# Patient Record
Sex: FEMALE | Race: WHITE | NOT HISPANIC OR LATINO | Employment: OTHER | URBAN - METROPOLITAN AREA
[De-identification: names, ages, dates, MRNs, and addresses within clinical notes are randomized per-mention and may not be internally consistent; named-entity substitution may affect disease eponyms.]

---

## 2017-09-15 ENCOUNTER — HISTORICAL (OUTPATIENT)
Dept: ADMINISTRATIVE | Facility: HOSPITAL | Age: 52
End: 2017-09-15

## 2017-09-15 LAB
ALBUMIN SERPL-MCNC: 3.7 G/DL (ref 3.1–4.7)
ALP SERPL-CCNC: 83 IU/L (ref 40–104)
ALT (SGPT): 18 IU/L (ref 3–33)
AST SERPL-CCNC: 18 IU/L (ref 10–40)
BASOPHILS NFR BLD: 0 K/UL (ref 0–0.2)
BASOPHILS NFR BLD: 0.4 %
BILIRUB SERPL-MCNC: 0.6 MG/DL (ref 0.3–1)
BUN SERPL-MCNC: 14 MG/DL (ref 8–20)
CALCIUM SERPL-MCNC: 9.1 MG/DL (ref 7.7–10.4)
CHLORIDE: 101 MMOL/L (ref 98–110)
CO2 SERPL-SCNC: 24.4 MMOL/L (ref 22.8–31.6)
CREATININE: 0.66 MG/DL (ref 0.6–1.4)
EOSINOPHIL NFR BLD: 0.1 K/UL (ref 0–0.7)
EOSINOPHIL NFR BLD: 1.2 %
ERYTHROCYTE [DISTWIDTH] IN BLOOD BY AUTOMATED COUNT: 17.2 % (ref 11.7–14.9)
FERRITIN SERPL-MCNC: 67 NG/ML (ref 24–162)
GLUCOSE: 121 MG/DL (ref 70–99)
GRAN #: 8.2 K/UL (ref 1.4–6.5)
GRAN%: 73.1 %
HCT VFR BLD AUTO: 36.7 % (ref 36–48)
HGB BLD-MCNC: 11.2 G/DL (ref 12–15)
IMMATURE GRANS (ABS): 0.1 K/UL (ref 0–1)
IMMATURE GRANULOCYTES: 0.4 %
LYMPH #: 2.2 K/UL (ref 1.2–3.4)
LYMPH%: 19.4 %
MCH RBC QN AUTO: 23.9 PG (ref 25–35)
MCHC RBC AUTO-ENTMCNC: 30.5 G/DL (ref 31–36)
MCV RBC AUTO: 78.4 FL (ref 79–98)
MONO #: 0.6 K/UL (ref 0.1–0.6)
MONO%: 5.5 %
NUCLEATED RBCS: 0 %
PLATELET # BLD AUTO: 443 K/UL (ref 140–440)
PMV BLD AUTO: 9.4 FL (ref 8.8–12.7)
POTASSIUM SERPL-SCNC: 4.1 MMOL/L (ref 3.5–5)
PROT SERPL-MCNC: 7.9 G/DL (ref 6–8.2)
RBC # BLD AUTO: 4.68 M/UL (ref 3.5–5.5)
SODIUM: 136 MMOL/L (ref 134–144)
WBC # BLD AUTO: 11.3 K/UL (ref 5–10)

## 2018-01-31 ENCOUNTER — OFFICE VISIT (OUTPATIENT)
Dept: FAMILY MEDICINE | Facility: CLINIC | Age: 53
End: 2018-01-31
Payer: MEDICARE

## 2018-01-31 VITALS
TEMPERATURE: 98 F | OXYGEN SATURATION: 97 % | HEART RATE: 91 BPM | DIASTOLIC BLOOD PRESSURE: 80 MMHG | BODY MASS INDEX: 50.02 KG/M2 | HEIGHT: 64 IN | WEIGHT: 293 LBS | SYSTOLIC BLOOD PRESSURE: 114 MMHG | RESPIRATION RATE: 18 BRPM

## 2018-01-31 DIAGNOSIS — M54.42 CHRONIC BILATERAL LOW BACK PAIN WITH BILATERAL SCIATICA: ICD-10-CM

## 2018-01-31 DIAGNOSIS — I89.0 LYMPHEDEMA: ICD-10-CM

## 2018-01-31 DIAGNOSIS — F33.1 MODERATE EPISODE OF RECURRENT MAJOR DEPRESSIVE DISORDER: ICD-10-CM

## 2018-01-31 DIAGNOSIS — E11.9 TYPE 2 DIABETES MELLITUS WITHOUT COMPLICATION, WITHOUT LONG-TERM CURRENT USE OF INSULIN: ICD-10-CM

## 2018-01-31 DIAGNOSIS — R26.81 UNSTEADINESS ON FEET: ICD-10-CM

## 2018-01-31 DIAGNOSIS — G89.29 CHRONIC BILATERAL LOW BACK PAIN WITH BILATERAL SCIATICA: ICD-10-CM

## 2018-01-31 DIAGNOSIS — R26.9 GAIT DIFFICULTY: ICD-10-CM

## 2018-01-31 DIAGNOSIS — M54.41 CHRONIC BILATERAL LOW BACK PAIN WITH BILATERAL SCIATICA: ICD-10-CM

## 2018-01-31 DIAGNOSIS — E66.2 PICKWICKIAN SYNDROME: Primary | ICD-10-CM

## 2018-01-31 DIAGNOSIS — L73.2 HIDRADENITIS SUPPURATIVA: ICD-10-CM

## 2018-01-31 DIAGNOSIS — D50.0 IRON DEFICIENCY ANEMIA DUE TO CHRONIC BLOOD LOSS: ICD-10-CM

## 2018-01-31 DIAGNOSIS — E66.01 MORBID OBESITY: ICD-10-CM

## 2018-01-31 DIAGNOSIS — B37.2 CANDIDAL INTERTRIGO: ICD-10-CM

## 2018-01-31 DIAGNOSIS — R53.81 PHYSICAL DECONDITIONING: ICD-10-CM

## 2018-01-31 DIAGNOSIS — R53.83 OTHER FATIGUE: ICD-10-CM

## 2018-01-31 PROBLEM — D50.9 IRON DEFICIENCY ANEMIA: Status: ACTIVE | Noted: 2018-01-31

## 2018-01-31 PROCEDURE — 99204 OFFICE O/P NEW MOD 45 MIN: CPT | Mod: ,,, | Performed by: INTERNAL MEDICINE

## 2018-01-31 RX ORDER — CLINDAMYCIN PHOSPHATE 11.9 MG/ML
SOLUTION TOPICAL 2 TIMES DAILY
Qty: 60 ML | Refills: 3 | Status: SHIPPED | OUTPATIENT
Start: 2018-01-31 | End: 2018-04-17

## 2018-01-31 RX ORDER — CLINDAMYCIN HYDROCHLORIDE 300 MG/1
300 CAPSULE ORAL 3 TIMES DAILY
Qty: 30 CAPSULE | Refills: 0 | Status: CANCELLED | OUTPATIENT
Start: 2018-01-31

## 2018-01-31 RX ORDER — FLUCONAZOLE 100 MG/1
100 TABLET ORAL DAILY
Qty: 30 TABLET | Refills: 0 | Status: SHIPPED | OUTPATIENT
Start: 2018-01-31 | End: 2018-03-02

## 2018-01-31 RX ORDER — GABAPENTIN 100 MG/1
100 CAPSULE ORAL 2 TIMES DAILY
COMMUNITY
End: 2018-04-17

## 2018-01-31 RX ORDER — BUPROPION HYDROCHLORIDE 150 MG/1
150 TABLET ORAL DAILY
Qty: 30 TABLET | Refills: 5 | Status: SHIPPED | OUTPATIENT
Start: 2018-01-31 | End: 2019-03-20 | Stop reason: CLARIF

## 2018-01-31 RX ORDER — METFORMIN HYDROCHLORIDE 500 MG/1
500 TABLET ORAL 2 TIMES DAILY WITH MEALS
COMMUNITY
End: 2018-02-26 | Stop reason: SDUPTHER

## 2018-01-31 RX ORDER — FERROUS SULFATE 325(65) MG
325 TABLET ORAL
COMMUNITY
End: 2021-08-26

## 2018-01-31 NOTE — Clinical Note
Can you try to get me a number for [facility] lymphedema physical therapy?  Also, Ms. Wright said [facility] one of [facility] med[facility] for cost???

## 2018-01-31 NOTE — PROGRESS NOTES
"                                                NEW ADULT PATIENT NOTE    Subjective:     Chief Complaint:  Establish Care      History of Present Illness:   Adriana Zaragoza is a 52 y.o. female who presents to establish care in this clinic, though she is well known to me from my previous practice. She has a history of morbid obesity, type 2 diabetes, low back pain, Obesity hypoventilation syndrome and massive localized lymphedema of R thigh. She has a few complaints today.  Fatigue- She felt she was doing well in terms of getting around, losing weight, until a month or so ago when she started feeling very fatigued. She states she sleeps 8 hours per night (though typically sleeps from 3AM to 11AM) but does not wake refreshed. She denies SOB, Cp, diaphoresis.  She states that her BiPAP was "reposessed" a few months ago and admits that is when these symptoms started. She has a h/o iron def anemia 2/2 menorrhagia, but her vaginal bleeding has decreased significantly this year.  Rash- Pt states she has a recurrent rash under her breasts and under her pannus. Dr. Rey had previous treated this with a combination of fluconazole and clindamycin (both Po).  She denies any rash under her arms.        Past Medical History:   Diagnosis Date    Allergy     Anemia     Asthma     COPD (chronic obstructive pulmonary disease)     Deep vein thrombosis     Depression     Diabetes mellitus, type 2     Encounter for blood transfusion     Heart murmur     Neuromuscular disorder        Family History   Problem Relation Age of Onset    Heart disease Mother     Diabetes Mother     Arthritis Mother     Depression Mother     Cancer Father     COPD Father     Arthritis Maternal Grandmother     Cancer Maternal Grandmother     Cancer Maternal Grandfather     Cancer Paternal Grandmother     Kidney disease Paternal Grandmother     Diabetes Paternal Grandmother     Diabetes Paternal Grandfather     Hyperlipidemia " Paternal Grandfather        Social History     Social History    Marital status: Legally      Spouse name: N/A    Number of children: N/A    Years of education: N/A     Occupational History    Not on file.     Social History Main Topics    Smoking status: Former Smoker    Smokeless tobacco: Never Used    Alcohol use No    Drug use: No    Sexual activity: No     Other Topics Concern    Not on file     Social History Narrative    No narrative on file       ROS:  Review of Systems   Constitutional: Positive for fatigue. Negative for activity change, appetite change, diaphoresis and unexpected weight change.   HENT: Negative for congestion, ear pain, rhinorrhea, sinus pain, sinus pressure, sore throat and trouble swallowing.    Eyes: Negative for pain, redness and visual disturbance.   Respiratory: Negative for cough, chest tightness, shortness of breath and wheezing.    Cardiovascular: Negative for chest pain and palpitations.   Gastrointestinal: Negative for abdominal pain, constipation, diarrhea, nausea and vomiting.   Endocrine: Negative for cold intolerance, heat intolerance, polydipsia and polyuria.   Genitourinary: Positive for vaginal bleeding. Negative for difficulty urinating, dysuria, flank pain, frequency, pelvic pain and vaginal discharge.   Musculoskeletal: Positive for arthralgias and back pain.   Skin: Positive for rash.   Neurological: Negative for dizziness, seizures, syncope, weakness and headaches.   Hematological: Negative for adenopathy.   Psychiatric/Behavioral: Positive for sleep disturbance. Negative for confusion, dysphoric mood and suicidal ideas. The patient is not nervous/anxious.           Current Outpatient Prescriptions:     ferrous sulfate 325 mg (65 mg iron) Tab tablet, Take 325 mg by mouth daily with breakfast., Disp: , Rfl:     metFORMIN (GLUCOPHAGE) 500 MG tablet, Take 500 mg by mouth 2 (two) times daily with meals., Disp: , Rfl:     buPROPion (WELLBUTRIN XL)  "150 MG TB24 tablet, Take 1 tablet (150 mg total) by mouth once daily., Disp: 30 tablet, Rfl: 5    clindamycin (CLEOCIN T) 1 % external solution, Apply topically 2 (two) times daily., Disp: 60 mL, Rfl: 3    fluconazole (DIFLUCAN) 100 MG tablet, Take 1 tablet (100 mg total) by mouth once daily., Disp: 30 tablet, Rfl: 0    gabapentin (NEURONTIN) 100 MG capsule, Take 100 mg by mouth 2 (two) times daily., Disp: , Rfl:         Objective:       Physical Examination:     /80   Pulse 91   Temp 98.2 °F (36.8 °C) (Oral)   Resp 18   Ht 5' 4" (1.626 m)   Wt (!) 205 kg (452 lb)   SpO2 97%   BMI 77.59 kg/m²     Physical Exam   Constitutional:   Morbidly obese woman using walker to ambulate, nasal cannula O2 in place, in NAD   Eyes: Pupils are equal, round, and reactive to light.   Cardiovascular: Regular rhythm, normal heart sounds and intact distal pulses.    No murmur heard.  Pulmonary/Chest: Effort normal and breath sounds normal. She has no wheezes. She has no rales.   Musculoskeletal: She exhibits edema (B LE ).   Skin:   Confluent areas of erythema with some papules, pustules under B breasts, pannus  Large pedunculated lymphadematous mass arising from R medial thigh, no active ulcerations/infections, approx 12 inches in diameter         Assessment/Plan:   Adriana is a 52 y.o. female here to establish care.     Problem List Items Addressed This Visit        Psychiatric    Moderate episode of recurrent major depressive disorder    Current Assessment & Plan     Trial of wellbutrin.         Relevant Medications    buPROPion (WELLBUTRIN XL) 150 MG TB24 tablet       Derm    Hidradenitis suppurativa    Current Assessment & Plan     Rx topical clindamycin.  Unclear exact nature of rash under breasts and pannus- most c/w candida intertrigo but presumably Dr. Rey felt there was a bacterial component as well. New ID referral placed, may consider derm referral instead.          Relevant Medications    clindamycin " "(CLEOCIN T) 1 % external solution    Other Relevant Orders    Ambulatory referral to Infectious Disease       ID    Candidal intertrigo    Relevant Medications    fluconazole (DIFLUCAN) 100 MG tablet    Other Relevant Orders    Ambulatory referral to Infectious Disease       Oncology    Iron deficiency anemia    Current Assessment & Plan     Recheck CBC, iron panel, B12.         Relevant Orders    CBC auto differential    Ferritin    Iron and TIBC       Endocrine    Type 2 diabetes mellitus without complication    Current Assessment & Plan     Continue metformin. HbA1c ordered.          Relevant Orders    Hemoglobin A1c    Morbid obesity    Current Assessment & Plan     Pt limits her caloric intake to around 800 kcal a day and has lost around 50 lbs in the time I have been her doctor.  Pt would prefer to "do it on her own" but we have discussed the possibility of bariatric surgery to help her lose weight more quickly. She is planning to contact Dr. Sigrid Hernandez to restart the process.            Orthopedic    Chronic low back pain with bilateral sciatica    Current Assessment & Plan     Pt not taking gabapentin as it doesn't help and causes sleepiness.  Pt aware weight loss is main goal/treatment at this point.  Will restart physical therapy with home health.             Other    Lymphedema    Current Assessment & Plan     Pt's massive localized lymphedema on her R leg severely limits her mobility and is painful.  Evidence is lacking for best course of treatment, though most literature recommends weight loss and traditional lymphedema treatments as first line, surgery only after weight loss.  Will discuss pt with lymphedema clinic, may be able to arrange for treatment with home health.          Relevant Orders    TSH    Pickwickian syndrome - Primary    Current Assessment & Plan     Pt referred back to Dr. Gibson. Continue home O2, needs new sleep study to get new BiPAP machine.          Relevant Orders    " Ambulatory referral to Pulmonology    Other fatigue    Relevant Orders    Vitamin B12    TSH      Other Visit Diagnoses     Unsteadiness on feet         Relevant Orders    Vitamin B12          Health Maintenance   Topic Date Due    Hepatitis C Screening  1965    TETANUS VACCINE  11/23/1982    Mammogram  11/23/2004    Colonoscopy  11/23/2014    Influenza Vaccine  08/01/2017    Pap Smear with HPV Cotest  10/07/2018    Lipid Panel  09/08/2021       Discussion:     Follow-up in about 3 months (around 4/30/2018).    Goals     None          Electronically signed by Jessica Baeza

## 2018-02-02 NOTE — ASSESSMENT & PLAN NOTE
Rx topical clindamycin.  Unclear exact nature of rash under breasts and pannus- most c/w candida intertrigo but presumably Dr. Rey felt there was a bacterial component as well. New ID referral placed, may consider derm referral instead.

## 2018-02-02 NOTE — ASSESSMENT & PLAN NOTE
Pt referred back to Dr. Gibson. Continue home O2, needs new sleep study to get new BiPAP machine.

## 2018-02-02 NOTE — ASSESSMENT & PLAN NOTE
Pt's massive localized lymphedema on her R leg severely limits her mobility and is painful.  Evidence is lacking for best course of treatment, though most literature recommends weight loss and traditional lymphedema treatments as first line, surgery only after weight loss.  Will discuss pt with lymphedema clinic, may be able to arrange for treatment with home health.

## 2018-02-02 NOTE — ASSESSMENT & PLAN NOTE
"Pt limits her caloric intake to around 800 kcal a day and has lost around 50 lbs in the time I have been her doctor.  Pt would prefer to "do it on her own" but we have discussed the possibility of bariatric surgery to help her lose weight more quickly. She is planning to contact Dr. Sigrid Hernandez to restart the process.  "

## 2018-02-02 NOTE — ASSESSMENT & PLAN NOTE
Pt not taking gabapentin as it doesn't help and causes sleepiness.  Pt aware weight loss is main goal/treatment at this point.  Will restart physical therapy with home health.

## 2018-02-07 LAB
% SATURATION: 7 %
ALBUMIN SERPL-MCNC: 3.6 G/DL (ref 3.1–4.7)
ALP SERPL-CCNC: 78 IU/L (ref 40–104)
ALT (SGPT): 19 IU/L (ref 3–33)
AST SERPL-CCNC: 17 IU/L (ref 10–40)
BASOPHILS NFR BLD: 0.1 K/UL (ref 0–0.2)
BASOPHILS NFR BLD: 0.4 %
BILIRUB SERPL-MCNC: 0.5 MG/DL (ref 0.3–1)
BUN SERPL-MCNC: 17 MG/DL (ref 8–20)
CALCIUM SERPL-MCNC: 9 MG/DL (ref 7.7–10.4)
CHLORIDE: 101 MMOL/L (ref 98–110)
CO2 SERPL-SCNC: 25.9 MMOL/L (ref 22.8–31.6)
CREATININE: 0.6 MG/DL (ref 0.6–1.4)
EOSINOPHIL NFR BLD: 0.1 K/UL (ref 0–0.7)
EOSINOPHIL NFR BLD: 1.1 %
ERYTHROCYTE [DISTWIDTH] IN BLOOD BY AUTOMATED COUNT: 16.8 % (ref 11.7–14.9)
FERRITIN SERPL-MCNC: 101 NG/ML (ref 24–162)
GLUCOSE: 93 MG/DL (ref 70–99)
GRAN #: 9 K/UL (ref 1.4–6.5)
GRAN%: 70.8 %
HCT VFR BLD AUTO: 35.3 % (ref 36–48)
HGB BLD-MCNC: 10.6 G/DL (ref 12–15)
IMMATURE GRANS (ABS): 0.1 K/UL (ref 0–1)
IMMATURE GRANULOCYTES: 0.4 %
IRON: 26 MCG/DL (ref 24–162)
LYMPH #: 2.5 K/UL (ref 1.2–3.4)
LYMPH%: 19.8 %
MCH RBC QN AUTO: 24.7 PG (ref 25–35)
MCHC RBC AUTO-ENTMCNC: 30 G/DL (ref 31–36)
MCV RBC AUTO: 82.1 FL (ref 79–98)
MONO #: 1 K/UL (ref 0.1–0.6)
MONO%: 7.5 %
NUCLEATED RBCS: 0 %
PLATELET # BLD AUTO: 374 K/UL (ref 140–440)
PMV BLD AUTO: 9.6 FL (ref 8.8–12.7)
POTASSIUM SERPL-SCNC: 3.9 MMOL/L (ref 3.5–5)
PROT SERPL-MCNC: 7.8 G/DL (ref 6–8.2)
RBC # BLD AUTO: 4.3 M/UL (ref 3.5–5.5)
SODIUM: 139 MMOL/L (ref 134–144)
TOTAL IRON BINDING CAPACITY: 357 MCG/DL (ref 177–435)
VITAMIN B12: 136 PG/ML (ref 62–940)
WBC # BLD AUTO: 12.7 K/UL (ref 5–10)

## 2018-02-08 LAB — HBA1C MFR BLD: 6 % (ref 3.1–6.5)

## 2018-02-22 ENCOUNTER — TELEPHONE (OUTPATIENT)
Dept: FAMILY MEDICINE | Facility: CLINIC | Age: 53
End: 2018-02-22

## 2018-02-22 DIAGNOSIS — E66.01 MORBID OBESITY: ICD-10-CM

## 2018-02-22 DIAGNOSIS — R79.89 LOW VITAMIN B12 LEVEL: Primary | ICD-10-CM

## 2018-02-22 DIAGNOSIS — R53.83 OTHER FATIGUE: ICD-10-CM

## 2018-02-22 NOTE — TELEPHONE ENCOUNTER
Emi from [facility] called to speak with you in regards to patient, she does not have lymphadema specialist that goes into the home, needs guidance about Pt. She said she spoke with you before [phone].

## 2018-02-23 NOTE — TELEPHONE ENCOUNTER
D/w patient that lymphedema in home not available.  Also discussed referral to Dr. Styles for bariatric surgery.

## 2018-02-26 RX ORDER — METFORMIN HYDROCHLORIDE 500 MG/1
500 TABLET ORAL 2 TIMES DAILY WITH MEALS
Qty: 30 TABLET | Refills: 5 | Status: SHIPPED | OUTPATIENT
Start: 2018-02-26 | End: 2018-09-24 | Stop reason: SDUPTHER

## 2018-03-02 LAB — T4 FREE SP9 P CHAL SERPL-SCNC: 0.75 NG/DL (ref 0.45–1.27)

## 2018-03-12 NOTE — TELEPHONE ENCOUNTER
Please call patient with normal results. TSH never resulted, can you check into whether they ever faisal it? Also check if pt heard from [facility] Bariatric surgery to schedule appointment.

## 2018-04-17 ENCOUNTER — OFFICE VISIT (OUTPATIENT)
Dept: FAMILY MEDICINE | Facility: CLINIC | Age: 53
End: 2018-04-17
Payer: MEDICARE

## 2018-04-17 VITALS
DIASTOLIC BLOOD PRESSURE: 68 MMHG | OXYGEN SATURATION: 98 % | RESPIRATION RATE: 18 BRPM | SYSTOLIC BLOOD PRESSURE: 116 MMHG | HEART RATE: 96 BPM | WEIGHT: 293 LBS | TEMPERATURE: 98 F | BODY MASS INDEX: 80.61 KG/M2

## 2018-04-17 DIAGNOSIS — L73.2 HIDRADENITIS SUPPURATIVA: ICD-10-CM

## 2018-04-17 DIAGNOSIS — I89.0 LYMPHEDEMA: ICD-10-CM

## 2018-04-17 DIAGNOSIS — E66.01 MORBID OBESITY: ICD-10-CM

## 2018-04-17 DIAGNOSIS — Z12.39 BREAST CANCER SCREENING: Primary | ICD-10-CM

## 2018-04-17 DIAGNOSIS — D50.0 IRON DEFICIENCY ANEMIA DUE TO CHRONIC BLOOD LOSS: ICD-10-CM

## 2018-04-17 DIAGNOSIS — B37.2 CANDIDAL INTERTRIGO: ICD-10-CM

## 2018-04-17 DIAGNOSIS — E11.9 TYPE 2 DIABETES MELLITUS WITHOUT COMPLICATION, WITHOUT LONG-TERM CURRENT USE OF INSULIN: ICD-10-CM

## 2018-04-17 DIAGNOSIS — F33.1 MODERATE EPISODE OF RECURRENT MAJOR DEPRESSIVE DISORDER: ICD-10-CM

## 2018-04-17 PROCEDURE — 99213 OFFICE O/P EST LOW 20 MIN: CPT | Mod: ,,, | Performed by: INTERNAL MEDICINE

## 2018-04-17 NOTE — PROGRESS NOTES
ADULT FOLLOW-UP VISIT    Subjective:     Chief Complaint:  Follow-up      51 yo woman here for follow-up.    Depression/fatigue- Pt unable to afford wellbutrin so didn't try it yet.  Did not go see Dr. Yoo yet.    Rash- Combination of antifungal and clindamycin topical helped. Pt also saw Dermatologist and they agreed likely fungal. Gave another antifungal cream and did UV light treatment.  Has f/u appointment scheduled.  Overall skin is better but flares up when pt does physical therapy.    Morbid obesity/lymphedema- Pt is seeing Dr. Francis at [facility], planning to have plastic surgeon come to next appointment and set a date for removal of left thigh MLL.             Ms. Zaragoza  has a past medical history of Allergy; Anemia; Asthma; COPD (chronic obstructive pulmonary disease); Deep vein thrombosis; Depression; Diabetes mellitus, type 2; Encounter for blood transfusion; Heart murmur; and Neuromuscular disorder.    Family history and social history are reviewed and there are no significant changes.     ROS:  Review of Systems   Constitutional: Negative for diaphoresis and fatigue.   Respiratory: Negative for shortness of breath and wheezing.    Cardiovascular: Positive for chest pain and leg swelling. Negative for palpitations.   Gastrointestinal: Negative for abdominal pain, blood in stool and constipation.   Genitourinary: Negative for menstrual problem and vaginal bleeding.   Musculoskeletal: Positive for arthralgias and back pain. Negative for joint swelling and neck pain.          Current Outpatient Prescriptions:     ferrous sulfate 325 mg (65 mg iron) Tab tablet, Take 325 mg by mouth daily with breakfast., Disp: , Rfl:     metFORMIN (GLUCOPHAGE) 500 MG tablet, Take 1 tablet (500 mg total) by mouth 2 (two) times daily with meals., Disp: 30 tablet, Rfl: 5    buPROPion (WELLBUTRIN XL) 150 MG TB24 tablet, Take 1 tablet (150 mg total) by mouth once daily., Disp: 30  tablet, Rfl: 5      Objective:     Physical Examination:     /68   Pulse 96   Temp 97.8 °F (36.6 °C) (Oral)   Resp 18   Wt (!) 213 kg (469 lb 9.6 oz)   SpO2 98%   BMI 80.61 kg/m²     Physical Exam   Constitutional:   Morbidly obese woman using walker to ambulate, nasal cannula O2 in place, in NAD   Eyes: Pupils are equal, round, and reactive to light.   Cardiovascular: Regular rhythm, normal heart sounds and intact distal pulses.    No murmur heard.  Pulmonary/Chest: Effort normal and breath sounds normal. She has no wheezes. She has no rales.   Musculoskeletal: She exhibits edema (B LE ).   Skin:   Confluent areas of erythema with some papules under pannus, improved from last visit  Large pedunculated lymphadematous mass arising from R medial thigh, no active ulcerations/infections, approx 12 inches in diameter       Assessment/Plan:   Adriana is a 52 y.o. female here for follow-up.    Problem List Items Addressed This Visit        Psychiatric    Moderate episode of recurrent major depressive disorder    Current Assessment & Plan     Gave pt a coupon for wellbutrin.            Derm    Hidradenitis suppurativa    Current Assessment & Plan     F/u with derm.            ID    Candidal intertrigo    Current Assessment & Plan     Continue topical antifungals, f/u with derm.             Oncology    Iron deficiency anemia    Current Assessment & Plan     Continue iron.  Discussed with patient that she will need a colonoscopy to r/o occult GI bleed.            Endocrine    Type 2 diabetes mellitus without complication    Current Assessment & Plan     HbA1c 6.0% 1/2018. Continue metformin.          Morbid obesity    Current Assessment & Plan     F/u with Dr. Styles at [facility], planned for gastric sleeve in the next 6 months.            Other    Lymphedema    Current Assessment & Plan     Scheduled for plastic surgery consult and possible surgical removal of massive localized lymphedema of LLE.            Other  Visit Diagnoses     Breast cancer screening    -  Primary    Relevant Orders    Mammo Digital Screening Bilat with CAD          Health Maintenance   Topic Date Due    Hepatitis C Screening  1965    Foot Exam  11/23/1974    Eye Exam  11/23/1974    TETANUS VACCINE  11/23/1982    Pneumococcal PPSV23 (Medium Risk) (1) 11/23/1982    Mammogram  11/23/2004    Colonoscopy  11/23/2014    Urine Microalbumin  05/16/2017    Influenza Vaccine  08/01/2017    Hemoglobin A1c  08/07/2018    Lipid Panel  09/15/2018    Pap Smear with HPV Cotest  10/07/2018           Discussion:     Follow-up in about 3 months (around 7/17/2018) for f/u weight, anemia.    Goals     None          Electronically signed by Jessica Baeza

## 2018-04-18 NOTE — ASSESSMENT & PLAN NOTE
F/u with Dr. Styles at [facility], planned for gastric sleeve in the next 6 months.

## 2018-04-18 NOTE — ASSESSMENT & PLAN NOTE
Scheduled for plastic surgery consult and possible surgical removal of massive localized lymphedema of LLE.

## 2018-05-29 DIAGNOSIS — G47.33 OSA (OBSTRUCTIVE SLEEP APNEA): Primary | ICD-10-CM

## 2018-06-06 ENCOUNTER — TELEPHONE (OUTPATIENT)
Dept: SLEEP MEDICINE | Facility: OTHER | Age: 53
End: 2018-06-06

## 2018-06-13 ENCOUNTER — TELEPHONE (OUTPATIENT)
Dept: SLEEP MEDICINE | Facility: OTHER | Age: 53
End: 2018-06-13

## 2018-06-21 ENCOUNTER — TELEPHONE (OUTPATIENT)
Dept: SLEEP MEDICINE | Facility: OTHER | Age: 53
End: 2018-06-21

## 2018-06-22 ENCOUNTER — HOSPITAL ENCOUNTER (OUTPATIENT)
Dept: SLEEP MEDICINE | Facility: OTHER | Age: 53
Discharge: HOME | End: 2018-06-22
Attending: SURGERY
Payer: MEDICARE

## 2018-06-22 DIAGNOSIS — G47.33 OSA (OBSTRUCTIVE SLEEP APNEA): ICD-10-CM

## 2018-06-22 DIAGNOSIS — G47.20 SLEEP STAGE DYSFUNCTION: ICD-10-CM

## 2018-06-22 PROCEDURE — 95806 SLEEP STUDY UNATT&RESP EFFT: CPT | Mod: 26,,, | Performed by: PSYCHIATRY & NEUROLOGY

## 2018-06-22 PROCEDURE — 95800 SLP STDY UNATTENDED: CPT

## 2018-07-17 ENCOUNTER — OFFICE VISIT (OUTPATIENT)
Dept: FAMILY MEDICINE | Facility: CLINIC | Age: 53
End: 2018-07-17
Payer: MEDICARE

## 2018-07-17 VITALS
WEIGHT: 293 LBS | HEIGHT: 64 IN | TEMPERATURE: 98 F | BODY MASS INDEX: 50.02 KG/M2 | SYSTOLIC BLOOD PRESSURE: 128 MMHG | HEART RATE: 91 BPM | OXYGEN SATURATION: 98 % | DIASTOLIC BLOOD PRESSURE: 70 MMHG | RESPIRATION RATE: 18 BRPM

## 2018-07-17 DIAGNOSIS — E11.9 TYPE 2 DIABETES MELLITUS WITHOUT COMPLICATION, WITHOUT LONG-TERM CURRENT USE OF INSULIN: Primary | ICD-10-CM

## 2018-07-17 DIAGNOSIS — R30.0 DYSURIA: ICD-10-CM

## 2018-07-17 DIAGNOSIS — B37.2 CANDIDAL INTERTRIGO: ICD-10-CM

## 2018-07-17 DIAGNOSIS — M79.10 MYALGIA: ICD-10-CM

## 2018-07-17 DIAGNOSIS — R50.9 FEVER, UNSPECIFIED FEVER CAUSE: ICD-10-CM

## 2018-07-17 DIAGNOSIS — I89.0 LYMPHEDEMA: ICD-10-CM

## 2018-07-17 DIAGNOSIS — L73.2 HIDRADENITIS SUPPURATIVA: ICD-10-CM

## 2018-07-17 PROCEDURE — 99214 OFFICE O/P EST MOD 30 MIN: CPT | Mod: ,,, | Performed by: INTERNAL MEDICINE

## 2018-07-17 RX ORDER — CLINDAMYCIN PHOSPHATE 11.9 MG/ML
SOLUTION TOPICAL 2 TIMES DAILY
Qty: 60 ML | Refills: 5 | Status: SHIPPED | OUTPATIENT
Start: 2018-07-17 | End: 2019-03-20 | Stop reason: CLARIF

## 2018-07-17 NOTE — PROGRESS NOTES
"                                    ADULT FOLLOW-UP VISIT    Subjective:     Chief Complaint:  Follow-up      51 yo woman here for routine follow-up. She reports that she was very sick about 10 days ago with shaking chills and body aches.  She also had some foul smelling urine and "burning" in her pelvis. She states she was so sick that several family members urged her to go to the hospital but she was "too tired to do anything" and got in her bed and slept for 22 hours.  After that she felt somewhat better, but continues to feel very "worn out" and having the urinary symptoms.  She denies any other signs of infection- no URI symptoms, no cough/shortness of breath, no diarrhea/n/v, no skin infections.       Fever    Associated symptoms include muscle aches, sleepiness and urinary pain. Pertinent negatives include no chest pain, congestion, coughing, diarrhea, ear pain, headaches, nausea, rash, sore throat, vomiting or wheezing.         [unfilled]  has a past medical history of Allergy; Anemia; Asthma; COPD (chronic obstructive pulmonary disease); Deep vein thrombosis; Depression; Diabetes mellitus, type 2; Encounter for blood transfusion; Heart murmur; and Neuromuscular disorder.    Family history and social history are reviewed and there are no significant changes.     ROS:  Review of Systems   Constitutional: Positive for fever.   HENT: Negative for congestion, ear pain and sore throat.    Respiratory: Negative for cough and wheezing.    Cardiovascular: Negative for chest pain.   Gastrointestinal: Negative for diarrhea, nausea and vomiting.   Genitourinary: Positive for dysuria.   Skin: Negative for rash.   Neurological: Negative for headaches.          Current Outpatient Prescriptions:     buPROPion (WELLBUTRIN XL) 150 MG TB24 tablet, Take 1 tablet (150 mg total) by mouth once daily., Disp: 30 tablet, Rfl: 5    ferrous sulfate 325 mg (65 mg iron) Tab tablet, Take 325 mg by mouth daily with breakfast., Disp: , " "Rfl:     metFORMIN (GLUCOPHAGE) 500 MG tablet, Take 1 tablet (500 mg total) by mouth 2 (two) times daily with meals., Disp: 30 tablet, Rfl: 5    clindamycin (CLEOCIN T) 1 % external solution, Apply topically 2 (two) times daily., Disp: 60 mL, Rfl: 5      Objective:     Physical Examination:     /70   Pulse 91   Temp 97.8 °F (36.6 °C) (Oral)   Resp 18   Ht 5' 4" (1.626 m)   Wt (!) 207.4 kg (457 lb 3 oz)   SpO2 98%   BMI 78.48 kg/m²     Physical Exam   Constitutional:   Morbidly obese woman using walker to ambulate, nasal cannula O2 in place, in NAD   Eyes: Pupils are equal, round, and reactive to light.   Cardiovascular: Regular rhythm, normal heart sounds and intact distal pulses.    No murmur heard.  Pulmonary/Chest: Effort normal and breath sounds normal. She has no wheezes. She has no rales.   Musculoskeletal: She exhibits edema (B LE ).   Skin:   Confluent areas of erythema with some papules under pannus  Large pedunculated lymphadematous mass arising from R medial thigh, no active ulcerations/infections, approx 12 inches in diameter       Assessment/Plan:   Adriana is a 52 y.o. female here for follow-up.    Problem List Items Addressed This Visit        Derm    Hidradenitis suppurativa    Current Assessment & Plan     F/u with derm. Restart topical clindamycin.         Relevant Medications    clindamycin (CLEOCIN T) 1 % external solution       Renal/    Dysuria    Relevant Orders    Urinalysis (Completed)    Urine culture (Completed)       ID    Candidal intertrigo       Endocrine    Type 2 diabetes mellitus without complication - Primary    Current Assessment & Plan     HbA1c 6.0% 1/2018. Continue metformin. HbA1c ordered.          Relevant Orders    Hemoglobin A1c       Orthopedic    Myalgia    Relevant Orders    Comprehensive metabolic panel    CK       Other    Lymphedema    Current Assessment & Plan     Has seen plastic surgeon and gen surgeon at [facility] for surgical removal of massive " localized lymphedema of LLE. Planned for CT of mass and vascular surgery consult.  Also needs pre-op evaluation by cardiology and endocrine.          Fever    Current Assessment & Plan     Unclear cause of recent febrile illness. U/a bland but will send urine for culture. Check CBC, CMP, CK.           Relevant Orders    Comprehensive metabolic panel    CBC auto differential          Health Maintenance   Topic Date Due    Hepatitis C Screening  1965    Foot Exam  11/23/1974    Eye Exam  11/23/1974    Mammogram  11/23/2004    Colonoscopy  11/23/2014    Urine Microalbumin  05/16/2017    Influenza Vaccine  08/01/2018    Hemoglobin A1c  08/07/2018    Lipid Panel  09/15/2018    Pap Smear with HPV Cotest  10/07/2018    TETANUS VACCINE  05/23/2027    Pneumococcal PPSV23 (Medium Risk) (2) 11/23/2029           Discussion:     Follow-up in about 3 months (around 10/17/2018).    Goals     None          Electronically signed by Jessica Baeza

## 2018-07-18 NOTE — ASSESSMENT & PLAN NOTE
Unclear cause of recent febrile illness. U/a bland but will send urine for culture. Check CBC, CMP, CK.

## 2018-07-18 NOTE — ASSESSMENT & PLAN NOTE
Has seen plastic surgeon and gen surgeon at [facility] for surgical removal of massive localized lymphedema of LLE. Planned for CT of mass and vascular surgery consult.  Also needs pre-op evaluation by cardiology and endocrine.

## 2018-07-19 LAB
ALBUMIN SERPL-MCNC: 3.4 G/DL (ref 3.1–4.7)
ALP SERPL-CCNC: 82 IU/L (ref 40–104)
ALT (SGPT): 25 IU/L (ref 3–33)
AST SERPL-CCNC: 19 IU/L (ref 10–40)
BILIRUB SERPL-MCNC: 0.5 MG/DL (ref 0.3–1)
BUN SERPL-MCNC: 16 MG/DL (ref 8–20)
CALCIUM SERPL-MCNC: 9 MG/DL (ref 7.7–10.4)
CHLORIDE: 100 MMOL/L (ref 98–110)
CK MB CFR SERPL ELPH: 0.9 NG/ML (ref 0–4.5)
CO2 SERPL-SCNC: 27 MMOL/L (ref 22.8–31.6)
CREATININE: 0.57 MG/DL (ref 0.6–1.4)
GLUCOSE: 140 MG/DL (ref 70–99)
HBA1C MFR BLD: 6.5 % (ref 3.1–6.5)
POTASSIUM SERPL-SCNC: 4.2 MMOL/L (ref 3.5–5)
PROT SERPL-MCNC: 7.8 G/DL (ref 6–8.2)
SODIUM: 139 MMOL/L (ref 134–144)

## 2018-07-31 NOTE — PROGRESS NOTES
Please call patient with normal results. If she is having any more urinary symptoms we can try to redo the urine culture as it looked contaminated. Everything else was okay. HbA1c up to 6.5%, have her increase metformin back to BID if tolerated.

## 2018-08-27 ENCOUNTER — TELEPHONE (OUTPATIENT)
Dept: FAMILY MEDICINE | Facility: CLINIC | Age: 53
End: 2018-08-27

## 2018-08-27 DIAGNOSIS — N28.9 KIDNEY LESION, NATIVE, RIGHT: Primary | ICD-10-CM

## 2018-08-27 NOTE — TELEPHONE ENCOUNTER
Pt brought copy of CT pelvis done at [facility] with incidentally noted R renal lesion. U/s ordered for further evaluation.

## 2018-09-13 NOTE — PROGRESS NOTES
Please call patient with normal results.  Lesion noted on CT is a benign cyst which has been present since 2013 and is unchanged.

## 2018-09-24 RX ORDER — METFORMIN HYDROCHLORIDE 500 MG/1
TABLET ORAL
Qty: 60 TABLET | Refills: 5 | Status: SHIPPED | OUTPATIENT
Start: 2018-09-24 | End: 2019-03-20 | Stop reason: CLARIF

## 2019-03-20 ENCOUNTER — HOSPITAL ENCOUNTER (EMERGENCY)
Facility: HOSPITAL | Age: 54
Discharge: OTHER INSTITUTION | End: 2019-03-21
Attending: EMERGENCY MEDICINE
Payer: MEDICARE

## 2019-03-20 DIAGNOSIS — R10.84 ABDOMINAL PAIN, DIFFUSE: ICD-10-CM

## 2019-03-20 DIAGNOSIS — R11.2 INTRACTABLE VOMITING WITH NAUSEA, UNSPECIFIED VOMITING TYPE: Primary | ICD-10-CM

## 2019-03-20 LAB
ALBUMIN SERPL BCP-MCNC: 3.7 G/DL
ALP SERPL-CCNC: 78 U/L
ALT SERPL W/O P-5'-P-CCNC: 35 U/L
ANION GAP SERPL CALC-SCNC: 10 MMOL/L
AST SERPL-CCNC: 28 U/L
BASOPHILS # BLD AUTO: 0 K/UL
BASOPHILS NFR BLD: 0.1 %
BILIRUB SERPL-MCNC: 0.5 MG/DL
BILIRUB UR QL STRIP: NEGATIVE
BUN SERPL-MCNC: 11 MG/DL
CALCIUM SERPL-MCNC: 10.1 MG/DL
CHLORIDE SERPL-SCNC: 102 MMOL/L
CLARITY UR: CLEAR
CO2 SERPL-SCNC: 27 MMOL/L
COLOR UR: YELLOW
CREAT SERPL-MCNC: 0.7 MG/DL
DIFFERENTIAL METHOD: ABNORMAL
EOSINOPHIL # BLD AUTO: 0 K/UL
EOSINOPHIL NFR BLD: 0.1 %
ERYTHROCYTE [DISTWIDTH] IN BLOOD BY AUTOMATED COUNT: 17.7 %
EST. GFR  (AFRICAN AMERICAN): >60 ML/MIN/1.73 M^2
EST. GFR  (NON AFRICAN AMERICAN): >60 ML/MIN/1.73 M^2
GLUCOSE SERPL-MCNC: 154 MG/DL
GLUCOSE UR QL STRIP: NEGATIVE
HCT VFR BLD AUTO: 37.3 %
HGB BLD-MCNC: 11.7 G/DL
HGB UR QL STRIP: NEGATIVE
KETONES UR QL STRIP: ABNORMAL
LEUKOCYTE ESTERASE UR QL STRIP: NEGATIVE
LIPASE SERPL-CCNC: 8 U/L
LYMPHOCYTES # BLD AUTO: 1.1 K/UL
LYMPHOCYTES NFR BLD: 9.3 %
MCH RBC QN AUTO: 24.2 PG
MCHC RBC AUTO-ENTMCNC: 31.4 G/DL
MCV RBC AUTO: 77 FL
MONOCYTES # BLD AUTO: 0.3 K/UL
MONOCYTES NFR BLD: 2.9 %
NEUTROPHILS # BLD AUTO: 10.2 K/UL
NEUTROPHILS NFR BLD: 87.6 %
NITRITE UR QL STRIP: NEGATIVE
PH UR STRIP: 6 [PH] (ref 5–8)
PLATELET # BLD AUTO: 409 K/UL
PMV BLD AUTO: 7.8 FL
POTASSIUM SERPL-SCNC: 4.1 MMOL/L
PROT SERPL-MCNC: 8.1 G/DL
PROT UR QL STRIP: ABNORMAL
RBC # BLD AUTO: 4.84 M/UL
SODIUM SERPL-SCNC: 139 MMOL/L
SP GR UR STRIP: 1.02 (ref 1–1.03)
URN SPEC COLLECT METH UR: ABNORMAL
UROBILINOGEN UR STRIP-ACNC: NEGATIVE EU/DL
WBC # BLD AUTO: 11.6 K/UL

## 2019-03-20 PROCEDURE — 96366 THER/PROPH/DIAG IV INF ADDON: CPT

## 2019-03-20 PROCEDURE — 81003 URINALYSIS AUTO W/O SCOPE: CPT

## 2019-03-20 PROCEDURE — 85025 COMPLETE CBC W/AUTO DIFF WBC: CPT

## 2019-03-20 PROCEDURE — 83690 ASSAY OF LIPASE: CPT

## 2019-03-20 PROCEDURE — 96375 TX/PRO/DX INJ NEW DRUG ADDON: CPT | Mod: 59

## 2019-03-20 PROCEDURE — 99285 EMERGENCY DEPT VISIT HI MDM: CPT | Mod: 25

## 2019-03-20 PROCEDURE — 96361 HYDRATE IV INFUSION ADD-ON: CPT

## 2019-03-20 PROCEDURE — 25500020 PHARM REV CODE 255: Performed by: EMERGENCY MEDICINE

## 2019-03-20 PROCEDURE — 96365 THER/PROPH/DIAG IV INF INIT: CPT | Mod: 59

## 2019-03-20 PROCEDURE — 80053 COMPREHEN METABOLIC PANEL: CPT

## 2019-03-20 PROCEDURE — 36415 COLL VENOUS BLD VENIPUNCTURE: CPT

## 2019-03-20 PROCEDURE — 63600175 PHARM REV CODE 636 W HCPCS: Performed by: EMERGENCY MEDICINE

## 2019-03-20 PROCEDURE — 25000003 PHARM REV CODE 250: Performed by: EMERGENCY MEDICINE

## 2019-03-20 PROCEDURE — 96376 TX/PRO/DX INJ SAME DRUG ADON: CPT

## 2019-03-20 RX ORDER — HYDROMORPHONE HYDROCHLORIDE 2 MG/ML
0.5 INJECTION, SOLUTION INTRAMUSCULAR; INTRAVENOUS; SUBCUTANEOUS
Status: COMPLETED | OUTPATIENT
Start: 2019-03-20 | End: 2019-03-20

## 2019-03-20 RX ORDER — ONDANSETRON 2 MG/ML
8 INJECTION INTRAMUSCULAR; INTRAVENOUS
Status: COMPLETED | OUTPATIENT
Start: 2019-03-20 | End: 2019-03-20

## 2019-03-20 RX ORDER — HYDROMORPHONE HYDROCHLORIDE 2 MG/ML
1 INJECTION, SOLUTION INTRAMUSCULAR; INTRAVENOUS; SUBCUTANEOUS
Status: COMPLETED | OUTPATIENT
Start: 2019-03-20 | End: 2019-03-20

## 2019-03-20 RX ORDER — HYDROMORPHONE HYDROCHLORIDE 2 MG/ML
INJECTION, SOLUTION INTRAMUSCULAR; INTRAVENOUS; SUBCUTANEOUS
Status: DISPENSED
Start: 2019-03-20 | End: 2019-03-21

## 2019-03-20 RX ORDER — HYDROMORPHONE HYDROCHLORIDE 2 MG/ML
0.5 INJECTION, SOLUTION INTRAMUSCULAR; INTRAVENOUS; SUBCUTANEOUS
Status: COMPLETED | OUTPATIENT
Start: 2019-03-21 | End: 2019-03-21

## 2019-03-20 RX ADMIN — HYDROMORPHONE HYDROCHLORIDE 0.5 MG: 2 INJECTION INTRAMUSCULAR; INTRAVENOUS; SUBCUTANEOUS at 03:03

## 2019-03-20 RX ADMIN — SODIUM CHLORIDE 1000 ML: 0.9 INJECTION, SOLUTION INTRAVENOUS at 12:03

## 2019-03-20 RX ADMIN — IOHEXOL 5 ML: 350 INJECTION, SOLUTION INTRAVENOUS at 01:03

## 2019-03-20 RX ADMIN — IOHEXOL 100 ML: 350 INJECTION, SOLUTION INTRAVENOUS at 01:03

## 2019-03-20 RX ADMIN — HYDROMORPHONE HYDROCHLORIDE 0.5 MG: 2 INJECTION, SOLUTION INTRAMUSCULAR; INTRAVENOUS; SUBCUTANEOUS at 04:03

## 2019-03-20 RX ADMIN — ONDANSETRON 8 MG: 2 INJECTION INTRAMUSCULAR; INTRAVENOUS at 12:03

## 2019-03-20 RX ADMIN — PROMETHAZINE HYDROCHLORIDE 25 MG: 25 INJECTION INTRAMUSCULAR; INTRAVENOUS at 08:03

## 2019-03-20 RX ADMIN — HYDROMORPHONE HYDROCHLORIDE 1 MG: 2 INJECTION, SOLUTION INTRAMUSCULAR; INTRAVENOUS; SUBCUTANEOUS at 12:03

## 2019-03-20 NOTE — ED PROVIDER NOTES
"Encounter Date: 3/20/2019    SCRIBE #1 NOTE: I, Keira Lockhart, am scribing for, and in the presence of, Dr. Melissa.       History     Chief Complaint   Patient presents with    Abdominal Pain     Gastric sleeve 4 weeks ago at [facility]       Time seen by provider: 11:31 PM on 2019    Adriana Zaragoza is a 53 y.o. female who presents to the ED via EMS complaining of severe abdominal pain starting last night. Pt states that she has been dry heaving and vomiting clear phlegm for the past 12 hours. Pt received a gastric sleeve 4 weeks ago by Dr. Francis at [facility]. Pt reports that she was told to start eating soft foods last week and thinks she "overdid it" with eating foods last night. She reports that she has been unable to belch or pass gas x 2 days. She took 2 Gas-X with no relief. Her last bowel movement was 2 days ago and was normal. Pt notes that a small hernia was found during surgery. The patient denies fever or any other symptoms at this time. PMHx includes DM, anemia, COPD, and DVT. No other abdominal surgery noted. Allergies include aspirin.      The history is provided by the patient.     Review of patient's allergies indicates:   Allergen Reactions    Aspirin      Past Medical History:   Diagnosis Date    Allergy     Anemia     Asthma     COPD (chronic obstructive pulmonary disease)     Deep vein thrombosis     Depression     Diabetes mellitus, type 2     Encounter for blood transfusion     Heart murmur     Neuromuscular disorder      Past Surgical History:   Procedure Laterality Date     SECTION      DILATION AND CURETTAGE OF UTERUS      TONSILLECTOMY       Family History   Problem Relation Age of Onset    Heart disease Mother     Diabetes Mother     Arthritis Mother     Depression Mother     Cancer Father     COPD Father     Arthritis Maternal Grandmother     Cancer Maternal Grandmother     Cancer Maternal Grandfather     Cancer " Paternal Grandmother     Kidney disease Paternal Grandmother     Diabetes Paternal Grandmother     Diabetes Paternal Grandfather     Hyperlipidemia Paternal Grandfather      Social History     Tobacco Use    Smoking status: Former Smoker    Smokeless tobacco: Never Used   Substance Use Topics    Alcohol use: No    Drug use: No     Review of Systems   Constitutional: Negative for fever.   HENT: Negative for sore throat.    Respiratory: Negative for shortness of breath.    Cardiovascular: Negative for chest pain.   Gastrointestinal: Positive for abdominal pain, nausea and vomiting.        + Dry heaves.  + Unable to belch or pass gas.   Genitourinary: Negative for dysuria.   Musculoskeletal: Negative for back pain.   Skin: Negative for rash.   Neurological: Negative for weakness.   Hematological: Does not bruise/bleed easily.       Physical Exam     Initial Vitals [03/20/19 1123]   BP Pulse Resp Temp SpO2   135/60 92 (!) 22 98 °F (36.7 °C) 95 %      MAP       --         Physical Exam    Nursing note and vitals reviewed.  Constitutional: She appears well-developed and well-nourished. She is Obese .  Non-toxic appearance. No distress.   Morbidly obese.   HENT:   Head: Normocephalic and atraumatic.   Eyes: EOM are normal. Pupils are equal, round, and reactive to light.   Neck: Normal range of motion. Neck supple. No neck rigidity. No JVD present.   Cardiovascular: Normal rate, regular rhythm and intact distal pulses. Exam reveals no gallop and no friction rub.    Murmur heard.   Systolic murmur is present.  Systolic ejection murmur.   Pulmonary/Chest: Breath sounds normal. She has no wheezes. She has no rhonchi. She has no rales.   Abdominal: Soft. She exhibits no distension. Bowel sounds are decreased. There is tenderness. There is no rigidity, no rebound and no guarding. A hernia is present.   Diffuse abdominal tenderness. Several abdominal wounds that are healing well. Incision in left upper quadrant that is  not healing as well with marginal erythema and mild purulence under the scab. Umbilical hernia that is difficult to distinguish.   Musculoskeletal: Normal range of motion.   Neurological: She is alert and oriented to person, place, and time. She has normal strength and normal reflexes. No cranial nerve deficit or sensory deficit. She exhibits normal muscle tone. Coordination normal. GCS eye subscore is 4. GCS verbal subscore is 5. GCS motor subscore is 6.   Skin: Skin is warm and dry. There is erythema.   Psychiatric: She has a normal mood and affect. Her speech is normal and behavior is normal. She is not actively hallucinating.         ED Course   Procedures  Labs Reviewed   CBC W/ AUTO DIFFERENTIAL - Abnormal; Notable for the following components:       Result Value    Hemoglobin 11.7 (*)     MCV 77 (*)     MCH 24.2 (*)     MCHC 31.4 (*)     RDW 17.7 (*)     Platelets 409 (*)     MPV 7.8 (*)     Gran # (ANC) 10.2 (*)     Gran% 87.6 (*)     Lymph% 9.3 (*)     Mono% 2.9 (*)     All other components within normal limits   COMPREHENSIVE METABOLIC PANEL - Abnormal; Notable for the following components:    Glucose 154 (*)     All other components within normal limits   URINALYSIS, REFLEX TO URINE CULTURE - Abnormal; Notable for the following components:    Protein, UA Trace (*)     Ketones, UA 2+ (*)     All other components within normal limits    Narrative:     Preferred Collection Type->Urine, Clean Catch   LIPASE          Imaging Results          CT Abdomen Pelvis With Contrast (Final result)  Result time 03/20/19 14:40:10    Final result by Boogie Tijerina Jr., MD (03/20/19 14:40:10)                 Impression:      Midline hernia of the inferior abdominal and pelvic wall with a hernia sac containing a loop of the sigmoid colon and a loop of the ileum.  No bowel obstruction is identified.  There is a 3.2 cm mass of the right kidney a possible cyst.  Recommend ultrasound confirmation that this is cystic rather  than solid.      Electronically signed by: Boogie Tijerina MD  Date:    03/20/2019  Time:    14:40             Narrative:    EXAMINATION:  CT ABDOMEN PELVIS WITH CONTRAST    CLINICAL HISTORY:  Abdominal distension;    TECHNIQUE:  Low dose axial images, sagittal and coronal reformations were obtained from the lung bases to the pubic symphysis following the IV administration of 75 mL of Omnipaque 350 and the oral administration of 30 mL of Omnipaque 350.    COMPARISON:  None.    FINDINGS:  The liver is of normal size and CT density without focal defect.  The gallbladder is of normal size without CT evidence of stone.  The pancreas is of normal contour with fatty atrophy.  Edema or mass is not seen.  The spleen is of normal size and CT density.    The adrenal glands are not enlarged.  The kidneys are of normal size and contrast enhancement.  There is a 3.2 cm mass of the right kidney posteriorly.  This may represent a cyst but ultrasound confirmation is recommended.  Stone or hydronephrosis is not seen.  The abdominal aorta and inferior vena cava are of normal caliber.    The patient has a hernia of the central inferior abdominal wall below the umbilicus.  This  produces a saccular protrusion into the pelvic fat anterior to the pelvis.  It contains a loop of the sigmoid colon and ileal small bowel loops.. Bowel distention or air-fluid levels are not seen.  A soft tissue mass is not noted.  No free fluid is seen.    The bladder is of normal contour without mass or asymmetry.  The uterus is of normal size and CT density.  No free fluid is seen.                                 Medical Decision Making:   History:   Old Medical Records: I decided to obtain old medical records.  Initial Assessment:   53-year-old woman who presents emergency department complaining of gradual onset of worsening abdominal pain with associated nausea and vomiting over the past day.  She has not had a bowel movement over day is not passing gas.   Her abdominal pain is diffuse.  No fever.  On examination she has diffuse tenderness and a non reducible umbilical hernia.  She has no leukocytosis to suspect infectious etiology.  No great electrolyte abnormalities or decreasing renal function.  No evidence of acute pancreatitis.  CT abdomen pelvis with IV and oral contrast was obtained shows no obstruction or leak from her recent gastric sleeve procedure.  It does show umbilical hernia but does not reveal evidence of obstruction or inflammatory changes.  Patient was treated with multiple rounds of antiemetics and pain medication and remained nauseated and having abdominal pain. Discussed with Dr. Styles who accepts patient for transfer to [facility] for further evaluation.  Clinical Tests:   Lab Tests: Ordered and Reviewed  Radiological Study: Ordered and Reviewed            Scribe Attestation:   Scribe #1: I performed the above scribed service and the documentation accurately describes the services I performed. I attest to the accuracy of the note.    I, Shin Mantilla, personally performed the services described in this documentation. All medical record entries made by the scribe were at my direction and in my presence.  I have reviewed the chart and agree that the record reflects my personal performance and is accurate and complete. Vishnu Melissa MD.  11:02 PM 03/20/2019       DISCLAIMER: This note was prepared with [employer] voice recognition transcription software. Garbled syntax, mangled pronouns, and other bizarre constructions may be attributed to that software system.             Clinical Impression:       ICD-10-CM ICD-9-CM   1. Intractable vomiting with nausea, unspecified vomiting type R11.2 536.2   2. Abdominal pain, diffuse R10.84 789.00         Disposition:   Disposition: Transferred                        Vishnu Melissa MD  03/20/19 [id number]

## 2019-03-21 VITALS
TEMPERATURE: 98 F | WEIGHT: 293 LBS | RESPIRATION RATE: 16 BRPM | SYSTOLIC BLOOD PRESSURE: 130 MMHG | HEIGHT: 64 IN | DIASTOLIC BLOOD PRESSURE: 68 MMHG | HEART RATE: 74 BPM | OXYGEN SATURATION: 99 % | BODY MASS INDEX: 50.02 KG/M2

## 2019-03-21 PROCEDURE — 63600175 PHARM REV CODE 636 W HCPCS: Performed by: EMERGENCY MEDICINE

## 2019-03-21 RX ADMIN — HYDROMORPHONE HYDROCHLORIDE 0.5 MG: 2 INJECTION INTRAMUSCULAR; INTRAVENOUS; SUBCUTANEOUS at 12:03

## 2019-03-21 NOTE — ED NOTES
Transfer center called. Pt going to [facility] room 5342. Number given to call their transfer center with report.

## 2019-05-30 ENCOUNTER — TELEPHONE (OUTPATIENT)
Dept: FAMILY MEDICINE | Facility: CLINIC | Age: 54
End: 2019-05-30

## 2019-05-30 NOTE — TELEPHONE ENCOUNTER
Left message for patient to contact the office to get updated on her Mammogram and colonoscopy. Stated that if she wanted an order or referral to schedule an appointment to get update on health issues as well as those two tests.

## 2019-07-10 ENCOUNTER — CLINICAL SUPPORT (OUTPATIENT)
Dept: URGENT CARE | Facility: CLINIC | Age: 54
End: 2019-07-10
Payer: MEDICARE

## 2019-07-10 VITALS
DIASTOLIC BLOOD PRESSURE: 71 MMHG | OXYGEN SATURATION: 97 % | TEMPERATURE: 99 F | SYSTOLIC BLOOD PRESSURE: 131 MMHG | HEIGHT: 64 IN | BODY MASS INDEX: 50.02 KG/M2 | HEART RATE: 79 BPM | WEIGHT: 293 LBS

## 2019-07-10 DIAGNOSIS — L30.9 DERMATITIS: Primary | ICD-10-CM

## 2019-07-10 PROCEDURE — 96372 PR INJECTION,THERAP/PROPH/DIAG2ST, IM OR SUBCUT: ICD-10-PCS | Mod: S$GLB,,, | Performed by: NURSE PRACTITIONER

## 2019-07-10 PROCEDURE — 99204 OFFICE O/P NEW MOD 45 MIN: CPT | Mod: 25,S$GLB,, | Performed by: NURSE PRACTITIONER

## 2019-07-10 PROCEDURE — 99204 PR OFFICE/OUTPT VISIT, NEW, LEVL IV, 45-59 MIN: ICD-10-PCS | Mod: 25,S$GLB,, | Performed by: NURSE PRACTITIONER

## 2019-07-10 PROCEDURE — 96372 THER/PROPH/DIAG INJ SC/IM: CPT | Mod: S$GLB,,, | Performed by: NURSE PRACTITIONER

## 2019-07-10 RX ORDER — DEXAMETHASONE SODIUM PHOSPHATE 4 MG/ML
8 INJECTION, SOLUTION INTRA-ARTICULAR; INTRALESIONAL; INTRAMUSCULAR; INTRAVENOUS; SOFT TISSUE
Status: COMPLETED | OUTPATIENT
Start: 2019-07-10 | End: 2019-07-10

## 2019-07-10 RX ORDER — HYDROXYZINE HYDROCHLORIDE 50 MG/1
50 TABLET, FILM COATED ORAL 4 TIMES DAILY PRN
Qty: 30 TABLET | Refills: 0 | Status: SHIPPED | OUTPATIENT
Start: 2019-07-10 | End: 2019-10-17

## 2019-07-10 RX ORDER — PREDNISONE 20 MG/1
20 TABLET ORAL 2 TIMES DAILY
Qty: 10 TABLET | Refills: 0 | Status: SHIPPED | OUTPATIENT
Start: 2019-07-10 | End: 2019-07-15

## 2019-07-10 RX ADMIN — DEXAMETHASONE SODIUM PHOSPHATE 8 MG: 4 INJECTION, SOLUTION INTRA-ARTICULAR; INTRALESIONAL; INTRAMUSCULAR; INTRAVENOUS; SOFT TISSUE at 02:07

## 2019-07-10 NOTE — PATIENT INSTRUCTIONS
Nonspecific Dermatitis  Dermatitis is a skin rash caused by something that touches the skin and makes it irritated and inflamed.  Your skin may be red, swollen, dry, and may be cracked. Blisters may form and ooze. The rash will itch.  Dermatitis can form on the face and neck, backs of hands, forearms, genitals, and lower legs. Dermatitis is not passed from person to person.  Talk with your health care provider about what may have caused the rash. Common things that cause skin allergies are metal in jewelry, plants like poison ivy or poison oak, and certain skin care products. You will need to avoid the source of your rash in the future to prevent it from coming back. In some cases, the cause of the dermatitis may not be found.  Treatment is done to relieve itching and prevent the rash from coming back. The rash should go away in a few days to a few weeks.  Home care  The health care provider may prescribe medications to relieve swelling and itching. Follow all instructions when using these medications.  · Avoid anything that heats up your skin, such as hot showers or baths, or direct sunlight. This can make itching worse.  · Stay away from whatever you think caused the rash.  · Bathe in warm, not hot, water. Apply a moisturizing lotion after bathing to prevent dry skin.  · Avoid skin irritants such as wool or silk clothing, grease, oils, harsh soaps, and detergents.  · Apply cold compresses to soothe your sores to help relieve your symptoms. Do this for 30 minutes 3 to 4 times a day. You can make a cold compress by soaking a cloth in cold water. Squeeze out excess water. You can add colloidal oatmeal to the water to help reduce itching. For severe itching in a small area, apply an ice pack wrapped in a thin towel. Do this for 20 minutes 3 to 4 times a day.  · You can also help relieve large areas of itching by taking a lukewarm bath with colloidal oatmeal added to the water.  · Use hydrocortisone cream for redness  and irritation, unless another medicine was prescribed. You can also use benzocaine anesthetic cream or spray.  · Use oral diphenhydramine to help reduce itching. This is an antihistamine you can buy at drug and grocery stores. It can make you sleepy, so use lower doses during the daytime. Or you can use loratadine. This is an antihistamine that will not make you sleepy. Dont use diphenhydramine if you have glaucoma or have trouble urinating because of an enlarged prostate.  · Wash your hands or use an antibacterial gel often to prevent the spread of the rash.  Follow-up care  Follow up with your health care provider. Make an appointment with your health care provider if your symptoms do not get better in the next 1 to 2 weeks.  When to seek medical advice  Call your health care provider right away if any of these occur:  · Spreading of the rash to other parts of your body  · Severe swelling of your face, eyelids, mouth, throat or tongue  · Trouble urinating due to swelling in the genital area  · Fever of 100.4°F (38°C) or higher  · Redness or swelling that gets worse  · Pain that gets worse  · Foul-smelling fluid leaking from the skin  · Yellow-brown crusts on the open blisters  · Joint pain   Date Last Reviewed: 7/23/2014  © 6931-4629 [employer] [address] [place] [address], [place] [address]. All rights reserved. This information is not intended as a substitute for professional medical care. Always follow your healthcare professional's instructions.        Self-Care for Skin Rashes     Pat your skin dry. Do not rub.     When your skin reacts to a substance your body is sensitive to, it can cause a rash. You can treat most rashes at home by keeping the skin clean and dry. Many rashes may get better on their own within 2 to 3 days. You may need medical attention if your rash itches, drains, or hurts, particularly if the rash is getting worse.  What can cause a skin rash?  · Sun poisoning, caused by too  much exposure to the sun  · An irritant or allergic reaction to a certain type of food, plant, or chemical, such as  shellfish, poison ivy, and or cleaning products  · An infection caused by a fungus (ringworm), virus (chickenpox), or bacteria (strep)  · Bites or infestation caused by insects or pests, such as ticks, lice, or mites  · Dry skin, which is often seen during the winter months and in older people  How can I control itching and skin damage?  · Take soothing lukewarm baths in a colloidal oatmeal product. You can buy this at the [employer].  · Do your best not to scratch. Clip fingernails short, especially in young children, to reduce skin damage if scratching does occur.  · Use moisturizing skin lotion instead of scratching your dry skin.  · Use sunscreen whenever going out into direct sun.  · Use only mild cleansing agents whenever possible.  · Wash with mild, nonirritating soap and warm water.  · Wear clothing that breathes, such as cotton shirts or canvas shoes.  · If fluid is seeping from the rash, cover it loosely with clean gauze to absorb the discharge.  · Many rashes are contagious. Prevent the rash from spreading to others by washing your hands often before or after touching others with any skin rash.  Use medicine  · Antihistamines such as diphenhydramine can help control itching. But use with caution because they can make you drowsy.  · Using over-the-counter hydrocortisone cream on small rashes may help reduce swelling and itching  · Most over-the-counter antifungal medicines can treat athletes foot and many other fungal infections of the skin.  Check with your healthcare provider  Call your healthcare provider if:  · You were told that you have a fungal infection on your skin to make sure you have the correct type of medicine.  · You have questions or concerns about medicines or their side effects.     Call 911  Call 911 if either of these occur:  · Your tongue or lips start to swell  · You  have difficulty breathing      Call your healthcare provider  Call your healthcare provider if any of these occur:  · Temperature of more than 101.0°F (38.3°C), or as directed  · Sore throat, a cough, or unusual fatigue  · Red, oozy, or painful rash gets worse. These are signs of infection.  · Rash covers your face, genitals, or most of your body  · Crusty sores or red rings that begin to spread  · You were exposed to someone who has a contagious rash, such as scabies or lice.  · Red bulls-eye rash with a white center (a sign of Lyme disease)  · You were told that you have resistant bacteria (MRSA) on your skin.   Date Last Reviewed: 5/12/2015  © 5581-3327 [employer]. [address] [place] [address], [place] [address]. All rights reserved. This information is not intended as a substitute for professional medical care. Always follow your healthcare professional's instructions.

## 2019-07-10 NOTE — PROGRESS NOTES
"Subjective:       Patient ID: Adriana Zaragoza is a 53 y.o. female.    Vitals:  height is 5' 4" (1.626 m) and weight is 192.5 kg (424 lb 6.4 oz) (abnormal). Her oral temperature is 99.1 °F (37.3 °C). Her blood pressure is 131/71 and her pulse is 79. Her oxygen saturation is 97%.     Chief Complaint: Rash    Rash   This is a new problem. The current episode started 1 to 4 weeks ago. The problem has been gradually worsening since onset. The affected locations include the right arm, left arm and right upper leg. The rash is characterized by itchiness and redness. She was exposed to nothing. Pertinent negatives include no cough, fever or sore throat. Treatments tried: Antibiotic cream, anti-fungal cream. The treatment provided no relief.       Constitution: Negative for chills and fever.   HENT: Negative for facial swelling and sore throat.    Neck: Negative for painful lymph nodes.   Eyes: Negative for eye itching and eyelid swelling.   Respiratory: Negative for cough.    Musculoskeletal: Negative for joint pain and joint swelling.   Skin: Positive for rash and erythema. Negative for color change, pale, wound, abrasion, laceration, lesion, skin thickening/induration, puncture wound, bruising, abscess, avulsion and hives.   Allergic/Immunologic: Negative for environmental allergies, immunocompromised state and hives.   Hematologic/Lymphatic: Negative for swollen lymph nodes.       Objective:      Physical Exam   Constitutional: She is oriented to person, place, and time. She appears well-developed and well-nourished.   HENT:   Head: Normocephalic and atraumatic. Head is without abrasion, without contusion and without laceration.   Right Ear: External ear normal.   Left Ear: External ear normal.   Nose: Nose normal.   Mouth/Throat: Oropharynx is clear and moist.   Eyes: Pupils are equal, round, and reactive to light. Conjunctivae, EOM and lids are normal.   Neck: Trachea normal, full passive range of motion " without pain and phonation normal. Neck supple.   Cardiovascular: Normal rate, regular rhythm and normal heart sounds.   Pulmonary/Chest: Effort normal and breath sounds normal. No stridor. No respiratory distress.   Musculoskeletal: Normal range of motion.   Neurological: She is alert and oriented to person, place, and time.   Skin: Skin is warm, dry and intact. Capillary refill takes less than 2 seconds. Rash noted. No abrasion, no bruising, no burn, no ecchymosis, no laceration and no lesion noted. Rash is papular. There is erythema.        Erythematous papular Patches to bilateral upper arms at AC/elbow, anterior aspect and lateral aspect   Psychiatric: She has a normal mood and affect. Her speech is normal and behavior is normal. Judgment and thought content normal. Cognition and memory are normal.   Nursing note and vitals reviewed.      Assessment:       1. Dermatitis        Plan:         Dermatitis    Other orders  -     dexamethasone injection 8 mg  -     predniSONE (DELTASONE) 20 MG tablet; Take 1 tablet (20 mg total) by mouth 2 (two) times daily. for 5 days  Dispense: 10 tablet; Refill: 0  -     hydrOXYzine (ATARAX) 50 MG tablet; Take 1 tablet (50 mg total) by mouth 4 (four) times daily as needed for Itching.  Dispense: 30 tablet; Refill: 0

## 2019-08-12 ENCOUNTER — TELEPHONE (OUTPATIENT)
Dept: ADMINISTRATIVE | Facility: HOSPITAL | Age: 54
End: 2019-08-12

## 2019-08-13 ENCOUNTER — TELEPHONE (OUTPATIENT)
Dept: FAMILY MEDICINE | Facility: CLINIC | Age: 54
End: 2019-08-13

## 2019-08-13 DIAGNOSIS — Z12.11 SCREEN FOR COLON CANCER: Primary | ICD-10-CM

## 2019-08-13 NOTE — TELEPHONE ENCOUNTER
Spoke with patient, she would like to have a mammo order sent to [facility] and asks that Dr. Baeza order a Cologuard

## 2019-09-05 ENCOUNTER — TELEPHONE (OUTPATIENT)
Dept: FAMILY MEDICINE | Facility: CLINIC | Age: 54
End: 2019-09-05

## 2019-09-11 ENCOUNTER — TELEPHONE (OUTPATIENT)
Dept: FAMILY MEDICINE | Facility: CLINIC | Age: 54
End: 2019-09-11

## 2019-09-25 ENCOUNTER — TELEPHONE (OUTPATIENT)
Dept: FAMILY MEDICINE | Facility: CLINIC | Age: 54
End: 2019-09-25

## 2019-09-25 DIAGNOSIS — E11.9 TYPE 2 DIABETES MELLITUS WITHOUT COMPLICATION, WITHOUT LONG-TERM CURRENT USE OF INSULIN: ICD-10-CM

## 2019-09-25 DIAGNOSIS — D50.9 IRON DEFICIENCY ANEMIA, UNSPECIFIED IRON DEFICIENCY ANEMIA TYPE: ICD-10-CM

## 2019-10-17 ENCOUNTER — OFFICE VISIT (OUTPATIENT)
Dept: FAMILY MEDICINE | Facility: CLINIC | Age: 54
End: 2019-10-17
Payer: MEDICARE

## 2019-10-17 VITALS
DIASTOLIC BLOOD PRESSURE: 60 MMHG | BODY MASS INDEX: 50.02 KG/M2 | HEART RATE: 100 BPM | SYSTOLIC BLOOD PRESSURE: 146 MMHG | HEIGHT: 64 IN | OXYGEN SATURATION: 97 % | WEIGHT: 293 LBS | RESPIRATION RATE: 18 BRPM

## 2019-10-17 DIAGNOSIS — G47.33 OSA (OBSTRUCTIVE SLEEP APNEA): ICD-10-CM

## 2019-10-17 DIAGNOSIS — I89.0 LYMPHEDEMA: ICD-10-CM

## 2019-10-17 DIAGNOSIS — K59.01 SLOW TRANSIT CONSTIPATION: ICD-10-CM

## 2019-10-17 DIAGNOSIS — E66.2 PICKWICKIAN SYNDROME: ICD-10-CM

## 2019-10-17 DIAGNOSIS — Z98.84 S/P GASTRIC BYPASS: ICD-10-CM

## 2019-10-17 DIAGNOSIS — E66.01 MORBID OBESITY: ICD-10-CM

## 2019-10-17 DIAGNOSIS — F33.1 MODERATE EPISODE OF RECURRENT MAJOR DEPRESSIVE DISORDER: ICD-10-CM

## 2019-10-17 DIAGNOSIS — E11.9 TYPE 2 DIABETES MELLITUS WITHOUT COMPLICATION, WITHOUT LONG-TERM CURRENT USE OF INSULIN: Primary | ICD-10-CM

## 2019-10-17 PROBLEM — R50.9 FEVER: Status: RESOLVED | Noted: 2018-07-17 | Resolved: 2019-10-17

## 2019-10-17 LAB — HBA1C MFR BLD: 5.5 %

## 2019-10-17 PROCEDURE — 83036 HEMOGLOBIN GLYCOSYLATED A1C: CPT | Mod: PBBFAC | Performed by: INTERNAL MEDICINE

## 2019-10-17 PROCEDURE — 99214 OFFICE O/P EST MOD 30 MIN: CPT | Performed by: INTERNAL MEDICINE

## 2019-10-17 PROCEDURE — 99214 OFFICE O/P EST MOD 30 MIN: CPT | Mod: S$PBB,,, | Performed by: INTERNAL MEDICINE

## 2019-10-17 PROCEDURE — 99214 PR OFFICE/OUTPT VISIT, EST, LEVL IV, 30-39 MIN: ICD-10-PCS | Mod: S$PBB,,, | Performed by: INTERNAL MEDICINE

## 2019-10-17 RX ORDER — ONDANSETRON 4 MG/1
4 TABLET, ORALLY DISINTEGRATING ORAL EVERY 6 HOURS PRN
COMMUNITY
Start: 2019-02-22 | End: 2020-07-09

## 2019-10-17 RX ORDER — POTASSIUM CHLORIDE 750 MG/1
10 CAPSULE, EXTENDED RELEASE ORAL DAILY
COMMUNITY
End: 2020-07-09

## 2019-10-17 RX ORDER — CALCIUM CARBONATE 600 MG
600 TABLET ORAL DAILY
COMMUNITY
End: 2020-10-20

## 2019-10-17 RX ORDER — CITALOPRAM 10 MG/1
10 TABLET ORAL DAILY
Qty: 30 TABLET | Refills: 11 | Status: SHIPPED | OUTPATIENT
Start: 2019-10-17 | End: 2020-07-09

## 2019-10-17 RX ORDER — ALBUTEROL SULFATE 90 UG/1
2 AEROSOL, METERED RESPIRATORY (INHALATION) EVERY 6 HOURS PRN
COMMUNITY

## 2019-10-17 NOTE — PROGRESS NOTES
ADULT FOLLOW-UP VISIT    Subjective:     Chief Complaint:  Follow-up      53-year-old woman here for follow-up after a long absence.  Patient was last seen 7/2018.  Most notably, patient underwent gastric bypass surgery at [facility] in March 2019.  Patient states that she did well in hospital and initially did well after discharge. About a month after her surgery she developed what sounds like a bowel obstruction, possibly related to her ventral hernia.  She presented to a local ER with abdominal pain and constipation and was transferred to [facility].  Patient states that admission she has been doing well.  To maintain bowel labs from with stool softeners and occasional loss today.  She continues to have a low calorie, high protein to diet as advised by her bariatric surgeon.  She estimates she has lost 120 lb since the surgery.  She has been told she needs to lose 70 more lb before her neck surgery and being scheduled.  The next surgery will be to remove her pannus.  After that surgery, the next planned surgery is to hands her massive localized lymphedema.  Patient states she continues to receive home physical therapy.  She is getting around her house without her walker.  She has been getting out of the house more and even going out with friends.  She reports some generalized anxiety associated with trying to interact with people.  She still feels very self-conscious about her size and is constantly worried about something happening when she is out of her house.        Ms. Zaragoza  has a past medical history of Allergy, Anemia, Asthma, COPD (chronic obstructive pulmonary disease), Deep vein thrombosis, Depression, Diabetes mellitus, type 2, Encounter for blood transfusion, Heart murmur, and Neuromuscular disorder.    Family history and social history are reviewed and there are no significant changes.     ROS:  Review of Systems   Constitutional: Positive for fatigue. Negative  "for fever.   Respiratory: Positive for shortness of breath. Negative for cough and wheezing.    Cardiovascular: Positive for leg swelling. Negative for chest pain and palpitations.   Gastrointestinal: Negative for abdominal pain, constipation, diarrhea, nausea and vomiting.   Genitourinary: Negative for dysuria, vaginal bleeding and vaginal discharge.   Musculoskeletal: Positive for arthralgias and back pain.   Skin: Negative for rash.          Current Outpatient Medications:     albuterol (PROVENTIL/VENTOLIN HFA) 90 mcg/actuation inhaler, Ventolin HFA 90 mcg/actuation aerosol inhaler  Inhale 2 puffs every 4-6 hours by inhalation route., Disp: , Rfl:     calcium carbonate (OS-BRENDA) 600 mg calcium (1,500 mg) Tab, Take 600 mg by mouth once., Disp: , Rfl:     ferrous sulfate 325 mg (65 mg iron) Tab tablet, Take 325 mg by mouth daily with breakfast., Disp: , Rfl:     multivit-min/iron/folic acid/K (BARIATRIC MULTIVITAMINS ORAL), ONCE DAILY, Disp: , Rfl:     ondansetron (ZOFRAN-ODT) 4 MG TbDL, 4 mg., Disp: , Rfl:     potassium chloride (MICRO-K) 10 MEQ CpSR, Take 10 mEq by mouth once., Disp: , Rfl:     citalopram (CELEXA) 10 MG tablet, Take 1 tablet (10 mg total) by mouth once daily., Disp: 30 tablet, Rfl: 11      Objective:     Physical Examination:     BP (!) 146/60   Pulse 100   Resp 18   Ht 5' 4" (1.626 m)   Wt (!) 187 kg (412 lb 5 oz)   SpO2 97%   BMI 70.77 kg/m²     Physical Exam   Constitutional: She is oriented to person, place, and time. No distress.   Morbidly obese woman in NAD, using walker to ambulate   HENT:   Right Ear: External ear normal.   Left Ear: External ear normal.   Nose: Nose normal.   Mouth/Throat: Oropharynx is clear and moist and mucous membranes are normal.   Eyes: Pupils are equal, round, and reactive to light. Conjunctivae are normal. Right eye exhibits no discharge. Left eye exhibits no discharge.   Cardiovascular: Normal rate, regular rhythm, normal heart sounds and intact " distal pulses.   No murmur heard.  Pulmonary/Chest: Effort normal and breath sounds normal. No respiratory distress. She has no wheezes. She has no rales.   Musculoskeletal: She exhibits edema.   Large pedunculated lymphadematous mass arising from R medial thigh, no active ulcerations/infections, approx 12 inches in diameter    Neurological: She is alert and oriented to person, place, and time.   Skin: She is not diaphoretic.   Mild erythema under breasts and pannus w/o any pustules or skin breakdown       Assessment/Plan:   Adriana is a 53 y.o. female here for follow-up.    Problem List Items Addressed This Visit        Psychiatric    Moderate episode of recurrent major depressive disorder    Current Assessment & Plan     Previously on Wellbutrin.  Trial of citalopram.         Relevant Medications    citalopram (CELEXA) 10 MG tablet       Endocrine    Type 2 diabetes mellitus without complication - Primary    Current Assessment & Plan     Hemoglobin A1c 5.5 today off medications.         Relevant Orders    Hemoglobin A1C, POCT (Completed)    Morbid obesity    S/P gastric bypass    Current Assessment & Plan     March 2019. Still followed closely at [facility].             GI    Slow transit constipation       Other    Lymphedema    Current Assessment & Plan     Eventual plan is for plastic surgery at [facility] for surgical removal of massive localized lymphedema of LLE.          Pickwickian syndrome    Current Assessment & Plan     Still on nighttime O2, no longer requiring ambulatory oxygen.         KEESHA (obstructive sleep apnea)    Current Assessment & Plan     CPAP               Health Maintenance   Topic Date Due    Hepatitis C Screening  1965    Foot Exam  11/23/1974    Mammogram  11/23/2004    Colonoscopy  11/23/2014    Lipid Panel  09/15/2018    Pap Smear with HPV Cotest  10/07/2018    Hemoglobin A1c  02/03/2019    Eye Exam  10/09/2019    Urine Microalbumin  03/20/2020    TETANUS VACCINE  05/23/2027     Pneumococcal Vaccine (Medium Risk)  Completed           Discussion:     Follow up in about 6 weeks (around 11/28/2019) for f/u  new med, labs.    Goals    None         Electronically signed by Jessica Baeza

## 2019-10-18 NOTE — ASSESSMENT & PLAN NOTE
Eventual plan is for plastic surgery at [facility] for surgical removal of massive localized lymphedema of LLE.

## 2019-10-22 ENCOUNTER — LAB VISIT (OUTPATIENT)
Dept: LAB | Facility: HOSPITAL | Age: 54
End: 2019-10-22
Attending: INTERNAL MEDICINE
Payer: MEDICARE

## 2019-10-22 DIAGNOSIS — E87.6 HYPOPOTASSEMIA: ICD-10-CM

## 2019-10-22 DIAGNOSIS — D64.9 ANEMIA, UNSPECIFIED: ICD-10-CM

## 2019-10-22 DIAGNOSIS — E78.5 HYPERLIPEMIA: Primary | ICD-10-CM

## 2019-10-22 LAB
ALBUMIN SERPL BCP-MCNC: 4.3 G/DL (ref 3.5–5.2)
ALP SERPL-CCNC: 72 U/L (ref 55–135)
ALT SERPL W/O P-5'-P-CCNC: 15 U/L (ref 10–44)
ANION GAP SERPL CALC-SCNC: 12 MMOL/L (ref 8–16)
AST SERPL-CCNC: 18 U/L (ref 10–40)
BASOPHILS # BLD AUTO: 0.05 K/UL (ref 0–0.2)
BASOPHILS NFR BLD: 0.4 % (ref 0–1.9)
BILIRUB SERPL-MCNC: 0.6 MG/DL (ref 0.1–1)
BUN SERPL-MCNC: 14 MG/DL (ref 6–20)
CALCIUM SERPL-MCNC: 9.3 MG/DL (ref 8.7–10.5)
CHLORIDE SERPL-SCNC: 105 MMOL/L (ref 95–110)
CHOLEST SERPL-MCNC: 203 MG/DL (ref 120–199)
CHOLEST/HDLC SERPL: 5.3 {RATIO} (ref 2–5)
CO2 SERPL-SCNC: 25 MMOL/L (ref 23–29)
CREAT SERPL-MCNC: 0.6 MG/DL (ref 0.5–1.4)
DIFFERENTIAL METHOD: ABNORMAL
EOSINOPHIL # BLD AUTO: 0.2 K/UL (ref 0–0.5)
EOSINOPHIL NFR BLD: 1.3 % (ref 0–8)
ERYTHROCYTE [DISTWIDTH] IN BLOOD BY AUTOMATED COUNT: 16 % (ref 11.5–14.5)
EST. GFR  (AFRICAN AMERICAN): >60 ML/MIN/1.73 M^2
EST. GFR  (NON AFRICAN AMERICAN): >60 ML/MIN/1.73 M^2
GLUCOSE SERPL-MCNC: 87 MG/DL (ref 70–110)
HCT VFR BLD AUTO: 42.1 % (ref 37–48.5)
HDLC SERPL-MCNC: 38 MG/DL (ref 40–75)
HDLC SERPL: 18.7 % (ref 20–50)
HGB BLD-MCNC: 12.9 G/DL (ref 12–16)
IMM GRANULOCYTES # BLD AUTO: 0.04 K/UL (ref 0–0.04)
IMM GRANULOCYTES NFR BLD AUTO: 0.3 % (ref 0–0.5)
LDLC SERPL CALC-MCNC: 135.2 MG/DL (ref 63–159)
LYMPHOCYTES # BLD AUTO: 2.9 K/UL (ref 1–4.8)
LYMPHOCYTES NFR BLD: 25.3 % (ref 18–48)
MCH RBC QN AUTO: 25.9 PG (ref 27–31)
MCHC RBC AUTO-ENTMCNC: 30.6 G/DL (ref 32–36)
MCV RBC AUTO: 85 FL (ref 82–98)
MONOCYTES # BLD AUTO: 0.7 K/UL (ref 0.3–1)
MONOCYTES NFR BLD: 5.7 % (ref 4–15)
NEUTROPHILS # BLD AUTO: 7.8 K/UL (ref 1.8–7.7)
NEUTROPHILS NFR BLD: 67 % (ref 38–73)
NONHDLC SERPL-MCNC: 165 MG/DL
NRBC BLD-RTO: 0 /100 WBC
PLATELET # BLD AUTO: 381 K/UL (ref 150–350)
PMV BLD AUTO: 9.9 FL (ref 9.2–12.9)
POTASSIUM SERPL-SCNC: 3.8 MMOL/L (ref 3.5–5.1)
PROT SERPL-MCNC: 8.4 G/DL (ref 6–8.4)
RBC # BLD AUTO: 4.98 M/UL (ref 4–5.4)
SODIUM SERPL-SCNC: 142 MMOL/L (ref 136–145)
TRIGL SERPL-MCNC: 149 MG/DL (ref 30–150)
WBC # BLD AUTO: 11.6 K/UL (ref 3.9–12.7)

## 2019-10-22 PROCEDURE — 80061 LIPID PANEL: CPT

## 2019-10-22 PROCEDURE — 85025 COMPLETE CBC W/AUTO DIFF WBC: CPT

## 2019-10-22 PROCEDURE — 80053 COMPREHEN METABOLIC PANEL: CPT

## 2019-10-22 PROCEDURE — 36415 COLL VENOUS BLD VENIPUNCTURE: CPT

## 2019-10-24 ENCOUNTER — TELEPHONE (OUTPATIENT)
Dept: FAMILY MEDICINE | Facility: CLINIC | Age: 54
End: 2019-10-24

## 2019-10-24 DIAGNOSIS — E78.5 HYPERLIPIDEMIA, UNSPECIFIED HYPERLIPIDEMIA TYPE: Primary | ICD-10-CM

## 2019-10-24 RX ORDER — PRAVASTATIN SODIUM 40 MG/1
40 TABLET ORAL DAILY
Qty: 90 TABLET | Refills: 3 | Status: SHIPPED | OUTPATIENT
Start: 2019-10-24 | End: 2020-07-09 | Stop reason: SDUPTHER

## 2019-10-24 NOTE — TELEPHONE ENCOUNTER
Spoke with patient about abnormal cholesterol and staring new medication. Pt verbalized understanding.

## 2019-10-24 NOTE — TELEPHONE ENCOUNTER
Labs look great other than elevated cholesterol. Given her diabetes, need to start a cholesterol lowering medication. Rx sent for pravastatin to her pharmacy.

## 2020-01-13 DIAGNOSIS — Z12.31 ENCOUNTER FOR SCREENING MAMMOGRAM FOR BREAST CANCER: Primary | ICD-10-CM

## 2020-03-04 ENCOUNTER — HOSPITAL ENCOUNTER (INPATIENT)
Facility: HOSPITAL | Age: 55
LOS: 2 days | Discharge: HOME HEALTH | DRG: 872 | End: 2020-03-07
Attending: EMERGENCY MEDICINE | Admitting: INTERNAL MEDICINE
Payer: MEDICARE

## 2020-03-04 DIAGNOSIS — L03.818 CELLULITIS OF OTHER SPECIFIED SITE: ICD-10-CM

## 2020-03-04 DIAGNOSIS — R07.9 CHEST PAIN: ICD-10-CM

## 2020-03-04 DIAGNOSIS — R50.9 FEVER: ICD-10-CM

## 2020-03-04 DIAGNOSIS — N12 PYELONEPHRITIS: ICD-10-CM

## 2020-03-04 DIAGNOSIS — A41.9 SEPSIS WITHOUT ACUTE ORGAN DYSFUNCTION, DUE TO UNSPECIFIED ORGANISM: Primary | ICD-10-CM

## 2020-03-04 LAB
ALBUMIN SERPL BCP-MCNC: 3.9 G/DL (ref 3.5–5.2)
ALP SERPL-CCNC: 75 U/L (ref 55–135)
ALT SERPL W/O P-5'-P-CCNC: 15 U/L (ref 10–44)
ANION GAP SERPL CALC-SCNC: 11 MMOL/L (ref 8–16)
AST SERPL-CCNC: 21 U/L (ref 10–40)
BACTERIA #/AREA URNS HPF: ABNORMAL /HPF
BASOPHILS # BLD AUTO: 0.06 K/UL (ref 0–0.2)
BASOPHILS NFR BLD: 0.2 % (ref 0–1.9)
BILIRUB SERPL-MCNC: 0.8 MG/DL (ref 0.1–1)
BILIRUB UR QL STRIP: NEGATIVE
BUN SERPL-MCNC: 13 MG/DL (ref 6–20)
CALCIUM SERPL-MCNC: 9.2 MG/DL (ref 8.7–10.5)
CHLORIDE SERPL-SCNC: 102 MMOL/L (ref 95–110)
CLARITY UR: CLEAR
CO2 SERPL-SCNC: 25 MMOL/L (ref 23–29)
COLOR UR: YELLOW
CREAT SERPL-MCNC: 0.6 MG/DL (ref 0.5–1.4)
CRP SERPL-MCNC: 4.73 MG/DL (ref 0–0.75)
CTP QC/QA: YES
DIFFERENTIAL METHOD: ABNORMAL
EOSINOPHIL # BLD AUTO: 0 K/UL (ref 0–0.5)
EOSINOPHIL NFR BLD: 0.2 % (ref 0–8)
ERYTHROCYTE [DISTWIDTH] IN BLOOD BY AUTOMATED COUNT: 16.4 % (ref 11.5–14.5)
ERYTHROCYTE [SEDIMENTATION RATE] IN BLOOD BY WESTERGREN METHOD: 42 MM/HR (ref 0–20)
EST. GFR  (AFRICAN AMERICAN): >60 ML/MIN/1.73 M^2
EST. GFR  (NON AFRICAN AMERICAN): >60 ML/MIN/1.73 M^2
GLUCOSE SERPL-MCNC: 149 MG/DL (ref 70–110)
GLUCOSE UR QL STRIP: NEGATIVE
HCT VFR BLD AUTO: 40.9 % (ref 37–48.5)
HGB BLD-MCNC: 12 G/DL (ref 12–16)
HGB UR QL STRIP: NEGATIVE
HYALINE CASTS #/AREA URNS LPF: 31 /LPF
IMM GRANULOCYTES # BLD AUTO: 0.2 K/UL (ref 0–0.04)
IMM GRANULOCYTES NFR BLD AUTO: 0.8 % (ref 0–0.5)
KETONES UR QL STRIP: NEGATIVE
LACTATE SERPL-SCNC: 1.2 MMOL/L (ref 0.5–1.9)
LEUKOCYTE ESTERASE UR QL STRIP: ABNORMAL
LYMPHOCYTES # BLD AUTO: 1.1 K/UL (ref 1–4.8)
LYMPHOCYTES NFR BLD: 4.2 % (ref 18–48)
MCH RBC QN AUTO: 25.3 PG (ref 27–31)
MCHC RBC AUTO-ENTMCNC: 29.3 G/DL (ref 32–36)
MCV RBC AUTO: 86 FL (ref 82–98)
MICROSCOPIC COMMENT: ABNORMAL
MONOCYTES # BLD AUTO: 1.2 K/UL (ref 0.3–1)
MONOCYTES NFR BLD: 4.8 % (ref 4–15)
NEUTROPHILS # BLD AUTO: 22.5 K/UL (ref 1.8–7.7)
NEUTROPHILS NFR BLD: 89.8 % (ref 38–73)
NITRITE UR QL STRIP: POSITIVE
NRBC BLD-RTO: 0 /100 WBC
PH UR STRIP: >8 [PH] (ref 5–8)
PLATELET # BLD AUTO: 361 K/UL (ref 150–350)
PMV BLD AUTO: 9.2 FL (ref 9.2–12.9)
POC MOLECULAR INFLUENZA A AGN: NEGATIVE
POC MOLECULAR INFLUENZA B AGN: NEGATIVE
POTASSIUM SERPL-SCNC: 4.1 MMOL/L (ref 3.5–5.1)
PROT SERPL-MCNC: 8.4 G/DL (ref 6–8.4)
PROT UR QL STRIP: ABNORMAL
RBC # BLD AUTO: 4.75 M/UL (ref 4–5.4)
RBC #/AREA URNS HPF: 1 /HPF (ref 0–4)
SODIUM SERPL-SCNC: 138 MMOL/L (ref 136–145)
SP GR UR STRIP: >1.03 (ref 1–1.03)
SQUAMOUS #/AREA URNS HPF: 0 /HPF
URN SPEC COLLECT METH UR: ABNORMAL
UROBILINOGEN UR STRIP-ACNC: NEGATIVE EU/DL
WBC # BLD AUTO: 25.09 K/UL (ref 3.9–12.7)
WBC #/AREA URNS HPF: 67 /HPF (ref 0–5)

## 2020-03-04 PROCEDURE — 96375 TX/PRO/DX INJ NEW DRUG ADDON: CPT

## 2020-03-04 PROCEDURE — 81001 URINALYSIS AUTO W/SCOPE: CPT

## 2020-03-04 PROCEDURE — 25000003 PHARM REV CODE 250: Performed by: EMERGENCY MEDICINE

## 2020-03-04 PROCEDURE — 51701 INSERT BLADDER CATHETER: CPT

## 2020-03-04 PROCEDURE — 25500020 PHARM REV CODE 255: Performed by: EMERGENCY MEDICINE

## 2020-03-04 PROCEDURE — 96365 THER/PROPH/DIAG IV INF INIT: CPT

## 2020-03-04 PROCEDURE — 85651 RBC SED RATE NONAUTOMATED: CPT

## 2020-03-04 PROCEDURE — 96366 THER/PROPH/DIAG IV INF ADDON: CPT

## 2020-03-04 PROCEDURE — 87040 BLOOD CULTURE FOR BACTERIA: CPT | Mod: 59

## 2020-03-04 PROCEDURE — 85025 COMPLETE CBC W/AUTO DIFF WBC: CPT

## 2020-03-04 PROCEDURE — 99285 EMERGENCY DEPT VISIT HI MDM: CPT | Mod: 25

## 2020-03-04 PROCEDURE — 86140 C-REACTIVE PROTEIN: CPT

## 2020-03-04 PROCEDURE — 36415 COLL VENOUS BLD VENIPUNCTURE: CPT

## 2020-03-04 PROCEDURE — 83605 ASSAY OF LACTIC ACID: CPT

## 2020-03-04 PROCEDURE — 80053 COMPREHEN METABOLIC PANEL: CPT

## 2020-03-04 PROCEDURE — 63600175 PHARM REV CODE 636 W HCPCS: Performed by: EMERGENCY MEDICINE

## 2020-03-04 RX ORDER — VANCOMYCIN HCL IN 5 % DEXTROSE 1G/250ML
2000 PLASTIC BAG, INJECTION (ML) INTRAVENOUS ONCE
Status: COMPLETED | OUTPATIENT
Start: 2020-03-04 | End: 2020-03-05

## 2020-03-04 RX ORDER — ONDANSETRON 2 MG/ML
4 INJECTION INTRAMUSCULAR; INTRAVENOUS
Status: COMPLETED | OUTPATIENT
Start: 2020-03-04 | End: 2020-03-04

## 2020-03-04 RX ORDER — ACETAMINOPHEN 500 MG
1000 TABLET ORAL
Status: COMPLETED | OUTPATIENT
Start: 2020-03-04 | End: 2020-03-04

## 2020-03-04 RX ORDER — MORPHINE SULFATE 4 MG/ML
2 INJECTION, SOLUTION INTRAMUSCULAR; INTRAVENOUS
Status: COMPLETED | OUTPATIENT
Start: 2020-03-04 | End: 2020-03-04

## 2020-03-04 RX ADMIN — MORPHINE SULFATE 2 MG: 4 INJECTION INTRAVENOUS at 10:03

## 2020-03-04 RX ADMIN — ACETAMINOPHEN 1000 MG: 500 TABLET, FILM COATED ORAL at 06:03

## 2020-03-04 RX ADMIN — ONDANSETRON 4 MG: 2 INJECTION INTRAMUSCULAR; INTRAVENOUS at 10:03

## 2020-03-04 RX ADMIN — SODIUM CHLORIDE, SODIUM LACTATE, POTASSIUM CHLORIDE, AND CALCIUM CHLORIDE 1000 ML: .6; .31; .03; .02 INJECTION, SOLUTION INTRAVENOUS at 09:03

## 2020-03-04 RX ADMIN — IOHEXOL 100 ML: 350 INJECTION, SOLUTION INTRAVENOUS at 09:03

## 2020-03-04 RX ADMIN — PIPERACILLIN AND TAZOBACTAM 4.5 G: 4; .5 INJECTION, POWDER, LYOPHILIZED, FOR SOLUTION INTRAVENOUS; PARENTERAL at 08:03

## 2020-03-05 PROBLEM — A41.9 SEPSIS WITHOUT ACUTE ORGAN DYSFUNCTION: Status: ACTIVE | Noted: 2020-03-05

## 2020-03-05 PROBLEM — L03.90 CELLULITIS: Status: ACTIVE | Noted: 2020-03-05

## 2020-03-05 PROBLEM — N12 PYELONEPHRITIS: Status: ACTIVE | Noted: 2020-03-05

## 2020-03-05 PROBLEM — L03.91 ACUTE LYMPHANGITIS: Status: ACTIVE | Noted: 2020-03-05

## 2020-03-05 LAB
ANION GAP SERPL CALC-SCNC: 6 MMOL/L (ref 8–16)
BASOPHILS # BLD AUTO: 0.05 K/UL (ref 0–0.2)
BASOPHILS NFR BLD: 0.3 % (ref 0–1.9)
BUN SERPL-MCNC: 13 MG/DL (ref 6–20)
CALCIUM SERPL-MCNC: 8.4 MG/DL (ref 8.7–10.5)
CHLORIDE SERPL-SCNC: 103 MMOL/L (ref 95–110)
CO2 SERPL-SCNC: 26 MMOL/L (ref 23–29)
CREAT SERPL-MCNC: 0.7 MG/DL (ref 0.5–1.4)
DIFFERENTIAL METHOD: ABNORMAL
EOSINOPHIL # BLD AUTO: 0 K/UL (ref 0–0.5)
EOSINOPHIL NFR BLD: 0 % (ref 0–8)
ERYTHROCYTE [DISTWIDTH] IN BLOOD BY AUTOMATED COUNT: 16.5 % (ref 11.5–14.5)
EST. GFR  (AFRICAN AMERICAN): >60 ML/MIN/1.73 M^2
EST. GFR  (NON AFRICAN AMERICAN): >60 ML/MIN/1.73 M^2
GLUCOSE SERPL-MCNC: 131 MG/DL (ref 70–110)
GLUCOSE SERPL-MCNC: 156 MG/DL (ref 70–110)
HCT VFR BLD AUTO: 33.4 % (ref 37–48.5)
HGB BLD-MCNC: 10.3 G/DL (ref 12–16)
IMM GRANULOCYTES # BLD AUTO: 0.08 K/UL (ref 0–0.04)
IMM GRANULOCYTES NFR BLD AUTO: 0.5 % (ref 0–0.5)
LYMPHOCYTES # BLD AUTO: 1.3 K/UL (ref 1–4.8)
LYMPHOCYTES NFR BLD: 7.3 % (ref 18–48)
MAGNESIUM SERPL-MCNC: 1.9 MG/DL (ref 1.6–2.6)
MCH RBC QN AUTO: 25.3 PG (ref 27–31)
MCHC RBC AUTO-ENTMCNC: 30.8 G/DL (ref 32–36)
MCV RBC AUTO: 82 FL (ref 82–98)
MONOCYTES # BLD AUTO: 0.7 K/UL (ref 0.3–1)
MONOCYTES NFR BLD: 3.9 % (ref 4–15)
NEUTROPHILS # BLD AUTO: 15.5 K/UL (ref 1.8–7.7)
NEUTROPHILS NFR BLD: 88 % (ref 38–73)
NRBC BLD-RTO: 0 /100 WBC
PLATELET # BLD AUTO: 315 K/UL (ref 150–350)
PMV BLD AUTO: 9.5 FL (ref 9.2–12.9)
POTASSIUM SERPL-SCNC: 3.4 MMOL/L (ref 3.5–5.1)
POTASSIUM SERPL-SCNC: 4 MMOL/L (ref 3.5–5.1)
RBC # BLD AUTO: 4.07 M/UL (ref 4–5.4)
SODIUM SERPL-SCNC: 135 MMOL/L (ref 136–145)
VANCOMYCIN TROUGH SERPL-MCNC: 5.1 UG/ML (ref 10–22)
WBC # BLD AUTO: 17.58 K/UL (ref 3.9–12.7)

## 2020-03-05 PROCEDURE — 94660 CPAP INITIATION&MGMT: CPT

## 2020-03-05 PROCEDURE — 94761 N-INVAS EAR/PLS OXIMETRY MLT: CPT

## 2020-03-05 PROCEDURE — 85025 COMPLETE CBC W/AUTO DIFF WBC: CPT

## 2020-03-05 PROCEDURE — 83735 ASSAY OF MAGNESIUM: CPT

## 2020-03-05 PROCEDURE — 25000003 PHARM REV CODE 250: Performed by: EMERGENCY MEDICINE

## 2020-03-05 PROCEDURE — 36415 COLL VENOUS BLD VENIPUNCTURE: CPT

## 2020-03-05 PROCEDURE — 80202 ASSAY OF VANCOMYCIN: CPT

## 2020-03-05 PROCEDURE — 27000221 HC OXYGEN, UP TO 24 HOURS

## 2020-03-05 PROCEDURE — 84132 ASSAY OF SERUM POTASSIUM: CPT

## 2020-03-05 PROCEDURE — 80048 BASIC METABOLIC PNL TOTAL CA: CPT

## 2020-03-05 PROCEDURE — 63600175 PHARM REV CODE 636 W HCPCS: Performed by: EMERGENCY MEDICINE

## 2020-03-05 PROCEDURE — 99900035 HC TECH TIME PER 15 MIN (STAT)

## 2020-03-05 PROCEDURE — 25000003 PHARM REV CODE 250: Performed by: INTERNAL MEDICINE

## 2020-03-05 PROCEDURE — 21400001 HC TELEMETRY ROOM

## 2020-03-05 PROCEDURE — 63600175 PHARM REV CODE 636 W HCPCS: Performed by: INTERNAL MEDICINE

## 2020-03-05 RX ORDER — POTASSIUM CHLORIDE 7.45 MG/ML
20 INJECTION INTRAVENOUS
Status: DISCONTINUED | OUTPATIENT
Start: 2020-03-05 | End: 2020-03-07 | Stop reason: HOSPADM

## 2020-03-05 RX ORDER — LANOLIN ALCOHOL/MO/W.PET/CERES
800 CREAM (GRAM) TOPICAL
Status: DISCONTINUED | OUTPATIENT
Start: 2020-03-05 | End: 2020-03-07 | Stop reason: HOSPADM

## 2020-03-05 RX ORDER — ONDANSETRON 2 MG/ML
4 INJECTION INTRAMUSCULAR; INTRAVENOUS EVERY 8 HOURS PRN
Status: DISCONTINUED | OUTPATIENT
Start: 2020-03-05 | End: 2020-03-07 | Stop reason: HOSPADM

## 2020-03-05 RX ORDER — ENOXAPARIN SODIUM 100 MG/ML
40 INJECTION SUBCUTANEOUS EVERY 12 HOURS
Status: DISCONTINUED | OUTPATIENT
Start: 2020-03-05 | End: 2020-03-07 | Stop reason: HOSPADM

## 2020-03-05 RX ORDER — ACETAMINOPHEN 325 MG/1
650 TABLET ORAL EVERY 4 HOURS PRN
Status: DISCONTINUED | OUTPATIENT
Start: 2020-03-05 | End: 2020-03-07 | Stop reason: HOSPADM

## 2020-03-05 RX ORDER — ALBUTEROL SULFATE 90 UG/1
1 AEROSOL, METERED RESPIRATORY (INHALATION) EVERY 6 HOURS PRN
Status: DISCONTINUED | OUTPATIENT
Start: 2020-03-05 | End: 2020-03-07 | Stop reason: HOSPADM

## 2020-03-05 RX ORDER — SODIUM CHLORIDE 9 MG/ML
INJECTION, SOLUTION INTRAVENOUS CONTINUOUS
Status: DISCONTINUED | OUTPATIENT
Start: 2020-03-05 | End: 2020-03-07 | Stop reason: HOSPADM

## 2020-03-05 RX ORDER — POTASSIUM CHLORIDE 7.45 MG/ML
40 INJECTION INTRAVENOUS
Status: DISCONTINUED | OUTPATIENT
Start: 2020-03-05 | End: 2020-03-07 | Stop reason: HOSPADM

## 2020-03-05 RX ORDER — MAGNESIUM SULFATE 1 G/100ML
1 INJECTION INTRAVENOUS
Status: DISCONTINUED | OUTPATIENT
Start: 2020-03-05 | End: 2020-03-07 | Stop reason: HOSPADM

## 2020-03-05 RX ORDER — POTASSIUM CHLORIDE 20 MEQ/1
40 TABLET, EXTENDED RELEASE ORAL
Status: DISCONTINUED | OUTPATIENT
Start: 2020-03-05 | End: 2020-03-07 | Stop reason: HOSPADM

## 2020-03-05 RX ORDER — CITALOPRAM 10 MG/1
10 TABLET ORAL DAILY
Status: DISCONTINUED | OUTPATIENT
Start: 2020-03-05 | End: 2020-03-07 | Stop reason: HOSPADM

## 2020-03-05 RX ORDER — HYDROCODONE BITARTRATE AND ACETAMINOPHEN 5; 325 MG/1; MG/1
1 TABLET ORAL EVERY 8 HOURS PRN
Status: DISCONTINUED | OUTPATIENT
Start: 2020-03-05 | End: 2020-03-06

## 2020-03-05 RX ORDER — OXYCODONE AND ACETAMINOPHEN 5; 325 MG/1; MG/1
1 TABLET ORAL EVERY 8 HOURS PRN
Status: DISCONTINUED | OUTPATIENT
Start: 2020-03-05 | End: 2020-03-07

## 2020-03-05 RX ORDER — POTASSIUM CHLORIDE 20 MEQ/1
20 TABLET, EXTENDED RELEASE ORAL
Status: DISCONTINUED | OUTPATIENT
Start: 2020-03-05 | End: 2020-03-07 | Stop reason: HOSPADM

## 2020-03-05 RX ORDER — MAGNESIUM SULFATE HEPTAHYDRATE 40 MG/ML
4 INJECTION, SOLUTION INTRAVENOUS
Status: DISCONTINUED | OUTPATIENT
Start: 2020-03-05 | End: 2020-03-07 | Stop reason: HOSPADM

## 2020-03-05 RX ORDER — MAGNESIUM SULFATE HEPTAHYDRATE 40 MG/ML
2 INJECTION, SOLUTION INTRAVENOUS
Status: DISCONTINUED | OUTPATIENT
Start: 2020-03-05 | End: 2020-03-07 | Stop reason: HOSPADM

## 2020-03-05 RX ORDER — PRAVASTATIN SODIUM 40 MG/1
40 TABLET ORAL DAILY
Status: DISCONTINUED | OUTPATIENT
Start: 2020-03-05 | End: 2020-03-07 | Stop reason: HOSPADM

## 2020-03-05 RX ORDER — ACETAMINOPHEN 325 MG/1
650 TABLET ORAL
Status: COMPLETED | OUTPATIENT
Start: 2020-03-05 | End: 2020-03-05

## 2020-03-05 RX ORDER — FERROUS SULFATE 325(65) MG
325 TABLET ORAL
Status: DISCONTINUED | OUTPATIENT
Start: 2020-03-05 | End: 2020-03-07 | Stop reason: HOSPADM

## 2020-03-05 RX ORDER — ACETAMINOPHEN 325 MG/1
650 TABLET ORAL EVERY 8 HOURS PRN
Status: DISCONTINUED | OUTPATIENT
Start: 2020-03-05 | End: 2020-03-07 | Stop reason: HOSPADM

## 2020-03-05 RX ORDER — HYDROCODONE BITARTRATE AND ACETAMINOPHEN 10; 325 MG/1; MG/1
1 TABLET ORAL ONCE
Status: COMPLETED | OUTPATIENT
Start: 2020-03-05 | End: 2020-03-05

## 2020-03-05 RX ADMIN — CITALOPRAM HYDROBROMIDE 10 MG: 10 TABLET ORAL at 09:03

## 2020-03-05 RX ADMIN — POTASSIUM CHLORIDE 40 MEQ: 20 TABLET, EXTENDED RELEASE ORAL at 10:03

## 2020-03-05 RX ADMIN — HYDROCODONE BITARTRATE AND ACETAMINOPHEN 1 TABLET: 5; 325 TABLET ORAL at 07:03

## 2020-03-05 RX ADMIN — OXYCODONE HYDROCHLORIDE AND ACETAMINOPHEN 1 TABLET: 5; 325 TABLET ORAL at 01:03

## 2020-03-05 RX ADMIN — SODIUM CHLORIDE: 0.9 INJECTION, SOLUTION INTRAVENOUS at 01:03

## 2020-03-05 RX ADMIN — OXYCODONE HYDROCHLORIDE AND ACETAMINOPHEN 1 TABLET: 5; 325 TABLET ORAL at 11:03

## 2020-03-05 RX ADMIN — SODIUM CHLORIDE: 0.9 INJECTION, SOLUTION INTRAVENOUS at 09:03

## 2020-03-05 RX ADMIN — ACETAMINOPHEN 650 MG: 325 TABLET ORAL at 02:03

## 2020-03-05 RX ADMIN — ENOXAPARIN SODIUM 40 MG: 100 INJECTION SUBCUTANEOUS at 09:03

## 2020-03-05 RX ADMIN — PRAVASTATIN SODIUM 40 MG: 40 TABLET ORAL at 09:03

## 2020-03-05 RX ADMIN — VANCOMYCIN HYDROCHLORIDE 2000 MG: 1 INJECTION, POWDER, LYOPHILIZED, FOR SOLUTION INTRAVENOUS at 02:03

## 2020-03-05 RX ADMIN — SODIUM CHLORIDE: 0.9 INJECTION, SOLUTION INTRAVENOUS at 03:03

## 2020-03-05 RX ADMIN — HYDROCODONE BITARTRATE AND ACETAMINOPHEN 1 TABLET: 5; 325 TABLET ORAL at 03:03

## 2020-03-05 RX ADMIN — FERROUS SULFATE TAB 325 MG (65 MG ELEMENTAL FE) 325 MG: 325 (65 FE) TAB at 07:03

## 2020-03-05 RX ADMIN — HYDROCODONE BITARTRATE AND ACETAMINOPHEN 1 TABLET: 10; 325 TABLET ORAL at 09:03

## 2020-03-05 NOTE — NURSING
54 yr old female  400+ lb   Says she is fine in the bed she has. Offered a maribell bed if needed   Daughter at bedside   Skin assessment  Found to have yeast in the folds. Will order interdry for the area.   Has a large pannis with dilated pores to the posterior side. Said she has a sore but on finding a scab with multi dilated pores weeping.  says she uses calmoseptine.     Will continue with the calmoseptine to the pannis area.   Interdry to the yeast folds

## 2020-03-05 NOTE — PLAN OF CARE
03/05/20 0948   Discharge Assessment   Assessment Type Discharge Planning Assessment   Confirmed/corrected address and phone number on facesheet? Yes   Assessment information obtained from? Patient   Communicated expected length of stay with patient/caregiver yes   Prior to hospitilization cognitive status: Alert/Oriented   Prior to hospitalization functional status: Assistive Equipment   Current cognitive status: Alert/Oriented   Current Functional Status: Assistive Equipment   Lives With parent(s);significant other  (Pt's resides with her mother and boyfriend.)   Able to Return to Prior Arrangements yes   Is patient able to care for self after discharge? Yes   Who are your caregiver(s) and their phone number(s)? Pts emergency contact is her daughter, Diane [phone]. No POA in place.   Patient's perception of discharge disposition home or selfcare   Readmission Within the Last 30 Days no previous admission in last 30 days   Patient currently being followed by outpatient case management? No   Patient currently receives any other outside agency services? Yes   Name and contact number of agency or person providing outside services [facility]   Is it the patient/care giver preference to resume care with the current outside agency? Yes   Equipment Currently Used at Home rollator   Do you have any problems affording any of your prescribed medications? TBD   Is the patient taking medications as prescribed? yes   Does the patient have transportation home? Yes   Transportation Anticipated family or friend will provide   Does the patient receive services at the [facility]? No   Discharge Plan A Home with family;Home   Discharge Plan B Home with family;Home   DME Needed Upon Discharge  none   Patient/Family in Agreement with Plan yes

## 2020-03-05 NOTE — ED NOTES
Pt aaox3, reports, fatigue, weakness, sob, headache, chills since noon today. Pt noted to have large tumor to right calf. resp even tachypnic, BBSCTA. Pt placed in bed with assistance.

## 2020-03-05 NOTE — SUBJECTIVE & OBJECTIVE
Past Medical History:   Diagnosis Date    Allergy     Anemia     Asthma     COPD (chronic obstructive pulmonary disease)     Deep vein thrombosis     Depression     Diabetes mellitus, type 2     Encounter for blood transfusion     Heart murmur     Neuromuscular disorder        Past Surgical History:   Procedure Laterality Date     SECTION      DILATION AND CURETTAGE OF UTERUS      TONSILLECTOMY         Review of patient's allergies indicates:   Allergen Reactions    Aspirin        No current facility-administered medications on file prior to encounter.      Current Outpatient Medications on File Prior to Encounter   Medication Sig    albuterol (PROVENTIL/VENTOLIN HFA) 90 mcg/actuation inhaler Ventolin HFA 90 mcg/actuation aerosol inhaler   Inhale 2 puffs every 4-6 hours by inhalation route.    calcium carbonate (OS-BRENDA) 600 mg calcium (1,500 mg) Tab Take 600 mg by mouth once.    citalopram (CELEXA) 10 MG tablet Take 1 tablet (10 mg total) by mouth once daily.    ferrous sulfate 325 mg (65 mg iron) Tab tablet Take 325 mg by mouth daily with breakfast.    multivit-min/iron/folic acid/K (BARIATRIC MULTIVITAMINS ORAL) ONCE DAILY    ondansetron (ZOFRAN-ODT) 4 MG TbDL 4 mg.    potassium chloride (MICRO-K) 10 MEQ CpSR Take 10 mEq by mouth once.    pravastatin (PRAVACHOL) 40 MG tablet Take 1 tablet (40 mg total) by mouth once daily.     Family History     Problem Relation (Age of Onset)    Arthritis Mother, Maternal Grandmother    COPD Father    Cancer Father, Maternal Grandmother, Maternal Grandfather, Paternal Grandmother    Depression Mother    Diabetes Mother, Paternal Grandmother, Paternal Grandfather    Heart disease Mother    Hyperlipidemia Paternal Grandfather    Kidney disease Paternal Grandmother        Tobacco Use    Smoking status: Current Every Day Smoker     Packs/day: 1.00     Types: Cigarettes    Smokeless tobacco: Never Used   Substance and Sexual Activity    Alcohol use: No     Drug use: No    Sexual activity: Never       Objective:     Vital Signs (Most Recent):  Temp: 99.5 °F (37.5 °C) (03/04/20 2015)  Pulse: 102 (03/04/20 1831)  Resp: 18 (03/04/20 1558)  BP: (!) 123/56 (03/04/20 1831)  SpO2: 100 % (03/04/20 1831) Vital Signs (24h Range):  Temp:  [99.5 °F (37.5 °C)-103.2 °F (39.6 °C)] 99.5 °F (37.5 °C)  Pulse:  [102-110] 102  Resp:  [18] 18  SpO2:  [100 %] 100 %  BP: (123-147)/(56-84) 123/56     Weight: (!) 167.8 kg (370 lb)  Body mass index is 63.51 kg/m².    Physical Exam   Constitutional: She is oriented to person, place, and time. No distress.   Morbidly obese middle-aged  female in mild distress   HENT:   Head: Normocephalic and atraumatic.   Eyes: Conjunctivae are normal. No scleral icterus.   Neck: Neck supple. No thyromegaly present.   Cardiovascular: Normal rate, regular rhythm and normal heart sounds.   No murmur heard.  Pulmonary/Chest: Effort normal and breath sounds normal. No respiratory distress.   Abdominal: Soft. Bowel sounds are normal. There is no tenderness. A hernia is present.   Pannus noted   Genitourinary:   Genitourinary Comments: Right CVA tenderness noted   Musculoskeletal: She exhibits no edema or deformity.   Neurological: She is alert and oriented to person, place, and time. She has normal strength. No sensory deficit.   Skin: Skin is warm and dry. Capillary refill takes less than 2 seconds.   Large pedunculated mass arising from right medial thigh. Erythematous and warm to touch. Non-tender; area pf purulence over the posterior aspect      Psychiatric: She has a normal mood and affect. Her behavior is normal.   Nursing note and vitals reviewed.          Significant Labs:   CBC:   Recent Labs   Lab 03/04/20 1831   WBC 25.09*   HGB 12.0   HCT 40.9   *     CMP:   Recent Labs   Lab 03/04/20 1831      K 4.1      CO2 25   *   BUN 13   CREATININE 0.6   CALCIUM 9.2   PROT 8.4   ALBUMIN 3.9   BILITOT 0.8   ALKPHOS 75   AST  21   ALT 15   ANIONGAP 11   EGFRNONAA >60.0     Lactic Acid:   Recent Labs   Lab 03/04/20  1836   LACTATE 1.2     Urine Studies:   Recent Labs   Lab 03/04/20  2137   COLORU Yellow   APPEARANCEUA Clear   PHUR >8.0*   SPECGRAV >1.030*   PROTEINUA 1+*   GLUCUA Negative   KETONESU Negative   BILIRUBINUA Negative   OCCULTUA Negative   NITRITE Positive*   UROBILINOGEN Negative   LEUKOCYTESUR 2+*   RBCUA 1   WBCUA 67*   BACTERIA Many*   SQUAMEPITHEL 0   HYALINECASTS 31*       Significant Imaging: I have reviewed all pertinent imaging results/findings within the past 24 hours.     Imaging Results          X-Ray Chest PA And Lateral (In process)                CT Abdomen Pelvis With Contrast (Final result)  Result time 03/04/20 21:19:13    Final result by Shannan Vincent MD (03/04/20 21:19:13)                 Narrative:        Exam: CT OF THE ABDOMEN/PELVIS WITH IV CONTRAST    Clinical data: Right flank pain, fever and large mass with clinical cellulitis.    Technique: Direct contiguous axial CT images were acquired through the abdomen  and pelvis with intravenous contrast using soft tissue and bone algorithms. Oral  contrast was not administered. Reformatted/MPR images were performed. Radiation  dose:  CTDIvol = 27.90 mGy, DLP = 2058.90 mGy x cm. Contrast used:  Omnipaque-350. Amount: 100 mL.    Limitations: Lack of oral contrast limits evaluation of the bowel loops.    Prior Studies: No prior studies submitted.    Findings: Lung bases:  Clear    Liver:   Unremarkable size and contour. Normal density. No evidence of mass. No  evidence of dilated ducts.    Gallbladder:  Unremarkable    Spleen:  Grossly unremarkable.    Pancreas/adrenal glands:   Grossly unremarkable size, contour and density.    Kidneys:   In anatomic position. Grossly unremarkable renal size, contour and  density. No renal or ureteral calculi. No evidence of a renal mass.  3 cm size  cortical cyst at the upper pole of the right kidney pole of the right  kidney.  Perinephric space is unremarkable.    Retroperitoneum: No enlarged retroperitoneal lymphadenopathy. The aorta and IVC  appear unremarkable.    Peritoneal cavity:  No evidence of free air or ascites.    Gastrointestinal tract: No obstruction.    Appendix:  Unremarkable    Pelvis:  Solid and hollow viscera grossly unremarkable.    Osseous structures:  No acute or destructive bony process identified.    Large abdominal wall hernia in the left lower quadrant containing bowel loops  without evidence of obstruction.    Enlarged right inguinal lymph nodes.    IMPRESSION:  Large abdominal wall hernia in the left lower quadrant containing bowel loops  without evidence of obstruction.  Enlarged right inguinal lymph nodes.  Right renal cortical cyst.    Recommendation: Follow up as clinically indicated.    All CT scans at this facility utilize dose modulation, iterative reconstruction,  and/or weight based dosing when appropriate to reduce radiation dose to as low  as reasonably achievable.      Electronically Signed by GUILLE HARRINGTON MD at 3/5/2020 12:34:59 AM EST

## 2020-03-05 NOTE — PLAN OF CARE
03/05/20 1129   Discharge Reassessment   Assessment Type Discharge Planning Reassessment   Anticipated Discharge Disposition Home-Health   Discharge Plan A Home Health;Home with family   Discharge Plan B Home with family;Home Health   DME Needed Upon Discharge  none   Patient choice form signed by patient/caregiver   (JACKELIN obtained pt choice form for pt to resume home health services with [facility]. Pt choice signed at 1125 on this date and will be scanned to pt's chart.)   Post-Acute Status   Post-Acute Authorization Home Health/Hospice   Home Health/Hospice Status Awaiting Orders for HH   Discharge Delays None known at this time       Update 1210: JACKELIN informed attending that pt would like to resume home health at the time of d/c. Attending placed consult for home health. JACKELIN called [facility] [phone] to verify that pt was active with their service. JACKELIN spoke with Lorena who was able to verify that pt was active with them and is eligible to return to their services once she is d/c. JACKELIN obtained faxed number and referral has been faxed via [employer] to [phone].

## 2020-03-05 NOTE — PLAN OF CARE
03/05/20 0827   Patient Assessment/Suction   Level of Consciousness (AVPU) alert   All Lung Fields Breath Sounds clear;coarse   PRE-TX-O2   O2 Device (Oxygen Therapy) BiPAP   $ Is the patient on Low Flow Oxygen? Yes   Oxygen Concentration (%) 28   SpO2 97 %   Pulse Oximetry Type Intermittent   $ Pulse Oximetry - Multiple Charge Pulse Oximetry - Multiple   Pulse 84   Aerosol Therapy   $ Aerosol Therapy Charges PRN treatment not required   Ready to Wean/Extubation Screen   FIO2<=50 (chart decimal) 0.28   Preset CPAP/BiPAP Settings   Mode Of Delivery BiPAP   $ CPAP/BiPAP Daily Charge BiPAP/CPAP Daily   $ Initial CPAP/BiPAP Setup? Yes   $ Is patient using? Yes   Size of Mask Small   Sized Appropriately? Yes   Equipment Type V60   Ipap 16   EPAP (cm H2O) 8   Pressure Support (cm H2O) 8   Set Rate (Breaths/Min) 12   Patient CPAP/BiPAP Settings   RR Total (Breaths/Min) 22   Tidal Volume (mL) 554

## 2020-03-05 NOTE — H&P
[facility]  History & Physical    Patient Name: Adriana Zaragoza  MRN: [id number]  Admission Date: 3/4/2020  Attending Physician: Merrill Stallworth MD  Primary Care Provider: Jessica Baeza MD         Patient information was obtained from patient, past medical records and ER records.     Subjective:     Principal Problem:Sepsis without acute organ dysfunction    Chief Complaint:   Chief Complaint   Patient presents with    Chills    Generalized Body Aches    Headache        HPI: Patient is a 54-year-old  female with morbid obesity, status post gastric bypass, chronic lymphedema of left lower extremity as well as COPD presents to the ED with chief complaint fever, chills and generalized weakness.    She was in her usual state of health until about 2 days ago when she started to notice generalized weakness.  Yesterday afternoon she noticed subjective fevers with shaking chills and myalgias.  Did not check her temperature at home.  She endorses urinary incontinence as well as dysuria over the last 2 days.  Symptoms consistent with her prior UTIs.  Patient has chronic lymphedema of right lower extremity with a large pedunculated mass arising from the medical thigh.  This is complicated by chronic nonhealing wound over the posterior aspect for which she gets wound care.  She has had secondary infections of this area in the past secondary to cellulitis and is concerned about similar presentation now.  Review of systems notable for shortness of breath and headache.  Denies chest pain, cough, nausea/vomiting, abdominal pain or diarrhea.  She has known slow transit constipation and is on daily stool softeners.    In the ED:  Noted to be febrile to 103.2° F.  Labs notable for leukocytosis.  Urinalysis positive.  Lactic acid within normal limits.  Blood cultures sent and patient has been initiated broad-spectrum antibiotics. CT abd/pelvis without acute process.    Rest of the 10  point review of systems is negative except as mentioned above.      Past Medical History:   Diagnosis Date    Allergy     Anemia     Asthma     COPD (chronic obstructive pulmonary disease)     Deep vein thrombosis     Depression     Diabetes mellitus, type 2     Encounter for blood transfusion     Heart murmur     Neuromuscular disorder        Past Surgical History:   Procedure Laterality Date     SECTION      DILATION AND CURETTAGE OF UTERUS      TONSILLECTOMY         Review of patient's allergies indicates:   Allergen Reactions    Aspirin        No current facility-administered medications on file prior to encounter.      Current Outpatient Medications on File Prior to Encounter   Medication Sig    albuterol (PROVENTIL/VENTOLIN HFA) 90 mcg/actuation inhaler Ventolin HFA 90 mcg/actuation aerosol inhaler   Inhale 2 puffs every 4-6 hours by inhalation route.    calcium carbonate (OS-BRENDA) 600 mg calcium (1,500 mg) Tab Take 600 mg by mouth once.    citalopram (CELEXA) 10 MG tablet Take 1 tablet (10 mg total) by mouth once daily.    ferrous sulfate 325 mg (65 mg iron) Tab tablet Take 325 mg by mouth daily with breakfast.    multivit-min/iron/folic acid/K (BARIATRIC MULTIVITAMINS ORAL) ONCE DAILY    ondansetron (ZOFRAN-ODT) 4 MG TbDL 4 mg.    potassium chloride (MICRO-K) 10 MEQ CpSR Take 10 mEq by mouth once.    pravastatin (PRAVACHOL) 40 MG tablet Take 1 tablet (40 mg total) by mouth once daily.     Family History     Problem Relation (Age of Onset)    Arthritis Mother, Maternal Grandmother    COPD Father    Cancer Father, Maternal Grandmother, Maternal Grandfather, Paternal Grandmother    Depression Mother    Diabetes Mother, Paternal Grandmother, Paternal Grandfather    Heart disease Mother    Hyperlipidemia Paternal Grandfather    Kidney disease Paternal Grandmother        Tobacco Use    Smoking status: Current Every Day Smoker     Packs/day: 1.00     Types: Cigarettes    Smokeless  tobacco: Never Used   Substance and Sexual Activity    Alcohol use: No    Drug use: No    Sexual activity: Never       Objective:     Vital Signs (Most Recent):  Temp: 99.5 °F (37.5 °C) (03/04/20 2015)  Pulse: 102 (03/04/20 1831)  Resp: 18 (03/04/20 1558)  BP: (!) 123/56 (03/04/20 1831)  SpO2: 100 % (03/04/20 1831) Vital Signs (24h Range):  Temp:  [99.5 °F (37.5 °C)-103.2 °F (39.6 °C)] 99.5 °F (37.5 °C)  Pulse:  [102-110] 102  Resp:  [18] 18  SpO2:  [100 %] 100 %  BP: (123-147)/(56-84) 123/56     Weight: (!) 167.8 kg (370 lb)  Body mass index is 63.51 kg/m².    Physical Exam   Constitutional: She is oriented to person, place, and time. No distress.   Morbidly obese middle-aged  female in mild distress   HENT:   Head: Normocephalic and atraumatic.   Eyes: Conjunctivae are normal. No scleral icterus.   Neck: Neck supple. No thyromegaly present.   Cardiovascular: Normal rate, regular rhythm and normal heart sounds.   No murmur heard.  Pulmonary/Chest: Effort normal and breath sounds normal. No respiratory distress.   Abdominal: Soft. Bowel sounds are normal. There is no tenderness. A hernia is present.   Pannus noted   Genitourinary:   Genitourinary Comments: Right CVA tenderness noted   Musculoskeletal: She exhibits no edema or deformity.   Neurological: She is alert and oriented to person, place, and time. She has normal strength. No sensory deficit.   Skin: Skin is warm and dry. Capillary refill takes less than 2 seconds.   Large pedunculated mass arising from right medial thigh. Erythematous and warm to touch. Non-tender; area pf purulence over the posterior aspect      Psychiatric: She has a normal mood and affect. Her behavior is normal.   Nursing note and vitals reviewed.          Significant Labs:   CBC:   Recent Labs   Lab 03/04/20 1831   WBC 25.09*   HGB 12.0   HCT 40.9   *     CMP:   Recent Labs   Lab 03/04/20  1831      K 4.1      CO2 25   *   BUN 13   CREATININE 0.6    CALCIUM 9.2   PROT 8.4   ALBUMIN 3.9   BILITOT 0.8   ALKPHOS 75   AST 21   ALT 15   ANIONGAP 11   EGFRNONAA >60.0     Lactic Acid:   Recent Labs   Lab 03/04/20  1836   LACTATE 1.2     Urine Studies:   Recent Labs   Lab 03/04/20  2137   COLORU Yellow   APPEARANCEUA Clear   PHUR >8.0*   SPECGRAV >1.030*   PROTEINUA 1+*   GLUCUA Negative   KETONESU Negative   BILIRUBINUA Negative   OCCULTUA Negative   NITRITE Positive*   UROBILINOGEN Negative   LEUKOCYTESUR 2+*   RBCUA 1   WBCUA 67*   BACTERIA Many*   SQUAMEPITHEL 0   HYALINECASTS 31*       Significant Imaging: I have reviewed all pertinent imaging results/findings within the past 24 hours.     Imaging Results          X-Ray Chest PA And Lateral (In process)                CT Abdomen Pelvis With Contrast (Final result)  Result time 03/04/20 21:19:13    Final result by Shannan Vincent MD (03/04/20 21:19:13)                 Narrative:        Exam: CT OF THE ABDOMEN/PELVIS WITH IV CONTRAST    Clinical data: Right flank pain, fever and large mass with clinical cellulitis.    Technique: Direct contiguous axial CT images were acquired through the abdomen  and pelvis with intravenous contrast using soft tissue and bone algorithms. Oral  contrast was not administered. Reformatted/MPR images were performed. Radiation  dose:  CTDIvol = 27.90 mGy, DLP = 2058.90 mGy x cm. Contrast used:  Omnipaque-350. Amount: 100 mL.    Limitations: Lack of oral contrast limits evaluation of the bowel loops.    Prior Studies: No prior studies submitted.    Findings: Lung bases:  Clear    Liver:   Unremarkable size and contour. Normal density. No evidence of mass. No  evidence of dilated ducts.    Gallbladder:  Unremarkable    Spleen:  Grossly unremarkable.    Pancreas/adrenal glands:   Grossly unremarkable size, contour and density.    Kidneys:   In anatomic position. Grossly unremarkable renal size, contour and  density. No renal or ureteral calculi. No evidence of a renal mass.  3 cm  size  cortical cyst at the upper pole of the right kidney pole of the right kidney.  Perinephric space is unremarkable.    Retroperitoneum: No enlarged retroperitoneal lymphadenopathy. The aorta and IVC  appear unremarkable.    Peritoneal cavity:  No evidence of free air or ascites.    Gastrointestinal tract: No obstruction.    Appendix:  Unremarkable    Pelvis:  Solid and hollow viscera grossly unremarkable.    Osseous structures:  No acute or destructive bony process identified.    Large abdominal wall hernia in the left lower quadrant containing bowel loops  without evidence of obstruction.    Enlarged right inguinal lymph nodes.    IMPRESSION:  Large abdominal wall hernia in the left lower quadrant containing bowel loops  without evidence of obstruction.  Enlarged right inguinal lymph nodes.  Right renal cortical cyst.    Recommendation: Follow up as clinically indicated.    All CT scans at this facility utilize dose modulation, iterative reconstruction,  and/or weight based dosing when appropriate to reduce radiation dose to as low  as reasonably achievable.      Electronically Signed by GUILLE HARRINGTON MD at 3/5/2020 12:34:59 AM EST                                Assessment/Plan:     Active Hospital Problems    Diagnosis  POA    *Sepsis without acute organ dysfunction [A41.9]  Yes     Priority: 1 - High    Acute lymphangitis [L03.91]  Yes     Priority: 2     Pyelonephritis [N12]  Yes     Priority: 3     Lymphedema [I89.0]  Yes     Priority: 3     Slow transit constipation [K59.01]  Yes    KEESHA (obstructive sleep apnea) [G47.33]  Yes    Morbid obesity [E66.01]  Yes    Pickwickian syndrome [E66.2]  Yes      Resolved Hospital Problems   No resolved problems to display.       Plan:  Admit to med-surg with telemetry   Sepsis - likely sources are acute lymphangitis versus pyelonephritis  Lactic acid -wnl  Continue maintenance IV fluids  Continue empiric antibiotic therapy with vancomycin and  piperacillin-tazobactam  Follow urine and blood cultures  Wound care consulted for lymphedematous mass  Continue home medications for chronic medical conditions      VTE Risk Mitigation (From admission, onward)         Ordered     enoxaparin injection 40 mg  Every 12 hours      03/05/20 0034     IP VTE HIGH RISK PATIENT  Once      03/05/20 0034                   Merrill Stallworth MD  Department of Hospital Medicine   [facility]

## 2020-03-05 NOTE — UM SECONDARY REVIEW
Secondary review outcome:    Per Dr. Ashlyn Clemons MD with [employer] EHR recommendation 03- is inpatient.

## 2020-03-05 NOTE — ED PROVIDER NOTES
Encounter Date: 3/4/2020       History     Chief Complaint   Patient presents with    Chills    Generalized Body Aches    Headache     54-year-old female with a past medical history of COPD, diabetes, morbid obesity who is status post gastric bypass in 2019 with large amount of lower extremity lymphedema presents emergency department with fever.  Patient states that around noon today she developed shaking chills and diffuse body aches.  She also complains of right lower back pain and dysuria that began yesterday.  Patient states that her lymphedema appears erythematous and is mildly painful.  She does attend wound care for chronic nonhealing wounds to the posterior aspect of her localize lymphedema.  The patient states that she moved into a new apartment over this past weekend and she is concerned that she may have irritated the area causing infection.  She reports a history of sepsis in the past secondary to cellulitis in the same area.  Patient also has a generalized, throbbing headache. She denies any neck stiffness.  No known sick contacts.  No travel outside the country.        Review of patient's allergies indicates:   Allergen Reactions    Aspirin      Past Medical History:   Diagnosis Date    Allergy     Anemia     Asthma     COPD (chronic obstructive pulmonary disease)     Deep vein thrombosis     Depression     Diabetes mellitus, type 2     Encounter for blood transfusion     Heart murmur     Neuromuscular disorder      Past Surgical History:   Procedure Laterality Date     SECTION      DILATION AND CURETTAGE OF UTERUS      TONSILLECTOMY       Family History   Problem Relation Age of Onset    Heart disease Mother     Diabetes Mother     Arthritis Mother     Depression Mother     Cancer Father     COPD Father     Arthritis Maternal Grandmother     Cancer Maternal Grandmother     Cancer Maternal Grandfather     Cancer Paternal Grandmother     Kidney disease Paternal  Grandmother     Diabetes Paternal Grandmother     Diabetes Paternal Grandfather     Hyperlipidemia Paternal Grandfather      Social History     Tobacco Use    Smoking status: Current Every Day Smoker     Packs/day: 1.00     Types: Cigarettes    Smokeless tobacco: Never Used   Substance Use Topics    Alcohol use: No    Drug use: No     Review of Systems   Constitutional: Positive for chills and fever. Negative for diaphoresis and fatigue.   HENT: Positive for ear pain. Negative for congestion and sore throat.    Eyes: Negative for visual disturbance.   Respiratory: Negative for cough, shortness of breath, wheezing and stridor.    Cardiovascular: Negative for chest pain and palpitations.   Gastrointestinal: Negative for abdominal distention, constipation, diarrhea, nausea and vomiting.   Genitourinary: Positive for dysuria and flank pain. Negative for frequency, hematuria and urgency.   Musculoskeletal: Negative for back pain.   Skin: Positive for wound. Negative for pallor.   Neurological: Positive for weakness (Generalized). Negative for light-headedness, numbness and headaches.   Psychiatric/Behavioral: Negative for confusion.   All other systems reviewed and are negative.      Physical Exam     Initial Vitals [03/04/20 1558]   BP Pulse Resp Temp SpO2   (!) 147/84 110 18 99.7 °F (37.6 °C) 100 %      MAP       --         Physical Exam    Nursing note and vitals reviewed.  Constitutional:   Morbidly obese, appears ill   HENT:   Head: Normocephalic and atraumatic.   Dullness to bilateral TMs with mild erythema to left TM   Eyes: Conjunctivae and EOM are normal. Pupils are equal, round, and reactive to light.   Neck: Normal range of motion. Neck supple.   No meningismus   Cardiovascular: Regular rhythm, normal heart sounds and intact distal pulses.   No murmur heard.  Mild tachycardia   Pulmonary/Chest: Breath sounds normal. She has no wheezes. She has no rhonchi. She has no rales.   Abdominal: Soft. There is no  tenderness. There is no rebound.   Morbidly obese abdomen with large pannus   Genitourinary:   Genitourinary Comments: Right cva tenderness   Musculoskeletal: Normal range of motion. She exhibits edema.   Bilateral lower extremity lymphedema with large area of localized lymphedema at right inguinal/upper extremity region, there is erythema, warmth and induration to majority of lymphedema with scattered folliculitis/superficial abscesses to posterior aspect with purulence   Neurological: She is alert and oriented to person, place, and time. She has normal strength. No sensory deficit.   Skin: Skin is warm and dry. Capillary refill takes less than 2 seconds.   Psychiatric: Thought content normal.         ED Course   Procedures  Labs Reviewed   CBC W/ AUTO DIFFERENTIAL - Abnormal; Notable for the following components:       Result Value    WBC 25.09 (*)     Mean Corpuscular Hemoglobin 25.3 (*)     Mean Corpuscular Hemoglobin Conc 29.3 (*)     RDW 16.4 (*)     Platelets 361 (*)     Immature Granulocytes 0.8 (*)     Gran # (ANC) 22.5 (*)     Immature Grans (Abs) 0.20 (*)     Mono # 1.2 (*)     Gran% 89.8 (*)     Lymph% 4.2 (*)     All other components within normal limits   COMPREHENSIVE METABOLIC PANEL - Abnormal; Notable for the following components:    Glucose 149 (*)     All other components within normal limits   CULTURE, BLOOD   CULTURE, BLOOD   LACTIC ACID, PLASMA   URINALYSIS   URINALYSIS, REFLEX TO URINE CULTURE   SEDIMENTATION RATE   C-REACTIVE PROTEIN   POCT INFLUENZA A/B MOLECULAR   POCT URINE PREGNANCY          Imaging Results          X-Ray Chest PA And Lateral (In process)                  Medical Decision Making:   ED Management:  54-year-old female presents emergency department with flank pain, skin color changes and fever.  The patient has been pyelonephritis as well as cellulitis of her localized lymphedema.  She has a leukocytosis of 25 as well as elevated ESR and CRP but a normal lactic acid.  Her  urinalysis does show signs of infection.  CT scan with large ventral hernia but no signs of obstruction.  Nothing else acute.  The patient has been covered with broad-spectrum antibiotics.  She will be admitted for further management of pyelonephritis and cellulitis.    Bridget Plaza MD  Emergency Medicine  03/05/2020 4:18 AM                                   Clinical Impression:       ICD-10-CM ICD-9-CM   1. Sepsis without acute organ dysfunction, due to unspecified organism A41.9 038.9     995.91   2. Fever R50.9 780.60   3. Cellulitis of other specified site L03.818 682.8   4. Pyelonephritis N12 590.80   5. Chest pain R07.9 786.50                                Bridget Plaza MD  03/05/20 0418

## 2020-03-05 NOTE — PROGRESS NOTES
"[facility] Progress Note    Patient Name: Adriana Zaragoza MRN: [id number]   Patient Class: IP- Inpatient  Length of Stay: 0   Admission Date: 3/4/2020  5:28 PM Attending Physician: Felicita Beal MD   Primary Care Provider: Jessica Baeza MD Face-to-Face encounter date: 03/05/2020   Chief Complaint: Chills; Generalized Body Aches; and Headache        Subjective:    Hospital course:  Patient was admitted for sepsis from acute lymphangitis and pyelonephritis  Interval History: .  Complains of uncontrolled pain on her back not responded 10 mg Norco  Percocet added  Continue broad-spectrum antibiotics and to get the cultures   consult for home health  Leukocytosis improving, afebrile  Hypokalemia replaced    Review of Systems All other Review of Systems were found to be negative expect for that mentioned already in HPI.     Objective:   Physical Exam  BP (!) 140/65   Pulse 77   Temp 99.7 °F (37.6 °C)   Resp 18   Ht 5' 4" (1.626 m)   Wt (!) 181.6 kg (400 lb 5.7 oz)   SpO2 99%   Breastfeeding? No   BMI 68.72 kg/m²   Vitals reviewed.    Constitutional:  morbidly obese woman with mild distress  HENT: Atraumatic. PERRLA  Cardiovascular: Normal rate, regular rhythm and normal heart sounds.   Pulmonary/Chest: Effort normal. She has no wheezes.   Abdominal:  protuberant with umbilical hernia   right CVA tenderness,,bowel sounds are normal large pedunculated most from the right medial thigh is erythematous and warm to touch nontender, purulent area on the posterior aspect  Neurological: AOX3  Skin: Skin is warm and dry.     Following labs were Reviewed   Recent Labs   Lab 03/04/20  1831 03/05/20  0426   WBC 25.09* 17.58*   HGB 12.0 10.3*   HCT 40.9 33.4*   * 315   CALCIUM 9.2 8.4*   ALBUMIN 3.9  --    PROT 8.4  --     135*   K 4.1 3.4*   CO2 25 26    103   BUN 13 13   CREATININE 0.6 0.7   ALKPHOS 75  --    ALT 15  --    AST 21  --    BILITOT 0.8  " --      No results found for: POCTGLUCOSE     Microbiology Results (last 7 days)     Procedure Component Value Units Date/Time    Blood Culture #1 **CANNOT BE ORDERED STAT** [[id number]] Collected:  03/04/20 1843    Order Status:  Completed Specimen:  Blood from Peripheral, Antecubital, Right Updated:  03/05/20 0117     Blood Culture, Routine No Growth to date    Blood Culture #1 **CANNOT BE ORDERED STAT** [[id number]] Collected:  03/04/20 1832    Order Status:  Completed Specimen:  Blood from Peripheral, Forearm, Right Updated:  03/05/20 0117     Blood Culture, Routine No Growth to date        X-Ray Chest PA And Lateral   Final Result      Stable chest radiograph demonstrating prominent epicardial fat and no acute pulmonary process.         Electronically signed by: Chucky Rossi MD   Date:    03/05/2020   Time:    06:29      CT Abdomen Pelvis With Contrast   Final Result          Inpatient medications  Scheduled Meds:   citalopram  10 mg Oral Daily    enoxaparin  40 mg Subcutaneous Q12H    ferrous sulfate  325 mg Oral Daily with breakfast    [START ON 3/6/2020] piperacillin-tazobactam (ZOSYN) IVPB  3.375 g Intravenous Q8H    pravastatin  40 mg Oral Daily     Continuous Infusions:   sodium chloride 0.9% 125 mL/hr at 03/05/20 1349     PRN Meds:.acetaminophen, acetaminophen, albuterol, calcium chloride IVPB, calcium chloride IVPB, calcium chloride IVPB, HYDROcodone-acetaminophen, magnesium oxide, magnesium sulfate IVPB, magnesium sulfate IVPB, magnesium sulfate IVPB, magnesium sulfate IVPB, ondansetron, oxyCODONE-acetaminophen, potassium chloride in water, potassium chloride in water, potassium chloride in water, potassium chloride in water, potassium chloride, potassium chloride, potassium chloride, potassium chloride, sodium phosphate IVPB, sodium phosphate IVPB, sodium phosphate IVPB, sodium phosphate IVPB, sodium phosphate IVPB      Assessment & Plan:     # Sepsis - likely sources are acute lymphangitis  versus pyelonephritis  Lactic acid -wnl  Continue maintenance IV fluids and pain meds  Continue empiric antibiotic therapy with vancomycin and piperacillin-tazobactam  Follow urine and blood cultures  Wound care consulted for lymphedematous mass  Continue home medications for chronic medical conditions           Core measures:    - DVT PPx: /Lovenox         Discharge Planning:   No mobility needs. Patient will be discharged in home health  PT C/S for debility/fraility/deconditioning and assistance in discharge needs.       Above encounter included review of the medical records, interviewing and examining the patient face-to-face, discussion with family and other health care providers, ordering and interpreting lab/test results and formulating a plan of care.     Medical Decision Making:      [] Low Complexity  [x] Moderate Complexity  [] High Complexity

## 2020-03-05 NOTE — NURSING
Patient complaining of severe back pain.  Per Dr Beal -- one time dose Norco 10/325mg po once now. RBTO

## 2020-03-05 NOTE — HPI
Patient is a 54-year-old  female with morbid obesity, status post gastric bypass, chronic lymphedema of left lower extremity as well as COPD presents to the ED with chief complaint fever, chills and generalized weakness.    She was in her usual state of health until about 2 days ago when she started to notice generalized weakness.  Yesterday afternoon she noticed subjective fevers with shaking chills and myalgias.  Did not check her temperature at home.  She endorses urinary incontinence as well as dysuria over the last 2 days.  Symptoms consistent with her prior UTIs.  Patient has chronic lymphedema of right lower extremity with a large pedunculated mass arising from the medical thigh.  This is complicated by chronic nonhealing wound over the posterior aspect for which she gets wound care.  She has had secondary infections of this area in the past secondary to cellulitis and is concerned about similar presentation now.  Review of systems notable for shortness of breath and headache.  Denies chest pain, cough, nausea/vomiting, abdominal pain or diarrhea.  She has known slow transit constipation and is on daily stool softeners.    In the ED:  Noted to be febrile to 103.2° F.  Labs notable for leukocytosis.  Urinalysis positive.  Lactic acid within normal limits.  Blood cultures sent and patient has been initiated broad-spectrum antibiotics. CT abd/pelvis without acute process.    Rest of the 10 point review of systems is negative except as mentioned above.

## 2020-03-06 LAB
ANION GAP SERPL CALC-SCNC: 5 MMOL/L (ref 8–16)
BASOPHILS # BLD AUTO: 0.04 K/UL (ref 0–0.2)
BASOPHILS NFR BLD: 0.5 % (ref 0–1.9)
BUN SERPL-MCNC: 10 MG/DL (ref 6–20)
CALCIUM SERPL-MCNC: 8.1 MG/DL (ref 8.7–10.5)
CHLORIDE SERPL-SCNC: 103 MMOL/L (ref 95–110)
CO2 SERPL-SCNC: 26 MMOL/L (ref 23–29)
CREAT SERPL-MCNC: 0.6 MG/DL (ref 0.5–1.4)
DIFFERENTIAL METHOD: ABNORMAL
EOSINOPHIL # BLD AUTO: 0.1 K/UL (ref 0–0.5)
EOSINOPHIL NFR BLD: 1.2 % (ref 0–8)
ERYTHROCYTE [DISTWIDTH] IN BLOOD BY AUTOMATED COUNT: 16.7 % (ref 11.5–14.5)
EST. GFR  (AFRICAN AMERICAN): >60 ML/MIN/1.73 M^2
EST. GFR  (NON AFRICAN AMERICAN): >60 ML/MIN/1.73 M^2
GLUCOSE SERPL-MCNC: 83 MG/DL (ref 70–110)
HCT VFR BLD AUTO: 32.4 % (ref 37–48.5)
HGB BLD-MCNC: 9.6 G/DL (ref 12–16)
IMM GRANULOCYTES # BLD AUTO: 0.04 K/UL (ref 0–0.04)
IMM GRANULOCYTES NFR BLD AUTO: 0.5 % (ref 0–0.5)
LYMPHOCYTES # BLD AUTO: 1.7 K/UL (ref 1–4.8)
LYMPHOCYTES NFR BLD: 19.5 % (ref 18–48)
MAGNESIUM SERPL-MCNC: 1.9 MG/DL (ref 1.6–2.6)
MCH RBC QN AUTO: 24.9 PG (ref 27–31)
MCHC RBC AUTO-ENTMCNC: 29.6 G/DL (ref 32–36)
MCV RBC AUTO: 84 FL (ref 82–98)
MONOCYTES # BLD AUTO: 0.8 K/UL (ref 0.3–1)
MONOCYTES NFR BLD: 9.7 % (ref 4–15)
NEUTROPHILS # BLD AUTO: 6 K/UL (ref 1.8–7.7)
NEUTROPHILS NFR BLD: 68.6 % (ref 38–73)
NRBC BLD-RTO: 0 /100 WBC
PLATELET # BLD AUTO: 264 K/UL (ref 150–350)
PMV BLD AUTO: 9.7 FL (ref 9.2–12.9)
POTASSIUM SERPL-SCNC: 3.8 MMOL/L (ref 3.5–5.1)
RBC # BLD AUTO: 3.86 M/UL (ref 4–5.4)
SODIUM SERPL-SCNC: 134 MMOL/L (ref 136–145)
WBC # BLD AUTO: 8.68 K/UL (ref 3.9–12.7)

## 2020-03-06 PROCEDURE — 97535 SELF CARE MNGMENT TRAINING: CPT

## 2020-03-06 PROCEDURE — 25000003 PHARM REV CODE 250: Performed by: INTERNAL MEDICINE

## 2020-03-06 PROCEDURE — 97165 OT EVAL LOW COMPLEX 30 MIN: CPT

## 2020-03-06 PROCEDURE — 83735 ASSAY OF MAGNESIUM: CPT

## 2020-03-06 PROCEDURE — 94761 N-INVAS EAR/PLS OXIMETRY MLT: CPT

## 2020-03-06 PROCEDURE — 85025 COMPLETE CBC W/AUTO DIFF WBC: CPT

## 2020-03-06 PROCEDURE — 99900035 HC TECH TIME PER 15 MIN (STAT)

## 2020-03-06 PROCEDURE — 27000221 HC OXYGEN, UP TO 24 HOURS

## 2020-03-06 PROCEDURE — 97116 GAIT TRAINING THERAPY: CPT

## 2020-03-06 PROCEDURE — 21400001 HC TELEMETRY ROOM

## 2020-03-06 PROCEDURE — 36415 COLL VENOUS BLD VENIPUNCTURE: CPT

## 2020-03-06 PROCEDURE — 94660 CPAP INITIATION&MGMT: CPT

## 2020-03-06 PROCEDURE — 80048 BASIC METABOLIC PNL TOTAL CA: CPT

## 2020-03-06 PROCEDURE — 63600175 PHARM REV CODE 636 W HCPCS: Performed by: INTERNAL MEDICINE

## 2020-03-06 PROCEDURE — 97161 PT EVAL LOW COMPLEX 20 MIN: CPT

## 2020-03-06 RX ADMIN — OXYCODONE HYDROCHLORIDE AND ACETAMINOPHEN 1 TABLET: 5; 325 TABLET ORAL at 09:03

## 2020-03-06 RX ADMIN — HYDROCODONE BITARTRATE AND ACETAMINOPHEN 1 TABLET: 5; 325 TABLET ORAL at 05:03

## 2020-03-06 RX ADMIN — ENOXAPARIN SODIUM 40 MG: 100 INJECTION SUBCUTANEOUS at 08:03

## 2020-03-06 RX ADMIN — PIPERACILLIN AND TAZOBACTAM 3.38 G: 3; .375 INJECTION, POWDER, LYOPHILIZED, FOR SOLUTION INTRAVENOUS; PARENTERAL at 08:03

## 2020-03-06 RX ADMIN — SODIUM CHLORIDE: 0.9 INJECTION, SOLUTION INTRAVENOUS at 09:03

## 2020-03-06 RX ADMIN — PIPERACILLIN AND TAZOBACTAM 3.38 G: 3; .375 INJECTION, POWDER, LYOPHILIZED, FOR SOLUTION INTRAVENOUS; PARENTERAL at 05:03

## 2020-03-06 RX ADMIN — FERROUS SULFATE TAB 325 MG (65 MG ELEMENTAL FE) 325 MG: 325 (65 FE) TAB at 08:03

## 2020-03-06 RX ADMIN — PIPERACILLIN AND TAZOBACTAM 3.38 G: 3; .375 INJECTION, POWDER, LYOPHILIZED, FOR SOLUTION INTRAVENOUS; PARENTERAL at 01:03

## 2020-03-06 RX ADMIN — POTASSIUM CHLORIDE 20 MEQ: 20 TABLET, EXTENDED RELEASE ORAL at 08:03

## 2020-03-06 RX ADMIN — CITALOPRAM HYDROBROMIDE 10 MG: 10 TABLET ORAL at 08:03

## 2020-03-06 RX ADMIN — OXYCODONE HYDROCHLORIDE AND ACETAMINOPHEN 1 TABLET: 5; 325 TABLET ORAL at 08:03

## 2020-03-06 RX ADMIN — ACETAMINOPHEN 650 MG: 325 TABLET ORAL at 04:03

## 2020-03-06 RX ADMIN — PRAVASTATIN SODIUM 40 MG: 40 TABLET ORAL at 08:03

## 2020-03-06 NOTE — PLAN OF CARE
Vital signs, cardiac and lab monitoring. IV hydration and IV antibiotics. Possible discharge in am . Increase activity as tolerated. Bed alarm on. Watch for falls. Watch for sign and symptoms of bleeding. Breathing treatments and adjust oxygen as needed. Strict I & O. Daily weights.

## 2020-03-06 NOTE — PLAN OF CARE
03/06/20 0841   Patient Assessment/Suction   Level of Consciousness (AVPU) alert   Respiratory Effort Normal;Unlabored   PRE-TX-O2   O2 Device (Oxygen Therapy) BiPAP   $ Is the patient on Low Flow Oxygen? Yes   Flow (L/min) 35   SpO2 98 %   Pulse Oximetry Type Intermittent   $ Pulse Oximetry - Multiple Charge Pulse Oximetry - Multiple   Pulse 70   Resp 17   Aerosol Therapy   $ Aerosol Therapy Charges PRN treatment not required  (pt sleeping)   Preset CPAP/BiPAP Settings   Mode Of Delivery BiPAP   $ CPAP/BiPAP Daily Charge BiPAP/CPAP Daily   $ Is patient using? Yes   Size of Mask Small/Medium   Sized Appropriately? Yes   Equipment Type V60   Ipap 16   EPAP (cm H2O) 8   Pressure Support (cm H2O) 8   Set Rate (Breaths/Min) 12   Patient CPAP/BiPAP Settings   RR Total (Breaths/Min) 17   Tidal Volume (mL) 724   VE Minute Ventilation (L/min) 12.5 L/min   Respiratory Evaluation   $ Care Plan Tech Time 15 min

## 2020-03-06 NOTE — PT/OT/SLP EVAL
Physical Therapy Evaluation    Patient Name:  Adriana Zaragoza   MRN:  [id number]    Recommendations:     Discharge Recommendations:  home health PT   Discharge Equipment Recommendations: none   Barriers to discharge: None    Assessment:     Adriana Zaragoza is a 54 y.o. female admitted with a medical diagnosis of Sepsis without acute organ dysfunction.  She presents with the following impairments/functional limitations:  impaired endurance, gait instability, weakness, impaired functional mobilty, impaired balance, impaired skin, impaired self care skills, decreased lower extremity function, decreased ROM, decreased coordination, impaired cardiopulmonary response to activity, decreased safety awareness. Pt sitting up in chair in NAD; sig other present; pt agreeable to PT eval and gait training; states at baseline she uses rollator at baseline and very large R LE tumor hangs at baseline.     Rehab Prognosis: Good; patient would benefit from acute skilled PT services to address these deficits and reach maximum level of function.    Recent Surgery: * No surgery found *      Plan:     During this hospitalization, patient to be seen 6 x/week to address the identified rehab impairments via gait training, therapeutic activities, therapeutic exercises and progress toward the following goals:    · Plan of Care Expires:  04/06/20    Subjective     Chief Complaint: abdominal pain  Patient/Family Comments/goals: home  Pain/Comfort:  · Pain Rating 1: 4/10  · Location - Side 1: Bilateral  · Location 1: abdomen  · Pain Addressed 1: Nurse notified  · Pain Rating Post-Intervention 1: 4/10    Patients cultural, spiritual, Jehovah's witness conflicts given the current situation:      Living Environment:  Pt lives in single story home with no steps to enter. Step down into garage; Pt lives with mother and boyfriend and states they are all disabled in terms of mobility.   Prior to admission, patients level of function was Mod indep  with rollator.  Equipment used at home: rollator, oxygen, CPAP.  DME owned (not currently used): none.  Upon discharge, patient will have assistance from family.    Objective:     Communicated with RN and OT prior to session.  Patient found up in chair with bed alarm, telemetry, peripheral IV, anne catheter, oxygen  upon PT entry to room.    General Precautions: Standard, fall   Orthopedic Precautions:    Braces:       Exams:  · Cognitive Exam:  Patient is oriented to Person, Place, Time and Situation  · RLE ROM: WFL  · RLE Strength: WFL  · LLE ROM: WFL  · LLE Strength: WFL  B LE very large 2/2 adipose tissue and R LE with very large external tumor that hangs down from thigh; but WFL for transfers and mobility    Functional Mobility:  · Transfers:     · Sit to Stand:  contact guard assistance with bariatric RW  · Gait: 20' with bariatric RW CGA on 2 L O2; 96% during gait training  · Balance: good in sitting; fair in standing      Therapeutic Activities and Exercises:   transfer training; PT educated pt/ family on importance of out of bed to chair and functional mobility to negate negative effects of prolonged bed rest. Will progress activity as tolerated by patient;     AM-PAC 6 CLICK MOBILITY  Total Score:16     Patient left up in chair with all lines intact, call button in reach and RN notified.    GOALS:   Multidisciplinary Problems     Physical Therapy Goals        Problem: Physical Therapy Goal    Goal Priority Disciplines Outcome Goal Variances Interventions   Physical Therapy Goal     PT, PT/OT      Description:  Goals to be met by: discharge     Patient will increase functional independence with mobility by performin. Supine to sit with Supervision  2. Sit to supine with Supervision  3. Sit to stand transfer with Supervision  4. Bed to chair transfer with Supervision using Rolling Walker  5. Gait  x 25 feet with Stand-by Assistance using Rolling Walker.                       History:     Past Medical  History:   Diagnosis Date    Allergy     Anemia     Asthma     COPD (chronic obstructive pulmonary disease)     Deep vein thrombosis     Depression     Diabetes mellitus, type 2     Encounter for blood transfusion     Heart murmur     Neuromuscular disorder        Past Surgical History:   Procedure Laterality Date     SECTION      DILATION AND CURETTAGE OF UTERUS      TONSILLECTOMY         Time Tracking:     PT Received On: 20  PT Start Time: 1036     PT Stop Time: 1051  PT Total Time (min): 15 min     Billable Minutes: Evaluation 7 and Gait Training 8      Leatha Hernandez, PT  2020

## 2020-03-06 NOTE — PLAN OF CARE
Vital signs, cardiac and lab monitoring. IV hydration and IV antibiotics.   Increase activity as tolerated. Bed alarm on. Watch for falls. Watch for sign and symptoms of bleeding.   Breathing treatments and adjust oxygen as needed. Strict I & O. Daily weights.

## 2020-03-06 NOTE — PT/OT/SLP EVAL
"Occupational Therapy   Evaluation and Discharge Note    Name: Adriana Zaragoza  MRN: [id number]  Admitting Diagnosis:  Sepsis without acute organ dysfunction      Recommendations:     Discharge Recommendations: home with home health, home health PT  Discharge Equipment Recommendations:  none  Barriers to discharge:       Assessment:     Adriana Zaragoza is a 54 y.o. female with a medical diagnosis of Sepsis without acute organ dysfunction. At this time, patient is functioning at their prior level of function and does not require further acute OT services.     Plan:     During this hospitalization, patient does not require further acute OT services.  Please re-consult if situation changes.    · Plan of Care Reviewed with: patient, significant other    Subjective     Chief Complaint: "I need help lifting up my tumor so It doesn't drag"  Patient/Family Comments/goals: home with HH    Occupational Profile:  Living Environment: one story house with one step to enter  Previous level of function: Mod I with rollator ambulation; Mod I UB/LD dressing (wears a dress and slip on shoes), uses  shower hose for toileting hygiene 5-6 times daily, boyfriend assists with LB hygiene in shower  Roles and Routines: lives with boyfriend and mother  Equipment Used at home:  rollator, oxygen, CPAP  Assistance upon Discharge: boyfriend will assist    Pain/Comfort:  · Pain Rating 1: 4/10  · Location - Side 1: Bilateral  · Location 1: abdomen  · Pain Addressed 1: Nurse notified  · Pain Rating Post-Intervention 1: 4/10  · Pain Rating 2: 4/10  · Location - Side 2: Bilateral  · Location 2: leg("basically from the waist down" )  · Pain Addressed 2: Nurse notified  · Pain Rating Post-Intervention 2: 4/10    Patients cultural, spiritual, Gnosticism conflicts given the current situation: no    Objective:     Communicated with: Nursing prior to session.  Patient found HOB elevated with bed alarm, telemetry, peripheral IV, anne catheter " upon OT entry to room.    General Precautions: Standard, fall, diabetic   Orthopedic Precautions:N/A   Braces: N/A     Occupational Performance:    Bed Mobility:    · Patient completed Rolling/Turning to Left with  pt does not sleep in a bed at home and pt did not want bed mobility assessed; HOB elevated for sup to sit   · Patient completed Scooting/Bridging with minimum assistance and with HOB elevated, pt needed Min A to support pendulum tumor with draw sheet to prevent shearing with scooting to sup to sit and EOB   · Patient completed Supine to Sit with minimum assistance and  and with HOB elevated, pt needed Min A to support pendulum tumor with draw sheet to prevent shearing with scooting to sup to sit and EOB     Functional Mobility/Transfers:  · Patient completed Sit <> Stand Transfer with contact guard assistance  with  4 pt pivot to armed chair   · Patient completed Bed <> Chair Transfer using Stand Pivot technique with contact guard assistance and   4 pt pivot to armed chair  with no assistive device and   4 pt pivot to armed chair     Activities of Daily Living:  · Feeding:  independence    · Grooming: set up  tray table set up due to limited mobility at this time to the sink   · Lower Body Dressing: pt depends on slip on shoes at home, however, pt does not have the slip on shoes at this time; pt needed dep with personal slippers with a back to hannah      Cognitive/Visual Perceptual:  Cognitive/Psychosocial Skills:     -       Oriented to: Person, Place, Time and Situation   -       Follows Commands/attention:Follows multistep  commands  -       Communication: clear/fluent  -       Memory: No Deficits noted  -       Safety awareness/insight to disability: intact   -       Mood/Affect/Coping skills/emotional control: Appropriate to situation, Despondent, Cooperative and Pleasant  Visual/Perceptual:      -Intact      Physical Exam:  Balance:    -       good sitting balance, fair + static standing   Skin  integrity: red irritatable upper leg tumor   Edema:  Severe upper leg tumor   Motor Planning:    -       intact  Dominant hand:    -       right   Upper Extremity Range of Motion:     -       Right Upper Extremity: WFL  -       Left Upper Extremity: WFL  Upper Extremity Strength:    -       Right Upper Extremity: WFL  -       Left Upper Extremity: WFL   Strength:    -       Right Upper Extremity: WFL  -       Left Upper Extremity: WFL  Fine Motor Coordination:    -       Intact  Gross motor coordination:   WFL    AMPAC 6 Click ADL:  AMPAC Total Score: 17    Treatment & Education: role of OT; ADL's with work simplification   Education:    Patient left up in chair with all lines intact, call button in reach, bed alarm on, nurse  notified and boyfriend present; OT/PT collaborated care at the end of session bariatric RW     GOALS:   Multidisciplinary Problems     Occupational Therapy Goals     Not on file                History:     Past Medical History:   Diagnosis Date    Allergy     Anemia     Asthma     COPD (chronic obstructive pulmonary disease)     Deep vein thrombosis     Depression     Diabetes mellitus, type 2     Encounter for blood transfusion     Heart murmur     Neuromuscular disorder        Past Surgical History:   Procedure Laterality Date     SECTION      DILATION AND CURETTAGE OF UTERUS      TONSILLECTOMY         Time Tracking:     OT Date of Treatment: 20  OT Start Time: 934  OT Stop Time: 1000  OT Total Time (min): 26 min    Billable Minutes:Evaluation 15  Self Care/Home Management 11    Florence Ponce OT  3/6/2020  10:37 AM

## 2020-03-06 NOTE — PROGRESS NOTES
"[facility] Medicine Progress Note    Patient Name: Adriana Zaragoza MRN: [id number]   Patient Class: IP- Inpatient  Length of Stay: 1   Admission Date: 3/4/2020  5:28 PM Attending Physician: Felicita Beal MD   Primary Care Provider: Jessica Baeza MD Face-to-Face encounter date: 03/06/2020   Chief Complaint: Chills; Generalized Body Aches; and Headache        Subjective:    Hospital course:  Patient was admitted for sepsis from acute lymphangitis and pyelonephritis  Pain control improved with Percocet    Interval History: .    Continue broad-spectrum antibiotics and pending cultures   consult for home health  Leukocytosisresolved afebrile  Discharge planning tomorrow    Review of Systems All other Review of Systems were found to be negative expect for that mentioned already in HPI.     Objective:   Physical Exam  BP (!) 107/52   Pulse 70   Temp 97.7 °F (36.5 °C)   Resp 16   Ht 5' 4" (1.626 m)   Wt (!) 177.5 kg (391 lb 5.1 oz)   SpO2 98%   Breastfeeding? No   BMI 67.17 kg/m²   Vitals reviewed.    Constitutional:  morbidly obese woman with mild distress  HENT: Atraumatic. PERRLA  Cardiovascular: Normal rate, regular rhythm and normal heart sounds.   Pulmonary/Chest: Effort normal. She has no wheezes.   Abdominal:  protuberant with umbilical hernia   right CVA tenderness,,bowel sounds are normal large pedunculated most from the right medial thigh is erythematous and warm to touch nontender, purulent area on the posterior aspect  Neurological: AOX3  Skin: Skin is warm and dry.     Following labs were Reviewed   Recent Labs   Lab 03/06/20  0433   WBC 8.68   HGB 9.6*   HCT 32.4*      CALCIUM 8.1*   *   K 3.8   CO2 26      BUN 10   CREATININE 0.6     No results found for: POCTGLUCOSE     Microbiology Results (last 7 days)     Procedure Component Value Units Date/Time    Blood Culture #1 **CANNOT BE ORDERED STAT** [[id number]] Collected:  03/04/20 1843 "    Order Status:  Completed Specimen:  Blood from Peripheral, Antecubital, Right Updated:  03/05/20 2032     Blood Culture, Routine No Growth to date      No Growth to date    Blood Culture #1 **CANNOT BE ORDERED STAT** [[id number]] Collected:  03/04/20 1832    Order Status:  Completed Specimen:  Blood from Peripheral, Forearm, Right Updated:  03/05/20 2032     Blood Culture, Routine No Growth to date      No Growth to date        X-Ray Chest PA And Lateral   Final Result      Stable chest radiograph demonstrating prominent epicardial fat and no acute pulmonary process.         Electronically signed by: Chucky Rossi MD   Date:    03/05/2020   Time:    06:29      CT Abdomen Pelvis With Contrast   Final Result          Inpatient medications  Scheduled Meds:   citalopram  10 mg Oral Daily    enoxaparin  40 mg Subcutaneous Q12H    ferrous sulfate  325 mg Oral Daily with breakfast    piperacillin-tazobactam (ZOSYN) IVPB  3.375 g Intravenous Q8H    pravastatin  40 mg Oral Daily     Continuous Infusions:   sodium chloride 0.9% 125 mL/hr at 03/06/20 0927     PRN Meds:.acetaminophen, acetaminophen, albuterol, calcium chloride IVPB, calcium chloride IVPB, calcium chloride IVPB, HYDROcodone-acetaminophen, magnesium oxide, magnesium sulfate IVPB, magnesium sulfate IVPB, magnesium sulfate IVPB, magnesium sulfate IVPB, ondansetron, oxyCODONE-acetaminophen, potassium chloride in water, potassium chloride in water, potassium chloride in water, potassium chloride in water, potassium chloride, potassium chloride, potassium chloride, potassium chloride, sodium phosphate IVPB, sodium phosphate IVPB, sodium phosphate IVPB, sodium phosphate IVPB, sodium phosphate IVPB      Assessment & Plan:     # Sepsis - likely sources are acute lymphangitis versus pyelonephritis  Lactic acid -wnl  Continue maintenance IV fluids and pain meds  Continue empiric antibiotic therapy with vancomycin and piperacillin-tazobactam  Follow urine and  blood cultures  Wound care consulted for lymphedematous mass appreciated  On calmoseptine to the pannis area,Interdry to the yeast folds  Continue home medications for chronic medical conditions           Core measures:    - DVT PPx: /Lovenox         Discharge Planning:   No mobility needs. Patient will be discharged in home health  PT C/S for debility/fraility/deconditioning and assistance in discharge needs.       Above encounter included review of the medical records, interviewing and examining the patient face-to-face, discussion with family and other health care providers, ordering and interpreting lab/test results and formulating a plan of care.     Medical Decision Making:      [] Low Complexity  [x] Moderate Complexity  [] High Complexity

## 2020-03-07 VITALS
RESPIRATION RATE: 20 BRPM | OXYGEN SATURATION: 99 % | HEART RATE: 76 BPM | BODY MASS INDEX: 50.02 KG/M2 | WEIGHT: 293 LBS | DIASTOLIC BLOOD PRESSURE: 58 MMHG | TEMPERATURE: 98 F | SYSTOLIC BLOOD PRESSURE: 127 MMHG | HEIGHT: 64 IN

## 2020-03-07 LAB
ANION GAP SERPL CALC-SCNC: 9 MMOL/L (ref 8–16)
BACTERIA #/AREA URNS HPF: NEGATIVE /HPF
BASOPHILS # BLD AUTO: 0.03 K/UL (ref 0–0.2)
BASOPHILS NFR BLD: 0.4 % (ref 0–1.9)
BILIRUB UR QL STRIP: NEGATIVE
BUN SERPL-MCNC: 10 MG/DL (ref 6–20)
CALCIUM SERPL-MCNC: 8.4 MG/DL (ref 8.7–10.5)
CHLORIDE SERPL-SCNC: 100 MMOL/L (ref 95–110)
CLARITY UR: ABNORMAL
CO2 SERPL-SCNC: 28 MMOL/L (ref 23–29)
COLOR UR: YELLOW
CREAT SERPL-MCNC: 0.5 MG/DL (ref 0.5–1.4)
DIFFERENTIAL METHOD: ABNORMAL
EOSINOPHIL # BLD AUTO: 0.1 K/UL (ref 0–0.5)
EOSINOPHIL NFR BLD: 1.6 % (ref 0–8)
ERYTHROCYTE [DISTWIDTH] IN BLOOD BY AUTOMATED COUNT: 16.2 % (ref 11.5–14.5)
EST. GFR  (AFRICAN AMERICAN): >60 ML/MIN/1.73 M^2
EST. GFR  (NON AFRICAN AMERICAN): >60 ML/MIN/1.73 M^2
GLUCOSE SERPL-MCNC: 91 MG/DL (ref 70–110)
GLUCOSE UR QL STRIP: NEGATIVE
HCT VFR BLD AUTO: 31.4 % (ref 37–48.5)
HGB BLD-MCNC: 9.5 G/DL (ref 12–16)
HGB UR QL STRIP: ABNORMAL
HYALINE CASTS #/AREA URNS LPF: 47 /LPF
IMM GRANULOCYTES # BLD AUTO: 0.02 K/UL (ref 0–0.04)
IMM GRANULOCYTES NFR BLD AUTO: 0.2 % (ref 0–0.5)
KETONES UR QL STRIP: NEGATIVE
LEUKOCYTE ESTERASE UR QL STRIP: ABNORMAL
LYMPHOCYTES # BLD AUTO: 1.6 K/UL (ref 1–4.8)
LYMPHOCYTES NFR BLD: 19 % (ref 18–48)
MAGNESIUM SERPL-MCNC: 1.9 MG/DL (ref 1.6–2.6)
MCH RBC QN AUTO: 25 PG (ref 27–31)
MCHC RBC AUTO-ENTMCNC: 30.3 G/DL (ref 32–36)
MCV RBC AUTO: 83 FL (ref 82–98)
MICROSCOPIC COMMENT: ABNORMAL
MONOCYTES # BLD AUTO: 0.7 K/UL (ref 0.3–1)
MONOCYTES NFR BLD: 7.9 % (ref 4–15)
NEUTROPHILS # BLD AUTO: 5.8 K/UL (ref 1.8–7.7)
NEUTROPHILS NFR BLD: 70.9 % (ref 38–73)
NITRITE UR QL STRIP: NEGATIVE
NRBC BLD-RTO: 0 /100 WBC
PH UR STRIP: 6 [PH] (ref 5–8)
PLATELET # BLD AUTO: 272 K/UL (ref 150–350)
PMV BLD AUTO: 9.7 FL (ref 9.2–12.9)
POTASSIUM SERPL-SCNC: 3.7 MMOL/L (ref 3.5–5.1)
PROT UR QL STRIP: ABNORMAL
RBC # BLD AUTO: 3.8 M/UL (ref 4–5.4)
RBC #/AREA URNS HPF: >100 /HPF (ref 0–4)
SODIUM SERPL-SCNC: 137 MMOL/L (ref 136–145)
SP GR UR STRIP: 1.02 (ref 1–1.03)
SQUAMOUS #/AREA URNS HPF: 7 /HPF
URN SPEC COLLECT METH UR: ABNORMAL
UROBILINOGEN UR STRIP-ACNC: ABNORMAL EU/DL
WBC # BLD AUTO: 8.23 K/UL (ref 3.9–12.7)
WBC #/AREA URNS HPF: 14 /HPF (ref 0–5)

## 2020-03-07 PROCEDURE — 81001 URINALYSIS AUTO W/SCOPE: CPT

## 2020-03-07 PROCEDURE — 87086 URINE CULTURE/COLONY COUNT: CPT

## 2020-03-07 PROCEDURE — 94660 CPAP INITIATION&MGMT: CPT

## 2020-03-07 PROCEDURE — 83735 ASSAY OF MAGNESIUM: CPT

## 2020-03-07 PROCEDURE — 99900035 HC TECH TIME PER 15 MIN (STAT)

## 2020-03-07 PROCEDURE — 25000003 PHARM REV CODE 250: Performed by: INTERNAL MEDICINE

## 2020-03-07 PROCEDURE — 97116 GAIT TRAINING THERAPY: CPT

## 2020-03-07 PROCEDURE — 85025 COMPLETE CBC W/AUTO DIFF WBC: CPT

## 2020-03-07 PROCEDURE — 36415 COLL VENOUS BLD VENIPUNCTURE: CPT

## 2020-03-07 PROCEDURE — 80048 BASIC METABOLIC PNL TOTAL CA: CPT

## 2020-03-07 PROCEDURE — 94761 N-INVAS EAR/PLS OXIMETRY MLT: CPT

## 2020-03-07 PROCEDURE — 63600175 PHARM REV CODE 636 W HCPCS: Performed by: INTERNAL MEDICINE

## 2020-03-07 PROCEDURE — 27000221 HC OXYGEN, UP TO 24 HOURS

## 2020-03-07 RX ORDER — AMOXICILLIN AND CLAVULANATE POTASSIUM 875; 125 MG/1; MG/1
1 TABLET, FILM COATED ORAL EVERY 12 HOURS
Qty: 8 TABLET | Refills: 0 | Status: ON HOLD | OUTPATIENT
Start: 2020-03-07 | End: 2020-06-12 | Stop reason: HOSPADM

## 2020-03-07 RX ADMIN — CITALOPRAM HYDROBROMIDE 10 MG: 10 TABLET ORAL at 08:03

## 2020-03-07 RX ADMIN — PIPERACILLIN AND TAZOBACTAM 3.38 G: 3; .375 INJECTION, POWDER, LYOPHILIZED, FOR SOLUTION INTRAVENOUS; PARENTERAL at 05:03

## 2020-03-07 RX ADMIN — ENOXAPARIN SODIUM 40 MG: 100 INJECTION SUBCUTANEOUS at 08:03

## 2020-03-07 RX ADMIN — OXYCODONE HYDROCHLORIDE AND ACETAMINOPHEN 1 TABLET: 5; 325 TABLET ORAL at 06:03

## 2020-03-07 RX ADMIN — SODIUM CHLORIDE: 0.9 INJECTION, SOLUTION INTRAVENOUS at 03:03

## 2020-03-07 RX ADMIN — FERROUS SULFATE TAB 325 MG (65 MG ELEMENTAL FE) 325 MG: 325 (65 FE) TAB at 08:03

## 2020-03-07 RX ADMIN — POTASSIUM CHLORIDE 20 MEQ: 20 TABLET, EXTENDED RELEASE ORAL at 06:03

## 2020-03-07 RX ADMIN — ACETAMINOPHEN 650 MG: 325 TABLET ORAL at 03:03

## 2020-03-07 NOTE — DISCHARGE SUMMARY
[facility] Medicine  Discharge Summary      Patient Name: Adriana Zaragoza  MRN: [id number]  Admission Date: 3/4/2020  Hospital Length of Stay: 2 days  Discharge Date and Time: No discharge date for patient encounter.  Attending Physician: Felicita Beal MD   Discharging Provider: Felicita Beal MD  Primary Care Provider: Jessica Baeza MD      HPI:   Patient is a 54-year-old  female with morbid obesity, status post gastric bypass, chronic lymphedema of left lower extremity as well as COPD presents to the ED with chief complaint fever, chills and generalized weakness.    She was in her usual state of health until about 2 days ago when she started to notice generalized weakness.  Yesterday afternoon she noticed subjective fevers with shaking chills and myalgias.  Did not check her temperature at home.  She endorses urinary incontinence as well as dysuria over the last 2 days.  Symptoms consistent with her prior UTIs.  Patient has chronic lymphedema of right lower extremity with a large pedunculated mass arising from the medical thigh.  This is complicated by chronic nonhealing wound over the posterior aspect for which she gets wound care.  She has had secondary infections of this area in the past secondary to cellulitis and is concerned about similar presentation now.  Review of systems notable for shortness of breath and headache.  Denies chest pain, cough, nausea/vomiting, abdominal pain or diarrhea.  She has known slow transit constipation and is on daily stool softeners.    In the ED:  Noted to be febrile to 103.2° F.  Labs notable for leukocytosis.  Urinalysis positive.  Lactic acid within normal limits.  Blood cultures sent and patient has been initiated broad-spectrum antibiotics. CT abd/pelvis without acute process.    Rest of the 10 point review of systems is negative except as mentioned above.      * No surgery found *      Hospital Course:     Patient was  admitted for sepsis from acute lymphangitis and pyelonephritis  Pain control improved with Percocet, pt received IV Vanco Zosyn for 3 days with remarkable improvement in her symptoms including right flank pain, afebrile  Leukocytosis resolved, redness on lymphedema improved on right leg hernia.  Blood culture negative to date  Patient will be discharged with p.o. Augmentin for 4 more days  Urine culture was not processed   C/w  calmoseptine to the pannis area,Interdry to the yeast folds  tylanol prn for pain     Physical exam on the day of discharge     Constitutional:  morbidly obese woman with mild distress  HENT: Atraumatic. PERRLA  Cardiovascular: Normal rate, regular rhythm and normal heart sounds.   Pulmonary/Chest: Effort normal. She has no wheezes.   Abdominal:  protuberant with umbilical hernia no CVA tenderness,,bowel sounds are normal large pedunculated most from the right medial thigh is erythematous and warm decreased nontender,   Neurological: AOX3  Skin: Skin is warm and dry.           Consults:   Consults (From admission, onward)        Status Ordering Provider     Inpatient consult to Hospitalist  Once     Provider:  Sebastián Leo MD    Acknowledged SEBASTIÁN LEO     Inpatient consult to   Once     Provider:  (Not yet assigned)    Acknowledged OLIVIA MCCORMICK          No new Assessment & Plan notes have been filed under this hospital service since the last note was generated.  Service: Hospital Medicine    Final Active Diagnoses:    Diagnosis Date Noted POA    PRINCIPAL PROBLEM:  Sepsis without acute organ dysfunction [A41.9] 03/05/2020 Yes    Acute lymphangitis [L03.91] 03/05/2020 Yes    Pyelonephritis [N12] 03/05/2020 Yes    Slow transit constipation [K59.01] 10/17/2019 Yes    KEESHA (obstructive sleep apnea) [G47.33]  Yes    Lymphedema [I89.0] 01/31/2018 Yes    Morbid obesity [E66.01] 01/31/2018 Yes    Pickwickian syndrome [E66.2] 01/31/2018 Yes      Problems Resolved  During this Admission:       Discharged Condition: fair    Disposition: Home or Self Care    Follow Up:  Follow-up Information     Follow up In 1 week.           Jessica Baeza MD.    Specialties:  Internal Medicine, Pediatrics  Contact information:  [place] [id number] [phone]                 Patient Instructions:      Diet Cardiac     Notify your health care provider if you experience any of the following:  temperature >100.4     Notify your health care provider if you experience any of the following:  persistent nausea and vomiting or diarrhea     Notify your health care provider if you experience any of the following:  persistent dizziness, light-headedness, or visual disturbances     Activity as tolerated       Significant Diagnostic Studies: Labs:   BMP:   Recent Labs   Lab 03/05/20  1623 03/06/20  0433 03/07/20  0412   GLU  --  83 91   NA  --  134* 137   K 4.0 3.8 3.7   CL  --  103 100   CO2  --  26 28   BUN  --  10 10   CREATININE  --  0.6 0.5   CALCIUM  --  8.1* 8.4*   MG  --  1.9 1.9    and CBC   Recent Labs   Lab 03/06/20  0433 03/07/20  0412   WBC 8.68 8.23   HGB 9.6* 9.5*   HCT 32.4* 31.4*    272       Pending Diagnostic Studies:     Procedure Component Value Units Date/Time    Urinalysis, Reflex to Urine Culture Urine, Catheterized [[id number]] Collected:  03/04/20 2137    Order Status:  Sent Lab Status:  In process Updated:  03/04/20 2224    Specimen:  Urine     Urinalysis, Reflex to Urine Culture Urine, Clean Catch [[id number]] Collected:  03/07/20 1428    Order Status:  Sent Lab Status:  In process Updated:  03/07/20 1428    Specimen:  Urine          Medications:  Reconciled Home Medications:      Medication List      START taking these medications    amoxicillin-clavulanate 875-125mg 875-125 mg per tablet  Commonly known as:  AUGMENTIN  Take 1 tablet by mouth every 12 (twelve) hours.        CONTINUE taking these medications    albuterol 90 mcg/actuation inhaler  Commonly  known as:  PROVENTIL/VENTOLIN HFA  Ventolin HFA 90 mcg/actuation aerosol inhaler   Inhale 2 puffs every 4-6 hours by inhalation route.     BARIATRIC MULTIVITAMINS ORAL  ONCE DAILY     calcium carbonate 600 mg calcium (1,500 mg) Tab  Commonly known as:  OS-BRENDA  Take 600 mg by mouth once.     citalopram 10 MG tablet  Commonly known as:  CELEXA  Take 1 tablet (10 mg total) by mouth once daily.     ferrous sulfate 325 mg (65 mg iron) Tab tablet  Commonly known as:  FEOSOL  Take 325 mg by mouth daily with breakfast.     ondansetron 4 MG Tbdl  Commonly known as:  ZOFRAN-ODT  4 mg.     potassium chloride 10 MEQ Cpsr  Commonly known as:  MICRO-K  Take 10 mEq by mouth once.     pravastatin 40 MG tablet  Commonly known as:  PRAVACHOL  Take 1 tablet (40 mg total) by mouth once daily.            Indwelling Lines/Drains at time of discharge:   Lines/Drains/Airways     None                 Time spent on the discharge of patient: 33 minutes  Patient was seen and examined on the date of discharge and determined to be suitable for discharge.         Felicita Beal MD  Department of Hospital Medicine  [facility]

## 2020-03-07 NOTE — CARE UPDATE
03/06/20 2322   Patient Assessment/Suction   Level of Consciousness (AVPU) alert   Respiratory Effort Unlabored   Expansion/Accessory Muscles/Retractions no use of accessory muscles   All Lung Fields Breath Sounds clear;diminished   Rhythm/Pattern, Respiratory unlabored   Cough Frequency no cough   PRE-TX-O2   O2 Device (Oxygen Therapy) nasal cannula   $ Is the patient on Low Flow Oxygen? Yes   Flow (L/min) 2   SpO2 95 %   Pulse Oximetry Type Intermittent   $ Pulse Oximetry - Multiple Charge Pulse Oximetry - Multiple   Pulse 76   Resp (!) 21   Positioning   Head of Bed (HOB) HOB elevated   Aerosol Therapy   $ Aerosol Therapy Charges PRN treatment not required   Respiratory Evaluation   $ Care Plan Tech Time 15 min

## 2020-03-07 NOTE — PLAN OF CARE
03/07/20 0759   Patient Assessment/Suction   Level of Consciousness (AVPU) alert   Respiratory Effort Normal;Unlabored   Expansion/Accessory Muscles/Retractions no use of accessory muscles;no retractions   All Lung Fields Breath Sounds clear;diminished   Rhythm/Pattern, Respiratory unlabored;pattern regular;depth regular   PRE-TX-O2   O2 Device (Oxygen Therapy) BiPAP   $ Is the patient on Low Flow Oxygen? Yes   Oxygen Concentration (%) 35   SpO2 99 %   Pulse Oximetry Type Intermittent   $ Pulse Oximetry - Multiple Charge Pulse Oximetry - Multiple   Pulse 84   Resp 20   Preset CPAP/BiPAP Settings   Mode Of Delivery BiPAP   $ CPAP/BiPAP Daily Charge BiPAP/CPAP Daily   $ Is patient using? Yes   Equipment Type V60   Ipap 16   EPAP (cm H2O) 8   Pressure Support (cm H2O) 8   Set Rate (Breaths/Min) 12   ITime (sec) 0.8   Patient CPAP/BiPAP Settings   RR Total (Breaths/Min) 20   Tidal Volume (mL) 636   VE Minute Ventilation (L/min) 11.9 L/min   Peak Inspiratory Pressure (cm H2O) 16   TiTOT (%) 31   Total Leak (L/Min) 6   Patient Trigger - ST Mode Only (%) 100   CPAP/BiPAP Backup Settings   Backup Rate 12 breaths per minute (bpm)   Respiratory Evaluation   $ Care Plan Tech Time 15 min   Evaluation For   (care plan)

## 2020-03-07 NOTE — NURSING
Went over discharge information with the patient answered questions patient had at that time. Removed 1 PIV without difficulty tip intact. Patient left with personal belonging Phone  and clothing. Patient wheeled down to car where she drove herself home.

## 2020-03-07 NOTE — HOSPITAL COURSE
Patient was admitted for sepsis from acute lymphangitis and pyelonephritis  Pain control improved with Percocet, pt received IV Vanco Zosyn for 3 days with remarkable improvement in her symptoms including right flank pain, afebrile  Leukocytosis resolved, redness on lymphedema improved on right leg hernia.  Blood culture negative to date  Patient will be discharged with p.o. Augmentin for 4 more days  Urine culture was not processed   C/w  calmoseptine to the pannis area,Interdry to the yeast folds  tylanol prn for pain     Physical exam on the day of discharge     Constitutional:  morbidly obese woman with mild distress  HENT: Atraumatic. PERRLA  Cardiovascular: Normal rate, regular rhythm and normal heart sounds.   Pulmonary/Chest: Effort normal. She has no wheezes.   Abdominal:  protuberant with umbilical hernia no CVA tenderness,,bowel sounds are normal large pedunculated most from the right medial thigh is erythematous and warm decreased nontender,   Neurological: AOX3  Skin: Skin is warm and dry.

## 2020-03-07 NOTE — NURSING
Removed Anne per MD order in preporation for discharge. Removed anne without difficulty tip intact. Patient handled procedure well. Awaiting patient first void after removal before patient can be discharged.

## 2020-03-07 NOTE — PT/OT/SLP PROGRESS
Physical Therapy Treatment    Patient Name:  Adriana Zaragoza   MRN:  [id number]    Recommendations:     Discharge Recommendations:  home health PT   Discharge Equipment Recommendations: none   Barriers to discharge: None    Assessment:     Adriana Zaragoza is a 54 y.o. female admitted with a medical diagnosis of Sepsis without acute organ dysfunction.  Today, patient requiring min A for supine to sit transfer, only to progress tumor to EOB. Pt then able to stand with CGA utilizing RW. Pt ambulating 10 feet further today, performing with RW and CGA with reciprocal pattern and 1 very brief standing rest break. Pt returned to room and left up in chair with family arriving. Pt would benefit from further PT to address ambulation endurance and functional mobility.     Rehab Prognosis: Good; patient would benefit from acute skilled PT services to address these deficits and reach maximum level of function.    Recent Surgery: * No surgery found *      Plan:     During this hospitalization, patient to be seen 6 x/week to address the identified rehab impairments via gait training, therapeutic activities, therapeutic exercises and progress toward the following goals:    · Plan of Care Expires:  04/06/20    Subjective     Chief Complaint: pain; weakness  Patient/Family Comments/goals: home with family  Pain/Comfort:  · Pain Rating 1: 6/10  · Location 1: lumbar spine  · Pain Addressed 1: Reposition, Distraction  · Pain Rating Post-Intervention 1: 6/10  · Pain Rating 2: 6/10  · Location - Side 2: Bilateral  · Location 2: leg  · Pain Addressed 2: Reposition, Distraction  · Pain Rating Post-Intervention 2: 6/10      Objective:     Communicated with nsg prior to session.  Patient found HOB elevated with peripheral IV, oxygen upon PT entry to room.     General Precautions: Standard, fall   Orthopedic Precautions:N/A   Braces: N/A     Functional Mobility:  · Bed Mobility:     · Supine to Sit: minimum  assistance  · Transfers:     · Sit to Stand:  contact guard assistance with rolling walker  · Gait: 30 ft w/ RW and CGA on 2L O2  · Balance: sitting: normal; standing: fair      AM-PAC 6 CLICK MOBILITY  Turning over in bed (including adjusting bedclothes, sheets and blankets)?: 4  Sitting down on and standing up from a chair with arms (e.g., wheelchair, bedside commode, etc.): 3  Moving from lying on back to sitting on the side of the bed?: 3  Moving to and from a bed to a chair (including a wheelchair)?: 3  Need to walk in hospital room?: 3  Climbing 3-5 steps with a railing?: 1  Basic Mobility Total Score: 17       Therapeutic Activities and Exercises:   none    Patient left up in chair with all lines intact, call button in reach and family walking into room as PT leaving..    GOALS:   Multidisciplinary Problems     Physical Therapy Goals        Problem: Physical Therapy Goal    Goal Priority Disciplines Outcome Goal Variances Interventions   Physical Therapy Goal     PT, PT/OT      Description:  Goals to be met by: discharge     Patient will increase functional independence with mobility by performin. Supine to sit with Supervision: min A 3/7/20  2. Sit to supine with Supervision  3. Sit to stand transfer with Supervision: CGA w/ RW 3/7/20  4. Bed to chair transfer with Supervision using Rolling Walker. CGA w/ RW 3/7/20  5. Gait  x 25 feet with Stand-by Assistance using Rolling Walker. 30 ft w/ RW and CGA 3/7/20                       Time Tracking:     PT Received On: 20  PT Start Time: 1110     PT Stop Time: 1124  PT Total Time (min): 14 min     Billable Minutes: Gait Training 14 minutes     Treatment Type: Treatment  PT/PTA: PT     PTA Visit Number: 0     Felix Navarro, PT  2020

## 2020-03-09 LAB
BACTERIA BLD CULT: NORMAL
BACTERIA BLD CULT: NORMAL

## 2020-03-09 NOTE — PLAN OF CARE
03/09/20 0809   Discharge Reassessment   Assessment Type Discharge Planning Reassessment   Anticipated Discharge Disposition Home-Health     Pt d/c 3/7/19 and JACKELIN Agosto had sent referral to resume care with [facility]. JACKELIN sent d/c orders to [facility] via right fax at [phone].

## 2020-03-10 LAB — BACTERIA UR CULT: NO GROWTH

## 2020-05-12 ENCOUNTER — EXTERNAL HOME HEALTH (OUTPATIENT)
Dept: HOME HEALTH SERVICES | Facility: HOSPITAL | Age: 55
End: 2020-05-12

## 2020-05-18 ENCOUNTER — DOCUMENT SCAN (OUTPATIENT)
Dept: HOME HEALTH SERVICES | Facility: HOSPITAL | Age: 55
End: 2020-05-18

## 2020-06-10 ENCOUNTER — HOSPITAL ENCOUNTER (INPATIENT)
Facility: HOSPITAL | Age: 55
LOS: 1 days | Discharge: HOME HEALTH | DRG: 872 | End: 2020-06-12
Attending: EMERGENCY MEDICINE | Admitting: INTERNAL MEDICINE
Payer: MEDICARE

## 2020-06-10 DIAGNOSIS — R50.9 FEVER: ICD-10-CM

## 2020-06-10 DIAGNOSIS — L03.90 CELLULITIS: Primary | ICD-10-CM

## 2020-06-10 DIAGNOSIS — A41.9 SEPSIS DUE TO UNDETERMINED ORGANISM WITHOUT RESULTANT ORGAN FAILURE: ICD-10-CM

## 2020-06-10 LAB
ABO + RH BLD: NORMAL
ALBUMIN SERPL BCP-MCNC: 3.6 G/DL (ref 3.5–5.2)
ALP SERPL-CCNC: 63 U/L (ref 55–135)
ALT SERPL W/O P-5'-P-CCNC: 13 U/L (ref 10–44)
ANION GAP SERPL CALC-SCNC: 9 MMOL/L (ref 8–16)
APTT PPP: 28.8 SEC (ref 23.6–33.3)
AST SERPL-CCNC: 13 U/L (ref 10–40)
BASOPHILS # BLD AUTO: 0.06 K/UL (ref 0–0.2)
BASOPHILS NFR BLD: 0.3 % (ref 0–1.9)
BILIRUB SERPL-MCNC: 0.7 MG/DL (ref 0.1–1)
BLD GP AB SCN CELLS X3 SERPL QL: NORMAL
BUN SERPL-MCNC: 13 MG/DL (ref 6–20)
CALCIUM SERPL-MCNC: 8.5 MG/DL (ref 8.7–10.5)
CHLORIDE SERPL-SCNC: 103 MMOL/L (ref 95–110)
CK SERPL-CCNC: 67 U/L (ref 20–180)
CO2 SERPL-SCNC: 24 MMOL/L (ref 23–29)
CREAT SERPL-MCNC: 0.7 MG/DL (ref 0.5–1.4)
DIFFERENTIAL METHOD: ABNORMAL
EOSINOPHIL # BLD AUTO: 0 K/UL (ref 0–0.5)
EOSINOPHIL NFR BLD: 0.1 % (ref 0–8)
ERYTHROCYTE [DISTWIDTH] IN BLOOD BY AUTOMATED COUNT: 15.9 % (ref 11.5–14.5)
EST. GFR  (AFRICAN AMERICAN): >60 ML/MIN/1.73 M^2
EST. GFR  (NON AFRICAN AMERICAN): >60 ML/MIN/1.73 M^2
GLUCOSE SERPL-MCNC: 143 MG/DL (ref 70–110)
HCT VFR BLD AUTO: 36.9 % (ref 37–48.5)
HGB BLD-MCNC: 11.3 G/DL (ref 12–16)
IMM GRANULOCYTES # BLD AUTO: 0.17 K/UL (ref 0–0.04)
IMM GRANULOCYTES NFR BLD AUTO: 0.8 % (ref 0–0.5)
INR PPP: 1.2
LACTATE SERPL-SCNC: 1.3 MMOL/L (ref 0.5–1.9)
LIPASE SERPL-CCNC: 28 U/L (ref 4–60)
LYMPHOCYTES # BLD AUTO: 1 K/UL (ref 1–4.8)
LYMPHOCYTES NFR BLD: 4.6 % (ref 18–48)
MAGNESIUM SERPL-MCNC: 1.8 MG/DL (ref 1.6–2.6)
MAGNESIUM SERPL-MCNC: 1.8 MG/DL (ref 1.6–2.6)
MCH RBC QN AUTO: 25.5 PG (ref 27–31)
MCHC RBC AUTO-ENTMCNC: 30.6 G/DL (ref 32–36)
MCV RBC AUTO: 83 FL (ref 82–98)
MONOCYTES # BLD AUTO: 0.8 K/UL (ref 0.3–1)
MONOCYTES NFR BLD: 3.7 % (ref 4–15)
NEUTROPHILS # BLD AUTO: 20 K/UL (ref 1.8–7.7)
NEUTROPHILS NFR BLD: 90.5 % (ref 38–73)
NRBC BLD-RTO: 0 /100 WBC
PLATELET # BLD AUTO: 287 K/UL (ref 150–350)
PMV BLD AUTO: 9.4 FL (ref 9.2–12.9)
POTASSIUM SERPL-SCNC: 3.9 MMOL/L (ref 3.5–5.1)
PROT SERPL-MCNC: 7.4 G/DL (ref 6–8.4)
PROTHROMBIN TIME: 14.8 SEC (ref 10.6–14.8)
RBC # BLD AUTO: 4.43 M/UL (ref 4–5.4)
SARS-COV-2 RDRP RESP QL NAA+PROBE: NEGATIVE
SODIUM SERPL-SCNC: 136 MMOL/L (ref 136–145)
TROPONIN I SERPL DL<=0.01 NG/ML-MCNC: <0.03 NG/ML
TSH SERPL DL<=0.005 MIU/L-ACNC: 0.78 UIU/ML (ref 0.34–5.6)
WBC # BLD AUTO: 22.11 K/UL (ref 3.9–12.7)

## 2020-06-10 PROCEDURE — 85730 THROMBOPLASTIN TIME PARTIAL: CPT

## 2020-06-10 PROCEDURE — 84443 ASSAY THYROID STIM HORMONE: CPT

## 2020-06-10 PROCEDURE — 25000003 PHARM REV CODE 250: Performed by: EMERGENCY MEDICINE

## 2020-06-10 PROCEDURE — U0002 COVID-19 LAB TEST NON-CDC: HCPCS

## 2020-06-10 PROCEDURE — 83735 ASSAY OF MAGNESIUM: CPT

## 2020-06-10 PROCEDURE — 99285 EMERGENCY DEPT VISIT HI MDM: CPT | Mod: 25

## 2020-06-10 PROCEDURE — 87040 BLOOD CULTURE FOR BACTERIA: CPT

## 2020-06-10 PROCEDURE — 84484 ASSAY OF TROPONIN QUANT: CPT

## 2020-06-10 PROCEDURE — 85025 COMPLETE CBC W/AUTO DIFF WBC: CPT

## 2020-06-10 PROCEDURE — 96365 THER/PROPH/DIAG IV INF INIT: CPT

## 2020-06-10 PROCEDURE — 83605 ASSAY OF LACTIC ACID: CPT

## 2020-06-10 PROCEDURE — 63600175 PHARM REV CODE 636 W HCPCS: Performed by: EMERGENCY MEDICINE

## 2020-06-10 PROCEDURE — 86850 RBC ANTIBODY SCREEN: CPT

## 2020-06-10 PROCEDURE — 36415 COLL VENOUS BLD VENIPUNCTURE: CPT

## 2020-06-10 PROCEDURE — 80053 COMPREHEN METABOLIC PANEL: CPT

## 2020-06-10 PROCEDURE — 85610 PROTHROMBIN TIME: CPT

## 2020-06-10 PROCEDURE — 82550 ASSAY OF CK (CPK): CPT

## 2020-06-10 PROCEDURE — 93005 ELECTROCARDIOGRAM TRACING: CPT | Performed by: INTERNAL MEDICINE

## 2020-06-10 PROCEDURE — 83690 ASSAY OF LIPASE: CPT

## 2020-06-10 RX ORDER — DIPHENHYDRAMINE HCL 25 MG
25 CAPSULE ORAL
Status: COMPLETED | OUTPATIENT
Start: 2020-06-10 | End: 2020-06-10

## 2020-06-10 RX ORDER — NICOTINE POLACRILEX 2 MG
1 GUM BUCCAL DAILY
COMMUNITY
End: 2021-08-26

## 2020-06-10 RX ORDER — DEXTROMETHORPHAN HYDROBROMIDE, GUAIFENESIN 5; 100 MG/5ML; MG/5ML
650 LIQUID ORAL EVERY 8 HOURS
COMMUNITY

## 2020-06-10 RX ORDER — VANCOMYCIN HCL IN 5 % DEXTROSE 1G/250ML
2000 PLASTIC BAG, INJECTION (ML) INTRAVENOUS ONCE
Status: DISCONTINUED | OUTPATIENT
Start: 2020-06-11 | End: 2020-06-11

## 2020-06-10 RX ORDER — ACETAMINOPHEN 500 MG
1000 TABLET ORAL
Status: COMPLETED | OUTPATIENT
Start: 2020-06-10 | End: 2020-06-10

## 2020-06-10 RX ADMIN — ACETAMINOPHEN 1000 MG: 500 TABLET, FILM COATED ORAL at 11:06

## 2020-06-10 RX ADMIN — SODIUM CHLORIDE 5715 ML: 0.9 INJECTION, SOLUTION INTRAVENOUS at 11:06

## 2020-06-10 RX ADMIN — DIPHENHYDRAMINE HYDROCHLORIDE 25 MG: 25 CAPSULE ORAL at 11:06

## 2020-06-10 RX ADMIN — PIPERACILLIN AND TAZOBACTAM 4.5 G: 4; .5 INJECTION, POWDER, LYOPHILIZED, FOR SOLUTION INTRAVENOUS; PARENTERAL at 11:06

## 2020-06-11 PROBLEM — A41.9 SEPSIS DUE TO UNDETERMINED ORGANISM WITHOUT RESULTANT ORGAN FAILURE: Status: RESOLVED | Noted: 2020-03-05 | Resolved: 2020-06-11

## 2020-06-11 PROBLEM — N12 PYELONEPHRITIS: Status: RESOLVED | Noted: 2020-03-05 | Resolved: 2020-06-11

## 2020-06-11 PROBLEM — L03.90 CELLULITIS: Status: ACTIVE | Noted: 2020-06-11

## 2020-06-11 LAB
ALBUMIN SERPL BCP-MCNC: 3 G/DL (ref 3.5–5.2)
ALP SERPL-CCNC: 53 U/L (ref 55–135)
ALT SERPL W/O P-5'-P-CCNC: 11 U/L (ref 10–44)
AMPHET+METHAMPHET UR QL: NEGATIVE
ANION GAP SERPL CALC-SCNC: 7 MMOL/L (ref 8–16)
AST SERPL-CCNC: 11 U/L (ref 10–40)
BARBITURATES UR QL SCN>200 NG/ML: NEGATIVE
BASOPHILS # BLD AUTO: 0.04 K/UL (ref 0–0.2)
BASOPHILS NFR BLD: 0.2 % (ref 0–1.9)
BENZODIAZ UR QL SCN>200 NG/ML: NEGATIVE
BILIRUB SERPL-MCNC: 0.8 MG/DL (ref 0.1–1)
BILIRUB UR QL STRIP: NEGATIVE
BUN SERPL-MCNC: 11 MG/DL (ref 6–20)
BZE UR QL SCN: NEGATIVE
CALCIUM SERPL-MCNC: 8.2 MG/DL (ref 8.7–10.5)
CANNABINOIDS UR QL SCN: NEGATIVE
CHLORIDE SERPL-SCNC: 105 MMOL/L (ref 95–110)
CLARITY UR: CLEAR
CO2 SERPL-SCNC: 25 MMOL/L (ref 23–29)
COLOR UR: YELLOW
CREAT SERPL-MCNC: 0.6 MG/DL (ref 0.5–1.4)
CREAT UR-MCNC: 116 MG/DL (ref 15–325)
CRP SERPL-MCNC: 6.52 MG/DL
DIFFERENTIAL METHOD: ABNORMAL
EOSINOPHIL # BLD AUTO: 0 K/UL (ref 0–0.5)
EOSINOPHIL NFR BLD: 0.1 % (ref 0–8)
ERYTHROCYTE [DISTWIDTH] IN BLOOD BY AUTOMATED COUNT: 16 % (ref 11.5–14.5)
EST. GFR  (AFRICAN AMERICAN): >60 ML/MIN/1.73 M^2
EST. GFR  (NON AFRICAN AMERICAN): >60 ML/MIN/1.73 M^2
ESTIMATED AVG GLUCOSE: 114 MG/DL (ref 68–131)
GLUCOSE SERPL-MCNC: 111 MG/DL (ref 70–110)
GLUCOSE SERPL-MCNC: 119 MG/DL (ref 70–110)
GLUCOSE SERPL-MCNC: 120 MG/DL (ref 70–110)
GLUCOSE SERPL-MCNC: 125 MG/DL (ref 70–110)
GLUCOSE SERPL-MCNC: 125 MG/DL (ref 70–110)
GLUCOSE UR QL STRIP: NEGATIVE
HBA1C MFR BLD HPLC: 5.6 % (ref 4.5–6.2)
HCT VFR BLD AUTO: 33.7 % (ref 37–48.5)
HGB BLD-MCNC: 10.3 G/DL (ref 12–16)
HGB UR QL STRIP: NEGATIVE
IMM GRANULOCYTES # BLD AUTO: 0.12 K/UL (ref 0–0.04)
IMM GRANULOCYTES NFR BLD AUTO: 0.7 % (ref 0–0.5)
INFLUENZA A, MOLECULAR: NEGATIVE
INFLUENZA B, MOLECULAR: NEGATIVE
KETONES UR QL STRIP: NEGATIVE
LEUKOCYTE ESTERASE UR QL STRIP: NEGATIVE
LYMPHOCYTES # BLD AUTO: 1.3 K/UL (ref 1–4.8)
LYMPHOCYTES NFR BLD: 7.2 % (ref 18–48)
MAGNESIUM SERPL-MCNC: 1.8 MG/DL (ref 1.6–2.6)
MCH RBC QN AUTO: 25.6 PG (ref 27–31)
MCHC RBC AUTO-ENTMCNC: 30.6 G/DL (ref 32–36)
MCV RBC AUTO: 84 FL (ref 82–98)
MONOCYTES # BLD AUTO: 0.8 K/UL (ref 0.3–1)
MONOCYTES NFR BLD: 4.3 % (ref 4–15)
NEUTROPHILS # BLD AUTO: 15.7 K/UL (ref 1.8–7.7)
NEUTROPHILS NFR BLD: 87.5 % (ref 38–73)
NITRITE UR QL STRIP: NEGATIVE
NRBC BLD-RTO: 0 /100 WBC
OPIATES UR QL SCN: NEGATIVE
PCP UR QL SCN>25 NG/ML: NEGATIVE
PH UR STRIP: >8 [PH] (ref 5–8)
PHOSPHATE SERPL-MCNC: 4.3 MG/DL (ref 2.7–4.5)
PLATELET # BLD AUTO: 255 K/UL (ref 150–350)
PMV BLD AUTO: 9.4 FL (ref 9.2–12.9)
POTASSIUM SERPL-SCNC: 3.6 MMOL/L (ref 3.5–5.1)
PROCALCITONIN SERPL IA-MCNC: 0.18 NG/ML (ref 0–0.5)
PROT SERPL-MCNC: 6.6 G/DL (ref 6–8.4)
PROT UR QL STRIP: ABNORMAL
RBC # BLD AUTO: 4.02 M/UL (ref 4–5.4)
SODIUM SERPL-SCNC: 137 MMOL/L (ref 136–145)
SP GR UR STRIP: 1.02 (ref 1–1.03)
SPECIMEN SOURCE: NORMAL
T4 FREE SERPL-MCNC: 0.86 NG/DL (ref 0.71–1.51)
TOXICOLOGY INFORMATION: NORMAL
URN SPEC COLLECT METH UR: ABNORMAL
UROBILINOGEN UR STRIP-ACNC: NEGATIVE EU/DL
WBC # BLD AUTO: 17.96 K/UL (ref 3.9–12.7)

## 2020-06-11 PROCEDURE — 25000003 PHARM REV CODE 250: Performed by: NURSE PRACTITIONER

## 2020-06-11 PROCEDURE — 94660 CPAP INITIATION&MGMT: CPT

## 2020-06-11 PROCEDURE — 87086 URINE CULTURE/COLONY COUNT: CPT

## 2020-06-11 PROCEDURE — 94761 N-INVAS EAR/PLS OXIMETRY MLT: CPT

## 2020-06-11 PROCEDURE — 99900035 HC TECH TIME PER 15 MIN (STAT)

## 2020-06-11 PROCEDURE — 12000002 HC ACUTE/MED SURGE SEMI-PRIVATE ROOM

## 2020-06-11 PROCEDURE — 83735 ASSAY OF MAGNESIUM: CPT

## 2020-06-11 PROCEDURE — 83036 HEMOGLOBIN GLYCOSYLATED A1C: CPT

## 2020-06-11 PROCEDURE — 82962 GLUCOSE BLOOD TEST: CPT

## 2020-06-11 PROCEDURE — 25000003 PHARM REV CODE 250: Performed by: INTERNAL MEDICINE

## 2020-06-11 PROCEDURE — 36415 COLL VENOUS BLD VENIPUNCTURE: CPT

## 2020-06-11 PROCEDURE — 84145 PROCALCITONIN (PCT): CPT

## 2020-06-11 PROCEDURE — 84100 ASSAY OF PHOSPHORUS: CPT

## 2020-06-11 PROCEDURE — 86140 C-REACTIVE PROTEIN: CPT

## 2020-06-11 PROCEDURE — 81003 URINALYSIS AUTO W/O SCOPE: CPT

## 2020-06-11 PROCEDURE — 87502 INFLUENZA DNA AMP PROBE: CPT

## 2020-06-11 PROCEDURE — 84439 ASSAY OF FREE THYROXINE: CPT

## 2020-06-11 PROCEDURE — 63600175 PHARM REV CODE 636 W HCPCS: Performed by: INTERNAL MEDICINE

## 2020-06-11 PROCEDURE — 80307 DRUG TEST PRSMV CHEM ANLYZR: CPT

## 2020-06-11 PROCEDURE — 63600175 PHARM REV CODE 636 W HCPCS: Performed by: NURSE PRACTITIONER

## 2020-06-11 PROCEDURE — 63600175 PHARM REV CODE 636 W HCPCS: Mod: JG | Performed by: INTERNAL MEDICINE

## 2020-06-11 PROCEDURE — 80053 COMPREHEN METABOLIC PANEL: CPT

## 2020-06-11 PROCEDURE — 85025 COMPLETE CBC W/AUTO DIFF WBC: CPT

## 2020-06-11 PROCEDURE — 27000221 HC OXYGEN, UP TO 24 HOURS

## 2020-06-11 RX ORDER — SODIUM CHLORIDE, SODIUM LACTATE, POTASSIUM CHLORIDE, CALCIUM CHLORIDE 600; 310; 30; 20 MG/100ML; MG/100ML; MG/100ML; MG/100ML
INJECTION, SOLUTION INTRAVENOUS CONTINUOUS
Status: ACTIVE | OUTPATIENT
Start: 2020-06-11 | End: 2020-06-12

## 2020-06-11 RX ORDER — GLUCAGON 1 MG
1 KIT INJECTION
Status: DISCONTINUED | OUTPATIENT
Start: 2020-06-11 | End: 2020-06-11

## 2020-06-11 RX ORDER — SODIUM CHLORIDE 0.9 % (FLUSH) 0.9 %
10 SYRINGE (ML) INJECTION
Status: DISCONTINUED | OUTPATIENT
Start: 2020-06-11 | End: 2020-06-12 | Stop reason: HOSPADM

## 2020-06-11 RX ORDER — POTASSIUM CHLORIDE 750 MG/1
10 CAPSULE, EXTENDED RELEASE ORAL DAILY
Status: DISCONTINUED | OUTPATIENT
Start: 2020-06-11 | End: 2020-06-12 | Stop reason: HOSPADM

## 2020-06-11 RX ORDER — FERROUS SULFATE 325(65) MG
325 TABLET ORAL
Status: DISCONTINUED | OUTPATIENT
Start: 2020-06-11 | End: 2020-06-12 | Stop reason: HOSPADM

## 2020-06-11 RX ORDER — TALC
6 POWDER (GRAM) TOPICAL NIGHTLY PRN
Status: DISCONTINUED | OUTPATIENT
Start: 2020-06-11 | End: 2020-06-12 | Stop reason: HOSPADM

## 2020-06-11 RX ORDER — ENOXAPARIN SODIUM 100 MG/ML
40 INJECTION SUBCUTANEOUS EVERY 24 HOURS
Status: DISCONTINUED | OUTPATIENT
Start: 2020-06-11 | End: 2020-06-11

## 2020-06-11 RX ORDER — INSULIN ASPART 100 [IU]/ML
0-5 INJECTION, SOLUTION INTRAVENOUS; SUBCUTANEOUS
Status: DISCONTINUED | OUTPATIENT
Start: 2020-06-11 | End: 2020-06-11

## 2020-06-11 RX ORDER — IBUPROFEN 200 MG
24 TABLET ORAL
Status: DISCONTINUED | OUTPATIENT
Start: 2020-06-11 | End: 2020-06-11

## 2020-06-11 RX ORDER — HYDROCODONE BITARTRATE AND ACETAMINOPHEN 5; 325 MG/1; MG/1
1 TABLET ORAL EVERY 6 HOURS PRN
Status: DISCONTINUED | OUTPATIENT
Start: 2020-06-11 | End: 2020-06-12 | Stop reason: HOSPADM

## 2020-06-11 RX ORDER — ENOXAPARIN SODIUM 100 MG/ML
40 INJECTION SUBCUTANEOUS EVERY 12 HOURS
Status: DISCONTINUED | OUTPATIENT
Start: 2020-06-11 | End: 2020-06-12 | Stop reason: HOSPADM

## 2020-06-11 RX ORDER — PRAVASTATIN SODIUM 40 MG/1
40 TABLET ORAL DAILY
Status: DISCONTINUED | OUTPATIENT
Start: 2020-06-11 | End: 2020-06-12 | Stop reason: HOSPADM

## 2020-06-11 RX ORDER — ALBUTEROL SULFATE 90 UG/1
2 AEROSOL, METERED RESPIRATORY (INHALATION) EVERY 6 HOURS PRN
Status: DISCONTINUED | OUTPATIENT
Start: 2020-06-11 | End: 2020-06-12 | Stop reason: HOSPADM

## 2020-06-11 RX ORDER — IBUPROFEN 200 MG
16 TABLET ORAL
Status: DISCONTINUED | OUTPATIENT
Start: 2020-06-11 | End: 2020-06-11

## 2020-06-11 RX ORDER — ONDANSETRON 2 MG/ML
4 INJECTION INTRAMUSCULAR; INTRAVENOUS EVERY 8 HOURS PRN
Status: DISCONTINUED | OUTPATIENT
Start: 2020-06-11 | End: 2020-06-12 | Stop reason: HOSPADM

## 2020-06-11 RX ORDER — CITALOPRAM 10 MG/1
10 TABLET ORAL DAILY
Status: DISCONTINUED | OUTPATIENT
Start: 2020-06-11 | End: 2020-06-12 | Stop reason: HOSPADM

## 2020-06-11 RX ORDER — ACETAMINOPHEN 325 MG/1
650 TABLET ORAL EVERY 8 HOURS PRN
Status: DISCONTINUED | OUTPATIENT
Start: 2020-06-11 | End: 2020-06-12 | Stop reason: HOSPADM

## 2020-06-11 RX ADMIN — PIPERACILLIN AND TAZOBACTAM 3.38 G: 3; .375 INJECTION, POWDER, LYOPHILIZED, FOR SOLUTION INTRAVENOUS; PARENTERAL at 07:06

## 2020-06-11 RX ADMIN — ENOXAPARIN SODIUM 40 MG: 100 INJECTION SUBCUTANEOUS at 08:06

## 2020-06-11 RX ADMIN — HYDROCODONE BITARTRATE AND ACETAMINOPHEN 1 TABLET: 5; 325 TABLET ORAL at 09:06

## 2020-06-11 RX ADMIN — VANCOMYCIN HYDROCHLORIDE 2000 MG: 1 INJECTION, POWDER, LYOPHILIZED, FOR SOLUTION INTRAVENOUS at 02:06

## 2020-06-11 RX ADMIN — HYDROCODONE BITARTRATE AND ACETAMINOPHEN 1 TABLET: 5; 325 TABLET ORAL at 02:06

## 2020-06-11 RX ADMIN — PIPERACILLIN AND TAZOBACTAM 3.38 G: 3; .375 INJECTION, POWDER, LYOPHILIZED, FOR SOLUTION INTRAVENOUS; PARENTERAL at 05:06

## 2020-06-11 RX ADMIN — POTASSIUM CHLORIDE 10 MEQ: 750 CAPSULE, EXTENDED RELEASE ORAL at 08:06

## 2020-06-11 RX ADMIN — CEFTAROLINE FOSAMIL 600 MG: 600 POWDER, FOR SOLUTION INTRAVENOUS at 10:06

## 2020-06-11 RX ADMIN — VANCOMYCIN HYDROCHLORIDE 2000 MG: 1 INJECTION, POWDER, LYOPHILIZED, FOR SOLUTION INTRAVENOUS at 03:06

## 2020-06-11 RX ADMIN — CITALOPRAM HYDROBROMIDE 10 MG: 10 TABLET ORAL at 08:06

## 2020-06-11 RX ADMIN — HYDROCODONE BITARTRATE AND ACETAMINOPHEN 1 TABLET: 5; 325 TABLET ORAL at 08:06

## 2020-06-11 RX ADMIN — SODIUM CHLORIDE, SODIUM LACTATE, POTASSIUM CHLORIDE, AND CALCIUM CHLORIDE: .6; .31; .03; .02 INJECTION, SOLUTION INTRAVENOUS at 01:06

## 2020-06-11 RX ADMIN — FERROUS SULFATE TAB 325 MG (65 MG ELEMENTAL FE) 325 MG: 325 (65 FE) TAB at 07:06

## 2020-06-11 RX ADMIN — PRAVASTATIN SODIUM 40 MG: 40 TABLET ORAL at 08:06

## 2020-06-11 RX ADMIN — HYDROCODONE BITARTRATE AND ACETAMINOPHEN 1 TABLET: 5; 325 TABLET ORAL at 03:06

## 2020-06-11 NOTE — H&P
[facility] Medicine History & Physical Examination   Patient Name: Adriana Zaragoza  MRN: [id number]  Patient Class: Emergency   Admission Date: 6/10/2020 10:20 PM  Length of Stay: 0  Attending Physician:   Primary Care Provider: Jessica Baeza MD  Face-to-Face encounter date: 06/11/2020  Code Status:Full Code  MPOA:  Chief Complaint: Weakness; Chills; and body pain        Patient information was obtained from patient, past medical records and ER records.   HISTORY OF PRESENT ILLNESS:   Adriana Zaragoza is a 54 y.o. old female who  has a past medical history of Allergy, Anemia, Asthma, COPD (chronic obstructive pulmonary disease), Deep vein thrombosis, Depression, Diabetes mellitus, type 2, Encounter for blood transfusion, Heart murmur, and Neuromuscular disorder.. The patient presented to [facility] on 6/10/2020 with a primary complaint of Weakness; Chills; and body pain  .   54-year-old  female presents to emergency room with fever chills and weakness.      The patient states she has been having fever chills and weakness and generalized fatigue for the past 24-48 hours.  She did not take any ibuprofen or Aleve for her symptoms.      She also complaints of generalized weakness nausea without vomiting and decreased appetite.  The patient also noted increased redness and warmth to an area of lymphedema in her right groin region.      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  She denied chest pain, palpitations, cough, hematemesis hemoptysis black or bloody stools constipation or dizziness.      She describes her symptoms as moderate to severe severity with no exacerbating or alleviating factors.      This patient was seen in March/ 2020 for similar problem    REVIEW OF SYSTEMS:   10 Point Review of System was performed and was found to be negative except for that mentioned already in the HPI and   Review of Systems (Negative  unless checked off)  Review of Systems   Constitutional: Positive for chills and fever.   Gastrointestinal: Positive for abdominal pain and nausea.   Psychiatric/Behavioral: Positive for depression.           PAST MEDICAL HISTORY:     Past Medical History:   Diagnosis Date    Allergy     Anemia     Asthma     COPD (chronic obstructive pulmonary disease)     Deep vein thrombosis     Depression     Diabetes mellitus, type 2     Encounter for blood transfusion     Heart murmur     Neuromuscular disorder        PAST SURGICAL HISTORY:     Past Surgical History:   Procedure Laterality Date     SECTION      DILATION AND CURETTAGE OF UTERUS      TONSILLECTOMY         ALLERGIES:   Aspirin and Nsaids (non-steroidal anti-inflammatory drug)    FAMILY HISTORY:     Family History   Problem Relation Age of Onset    Heart disease Mother     Diabetes Mother     Arthritis Mother     Depression Mother     Cancer Father     COPD Father     Arthritis Maternal Grandmother     Cancer Maternal Grandmother     Cancer Maternal Grandfather     Cancer Paternal Grandmother     Kidney disease Paternal Grandmother     Diabetes Paternal Grandmother     Diabetes Paternal Grandfather     Hyperlipidemia Paternal Grandfather        SOCIAL HISTORY:     Social History     Tobacco Use    Smoking status: Current Every Day Smoker     Packs/day: 1.00     Types: Cigarettes    Smokeless tobacco: Never Used   Substance Use Topics    Alcohol use: No        Social History     Substance and Sexual Activity   Sexual Activity Never        HOME MEDICATIONS:     Prior to Admission medications    Medication Sig Start Date End Date Taking? Authorizing Provider   acetaminophen (TYLENOL ARTHRITIS PAIN) 650 MG TbSR Take 650 mg by mouth every 8 (eight) hours.   Yes Historical Provider, MD   albuterol (PROVENTIL/VENTOLIN HFA) 90 mcg/actuation inhaler Inhale 2 puffs into the lungs every 6 (six) hours as needed.    Yes Historical  "Provider, MD   biotin 1 mg Cap Take 1 mg by mouth once daily.   Yes Historical Provider, MD   calcium carbonate (OS-BRENDA) 600 mg calcium (1,500 mg) Tab Take 600 mg by mouth once daily.    Yes Historical Provider, MD   citalopram (CELEXA) 10 MG tablet Take 1 tablet (10 mg total) by mouth once daily. 10/17/19 10/16/20 Yes Jessica Baeza MD   ferrous sulfate 325 mg (65 mg iron) Tab tablet Take 325 mg by mouth daily with breakfast.   Yes Historical Provider, MD   multivit-min/iron/folic acid/K (BARIATRIC MULTIVITAMINS ORAL) Take 1 tablet by mouth once daily.    Yes Historical Provider, MD   ondansetron (ZOFRAN-ODT) 4 MG TbDL Take 4 mg by mouth every 6 (six) hours as needed.  2/22/19  Yes Historical Provider, MD   potassium chloride (MICRO-K) 10 MEQ CpSR Take 10 mEq by mouth once daily.    Yes Historical Provider, MD   pravastatin (PRAVACHOL) 40 MG tablet Take 1 tablet (40 mg total) by mouth once daily. 10/24/19 10/23/20 Yes Jessica Baeza MD   vit A/vit C/biotin/zinc/copper (HAIR-SKIN-NAIL,VIT A,C-BIOTIN, ORAL) Take 1 tablet by mouth once daily.   Yes Historical Provider, MD   amoxicillin-clavulanate 875-125mg (AUGMENTIN) 875-125 mg per tablet Take 1 tablet by mouth every 12 (twelve) hours. 3/7/20   Felicita Beal MD         PHYSICAL EXAM:   BP (!) 128/58 (BP Location: Left arm, Patient Position: Sitting)   Pulse 100   Temp 99.5 °F (37.5 °C) (Oral)   Resp 20   Ht 5' 4" (1.626 m)   Wt (!) 190.5 kg (420 lb)   SpO2 95%   BMI 72.09 kg/m²   Vitals Reviewed  General appearance: Ill appearing  female in no apparent distress.  Skin: No Rash.   Neuro: Motor and sensory exams grossly intact. Good tone. Power in all 4 extremities 5/5.   HENT: Atraumatic head. Moist mucous membranes of oral cavity.  Eyes: Normal extraocular movements.   Neck: Supple. No evidence of lymphadenopathy. No thyroidomegaly.  Lungs: Clear to auscultation bilaterally. No wheezing present.   Heart: Regular rate and rhythm. S1 and S2 present with " no murmurs/gallop/rub. No pedal edema. No JVD present.   Abdomen: Soft, non-distended, non-tender. No rebound tenderness/guarding. No masses or organomegaly. Bowel sounds are normal. Bladder is not palpable. Right lower leg has an area of lymphedema/mass to the medial thigh area.  That area has edema erythema induration and increased warmth diffusely.  There is no palpable abscess.    Extremities: No cyanosis, clubbing, or edema.  Psych/mental status: Alert and oriented. Cooperative. Responds appropriately to questions.   EMERGENCY DEPARTMENT LABS AND IMAGING:   Following labs were Reviewed   Recent Labs   Lab 06/10/20  2304   WBC 22.11*   HGB 11.3*   HCT 36.9*      CALCIUM 8.5*   ALBUMIN 3.6   PROT 7.4      K 3.9   CO2 24      BUN 13   CREATININE 0.7   ALKPHOS 63   ALT 13   AST 13   BILITOT 0.7         BMP:   Recent Labs   Lab 06/10/20  2304   *      K 3.9      CO2 24   BUN 13   CREATININE 0.7   CALCIUM 8.5*   MG 1.8  1.8   , CMP   Recent Labs   Lab 06/10/20  2304      K 3.9      CO2 24   *   BUN 13   CREATININE 0.7   CALCIUM 8.5*   PROT 7.4   ALBUMIN 3.6   BILITOT 0.7   ALKPHOS 63   AST 13   ALT 13   ANIONGAP 9   ESTGFRAFRICA >60.0   EGFRNONAA >60.0   , CBC   Recent Labs   Lab 06/10/20  2304   WBC 22.11*   HGB 11.3*   HCT 36.9*      , INR   Lab Results   Component Value Date    INR 1.2 06/10/2020   , Lipid Panel   Lab Results   Component Value Date    CHOL 203 (H) 10/22/2019    HDL 38 (L) 10/22/2019    LDLCALC 135.2 10/22/2019    TRIG 149 10/22/2019    CHOLHDL 18.7 (L) 10/22/2019   , Troponin   Recent Labs   Lab 06/10/20  2304   TROPONINI <0.030   , A1C: No results for input(s): HGBA1C in the last 4320 hours. and All labs within the past 24 hours have been reviewed  Microbiology Results (last 7 days)     Procedure Component Value Units Date/Time    Blood culture #2 **CANNOT BE ORDERED STAT** [[id number]] Collected:  06/10/20 2315    Order Status:   Sent Specimen:  Blood from Peripheral, Forearm, Right Updated:  06/10/20 2323    Blood culture #1 **CANNOT BE ORDERED STAT** [[id number]] Collected:  06/10/20 2305    Order Status:  Sent Specimen:  Blood from Peripheral, Antecubital, Right Updated:  06/10/20 2312    Urine culture **CANNOT BE ORDERED STAT** [[id number]]     Order Status:  No result Specimen:  Urine         X-Ray Chest AP Portable    (Results Pending)       ASSESSMENT & PLAN:   Adriana Zaragoza is a 54 y.o. female admitted for    1. Sepsis w/o Shock  - Lactic acid -(1.3)  - Leukocytosis with left shift  - Procalcitonin level pending  -CRP ordered  - Vancomycin and Zosyn for now  - IV .09NS bolus given in ED  -  maintenance  LR at 100 cc/hr    2. Cellulitis to a large lymph-edematous mass (right groin region)  -Possible necrotic tissues in mass  - IV Antibiotics  with vanc and Zosyn for now  -ID Consult  - possible surgical consult for mass removal    3. UTI  - urine culture sent in ED  - On vanc and zosyn for now    4. KEESHA  - CPAP- home settings    5. Morbid Obesity- BMI: 66.72    6. Chronic Lymphedema    7.H/O DVT  - will place on LMWH    8. Type II diabetic  - Low dose Novolog S/S insulin  -Sliding scale protocol             DVT Prophylaxis: will be placed on Lovenox for DVT prophylaxis and will be advised to be as mobile as possible and sit in a chair as tolerated.   ________________________________________________________________________________      Face-to-Face encounter date: 06/11/2020  Encounter included review of the medical records, interviewing and examining the patient face-to-face, discussion with family and other health care providers including emergency medicine physician, admission orders, interpreting lab/test results and formulating a plan of care.   Medical Decision Making during this encounter was  [_] Low Complexity  [_] Moderate Complexity  [x] High  Complexity  _________________________________________________________________________________    INPATIENT LIST OF MEDICATIONS     Current Facility-Administered Medications:     sodium chloride 0.9% bolus 5,715 mL, 30 mL/kg, Intravenous, ED 1 Time, Lucy Mendoza MD, Last Rate: 600 mL/hr at 06/10/20 2300, 5,715 mL at 06/10/20 2300    vancomycin in dextrose 5 % 1 gram/250 mL IVPB 2,000 mg, 2,000 mg, Intravenous, Once, Lucy Mendoza MD    Current Outpatient Medications:     acetaminophen (TYLENOL ARTHRITIS PAIN) 650 MG TbSR, Take 650 mg by mouth every 8 (eight) hours., Disp: , Rfl:     albuterol (PROVENTIL/VENTOLIN HFA) 90 mcg/actuation inhaler, Inhale 2 puffs into the lungs every 6 (six) hours as needed. , Disp: , Rfl:     biotin 1 mg Cap, Take 1 mg by mouth once daily., Disp: , Rfl:     calcium carbonate (OS-BRENDA) 600 mg calcium (1,500 mg) Tab, Take 600 mg by mouth once daily. , Disp: , Rfl:     citalopram (CELEXA) 10 MG tablet, Take 1 tablet (10 mg total) by mouth once daily., Disp: 30 tablet, Rfl: 11    ferrous sulfate 325 mg (65 mg iron) Tab tablet, Take 325 mg by mouth daily with breakfast., Disp: , Rfl:     multivit-min/iron/folic acid/K (BARIATRIC MULTIVITAMINS ORAL), Take 1 tablet by mouth once daily. , Disp: , Rfl:     ondansetron (ZOFRAN-ODT) 4 MG TbDL, Take 4 mg by mouth every 6 (six) hours as needed. , Disp: , Rfl:     potassium chloride (MICRO-K) 10 MEQ CpSR, Take 10 mEq by mouth once daily. , Disp: , Rfl:     pravastatin (PRAVACHOL) 40 MG tablet, Take 1 tablet (40 mg total) by mouth once daily., Disp: 90 tablet, Rfl: 3    vit A/vit C/biotin/zinc/copper (HAIR-SKIN-NAIL,VIT A,C-BIOTIN, ORAL), Take 1 tablet by mouth once daily., Disp: , Rfl:     amoxicillin-clavulanate 875-125mg (AUGMENTIN) 875-125 mg per tablet, Take 1 tablet by mouth every 12 (twelve) hours., Disp: 8 tablet, Rfl: 0      Scheduled Meds:   sodium chloride 0.9%  30 mL/kg Intravenous ED 1 Time    vancomycin (VANCOCIN) IVPB   2,000 mg Intravenous Once     Continuous Infusions:  PRN Meds:.      Yohana Thomas  [facility] Hospitalist NP  06/11/2020

## 2020-06-11 NOTE — PLAN OF CARE
Problem: Wound  Goal: Optimal Wound Healing  Outcome: Ongoing, Progressing     Problem: Infection  Goal: Infection Symptom Resolution  Outcome: Ongoing, Progressing     Problem: Adult Inpatient Plan of Care  Goal: Plan of Care Review  Outcome: Ongoing, Progressing  Goal: Patient-Specific Goal (Individualization)  Outcome: Ongoing, Progressing  Goal: Absence of Hospital-Acquired Illness or Injury  Outcome: Ongoing, Progressing  Goal: Optimal Comfort and Wellbeing  Outcome: Ongoing, Progressing  Goal: Readiness for Transition of Care  Outcome: Ongoing, Progressing  Goal: Rounds/Family Conference  Outcome: Ongoing, Progressing     Problem: Bariatric Environmental Safety  Goal: Safety Maintained with Care  Outcome: Ongoing, Progressing     Problem: Diabetes Comorbidity  Goal: Blood Glucose Level Within Desired Range  Outcome: Ongoing, Progressing     Problem: Adjustment to Illness (Sepsis/Septic Shock)  Goal: Optimal Coping  Outcome: Ongoing, Progressing     Problem: Bleeding (Sepsis/Septic Shock)  Goal: Absence of Bleeding  Outcome: Ongoing, Progressing     Problem: Glycemic Control Impaired (Sepsis/Septic Shock)  Goal: Blood Glucose Level Within Desired Range  Outcome: Ongoing, Progressing     Problem: Hemodynamic Instability (Sepsis/Septic Shock)  Goal: Effective Tissue Perfusion  Outcome: Ongoing, Progressing     Problem: Infection (Sepsis/Septic Shock)  Goal: Absence of Infection Signs/Symptoms  Outcome: Ongoing, Progressing     Problem: Nutrition Impaired (Sepsis/Septic Shock)  Goal: Optimal Nutrition Intake  Outcome: Ongoing, Progressing     Problem: Respiratory Compromise (Sepsis/Septic Shock)  Goal: Effective Oxygenation and Ventilation  Outcome: Ongoing, Progressing     Problem: Skin Injury Risk Increased  Goal: Skin Health and Integrity  Outcome: Ongoing, Progressing     Problem: Fall Injury Risk  Goal: Absence of Fall and Fall-Related Injury  Outcome: Ongoing, Progressing

## 2020-06-11 NOTE — ED PROVIDER NOTES
Encounter Date: 6/10/2020       History     Chief Complaint   Patient presents with    Weakness    Chills    body pain     This is a 54-year-old female who presents complaining of body aches chills and fever starting today.  She has had generalized fatigue and malaise.  She denies any focal weakness numbness tingling or paresthesias.  She has had nausea and decreased appetite.  Patient has noted redness and warmth to an area of lymphedema to the right upper leg.  Patient has a chronic mass in this area which has been attributed to lymphedema.  This area has become infected in the past causing similar symptoms.  She denies any trauma.  She has had a dull frontal headache that improved with fever control.  She denies any severe headache or any headache associated with neck pain, stiffness or photophobia.  She denies chest pain or shortness of breath.  She denies any coughing or respiratory symptoms.  She denies any urinary problems changes or abnormalities denies any abdominal pain nausea vomiting or diarrhea.  Symptoms have been moderate in intensity and there are no exacerbating or alleviating factors.        Review of patient's allergies indicates:   Allergen Reactions    Aspirin     Nsaids (non-steroidal anti-inflammatory drug) Hives     Past Medical History:   Diagnosis Date    Allergy     Anemia     Asthma     COPD (chronic obstructive pulmonary disease)     Deep vein thrombosis     Depression     Diabetes mellitus, type 2     Encounter for blood transfusion     Heart murmur     Neuromuscular disorder      Past Surgical History:   Procedure Laterality Date     SECTION      DILATION AND CURETTAGE OF UTERUS      TONSILLECTOMY       Family History   Problem Relation Age of Onset    Heart disease Mother     Diabetes Mother     Arthritis Mother     Depression Mother     Cancer Father     COPD Father     Arthritis Maternal Grandmother     Cancer Maternal Grandmother     Cancer  Maternal Grandfather     Cancer Paternal Grandmother     Kidney disease Paternal Grandmother     Diabetes Paternal Grandmother     Diabetes Paternal Grandfather     Hyperlipidemia Paternal Grandfather      Social History     Tobacco Use    Smoking status: Current Every Day Smoker     Packs/day: 1.00     Types: Cigarettes    Smokeless tobacco: Never Used   Substance Use Topics    Alcohol use: No    Drug use: No     Review of Systems   Constitutional: Positive for fever. Negative for activity change, appetite change, chills and fatigue.   HENT: Negative.  Negative for congestion, dental problem, ear pain, rhinorrhea, sinus pressure, sinus pain, sore throat and trouble swallowing.    Eyes: Negative.  Negative for photophobia, pain, redness and visual disturbance.   Respiratory: Negative.  Negative for cough, chest tightness, shortness of breath and wheezing.    Cardiovascular: Negative.  Negative for chest pain, palpitations and leg swelling.   Gastrointestinal: Negative.  Negative for abdominal distention, abdominal pain, anal bleeding, blood in stool, constipation, diarrhea, nausea and vomiting.   Endocrine: Negative.    Genitourinary: Negative.  Negative for decreased urine volume, difficulty urinating, dysuria, flank pain, frequency, hematuria, pelvic pain and urgency.   Musculoskeletal: Negative.  Negative for arthralgias, back pain, gait problem, joint swelling, myalgias, neck pain and neck stiffness.   Skin: Negative.  Negative for color change, pallor and rash.   Neurological: Negative.  Negative for dizziness, tremors, seizures, syncope, facial asymmetry, speech difficulty, weakness, light-headedness, numbness and headaches.   Hematological: Negative.  Does not bruise/bleed easily.   Psychiatric/Behavioral: Negative.  Negative for confusion.   All other systems reviewed and are negative.      Physical Exam     Initial Vitals [06/10/20 2220]   BP Pulse Resp Temp SpO2   (!) 128/58 100 20 99.5 °F (37.5  °C) 95 %      MAP       --         Physical Exam    Nursing note and vitals reviewed.  Constitutional: She is active and cooperative.  Non-toxic appearance. She does not have a sickly appearance. She does not appear ill. No distress.   HENT:   Head: Normocephalic and atraumatic.   Right Ear: Tympanic membrane normal.   Left Ear: Tympanic membrane normal.   Nose: Nose normal.   Mouth/Throat: Uvula is midline, oropharynx is clear and moist and mucous membranes are normal. No oral lesions. No uvula swelling. No oropharyngeal exudate, posterior oropharyngeal edema or posterior oropharyngeal erythema.   Eyes: Conjunctivae, EOM and lids are normal. Pupils are equal, round, and reactive to light. No scleral icterus.   Neck: Trachea normal, normal range of motion, full passive range of motion without pain and phonation normal. Neck supple. No thyroid mass and no thyromegaly present. No stridor present. No spinous process tenderness and no muscular tenderness present. No tracheal deviation, no edema, no erythema and normal range of motion present. No neck rigidity. No JVD present.   Cardiovascular: Normal rate, regular rhythm, normal heart sounds, intact distal pulses and normal pulses. Exam reveals no gallop and no friction rub.    No murmur heard.  Pulmonary/Chest: Effort normal and breath sounds normal. No accessory muscle usage or stridor. No tachypnea. No respiratory distress. She has no wheezes. She has no rhonchi. She has no rales.   Abdominal: Soft. Normal appearance and bowel sounds are normal. She exhibits no distension, no pulsatile midline mass and no mass. There is no tenderness. There is no rigidity, no guarding and no CVA tenderness.   Musculoskeletal: Normal range of motion. She exhibits no edema or tenderness.        Cervical back: Normal. She exhibits normal range of motion, no tenderness and no bony tenderness.        Thoracic back: Normal. She exhibits normal range of motion, no tenderness, no bony  tenderness and no swelling.        Lumbar back: Normal. She exhibits normal range of motion, no tenderness and no bony tenderness.   Pulses are 2+ throughout, cap refill is less than 2 sec throughout. There is no spinal tenderness to palpation.  Right lower leg has an area of lymphedema/mass to the medial thigh area.  That area has edema erythema induration and increased warmth diffusely.  There is no palpable abscess.  The area is mildly tender to palpation but there is no pain out of proportion to exam or severe pain on palpation.  All extremities are neurovascularly intact throughout.   Neurological: She is alert and oriented to person, place, and time. She has normal strength. She displays normal reflexes. No cranial nerve deficit or sensory deficit.   No focal deficits.   Skin: Skin is warm, dry and intact. Capillary refill takes less than 2 seconds. No ecchymosis, no petechiae and no rash noted. No erythema. No pallor.   Psychiatric: She has a normal mood and affect. Her speech is normal and behavior is normal. Judgment and thought content normal. Cognition and memory are normal.         ED Course   Procedures  Labs Reviewed   CULTURE, BLOOD   CULTURE, BLOOD   CULTURE, URINE   APTT   CBC W/ AUTO DIFFERENTIAL   COMPREHENSIVE METABOLIC PANEL   DRUG SCREEN PANEL, URINE EMERGENCY   LACTIC ACID, PLASMA   LIPASE   MAGNESIUM   PROTIME-INR   INFLUENZA A AND B ANTIGEN   TROPONIN I   TSH   URINALYSIS   SARS-COV-2 RNA AMPLIFICATION, QUAL   MAGNESIUM   CK   TYPE & SCREEN          Imaging Results    None          Medical Decision Making:   Clinical Tests:   Lab Tests: Reviewed  Radiological Study: Reviewed  Medical Tests: Reviewed  ED Management:  The patient is currently hemodynamically stable in no distress.  She is not demonstrating any evidence of shock or hypoperfusion.  Blood cultures have been obtained.  IV antibiotics have been given.  Antipyretics have been given.  I have discussed the case with the hospitalist  have assumed care of the patient in admission.  Other:   I have discussed this case with another health care provider.                                 Clinical Impression:       ICD-10-CM ICD-9-CM   1. Fever R50.9 780.60     2. Cellulitis                           Lucy Mendoza MD  06/11/20 0006       Lucy Mendoza MD  06/11/20 0016

## 2020-06-11 NOTE — HOSPITAL COURSE
"06/11 Assumed care. Chart and labs reviewed. Leukocytosis has improved, yet persisting with mild normocytic anemia; electrolytes and renal function are normal. Patient states she feels "Better", but has 3-4/10 low back pain. No focal neuro complaint. No UTI on U/A; urine cultures pending. No GI symptoms other than vague nausea without vomiting. No CP. Wears CPAP for SOB. No orthopnea.    06/12 Has been cleared by ID for discharge with outpatient follow-up by patient's PCP.   VSS  Lungs: decreased entry without adventitious  Heart: distant regular  Abdo: soft  "

## 2020-06-11 NOTE — PLAN OF CARE
06/11/20 0800   Patient Assessment/Suction   Level of Consciousness (AVPU) alert   Respiratory Effort Normal;Unlabored   All Lung Fields Breath Sounds clear   PRE-TX-O2   O2 Device (Oxygen Therapy) nasal cannula   Flow (L/min) 2   SpO2 (!) 94 %   Pulse Oximetry Type Intermittent   $ Pulse Oximetry - Multiple Charge Pulse Oximetry - Multiple   Pulse 78   Aerosol Therapy   $ Aerosol Therapy Charges PRN treatment not required   Preset CPAP/BiPAP Settings   Mode Of Delivery CPAP   $ CPAP/BiPAP Daily Charge BiPAP/CPAP Daily   $ Initial CPAP/BiPAP Setup? No   $ Is patient using? Yes   Size of Mask Medium/Large   Sized Appropriately? Yes   Equipment Type DeVilbiss   Respiratory Evaluation   $ Care Plan Tech Time 15 min

## 2020-06-11 NOTE — PLAN OF CARE
06/11/20 1200   Discharge Assessment   Assessment Type Discharge Planning Assessment   Confirmed/corrected address and phone number on facesheet? Yes   Assessment information obtained from? Patient   Expected Length of Stay (days) 3   Communicated expected length of stay with patient/caregiver yes   Prior to hospitilization cognitive status: Alert/Oriented   Prior to hospitalization functional status: Independent   Current cognitive status: Alert/Oriented   Current Functional Status: Independent   Facility Arrived From: HOME   Lives With significant other;parent(s)   Able to Return to Prior Arrangements yes   Is patient able to care for self after discharge? Yes   Patient's perception of discharge disposition home health   Readmission Within the Last 30 Days no previous admission in last 30 days   Patient currently being followed by outpatient case management? No   Patient currently receives any other outside agency services? Yes   Name and contact number of agency or person providing outside services [facility]   Is it the patient/care giver preference to resume care with the current outside agency? Yes   Equipment Currently Used at Home BIPAP;wheelchair;rollator;oxygen   Do you have any problems affording any of your prescribed medications? No   Is the patient taking medications as prescribed? yes   Does the patient have transportation home? Yes   Transportation Anticipated car, drives self;family or friend will provide   Dialysis Name and Scheduled days N/A   Does the patient receive services at the [facility]? No   Discharge Plan A Home Health   Discharge Plan B Home Health   DME Needed Upon Discharge  none   Patient/Family in Agreement with Plan yes   Introduced self to patient asked if ok to take a few min of their time for short interview for D/C planning and ok with patient

## 2020-06-11 NOTE — HPI
Adriana Zaragoza is a 54 y.o. old female who  has a past medical history of Allergy, Anemia, Asthma, COPD (chronic obstructive pulmonary disease), Deep vein thrombosis, Depression, Diabetes mellitus, type 2, Encounter for blood transfusion, Heart murmur, and Neuromuscular disorder.. The patient presented to [facility] on 6/10/2020 with a primary complaint of Weakness; Chills; and body pain  .   54-year-old  female presents to emergency room with fever chills and weakness.       The patient states she has been having fever chills and weakness and generalized fatigue for the past 24-48 hours.  She did not take any ibuprofen or Aleve for her symptoms.       She also complaints of generalized weakness nausea without vomiting and decreased appetite.  The patient also noted increased redness and warmth to an area of lymphedema in her right groin region.       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  She denied chest pain, palpitations, cough, hematemesis hemoptysis black or bloody stools constipation or dizziness.       She describes her symptoms as moderate to severe severity with no exacerbating or alleviating factors.       This patient was seen in March/ 2020 for similar problem

## 2020-06-11 NOTE — PROGRESS NOTES
VANCOMYCIN PHARMACOKINETIC NOTE:  Vancomycin Day # 1    Objective/Assessment:    Diagnosis/Indication for Vancomycin: Skin & Soft Tissue infection     54 y.o., female; Actual Body Weight = (!) 176.3 kg (388 lb 10.7 oz).    The patient has the following labs:  6/11/2020 Estimated Creatinine Clearance: 148.1 mL/min (based on SCr of 0.7 mg/dL). Lab Results   Component Value Date    BUN 13 06/10/2020       Lab Results   Component Value Date    WBC 22.11 (H) 06/10/2020              Plan:  Adjust vancomycin dose and/or frequency based on the patient's actual weight and renal function:  Initiate Vancomycin 2000 mg IV every 12 hours.  Orders have been entered into patient's chart.        Vancomycin trough level has been ordered for 6/12 @13:30, prior to 4th dose.    Pharmacy will manage vancomycin therapy, monitor serum vancomycin levels, monitor renal function and adjust regimen as necessary.    Thank you for allowing us to participate in this patient's care.     Hussein Freire 6/11/2020 2:14 AM  Department of Pharmacy  Ext [phone]

## 2020-06-11 NOTE — SUBJECTIVE & OBJECTIVE
Interval History: slow improvement    Review of Systems   Constitutional: Positive for appetite change and fatigue.   HENT: Negative.    Eyes: Negative.    Respiratory: Negative.    Cardiovascular: Negative.    Gastrointestinal: Positive for nausea.   Endocrine: Negative.    Genitourinary: Negative.    Musculoskeletal: Negative.    Skin: Positive for wound.   Allergic/Immunologic: Negative.    Neurological: Negative.    Hematological: Negative.    All other systems reviewed and are negative.    Objective:     Vital Signs (Most Recent):  Temp: 99.2 °F (37.3 °C) (06/11/20 0804)  Pulse: 77 (06/11/20 0804)  Resp: 18 (06/11/20 0804)  BP: (!) 122/54 (06/11/20 0804)  SpO2: 96 % (06/11/20 0804) Vital Signs (24h Range):  Temp:  [98.2 °F (36.8 °C)-99.5 °F (37.5 °C)] 99.2 °F (37.3 °C)  Pulse:  [] 77  Resp:  [18-20] 18  SpO2:  [92 %-97 %] 96 %  BP: (110-150)/(54-88) 122/54     Weight: (!) 176.3 kg (388 lb 10.7 oz)  Body mass index is 66.72 kg/m².    Intake/Output Summary (Last 24 hours) at 6/11/2020 1108  Last data filed at 6/11/2020 0700  Gross per 24 hour   Intake 1060 ml   Output 550 ml   Net 510 ml      Physical Exam   Constitutional: She is oriented to person, place, and time. She appears well-developed and well-nourished.   HENT:   Head: Normocephalic and atraumatic.   Eyes: Pupils are equal, round, and reactive to light. Conjunctivae and EOM are normal.   Neck: Normal range of motion. Neck supple.   Cardiovascular: Normal rate, regular rhythm, normal heart sounds and intact distal pulses.   Pulmonary/Chest: Effort normal and breath sounds normal.   Abdominal: Soft. Bowel sounds are normal.   super morbidly obese   Musculoskeletal: Normal range of motion.   Neurological: She is alert and oriented to person, place, and time.   Skin: Skin is warm and dry. Capillary refill takes less than 2 seconds.   Psychiatric: She has a normal mood and affect. Her behavior is normal. Judgment and thought content normal.   Nursing  note and vitals reviewed.      Significant Labs:   BMP:   Recent Labs   Lab 06/11/20  0511   *      K 3.6      CO2 25   BUN 11   CREATININE 0.6   CALCIUM 8.2*   MG 1.8     CBC:   Recent Labs   Lab 06/10/20  2304 06/11/20  0511   WBC 22.11* 17.96*   HGB 11.3* 10.3*   HCT 36.9* 33.7*    255       Significant Imaging: I have reviewed and interpreted all pertinent imaging results/findings within the past 24 hours.

## 2020-06-11 NOTE — CONSULTS
"Consult Note  Infectious Disease    Reason for Consult:  Fever, ?sepsis    HPI: Adriana Zaragoza is a  54 y.o. female presented to emergency room with fever chills and weakness.    The patient states she has been having fever chills and weakness and generalized fatigue for the past 24-48 hours.   She also complaints of generalized weakness nausea without vomiting and decreased appetite.  The patient also noted increased redness and warmth to an area of lymphedema in her right proximal thigh .    The patient has a chronic lymphedematous mass to her right inner thigh. She has been hospitalized several times for cellulitis of this mass. She has not had HH care since the COVID pandemic, who usually helps her with hygiene. She is under the care of [facility] plastic surgery, but amputation of this mass and abdominal panniculectomy was postponed due to COVID.  She has chronic venous stasis dermatitis and brawny edema of the mass.   She denied chest pain, palpitations, cough, hematemesis hemoptysis black or bloody stools constipation or dizziness.    She has memory loss since she was on "life support". She is s/p a 180# weight loss after gastric sleeve at [facility].     Admitted 03/04-3/6 for fever of 103°, chills, myalgias, dysuria.  She received vancomycin and Zosyn for 3 days with findings of evolution of cellulitis on the right medial thigh pannus/mass and was discharged on Augmentin with topical therapy to the skin and antifungal therapy to the skin folds.    Review of patient's allergies indicates:   Allergen Reactions    Aspirin     Nsaids (non-steroidal anti-inflammatory drug) Hives     Past Medical History:   Diagnosis Date    Allergy     Anemia     Asthma     COPD (chronic obstructive pulmonary disease)     Deep vein thrombosis     Depression     Diabetes mellitus, type 2     Encounter for blood transfusion     Heart murmur     Neuromuscular disorder      Past Surgical History:   Procedure Laterality " Date     SECTION      DILATION AND CURETTAGE OF UTERUS      TONSILLECTOMY     gastric sleeve bypass for weight loss    Social History     Socioeconomic History    Marital status: Legally      Spouse name: Not on file    Number of children: Not on file    Years of education: Not on file    Highest education level: Not on file   Occupational History    Not on file   Social Needs    Financial resource strain: Not on file    Food insecurity:     Worry: Not on file     Inability: Not on file    Transportation needs:     Medical: Not on file     Non-medical: Not on file   Tobacco Use    Smoking status: Current Every Day Smoker     Packs/day: 1.00     Types: Cigarettes    Smokeless tobacco: Never Used   Substance and Sexual Activity    Alcohol use: No    Drug use: No    Sexual activity: Never   Lifestyle    Physical activity:     Days per week: Not on file     Minutes per session: Not on file    Stress: Not on file   Relationships    Social connections:     Talks on phone: Not on file     Gets together: Not on file     Attends Hoahaoism service: Not on file     Active member of club or organization: Not on file     Attends meetings of clubs or organizations: Not on file     Relationship status: Not on file   Other Topics Concern    Not on file   Social History Narrative    Not on file     Family History   Problem Relation Age of Onset    Heart disease Mother     Diabetes Mother     Arthritis Mother     Depression Mother     Cancer Father     COPD Father     Arthritis Maternal Grandmother     Cancer Maternal Grandmother     Cancer Maternal Grandfather     Cancer Paternal Grandmother     Kidney disease Paternal Grandmother     Diabetes Paternal Grandmother     Diabetes Paternal Grandfather     Hyperlipidemia Paternal Grandfather        Pertinent medications noted:     Review of Systems:     chills, fever, sweats,  She has not been in the water.  No change in vision,    No  sinus congestion,    No pain in mouth or throat. No problems with teeth, gums.  No chest pain,  syncope  No cough, sputum production, shortness of breath, but is chronically on oxygen  Compliant with CPAP  No nausea, vomiting, diarrhea,  , or focal abd pain,        No swelling of joints, redness of joints, injuries, or new focal pain  No unusual headaches,  falls  On treatment for depression, no substance abuse, sleep disturbance     No bleeding,  malignancy, unusual bruising  No new rashes,    Antibiotic History:  March augmentin    EXAM & DIAGNOSTICS REVIEWED:   Vitals:     Temp:  [98.2 °F (36.8 °C)-99.5 °F (37.5 °C)]   Temp: 98.2 °F (36.8 °C) (06/11/20 0429)  Pulse: 77 (06/11/20 0429)  Resp: 19 (06/11/20 0429)  BP: 110/66 (06/11/20 0429)  SpO2: 97 % (06/11/20 0429)    Intake/Output Summary (Last 24 hours) at 6/11/2020 0744  Last data filed at 6/11/2020 0700  Gross per 24 hour   Intake 1060 ml   Output 550 ml   Net 510 ml       General:  In NAD. Alert and attentive, cooperative, comfortable  Eyes:  Anicteric, PERRL, EOMI  ENT:  No ulcers, exudates, thrush, nares patent, dentition is good  Neck:  supple, no masses or adenopathy appreciated  Lungs: Clear, no consolidation, rales, wheezes, rub  Heart:  RRR, no gallop/murmur/rub noted  Abd:  Soft, morbidly obese, periumbilical hernia, very large pannus hanging over her medial thigh mass,  NT, ND, normal BS, no masses or organomegaly appreciated.  :   Lyons, urine clear, no flank tenderness  Musc:  Joints without effusion, swelling, erythema, synovitis, muscle wasting.   Skin:  No rashes. She does have venous stasis dermatitis of the medial thigh pannus and there is a dry ulcer on the distal end of the pannus. There are some prominent pores in the skin posteriorly, without abscess formation  Wound:     On exam today, the mass is smaller and less intensely red.       Neuro:              Alert, attentive, speech fluent, face symmetric, moves all extremities, no focal  weakness. Ambulatory  Psych:  Calm, cooperative  Lymphatic:     No cervical, supraclavicular, axillary, or inguinal nodes  Extrem: No edema, erythema, phlebitis, cellulitis, warm and well perfused  VAD:       Isolation:      Lines/Tubes/Drains:    General Labs reviewed:  Recent Labs   Lab 06/10/20  2304 06/11/20  0511   WBC 22.11* 17.96*   HGB 11.3* 10.3*   HCT 36.9* 33.7*    255       Recent Labs   Lab 06/10/20  2304 06/11/20  0511    137   K 3.9 3.6    105   CO2 24 25   BUN 13 11   CREATININE 0.7 0.6   CALCIUM 8.5* 8.2*   PROT 7.4 6.6   BILITOT 0.7 0.8   ALKPHOS 63 53*   ALT 13 11   AST 13 11           Micro:  Microbiology Results (last 7 days)     Procedure Component Value Units Date/Time    Blood culture #1 **CANNOT BE ORDERED STAT** [[id number]] Collected:  06/10/20 2305    Order Status:  Completed Specimen:  Blood from Peripheral, Antecubital, Right Updated:  06/11/20 0558     Blood Culture, Routine No Growth to date    Blood culture #2 **CANNOT BE ORDERED STAT** [[id number]] Collected:  06/10/20 2315    Order Status:  Completed Specimen:  Blood from Peripheral, Forearm, Right Updated:  06/11/20 0558     Blood Culture, Routine No Growth to date    Urine culture **CANNOT BE ORDERED STAT** [[id number]] Collected:  06/11/20 0038    Order Status:  Sent Specimen:  Urine, Catheterized Updated:  06/11/20 0038        Imaging Reviewed:   CXR      Cardiology:    IMPRESSION & PLAN   1. Cellulitis medial thigh pannus  2. supermorbid obesity with lymphedema of limbs  3. Chronic dermatitis/venous stasis of medial thigh pannus  4. KEESHA, oxygen dependent  5. Diabetes  6. Memory loss    Recommendations:  Change vanc and zosyn to teflaro. I am not confident in the ability of 4g/day of vanco to provide a therapeutic level  Local care  F/u with plastic surgery for amputation of the medial thigh pannus  Elevate pannus  Check A1c

## 2020-06-11 NOTE — PHYSICIAN QUERY
PT Name: Adriana Zaragoza  MR #: [id number]     Diagnosis Clarification      CDS/: Lashell Cunningham RN, CCDS               Contact information:  [phone]    This form is a permanent document in the medical record.     Query Date: June 11, 2020    Dear Provider,  By submitting this query, we are merely seeking further clarification of documentation.  Please utilize your independent clinical judgment when addressing the question(s) below.     The medical record contains the following:    Supporting Clinical Information Location in Medical Record   Sepsis w/o Shock  - Lactic acid -(1.3)  - Leukocytosis with left shift  - Procalcitonin level pending  -CRP ordered  - Vancomycin and Zosyn for now  - IV .09NS bolus given in ED  -  maintenance  LR at 100 cc/hr  Cellulitis to a large lymph-edematous mass (right groin region)  -Possible necrotic tissues in mass  - IV Antibiotics  with vanc and Zosyn for now  -ID Consult  - possible surgical consult for mass removal  UTI  - urine culture sent in ED  - On vanc and zosyn for now    Cellulitis medial thigh pannus    Cellulitis    Continue current course     Labs for AM review H & P                                  06/11/2020 Infectious Diseases consult    06/11/2020 Hospital Medicine       Please clarify if the sepsis diagnosis has been:    [  ] Ruled In   [x  ] Ruled In, Now Resolved   [  ] Ruled Out   [  ] Other/Clarification of findings (please specify)_______________    [   ] Clinically undetermined           Please document in your progress notes daily for the duration of treatment, until resolved, and include in your discharge summary.

## 2020-06-11 NOTE — PROGRESS NOTES
"[facility] Medicine  Progress Note    Patient Name: Adriana Zaragoza  MRN: [id number]  Patient Class: IP- Inpatient   Admission Date: 6/10/2020  Length of Stay: 0 days  Attending Physician: Enrique Herring MD  Primary Care Provider: Jessica Baeza MD        Subjective:     Principal Problem:Sepsis due to undetermined organism without resultant organ failure        HPI:  Adriana Zaragoza is a 54 y.o. old female who  has a past medical history of Allergy, Anemia, Asthma, COPD (chronic obstructive pulmonary disease), Deep vein thrombosis, Depression, Diabetes mellitus, type 2, Encounter for blood transfusion, Heart murmur, and Neuromuscular disorder.. The patient presented to [facility] on 6/10/2020 with a primary complaint of Weakness; Chills; and body pain  .   54-year-old  female presents to emergency room with fever chills and weakness.       The patient states she has been having fever chills and weakness and generalized fatigue for the past 24-48 hours.  She did not take any ibuprofen or Aleve for her symptoms.       She also complaints of generalized weakness nausea without vomiting and decreased appetite.  The patient also noted increased redness and warmth to an area of lymphedema in her right groin region.       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  She denied chest pain, palpitations, cough, hematemesis hemoptysis black or bloody stools constipation or dizziness.       She describes her symptoms as moderate to severe severity with no exacerbating or alleviating factors.       This patient was seen in March/ 2020 for similar problem    Overview/Hospital Course:  06/11 Assumed care. Chart and labs reviewed. Leukocytosis has improved, yet persisting with mild normocytic anemia; electrolytes and renal function are normal. Patient states she feels "Better", but has 3-4/10 low back pain. No focal neuro complaint. No UTI " on U/A; urine cultures pending. No GI symptoms other than vague nausea without vomiting. No CP. Wears CPAP for SOB. No orthopnea.    Interval History: slow improvement    Review of Systems   Constitutional: Positive for appetite change and fatigue.   HENT: Negative.    Eyes: Negative.    Respiratory: Negative.    Cardiovascular: Negative.    Gastrointestinal: Positive for nausea.   Endocrine: Negative.    Genitourinary: Negative.    Musculoskeletal: Negative.    Skin: Positive for wound.   Allergic/Immunologic: Negative.    Neurological: Negative.    Hematological: Negative.    All other systems reviewed and are negative.    Objective:     Vital Signs (Most Recent):  Temp: 99.2 °F (37.3 °C) (06/11/20 0804)  Pulse: 77 (06/11/20 0804)  Resp: 18 (06/11/20 0804)  BP: (!) 122/54 (06/11/20 0804)  SpO2: 96 % (06/11/20 0804) Vital Signs (24h Range):  Temp:  [98.2 °F (36.8 °C)-99.5 °F (37.5 °C)] 99.2 °F (37.3 °C)  Pulse:  [] 77  Resp:  [18-20] 18  SpO2:  [92 %-97 %] 96 %  BP: (110-150)/(54-88) 122/54     Weight: (!) 176.3 kg (388 lb 10.7 oz)  Body mass index is 66.72 kg/m².    Intake/Output Summary (Last 24 hours) at 6/11/2020 1108  Last data filed at 6/11/2020 0700  Gross per 24 hour   Intake 1060 ml   Output 550 ml   Net 510 ml      Physical Exam   Constitutional: She is oriented to person, place, and time. She appears well-developed and well-nourished.   HENT:   Head: Normocephalic and atraumatic.   Eyes: Pupils are equal, round, and reactive to light. Conjunctivae and EOM are normal.   Neck: Normal range of motion. Neck supple.   Cardiovascular: Normal rate, regular rhythm, normal heart sounds and intact distal pulses.   Pulmonary/Chest: Effort normal and breath sounds normal.   Abdominal: Soft. Bowel sounds are normal.   super morbidly obese   Musculoskeletal: Normal range of motion.   Neurological: She is alert and oriented to person, place, and time.   Skin: Skin is warm and dry. Capillary refill takes less than  2 seconds.   Psychiatric: She has a normal mood and affect. Her behavior is normal. Judgment and thought content normal.   Nursing note and vitals reviewed.      Significant Labs:   BMP:   Recent Labs   Lab 06/11/20  0511   *      K 3.6      CO2 25   BUN 11   CREATININE 0.6   CALCIUM 8.2*   MG 1.8     CBC:   Recent Labs   Lab 06/10/20  2304 06/11/20  0511   WBC 22.11* 17.96*   HGB 11.3* 10.3*   HCT 36.9* 33.7*    255       Significant Imaging: I have reviewed and interpreted all pertinent imaging results/findings within the past 24 hours.      Assessment/Plan:      Cellulitis  Continue current course  Labs for AM review      KEESHA (obstructive sleep apnea)  Continue current course      Lymphedema  Continue current course      Morbid obesity  Consider bariatric      Type 2 diabetes mellitus without complication  Sliding scale        VTE Risk Mitigation (From admission, onward)         Ordered     enoxaparin injection 40 mg  Every 12 hours      06/11/20 0133     IP VTE HIGH RISK PATIENT  Once      06/11/20 0014                      Enrique Herring MD  Department of Hospital Medicine   [facility]

## 2020-06-11 NOTE — PROGRESS NOTES
Pharmacist Dose Adjustment Note    Adriana Zaragoza is a 54 y.o. female being treated with the medication enoxaparin    Patient Data:    Vital Signs (Most Recent):  Temp: 98.5 °F (36.9 °C) (06/11/20 0045)  Pulse: 93 (06/11/20 0045)  Resp: 20 (06/11/20 0045)  BP: (!) 116/56 (06/11/20 0045)  SpO2: 97 % (06/11/20 0045)   Vital Signs (72h Range):  Temp:  [98.5 °F (36.9 °C)-99.5 °F (37.5 °C)]   Pulse:  []   Resp:  [20]   BP: (116-150)/(56-88)   SpO2:  [92 %-97 %]      Recent Labs   Lab 06/10/20  2304   CREATININE 0.7     Serum creatinine: 0.7 mg/dL 06/10/20 2304  Estimated creatinine clearance: 156.3 mL/min    Medication:enoxaparin 40 mg q 24 hours will be changed to 40 mg q 12 hours per bmi >40 (72.09) and crcl > 30 (156.3 ml/min)     Pharmacist's Name: Hussein Freire  Pharmacist's Extension: [phone]

## 2020-06-12 VITALS
SYSTOLIC BLOOD PRESSURE: 112 MMHG | OXYGEN SATURATION: 99 % | DIASTOLIC BLOOD PRESSURE: 61 MMHG | WEIGHT: 293 LBS | TEMPERATURE: 98 F | BODY MASS INDEX: 50.02 KG/M2 | HEIGHT: 64 IN | RESPIRATION RATE: 20 BRPM | HEART RATE: 69 BPM

## 2020-06-12 LAB
ALBUMIN SERPL BCP-MCNC: 3 G/DL (ref 3.5–5.2)
ALP SERPL-CCNC: 57 U/L (ref 55–135)
ALT SERPL W/O P-5'-P-CCNC: 11 U/L (ref 10–44)
ANION GAP SERPL CALC-SCNC: 9 MMOL/L (ref 8–16)
AST SERPL-CCNC: 11 U/L (ref 10–40)
BASOPHILS # BLD AUTO: 0.04 K/UL (ref 0–0.2)
BASOPHILS NFR BLD: 0.5 % (ref 0–1.9)
BILIRUB SERPL-MCNC: 0.4 MG/DL (ref 0.1–1)
BUN SERPL-MCNC: 12 MG/DL (ref 6–20)
CALCIUM SERPL-MCNC: 8.4 MG/DL (ref 8.7–10.5)
CHLORIDE SERPL-SCNC: 104 MMOL/L (ref 95–110)
CO2 SERPL-SCNC: 25 MMOL/L (ref 23–29)
CREAT SERPL-MCNC: 0.6 MG/DL (ref 0.5–1.4)
DIFFERENTIAL METHOD: ABNORMAL
EOSINOPHIL # BLD AUTO: 0.2 K/UL (ref 0–0.5)
EOSINOPHIL NFR BLD: 2.2 % (ref 0–8)
ERYTHROCYTE [DISTWIDTH] IN BLOOD BY AUTOMATED COUNT: 16 % (ref 11.5–14.5)
EST. GFR  (AFRICAN AMERICAN): >60 ML/MIN/1.73 M^2
EST. GFR  (NON AFRICAN AMERICAN): >60 ML/MIN/1.73 M^2
GLUCOSE SERPL-MCNC: 103 MG/DL (ref 70–110)
HCT VFR BLD AUTO: 34.3 % (ref 37–48.5)
HGB BLD-MCNC: 10.3 G/DL (ref 12–16)
IMM GRANULOCYTES # BLD AUTO: 0.02 K/UL (ref 0–0.04)
IMM GRANULOCYTES NFR BLD AUTO: 0.2 % (ref 0–0.5)
LYMPHOCYTES # BLD AUTO: 1.4 K/UL (ref 1–4.8)
LYMPHOCYTES NFR BLD: 17.8 % (ref 18–48)
MAGNESIUM SERPL-MCNC: 1.9 MG/DL (ref 1.6–2.6)
MCH RBC QN AUTO: 25.1 PG (ref 27–31)
MCHC RBC AUTO-ENTMCNC: 30 G/DL (ref 32–36)
MCV RBC AUTO: 84 FL (ref 82–98)
MONOCYTES # BLD AUTO: 0.8 K/UL (ref 0.3–1)
MONOCYTES NFR BLD: 9.7 % (ref 4–15)
NEUTROPHILS # BLD AUTO: 5.6 K/UL (ref 1.8–7.7)
NEUTROPHILS NFR BLD: 69.6 % (ref 38–73)
NRBC BLD-RTO: 0 /100 WBC
PHOSPHATE SERPL-MCNC: 4 MG/DL (ref 2.7–4.5)
PLATELET # BLD AUTO: 223 K/UL (ref 150–350)
PMV BLD AUTO: 8.9 FL (ref 9.2–12.9)
POTASSIUM SERPL-SCNC: 3.8 MMOL/L (ref 3.5–5.1)
PROT SERPL-MCNC: 6.8 G/DL (ref 6–8.4)
RBC # BLD AUTO: 4.11 M/UL (ref 4–5.4)
SODIUM SERPL-SCNC: 138 MMOL/L (ref 136–145)
WBC # BLD AUTO: 8.11 K/UL (ref 3.9–12.7)

## 2020-06-12 PROCEDURE — 36415 COLL VENOUS BLD VENIPUNCTURE: CPT

## 2020-06-12 PROCEDURE — 99900035 HC TECH TIME PER 15 MIN (STAT)

## 2020-06-12 PROCEDURE — 84100 ASSAY OF PHOSPHORUS: CPT

## 2020-06-12 PROCEDURE — 85025 COMPLETE CBC W/AUTO DIFF WBC: CPT

## 2020-06-12 PROCEDURE — 27000221 HC OXYGEN, UP TO 24 HOURS

## 2020-06-12 PROCEDURE — 94761 N-INVAS EAR/PLS OXIMETRY MLT: CPT

## 2020-06-12 PROCEDURE — 83735 ASSAY OF MAGNESIUM: CPT

## 2020-06-12 PROCEDURE — 25000003 PHARM REV CODE 250: Performed by: INTERNAL MEDICINE

## 2020-06-12 PROCEDURE — 63600175 PHARM REV CODE 636 W HCPCS: Mod: JG | Performed by: INTERNAL MEDICINE

## 2020-06-12 PROCEDURE — 80053 COMPREHEN METABOLIC PANEL: CPT

## 2020-06-12 PROCEDURE — 25000003 PHARM REV CODE 250: Performed by: NURSE PRACTITIONER

## 2020-06-12 PROCEDURE — 94660 CPAP INITIATION&MGMT: CPT

## 2020-06-12 PROCEDURE — 63600175 PHARM REV CODE 636 W HCPCS: Performed by: INTERNAL MEDICINE

## 2020-06-12 RX ORDER — CLINDAMYCIN HYDROCHLORIDE 300 MG/1
300 CAPSULE ORAL EVERY 6 HOURS
Qty: 28 CAPSULE | Refills: 0 | Status: SHIPPED | OUTPATIENT
Start: 2020-06-12 | End: 2020-06-19

## 2020-06-12 RX ADMIN — CEFTAROLINE FOSAMIL 600 MG: 600 POWDER, FOR SOLUTION INTRAVENOUS at 09:06

## 2020-06-12 RX ADMIN — ENOXAPARIN SODIUM 40 MG: 100 INJECTION SUBCUTANEOUS at 08:06

## 2020-06-12 RX ADMIN — POTASSIUM CHLORIDE 10 MEQ: 750 CAPSULE, EXTENDED RELEASE ORAL at 08:06

## 2020-06-12 RX ADMIN — CITALOPRAM HYDROBROMIDE 10 MG: 10 TABLET ORAL at 08:06

## 2020-06-12 RX ADMIN — FERROUS SULFATE TAB 325 MG (65 MG ELEMENTAL FE) 325 MG: 325 (65 FE) TAB at 08:06

## 2020-06-12 RX ADMIN — HYDROCODONE BITARTRATE AND ACETAMINOPHEN 1 TABLET: 5; 325 TABLET ORAL at 03:06

## 2020-06-12 RX ADMIN — HYDROCODONE BITARTRATE AND ACETAMINOPHEN 1 TABLET: 5; 325 TABLET ORAL at 09:06

## 2020-06-12 NOTE — PLAN OF CARE
06/12/20 1405   Medicare Message   Important Message from Medicare regarding Discharge Appeal Rights Given to patient/caregiver;Explained to patient/caregiver;Signed/date by patient/caregiver   Date IMM was signed 06/12/20   Time IMM was signed 140

## 2020-06-12 NOTE — PROGRESS NOTES
"[facility]  Adult Nutrition   Progress Note (Initial Assessment)     SUMMARY     Recommendations/Interventions:    Recommendation/Intervention: 1. Recommend increase calorie level to 2000 calories to better meet nutritional needs.  This would meet 75% of pt's estimated nutritional needs  Goals: 1. Pt to meet 75% of estimated calories needs via PO intake 2.  to assist in menu selections  Nutrition Goal Status: new  Communication of RD Recs: other (comment)(Discussed intake with pt. )    Dietitian Rounds Brief:    Attended rounds- no concerns mentioned regarding pt diet, etc.     Reason for Assessment  Reason For Assessment: length of stay  Diagnosis: (Lymphedema)  Relevant Medical History: COPD, DVT, DM 2 Depression   Interdisciplinary Rounds: attended    Nutrition Risk Screen  Nutrition Risk Screen: no indicators present             Nutrition/Diet History  Patient Reported Diet/Restrictions/Preferences: general  Spiritual, Cultural Beliefs, Methodist Practices, Values that Affect Care: no  Food Allergies: NKFA  Factors Affecting Nutritional Intake: None identified at this time    Anthropometrics  Temp: 97.5 °F (36.4 °C)  Height Method: Stated  Height: 5' 4" (162.6 cm)  Height (inches): 64 in  Weight Method: Bed Scale  Weight: (!) 176.3 kg (388 lb 10.7 oz)  Weight (lb): (!) 388.67 lb  Ideal Body Weight (IBW), Female: 120 lb  % Ideal Body Weight, Female (lb): 323.89 %  BMI (Calculated): 66.7  BMI Grade: greater than 40 - morbid obesity       Weight History:  Wt Readings from Last 10 Encounters:   06/12/20 (!) 176.3 kg (388 lb 10.7 oz)   03/07/20 (!) 175.3 kg (386 lb 7.5 oz)   10/17/19 (!) 187 kg (412 lb 5 oz)   07/10/19 (!) 192.5 kg (424 lb 6.4 oz)   03/20/19 (!) 204.1 kg (450 lb)   03/20/19 (!) 204.1 kg (450 lb)   07/17/18 (!) 207.4 kg (457 lb 3 oz)   04/17/18 (!) 213 kg (469 lb 9.6 oz)   01/31/18 (!) 205 kg (452 lb)   }  Lab/Procedures/Meds: Pertinent Labs Reviewed  Clinical Chemistry:  Recent " Labs   Lab 06/10/20  2304 06/11/20  0511 06/12/20  0502    137 138   K 3.9 3.6 3.8    105 104   CO2 24 25 25   * 119* 103   BUN 13 11 12   CREATININE 0.7 0.6 0.6   CALCIUM 8.5* 8.2* 8.4*   PROT 7.4 6.6 6.8   ALBUMIN 3.6 3.0* 3.0*   BILITOT 0.7 0.8 0.4   ALKPHOS 63 53* 57   AST 13 11 11   ALT 13 11 11   ANIONGAP 9 7* 9   ESTGFRAFRICA >60.0 >60.0 >60.0   EGFRNONAA >60.0 >60.0 >60.0   MG 1.8  1.8 1.8 1.9   PHOS  --  4.3 4.0   LIPASE 28  --   --      CBC:   Recent Labs   Lab 06/12/20  0502   WBC 8.11   RBC 4.11   HGB 10.3*   HCT 34.3*      MCV 84   MCH 25.1*   MCHC 30.0*     Cardiac Profile:  Recent Labs   Lab 06/10/20  2304   CPK 67   TROPONINI <0.030     Inflammatory Labs:  Recent Labs   Lab 06/11/20  0511   CRP 6.52*     Diabetes:  Recent Labs   Lab 06/11/20  0512   HGBA1C 5.6     Thyroid & Parathyroid:  Recent Labs   Lab 06/10/20  2304 06/11/20  0511   TSH 0.780  --    FREET4  --  0.86       Medications: Pertinent Medications reviewed  Scheduled Meds:   ceftaroline (TEFLARO) IVPB  600 mg Intravenous Q12H    citalopram  10 mg Oral Daily    enoxaparin  40 mg Subcutaneous Q12H    ferrous sulfate  325 mg Oral Daily with breakfast    potassium chloride  10 mEq Oral Daily    pravastatin  40 mg Oral Daily     Continuous Infusions:  PRN Meds:.acetaminophen, albuterol, HYDROcodone-acetaminophen, melatonin, ondansetron, sodium chloride 0.9%    Estimated/Assessed Needs    Weight Used For Calorie Calculations: (!) 173.3 kg (382 lb 0.9 oz)  Energy Calorie Requirements (kcal): 2313 x1.2 2775 calories   [facility]: [facility]  Protein Requirements: 81 (IBW x 1.5 g)   Weight Used For Protein Calculations: 55 kg (121 lb 4.1 oz)  Fluid Requirements (mL): 2313 ml  Estimated Fluid Requirement Method: RDA Method    Nutrition Prescription Ordered    Current Diet Order: 1500 calories     Evaluation of Received Nutrient/Fluid Intake    Energy Calories Required: not meeting needs  Protein  Required: meeting needs  Fluid Required: meeting needs  Tolerance: tolerating  % Intake of Estimated Energy Needs: 75 - 100 %  % Meal Intake: 75 - 100 %    Intake/Output Summary (Last 24 hours) at 6/12/2020 0924  Last data filed at 6/12/2020 0600  Gross per 24 hour   Intake --   Output 4350 ml   Net -4350 ml      Nutrition Risk    Level of Risk/Frequency of Follow-up: moderate - high   Monitor and Evaluation    Food and Nutrient Intake: energy intake, food and beverage intake  Food and Nutrient Adminstration: diet order  Knowledge/Beliefs/Attitudes: food and nutrition knowledge/skill  Physical Activity and Function: nutrition-related ADLs and IADLs  Anthropometric Measurements: weight, weight change  Biochemical Data, Medical Tests and Procedures: electrolyte and renal panel, lipid profile, gastrointestinal profile, glucose/endocrine profile, inflammatory profile  Nutrition-Focused Physical Findings: overall appearance     Nutrition Follow-Up    RD Follow-up?: Yes   Trini Comer MS, RD, LDN

## 2020-06-12 NOTE — PROGRESS NOTES
"Consult Note  Infectious Disease    Reason for Consult:  Fever, ?sepsis    HPI: Adriana Zaragoza is a  54 y.o. female presented to emergency room with fever chills and weakness.    The patient states she has been having fever chills and weakness and generalized fatigue for the past 24-48 hours.   She also complaints of generalized weakness nausea without vomiting and decreased appetite.  The patient also noted increased redness and warmth to an area of lymphedema in her right proximal thigh .    The patient has a chronic lymphedematous mass to her right inner thigh. She has been hospitalized several times for cellulitis of this mass. She has not had HH care since the COVID pandemic, who usually helps her with hygiene. She is under the care of [facility] plastic surgery, but amputation of this mass and abdominal panniculectomy was postponed due to COVID.  She has chronic venous stasis dermatitis and brawny edema of the mass.   She denied chest pain, palpitations, cough, hematemesis hemoptysis black or bloody stools constipation or dizziness.    She has memory loss since she was on "life support". She is s/p a 180# weight loss after gastric sleeve at [facility].     Admitted 03/04-3/6 for fever of 103°, chills, myalgias, dysuria.  She received vancomycin and Zosyn for 3 days with findings of evolution of cellulitis on the right medial thigh pannus/mass and was discharged on Augmentin with topical therapy to the skin and antifungal therapy to the skin folds.    6/12: afebrile. WBC normal, and she feels much better. She believes she can go home.   EXAM & DIAGNOSTICS REVIEWED:   Vitals:     Temp:  [97.3 °F (36.3 °C)-98.4 °F (36.9 °C)]   Temp: 97.6 °F (36.4 °C) (06/12/20 1155)  Pulse: 69 (06/12/20 1155)  Resp: 20 (06/12/20 1155)  BP: 112/61 (06/12/20 1155)  SpO2: 99 % (06/12/20 0739)    Intake/Output Summary (Last 24 hours) at 6/12/2020 1331  Last data filed at 6/12/2020 0600  Gross per 24 hour   Intake --   Output 2750 ml "   Net -2750 ml       General:  In NAD. Alert and attentive, cooperative, comfortable  Eyes:  Anicteric, , EOMI  ENT:     Neck:  Supple,   Lungs:    Heart:     Abd:  Soft, morbidly obese, periumbilical hernia, very large pannus hanging over her medial thigh mass,  NT, ND, normal BS, no masses or organomegaly appreciated.  :   Lyons, urine clear, no flank tenderness  Musc:  Joints without effusion, swelling, erythema, synovitis, muscle wasting.   Skin:  No rashes. She does have venous stasis dermatitis of the medial thigh pannus and there is a dry ulcer on the distal end of the pannus. There are some prominent pores in the skin posteriorly, without abscess formation  Wound:     On exam today, the mass is smaller and less intensely red.     6/12: the erythema has decreased. It is less tender.     Neuro:              Alert, attentive, speech fluent, face symmetric, moves all extremities, no focal weakness. Ambulatory  Psych:  Calm, cooperative  Lymphatic:        Extrem: No edema, erythema, phlebitis, cellulitis, warm and well perfused  VAD:       Isolation:      Weight History:      Wt Readings from Last 10 Encounters:   06/12/20 (!) 176.3 kg (388 lb 10.7 oz)   03/07/20 (!) 175.3 kg (386 lb 7.5 oz)   10/17/19 (!) 187 kg (412 lb 5 oz)   07/10/19 (!) 192.5 kg (424 lb 6.4 oz)   03/20/19 (!) 204.1 kg (450 lb)   03/20/19 (!) 204.1 kg (450 lb)   07/17/18 (!) 207.4 kg (457 lb 3 oz)   04/17/18 (!) 213 kg (469 lb 9.6 oz)   01/31/18 (!) 205 kg (452 lb)         General Labs reviewed:  Recent Labs   Lab 06/10/20  2304 06/11/20  0511 06/12/20  0502   WBC 22.11* 17.96* 8.11   HGB 11.3* 10.3* 10.3*   HCT 36.9* 33.7* 34.3*    255 223       Recent Labs   Lab 06/10/20  2304 06/11/20  0511 06/12/20  0502    137 138   K 3.9 3.6 3.8    105 104   CO2 24 25 25   BUN 13 11 12   CREATININE 0.7 0.6 0.6   CALCIUM 8.5* 8.2* 8.4*   PROT 7.4 6.6 6.8   BILITOT 0.7 0.8 0.4   ALKPHOS 63 53* 57   ALT 13 11 11   AST 13 11 11            Micro:  Microbiology Results (last 7 days)     Procedure Component Value Units Date/Time    Urine culture **CANNOT BE ORDERED STAT** [[id number]] Collected:  06/11/20 0038    Order Status:  Completed Specimen:  Urine, Catheterized Updated:  06/12/20 0756     Urine Culture, Routine No growth to date    Narrative:       Indicated criteria for high risk culture:->Other  Other (specify):->other    Blood culture #1 **CANNOT BE ORDERED STAT** [[id number]] Collected:  06/10/20 2305    Order Status:  Completed Specimen:  Blood from Peripheral, Antecubital, Right Updated:  06/12/20 0232     Blood Culture, Routine No Growth to date      No Growth to date    Blood culture #2 **CANNOT BE ORDERED STAT** [[id number]] Collected:  06/10/20 2315    Order Status:  Completed Specimen:  Blood from Peripheral, Forearm, Right Updated:  06/12/20 0232     Blood Culture, Routine No Growth to date      No Growth to date        Imaging Reviewed:   CXR      Cardiology:    IMPRESSION & PLAN   1. Cellulitis medial thigh pannus, improving  2. supermorbid obesity with lymphedema of limbs  3. Chronic dermatitis/venous stasis of medial thigh pannus  4. KEESHA, oxygen dependent  5. Diabetes, A1c 5.6%  6. Memory loss    Recommendations:  Ok to dc on po clindamycin for a week  F/u with plastic surgery for amputation of the medial thigh pannus  Elevate pannus at home and moisturize skin, cleanse with chlorhexidine  Resume home health     D/w Dr. Herring and

## 2020-06-12 NOTE — PROGRESS NOTES
Therapy with Vancomycin complete and / or consult / order discontinued by on   06/11/2020 1809  by Lashell Llamas MD   Pharmacy will sign off, please re-consult as needed.  Thank you for allowing us to participate in this patient's care.  Luis Garcia 6/11/2020 7:12 PM  Dept of Pharmacy  Ext [phone]

## 2020-06-12 NOTE — DISCHARGE SUMMARY
"[facility] Medicine  Discharge Summary      Patient Name: Adriana Zaragoza  MRN: [id number]  Admission Date: 6/10/2020  Hospital Length of Stay: 1 days  Discharge Date and Time:  06/12/2020 2:26 PM  Attending Physician: Enrique Herring MD   Discharging Provider: Enrique Herring MD  Primary Care Provider: Jessica Baeza MD      HPI:   Adriana Zaragoza is a 54 y.o. old female who  has a past medical history of Allergy, Anemia, Asthma, COPD (chronic obstructive pulmonary disease), Deep vein thrombosis, Depression, Diabetes mellitus, type 2, Encounter for blood transfusion, Heart murmur, and Neuromuscular disorder.. The patient presented to [facility] on 6/10/2020 with a primary complaint of Weakness; Chills; and body pain  .   54-year-old  female presents to emergency room with fever chills and weakness.       The patient states she has been having fever chills and weakness and generalized fatigue for the past 24-48 hours.  She did not take any ibuprofen or Aleve for her symptoms.       She also complaints of generalized weakness nausea without vomiting and decreased appetite.  The patient also noted increased redness and warmth to an area of lymphedema in her right groin region.       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  She denied chest pain, palpitations, cough, hematemesis hemoptysis black or bloody stools constipation or dizziness.       She describes her symptoms as moderate to severe severity with no exacerbating or alleviating factors.       This patient was seen in March/ 2020 for similar problem    * No surgery found *      Hospital Course:   06/11 Assumed care. Chart and labs reviewed. Leukocytosis has improved, yet persisting with mild normocytic anemia; electrolytes and renal function are normal. Patient states she feels "Better", but has 3-4/10 low back pain. No focal neuro complaint. No UTI on U/A; urine " cultures pending. No GI symptoms other than vague nausea without vomiting. No CP. Wears CPAP for SOB. No orthopnea.    06/12 Has been cleared by ID for discharge with outpatient follow-up by patient's PCP.   VSS  Lungs: decreased entry without adventitious  Heart: distant regular  Abdo: soft     Consults:   Consults (From admission, onward)        Status Ordering Provider     Inpatient consult to Hospitalist  Once     Provider:  Reg Martinez MD    Acknowledged SHAQ LAWRENCE     Inpatient consult to Infectious Diseases  Once     Provider:  Lashell Llamas MD    Completed URIAH NELSON          No new Assessment & Plan notes have been filed under this hospital service since the last note was generated.  Service: Hospital Medicine    Final Active Diagnoses:    Diagnosis Date Noted POA    Cellulitis [L03.90] 06/11/2020 Yes    KEESHA (obstructive sleep apnea) [G47.33]  Yes    Lymphedema [I89.0] 01/31/2018 Yes    Type 2 diabetes mellitus without complication [E11.9] 01/31/2018 Yes    Morbid obesity [E66.01] 01/31/2018 Yes      Problems Resolved During this Admission:    Diagnosis Date Noted Date Resolved POA    PRINCIPAL PROBLEM:  Sepsis due to undetermined organism without resultant organ failure [A41.9] 03/05/2020 06/11/2020 Unknown    Pyelonephritis [N12] 03/05/2020 06/11/2020 Yes       Discharged Condition: fair    Disposition: Home-Health Care [facility]    Follow Up:  Follow-up Information     Jessica Baeza MD In 1 week.    Specialties:  Internal Medicine, Pediatrics  Why:  Post discharge follow-up  Contact information:  [address] [facility] [address] [phone]                 Patient Instructions:      Diet Cardiac     Activity as tolerated       Significant Diagnostic Studies: Labs:   BMP:   Recent Labs   Lab 06/10/20  2304 06/11/20  0511 06/12/20  0502   * 119* 103    137 138   K 3.9 3.6 3.8    105 104   CO2 24 25 25   BUN 13 11 12   CREATININE 0.7 0.6 0.6   CALCIUM 8.5* 8.2*  8.4*   MG 1.8  1.8 1.8 1.9    and CBC   Recent Labs   Lab 06/10/20  2304 06/11/20  0511 06/12/20  0502   WBC 22.11* 17.96* 8.11   HGB 11.3* 10.3* 10.3*   HCT 36.9* 33.7* 34.3*    255 223       Pending Diagnostic Studies:     None         Medications:  Reconciled Home Medications:      Medication List      START taking these medications    clindamycin 300 MG capsule  Commonly known as:  CLEOCIN  Take 1 capsule (300 mg total) by mouth every 6 (six) hours. for 7 days        CONTINUE taking these medications    albuterol 90 mcg/actuation inhaler  Commonly known as:  PROVENTIL/VENTOLIN HFA  Inhale 2 puffs into the lungs every 6 (six) hours as needed.     BARIATRIC MULTIVITAMINS ORAL  Take 1 tablet by mouth once daily.     biotin 1 mg Cap  Take 1 mg by mouth once daily.     calcium carbonate 600 mg calcium (1,500 mg) Tab  Commonly known as:  OS-BRENDA  Take 600 mg by mouth once daily.     citalopram 10 MG tablet  Commonly known as:  CELEXA  Take 1 tablet (10 mg total) by mouth once daily.     ferrous sulfate 325 mg (65 mg iron) Tab tablet  Commonly known as:  FEOSOL  Take 325 mg by mouth daily with breakfast.     HAIR-SKIN-NAIL(VIT A,C-BIOTIN) ORAL  Take 1 tablet by mouth once daily.     ondansetron 4 MG Tbdl  Commonly known as:  ZOFRAN-ODT  Take 4 mg by mouth every 6 (six) hours as needed.     potassium chloride 10 MEQ Cpsr  Commonly known as:  MICRO-K  Take 10 mEq by mouth once daily.     pravastatin 40 MG tablet  Commonly known as:  PRAVACHOL  Take 1 tablet (40 mg total) by mouth once daily.     TYLENOL ARTHRITIS PAIN 650 MG Tbsr  Generic drug:  acetaminophen  Take 650 mg by mouth every 8 (eight) hours.        STOP taking these medications    amoxicillin-clavulanate 875-125mg 875-125 mg per tablet  Commonly known as:  AUGMENTIN            Indwelling Lines/Drains at time of discharge:   Lines/Drains/Airways     Drain                 Urethral Catheter 06/11/20 0032 Straight-tip 16 Fr. 1 day                Time spent  on the discharge of patient: 32 minutes  Patient was seen and examined on the date of discharge and determined to be suitable for discharge.         Enrique Herring MD  Department of Hospital Medicine  Critical access hospital

## 2020-06-12 NOTE — DISCHARGE SUMMARY
"[facility] Medicine  Discharge Summary      Patient Name: Adriana Zaragoza  MRN: [id number]  Admission Date: 6/10/2020  Hospital Length of Stay: 1 days  Discharge Date and Time:  06/12/2020 2:24 PM  Attending Physician: Enrique Herring MD   Discharging Provider: Enrique Herring MD  Primary Care Provider: Jessica Baeza MD      HPI:   Adriana Zaragoza is a 54 y.o. old female who  has a past medical history of Allergy, Anemia, Asthma, COPD (chronic obstructive pulmonary disease), Deep vein thrombosis, Depression, Diabetes mellitus, type 2, Encounter for blood transfusion, Heart murmur, and Neuromuscular disorder.. The patient presented to [facility] on 6/10/2020 with a primary complaint of Weakness; Chills; and body pain  .   54-year-old  female presents to emergency room with fever chills and weakness.       The patient states she has been having fever chills and weakness and generalized fatigue for the past 24-48 hours.  She did not take any ibuprofen or Aleve for her symptoms.       She also complaints of generalized weakness nausea without vomiting and decreased appetite.  The patient also noted increased redness and warmth to an area of lymphedema in her right groin region.       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  She denied chest pain, palpitations, cough, hematemesis hemoptysis black or bloody stools constipation or dizziness.       She describes her symptoms as moderate to severe severity with no exacerbating or alleviating factors.       This patient was seen in March/ 2020 for similar problem    * No surgery found *      Hospital Course:   06/11 Assumed care. Chart and labs reviewed. Leukocytosis has improved, yet persisting with mild normocytic anemia; electrolytes and renal function are normal. Patient states she feels "Better", but has 3-4/10 low back pain. No focal neuro complaint. No UTI on U/A; urine " cultures pending. No GI symptoms other than vague nausea without vomiting. No CP. Wears CPAP for SOB. No orthopnea.    06/12 Has been cleared by ID for discharge with outpatient follow-up by patient's PCP.   VSS  Lungs: decreased entry without adventitious  Heart: distant regular  Abdo: soft     Consults:   Consults (From admission, onward)        Status Ordering Provider     Inpatient consult to Hospitalist  Once     Provider:  Reg Martinez MD    Acknowledged SHAQ LAWRENCE     Inpatient consult to Infectious Diseases  Once     Provider:  Lashell Llamas MD    Completed URIAH NELSON          No new Assessment & Plan notes have been filed under this hospital service since the last note was generated.  Service: Hospital Medicine    Final Active Diagnoses:    Diagnosis Date Noted POA    Cellulitis [L03.90] 06/11/2020 Yes    KEESHA (obstructive sleep apnea) [G47.33]  Yes    Lymphedema [I89.0] 01/31/2018 Yes    Type 2 diabetes mellitus without complication [E11.9] 01/31/2018 Yes    Morbid obesity [E66.01] 01/31/2018 Yes      Problems Resolved During this Admission:    Diagnosis Date Noted Date Resolved POA    PRINCIPAL PROBLEM:  Sepsis due to undetermined organism without resultant organ failure [A41.9] 03/05/2020 06/11/2020 Unknown    Pyelonephritis [N12] 03/05/2020 06/11/2020 Yes       Discharged Condition: good    [place] [facility]    Follow Up:  Follow-up Information     Jessica Baeza MD In 1 week.    Specialties:  Internal Medicine, Pediatrics  Why:  Post discharge follow-up  Contact information:  [address] [facility] [address] [phone]                 Patient Instructions:      Diet Cardiac     Activity as tolerated       Significant Diagnostic Studies: Labs:   BMP:   Recent Labs   Lab 06/10/20  2304 06/11/20  0511 06/12/20  0502   * 119* 103    137 138   K 3.9 3.6 3.8    105 104   CO2 24 25 25   BUN 13 11 12   CREATININE 0.7 0.6 0.6   CALCIUM 8.5* 8.2*  8.4*   MG 1.8  1.8 1.8 1.9    and CBC   Recent Labs   Lab 06/10/20  2304 06/11/20  0511 06/12/20  0502   WBC 22.11* 17.96* 8.11   HGB 11.3* 10.3* 10.3*   HCT 36.9* 33.7* 34.3*    255 223       Pending Diagnostic Studies:     None         Medications:  Reconciled Home Medications:      Medication List      START taking these medications    clindamycin 300 MG capsule  Commonly known as:  CLEOCIN  Take 1 capsule (300 mg total) by mouth every 6 (six) hours. for 7 days        CONTINUE taking these medications    albuterol 90 mcg/actuation inhaler  Commonly known as:  PROVENTIL/VENTOLIN HFA  Inhale 2 puffs into the lungs every 6 (six) hours as needed.     BARIATRIC MULTIVITAMINS ORAL  Take 1 tablet by mouth once daily.     biotin 1 mg Cap  Take 1 mg by mouth once daily.     calcium carbonate 600 mg calcium (1,500 mg) Tab  Commonly known as:  OS-BRENDA  Take 600 mg by mouth once daily.     citalopram 10 MG tablet  Commonly known as:  CELEXA  Take 1 tablet (10 mg total) by mouth once daily.     ferrous sulfate 325 mg (65 mg iron) Tab tablet  Commonly known as:  FEOSOL  Take 325 mg by mouth daily with breakfast.     HAIR-SKIN-NAIL(VIT A,C-BIOTIN) ORAL  Take 1 tablet by mouth once daily.     ondansetron 4 MG Tbdl  Commonly known as:  ZOFRAN-ODT  Take 4 mg by mouth every 6 (six) hours as needed.     potassium chloride 10 MEQ Cpsr  Commonly known as:  MICRO-K  Take 10 mEq by mouth once daily.     pravastatin 40 MG tablet  Commonly known as:  PRAVACHOL  Take 1 tablet (40 mg total) by mouth once daily.     TYLENOL ARTHRITIS PAIN 650 MG Tbsr  Generic drug:  acetaminophen  Take 650 mg by mouth every 8 (eight) hours.        STOP taking these medications    amoxicillin-clavulanate 875-125mg 875-125 mg per tablet  Commonly known as:  AUGMENTIN            Indwelling Lines/Drains at time of discharge:   Lines/Drains/Airways     Drain                 Urethral Catheter 06/11/20 0032 Straight-tip 16 Fr. 1 day                Time spent  on the discharge of patient: 32  minutes  Patient was seen and examined on the date of discharge and determined to be suitable for discharge.         Enrique Herring MD  Department of Hospital Medicine  Formerly [facility], [facility]1983

## 2020-06-12 NOTE — PLAN OF CARE
06/12/20 1454   Post-Acute Status   Post-Acute Authorization Home Health   Home Health Status Referrals Sent       Orders sent to [facility] via right fax/

## 2020-06-13 LAB — BACTERIA UR CULT: NO GROWTH

## 2020-06-15 ENCOUNTER — TELEPHONE (OUTPATIENT)
Dept: FAMILY MEDICINE | Facility: CLINIC | Age: 55
End: 2020-06-15

## 2020-06-15 NOTE — TELEPHONE ENCOUNTER
DC Date 6/12/20  Reached out to patient to schedule HFU.  HFU appointment scheduled 6/22/20  Clinical staff notified.

## 2020-06-16 LAB
BACTERIA BLD CULT: NORMAL
BACTERIA BLD CULT: NORMAL

## 2020-06-25 ENCOUNTER — EXTERNAL HOME HEALTH (OUTPATIENT)
Dept: HOME HEALTH SERVICES | Facility: HOSPITAL | Age: 55
End: 2020-06-25

## 2020-07-08 ENCOUNTER — DOCUMENT SCAN (OUTPATIENT)
Dept: HOME HEALTH SERVICES | Facility: HOSPITAL | Age: 55
End: 2020-07-08

## 2020-07-09 ENCOUNTER — OFFICE VISIT (OUTPATIENT)
Dept: FAMILY MEDICINE | Facility: CLINIC | Age: 55
End: 2020-07-09
Payer: MEDICARE

## 2020-07-09 DIAGNOSIS — I89.0 LYMPHEDEMA: ICD-10-CM

## 2020-07-09 DIAGNOSIS — E11.9 TYPE 2 DIABETES MELLITUS WITHOUT COMPLICATION, WITHOUT LONG-TERM CURRENT USE OF INSULIN: ICD-10-CM

## 2020-07-09 DIAGNOSIS — E78.5 HYPERLIPIDEMIA, UNSPECIFIED HYPERLIPIDEMIA TYPE: ICD-10-CM

## 2020-07-09 DIAGNOSIS — F33.1 MODERATE EPISODE OF RECURRENT MAJOR DEPRESSIVE DISORDER: ICD-10-CM

## 2020-07-09 DIAGNOSIS — L03.90 CELLULITIS, UNSPECIFIED CELLULITIS SITE: Primary | ICD-10-CM

## 2020-07-09 DIAGNOSIS — G47.33 OSA (OBSTRUCTIVE SLEEP APNEA): ICD-10-CM

## 2020-07-09 PROCEDURE — 99214 PR OFFICE/OUTPT VISIT, EST, LEVL IV, 30-39 MIN: ICD-10-PCS | Mod: 95,,, | Performed by: INTERNAL MEDICINE

## 2020-07-09 PROCEDURE — 99214 OFFICE O/P EST MOD 30 MIN: CPT | Mod: 95,,, | Performed by: INTERNAL MEDICINE

## 2020-07-09 RX ORDER — CITALOPRAM 10 MG/1
10 TABLET ORAL DAILY
Qty: 30 TABLET | Refills: 11 | Status: SHIPPED | OUTPATIENT
Start: 2020-07-09 | End: 2020-10-20 | Stop reason: SDUPTHER

## 2020-07-09 RX ORDER — PRAVASTATIN SODIUM 40 MG/1
40 TABLET ORAL DAILY
Qty: 30 TABLET | Refills: 11 | Status: SHIPPED | OUTPATIENT
Start: 2020-07-09 | End: 2021-08-26

## 2020-07-09 NOTE — PROGRESS NOTES
Subjective:        The chief complaint leading to consultation is: hospital follow-up   The patient location is:  Home  Visit type: Virtual visit with synchronous audio/video or audio only  This was a video visit in lieu of in-person visit due to the coronavirus emergency. Patient acknowledged and consented to the video visit encounter.         HOSPITAL FOLLOW-UP VISIT    Subjective:     Chief Complaint:  Hospital Follow Up      53 yo woman doing virtual visit for f/u of hospital admission for fever, possible sepsis and cellultis of lymphadematous mass of R thigh. Pt was planned for surgical removal of mass by plastic surgery at [facility] but this was postponed 2/2 COVID. She was also not receiving home health during COVID.  Seen by ID during stay. Discharged on po clindamycin for a week, completed 6/20.  Now receiving home health again for help with skin care.   Pt states she is doing better overall. Working with PT at home. Stopped pravastatin and celexa during one of her hospitalizations and never restarted. Noticing that she is feeling more down and anxious.         Ms. Zaragoza  has a past medical history of Allergy, Anemia, Asthma, COPD (chronic obstructive pulmonary disease), Deep vein thrombosis, Depression, Diabetes mellitus, type 2, Encounter for blood transfusion, Heart murmur, and Neuromuscular disorder.    Family history and social history are reviewed and there are no significant changes.     ROS:  Review of Systems   Constitutional: Positive for activity change, chills and fatigue. Negative for appetite change, fever and unexpected weight change.   HENT: Negative for congestion, sinus pain and sore throat.    Eyes: Negative for redness and visual disturbance.   Respiratory: Negative for cough, shortness of breath and wheezing.    Cardiovascular: Positive for leg swelling. Negative for chest pain and palpitations.   Genitourinary: Negative for flank pain, frequency and urgency.   Skin: Positive for  wound (lump under pannus).   Neurological: Negative for dizziness and headaches.   Psychiatric/Behavioral: Positive for dysphoric mood. Negative for suicidal ideas. The patient is nervous/anxious.           Current Outpatient Medications:     acetaminophen (TYLENOL ARTHRITIS PAIN) 650 MG TbSR, Take 650 mg by mouth every 8 (eight) hours., Disp: , Rfl:     albuterol (PROVENTIL/VENTOLIN HFA) 90 mcg/actuation inhaler, Inhale 2 puffs into the lungs every 6 (six) hours as needed. , Disp: , Rfl:     biotin 1 mg Cap, Take 1 mg by mouth once daily., Disp: , Rfl:     calcium carbonate (OS-BRENDA) 600 mg calcium (1,500 mg) Tab, Take 600 mg by mouth once daily. , Disp: , Rfl:     citalopram (CELEXA) 10 MG tablet, Take 1 tablet (10 mg total) by mouth once daily., Disp: 30 tablet, Rfl: 11    ferrous sulfate 325 mg (65 mg iron) Tab tablet, Take 325 mg by mouth daily with breakfast., Disp: , Rfl:     multivit-min/iron/folic acid/K (BARIATRIC MULTIVITAMINS ORAL), Take 1 tablet by mouth once daily. , Disp: , Rfl:     pravastatin (PRAVACHOL) 40 MG tablet, Take 1 tablet (40 mg total) by mouth once daily., Disp: 30 tablet, Rfl: 11    vit A/vit C/biotin/zinc/copper (HAIR-SKIN-NAIL,VIT A,C-BIOTIN, ORAL), Take 1 tablet by mouth once daily., Disp: , Rfl:     Current medication list reviewed and reconciled with hospital discharge medication list.      Objective:     Physical Examination:     There were no vitals taken for this visit.    Physical Exam  Constitutional:       General: She is not in acute distress.     Appearance: She is obese.   Pulmonary:      Effort: Pulmonary effort is normal. No respiratory distress.   Neurological:      Mental Status: She is alert and oriented to person, place, and time.         Assessment/Plan:   Adriana is a 54 y.o. female here for follow-up of hospitalization for cellulitis.     Problem List Items Addressed This Visit        Psychiatric    Moderate episode of recurrent major depressive disorder     Current Assessment & Plan     Restart celexa.         Relevant Medications    citalopram (CELEXA) 10 MG tablet       Cardiac/Vascular    Hyperlipemia    Current Assessment & Plan     Restart pravastatin. Check lipids 10/2020.         Relevant Medications    pravastatin (PRAVACHOL) 40 MG tablet       ID    Cellulitis - Primary    Current Assessment & Plan     S/p completion of antibiotics. Continue skin care with home health.             Endocrine    Type 2 diabetes mellitus without complication    Current Assessment & Plan     Diet controlled. A1c 5.6% 6/2020.            Other    Lymphedema    Current Assessment & Plan     Encouraged pt to f/u with plastic surgery at [facility] for surgical removal of massive localized lymphedema of LLE.          KEESHA (obstructive sleep apnea)    Current Assessment & Plan     On CPAP.                  Discussion:     Follow up in about 3 months (around 10/9/2020) for f/u hyperlipidemia, DM, depression.    Electronically signed by Jessica Baeza    Follow up in about 3 months (around 10/9/2020) for f/u hyperlipidemia, DM, depression.    Total time spent with patient: 30 minutes      Each patient to whom he or she provides medical services by telemedicine is:  (1) informed of the relationship between the physician and patient and the respective role of any other health care provider with respect to management of the patient; and (2) notified that he or she may decline to receive medical services by telemedicine and may withdraw from such care at any time.    This note was created using [employer] voice recognition software that occasionally misinterprets phrases or words.

## 2020-07-10 PROBLEM — E78.5 HYPERLIPEMIA: Status: ACTIVE | Noted: 2020-07-10

## 2020-07-10 NOTE — ASSESSMENT & PLAN NOTE
Encouraged pt to f/u with plastic surgery at [facility] for surgical removal of massive localized lymphedema of LLE.

## 2020-07-31 ENCOUNTER — EXTERNAL HOME HEALTH (OUTPATIENT)
Dept: HOME HEALTH SERVICES | Facility: HOSPITAL | Age: 55
End: 2020-07-31

## 2020-08-03 ENCOUNTER — DOCUMENT SCAN (OUTPATIENT)
Dept: HOME HEALTH SERVICES | Facility: HOSPITAL | Age: 55
End: 2020-08-03

## 2020-08-06 ENCOUNTER — DOCUMENT SCAN (OUTPATIENT)
Dept: HOME HEALTH SERVICES | Facility: HOSPITAL | Age: 55
End: 2020-08-06

## 2020-08-07 ENCOUNTER — EXTERNAL HOME HEALTH (OUTPATIENT)
Dept: HOME HEALTH SERVICES | Facility: HOSPITAL | Age: 55
End: 2020-08-07

## 2020-09-22 ENCOUNTER — EXTERNAL HOME HEALTH (OUTPATIENT)
Dept: HOME HEALTH SERVICES | Facility: HOSPITAL | Age: 55
End: 2020-09-22

## 2020-09-24 ENCOUNTER — TELEPHONE (OUTPATIENT)
Dept: FAMILY MEDICINE | Facility: CLINIC | Age: 55
End: 2020-09-24

## 2020-09-24 DIAGNOSIS — G47.33 OSA (OBSTRUCTIVE SLEEP APNEA): Primary | ICD-10-CM

## 2020-10-20 ENCOUNTER — OFFICE VISIT (OUTPATIENT)
Dept: FAMILY MEDICINE | Facility: CLINIC | Age: 55
End: 2020-10-20
Payer: MEDICARE

## 2020-10-20 ENCOUNTER — EXTERNAL HOME HEALTH (OUTPATIENT)
Dept: HOME HEALTH SERVICES | Facility: HOSPITAL | Age: 55
End: 2020-10-20

## 2020-10-20 VITALS
BODY MASS INDEX: 50.02 KG/M2 | HEART RATE: 85 BPM | HEIGHT: 64 IN | SYSTOLIC BLOOD PRESSURE: 120 MMHG | OXYGEN SATURATION: 95 % | DIASTOLIC BLOOD PRESSURE: 62 MMHG | RESPIRATION RATE: 20 BRPM | WEIGHT: 293 LBS

## 2020-10-20 DIAGNOSIS — I89.0 LYMPHEDEMA: ICD-10-CM

## 2020-10-20 DIAGNOSIS — E11.9 TYPE 2 DIABETES MELLITUS WITHOUT COMPLICATION, WITHOUT LONG-TERM CURRENT USE OF INSULIN: ICD-10-CM

## 2020-10-20 DIAGNOSIS — E78.5 HYPERLIPIDEMIA, UNSPECIFIED HYPERLIPIDEMIA TYPE: Primary | ICD-10-CM

## 2020-10-20 DIAGNOSIS — D50.9 IRON DEFICIENCY ANEMIA, UNSPECIFIED IRON DEFICIENCY ANEMIA TYPE: ICD-10-CM

## 2020-10-20 DIAGNOSIS — D51.8 OTHER VITAMIN B12 DEFICIENCY ANEMIAS: ICD-10-CM

## 2020-10-20 DIAGNOSIS — G47.33 OSA (OBSTRUCTIVE SLEEP APNEA): ICD-10-CM

## 2020-10-20 DIAGNOSIS — E53.8 DEFICIENCY OF OTHER SPECIFIED B GROUP VITAMINS: ICD-10-CM

## 2020-10-20 DIAGNOSIS — F33.1 MODERATE EPISODE OF RECURRENT MAJOR DEPRESSIVE DISORDER: ICD-10-CM

## 2020-10-20 DIAGNOSIS — Z98.84 S/P GASTRIC BYPASS: ICD-10-CM

## 2020-10-20 PROBLEM — L03.90 CELLULITIS: Status: RESOLVED | Noted: 2020-06-11 | Resolved: 2020-10-20

## 2020-10-20 PROCEDURE — 99215 OFFICE O/P EST HI 40 MIN: CPT | Performed by: INTERNAL MEDICINE

## 2020-10-20 PROCEDURE — 99214 PR OFFICE/OUTPT VISIT, EST, LEVL IV, 30-39 MIN: ICD-10-PCS | Mod: S$PBB,,, | Performed by: INTERNAL MEDICINE

## 2020-10-20 PROCEDURE — 99214 OFFICE O/P EST MOD 30 MIN: CPT | Mod: S$PBB,,, | Performed by: INTERNAL MEDICINE

## 2020-10-20 RX ORDER — HYDROCODONE BITARTRATE AND ACETAMINOPHEN 5; 325 MG/1; MG/1
1 TABLET ORAL EVERY 6 HOURS PRN
Qty: 24 TABLET | Refills: 0 | Status: SHIPPED | OUTPATIENT
Start: 2020-10-20 | End: 2021-08-26

## 2020-10-20 RX ORDER — CITALOPRAM 20 MG/1
20 TABLET, FILM COATED ORAL DAILY
Qty: 30 TABLET | Refills: 11 | Status: SHIPPED | OUTPATIENT
Start: 2020-10-20 | End: 2022-09-01 | Stop reason: SDUPTHER

## 2020-10-20 NOTE — PROGRESS NOTES
ADULT FOLLOW-UP VISIT    Subjective:     Chief Complaint:  Follow-up, Depression, Pain, and Sore (sore on back of legs)      54-year-old woman with a history of morbid obesity, lymphedema, KEESHA, depression/anxiety, hyperlipidemia, type 2 diabetes here for routine follow-up.  Patient is very tearful today.  She is disappointed with her progress since her gastric bypass surgery in March of 2019.  She lost 120 lb in the 1st 6 months after surgery but had numerous surgical complications. She was supposed to have surgery to remove her pannus as well as her massive localized lymphedema but has not followed up with Plastic surgery due to COVID-19.  She remains mostly homebound.  She is receiving home health to help with physical therapy and wound care is needed for the chronic when she develops on her leg mass.  She is on citalopram 10 mg daily but can not tell that it is doing anything.  Over the past month, she has had a number of upsetting anniversaries and has also had to evacuate her home twice due to possible her canes.  She states that any time she travels her lower extremity edema becomes much worse which is painful and makes it hard for her to get around. She c/o severe burning pain in her ext and leg mass whenever her swelling gets worse.         Ms. Zaragoza  has a past medical history of Allergy, Anemia, Asthma, COPD (chronic obstructive pulmonary disease), Deep vein thrombosis, Depression, Diabetes mellitus, type 2, Encounter for blood transfusion, Heart murmur, and Neuromuscular disorder.    Family history and social history are reviewed and there are no significant changes.     ROS:  Review of Systems   Constitutional: Positive for activity change and fatigue. Negative for appetite change, chills and fever.   HENT: Negative for congestion, rhinorrhea, sinus pressure and sinus pain.    Respiratory: Positive for shortness of breath. Negative for cough and wheezing.   "  Cardiovascular: Positive for leg swelling. Negative for chest pain and palpitations.          Current Outpatient Medications:     acetaminophen (TYLENOL ARTHRITIS PAIN) 650 MG TbSR, Take 650 mg by mouth every 8 (eight) hours., Disp: , Rfl:     biotin 1 mg Cap, Take 1 mg by mouth once daily., Disp: , Rfl:     citalopram (CELEXA) 20 MG tablet, Take 1 tablet (20 mg total) by mouth once daily., Disp: 30 tablet, Rfl: 11    ferrous sulfate 325 mg (65 mg iron) Tab tablet, Take 325 mg by mouth daily with breakfast., Disp: , Rfl:     multivit-min/iron/folic acid/K (BARIATRIC MULTIVITAMINS ORAL), Take 1 tablet by mouth once daily. , Disp: , Rfl:     pravastatin (PRAVACHOL) 40 MG tablet, Take 1 tablet (40 mg total) by mouth once daily., Disp: 30 tablet, Rfl: 11    albuterol (PROVENTIL/VENTOLIN HFA) 90 mcg/actuation inhaler, Inhale 2 puffs into the lungs every 6 (six) hours as needed. , Disp: , Rfl:     HYDROcodone-acetaminophen (NORCO) 5-325 mg per tablet, Take 1 tablet by mouth every 6 (six) hours as needed for Pain., Disp: 24 tablet, Rfl: 0    vit A/vit C/biotin/zinc/copper (HAIR-SKIN-NAIL,VIT A,C-BIOTIN, ORAL), Take 1 tablet by mouth once daily., Disp: , Rfl:       Objective:     Physical Examination:     /62   Pulse 85   Resp 20   Ht 5' 4" (1.626 m)   Wt (!) 171.5 kg (378 lb 3 oz)   SpO2 95%   BMI 64.92 kg/m²     Physical Exam  Constitutional:       General: She is not in acute distress.     Appearance: She is not diaphoretic.      Comments: Morbidly obese woman in NAD, using walker to ambulate   HENT:      Right Ear: External ear normal.      Left Ear: External ear normal.      Nose: Nose normal.   Eyes:      General:         Right eye: No discharge.         Left eye: No discharge.      Conjunctiva/sclera: Conjunctivae normal.      Pupils: Pupils are equal, round, and reactive to light.   Cardiovascular:      Rate and Rhythm: Normal rate and regular rhythm.      Heart sounds: Normal heart sounds. No " murmur.   Pulmonary:      Effort: Pulmonary effort is normal. No respiratory distress.      Breath sounds: Normal breath sounds. No wheezing or rales.   Musculoskeletal:      Comments: Large pedunculated lymphadematous mass arising from R medial thigh, no active ulcerations/infections, approx 12 inches in diameter    Neurological:      Mental Status: She is alert and oriented to person, place, and time.         Assessment/Plan:   Adriana is a 54 y.o. female here for follow-up.    Problem List Items Addressed This Visit        Psychiatric    Moderate episode of recurrent major depressive disorder    Current Assessment & Plan     Increase celexa to 20mg daily. Consider change to cymbalta if not improving at next visit, given chronic pain compliants. Referred to therapist as well.          Relevant Medications    citalopram (CELEXA) 20 MG tablet    Other Relevant Orders    Ambulatory referral/consult to Psychiatry       Cardiac/Vascular    Hyperlipemia - Primary    Current Assessment & Plan     Check lipids with next labs, continue pravastatin.          Relevant Orders    Lipid Panel       Oncology    Iron deficiency anemia    Relevant Orders    Iron and TIBC    Ferritin    CBC auto differential       Endocrine    Type 2 diabetes mellitus without complication    Current Assessment & Plan     Diet controlled. A1c 5.6% 6/2020.         Relevant Orders    Hemoglobin A1C    Comprehensive Metabolic Panel    S/P gastric bypass    Current Assessment & Plan     Hasn't followed at [facility] in awhile. Will check labs including vitamin levels.         Relevant Orders    Copper, Serum    Zinc    Vitamin B12    Folate       Other    Lymphedema    Current Assessment & Plan     Encouraged pt to f/u with plastic surgery at [facility] for surgical removal of massive localized lymphedema of LLE. Continue home health for PT, wound care as needed. Hydrocodone/APAP PRN severe pain.          Relevant Medications    HYDROcodone-acetaminophen  (NORCO) 5-325 mg per tablet    KEESHA (obstructive sleep apnea)      Other Visit Diagnoses     Other vitamin B12 deficiency anemias         Relevant Orders    Vitamin B12    Deficiency of other specified B group vitamins         Relevant Orders    Folate          Health Maintenance   Topic Date Due    Hepatitis C Screening  1965    Foot Exam  11/23/1974    Mammogram  11/23/2004    Eye Exam  10/09/2019    Urine Microalbumin  03/20/2020    Lipid Panel  10/22/2020    Hemoglobin A1c  12/11/2020    TETANUS VACCINE  05/23/2027    Pneumococcal Vaccine (Medium Risk)  Completed           Discussion:     Follow up in about 1 month (around 11/20/2020).    Goals    None         Electronically signed by Jessica Baeza

## 2020-10-20 NOTE — ASSESSMENT & PLAN NOTE
Hasn't followed at [facility] in awhile. Will check labs including vitamin levels.

## 2020-10-20 NOTE — ASSESSMENT & PLAN NOTE
Encouraged pt to f/u with plastic surgery at [facility] for surgical removal of massive localized lymphedema of LLE. Continue home health for PT, wound care as needed. Hydrocodone/APAP PRN severe pain.

## 2020-10-20 NOTE — ASSESSMENT & PLAN NOTE
Increase celexa to 20mg daily. Consider change to cymbalta if not improving at next visit, given chronic pain compliants. Referred to therapist as well.

## 2020-10-22 PROCEDURE — G0179 PR HOME HEALTH MD RECERTIFICATION: ICD-10-PCS | Mod: ,,, | Performed by: INTERNAL MEDICINE

## 2020-10-22 PROCEDURE — G0179 MD RECERTIFICATION HHA PT: HCPCS | Mod: ,,, | Performed by: INTERNAL MEDICINE

## 2020-11-11 ENCOUNTER — LAB VISIT (OUTPATIENT)
Dept: LAB | Facility: HOSPITAL | Age: 55
End: 2020-11-11
Attending: INTERNAL MEDICINE
Payer: MEDICARE

## 2020-11-11 DIAGNOSIS — E78.5 HYPERLIPEMIA: Primary | ICD-10-CM

## 2020-11-11 DIAGNOSIS — E11.9 DIABETES MELLITUS WITHOUT COMPLICATION: ICD-10-CM

## 2020-11-11 DIAGNOSIS — E53.8 BIOTIN-(PROPIONYL-COA-CARBOXYLASE) LIGASE DEFICIENCY: ICD-10-CM

## 2020-11-11 DIAGNOSIS — D51.8 OTHER VITAMIN B12 DEFICIENCY ANEMIA: ICD-10-CM

## 2020-11-11 DIAGNOSIS — Z98.84 BARIATRIC SURGERY STATUS: ICD-10-CM

## 2020-11-11 DIAGNOSIS — D50.9 IRON DEFICIENCY ANEMIA, UNSPECIFIED: ICD-10-CM

## 2020-11-11 LAB
ALBUMIN SERPL BCP-MCNC: 4.1 G/DL (ref 3.5–5.2)
ALP SERPL-CCNC: 72 U/L (ref 55–135)
ALT SERPL W/O P-5'-P-CCNC: 12 U/L (ref 10–44)
ANION GAP SERPL CALC-SCNC: 10 MMOL/L (ref 8–16)
AST SERPL-CCNC: 13 U/L (ref 10–40)
BASOPHILS # BLD AUTO: 0.07 K/UL (ref 0–0.2)
BASOPHILS NFR BLD: 0.5 % (ref 0–1.9)
BILIRUB SERPL-MCNC: 0.5 MG/DL (ref 0.1–1)
BUN SERPL-MCNC: 12 MG/DL (ref 6–20)
CALCIUM SERPL-MCNC: 9.5 MG/DL (ref 8.7–10.5)
CHLORIDE SERPL-SCNC: 104 MMOL/L (ref 95–110)
CHOLEST SERPL-MCNC: 197 MG/DL (ref 120–199)
CHOLEST/HDLC SERPL: 4.9 {RATIO} (ref 2–5)
CO2 SERPL-SCNC: 26 MMOL/L (ref 23–29)
CREAT SERPL-MCNC: 0.6 MG/DL (ref 0.5–1.4)
DIFFERENTIAL METHOD: ABNORMAL
EOSINOPHIL # BLD AUTO: 0.2 K/UL (ref 0–0.5)
EOSINOPHIL NFR BLD: 1.2 % (ref 0–8)
ERYTHROCYTE [DISTWIDTH] IN BLOOD BY AUTOMATED COUNT: 15.4 % (ref 11.5–14.5)
EST. GFR  (AFRICAN AMERICAN): >60 ML/MIN/1.73 M^2
EST. GFR  (NON AFRICAN AMERICAN): >60 ML/MIN/1.73 M^2
FERRITIN SERPL-MCNC: 65 NG/ML (ref 20–300)
FOLATE SERPL-MCNC: 11.7 NG/ML (ref 4–24)
GLUCOSE SERPL-MCNC: 101 MG/DL (ref 70–110)
HCT VFR BLD AUTO: 41.7 % (ref 37–48.5)
HDLC SERPL-MCNC: 40 MG/DL (ref 40–75)
HDLC SERPL: 20.3 % (ref 20–50)
HGB BLD-MCNC: 12.8 G/DL (ref 12–16)
IMM GRANULOCYTES # BLD AUTO: 0.04 K/UL (ref 0–0.04)
IMM GRANULOCYTES NFR BLD AUTO: 0.3 % (ref 0–0.5)
IRON SERPL-MCNC: 36 UG/DL (ref 30–160)
LDLC SERPL CALC-MCNC: 123.2 MG/DL (ref 63–159)
LYMPHOCYTES # BLD AUTO: 2.9 K/UL (ref 1–4.8)
LYMPHOCYTES NFR BLD: 22.6 % (ref 18–48)
MCH RBC QN AUTO: 26 PG (ref 27–31)
MCHC RBC AUTO-ENTMCNC: 30.7 G/DL (ref 32–36)
MCV RBC AUTO: 85 FL (ref 82–98)
MONOCYTES # BLD AUTO: 0.8 K/UL (ref 0.3–1)
MONOCYTES NFR BLD: 6.1 % (ref 4–15)
NEUTROPHILS # BLD AUTO: 9 K/UL (ref 1.8–7.7)
NEUTROPHILS NFR BLD: 69.3 % (ref 38–73)
NONHDLC SERPL-MCNC: 157 MG/DL
NRBC BLD-RTO: 0 /100 WBC
PLATELET # BLD AUTO: 348 K/UL (ref 150–350)
PMV BLD AUTO: 10.4 FL (ref 9.2–12.9)
POTASSIUM SERPL-SCNC: 4 MMOL/L (ref 3.5–5.1)
PROT SERPL-MCNC: 8.2 G/DL (ref 6–8.4)
RBC # BLD AUTO: 4.93 M/UL (ref 4–5.4)
SATURATED IRON: 9 % (ref 20–50)
SODIUM SERPL-SCNC: 140 MMOL/L (ref 136–145)
TOTAL IRON BINDING CAPACITY: 385 UG/DL (ref 250–450)
TRANSFERRIN SERPL-MCNC: 275 MG/DL (ref 200–375)
TRIGL SERPL-MCNC: 169 MG/DL (ref 30–150)
VIT B12 SERPL-MCNC: 266 PG/ML (ref 210–950)
WBC # BLD AUTO: 12.96 K/UL (ref 3.9–12.7)

## 2020-11-11 PROCEDURE — 85025 COMPLETE CBC W/AUTO DIFF WBC: CPT

## 2020-11-11 PROCEDURE — 80061 LIPID PANEL: CPT

## 2020-11-11 PROCEDURE — 80053 COMPREHEN METABOLIC PANEL: CPT

## 2020-11-11 PROCEDURE — 82607 VITAMIN B-12: CPT

## 2020-11-11 PROCEDURE — 83540 ASSAY OF IRON: CPT

## 2020-11-11 PROCEDURE — 82746 ASSAY OF FOLIC ACID SERUM: CPT

## 2020-11-11 PROCEDURE — 83036 HEMOGLOBIN GLYCOSYLATED A1C: CPT

## 2020-11-11 PROCEDURE — 82728 ASSAY OF FERRITIN: CPT

## 2020-11-12 LAB
ESTIMATED AVG GLUCOSE: 120 MG/DL (ref 68–131)
HBA1C MFR BLD HPLC: 5.8 % (ref 4.5–6.2)

## 2020-11-16 ENCOUNTER — TELEPHONE (OUTPATIENT)
Dept: FAMILY MEDICINE | Facility: CLINIC | Age: 55
End: 2020-11-16

## 2020-11-16 NOTE — TELEPHONE ENCOUNTER
----- Message from Jessica Baeza MD sent at 11/13/2020  2:53 PM CST -----  Please call patient with normal results. Labs stable. Keep upcoming follow-up.

## 2020-11-16 NOTE — TELEPHONE ENCOUNTER
Spoke to patient and gave normal lab results. Patient was instructed to keep her f/u appointment. Patient verbally stated that she understood.

## 2020-12-07 ENCOUNTER — EXTERNAL HOME HEALTH (OUTPATIENT)
Dept: HOME HEALTH SERVICES | Facility: HOSPITAL | Age: 55
End: 2020-12-07

## 2021-01-28 ENCOUNTER — DOCUMENT SCAN (OUTPATIENT)
Dept: HOME HEALTH SERVICES | Facility: HOSPITAL | Age: 56
End: 2021-01-28

## 2021-02-19 PROCEDURE — G0179 PR HOME HEALTH MD RECERTIFICATION: ICD-10-PCS | Mod: ,,, | Performed by: INTERNAL MEDICINE

## 2021-02-19 PROCEDURE — G0179 MD RECERTIFICATION HHA PT: HCPCS | Mod: ,,, | Performed by: INTERNAL MEDICINE

## 2021-03-03 ENCOUNTER — EXTERNAL HOME HEALTH (OUTPATIENT)
Dept: HOME HEALTH SERVICES | Facility: HOSPITAL | Age: 56
End: 2021-03-03

## 2021-03-24 ENCOUNTER — EXTERNAL HOME HEALTH (OUTPATIENT)
Dept: HOME HEALTH SERVICES | Facility: HOSPITAL | Age: 56
End: 2021-03-24

## 2021-04-28 ENCOUNTER — HOSPITAL ENCOUNTER (INPATIENT)
Facility: HOSPITAL | Age: 56
LOS: 4 days | Discharge: HOME HEALTH | DRG: 603 | End: 2021-05-03
Attending: EMERGENCY MEDICINE | Admitting: INTERNAL MEDICINE
Payer: MEDICARE

## 2021-04-28 DIAGNOSIS — R50.9 FEVER: ICD-10-CM

## 2021-04-28 DIAGNOSIS — A41.9 SEPSIS, DUE TO UNSPECIFIED ORGANISM, UNSPECIFIED WHETHER ACUTE ORGAN DYSFUNCTION PRESENT: ICD-10-CM

## 2021-04-28 DIAGNOSIS — E66.01 MORBID OBESITY: Primary | ICD-10-CM

## 2021-04-28 DIAGNOSIS — L03.90 CELLULITIS: ICD-10-CM

## 2021-04-28 DIAGNOSIS — R07.9 CHEST PAIN: ICD-10-CM

## 2021-04-28 DIAGNOSIS — L03.90 CELLULITIS, UNSPECIFIED CELLULITIS SITE: ICD-10-CM

## 2021-04-28 DIAGNOSIS — R53.83 FATIGUE: ICD-10-CM

## 2021-04-28 LAB
ALBUMIN SERPL BCP-MCNC: 3.6 G/DL (ref 3.5–5.2)
ALP SERPL-CCNC: 64 U/L (ref 55–135)
ALT SERPL W/O P-5'-P-CCNC: 14 U/L (ref 10–44)
ANION GAP SERPL CALC-SCNC: 9 MMOL/L (ref 8–16)
AST SERPL-CCNC: 11 U/L (ref 10–40)
BASOPHILS # BLD AUTO: 0.05 K/UL (ref 0–0.2)
BASOPHILS NFR BLD: 0.3 % (ref 0–1.9)
BILIRUB SERPL-MCNC: 0.9 MG/DL (ref 0.1–1)
BILIRUB UR QL STRIP: NEGATIVE
BUN SERPL-MCNC: 14 MG/DL (ref 6–20)
CALCIUM SERPL-MCNC: 8.6 MG/DL (ref 8.7–10.5)
CHLORIDE SERPL-SCNC: 104 MMOL/L (ref 95–110)
CLARITY UR: CLEAR
CO2 SERPL-SCNC: 23 MMOL/L (ref 23–29)
COLOR UR: YELLOW
CREAT SERPL-MCNC: 0.6 MG/DL (ref 0.5–1.4)
DIFFERENTIAL METHOD: ABNORMAL
EOSINOPHIL # BLD AUTO: 0 K/UL (ref 0–0.5)
EOSINOPHIL NFR BLD: 0.1 % (ref 0–8)
ERYTHROCYTE [DISTWIDTH] IN BLOOD BY AUTOMATED COUNT: 15.5 % (ref 11.5–14.5)
EST. GFR  (AFRICAN AMERICAN): >60 ML/MIN/1.73 M^2
EST. GFR  (NON AFRICAN AMERICAN): >60 ML/MIN/1.73 M^2
GLUCOSE SERPL-MCNC: 121 MG/DL (ref 70–110)
GLUCOSE UR QL STRIP: NEGATIVE
HCT VFR BLD AUTO: 37.5 % (ref 37–48.5)
HGB BLD-MCNC: 11.7 G/DL (ref 12–16)
HGB UR QL STRIP: NEGATIVE
IMM GRANULOCYTES # BLD AUTO: 0.08 K/UL (ref 0–0.04)
IMM GRANULOCYTES NFR BLD AUTO: 0.6 % (ref 0–0.5)
KETONES UR QL STRIP: NEGATIVE
LACTATE SERPL-SCNC: 0.9 MMOL/L (ref 0.5–1.9)
LEUKOCYTE ESTERASE UR QL STRIP: NEGATIVE
LYMPHOCYTES # BLD AUTO: 0.9 K/UL (ref 1–4.8)
LYMPHOCYTES NFR BLD: 6.1 % (ref 18–48)
MCH RBC QN AUTO: 26 PG (ref 27–31)
MCHC RBC AUTO-ENTMCNC: 31.2 G/DL (ref 32–36)
MCV RBC AUTO: 83 FL (ref 82–98)
MONOCYTES # BLD AUTO: 0.7 K/UL (ref 0.3–1)
MONOCYTES NFR BLD: 4.6 % (ref 4–15)
NEUTROPHILS # BLD AUTO: 12.8 K/UL (ref 1.8–7.7)
NEUTROPHILS NFR BLD: 88.3 % (ref 38–73)
NITRITE UR QL STRIP: NEGATIVE
NRBC BLD-RTO: 0 /100 WBC
PH UR STRIP: >8 [PH] (ref 5–8)
PLATELET # BLD AUTO: 277 K/UL (ref 150–450)
PMV BLD AUTO: 9.2 FL (ref 9.2–12.9)
POTASSIUM SERPL-SCNC: 3.9 MMOL/L (ref 3.5–5.1)
PROT SERPL-MCNC: 7.6 G/DL (ref 6–8.4)
PROT UR QL STRIP: ABNORMAL
RBC # BLD AUTO: 4.5 M/UL (ref 4–5.4)
SARS-COV-2 RDRP RESP QL NAA+PROBE: NEGATIVE
SODIUM SERPL-SCNC: 136 MMOL/L (ref 136–145)
SP GR UR STRIP: 1.03 (ref 1–1.03)
TROPONIN I SERPL DL<=0.01 NG/ML-MCNC: <0.03 NG/ML
URN SPEC COLLECT METH UR: ABNORMAL
UROBILINOGEN UR STRIP-ACNC: NEGATIVE EU/DL
WBC # BLD AUTO: 14.46 K/UL (ref 3.9–12.7)

## 2021-04-28 PROCEDURE — 96361 HYDRATE IV INFUSION ADD-ON: CPT

## 2021-04-28 PROCEDURE — G0378 HOSPITAL OBSERVATION PER HR: HCPCS

## 2021-04-28 PROCEDURE — 25000003 PHARM REV CODE 250: Performed by: EMERGENCY MEDICINE

## 2021-04-28 PROCEDURE — 63600175 PHARM REV CODE 636 W HCPCS: Performed by: EMERGENCY MEDICINE

## 2021-04-28 PROCEDURE — 36415 COLL VENOUS BLD VENIPUNCTURE: CPT | Performed by: NURSE PRACTITIONER

## 2021-04-28 PROCEDURE — 81003 URINALYSIS AUTO W/O SCOPE: CPT | Performed by: EMERGENCY MEDICINE

## 2021-04-28 PROCEDURE — 83605 ASSAY OF LACTIC ACID: CPT | Performed by: EMERGENCY MEDICINE

## 2021-04-28 PROCEDURE — 80053 COMPREHEN METABOLIC PANEL: CPT | Performed by: EMERGENCY MEDICINE

## 2021-04-28 PROCEDURE — 96367 TX/PROPH/DG ADDL SEQ IV INF: CPT

## 2021-04-28 PROCEDURE — 84484 ASSAY OF TROPONIN QUANT: CPT | Performed by: NURSE PRACTITIONER

## 2021-04-28 PROCEDURE — 99285 EMERGENCY DEPT VISIT HI MDM: CPT | Mod: 25

## 2021-04-28 PROCEDURE — 85025 COMPLETE CBC W/AUTO DIFF WBC: CPT | Performed by: EMERGENCY MEDICINE

## 2021-04-28 PROCEDURE — 36415 COLL VENOUS BLD VENIPUNCTURE: CPT | Performed by: EMERGENCY MEDICINE

## 2021-04-28 PROCEDURE — U0002 COVID-19 LAB TEST NON-CDC: HCPCS | Performed by: EMERGENCY MEDICINE

## 2021-04-28 PROCEDURE — 96366 THER/PROPH/DIAG IV INF ADDON: CPT

## 2021-04-28 PROCEDURE — 87040 BLOOD CULTURE FOR BACTERIA: CPT | Mod: 59 | Performed by: EMERGENCY MEDICINE

## 2021-04-28 PROCEDURE — 96365 THER/PROPH/DIAG IV INF INIT: CPT

## 2021-04-28 RX ORDER — ENOXAPARIN SODIUM 100 MG/ML
40 INJECTION SUBCUTANEOUS EVERY 12 HOURS
Status: DISCONTINUED | OUTPATIENT
Start: 2021-04-28 | End: 2021-05-03 | Stop reason: HOSPADM

## 2021-04-28 RX ORDER — VANCOMYCIN HCL IN 5 % DEXTROSE 1G/250ML
1000 PLASTIC BAG, INJECTION (ML) INTRAVENOUS
Status: ACTIVE | OUTPATIENT
Start: 2021-04-28 | End: 2021-04-29

## 2021-04-28 RX ORDER — OXYCODONE AND ACETAMINOPHEN 5; 325 MG/1; MG/1
1 TABLET ORAL EVERY 4 HOURS PRN
Status: DISCONTINUED | OUTPATIENT
Start: 2021-04-28 | End: 2021-05-03 | Stop reason: HOSPADM

## 2021-04-28 RX ORDER — SODIUM CHLORIDE 0.9 % (FLUSH) 0.9 %
10 SYRINGE (ML) INJECTION
Status: DISCONTINUED | OUTPATIENT
Start: 2021-04-28 | End: 2021-05-03 | Stop reason: HOSPADM

## 2021-04-28 RX ORDER — ACETAMINOPHEN 500 MG
1000 TABLET ORAL
Status: COMPLETED | OUTPATIENT
Start: 2021-04-28 | End: 2021-04-28

## 2021-04-28 RX ORDER — SODIUM CHLORIDE 9 MG/ML
INJECTION, SOLUTION INTRAVENOUS CONTINUOUS
Status: ACTIVE | OUTPATIENT
Start: 2021-04-28 | End: 2021-04-29

## 2021-04-28 RX ORDER — POLYETHYLENE GLYCOL 3350 17 G/17G
17 POWDER, FOR SOLUTION ORAL DAILY PRN
Status: DISCONTINUED | OUTPATIENT
Start: 2021-04-28 | End: 2021-05-03 | Stop reason: HOSPADM

## 2021-04-28 RX ORDER — ONDANSETRON 2 MG/ML
4 INJECTION INTRAMUSCULAR; INTRAVENOUS EVERY 6 HOURS PRN
Status: DISCONTINUED | OUTPATIENT
Start: 2021-04-28 | End: 2021-05-03 | Stop reason: HOSPADM

## 2021-04-28 RX ORDER — PRAVASTATIN SODIUM 40 MG/1
40 TABLET ORAL NIGHTLY
Status: DISCONTINUED | OUTPATIENT
Start: 2021-04-28 | End: 2021-05-03 | Stop reason: HOSPADM

## 2021-04-28 RX ORDER — OXYCODONE AND ACETAMINOPHEN 5; 325 MG/1; MG/1
1 TABLET ORAL
Status: COMPLETED | OUTPATIENT
Start: 2021-04-28 | End: 2021-04-28

## 2021-04-28 RX ORDER — CITALOPRAM 20 MG/1
20 TABLET, FILM COATED ORAL DAILY
Status: DISCONTINUED | OUTPATIENT
Start: 2021-04-29 | End: 2021-05-03 | Stop reason: HOSPADM

## 2021-04-28 RX ORDER — FERROUS SULFATE 325(65) MG
325 TABLET ORAL
Status: DISCONTINUED | OUTPATIENT
Start: 2021-04-29 | End: 2021-05-03 | Stop reason: HOSPADM

## 2021-04-28 RX ORDER — VANCOMYCIN HCL IN 5 % DEXTROSE 1G/250ML
1000 PLASTIC BAG, INJECTION (ML) INTRAVENOUS
Status: COMPLETED | OUTPATIENT
Start: 2021-04-28 | End: 2021-04-28

## 2021-04-28 RX ORDER — ACETAMINOPHEN 325 MG/1
650 TABLET ORAL EVERY 4 HOURS PRN
Status: DISCONTINUED | OUTPATIENT
Start: 2021-04-28 | End: 2021-05-03 | Stop reason: HOSPADM

## 2021-04-28 RX ADMIN — ACETAMINOPHEN 1000 MG: 500 TABLET, FILM COATED ORAL at 03:04

## 2021-04-28 RX ADMIN — VANCOMYCIN HYDROCHLORIDE 1000 MG: 1 INJECTION, POWDER, LYOPHILIZED, FOR SOLUTION INTRAVENOUS at 08:04

## 2021-04-28 RX ADMIN — PIPERACILLIN AND TAZOBACTAM 4.5 G: 4; .5 INJECTION, POWDER, LYOPHILIZED, FOR SOLUTION INTRAVENOUS; PARENTERAL at 05:04

## 2021-04-28 RX ADMIN — OXYCODONE AND ACETAMINOPHEN 1 TABLET: 5; 325 TABLET ORAL at 08:04

## 2021-04-28 RX ADMIN — SODIUM CHLORIDE: 0.9 INJECTION, SOLUTION INTRAVENOUS at 05:04

## 2021-04-29 ENCOUNTER — PATIENT MESSAGE (OUTPATIENT)
Dept: RESEARCH | Facility: HOSPITAL | Age: 56
End: 2021-04-29

## 2021-04-29 PROBLEM — A41.9 SEPSIS: Status: ACTIVE | Noted: 2021-04-29

## 2021-04-29 LAB
ALBUMIN SERPL BCP-MCNC: 3 G/DL (ref 3.5–5.2)
ALP SERPL-CCNC: 55 U/L (ref 55–135)
ALT SERPL W/O P-5'-P-CCNC: 11 U/L (ref 10–44)
ANION GAP SERPL CALC-SCNC: 8 MMOL/L (ref 8–16)
AST SERPL-CCNC: 9 U/L (ref 10–40)
BASOPHILS # BLD AUTO: 0.04 K/UL (ref 0–0.2)
BASOPHILS NFR BLD: 0.4 % (ref 0–1.9)
BILIRUB SERPL-MCNC: 1 MG/DL (ref 0.1–1)
BUN SERPL-MCNC: 11 MG/DL (ref 6–20)
CALCIUM SERPL-MCNC: 8.1 MG/DL (ref 8.7–10.5)
CHLORIDE SERPL-SCNC: 102 MMOL/L (ref 95–110)
CO2 SERPL-SCNC: 24 MMOL/L (ref 23–29)
CREAT SERPL-MCNC: 0.5 MG/DL (ref 0.5–1.4)
DIFFERENTIAL METHOD: ABNORMAL
EOSINOPHIL # BLD AUTO: 0 K/UL (ref 0–0.5)
EOSINOPHIL NFR BLD: 0.1 % (ref 0–8)
ERYTHROCYTE [DISTWIDTH] IN BLOOD BY AUTOMATED COUNT: 15.6 % (ref 11.5–14.5)
EST. GFR  (AFRICAN AMERICAN): >60 ML/MIN/1.73 M^2
EST. GFR  (NON AFRICAN AMERICAN): >60 ML/MIN/1.73 M^2
ESTIMATED AVG GLUCOSE: 117 MG/DL (ref 68–131)
GLUCOSE SERPL-MCNC: 103 MG/DL (ref 70–110)
HBA1C MFR BLD: 5.7 % (ref 4.5–6.2)
HCT VFR BLD AUTO: 34.4 % (ref 37–48.5)
HGB BLD-MCNC: 10.6 G/DL (ref 12–16)
IMM GRANULOCYTES # BLD AUTO: 0.04 K/UL (ref 0–0.04)
IMM GRANULOCYTES NFR BLD AUTO: 0.4 % (ref 0–0.5)
LYMPHOCYTES # BLD AUTO: 1.4 K/UL (ref 1–4.8)
LYMPHOCYTES NFR BLD: 15.4 % (ref 18–48)
MAGNESIUM SERPL-MCNC: 1.8 MG/DL (ref 1.6–2.6)
MCH RBC QN AUTO: 26.1 PG (ref 27–31)
MCHC RBC AUTO-ENTMCNC: 30.8 G/DL (ref 32–36)
MCV RBC AUTO: 85 FL (ref 82–98)
MONOCYTES # BLD AUTO: 0.8 K/UL (ref 0.3–1)
MONOCYTES NFR BLD: 8.2 % (ref 4–15)
NEUTROPHILS # BLD AUTO: 7 K/UL (ref 1.8–7.7)
NEUTROPHILS NFR BLD: 75.5 % (ref 38–73)
NRBC BLD-RTO: 0 /100 WBC
PLATELET # BLD AUTO: 224 K/UL (ref 150–450)
PMV BLD AUTO: 9 FL (ref 9.2–12.9)
POTASSIUM SERPL-SCNC: 3.3 MMOL/L (ref 3.5–5.1)
POTASSIUM SERPL-SCNC: 4.4 MMOL/L (ref 3.5–5.1)
PROT SERPL-MCNC: 6.6 G/DL (ref 6–8.4)
RBC # BLD AUTO: 4.06 M/UL (ref 4–5.4)
SODIUM SERPL-SCNC: 134 MMOL/L (ref 136–145)
TROPONIN I SERPL DL<=0.01 NG/ML-MCNC: <0.03 NG/ML
TROPONIN I SERPL DL<=0.01 NG/ML-MCNC: <0.03 NG/ML
WBC # BLD AUTO: 9.23 K/UL (ref 3.9–12.7)

## 2021-04-29 PROCEDURE — 99900035 HC TECH TIME PER 15 MIN (STAT)

## 2021-04-29 PROCEDURE — 25000003 PHARM REV CODE 250: Performed by: EMERGENCY MEDICINE

## 2021-04-29 PROCEDURE — 84132 ASSAY OF SERUM POTASSIUM: CPT | Performed by: INTERNAL MEDICINE

## 2021-04-29 PROCEDURE — 36415 COLL VENOUS BLD VENIPUNCTURE: CPT | Performed by: INTERNAL MEDICINE

## 2021-04-29 PROCEDURE — 99223 PR INITIAL HOSPITAL CARE,LEVL III: ICD-10-PCS | Mod: ,,, | Performed by: PHYSICIAN ASSISTANT

## 2021-04-29 PROCEDURE — 25000003 PHARM REV CODE 250: Performed by: NURSE PRACTITIONER

## 2021-04-29 PROCEDURE — 96361 HYDRATE IV INFUSION ADD-ON: CPT

## 2021-04-29 PROCEDURE — 99900031 HC PATIENT EDUCATION (STAT)

## 2021-04-29 PROCEDURE — 25000003 PHARM REV CODE 250: Performed by: INTERNAL MEDICINE

## 2021-04-29 PROCEDURE — 96366 THER/PROPH/DIAG IV INF ADDON: CPT

## 2021-04-29 PROCEDURE — 96376 TX/PRO/DX INJ SAME DRUG ADON: CPT

## 2021-04-29 PROCEDURE — 94660 CPAP INITIATION&MGMT: CPT

## 2021-04-29 PROCEDURE — 27000221 HC OXYGEN, UP TO 24 HOURS

## 2021-04-29 PROCEDURE — 85025 COMPLETE CBC W/AUTO DIFF WBC: CPT | Performed by: NURSE PRACTITIONER

## 2021-04-29 PROCEDURE — 12000002 HC ACUTE/MED SURGE SEMI-PRIVATE ROOM

## 2021-04-29 PROCEDURE — 63600175 PHARM REV CODE 636 W HCPCS: Performed by: INTERNAL MEDICINE

## 2021-04-29 PROCEDURE — 63600175 PHARM REV CODE 636 W HCPCS: Performed by: NURSE PRACTITIONER

## 2021-04-29 PROCEDURE — 84484 ASSAY OF TROPONIN QUANT: CPT | Mod: 91 | Performed by: NURSE PRACTITIONER

## 2021-04-29 PROCEDURE — 83036 HEMOGLOBIN GLYCOSYLATED A1C: CPT | Performed by: NURSE PRACTITIONER

## 2021-04-29 PROCEDURE — 94761 N-INVAS EAR/PLS OXIMETRY MLT: CPT

## 2021-04-29 PROCEDURE — 83735 ASSAY OF MAGNESIUM: CPT | Performed by: NURSE PRACTITIONER

## 2021-04-29 PROCEDURE — 96372 THER/PROPH/DIAG INJ SC/IM: CPT | Mod: 59

## 2021-04-29 PROCEDURE — 36415 COLL VENOUS BLD VENIPUNCTURE: CPT | Performed by: NURSE PRACTITIONER

## 2021-04-29 PROCEDURE — 80053 COMPREHEN METABOLIC PANEL: CPT | Performed by: NURSE PRACTITIONER

## 2021-04-29 PROCEDURE — 99223 1ST HOSP IP/OBS HIGH 75: CPT | Mod: ,,, | Performed by: PHYSICIAN ASSISTANT

## 2021-04-29 RX ORDER — POTASSIUM CHLORIDE 20 MEQ/1
20 TABLET, EXTENDED RELEASE ORAL
Status: DISCONTINUED | OUTPATIENT
Start: 2021-04-29 | End: 2021-05-03 | Stop reason: HOSPADM

## 2021-04-29 RX ORDER — MAGNESIUM SULFATE HEPTAHYDRATE 40 MG/ML
4 INJECTION, SOLUTION INTRAVENOUS
Status: DISCONTINUED | OUTPATIENT
Start: 2021-04-29 | End: 2021-05-03 | Stop reason: HOSPADM

## 2021-04-29 RX ORDER — INSULIN ASPART 100 [IU]/ML
0-5 INJECTION, SOLUTION INTRAVENOUS; SUBCUTANEOUS
Status: DISCONTINUED | OUTPATIENT
Start: 2021-04-29 | End: 2021-05-03 | Stop reason: HOSPADM

## 2021-04-29 RX ORDER — LANOLIN ALCOHOL/MO/W.PET/CERES
800 CREAM (GRAM) TOPICAL
Status: DISCONTINUED | OUTPATIENT
Start: 2021-04-29 | End: 2021-05-03 | Stop reason: HOSPADM

## 2021-04-29 RX ORDER — MAGNESIUM SULFATE HEPTAHYDRATE 40 MG/ML
2 INJECTION, SOLUTION INTRAVENOUS
Status: DISCONTINUED | OUTPATIENT
Start: 2021-04-29 | End: 2021-05-03 | Stop reason: HOSPADM

## 2021-04-29 RX ORDER — POTASSIUM CHLORIDE 7.45 MG/ML
40 INJECTION INTRAVENOUS
Status: DISCONTINUED | OUTPATIENT
Start: 2021-04-29 | End: 2021-05-03 | Stop reason: HOSPADM

## 2021-04-29 RX ORDER — POTASSIUM CHLORIDE 7.45 MG/ML
20 INJECTION INTRAVENOUS
Status: DISCONTINUED | OUTPATIENT
Start: 2021-04-29 | End: 2021-05-03 | Stop reason: HOSPADM

## 2021-04-29 RX ORDER — POTASSIUM CHLORIDE 20 MEQ/1
40 TABLET, EXTENDED RELEASE ORAL
Status: DISCONTINUED | OUTPATIENT
Start: 2021-04-29 | End: 2021-05-03 | Stop reason: HOSPADM

## 2021-04-29 RX ORDER — MICONAZOLE NITRATE 2 %
POWDER (GRAM) TOPICAL 2 TIMES DAILY
Status: DISCONTINUED | OUTPATIENT
Start: 2021-04-29 | End: 2021-05-03 | Stop reason: HOSPADM

## 2021-04-29 RX ORDER — MAGNESIUM SULFATE 1 G/100ML
1 INJECTION INTRAVENOUS
Status: DISCONTINUED | OUTPATIENT
Start: 2021-04-29 | End: 2021-05-03 | Stop reason: HOSPADM

## 2021-04-29 RX ORDER — IBUPROFEN 200 MG
16 TABLET ORAL
Status: DISCONTINUED | OUTPATIENT
Start: 2021-04-29 | End: 2021-05-03 | Stop reason: HOSPADM

## 2021-04-29 RX ORDER — GLUCAGON 1 MG
1 KIT INJECTION
Status: DISCONTINUED | OUTPATIENT
Start: 2021-04-29 | End: 2021-05-03 | Stop reason: HOSPADM

## 2021-04-29 RX ORDER — IBUPROFEN 200 MG
24 TABLET ORAL
Status: DISCONTINUED | OUTPATIENT
Start: 2021-04-29 | End: 2021-05-03 | Stop reason: HOSPADM

## 2021-04-29 RX ADMIN — OXYCODONE AND ACETAMINOPHEN 1 TABLET: 5; 325 TABLET ORAL at 10:04

## 2021-04-29 RX ADMIN — OXYCODONE AND ACETAMINOPHEN 1 TABLET: 5; 325 TABLET ORAL at 08:04

## 2021-04-29 RX ADMIN — CITALOPRAM HYDROBROMIDE 20 MG: 20 TABLET, FILM COATED ORAL at 08:04

## 2021-04-29 RX ADMIN — ENOXAPARIN SODIUM 40 MG: 40 INJECTION SUBCUTANEOUS at 08:04

## 2021-04-29 RX ADMIN — OXYCODONE AND ACETAMINOPHEN 1 TABLET: 5; 325 TABLET ORAL at 03:04

## 2021-04-29 RX ADMIN — FERROUS SULFATE TAB 325 MG (65 MG ELEMENTAL FE) 325 MG: 325 (65 FE) TAB at 08:04

## 2021-04-29 RX ADMIN — VANCOMYCIN HYDROCHLORIDE 2000 MG: 500 INJECTION, POWDER, LYOPHILIZED, FOR SOLUTION INTRAVENOUS at 08:04

## 2021-04-29 RX ADMIN — PRAVASTATIN SODIUM 40 MG: 40 TABLET ORAL at 08:04

## 2021-04-29 RX ADMIN — PIPERACILLIN SODIUM AND TAZOBACTAM SODIUM 3.38 G: 3; .375 INJECTION, POWDER, LYOPHILIZED, FOR SOLUTION INTRAVENOUS at 12:04

## 2021-04-29 RX ADMIN — MICONAZOLE NITRATE: 20 POWDER TOPICAL at 08:04

## 2021-04-29 RX ADMIN — POTASSIUM CHLORIDE 40 MEQ: 20 TABLET, EXTENDED RELEASE ORAL at 12:04

## 2021-04-29 RX ADMIN — PRAVASTATIN SODIUM 40 MG: 40 TABLET ORAL at 12:04

## 2021-04-29 RX ADMIN — ENOXAPARIN SODIUM 40 MG: 40 INJECTION SUBCUTANEOUS at 12:04

## 2021-04-29 RX ADMIN — SODIUM CHLORIDE: 0.9 INJECTION, SOLUTION INTRAVENOUS at 04:04

## 2021-04-29 RX ADMIN — OXYCODONE AND ACETAMINOPHEN 1 TABLET: 5; 325 TABLET ORAL at 05:04

## 2021-04-29 RX ADMIN — PIPERACILLIN SODIUM AND TAZOBACTAM SODIUM 3.38 G: 3; .375 INJECTION, POWDER, LYOPHILIZED, FOR SOLUTION INTRAVENOUS at 03:04

## 2021-04-29 RX ADMIN — THERA TABS 1 TABLET: TAB at 08:04

## 2021-04-29 RX ADMIN — OXYCODONE AND ACETAMINOPHEN 1 TABLET: 5; 325 TABLET ORAL at 12:04

## 2021-04-29 RX ADMIN — PIPERACILLIN SODIUM AND TAZOBACTAM SODIUM 3.38 G: 3; .375 INJECTION, POWDER, LYOPHILIZED, FOR SOLUTION INTRAVENOUS at 11:04

## 2021-04-30 LAB
ALBUMIN SERPL BCP-MCNC: 2.9 G/DL (ref 3.5–5.2)
ALP SERPL-CCNC: 60 U/L (ref 55–135)
ALT SERPL W/O P-5'-P-CCNC: 12 U/L (ref 10–44)
ANION GAP SERPL CALC-SCNC: 7 MMOL/L (ref 8–16)
AST SERPL-CCNC: 11 U/L (ref 10–40)
BASOPHILS # BLD AUTO: 0.05 K/UL (ref 0–0.2)
BASOPHILS NFR BLD: 0.6 % (ref 0–1.9)
BILIRUB SERPL-MCNC: 0.6 MG/DL (ref 0.1–1)
BUN SERPL-MCNC: 11 MG/DL (ref 6–20)
CALCIUM SERPL-MCNC: 7.9 MG/DL (ref 8.7–10.5)
CHLORIDE SERPL-SCNC: 101 MMOL/L (ref 95–110)
CO2 SERPL-SCNC: 27 MMOL/L (ref 23–29)
CREAT SERPL-MCNC: 0.6 MG/DL (ref 0.5–1.4)
DIFFERENTIAL METHOD: ABNORMAL
EOSINOPHIL # BLD AUTO: 0.1 K/UL (ref 0–0.5)
EOSINOPHIL NFR BLD: 1.6 % (ref 0–8)
ERYTHROCYTE [DISTWIDTH] IN BLOOD BY AUTOMATED COUNT: 15.2 % (ref 11.5–14.5)
EST. GFR  (AFRICAN AMERICAN): >60 ML/MIN/1.73 M^2
EST. GFR  (NON AFRICAN AMERICAN): >60 ML/MIN/1.73 M^2
GLUCOSE SERPL-MCNC: 100 MG/DL (ref 70–110)
GLUCOSE SERPL-MCNC: 104 MG/DL (ref 70–110)
GLUCOSE SERPL-MCNC: 114 MG/DL (ref 70–110)
HCT VFR BLD AUTO: 34.6 % (ref 37–48.5)
HGB BLD-MCNC: 10.9 G/DL (ref 12–16)
IMM GRANULOCYTES # BLD AUTO: 0.04 K/UL (ref 0–0.04)
IMM GRANULOCYTES NFR BLD AUTO: 0.5 % (ref 0–0.5)
LYMPHOCYTES # BLD AUTO: 1.7 K/UL (ref 1–4.8)
LYMPHOCYTES NFR BLD: 20 % (ref 18–48)
MAGNESIUM SERPL-MCNC: 2 MG/DL (ref 1.6–2.6)
MCH RBC QN AUTO: 26.8 PG (ref 27–31)
MCHC RBC AUTO-ENTMCNC: 31.5 G/DL (ref 32–36)
MCV RBC AUTO: 85 FL (ref 82–98)
MONOCYTES # BLD AUTO: 0.8 K/UL (ref 0.3–1)
MONOCYTES NFR BLD: 8.9 % (ref 4–15)
NEUTROPHILS # BLD AUTO: 5.9 K/UL (ref 1.8–7.7)
NEUTROPHILS NFR BLD: 68.4 % (ref 38–73)
NRBC BLD-RTO: 0 /100 WBC
PLATELET # BLD AUTO: 216 K/UL (ref 150–450)
PMV BLD AUTO: 8.8 FL (ref 9.2–12.9)
POTASSIUM SERPL-SCNC: 3.7 MMOL/L (ref 3.5–5.1)
PROT SERPL-MCNC: 6.9 G/DL (ref 6–8.4)
RBC # BLD AUTO: 4.07 M/UL (ref 4–5.4)
SODIUM SERPL-SCNC: 135 MMOL/L (ref 136–145)
VANCOMYCIN TROUGH SERPL-MCNC: 12.1 UG/ML (ref 10–22)
WBC # BLD AUTO: 8.68 K/UL (ref 3.9–12.7)

## 2021-04-30 PROCEDURE — 80053 COMPREHEN METABOLIC PANEL: CPT | Performed by: NURSE PRACTITIONER

## 2021-04-30 PROCEDURE — 25000003 PHARM REV CODE 250: Performed by: NURSE PRACTITIONER

## 2021-04-30 PROCEDURE — 25000003 PHARM REV CODE 250: Performed by: INTERNAL MEDICINE

## 2021-04-30 PROCEDURE — 80202 ASSAY OF VANCOMYCIN: CPT | Performed by: INTERNAL MEDICINE

## 2021-04-30 PROCEDURE — 12000002 HC ACUTE/MED SURGE SEMI-PRIVATE ROOM

## 2021-04-30 PROCEDURE — 63600175 PHARM REV CODE 636 W HCPCS: Performed by: INTERNAL MEDICINE

## 2021-04-30 PROCEDURE — 85025 COMPLETE CBC W/AUTO DIFF WBC: CPT | Performed by: NURSE PRACTITIONER

## 2021-04-30 PROCEDURE — 63600175 PHARM REV CODE 636 W HCPCS: Performed by: NURSE PRACTITIONER

## 2021-04-30 PROCEDURE — 83735 ASSAY OF MAGNESIUM: CPT | Performed by: NURSE PRACTITIONER

## 2021-04-30 PROCEDURE — 36415 COLL VENOUS BLD VENIPUNCTURE: CPT | Performed by: NURSE PRACTITIONER

## 2021-04-30 RX ADMIN — POLYETHYLENE GLYCOL 3350 17 G: 17 POWDER, FOR SOLUTION ORAL at 09:04

## 2021-04-30 RX ADMIN — OXYCODONE AND ACETAMINOPHEN 1 TABLET: 5; 325 TABLET ORAL at 03:04

## 2021-04-30 RX ADMIN — OXYCODONE AND ACETAMINOPHEN 1 TABLET: 5; 325 TABLET ORAL at 08:04

## 2021-04-30 RX ADMIN — PIPERACILLIN SODIUM AND TAZOBACTAM SODIUM 3.38 G: 3; .375 INJECTION, POWDER, LYOPHILIZED, FOR SOLUTION INTRAVENOUS at 02:04

## 2021-04-30 RX ADMIN — OXYCODONE AND ACETAMINOPHEN 1 TABLET: 5; 325 TABLET ORAL at 11:04

## 2021-04-30 RX ADMIN — VANCOMYCIN HYDROCHLORIDE 2000 MG: 500 INJECTION, POWDER, LYOPHILIZED, FOR SOLUTION INTRAVENOUS at 09:04

## 2021-04-30 RX ADMIN — FERROUS SULFATE TAB 325 MG (65 MG ELEMENTAL FE) 325 MG: 325 (65 FE) TAB at 09:04

## 2021-04-30 RX ADMIN — MICONAZOLE NITRATE: 20 POWDER TOPICAL at 08:04

## 2021-04-30 RX ADMIN — THERA TABS 1 TABLET: TAB at 09:04

## 2021-04-30 RX ADMIN — ENOXAPARIN SODIUM 40 MG: 40 INJECTION SUBCUTANEOUS at 09:04

## 2021-04-30 RX ADMIN — PRAVASTATIN SODIUM 40 MG: 40 TABLET ORAL at 08:04

## 2021-04-30 RX ADMIN — ENOXAPARIN SODIUM 40 MG: 40 INJECTION SUBCUTANEOUS at 08:04

## 2021-04-30 RX ADMIN — PIPERACILLIN SODIUM AND TAZOBACTAM SODIUM 3.38 G: 3; .375 INJECTION, POWDER, LYOPHILIZED, FOR SOLUTION INTRAVENOUS at 11:04

## 2021-04-30 RX ADMIN — CITALOPRAM HYDROBROMIDE 20 MG: 20 TABLET, FILM COATED ORAL at 09:04

## 2021-04-30 RX ADMIN — MICONAZOLE NITRATE: 20 POWDER TOPICAL at 09:04

## 2021-04-30 RX ADMIN — VANCOMYCIN HYDROCHLORIDE 2000 MG: 500 INJECTION, POWDER, LYOPHILIZED, FOR SOLUTION INTRAVENOUS at 08:04

## 2021-05-01 LAB
ALBUMIN SERPL BCP-MCNC: 3.1 G/DL (ref 3.5–5.2)
ALP SERPL-CCNC: 64 U/L (ref 55–135)
ALT SERPL W/O P-5'-P-CCNC: 15 U/L (ref 10–44)
ANION GAP SERPL CALC-SCNC: 7 MMOL/L (ref 8–16)
AST SERPL-CCNC: 14 U/L (ref 10–40)
BASOPHILS # BLD AUTO: 0.04 K/UL (ref 0–0.2)
BASOPHILS NFR BLD: 0.5 % (ref 0–1.9)
BILIRUB SERPL-MCNC: 0.8 MG/DL (ref 0.1–1)
BUN SERPL-MCNC: 11 MG/DL (ref 6–20)
CALCIUM SERPL-MCNC: 8.2 MG/DL (ref 8.7–10.5)
CHLORIDE SERPL-SCNC: 101 MMOL/L (ref 95–110)
CO2 SERPL-SCNC: 26 MMOL/L (ref 23–29)
CREAT SERPL-MCNC: 0.6 MG/DL (ref 0.5–1.4)
DIFFERENTIAL METHOD: ABNORMAL
EOSINOPHIL # BLD AUTO: 0.2 K/UL (ref 0–0.5)
EOSINOPHIL NFR BLD: 2.4 % (ref 0–8)
ERYTHROCYTE [DISTWIDTH] IN BLOOD BY AUTOMATED COUNT: 15.1 % (ref 11.5–14.5)
EST. GFR  (AFRICAN AMERICAN): >60 ML/MIN/1.73 M^2
EST. GFR  (NON AFRICAN AMERICAN): >60 ML/MIN/1.73 M^2
GLUCOSE SERPL-MCNC: 113 MG/DL (ref 70–110)
GLUCOSE SERPL-MCNC: 116 MG/DL (ref 70–110)
GLUCOSE SERPL-MCNC: 120 MG/DL (ref 70–110)
GLUCOSE SERPL-MCNC: 132 MG/DL (ref 70–110)
GLUCOSE SERPL-MCNC: 148 MG/DL (ref 70–110)
HCT VFR BLD AUTO: 35.4 % (ref 37–48.5)
HGB BLD-MCNC: 10.9 G/DL (ref 12–16)
IMM GRANULOCYTES # BLD AUTO: 0.03 K/UL (ref 0–0.04)
IMM GRANULOCYTES NFR BLD AUTO: 0.4 % (ref 0–0.5)
LYMPHOCYTES # BLD AUTO: 1.7 K/UL (ref 1–4.8)
LYMPHOCYTES NFR BLD: 21.3 % (ref 18–48)
MAGNESIUM SERPL-MCNC: 2 MG/DL (ref 1.6–2.6)
MCH RBC QN AUTO: 25.6 PG (ref 27–31)
MCHC RBC AUTO-ENTMCNC: 30.8 G/DL (ref 32–36)
MCV RBC AUTO: 83 FL (ref 82–98)
MONOCYTES # BLD AUTO: 0.6 K/UL (ref 0.3–1)
MONOCYTES NFR BLD: 7.2 % (ref 4–15)
NEUTROPHILS # BLD AUTO: 5.3 K/UL (ref 1.8–7.7)
NEUTROPHILS NFR BLD: 68.2 % (ref 38–73)
NRBC BLD-RTO: 0 /100 WBC
PLATELET # BLD AUTO: 272 K/UL (ref 150–450)
PMV BLD AUTO: 9.2 FL (ref 9.2–12.9)
POTASSIUM SERPL-SCNC: 4.1 MMOL/L (ref 3.5–5.1)
PROT SERPL-MCNC: 7.2 G/DL (ref 6–8.4)
RBC # BLD AUTO: 4.25 M/UL (ref 4–5.4)
SODIUM SERPL-SCNC: 134 MMOL/L (ref 136–145)
WBC # BLD AUTO: 7.8 K/UL (ref 3.9–12.7)

## 2021-05-01 PROCEDURE — 85025 COMPLETE CBC W/AUTO DIFF WBC: CPT | Performed by: NURSE PRACTITIONER

## 2021-05-01 PROCEDURE — 63600175 PHARM REV CODE 636 W HCPCS: Performed by: INTERNAL MEDICINE

## 2021-05-01 PROCEDURE — 25000003 PHARM REV CODE 250: Performed by: INTERNAL MEDICINE

## 2021-05-01 PROCEDURE — 80053 COMPREHEN METABOLIC PANEL: CPT | Performed by: NURSE PRACTITIONER

## 2021-05-01 PROCEDURE — 82962 GLUCOSE BLOOD TEST: CPT

## 2021-05-01 PROCEDURE — 25000003 PHARM REV CODE 250: Performed by: NURSE PRACTITIONER

## 2021-05-01 PROCEDURE — 36415 COLL VENOUS BLD VENIPUNCTURE: CPT | Performed by: NURSE PRACTITIONER

## 2021-05-01 PROCEDURE — 99900035 HC TECH TIME PER 15 MIN (STAT)

## 2021-05-01 PROCEDURE — 27000221 HC OXYGEN, UP TO 24 HOURS

## 2021-05-01 PROCEDURE — 94660 CPAP INITIATION&MGMT: CPT

## 2021-05-01 PROCEDURE — 83735 ASSAY OF MAGNESIUM: CPT | Performed by: NURSE PRACTITIONER

## 2021-05-01 PROCEDURE — 63600175 PHARM REV CODE 636 W HCPCS: Performed by: NURSE PRACTITIONER

## 2021-05-01 PROCEDURE — 12000002 HC ACUTE/MED SURGE SEMI-PRIVATE ROOM

## 2021-05-01 PROCEDURE — 94761 N-INVAS EAR/PLS OXIMETRY MLT: CPT

## 2021-05-01 RX ADMIN — FERROUS SULFATE TAB 325 MG (65 MG ELEMENTAL FE) 325 MG: 325 (65 FE) TAB at 08:05

## 2021-05-01 RX ADMIN — PRAVASTATIN SODIUM 40 MG: 40 TABLET ORAL at 10:05

## 2021-05-01 RX ADMIN — POLYETHYLENE GLYCOL 3350 17 G: 17 POWDER, FOR SOLUTION ORAL at 08:05

## 2021-05-01 RX ADMIN — VANCOMYCIN HYDROCHLORIDE 2000 MG: 500 INJECTION, POWDER, LYOPHILIZED, FOR SOLUTION INTRAVENOUS at 09:05

## 2021-05-01 RX ADMIN — PIPERACILLIN SODIUM AND TAZOBACTAM SODIUM 3.38 G: 3; .375 INJECTION, POWDER, LYOPHILIZED, FOR SOLUTION INTRAVENOUS at 02:05

## 2021-05-01 RX ADMIN — OXYCODONE AND ACETAMINOPHEN 1 TABLET: 5; 325 TABLET ORAL at 04:05

## 2021-05-01 RX ADMIN — PIPERACILLIN SODIUM AND TAZOBACTAM SODIUM 3.38 G: 3; .375 INJECTION, POWDER, LYOPHILIZED, FOR SOLUTION INTRAVENOUS at 10:05

## 2021-05-01 RX ADMIN — OXYCODONE AND ACETAMINOPHEN 1 TABLET: 5; 325 TABLET ORAL at 08:05

## 2021-05-01 RX ADMIN — THERA TABS 1 TABLET: TAB at 08:05

## 2021-05-01 RX ADMIN — MICONAZOLE NITRATE: 20 POWDER TOPICAL at 10:05

## 2021-05-01 RX ADMIN — OXYCODONE AND ACETAMINOPHEN 1 TABLET: 5; 325 TABLET ORAL at 09:05

## 2021-05-01 RX ADMIN — MICONAZOLE NITRATE: 20 POWDER TOPICAL at 08:05

## 2021-05-01 RX ADMIN — ENOXAPARIN SODIUM 40 MG: 40 INJECTION SUBCUTANEOUS at 10:05

## 2021-05-01 RX ADMIN — VANCOMYCIN HYDROCHLORIDE 2000 MG: 500 INJECTION, POWDER, LYOPHILIZED, FOR SOLUTION INTRAVENOUS at 08:05

## 2021-05-01 RX ADMIN — ENOXAPARIN SODIUM 40 MG: 40 INJECTION SUBCUTANEOUS at 08:05

## 2021-05-01 RX ADMIN — OXYCODONE AND ACETAMINOPHEN 1 TABLET: 5; 325 TABLET ORAL at 01:05

## 2021-05-01 RX ADMIN — PIPERACILLIN SODIUM AND TAZOBACTAM SODIUM 3.38 G: 3; .375 INJECTION, POWDER, LYOPHILIZED, FOR SOLUTION INTRAVENOUS at 05:05

## 2021-05-01 RX ADMIN — CITALOPRAM HYDROBROMIDE 20 MG: 20 TABLET, FILM COATED ORAL at 08:05

## 2021-05-02 LAB
ALBUMIN SERPL BCP-MCNC: 3.1 G/DL (ref 3.5–5.2)
ALP SERPL-CCNC: 64 U/L (ref 55–135)
ALT SERPL W/O P-5'-P-CCNC: 16 U/L (ref 10–44)
ANION GAP SERPL CALC-SCNC: 9 MMOL/L (ref 8–16)
AST SERPL-CCNC: 15 U/L (ref 10–40)
BASOPHILS # BLD AUTO: 0.04 K/UL (ref 0–0.2)
BASOPHILS NFR BLD: 0.5 % (ref 0–1.9)
BILIRUB SERPL-MCNC: 0.7 MG/DL (ref 0.1–1)
BUN SERPL-MCNC: 12 MG/DL (ref 6–20)
CALCIUM SERPL-MCNC: 8.5 MG/DL (ref 8.7–10.5)
CHLORIDE SERPL-SCNC: 101 MMOL/L (ref 95–110)
CO2 SERPL-SCNC: 27 MMOL/L (ref 23–29)
CREAT SERPL-MCNC: 0.6 MG/DL (ref 0.5–1.4)
CRP SERPL-MCNC: 2.97 MG/DL
DIFFERENTIAL METHOD: ABNORMAL
EOSINOPHIL # BLD AUTO: 0.2 K/UL (ref 0–0.5)
EOSINOPHIL NFR BLD: 2.5 % (ref 0–8)
ERYTHROCYTE [DISTWIDTH] IN BLOOD BY AUTOMATED COUNT: 15 % (ref 11.5–14.5)
ERYTHROCYTE [SEDIMENTATION RATE] IN BLOOD BY WESTERGREN METHOD: 55 MM/HR (ref 0–20)
EST. GFR  (AFRICAN AMERICAN): >60 ML/MIN/1.73 M^2
EST. GFR  (NON AFRICAN AMERICAN): >60 ML/MIN/1.73 M^2
GLUCOSE SERPL-MCNC: 103 MG/DL (ref 70–110)
GLUCOSE SERPL-MCNC: 104 MG/DL (ref 70–110)
GLUCOSE SERPL-MCNC: 105 MG/DL (ref 70–110)
GLUCOSE SERPL-MCNC: 127 MG/DL (ref 70–110)
GLUCOSE SERPL-MCNC: 135 MG/DL (ref 70–110)
HCT VFR BLD AUTO: 35.6 % (ref 37–48.5)
HGB BLD-MCNC: 10.8 G/DL (ref 12–16)
IMM GRANULOCYTES # BLD AUTO: 0.03 K/UL (ref 0–0.04)
IMM GRANULOCYTES NFR BLD AUTO: 0.4 % (ref 0–0.5)
LYMPHOCYTES # BLD AUTO: 1.7 K/UL (ref 1–4.8)
LYMPHOCYTES NFR BLD: 22.6 % (ref 18–48)
MAGNESIUM SERPL-MCNC: 2 MG/DL (ref 1.6–2.6)
MCH RBC QN AUTO: 25.5 PG (ref 27–31)
MCHC RBC AUTO-ENTMCNC: 30.3 G/DL (ref 32–36)
MCV RBC AUTO: 84 FL (ref 82–98)
MONOCYTES # BLD AUTO: 0.7 K/UL (ref 0.3–1)
MONOCYTES NFR BLD: 8.4 % (ref 4–15)
NEUTROPHILS # BLD AUTO: 5.1 K/UL (ref 1.8–7.7)
NEUTROPHILS NFR BLD: 65.6 % (ref 38–73)
NRBC BLD-RTO: 0 /100 WBC
PLATELET # BLD AUTO: 285 K/UL (ref 150–450)
PMV BLD AUTO: 8.9 FL (ref 9.2–12.9)
POTASSIUM SERPL-SCNC: 4.2 MMOL/L (ref 3.5–5.1)
PROT SERPL-MCNC: 7 G/DL (ref 6–8.4)
RBC # BLD AUTO: 4.23 M/UL (ref 4–5.4)
SODIUM SERPL-SCNC: 137 MMOL/L (ref 136–145)
VANCOMYCIN TROUGH SERPL-MCNC: 15.1 UG/ML (ref 10–22)
WBC # BLD AUTO: 7.7 K/UL (ref 3.9–12.7)

## 2021-05-02 PROCEDURE — 25000003 PHARM REV CODE 250: Performed by: NURSE PRACTITIONER

## 2021-05-02 PROCEDURE — 85651 RBC SED RATE NONAUTOMATED: CPT | Performed by: INTERNAL MEDICINE

## 2021-05-02 PROCEDURE — 94761 N-INVAS EAR/PLS OXIMETRY MLT: CPT

## 2021-05-02 PROCEDURE — 80202 ASSAY OF VANCOMYCIN: CPT | Performed by: NURSE PRACTITIONER

## 2021-05-02 PROCEDURE — 27000221 HC OXYGEN, UP TO 24 HOURS

## 2021-05-02 PROCEDURE — 80053 COMPREHEN METABOLIC PANEL: CPT | Performed by: NURSE PRACTITIONER

## 2021-05-02 PROCEDURE — 25000003 PHARM REV CODE 250: Performed by: INTERNAL MEDICINE

## 2021-05-02 PROCEDURE — 12000002 HC ACUTE/MED SURGE SEMI-PRIVATE ROOM

## 2021-05-02 PROCEDURE — 63600175 PHARM REV CODE 636 W HCPCS: Performed by: INTERNAL MEDICINE

## 2021-05-02 PROCEDURE — 83735 ASSAY OF MAGNESIUM: CPT | Performed by: NURSE PRACTITIONER

## 2021-05-02 PROCEDURE — 99900035 HC TECH TIME PER 15 MIN (STAT)

## 2021-05-02 PROCEDURE — 82962 GLUCOSE BLOOD TEST: CPT

## 2021-05-02 PROCEDURE — 94660 CPAP INITIATION&MGMT: CPT

## 2021-05-02 PROCEDURE — 86140 C-REACTIVE PROTEIN: CPT | Performed by: INTERNAL MEDICINE

## 2021-05-02 PROCEDURE — 85025 COMPLETE CBC W/AUTO DIFF WBC: CPT | Performed by: NURSE PRACTITIONER

## 2021-05-02 PROCEDURE — 63600175 PHARM REV CODE 636 W HCPCS: Performed by: NURSE PRACTITIONER

## 2021-05-02 RX ORDER — BISACODYL 5 MG
10 TABLET, DELAYED RELEASE (ENTERIC COATED) ORAL DAILY PRN
Status: DISCONTINUED | OUTPATIENT
Start: 2021-05-02 | End: 2021-05-03 | Stop reason: HOSPADM

## 2021-05-02 RX ADMIN — OXYCODONE AND ACETAMINOPHEN 1 TABLET: 5; 325 TABLET ORAL at 09:05

## 2021-05-02 RX ADMIN — VANCOMYCIN HYDROCHLORIDE 2000 MG: 500 INJECTION, POWDER, LYOPHILIZED, FOR SOLUTION INTRAVENOUS at 09:05

## 2021-05-02 RX ADMIN — VANCOMYCIN HYDROCHLORIDE 2000 MG: 500 INJECTION, POWDER, LYOPHILIZED, FOR SOLUTION INTRAVENOUS at 08:05

## 2021-05-02 RX ADMIN — PIPERACILLIN SODIUM AND TAZOBACTAM SODIUM 3.38 G: 3; .375 INJECTION, POWDER, LYOPHILIZED, FOR SOLUTION INTRAVENOUS at 02:05

## 2021-05-02 RX ADMIN — PIPERACILLIN SODIUM AND TAZOBACTAM SODIUM 3.38 G: 3; .375 INJECTION, POWDER, LYOPHILIZED, FOR SOLUTION INTRAVENOUS at 07:05

## 2021-05-02 RX ADMIN — OXYCODONE AND ACETAMINOPHEN 1 TABLET: 5; 325 TABLET ORAL at 02:05

## 2021-05-02 RX ADMIN — THERA TABS 1 TABLET: TAB at 08:05

## 2021-05-02 RX ADMIN — MICONAZOLE NITRATE: 20 POWDER TOPICAL at 08:05

## 2021-05-02 RX ADMIN — MICONAZOLE NITRATE: 20 POWDER TOPICAL at 09:05

## 2021-05-02 RX ADMIN — CITALOPRAM HYDROBROMIDE 20 MG: 20 TABLET, FILM COATED ORAL at 08:05

## 2021-05-02 RX ADMIN — ENOXAPARIN SODIUM 40 MG: 40 INJECTION SUBCUTANEOUS at 08:05

## 2021-05-02 RX ADMIN — PRAVASTATIN SODIUM 40 MG: 40 TABLET ORAL at 09:05

## 2021-05-02 RX ADMIN — BISACODYL 10 MG: 5 TABLET, COATED ORAL at 10:05

## 2021-05-03 VITALS
WEIGHT: 293 LBS | OXYGEN SATURATION: 98 % | HEIGHT: 64 IN | BODY MASS INDEX: 50.02 KG/M2 | TEMPERATURE: 98 F | HEART RATE: 61 BPM | SYSTOLIC BLOOD PRESSURE: 135 MMHG | DIASTOLIC BLOOD PRESSURE: 70 MMHG | RESPIRATION RATE: 16 BRPM

## 2021-05-03 LAB
ALBUMIN SERPL BCP-MCNC: 3.1 G/DL (ref 3.5–5.2)
ALP SERPL-CCNC: 65 U/L (ref 55–135)
ALT SERPL W/O P-5'-P-CCNC: 19 U/L (ref 10–44)
ANION GAP SERPL CALC-SCNC: 7 MMOL/L (ref 8–16)
AST SERPL-CCNC: 18 U/L (ref 10–40)
BACTERIA BLD CULT: NORMAL
BACTERIA BLD CULT: NORMAL
BASOPHILS # BLD AUTO: 0.06 K/UL (ref 0–0.2)
BASOPHILS NFR BLD: 0.7 % (ref 0–1.9)
BILIRUB SERPL-MCNC: 0.7 MG/DL (ref 0.1–1)
BUN SERPL-MCNC: 12 MG/DL (ref 6–20)
CALCIUM SERPL-MCNC: 8.6 MG/DL (ref 8.7–10.5)
CHLORIDE SERPL-SCNC: 102 MMOL/L (ref 95–110)
CO2 SERPL-SCNC: 29 MMOL/L (ref 23–29)
CREAT SERPL-MCNC: 0.5 MG/DL (ref 0.5–1.4)
DIFFERENTIAL METHOD: ABNORMAL
EOSINOPHIL # BLD AUTO: 0.2 K/UL (ref 0–0.5)
EOSINOPHIL NFR BLD: 2.3 % (ref 0–8)
ERYTHROCYTE [DISTWIDTH] IN BLOOD BY AUTOMATED COUNT: 15 % (ref 11.5–14.5)
EST. GFR  (AFRICAN AMERICAN): >60 ML/MIN/1.73 M^2
EST. GFR  (NON AFRICAN AMERICAN): >60 ML/MIN/1.73 M^2
GLUCOSE SERPL-MCNC: 102 MG/DL (ref 70–110)
GLUCOSE SERPL-MCNC: 103 MG/DL (ref 70–110)
HCT VFR BLD AUTO: 35.5 % (ref 37–48.5)
HGB BLD-MCNC: 11.1 G/DL (ref 12–16)
IMM GRANULOCYTES # BLD AUTO: 0.06 K/UL (ref 0–0.04)
IMM GRANULOCYTES NFR BLD AUTO: 0.7 % (ref 0–0.5)
LYMPHOCYTES # BLD AUTO: 1.8 K/UL (ref 1–4.8)
LYMPHOCYTES NFR BLD: 20.8 % (ref 18–48)
MAGNESIUM SERPL-MCNC: 2.1 MG/DL (ref 1.6–2.6)
MCH RBC QN AUTO: 26.4 PG (ref 27–31)
MCHC RBC AUTO-ENTMCNC: 31.3 G/DL (ref 32–36)
MCV RBC AUTO: 84 FL (ref 82–98)
MONOCYTES # BLD AUTO: 0.7 K/UL (ref 0.3–1)
MONOCYTES NFR BLD: 7.8 % (ref 4–15)
NEUTROPHILS # BLD AUTO: 6 K/UL (ref 1.8–7.7)
NEUTROPHILS NFR BLD: 67.7 % (ref 38–73)
NRBC BLD-RTO: 0 /100 WBC
PLATELET # BLD AUTO: 308 K/UL (ref 150–450)
PMV BLD AUTO: 9.2 FL (ref 9.2–12.9)
POTASSIUM SERPL-SCNC: 4 MMOL/L (ref 3.5–5.1)
PROT SERPL-MCNC: 7 G/DL (ref 6–8.4)
RBC # BLD AUTO: 4.21 M/UL (ref 4–5.4)
SODIUM SERPL-SCNC: 138 MMOL/L (ref 136–145)
WBC # BLD AUTO: 8.84 K/UL (ref 3.9–12.7)

## 2021-05-03 PROCEDURE — 85025 COMPLETE CBC W/AUTO DIFF WBC: CPT | Performed by: NURSE PRACTITIONER

## 2021-05-03 PROCEDURE — 25000003 PHARM REV CODE 250: Performed by: NURSE PRACTITIONER

## 2021-05-03 PROCEDURE — 63600175 PHARM REV CODE 636 W HCPCS: Performed by: NURSE PRACTITIONER

## 2021-05-03 PROCEDURE — 27000221 HC OXYGEN, UP TO 24 HOURS

## 2021-05-03 PROCEDURE — 80053 COMPREHEN METABOLIC PANEL: CPT | Performed by: NURSE PRACTITIONER

## 2021-05-03 PROCEDURE — 83735 ASSAY OF MAGNESIUM: CPT | Performed by: NURSE PRACTITIONER

## 2021-05-03 PROCEDURE — 94761 N-INVAS EAR/PLS OXIMETRY MLT: CPT

## 2021-05-03 PROCEDURE — 99900035 HC TECH TIME PER 15 MIN (STAT)

## 2021-05-03 PROCEDURE — 36415 COLL VENOUS BLD VENIPUNCTURE: CPT | Performed by: NURSE PRACTITIONER

## 2021-05-03 PROCEDURE — 94660 CPAP INITIATION&MGMT: CPT

## 2021-05-03 RX ORDER — NYSTATIN 100000 [USP'U]/G
POWDER TOPICAL 2 TIMES DAILY
Qty: 60 G | Refills: 0 | Status: SHIPPED | OUTPATIENT
Start: 2021-05-03 | End: 2021-06-02

## 2021-05-03 RX ORDER — DOXYCYCLINE HYCLATE 100 MG
100 TABLET ORAL EVERY 12 HOURS
Qty: 20 TABLET | Refills: 0 | Status: SHIPPED | OUTPATIENT
Start: 2021-05-03 | End: 2021-05-13

## 2021-05-03 RX ORDER — LEVOFLOXACIN 500 MG/1
500 TABLET, FILM COATED ORAL DAILY
Qty: 10 TABLET | Refills: 0 | Status: SHIPPED | OUTPATIENT
Start: 2021-05-03 | End: 2021-05-13

## 2021-05-03 RX ADMIN — PIPERACILLIN SODIUM AND TAZOBACTAM SODIUM 3.38 G: 3; .375 INJECTION, POWDER, LYOPHILIZED, FOR SOLUTION INTRAVENOUS at 12:05

## 2021-05-03 RX ADMIN — MICONAZOLE NITRATE: 20 POWDER TOPICAL at 09:05

## 2021-05-03 RX ADMIN — ENOXAPARIN SODIUM 40 MG: 40 INJECTION SUBCUTANEOUS at 08:05

## 2021-05-03 RX ADMIN — FERROUS SULFATE TAB 325 MG (65 MG ELEMENTAL FE) 325 MG: 325 (65 FE) TAB at 08:05

## 2021-05-03 RX ADMIN — OXYCODONE AND ACETAMINOPHEN 1 TABLET: 5; 325 TABLET ORAL at 08:05

## 2021-05-03 RX ADMIN — PIPERACILLIN SODIUM AND TAZOBACTAM SODIUM 3.38 G: 3; .375 INJECTION, POWDER, LYOPHILIZED, FOR SOLUTION INTRAVENOUS at 08:05

## 2021-05-03 RX ADMIN — CITALOPRAM HYDROBROMIDE 20 MG: 20 TABLET, FILM COATED ORAL at 08:05

## 2021-05-03 RX ADMIN — THERA TABS 1 TABLET: TAB at 08:05

## 2021-05-05 ENCOUNTER — EXTERNAL HOME HEALTH (OUTPATIENT)
Dept: HOME HEALTH SERVICES | Facility: HOSPITAL | Age: 56
End: 2021-05-05

## 2021-06-07 ENCOUNTER — EXTERNAL HOME HEALTH (OUTPATIENT)
Dept: HOME HEALTH SERVICES | Facility: HOSPITAL | Age: 56
End: 2021-06-07

## 2021-07-27 ENCOUNTER — EXTERNAL HOME HEALTH (OUTPATIENT)
Dept: HOME HEALTH SERVICES | Facility: HOSPITAL | Age: 56
End: 2021-07-27

## 2021-08-26 ENCOUNTER — HOSPITAL ENCOUNTER (INPATIENT)
Facility: HOSPITAL | Age: 56
LOS: 5 days | Discharge: HOME | DRG: 872 | End: 2021-08-31
Attending: EMERGENCY MEDICINE | Admitting: INTERNAL MEDICINE
Payer: MEDICARE

## 2021-08-26 DIAGNOSIS — L03.90 CELLULITIS: ICD-10-CM

## 2021-08-26 DIAGNOSIS — R07.9 CHEST PAIN: ICD-10-CM

## 2021-08-26 DIAGNOSIS — R00.0 TACHYCARDIA: ICD-10-CM

## 2021-08-26 DIAGNOSIS — A41.9 SEPSIS: ICD-10-CM

## 2021-08-26 PROBLEM — R22.41 MASS OF RIGHT THIGH: Status: ACTIVE | Noted: 2021-08-26

## 2021-08-26 LAB
ALBUMIN SERPL BCP-MCNC: 4 G/DL (ref 3.5–5.2)
ALP SERPL-CCNC: 75 U/L (ref 55–135)
ALT SERPL W/O P-5'-P-CCNC: 23 U/L (ref 10–44)
ANION GAP SERPL CALC-SCNC: 11 MMOL/L (ref 8–16)
AST SERPL-CCNC: 18 U/L (ref 10–40)
BASOPHILS # BLD AUTO: 0.06 K/UL (ref 0–0.2)
BASOPHILS NFR BLD: 0.3 % (ref 0–1.9)
BILIRUB SERPL-MCNC: 0.5 MG/DL (ref 0.1–1)
BILIRUB UR QL STRIP: NEGATIVE
BUN SERPL-MCNC: 17 MG/DL (ref 6–20)
CALCIUM SERPL-MCNC: 9.1 MG/DL (ref 8.7–10.5)
CHLORIDE SERPL-SCNC: 107 MMOL/L (ref 95–110)
CLARITY UR: CLEAR
CO2 SERPL-SCNC: 24 MMOL/L (ref 23–29)
COLOR UR: YELLOW
CREAT SERPL-MCNC: 0.7 MG/DL (ref 0.5–1.4)
DIFFERENTIAL METHOD: ABNORMAL
EOSINOPHIL # BLD AUTO: 0.1 K/UL (ref 0–0.5)
EOSINOPHIL NFR BLD: 0.5 % (ref 0–8)
ERYTHROCYTE [DISTWIDTH] IN BLOOD BY AUTOMATED COUNT: 15.1 % (ref 11.5–14.5)
EST. GFR  (AFRICAN AMERICAN): >60 ML/MIN/1.73 M^2
EST. GFR  (NON AFRICAN AMERICAN): >60 ML/MIN/1.73 M^2
GLUCOSE SERPL-MCNC: 123 MG/DL (ref 70–110)
GLUCOSE SERPL-MCNC: 161 MG/DL (ref 70–110)
GLUCOSE UR QL STRIP: NEGATIVE
HCT VFR BLD AUTO: 44.1 % (ref 37–48.5)
HGB BLD-MCNC: 13.5 G/DL (ref 12–16)
HGB UR QL STRIP: NEGATIVE
IMM GRANULOCYTES # BLD AUTO: 0.08 K/UL (ref 0–0.04)
IMM GRANULOCYTES NFR BLD AUTO: 0.4 % (ref 0–0.5)
KETONES UR QL STRIP: NEGATIVE
LACTATE SERPL-SCNC: 1.6 MMOL/L (ref 0.5–1.9)
LACTATE SERPL-SCNC: 1.9 MMOL/L (ref 0.5–1.9)
LEUKOCYTE ESTERASE UR QL STRIP: NEGATIVE
LYMPHOCYTES # BLD AUTO: 1.6 K/UL (ref 1–4.8)
LYMPHOCYTES NFR BLD: 8.1 % (ref 18–48)
MCH RBC QN AUTO: 26.2 PG (ref 27–31)
MCHC RBC AUTO-ENTMCNC: 30.6 G/DL (ref 32–36)
MCV RBC AUTO: 86 FL (ref 82–98)
MONOCYTES # BLD AUTO: 0.8 K/UL (ref 0.3–1)
MONOCYTES NFR BLD: 3.8 % (ref 4–15)
NEUTROPHILS # BLD AUTO: 17.4 K/UL (ref 1.8–7.7)
NEUTROPHILS NFR BLD: 86.9 % (ref 38–73)
NITRITE UR QL STRIP: NEGATIVE
NRBC BLD-RTO: 0 /100 WBC
PH UR STRIP: 6 [PH] (ref 5–8)
PLATELET # BLD AUTO: 340 K/UL (ref 150–450)
PMV BLD AUTO: 9.6 FL (ref 9.2–12.9)
POTASSIUM SERPL-SCNC: 4.2 MMOL/L (ref 3.5–5.1)
PROT SERPL-MCNC: 8.3 G/DL (ref 6–8.4)
PROT UR QL STRIP: NEGATIVE
RBC # BLD AUTO: 5.15 M/UL (ref 4–5.4)
SARS-COV-2 RDRP RESP QL NAA+PROBE: NEGATIVE
SODIUM SERPL-SCNC: 142 MMOL/L (ref 136–145)
SP GR UR STRIP: 1.02 (ref 1–1.03)
URN SPEC COLLECT METH UR: NORMAL
UROBILINOGEN UR STRIP-ACNC: NEGATIVE EU/DL
WBC # BLD AUTO: 20.07 K/UL (ref 3.9–12.7)

## 2021-08-26 PROCEDURE — 94640 AIRWAY INHALATION TREATMENT: CPT

## 2021-08-26 PROCEDURE — 93010 ELECTROCARDIOGRAM REPORT: CPT | Mod: ,,, | Performed by: GENERAL PRACTICE

## 2021-08-26 PROCEDURE — 25000003 PHARM REV CODE 250: Performed by: INTERNAL MEDICINE

## 2021-08-26 PROCEDURE — 99900035 HC TECH TIME PER 15 MIN (STAT)

## 2021-08-26 PROCEDURE — 63600175 PHARM REV CODE 636 W HCPCS: Performed by: INTERNAL MEDICINE

## 2021-08-26 PROCEDURE — 94761 N-INVAS EAR/PLS OXIMETRY MLT: CPT

## 2021-08-26 PROCEDURE — 99291 CRITICAL CARE FIRST HOUR: CPT

## 2021-08-26 PROCEDURE — 85025 COMPLETE CBC W/AUTO DIFF WBC: CPT | Performed by: EMERGENCY MEDICINE

## 2021-08-26 PROCEDURE — 83605 ASSAY OF LACTIC ACID: CPT | Mod: 91 | Performed by: EMERGENCY MEDICINE

## 2021-08-26 PROCEDURE — 87040 BLOOD CULTURE FOR BACTERIA: CPT | Performed by: EMERGENCY MEDICINE

## 2021-08-26 PROCEDURE — 99900031 HC PATIENT EDUCATION (STAT)

## 2021-08-26 PROCEDURE — 81003 URINALYSIS AUTO W/O SCOPE: CPT | Performed by: EMERGENCY MEDICINE

## 2021-08-26 PROCEDURE — 94660 CPAP INITIATION&MGMT: CPT

## 2021-08-26 PROCEDURE — 93005 ELECTROCARDIOGRAM TRACING: CPT | Performed by: GENERAL PRACTICE

## 2021-08-26 PROCEDURE — 63600175 PHARM REV CODE 636 W HCPCS: Performed by: EMERGENCY MEDICINE

## 2021-08-26 PROCEDURE — 82962 GLUCOSE BLOOD TEST: CPT

## 2021-08-26 PROCEDURE — 96361 HYDRATE IV INFUSION ADD-ON: CPT

## 2021-08-26 PROCEDURE — 27000221 HC OXYGEN, UP TO 24 HOURS

## 2021-08-26 PROCEDURE — 80053 COMPREHEN METABOLIC PANEL: CPT | Performed by: EMERGENCY MEDICINE

## 2021-08-26 PROCEDURE — 93010 EKG 12-LEAD: ICD-10-PCS | Mod: ,,, | Performed by: GENERAL PRACTICE

## 2021-08-26 PROCEDURE — 94799 UNLISTED PULMONARY SVC/PX: CPT

## 2021-08-26 PROCEDURE — 25000003 PHARM REV CODE 250: Performed by: EMERGENCY MEDICINE

## 2021-08-26 PROCEDURE — 96365 THER/PROPH/DIAG IV INF INIT: CPT

## 2021-08-26 PROCEDURE — U0002 COVID-19 LAB TEST NON-CDC: HCPCS | Performed by: EMERGENCY MEDICINE

## 2021-08-26 PROCEDURE — 21000000 HC CCU ICU ROOM CHARGE

## 2021-08-26 PROCEDURE — 25000242 PHARM REV CODE 250 ALT 637 W/ HCPCS: Performed by: INTERNAL MEDICINE

## 2021-08-26 RX ORDER — POTASSIUM CHLORIDE 7.45 MG/ML
20 INJECTION INTRAVENOUS
Status: DISCONTINUED | OUTPATIENT
Start: 2021-08-26 | End: 2021-08-31 | Stop reason: HOSPADM

## 2021-08-26 RX ORDER — ACETAMINOPHEN 500 MG
1000 TABLET ORAL
Status: COMPLETED | OUTPATIENT
Start: 2021-08-26 | End: 2021-08-26

## 2021-08-26 RX ORDER — ACETAMINOPHEN 325 MG/1
650 TABLET ORAL EVERY 4 HOURS PRN
Status: DISCONTINUED | OUTPATIENT
Start: 2021-08-26 | End: 2021-08-31 | Stop reason: HOSPADM

## 2021-08-26 RX ORDER — HYDROCODONE BITARTRATE AND ACETAMINOPHEN 5; 325 MG/1; MG/1
1 TABLET ORAL EVERY 6 HOURS PRN
Status: DISCONTINUED | OUTPATIENT
Start: 2021-08-26 | End: 2021-08-31 | Stop reason: HOSPADM

## 2021-08-26 RX ORDER — MAGNESIUM SULFATE HEPTAHYDRATE 40 MG/ML
2 INJECTION, SOLUTION INTRAVENOUS
Status: DISCONTINUED | OUTPATIENT
Start: 2021-08-26 | End: 2021-08-31 | Stop reason: HOSPADM

## 2021-08-26 RX ORDER — MAGNESIUM SULFATE HEPTAHYDRATE 40 MG/ML
4 INJECTION, SOLUTION INTRAVENOUS
Status: DISCONTINUED | OUTPATIENT
Start: 2021-08-26 | End: 2021-08-31 | Stop reason: HOSPADM

## 2021-08-26 RX ORDER — SODIUM CHLORIDE 9 MG/ML
INJECTION, SOLUTION INTRAVENOUS CONTINUOUS
Status: DISCONTINUED | OUTPATIENT
Start: 2021-08-26 | End: 2021-08-27

## 2021-08-26 RX ORDER — IPRATROPIUM BROMIDE AND ALBUTEROL SULFATE 2.5; .5 MG/3ML; MG/3ML
3 SOLUTION RESPIRATORY (INHALATION) EVERY 6 HOURS PRN
Status: DISCONTINUED | OUTPATIENT
Start: 2021-08-26 | End: 2021-08-31 | Stop reason: HOSPADM

## 2021-08-26 RX ORDER — POTASSIUM CHLORIDE 7.45 MG/ML
40 INJECTION INTRAVENOUS
Status: DISCONTINUED | OUTPATIENT
Start: 2021-08-26 | End: 2021-08-31 | Stop reason: HOSPADM

## 2021-08-26 RX ORDER — AMLODIPINE BESYLATE 5 MG/1
10 TABLET ORAL DAILY
Status: DISCONTINUED | OUTPATIENT
Start: 2021-08-26 | End: 2021-08-26

## 2021-08-26 RX ORDER — ONDANSETRON 2 MG/ML
4 INJECTION INTRAMUSCULAR; INTRAVENOUS
Status: COMPLETED | OUTPATIENT
Start: 2021-08-26 | End: 2021-08-26

## 2021-08-26 RX ORDER — DILTIAZEM HYDROCHLORIDE 5 MG/ML
10 INJECTION INTRAVENOUS ONCE
Status: DISCONTINUED | OUTPATIENT
Start: 2021-08-26 | End: 2021-08-26

## 2021-08-26 RX ORDER — ONDANSETRON 2 MG/ML
4 INJECTION INTRAMUSCULAR; INTRAVENOUS EVERY 6 HOURS PRN
Status: DISCONTINUED | OUTPATIENT
Start: 2021-08-26 | End: 2021-08-31 | Stop reason: HOSPADM

## 2021-08-26 RX ORDER — HYDROMORPHONE HYDROCHLORIDE 1 MG/ML
0.5 INJECTION, SOLUTION INTRAMUSCULAR; INTRAVENOUS; SUBCUTANEOUS ONCE
Status: COMPLETED | OUTPATIENT
Start: 2021-08-26 | End: 2021-08-26

## 2021-08-26 RX ORDER — VANCOMYCIN HCL IN 5 % DEXTROSE 1G/250ML
2000 PLASTIC BAG, INJECTION (ML) INTRAVENOUS ONCE
Status: COMPLETED | OUTPATIENT
Start: 2021-08-26 | End: 2021-08-26

## 2021-08-26 RX ORDER — MAGNESIUM SULFATE 1 G/100ML
1 INJECTION INTRAVENOUS
Status: DISCONTINUED | OUTPATIENT
Start: 2021-08-26 | End: 2021-08-31 | Stop reason: HOSPADM

## 2021-08-26 RX ORDER — POTASSIUM CHLORIDE 20 MEQ/1
20 TABLET, EXTENDED RELEASE ORAL
Status: DISCONTINUED | OUTPATIENT
Start: 2021-08-26 | End: 2021-08-31 | Stop reason: HOSPADM

## 2021-08-26 RX ORDER — HYDRALAZINE HYDROCHLORIDE 20 MG/ML
10 INJECTION INTRAMUSCULAR; INTRAVENOUS EVERY 4 HOURS PRN
Status: DISCONTINUED | OUTPATIENT
Start: 2021-08-26 | End: 2021-08-31 | Stop reason: HOSPADM

## 2021-08-26 RX ORDER — HYDROMORPHONE HYDROCHLORIDE 1 MG/ML
1 INJECTION, SOLUTION INTRAMUSCULAR; INTRAVENOUS; SUBCUTANEOUS
Status: COMPLETED | OUTPATIENT
Start: 2021-08-26 | End: 2021-08-26

## 2021-08-26 RX ORDER — FAMOTIDINE 20 MG/1
20 TABLET, FILM COATED ORAL 2 TIMES DAILY
Status: DISCONTINUED | OUTPATIENT
Start: 2021-08-26 | End: 2021-08-31 | Stop reason: HOSPADM

## 2021-08-26 RX ORDER — HYDROMORPHONE HYDROCHLORIDE 1 MG/ML
0.5 INJECTION, SOLUTION INTRAMUSCULAR; INTRAVENOUS; SUBCUTANEOUS
Status: DISCONTINUED | OUTPATIENT
Start: 2021-08-26 | End: 2021-08-26

## 2021-08-26 RX ORDER — IPRATROPIUM BROMIDE AND ALBUTEROL SULFATE 2.5; .5 MG/3ML; MG/3ML
SOLUTION RESPIRATORY (INHALATION)
Status: DISPENSED
Start: 2021-08-26 | End: 2021-08-26

## 2021-08-26 RX ORDER — POTASSIUM CHLORIDE 20 MEQ/1
40 TABLET, EXTENDED RELEASE ORAL
Status: DISCONTINUED | OUTPATIENT
Start: 2021-08-26 | End: 2021-08-31 | Stop reason: HOSPADM

## 2021-08-26 RX ORDER — ENOXAPARIN SODIUM 100 MG/ML
40 INJECTION SUBCUTANEOUS EVERY 12 HOURS
Status: DISCONTINUED | OUTPATIENT
Start: 2021-08-26 | End: 2021-08-29

## 2021-08-26 RX ORDER — ALBUTEROL SULFATE 90 UG/1
2 AEROSOL, METERED RESPIRATORY (INHALATION) EVERY 6 HOURS PRN
Status: DISCONTINUED | OUTPATIENT
Start: 2021-08-26 | End: 2021-08-26 | Stop reason: SDUPTHER

## 2021-08-26 RX ORDER — LANOLIN ALCOHOL/MO/W.PET/CERES
800 CREAM (GRAM) TOPICAL
Status: DISCONTINUED | OUTPATIENT
Start: 2021-08-26 | End: 2021-08-31 | Stop reason: HOSPADM

## 2021-08-26 RX ADMIN — ENOXAPARIN SODIUM 40 MG: 40 INJECTION SUBCUTANEOUS at 08:08

## 2021-08-26 RX ADMIN — HYDROMORPHONE HYDROCHLORIDE 0.5 MG: 1 INJECTION, SOLUTION INTRAMUSCULAR; INTRAVENOUS; SUBCUTANEOUS at 12:08

## 2021-08-26 RX ADMIN — IPRATROPIUM BROMIDE AND ALBUTEROL SULFATE 3 ML: .5; 3 SOLUTION RESPIRATORY (INHALATION) at 10:08

## 2021-08-26 RX ADMIN — PIPERACILLIN AND TAZOBACTAM 3.38 G: 3; .375 INJECTION, POWDER, LYOPHILIZED, FOR SOLUTION INTRAVENOUS; PARENTERAL at 03:08

## 2021-08-26 RX ADMIN — VANCOMYCIN HYDROCHLORIDE 2000 MG: 1 INJECTION, POWDER, LYOPHILIZED, FOR SOLUTION INTRAVENOUS at 09:08

## 2021-08-26 RX ADMIN — FAMOTIDINE 20 MG: 20 TABLET ORAL at 08:08

## 2021-08-26 RX ADMIN — HYDROMORPHONE HYDROCHLORIDE 1 MG: 1 INJECTION, SOLUTION INTRAMUSCULAR; INTRAVENOUS; SUBCUTANEOUS at 08:08

## 2021-08-26 RX ADMIN — PIPERACILLIN AND TAZOBACTAM 3.38 G: 3; .375 INJECTION, POWDER, LYOPHILIZED, FOR SOLUTION INTRAVENOUS; PARENTERAL at 06:08

## 2021-08-26 RX ADMIN — ACETAMINOPHEN 1000 MG: 500 TABLET, FILM COATED ORAL at 06:08

## 2021-08-26 RX ADMIN — ACETAMINOPHEN 650 MG: 325 TABLET, FILM COATED ORAL at 04:08

## 2021-08-26 RX ADMIN — AMLODIPINE BESYLATE 10 MG: 5 TABLET ORAL at 12:08

## 2021-08-26 RX ADMIN — SODIUM CHLORIDE: 0.9 INJECTION, SOLUTION INTRAVENOUS at 10:08

## 2021-08-26 RX ADMIN — HYDROMORPHONE HYDROCHLORIDE 0.5 MG: 1 INJECTION, SOLUTION INTRAMUSCULAR; INTRAVENOUS; SUBCUTANEOUS at 02:08

## 2021-08-26 RX ADMIN — HYDROCODONE BITARTRATE AND ACETAMINOPHEN 1 TABLET: 5; 325 TABLET ORAL at 07:08

## 2021-08-26 RX ADMIN — VANCOMYCIN HYDROCHLORIDE 2000 MG: 500 INJECTION, POWDER, LYOPHILIZED, FOR SOLUTION INTRAVENOUS at 10:08

## 2021-08-26 RX ADMIN — SODIUM CHLORIDE: 0.9 INJECTION, SOLUTION INTRAVENOUS at 04:08

## 2021-08-26 RX ADMIN — HYDROCODONE BITARTRATE AND ACETAMINOPHEN 1 TABLET: 5; 325 TABLET ORAL at 09:08

## 2021-08-26 RX ADMIN — ENOXAPARIN SODIUM 40 MG: 40 INJECTION SUBCUTANEOUS at 09:08

## 2021-08-26 RX ADMIN — ONDANSETRON 4 MG: 2 INJECTION INTRAMUSCULAR; INTRAVENOUS at 02:08

## 2021-08-26 RX ADMIN — ONDANSETRON 4 MG: 2 INJECTION INTRAMUSCULAR; INTRAVENOUS at 08:08

## 2021-08-26 RX ADMIN — PIPERACILLIN AND TAZOBACTAM 3.38 G: 3; .375 INJECTION, POWDER, LYOPHILIZED, FOR SOLUTION INTRAVENOUS; PARENTERAL at 11:08

## 2021-08-26 RX ADMIN — ONDANSETRON 4 MG: 2 INJECTION INTRAMUSCULAR; INTRAVENOUS at 09:08

## 2021-08-26 RX ADMIN — SODIUM CHLORIDE, SODIUM LACTATE, POTASSIUM CHLORIDE, AND CALCIUM CHLORIDE 1650 ML: .6; .31; .03; .02 INJECTION, SOLUTION INTRAVENOUS at 07:08

## 2021-08-27 PROBLEM — J96.10 CHRONIC RESPIRATORY FAILURE: Status: ACTIVE | Noted: 2021-08-27

## 2021-08-27 LAB
ALBUMIN SERPL BCP-MCNC: 3 G/DL (ref 3.5–5.2)
ALP SERPL-CCNC: 53 U/L (ref 55–135)
ALT SERPL W/O P-5'-P-CCNC: 14 U/L (ref 10–44)
ANION GAP SERPL CALC-SCNC: 7 MMOL/L (ref 8–16)
AST SERPL-CCNC: 10 U/L (ref 10–40)
BASOPHILS # BLD AUTO: 0.05 K/UL (ref 0–0.2)
BASOPHILS NFR BLD: 0.4 % (ref 0–1.9)
BILIRUB SERPL-MCNC: 0.8 MG/DL (ref 0.1–1)
BUN SERPL-MCNC: 12 MG/DL (ref 6–20)
CALCIUM SERPL-MCNC: 8 MG/DL (ref 8.7–10.5)
CHLORIDE SERPL-SCNC: 103 MMOL/L (ref 95–110)
CO2 SERPL-SCNC: 26 MMOL/L (ref 23–29)
CREAT SERPL-MCNC: 0.7 MG/DL (ref 0.5–1.4)
DIFFERENTIAL METHOD: ABNORMAL
EOSINOPHIL # BLD AUTO: 0 K/UL (ref 0–0.5)
EOSINOPHIL NFR BLD: 0.2 % (ref 0–8)
ERYTHROCYTE [DISTWIDTH] IN BLOOD BY AUTOMATED COUNT: 15.3 % (ref 11.5–14.5)
EST. GFR  (AFRICAN AMERICAN): >60 ML/MIN/1.73 M^2
EST. GFR  (NON AFRICAN AMERICAN): >60 ML/MIN/1.73 M^2
GLUCOSE SERPL-MCNC: 100 MG/DL (ref 70–110)
HCT VFR BLD AUTO: 35.9 % (ref 37–48.5)
HGB BLD-MCNC: 11.2 G/DL (ref 12–16)
IMM GRANULOCYTES # BLD AUTO: 0.05 K/UL (ref 0–0.04)
IMM GRANULOCYTES NFR BLD AUTO: 0.4 % (ref 0–0.5)
LYMPHOCYTES # BLD AUTO: 1.6 K/UL (ref 1–4.8)
LYMPHOCYTES NFR BLD: 13.1 % (ref 18–48)
MAGNESIUM SERPL-MCNC: 1.8 MG/DL (ref 1.6–2.6)
MCH RBC QN AUTO: 27.2 PG (ref 27–31)
MCHC RBC AUTO-ENTMCNC: 31.2 G/DL (ref 32–36)
MCV RBC AUTO: 87 FL (ref 82–98)
MONOCYTES # BLD AUTO: 0.8 K/UL (ref 0.3–1)
MONOCYTES NFR BLD: 7 % (ref 4–15)
NEUTROPHILS # BLD AUTO: 9.5 K/UL (ref 1.8–7.7)
NEUTROPHILS NFR BLD: 78.9 % (ref 38–73)
NRBC BLD-RTO: 0 /100 WBC
PLATELET # BLD AUTO: 240 K/UL (ref 150–450)
PMV BLD AUTO: 9.7 FL (ref 9.2–12.9)
POTASSIUM SERPL-SCNC: 3.4 MMOL/L (ref 3.5–5.1)
PROT SERPL-MCNC: 6.5 G/DL (ref 6–8.4)
RBC # BLD AUTO: 4.12 M/UL (ref 4–5.4)
SODIUM SERPL-SCNC: 136 MMOL/L (ref 136–145)
WBC # BLD AUTO: 12.08 K/UL (ref 3.9–12.7)

## 2021-08-27 PROCEDURE — 25000003 PHARM REV CODE 250: Performed by: INTERNAL MEDICINE

## 2021-08-27 PROCEDURE — 94761 N-INVAS EAR/PLS OXIMETRY MLT: CPT

## 2021-08-27 PROCEDURE — 80053 COMPREHEN METABOLIC PANEL: CPT | Performed by: INTERNAL MEDICINE

## 2021-08-27 PROCEDURE — 99900035 HC TECH TIME PER 15 MIN (STAT)

## 2021-08-27 PROCEDURE — 63600175 PHARM REV CODE 636 W HCPCS: Performed by: INTERNAL MEDICINE

## 2021-08-27 PROCEDURE — 85025 COMPLETE CBC W/AUTO DIFF WBC: CPT | Performed by: INTERNAL MEDICINE

## 2021-08-27 PROCEDURE — 36415 COLL VENOUS BLD VENIPUNCTURE: CPT | Performed by: INTERNAL MEDICINE

## 2021-08-27 PROCEDURE — 99900031 HC PATIENT EDUCATION (STAT)

## 2021-08-27 PROCEDURE — 21000000 HC CCU ICU ROOM CHARGE

## 2021-08-27 PROCEDURE — 94660 CPAP INITIATION&MGMT: CPT

## 2021-08-27 PROCEDURE — 83735 ASSAY OF MAGNESIUM: CPT | Performed by: INTERNAL MEDICINE

## 2021-08-27 PROCEDURE — 27000221 HC OXYGEN, UP TO 24 HOURS

## 2021-08-27 RX ORDER — DIPHENHYDRAMINE HCL 25 MG
50 CAPSULE ORAL EVERY 6 HOURS PRN
Status: DISCONTINUED | OUTPATIENT
Start: 2021-08-27 | End: 2021-08-31 | Stop reason: HOSPADM

## 2021-08-27 RX ORDER — CLINDAMYCIN PHOSPHATE 900 MG/50ML
900 INJECTION, SOLUTION INTRAVENOUS
Status: DISCONTINUED | OUTPATIENT
Start: 2021-08-27 | End: 2021-08-31 | Stop reason: HOSPADM

## 2021-08-27 RX ORDER — LEVOFLOXACIN 5 MG/ML
500 INJECTION, SOLUTION INTRAVENOUS
Status: DISCONTINUED | OUTPATIENT
Start: 2021-08-27 | End: 2021-08-31 | Stop reason: HOSPADM

## 2021-08-27 RX ADMIN — ENOXAPARIN SODIUM 40 MG: 40 INJECTION SUBCUTANEOUS at 08:08

## 2021-08-27 RX ADMIN — HYDROCODONE BITARTRATE AND ACETAMINOPHEN 1 TABLET: 5; 325 TABLET ORAL at 08:08

## 2021-08-27 RX ADMIN — ONDANSETRON 4 MG: 2 INJECTION INTRAMUSCULAR; INTRAVENOUS at 11:08

## 2021-08-27 RX ADMIN — FAMOTIDINE 20 MG: 20 TABLET ORAL at 08:08

## 2021-08-27 RX ADMIN — ACETAMINOPHEN 650 MG: 325 TABLET, FILM COATED ORAL at 12:08

## 2021-08-27 RX ADMIN — POTASSIUM CHLORIDE 40 MEQ: 20 TABLET, EXTENDED RELEASE ORAL at 08:08

## 2021-08-27 RX ADMIN — ONDANSETRON 4 MG: 2 INJECTION INTRAMUSCULAR; INTRAVENOUS at 03:08

## 2021-08-27 RX ADMIN — LEVOFLOXACIN 500 MG: 500 INJECTION, SOLUTION INTRAVENOUS at 08:08

## 2021-08-27 RX ADMIN — PIPERACILLIN AND TAZOBACTAM 3.38 G: 3; .375 INJECTION, POWDER, LYOPHILIZED, FOR SOLUTION INTRAVENOUS; PARENTERAL at 06:08

## 2021-08-27 RX ADMIN — HYDROCODONE BITARTRATE AND ACETAMINOPHEN 1 TABLET: 5; 325 TABLET ORAL at 02:08

## 2021-08-27 RX ADMIN — MAGNESIUM OXIDE 800 MG: 400 TABLET ORAL at 08:08

## 2021-08-27 RX ADMIN — CLINDAMYCIN IN 5 PERCENT DEXTROSE 900 MG: 18 INJECTION, SOLUTION INTRAVENOUS at 06:08

## 2021-08-27 RX ADMIN — CEFTAROLINE FOSAMIL 600 MG: 600 POWDER, FOR SOLUTION INTRAVENOUS at 03:08

## 2021-08-27 RX ADMIN — ONDANSETRON 4 MG: 2 INJECTION INTRAMUSCULAR; INTRAVENOUS at 06:08

## 2021-08-27 RX ADMIN — DIPHENHYDRAMINE HYDROCHLORIDE 50 MG: 25 CAPSULE ORAL at 03:08

## 2021-08-27 RX ADMIN — HYDROCODONE BITARTRATE AND ACETAMINOPHEN 1 TABLET: 5; 325 TABLET ORAL at 11:08

## 2021-08-28 LAB
ALBUMIN SERPL BCP-MCNC: 2.8 G/DL (ref 3.5–5.2)
ALP SERPL-CCNC: 60 U/L (ref 55–135)
ALT SERPL W/O P-5'-P-CCNC: 15 U/L (ref 10–44)
ANION GAP SERPL CALC-SCNC: 10 MMOL/L (ref 8–16)
AST SERPL-CCNC: 11 U/L (ref 10–40)
BASOPHILS # BLD AUTO: 0.02 K/UL (ref 0–0.2)
BASOPHILS NFR BLD: 0.3 % (ref 0–1.9)
BILIRUB SERPL-MCNC: 0.9 MG/DL (ref 0.1–1)
BUN SERPL-MCNC: 12 MG/DL (ref 6–20)
CALCIUM SERPL-MCNC: 8.2 MG/DL (ref 8.7–10.5)
CHLORIDE SERPL-SCNC: 102 MMOL/L (ref 95–110)
CO2 SERPL-SCNC: 27 MMOL/L (ref 23–29)
CREAT SERPL-MCNC: 0.6 MG/DL (ref 0.5–1.4)
DIFFERENTIAL METHOD: ABNORMAL
EOSINOPHIL # BLD AUTO: 0.1 K/UL (ref 0–0.5)
EOSINOPHIL NFR BLD: 1.9 % (ref 0–8)
ERYTHROCYTE [DISTWIDTH] IN BLOOD BY AUTOMATED COUNT: 15 % (ref 11.5–14.5)
EST. GFR  (AFRICAN AMERICAN): >60 ML/MIN/1.73 M^2
EST. GFR  (NON AFRICAN AMERICAN): >60 ML/MIN/1.73 M^2
GLUCOSE SERPL-MCNC: 105 MG/DL (ref 70–110)
HCT VFR BLD AUTO: 36.3 % (ref 37–48.5)
HGB BLD-MCNC: 11.1 G/DL (ref 12–16)
IMM GRANULOCYTES # BLD AUTO: 0.03 K/UL (ref 0–0.04)
IMM GRANULOCYTES NFR BLD AUTO: 0.4 % (ref 0–0.5)
LYMPHOCYTES # BLD AUTO: 0.7 K/UL (ref 1–4.8)
LYMPHOCYTES NFR BLD: 8.7 % (ref 18–48)
MAGNESIUM SERPL-MCNC: 2 MG/DL (ref 1.6–2.6)
MCH RBC QN AUTO: 26.5 PG (ref 27–31)
MCHC RBC AUTO-ENTMCNC: 30.6 G/DL (ref 32–36)
MCV RBC AUTO: 87 FL (ref 82–98)
MONOCYTES # BLD AUTO: 0.6 K/UL (ref 0.3–1)
MONOCYTES NFR BLD: 8.1 % (ref 4–15)
NEUTROPHILS # BLD AUTO: 6.1 K/UL (ref 1.8–7.7)
NEUTROPHILS NFR BLD: 80.6 % (ref 38–73)
NRBC BLD-RTO: 0 /100 WBC
PLATELET # BLD AUTO: 225 K/UL (ref 150–450)
PMV BLD AUTO: 9.3 FL (ref 9.2–12.9)
POTASSIUM SERPL-SCNC: 4.1 MMOL/L (ref 3.5–5.1)
PROT SERPL-MCNC: 6.7 G/DL (ref 6–8.4)
RBC # BLD AUTO: 4.19 M/UL (ref 4–5.4)
SODIUM SERPL-SCNC: 139 MMOL/L (ref 136–145)
WBC # BLD AUTO: 7.5 K/UL (ref 3.9–12.7)

## 2021-08-28 PROCEDURE — 27000221 HC OXYGEN, UP TO 24 HOURS

## 2021-08-28 PROCEDURE — 36415 COLL VENOUS BLD VENIPUNCTURE: CPT | Performed by: INTERNAL MEDICINE

## 2021-08-28 PROCEDURE — 94761 N-INVAS EAR/PLS OXIMETRY MLT: CPT

## 2021-08-28 PROCEDURE — 21000000 HC CCU ICU ROOM CHARGE

## 2021-08-28 PROCEDURE — 80053 COMPREHEN METABOLIC PANEL: CPT | Performed by: INTERNAL MEDICINE

## 2021-08-28 PROCEDURE — 85025 COMPLETE CBC W/AUTO DIFF WBC: CPT | Performed by: INTERNAL MEDICINE

## 2021-08-28 PROCEDURE — 25000003 PHARM REV CODE 250: Performed by: INTERNAL MEDICINE

## 2021-08-28 PROCEDURE — 63600175 PHARM REV CODE 636 W HCPCS: Performed by: INTERNAL MEDICINE

## 2021-08-28 PROCEDURE — 94660 CPAP INITIATION&MGMT: CPT

## 2021-08-28 PROCEDURE — 83735 ASSAY OF MAGNESIUM: CPT | Performed by: INTERNAL MEDICINE

## 2021-08-28 PROCEDURE — 99900031 HC PATIENT EDUCATION (STAT)

## 2021-08-28 PROCEDURE — 99900035 HC TECH TIME PER 15 MIN (STAT)

## 2021-08-28 RX ADMIN — CLINDAMYCIN IN 5 PERCENT DEXTROSE 900 MG: 18 INJECTION, SOLUTION INTRAVENOUS at 04:08

## 2021-08-28 RX ADMIN — FAMOTIDINE 20 MG: 20 TABLET ORAL at 08:08

## 2021-08-28 RX ADMIN — CLINDAMYCIN IN 5 PERCENT DEXTROSE 900 MG: 18 INJECTION, SOLUTION INTRAVENOUS at 01:08

## 2021-08-28 RX ADMIN — HYDROCODONE BITARTRATE AND ACETAMINOPHEN 1 TABLET: 5; 325 TABLET ORAL at 03:08

## 2021-08-28 RX ADMIN — ONDANSETRON 4 MG: 2 INJECTION INTRAMUSCULAR; INTRAVENOUS at 10:08

## 2021-08-28 RX ADMIN — ONDANSETRON 4 MG: 2 INJECTION INTRAMUSCULAR; INTRAVENOUS at 03:08

## 2021-08-28 RX ADMIN — CLINDAMYCIN IN 5 PERCENT DEXTROSE 900 MG: 18 INJECTION, SOLUTION INTRAVENOUS at 08:08

## 2021-08-28 RX ADMIN — LEVOFLOXACIN 500 MG: 500 INJECTION, SOLUTION INTRAVENOUS at 11:08

## 2021-08-28 RX ADMIN — HYDROCODONE BITARTRATE AND ACETAMINOPHEN 1 TABLET: 5; 325 TABLET ORAL at 07:08

## 2021-08-28 RX ADMIN — ONDANSETRON 4 MG: 2 INJECTION INTRAMUSCULAR; INTRAVENOUS at 07:08

## 2021-08-28 RX ADMIN — ENOXAPARIN SODIUM 40 MG: 40 INJECTION SUBCUTANEOUS at 08:08

## 2021-08-28 RX ADMIN — HYDROCODONE BITARTRATE AND ACETAMINOPHEN 1 TABLET: 5; 325 TABLET ORAL at 10:08

## 2021-08-29 LAB
ALBUMIN SERPL BCP-MCNC: 3 G/DL (ref 3.5–5.2)
ALP SERPL-CCNC: 59 U/L (ref 55–135)
ALT SERPL W/O P-5'-P-CCNC: 15 U/L (ref 10–44)
ANION GAP SERPL CALC-SCNC: 10 MMOL/L (ref 8–16)
AST SERPL-CCNC: 12 U/L (ref 10–40)
BASOPHILS # BLD AUTO: 0.02 K/UL (ref 0–0.2)
BASOPHILS NFR BLD: 0.3 % (ref 0–1.9)
BILIRUB SERPL-MCNC: 0.5 MG/DL (ref 0.1–1)
BUN SERPL-MCNC: 12 MG/DL (ref 6–20)
CALCIUM SERPL-MCNC: 8.4 MG/DL (ref 8.7–10.5)
CHLORIDE SERPL-SCNC: 100 MMOL/L (ref 95–110)
CO2 SERPL-SCNC: 26 MMOL/L (ref 23–29)
CREAT SERPL-MCNC: 0.6 MG/DL (ref 0.5–1.4)
DIFFERENTIAL METHOD: ABNORMAL
EOSINOPHIL # BLD AUTO: 0.3 K/UL (ref 0–0.5)
EOSINOPHIL NFR BLD: 4.3 % (ref 0–8)
ERYTHROCYTE [DISTWIDTH] IN BLOOD BY AUTOMATED COUNT: 14.7 % (ref 11.5–14.5)
EST. GFR  (AFRICAN AMERICAN): >60 ML/MIN/1.73 M^2
EST. GFR  (NON AFRICAN AMERICAN): >60 ML/MIN/1.73 M^2
GLUCOSE SERPL-MCNC: 109 MG/DL (ref 70–110)
HCT VFR BLD AUTO: 35.6 % (ref 37–48.5)
HGB BLD-MCNC: 11.1 G/DL (ref 12–16)
IMM GRANULOCYTES # BLD AUTO: 0.02 K/UL (ref 0–0.04)
IMM GRANULOCYTES NFR BLD AUTO: 0.3 % (ref 0–0.5)
LYMPHOCYTES # BLD AUTO: 1 K/UL (ref 1–4.8)
LYMPHOCYTES NFR BLD: 14.1 % (ref 18–48)
MAGNESIUM SERPL-MCNC: 2 MG/DL (ref 1.6–2.6)
MCH RBC QN AUTO: 26.2 PG (ref 27–31)
MCHC RBC AUTO-ENTMCNC: 31.2 G/DL (ref 32–36)
MCV RBC AUTO: 84 FL (ref 82–98)
MONOCYTES # BLD AUTO: 0.7 K/UL (ref 0.3–1)
MONOCYTES NFR BLD: 9.3 % (ref 4–15)
NEUTROPHILS # BLD AUTO: 5.2 K/UL (ref 1.8–7.7)
NEUTROPHILS NFR BLD: 71.7 % (ref 38–73)
NRBC BLD-RTO: 0 /100 WBC
PLATELET # BLD AUTO: 248 K/UL (ref 150–450)
PMV BLD AUTO: 9 FL (ref 9.2–12.9)
POTASSIUM SERPL-SCNC: 4 MMOL/L (ref 3.5–5.1)
PROT SERPL-MCNC: 6.9 G/DL (ref 6–8.4)
RBC # BLD AUTO: 4.24 M/UL (ref 4–5.4)
SODIUM SERPL-SCNC: 136 MMOL/L (ref 136–145)
WBC # BLD AUTO: 7.23 K/UL (ref 3.9–12.7)

## 2021-08-29 PROCEDURE — 94761 N-INVAS EAR/PLS OXIMETRY MLT: CPT

## 2021-08-29 PROCEDURE — 27000221 HC OXYGEN, UP TO 24 HOURS

## 2021-08-29 PROCEDURE — 83735 ASSAY OF MAGNESIUM: CPT | Performed by: INTERNAL MEDICINE

## 2021-08-29 PROCEDURE — 63600175 PHARM REV CODE 636 W HCPCS: Performed by: INTERNAL MEDICINE

## 2021-08-29 PROCEDURE — 85025 COMPLETE CBC W/AUTO DIFF WBC: CPT | Performed by: INTERNAL MEDICINE

## 2021-08-29 PROCEDURE — 12000002 HC ACUTE/MED SURGE SEMI-PRIVATE ROOM

## 2021-08-29 PROCEDURE — 80053 COMPREHEN METABOLIC PANEL: CPT | Performed by: INTERNAL MEDICINE

## 2021-08-29 PROCEDURE — 99900035 HC TECH TIME PER 15 MIN (STAT)

## 2021-08-29 PROCEDURE — 25000003 PHARM REV CODE 250: Performed by: INTERNAL MEDICINE

## 2021-08-29 PROCEDURE — 36415 COLL VENOUS BLD VENIPUNCTURE: CPT | Performed by: INTERNAL MEDICINE

## 2021-08-29 PROCEDURE — 94660 CPAP INITIATION&MGMT: CPT

## 2021-08-29 RX ORDER — DOCUSATE SODIUM 100 MG/1
200 CAPSULE, LIQUID FILLED ORAL DAILY
Status: DISCONTINUED | OUTPATIENT
Start: 2021-08-29 | End: 2021-08-31 | Stop reason: HOSPADM

## 2021-08-29 RX ORDER — ENOXAPARIN SODIUM 100 MG/ML
40 INJECTION SUBCUTANEOUS EVERY 12 HOURS
Status: DISCONTINUED | OUTPATIENT
Start: 2021-08-29 | End: 2021-08-31 | Stop reason: HOSPADM

## 2021-08-29 RX ADMIN — HYDROCODONE BITARTRATE AND ACETAMINOPHEN 1 TABLET: 5; 325 TABLET ORAL at 08:08

## 2021-08-29 RX ADMIN — HYDROCODONE BITARTRATE AND ACETAMINOPHEN 1 TABLET: 5; 325 TABLET ORAL at 05:08

## 2021-08-29 RX ADMIN — FAMOTIDINE 20 MG: 20 TABLET ORAL at 08:08

## 2021-08-29 RX ADMIN — CLINDAMYCIN IN 5 PERCENT DEXTROSE 900 MG: 18 INJECTION, SOLUTION INTRAVENOUS at 05:08

## 2021-08-29 RX ADMIN — LEVOFLOXACIN 500 MG: 500 INJECTION, SOLUTION INTRAVENOUS at 11:08

## 2021-08-29 RX ADMIN — ONDANSETRON 4 MG: 2 INJECTION INTRAMUSCULAR; INTRAVENOUS at 08:08

## 2021-08-29 RX ADMIN — ENOXAPARIN SODIUM 40 MG: 40 INJECTION SUBCUTANEOUS at 08:08

## 2021-08-29 RX ADMIN — HYDROCODONE BITARTRATE AND ACETAMINOPHEN 1 TABLET: 5; 325 TABLET ORAL at 11:08

## 2021-08-29 RX ADMIN — ONDANSETRON 4 MG: 2 INJECTION INTRAMUSCULAR; INTRAVENOUS at 11:08

## 2021-08-29 RX ADMIN — CLINDAMYCIN IN 5 PERCENT DEXTROSE 900 MG: 18 INJECTION, SOLUTION INTRAVENOUS at 08:08

## 2021-08-29 RX ADMIN — ONDANSETRON 4 MG: 2 INJECTION INTRAMUSCULAR; INTRAVENOUS at 05:08

## 2021-08-29 RX ADMIN — CLINDAMYCIN IN 5 PERCENT DEXTROSE 900 MG: 18 INJECTION, SOLUTION INTRAVENOUS at 01:08

## 2021-08-29 RX ADMIN — DOCUSATE SODIUM 200 MG: 100 CAPSULE, LIQUID FILLED ORAL at 11:08

## 2021-08-29 RX ADMIN — ENOXAPARIN SODIUM 40 MG: 40 INJECTION SUBCUTANEOUS at 01:08

## 2021-08-30 LAB
ALBUMIN SERPL BCP-MCNC: 2.9 G/DL (ref 3.5–5.2)
ALP SERPL-CCNC: 60 U/L (ref 55–135)
ALT SERPL W/O P-5'-P-CCNC: 16 U/L (ref 10–44)
ANION GAP SERPL CALC-SCNC: 6 MMOL/L (ref 8–16)
AST SERPL-CCNC: 16 U/L (ref 10–40)
BASOPHILS # BLD AUTO: 0.03 K/UL (ref 0–0.2)
BASOPHILS NFR BLD: 0.5 % (ref 0–1.9)
BILIRUB SERPL-MCNC: 0.6 MG/DL (ref 0.1–1)
BUN SERPL-MCNC: 12 MG/DL (ref 6–20)
CALCIUM SERPL-MCNC: 8.6 MG/DL (ref 8.7–10.5)
CHLORIDE SERPL-SCNC: 103 MMOL/L (ref 95–110)
CO2 SERPL-SCNC: 29 MMOL/L (ref 23–29)
CREAT SERPL-MCNC: 0.6 MG/DL (ref 0.5–1.4)
DIFFERENTIAL METHOD: ABNORMAL
EOSINOPHIL # BLD AUTO: 0.3 K/UL (ref 0–0.5)
EOSINOPHIL NFR BLD: 4.4 % (ref 0–8)
ERYTHROCYTE [DISTWIDTH] IN BLOOD BY AUTOMATED COUNT: 14.6 % (ref 11.5–14.5)
EST. GFR  (AFRICAN AMERICAN): >60 ML/MIN/1.73 M^2
EST. GFR  (NON AFRICAN AMERICAN): >60 ML/MIN/1.73 M^2
GLUCOSE SERPL-MCNC: 103 MG/DL (ref 70–110)
GLUCOSE SERPL-MCNC: 138 MG/DL (ref 70–110)
HCT VFR BLD AUTO: 37.3 % (ref 37–48.5)
HGB BLD-MCNC: 11.4 G/DL (ref 12–16)
IMM GRANULOCYTES # BLD AUTO: 0.02 K/UL (ref 0–0.04)
IMM GRANULOCYTES NFR BLD AUTO: 0.3 % (ref 0–0.5)
LYMPHOCYTES # BLD AUTO: 1.2 K/UL (ref 1–4.8)
LYMPHOCYTES NFR BLD: 18.4 % (ref 18–48)
MAGNESIUM SERPL-MCNC: 1.9 MG/DL (ref 1.6–2.6)
MCH RBC QN AUTO: 25.7 PG (ref 27–31)
MCHC RBC AUTO-ENTMCNC: 30.6 G/DL (ref 32–36)
MCV RBC AUTO: 84 FL (ref 82–98)
MONOCYTES # BLD AUTO: 0.5 K/UL (ref 0.3–1)
MONOCYTES NFR BLD: 8.1 % (ref 4–15)
NEUTROPHILS # BLD AUTO: 4.5 K/UL (ref 1.8–7.7)
NEUTROPHILS NFR BLD: 68.3 % (ref 38–73)
NRBC BLD-RTO: 0 /100 WBC
PLATELET # BLD AUTO: 329 K/UL (ref 150–450)
PMV BLD AUTO: 9.7 FL (ref 9.2–12.9)
POTASSIUM SERPL-SCNC: 4.2 MMOL/L (ref 3.5–5.1)
PROT SERPL-MCNC: 7.2 G/DL (ref 6–8.4)
RBC # BLD AUTO: 4.44 M/UL (ref 4–5.4)
SODIUM SERPL-SCNC: 138 MMOL/L (ref 136–145)
WBC # BLD AUTO: 6.52 K/UL (ref 3.9–12.7)

## 2021-08-30 PROCEDURE — 63600175 PHARM REV CODE 636 W HCPCS: Performed by: INTERNAL MEDICINE

## 2021-08-30 PROCEDURE — 27000221 HC OXYGEN, UP TO 24 HOURS

## 2021-08-30 PROCEDURE — 99900035 HC TECH TIME PER 15 MIN (STAT)

## 2021-08-30 PROCEDURE — 25000003 PHARM REV CODE 250: Performed by: INTERNAL MEDICINE

## 2021-08-30 PROCEDURE — 80053 COMPREHEN METABOLIC PANEL: CPT | Performed by: INTERNAL MEDICINE

## 2021-08-30 PROCEDURE — 12000002 HC ACUTE/MED SURGE SEMI-PRIVATE ROOM

## 2021-08-30 PROCEDURE — 85025 COMPLETE CBC W/AUTO DIFF WBC: CPT | Performed by: INTERNAL MEDICINE

## 2021-08-30 PROCEDURE — 99900031 HC PATIENT EDUCATION (STAT)

## 2021-08-30 PROCEDURE — 94761 N-INVAS EAR/PLS OXIMETRY MLT: CPT

## 2021-08-30 PROCEDURE — 36415 COLL VENOUS BLD VENIPUNCTURE: CPT | Performed by: INTERNAL MEDICINE

## 2021-08-30 PROCEDURE — 83735 ASSAY OF MAGNESIUM: CPT | Performed by: INTERNAL MEDICINE

## 2021-08-30 RX ADMIN — DOCUSATE SODIUM 200 MG: 100 CAPSULE, LIQUID FILLED ORAL at 09:08

## 2021-08-30 RX ADMIN — CLINDAMYCIN IN 5 PERCENT DEXTROSE 900 MG: 18 INJECTION, SOLUTION INTRAVENOUS at 04:08

## 2021-08-30 RX ADMIN — FAMOTIDINE 20 MG: 20 TABLET ORAL at 09:08

## 2021-08-30 RX ADMIN — ONDANSETRON 4 MG: 2 INJECTION INTRAMUSCULAR; INTRAVENOUS at 06:08

## 2021-08-30 RX ADMIN — CLINDAMYCIN IN 5 PERCENT DEXTROSE 900 MG: 18 INJECTION, SOLUTION INTRAVENOUS at 09:08

## 2021-08-30 RX ADMIN — FAMOTIDINE 20 MG: 20 TABLET ORAL at 08:08

## 2021-08-30 RX ADMIN — CLINDAMYCIN IN 5 PERCENT DEXTROSE 900 MG: 18 INJECTION, SOLUTION INTRAVENOUS at 01:08

## 2021-08-30 RX ADMIN — HYDROCODONE BITARTRATE AND ACETAMINOPHEN 1 TABLET: 5; 325 TABLET ORAL at 02:08

## 2021-08-30 RX ADMIN — ENOXAPARIN SODIUM 40 MG: 40 INJECTION SUBCUTANEOUS at 09:08

## 2021-08-30 RX ADMIN — ENOXAPARIN SODIUM 40 MG: 40 INJECTION SUBCUTANEOUS at 08:08

## 2021-08-30 RX ADMIN — HYDROCODONE BITARTRATE AND ACETAMINOPHEN 1 TABLET: 5; 325 TABLET ORAL at 05:08

## 2021-08-30 RX ADMIN — ONDANSETRON 4 MG: 2 INJECTION INTRAMUSCULAR; INTRAVENOUS at 02:08

## 2021-08-30 RX ADMIN — LEVOFLOXACIN 500 MG: 500 INJECTION, SOLUTION INTRAVENOUS at 11:08

## 2021-08-31 VITALS
HEART RATE: 75 BPM | WEIGHT: 293 LBS | OXYGEN SATURATION: 100 % | BODY MASS INDEX: 50.02 KG/M2 | DIASTOLIC BLOOD PRESSURE: 83 MMHG | TEMPERATURE: 98 F | SYSTOLIC BLOOD PRESSURE: 148 MMHG | RESPIRATION RATE: 18 BRPM | HEIGHT: 64 IN

## 2021-08-31 PROBLEM — L03.90 CELLULITIS: Status: RESOLVED | Noted: 2021-04-28 | Resolved: 2021-08-31

## 2021-08-31 PROBLEM — A41.9 SEPSIS: Status: RESOLVED | Noted: 2021-04-29 | Resolved: 2021-08-31

## 2021-08-31 LAB
ALBUMIN SERPL BCP-MCNC: 2.9 G/DL (ref 3.5–5.2)
ALP SERPL-CCNC: 62 U/L (ref 55–135)
ALT SERPL W/O P-5'-P-CCNC: 23 U/L (ref 10–44)
ANION GAP SERPL CALC-SCNC: 11 MMOL/L (ref 8–16)
AST SERPL-CCNC: 19 U/L (ref 10–40)
BACTERIA BLD CULT: NORMAL
BACTERIA BLD CULT: NORMAL
BASOPHILS # BLD AUTO: 0.05 K/UL (ref 0–0.2)
BASOPHILS NFR BLD: 0.7 % (ref 0–1.9)
BILIRUB SERPL-MCNC: 0.7 MG/DL (ref 0.1–1)
BUN SERPL-MCNC: 15 MG/DL (ref 6–20)
CALCIUM SERPL-MCNC: 9 MG/DL (ref 8.7–10.5)
CHLORIDE SERPL-SCNC: 99 MMOL/L (ref 95–110)
CO2 SERPL-SCNC: 29 MMOL/L (ref 23–29)
CREAT SERPL-MCNC: 0.7 MG/DL (ref 0.5–1.4)
DIFFERENTIAL METHOD: ABNORMAL
EOSINOPHIL # BLD AUTO: 0.2 K/UL (ref 0–0.5)
EOSINOPHIL NFR BLD: 3.1 % (ref 0–8)
ERYTHROCYTE [DISTWIDTH] IN BLOOD BY AUTOMATED COUNT: 14.5 % (ref 11.5–14.5)
EST. GFR  (AFRICAN AMERICAN): >60 ML/MIN/1.73 M^2
EST. GFR  (NON AFRICAN AMERICAN): >60 ML/MIN/1.73 M^2
GLUCOSE SERPL-MCNC: 100 MG/DL (ref 70–110)
HCT VFR BLD AUTO: 38.6 % (ref 37–48.5)
HGB BLD-MCNC: 11.9 G/DL (ref 12–16)
IMM GRANULOCYTES # BLD AUTO: 0.03 K/UL (ref 0–0.04)
IMM GRANULOCYTES NFR BLD AUTO: 0.4 % (ref 0–0.5)
LYMPHOCYTES # BLD AUTO: 1.8 K/UL (ref 1–4.8)
LYMPHOCYTES NFR BLD: 25.5 % (ref 18–48)
MAGNESIUM SERPL-MCNC: 1.9 MG/DL (ref 1.6–2.6)
MCH RBC QN AUTO: 25.9 PG (ref 27–31)
MCHC RBC AUTO-ENTMCNC: 30.8 G/DL (ref 32–36)
MCV RBC AUTO: 84 FL (ref 82–98)
MONOCYTES # BLD AUTO: 0.6 K/UL (ref 0.3–1)
MONOCYTES NFR BLD: 7.8 % (ref 4–15)
NEUTROPHILS # BLD AUTO: 4.5 K/UL (ref 1.8–7.7)
NEUTROPHILS NFR BLD: 62.5 % (ref 38–73)
NRBC BLD-RTO: 0 /100 WBC
PLATELET # BLD AUTO: 303 K/UL (ref 150–450)
PMV BLD AUTO: 9.1 FL (ref 9.2–12.9)
POTASSIUM SERPL-SCNC: 4.1 MMOL/L (ref 3.5–5.1)
PROT SERPL-MCNC: 6.9 G/DL (ref 6–8.4)
RBC # BLD AUTO: 4.6 M/UL (ref 4–5.4)
SODIUM SERPL-SCNC: 139 MMOL/L (ref 136–145)
WBC # BLD AUTO: 7.17 K/UL (ref 3.9–12.7)

## 2021-08-31 PROCEDURE — 83735 ASSAY OF MAGNESIUM: CPT | Performed by: INTERNAL MEDICINE

## 2021-08-31 PROCEDURE — 63600175 PHARM REV CODE 636 W HCPCS: Performed by: INTERNAL MEDICINE

## 2021-08-31 PROCEDURE — 85025 COMPLETE CBC W/AUTO DIFF WBC: CPT | Performed by: INTERNAL MEDICINE

## 2021-08-31 PROCEDURE — 36415 COLL VENOUS BLD VENIPUNCTURE: CPT | Performed by: INTERNAL MEDICINE

## 2021-08-31 PROCEDURE — 25000003 PHARM REV CODE 250: Performed by: INTERNAL MEDICINE

## 2021-08-31 PROCEDURE — 80053 COMPREHEN METABOLIC PANEL: CPT | Performed by: INTERNAL MEDICINE

## 2021-08-31 RX ORDER — CIPROFLOXACIN 500 MG/1
500 TABLET ORAL EVERY 12 HOURS
Qty: 20 TABLET | Refills: 0
Start: 2021-08-31 | End: 2021-09-10

## 2021-08-31 RX ORDER — CLINDAMYCIN HYDROCHLORIDE 150 MG/1
450 CAPSULE ORAL EVERY 8 HOURS
Qty: 90 CAPSULE | Refills: 0
Start: 2021-08-31 | End: 2021-09-10

## 2021-08-31 RX ADMIN — ENOXAPARIN SODIUM 40 MG: 40 INJECTION SUBCUTANEOUS at 10:08

## 2021-08-31 RX ADMIN — DOCUSATE SODIUM 200 MG: 100 CAPSULE, LIQUID FILLED ORAL at 09:08

## 2021-08-31 RX ADMIN — ONDANSETRON 4 MG: 2 INJECTION INTRAMUSCULAR; INTRAVENOUS at 12:08

## 2021-08-31 RX ADMIN — HYDROCODONE BITARTRATE AND ACETAMINOPHEN 1 TABLET: 5; 325 TABLET ORAL at 12:08

## 2021-08-31 RX ADMIN — FAMOTIDINE 20 MG: 20 TABLET ORAL at 09:08

## 2021-08-31 RX ADMIN — CLINDAMYCIN IN 5 PERCENT DEXTROSE 900 MG: 18 INJECTION, SOLUTION INTRAVENOUS at 12:08

## 2021-08-31 RX ADMIN — HYDROCODONE BITARTRATE AND ACETAMINOPHEN 1 TABLET: 5; 325 TABLET ORAL at 09:08

## 2021-08-31 RX ADMIN — CLINDAMYCIN IN 5 PERCENT DEXTROSE 900 MG: 18 INJECTION, SOLUTION INTRAVENOUS at 10:08

## 2021-08-31 RX ADMIN — ONDANSETRON 4 MG: 2 INJECTION INTRAMUSCULAR; INTRAVENOUS at 09:08

## 2021-09-01 ENCOUNTER — PATIENT OUTREACH (OUTPATIENT)
Dept: FAMILY MEDICINE | Facility: CLINIC | Age: 56
End: 2021-09-01

## 2021-09-03 ENCOUNTER — OFFICE VISIT (OUTPATIENT)
Dept: FAMILY MEDICINE | Facility: CLINIC | Age: 56
End: 2021-09-03
Payer: MEDICARE

## 2021-09-03 DIAGNOSIS — I89.0 LYMPHEDEMA: ICD-10-CM

## 2021-09-03 DIAGNOSIS — F41.1 GENERALIZED ANXIETY DISORDER: Primary | ICD-10-CM

## 2021-09-03 DIAGNOSIS — L03.91 ACUTE LYMPHANGITIS: ICD-10-CM

## 2021-09-03 PROCEDURE — 99496 TRANSJ CARE MGMT HIGH F2F 7D: CPT | Mod: 95,,, | Performed by: INTERNAL MEDICINE

## 2021-09-03 PROCEDURE — 99496 TRANSITIONAL CARE MANAGE SERVICE 7 DAY DISCHARGE: ICD-10-PCS | Mod: 95,,, | Performed by: INTERNAL MEDICINE

## 2021-09-03 RX ORDER — BUSPIRONE HYDROCHLORIDE 7.5 MG/1
7.5 TABLET ORAL 2 TIMES DAILY
Qty: 60 TABLET | Refills: 11 | Status: SHIPPED | OUTPATIENT
Start: 2021-09-03 | End: 2022-09-01 | Stop reason: SDUPTHER

## 2021-10-17 PROCEDURE — G0179 PR HOME HEALTH MD RECERTIFICATION: ICD-10-PCS | Mod: ,,, | Performed by: INTERNAL MEDICINE

## 2021-10-17 PROCEDURE — G0179 MD RECERTIFICATION HHA PT: HCPCS | Mod: ,,, | Performed by: INTERNAL MEDICINE

## 2021-12-16 PROCEDURE — G0179 MD RECERTIFICATION HHA PT: HCPCS | Mod: ,,, | Performed by: INTERNAL MEDICINE

## 2021-12-16 PROCEDURE — G0179 PR HOME HEALTH MD RECERTIFICATION: ICD-10-PCS | Mod: ,,, | Performed by: INTERNAL MEDICINE

## 2022-01-19 ENCOUNTER — EXTERNAL HOME HEALTH (OUTPATIENT)
Dept: HOME HEALTH SERVICES | Facility: HOSPITAL | Age: 57
End: 2022-01-19
Payer: MEDICARE

## 2022-01-21 ENCOUNTER — HOSPITAL ENCOUNTER (EMERGENCY)
Facility: HOSPITAL | Age: 57
Discharge: HOME | End: 2022-01-22
Attending: EMERGENCY MEDICINE
Payer: MEDICARE

## 2022-01-21 DIAGNOSIS — M79.3 PANNICULITIS: ICD-10-CM

## 2022-01-21 DIAGNOSIS — R10.12 LEFT UPPER QUADRANT ABDOMINAL PAIN: Primary | ICD-10-CM

## 2022-01-21 DIAGNOSIS — R10.9 ABDOMINAL PAIN: ICD-10-CM

## 2022-01-21 PROCEDURE — 99285 EMERGENCY DEPT VISIT HI MDM: CPT | Mod: 25

## 2022-01-21 PROCEDURE — 96375 TX/PRO/DX INJ NEW DRUG ADDON: CPT

## 2022-01-21 PROCEDURE — 96374 THER/PROPH/DIAG INJ IV PUSH: CPT

## 2022-01-22 VITALS
RESPIRATION RATE: 16 BRPM | WEIGHT: 293 LBS | DIASTOLIC BLOOD PRESSURE: 63 MMHG | OXYGEN SATURATION: 99 % | SYSTOLIC BLOOD PRESSURE: 136 MMHG | HEART RATE: 75 BPM | TEMPERATURE: 98 F | BODY MASS INDEX: 66.6 KG/M2

## 2022-01-22 LAB
ALBUMIN SERPL BCP-MCNC: 3.5 G/DL (ref 3.5–5.2)
ALP SERPL-CCNC: 58 U/L (ref 55–135)
ALT SERPL W/O P-5'-P-CCNC: 19 U/L (ref 10–44)
ANION GAP SERPL CALC-SCNC: 9 MMOL/L (ref 8–16)
AST SERPL-CCNC: 15 U/L (ref 10–40)
BASOPHILS # BLD AUTO: 0.06 K/UL (ref 0–0.2)
BASOPHILS NFR BLD: 0.5 % (ref 0–1.9)
BILIRUB SERPL-MCNC: 0.4 MG/DL (ref 0.1–1)
BILIRUB UR QL STRIP: NEGATIVE
BUN SERPL-MCNC: 16 MG/DL (ref 6–20)
CALCIUM SERPL-MCNC: 8.5 MG/DL (ref 8.7–10.5)
CHLORIDE SERPL-SCNC: 106 MMOL/L (ref 95–110)
CLARITY UR: CLEAR
CO2 SERPL-SCNC: 24 MMOL/L (ref 23–29)
COLOR UR: YELLOW
CREAT SERPL-MCNC: 0.6 MG/DL (ref 0.5–1.4)
DIFFERENTIAL METHOD: ABNORMAL
EOSINOPHIL # BLD AUTO: 0.2 K/UL (ref 0–0.5)
EOSINOPHIL NFR BLD: 1.7 % (ref 0–8)
ERYTHROCYTE [DISTWIDTH] IN BLOOD BY AUTOMATED COUNT: 15.1 % (ref 11.5–14.5)
EST. GFR  (AFRICAN AMERICAN): >60 ML/MIN/1.73 M^2
EST. GFR  (NON AFRICAN AMERICAN): >60 ML/MIN/1.73 M^2
GLUCOSE SERPL-MCNC: 113 MG/DL (ref 70–110)
GLUCOSE UR QL STRIP: NEGATIVE
HCT VFR BLD AUTO: 40.7 % (ref 37–48.5)
HGB BLD-MCNC: 12.3 G/DL (ref 12–16)
HGB UR QL STRIP: NEGATIVE
IMM GRANULOCYTES # BLD AUTO: 0.04 K/UL (ref 0–0.04)
IMM GRANULOCYTES NFR BLD AUTO: 0.4 % (ref 0–0.5)
KETONES UR QL STRIP: NEGATIVE
LACTATE SERPL-SCNC: 0.7 MMOL/L (ref 0.5–1.9)
LEUKOCYTE ESTERASE UR QL STRIP: NEGATIVE
LIPASE SERPL-CCNC: 27 U/L (ref 4–60)
LYMPHOCYTES # BLD AUTO: 3.2 K/UL (ref 1–4.8)
LYMPHOCYTES NFR BLD: 28.4 % (ref 18–48)
MCH RBC QN AUTO: 25.8 PG (ref 27–31)
MCHC RBC AUTO-ENTMCNC: 30.2 G/DL (ref 32–36)
MCV RBC AUTO: 85 FL (ref 82–98)
MONOCYTES # BLD AUTO: 0.7 K/UL (ref 0.3–1)
MONOCYTES NFR BLD: 6.2 % (ref 4–15)
NEUTROPHILS # BLD AUTO: 7.1 K/UL (ref 1.8–7.7)
NEUTROPHILS NFR BLD: 62.8 % (ref 38–73)
NITRITE UR QL STRIP: NEGATIVE
NRBC BLD-RTO: 0 /100 WBC
PH UR STRIP: 6 [PH] (ref 5–8)
PLATELET # BLD AUTO: 321 K/UL (ref 150–450)
PMV BLD AUTO: 9.5 FL (ref 9.2–12.9)
POTASSIUM SERPL-SCNC: 3.9 MMOL/L (ref 3.5–5.1)
PROT SERPL-MCNC: 7.5 G/DL (ref 6–8.4)
PROT UR QL STRIP: NEGATIVE
RBC # BLD AUTO: 4.77 M/UL (ref 4–5.4)
SODIUM SERPL-SCNC: 139 MMOL/L (ref 136–145)
SP GR UR STRIP: >1.03 (ref 1–1.03)
TROPONIN I SERPL DL<=0.01 NG/ML-MCNC: <0.03 NG/ML
URN SPEC COLLECT METH UR: ABNORMAL
UROBILINOGEN UR STRIP-ACNC: ABNORMAL EU/DL
WBC # BLD AUTO: 11.25 K/UL (ref 3.9–12.7)

## 2022-01-22 PROCEDURE — 85025 COMPLETE CBC W/AUTO DIFF WBC: CPT | Performed by: EMERGENCY MEDICINE

## 2022-01-22 PROCEDURE — 83690 ASSAY OF LIPASE: CPT | Performed by: EMERGENCY MEDICINE

## 2022-01-22 PROCEDURE — 84484 ASSAY OF TROPONIN QUANT: CPT | Performed by: EMERGENCY MEDICINE

## 2022-01-22 PROCEDURE — 83605 ASSAY OF LACTIC ACID: CPT | Performed by: EMERGENCY MEDICINE

## 2022-01-22 PROCEDURE — 25500020 PHARM REV CODE 255: Performed by: EMERGENCY MEDICINE

## 2022-01-22 PROCEDURE — 63600175 PHARM REV CODE 636 W HCPCS: Performed by: EMERGENCY MEDICINE

## 2022-01-22 PROCEDURE — 93005 ELECTROCARDIOGRAM TRACING: CPT | Performed by: SPECIALIST

## 2022-01-22 PROCEDURE — 80053 COMPREHEN METABOLIC PANEL: CPT | Performed by: EMERGENCY MEDICINE

## 2022-01-22 PROCEDURE — 93010 ELECTROCARDIOGRAM REPORT: CPT | Mod: ,,, | Performed by: SPECIALIST

## 2022-01-22 PROCEDURE — 93010 EKG 12-LEAD: ICD-10-PCS | Mod: ,,, | Performed by: SPECIALIST

## 2022-01-22 PROCEDURE — 81003 URINALYSIS AUTO W/O SCOPE: CPT | Performed by: EMERGENCY MEDICINE

## 2022-01-22 RX ORDER — ONDANSETRON 2 MG/ML
4 INJECTION INTRAMUSCULAR; INTRAVENOUS
Status: COMPLETED | OUTPATIENT
Start: 2022-01-22 | End: 2022-01-22

## 2022-01-22 RX ORDER — MORPHINE SULFATE 4 MG/ML
4 INJECTION, SOLUTION INTRAMUSCULAR; INTRAVENOUS
Status: COMPLETED | OUTPATIENT
Start: 2022-01-22 | End: 2022-01-22

## 2022-01-22 RX ORDER — ONDANSETRON 4 MG/1
4 TABLET, FILM COATED ORAL EVERY 8 HOURS PRN
Qty: 15 TABLET | Refills: 0 | Status: SHIPPED | OUTPATIENT
Start: 2022-01-22 | End: 2023-03-02

## 2022-01-22 RX ADMIN — ONDANSETRON 4 MG: 2 INJECTION INTRAMUSCULAR; INTRAVENOUS at 12:01

## 2022-01-22 RX ADMIN — MORPHINE SULFATE 4 MG: 4 INJECTION, SOLUTION INTRAMUSCULAR; INTRAVENOUS at 12:01

## 2022-01-22 RX ADMIN — IOHEXOL 100 ML: 350 INJECTION, SOLUTION INTRAVENOUS at 01:01

## 2022-01-22 NOTE — ED NOTES
Pt states she had sudden onset of severe abd pain approx 30 mins PTA. States she had some nausea but denies any vomiting or diarrhea. States she had gastric sleeve surgery 2 yrs ago and has hernia from previous csection

## 2022-01-22 NOTE — ED PROVIDER NOTES
Encounter Date: 2022       History     Chief Complaint   Patient presents with    Abdominal Pain     Middle abdominal pain began after reduction of hernia per pt.      This is a 56-year-old female who presents emergency department with abdominal pain.  She says that is started just prior to arrival.  She says that is located diffusely throughout her abdomen but mostly on the upper region and left upper quadrant left lower quadrant region.  She says that is associated with some nausea but no vomiting.  No associated diarrhea.  No urinary symptoms.  No fever chills.  She says that she has had a gastric sleeve procedure about 2 years ago.  She had some issues with obstruction afterwards but since has not had anything.  She has only ever had a  section in the past.  No other abdominal surgeries.  Patient says that when this pain happen hernia went away but now it is back.        Review of patient's allergies indicates:   Allergen Reactions    Aspirin     Dilaudid [hydromorphone]      Excessive sleepiness    Nsaids (non-steroidal anti-inflammatory drug) Hives    Teflaro [ceftaroline fosamil]      Allergic reaction/ hives/itching     Past Medical History:   Diagnosis Date    Allergy     Anemia     Asthma     COPD (chronic obstructive pulmonary disease)     Deep vein thrombosis     Depression     Diabetes mellitus, type 2     Encounter for blood transfusion     Heart murmur     Neuromuscular disorder      Past Surgical History:   Procedure Laterality Date     SECTION      DILATION AND CURETTAGE OF UTERUS      gastric sleeve      Ema     TONSILLECTOMY       Family History   Problem Relation Age of Onset    Heart disease Mother     Diabetes Mother     Arthritis Mother     Depression Mother     Cancer Father     COPD Father     Arthritis Maternal Grandmother     Cancer Maternal Grandmother     Cancer Maternal Grandfather     Cancer Paternal Grandmother     Kidney disease  Paternal Grandmother     Diabetes Paternal Grandmother     Diabetes Paternal Grandfather     Hyperlipidemia Paternal Grandfather      Social History     Tobacco Use    Smoking status: Current Every Day Smoker     Packs/day: 1.00     Types: Cigarettes    Smokeless tobacco: Never Used   Substance Use Topics    Alcohol use: No    Drug use: No     Review of Systems   Constitutional: Negative for chills and fever.   HENT: Negative for sore throat.    Respiratory: Negative for shortness of breath.    Cardiovascular: Negative for chest pain and palpitations.   Gastrointestinal: Positive for abdominal pain and nausea. Negative for vomiting.   Genitourinary: Negative for dysuria.   Musculoskeletal: Negative for back pain.   Skin: Negative for rash.   Neurological: Negative for weakness and headaches.   Hematological: Does not bruise/bleed easily.   All other systems reviewed and are negative.      Physical Exam     Initial Vitals [01/21/22 2345]   BP Pulse Resp Temp SpO2   (!) 143/64 96 20 97.7 °F (36.5 °C) 98 %      MAP       --         Physical Exam    Nursing note and vitals reviewed.  Constitutional: She appears well-developed and well-nourished.   HENT:   Head: Normocephalic and atraumatic.   Mouth/Throat: No oropharyngeal exudate.   Eyes: Conjunctivae and EOM are normal. Pupils are equal, round, and reactive to light.   Neck: Neck supple. No tracheal deviation present.   Normal range of motion.  Cardiovascular: Normal rate, regular rhythm, normal heart sounds and intact distal pulses.   No murmur heard.  Pulmonary/Chest: Breath sounds normal. No stridor. No respiratory distress. She has no wheezes. She has no rhonchi. She has no rales.   Abdominal: Abdomen is soft. She exhibits no distension. There is abdominal tenderness (diffuse abdominal tenderness to palpation).   Obese abdomen, there is a umbilical hernia which has some tenderness to palpation epigastric tenderness, left upper quadrant tenderness, left  lower quadrant tenderness. There is no rebound.   Musculoskeletal:         General: No tenderness or edema. Normal range of motion.      Cervical back: Normal range of motion and neck supple.     Neurological: She is alert and oriented to person, place, and time. She has normal strength. No cranial nerve deficit or sensory deficit.   Skin: Skin is warm and dry. Capillary refill takes less than 2 seconds. No rash noted. No erythema. No pallor.   Psychiatric: She has a normal mood and affect. Thought content normal.         ED Course   Procedures  Labs Reviewed   CBC W/ AUTO DIFFERENTIAL - Abnormal; Notable for the following components:       Result Value    MCH 25.8 (*)     MCHC 30.2 (*)     RDW 15.1 (*)     All other components within normal limits   COMPREHENSIVE METABOLIC PANEL - Abnormal; Notable for the following components:    Glucose 113 (*)     Calcium 8.5 (*)     All other components within normal limits   URINALYSIS, REFLEX TO URINE CULTURE - Abnormal; Notable for the following components:    Specific Gravity, UA >1.030 (*)     Urobilinogen, UA 2.0-3.0 (*)     All other components within normal limits    Narrative:     Specimen Source->Urine   LIPASE   TROPONIN I   LACTIC ACID, PLASMA           Results for orders placed or performed during the hospital encounter of 01/21/22   CBC auto differential   Result Value Ref Range    WBC 11.25 3.90 - 12.70 K/uL    RBC 4.77 4.00 - 5.40 M/uL    Hemoglobin 12.3 12.0 - 16.0 g/dL    Hematocrit 40.7 37.0 - 48.5 %    MCV 85 82 - 98 fL    MCH 25.8 (L) 27.0 - 31.0 pg    MCHC 30.2 (L) 32.0 - 36.0 g/dL    RDW 15.1 (H) 11.5 - 14.5 %    Platelets 321 150 - 450 K/uL    MPV 9.5 9.2 - 12.9 fL    Immature Granulocytes 0.4 0.0 - 0.5 %    Gran # (ANC) 7.1 1.8 - 7.7 K/uL    Immature Grans (Abs) 0.04 0.00 - 0.04 K/uL    Lymph # 3.2 1.0 - 4.8 K/uL    Mono # 0.7 0.3 - 1.0 K/uL    Eos # 0.2 0.0 - 0.5 K/uL    Baso # 0.06 0.00 - 0.20 K/uL    nRBC 0 0 /100 WBC    Gran % 62.8 38.0 - 73.0 %     Lymph % 28.4 18.0 - 48.0 %    Mono % 6.2 4.0 - 15.0 %    Eosinophil % 1.7 0.0 - 8.0 %    Basophil % 0.5 0.0 - 1.9 %    Differential Method Automated    Comprehensive metabolic panel   Result Value Ref Range    Sodium 139 136 - 145 mmol/L    Potassium 3.9 3.5 - 5.1 mmol/L    Chloride 106 95 - 110 mmol/L    CO2 24 23 - 29 mmol/L    Glucose 113 (H) 70 - 110 mg/dL    BUN 16 6 - 20 mg/dL    Creatinine 0.6 0.5 - 1.4 mg/dL    Calcium 8.5 (L) 8.7 - 10.5 mg/dL    Total Protein 7.5 6.0 - 8.4 g/dL    Albumin 3.5 3.5 - 5.2 g/dL    Total Bilirubin 0.4 0.1 - 1.0 mg/dL    Alkaline Phosphatase 58 55 - 135 U/L    AST 15 10 - 40 U/L    ALT 19 10 - 44 U/L    Anion Gap 9 8 - 16 mmol/L    eGFR if African American >60.0 >60 mL/min/1.73 m^2    eGFR if non African American >60.0 >60 mL/min/1.73 m^2   Lipase   Result Value Ref Range    Lipase 27 4 - 60 U/L   Troponin I   Result Value Ref Range    Troponin I <0.030 <=0.040 ng/mL   Urinalysis, Reflex to Urine Culture Urine, Clean Catch    Specimen: Urine   Result Value Ref Range    Specimen UA Urine, Clean Catch     Color, UA Yellow Yellow, Straw, Gillian    Appearance, UA Clear Clear    pH, UA 6.0 5.0 - 8.0    Specific Gravity, UA >1.030 (A) 1.005 - 1.030    Protein, UA Negative Negative    Glucose, UA Negative Negative    Ketones, UA Negative Negative    Bilirubin (UA) Negative Negative    Occult Blood UA Negative Negative    Nitrite, UA Negative Negative    Urobilinogen, UA 2.0-3.0 (A) Negative EU/dL    Leukocytes, UA Negative Negative   Lactic acid, plasma   Result Value Ref Range    Lactate (Lactic Acid) 0.7 0.5 - 1.9 mmol/L     CT Abdomen Pelvis With Contrast   Final Result          Imaging Results          CT Abdomen Pelvis With Contrast (Final result)  Result time 01/22/22 02:50:58    Final result by Juan Miguel Saini MD (01/22/22 02:50:58)                 Narrative:    EXAM DESCRIPTION:  CT ABDOMEN PELVIS WITH CONTRAST  RadLex: CT ABDOMEN PELVIS WITH IV CONTRAST    CLINICAL HISTORY:  56  years  Female;  Epigastric pain; Abdominal pain, acute, nonlocalized;    TECHNIQUE:  CT of the abdomen and pelvis [with] intravenous contrast.  All CT scans at this facility use dose modulation, iterative reconstruction, and/or weight based dosing when appropriate to reduce radiation dose to as low as reasonably achievable.    COMPARISON: 3/20/2019; 3/4/2020    FINDINGS:  Assessment is significantly limited by body habitus and attenuation artifact from soft tissue interface with the CT tube wall.  Lung bases are clear  Abdomen:  Stomach:Within normal limits  Liver:No focal lesions. No intrahepatic ductal distention.  Gallbladder:Nondistended  Pancreas:Within normal limits  Spleen:Within normal limits  Right kidney:No hydronephrosis. No focal lesion. 3.1 cm upper pole probable cyst.  Left kidney:No hydronephrosis. No focal lesion.  Adrenal glands:Within normal limits  Vascular structures:Atherosclerotic calcifications of the abdominal aorta and iliac arteries without aneurysm, dissection, or significant stenosis  Nodes:No lymphadenopathy by size criteria    Pelvis:  Unchanged ventral hernia in the left lower abdominal wall with herniation of both colon and small bowel loops. No obstruction. Not significantly changed compared to March of 2020  Small bowel:No significant distention.  Appendix:Within normal limits  Colon:No distention or acute pericolonic edema.  No free intraperitoneal fluid or air.  Bones: No acute bone findings.  Bladder: Unremarkable.  No pelvic mass or adenopathy.  Large pannus extending from the proximal medial right thigh with extensive skin thickening and edema consistent with panniculitis. This does not appear significantly changed compared with prior exam    IMPRESSION:    No acute findings in the abdomen or pelvis.    Unchanged ventral hernia in the left lower abdominal wall with herniation of both colon and small bowel loops. No obstruction.    Large pannus extending from the proximal medial  right thigh with extensive skin thickening and edema consistent with panniculitis. This does not appear significantly changed compared with prior exam.    Electronically signed by:  Juan Miguel Saini MD  1/22/2022 2:50 AM [facility] Workstation: [id number]                               Medications   morphine injection 4 mg (4 mg Intravenous Given 1/22/22 0038)   ondansetron injection 4 mg (4 mg Intravenous Given 1/22/22 0038)   iohexoL (OMNIPAQUE 350) injection 100 mL (100 mLs Intravenous Given 1/22/22 0146)     Medical Decision Making:   ED Management:  This is a well-appearing 56-year-old female presents emergency department with abdominal pain as described above.  She has been evaluated emergency department has had labs including lactic acid which is negative.  White blood cell count is negative.  She had a CT scan emergency department which does not show any evidence of any acute pathology.  She has chronic panniculitis which she knows about it.  She is requesting Zofran to go home and be discharged with.  She has improved.  Says her symptoms have resolved.  I recommend follow-up with primary care provider on an outpatient basis.  EKG is nonischemic, troponin is negative, low risk heart score doubt that this is any acute coronary syndrome either.  Recommend follow-up on an outpatient basis.    I had a detailed discussion with the patient and/or guardian regarding: The historical points, exam findings, and diagnostic results supporting the discharge diagnosis, lab results, pertinent radiology results, and the need for outpatient follow-up, for definitive care with a family practitioner and to return to the emergency department if symptoms worsen or persist or if there are any questions or concerns that arise at home. All questions have been answered in detail. Strict return to Emergency Department precautions have been provided.    A dictation software program was used for this note.  Please expect some simple typographical   errors in this note.    This patient was seen during the context of the Covid 19 global pandemic where local, state, hospital guidelines, were followed to the best of ability given the circumstances of the pandemic.                 ED Course as of 01/22/22 0325   Sat Jan 22, 2022   0033 EKG 00:29 normal sinus rhythm rate of 75. Artifact present which limits EKG interpretation.  No ST elevation or depression.  No STEMI.  EKG interpreted independently by me.   [JR]      ED Course User Index  [JR] Deonte Evans DO             Clinical Impression:   Final diagnoses:  [R10.9] Abdominal pain  [M79.3] Panniculitis  [R10.12] Left upper quadrant abdominal pain (Primary)          ED Disposition Condition    Discharge Stable        ED Prescriptions     Medication Sig Dispense Start Date End Date Auth. Provider    ondansetron (ZOFRAN) 4 MG tablet Take 1 tablet (4 mg total) by mouth every 8 (eight) hours as needed for Nausea. 15 tablet 1/22/2022  Deonte Evans DO        Follow-up Information     Follow up With Specialties Details Why Contact Info Additional Information    Jessica Baeza MD Internal Medicine, Pediatrics In 3 days  [address] [address]  [phone]       [facility] - Emergency Dept Emergency Medicine  If symptoms worsen [address] [id number]  [phone] 1st floor           Deonte Evans DO  01/22/22 0326

## 2022-02-14 PROCEDURE — G0179 MD RECERTIFICATION HHA PT: HCPCS | Mod: ,,, | Performed by: FAMILY MEDICINE

## 2022-02-14 PROCEDURE — G0179 PR HOME HEALTH MD RECERTIFICATION: ICD-10-PCS | Mod: ,,, | Performed by: FAMILY MEDICINE

## 2022-03-14 ENCOUNTER — DOCUMENT SCAN (OUTPATIENT)
Dept: HOME HEALTH SERVICES | Facility: HOSPITAL | Age: 57
End: 2022-03-14
Payer: MEDICARE

## 2022-04-13 ENCOUNTER — EXTERNAL HOME HEALTH (OUTPATIENT)
Dept: HOME HEALTH SERVICES | Facility: HOSPITAL | Age: 57
End: 2022-04-13
Payer: MEDICARE

## 2022-05-28 ENCOUNTER — EXTERNAL HOME HEALTH (OUTPATIENT)
Dept: HOME HEALTH SERVICES | Facility: HOSPITAL | Age: 57
End: 2022-05-28
Payer: MEDICARE

## 2022-06-14 PROCEDURE — G0179 PR HOME HEALTH MD RECERTIFICATION: ICD-10-PCS | Mod: ,,, | Performed by: FAMILY MEDICINE

## 2022-06-14 PROCEDURE — G0179 MD RECERTIFICATION HHA PT: HCPCS | Mod: ,,, | Performed by: FAMILY MEDICINE

## 2022-07-12 ENCOUNTER — TELEPHONE (OUTPATIENT)
Dept: FAMILY MEDICINE | Facility: CLINIC | Age: 57
End: 2022-07-12

## 2022-07-28 ENCOUNTER — EXTERNAL HOME HEALTH (OUTPATIENT)
Dept: HOME HEALTH SERVICES | Facility: HOSPITAL | Age: 57
End: 2022-07-28
Payer: MEDICARE

## 2022-08-13 PROCEDURE — G0179 MD RECERTIFICATION HHA PT: HCPCS | Mod: ,,, | Performed by: FAMILY MEDICINE

## 2022-08-13 PROCEDURE — G0179 PR HOME HEALTH MD RECERTIFICATION: ICD-10-PCS | Mod: ,,, | Performed by: FAMILY MEDICINE

## 2022-09-01 ENCOUNTER — OFFICE VISIT (OUTPATIENT)
Dept: FAMILY MEDICINE | Facility: CLINIC | Age: 57
End: 2022-09-01
Payer: MEDICARE

## 2022-09-01 VITALS
OXYGEN SATURATION: 97 % | RESPIRATION RATE: 18 BRPM | TEMPERATURE: 98 F | SYSTOLIC BLOOD PRESSURE: 132 MMHG | WEIGHT: 293 LBS | DIASTOLIC BLOOD PRESSURE: 56 MMHG | BODY MASS INDEX: 50.02 KG/M2 | HEIGHT: 64 IN | HEART RATE: 92 BPM

## 2022-09-01 DIAGNOSIS — F43.22 ADJUSTMENT DISORDER WITH ANXIETY: ICD-10-CM

## 2022-09-01 DIAGNOSIS — D50.9 IRON DEFICIENCY ANEMIA, UNSPECIFIED IRON DEFICIENCY ANEMIA TYPE: ICD-10-CM

## 2022-09-01 DIAGNOSIS — K90.9 INTESTINAL MALABSORPTION, UNSPECIFIED TYPE: ICD-10-CM

## 2022-09-01 DIAGNOSIS — Z98.84 S/P GASTRIC BYPASS: ICD-10-CM

## 2022-09-01 DIAGNOSIS — K90.89 OTHER INTESTINAL MALABSORPTION: ICD-10-CM

## 2022-09-01 DIAGNOSIS — F41.1 GENERALIZED ANXIETY DISORDER: ICD-10-CM

## 2022-09-01 DIAGNOSIS — Z00.00 PREVENTATIVE HEALTH CARE: ICD-10-CM

## 2022-09-01 DIAGNOSIS — F33.1 MODERATE EPISODE OF RECURRENT MAJOR DEPRESSIVE DISORDER: ICD-10-CM

## 2022-09-01 DIAGNOSIS — R07.9 CHEST PAIN, UNSPECIFIED TYPE: Primary | ICD-10-CM

## 2022-09-01 LAB
EKG 12-LEAD: NORMAL
PR INTERVAL: NORMAL
PRT AXES: NORMAL
QRS DURATION: NORMAL
QT/QTC: NORMAL
VENTRICULAR RATE: NORMAL

## 2022-09-01 PROCEDURE — 93010 POCT EKG 12-LEAD: ICD-10-PCS | Mod: S$PBB,AQ,, | Performed by: FAMILY MEDICINE

## 2022-09-01 PROCEDURE — 93010 ELECTROCARDIOGRAM REPORT: CPT | Mod: S$PBB,AQ,, | Performed by: FAMILY MEDICINE

## 2022-09-01 PROCEDURE — 93005 ELECTROCARDIOGRAM TRACING: CPT | Mod: PBBFAC | Performed by: FAMILY MEDICINE

## 2022-09-01 PROCEDURE — 99214 PR OFFICE/OUTPT VISIT, EST, LEVL IV, 30-39 MIN: ICD-10-PCS | Mod: 25,S$PBB,AQ, | Performed by: FAMILY MEDICINE

## 2022-09-01 PROCEDURE — 99213 OFFICE O/P EST LOW 20 MIN: CPT | Performed by: FAMILY MEDICINE

## 2022-09-01 PROCEDURE — 99214 OFFICE O/P EST MOD 30 MIN: CPT | Mod: 25,S$PBB,AQ, | Performed by: FAMILY MEDICINE

## 2022-09-01 RX ORDER — MULTIVIT-MIN/IRON/FOLIC ACID/K 45-800-120
1 CAPSULE ORAL DAILY
Qty: 90 CAPSULE | Refills: 3 | Status: SHIPPED | OUTPATIENT
Start: 2022-09-01 | End: 2023-09-01

## 2022-09-01 RX ORDER — CITALOPRAM 20 MG/1
20 TABLET, FILM COATED ORAL DAILY
Qty: 30 TABLET | Refills: 11 | Status: ON HOLD | OUTPATIENT
Start: 2022-09-01 | End: 2023-03-07 | Stop reason: HOSPADM

## 2022-09-01 RX ORDER — BUSPIRONE HYDROCHLORIDE 7.5 MG/1
7.5 TABLET ORAL 2 TIMES DAILY
Qty: 60 TABLET | Refills: 11 | Status: ON HOLD | OUTPATIENT
Start: 2022-09-01 | End: 2023-03-07 | Stop reason: HOSPADM

## 2022-09-01 NOTE — PROGRESS NOTES
Subjective:       Patient ID: Adriana Zaragoza is a 56 y.o. female.    Chief Complaint: Fatigue (Lethargic >4 months, headaches, shooting in chest, tired, clammy, SOB, sweaty), Nicotine Dependence (Would like to discuss smoking cessation and Wellbutrin), and Medication Refill (RF on Zofran)      Is here to get established transferring from Dr. Baeza.  Has been having a several month history of progressive fatigue.  Still losing weight after bariatric surgery she lost 100 lb a year several years ago.  Wt Readings from Last 7 Encounters:  09/01/22 : (!) 174.4 kg (384 lb 6.4 oz)  01/21/22 : (!) 176 kg (388 lb)  08/26/21 : (!) 176.2 kg (388 lb 7.2 oz)  05/03/21 : (!) 176.2 kg (388 lb 7.2 oz)  10/20/20 : (!) 171.5 kg (378 lb 3 oz)  06/12/20 : (!) 176.3 kg (388 lb 10.7 oz)  03/07/20 : (!) 175.3 kg (386 lb 7.5 oz)   On [facility] for wound care.  BP Readings from Last 3 Encounters:  09/01/22 : (!) 132/56  01/22/22 : 136/63  08/31/21 : (!) 148/83  Lab Results       Component                Value               Date                       WBC                      11.25               01/22/2022                 HGB                      12.3                01/22/2022                 HCT                      40.7                01/22/2022                 PLT                      321                 01/22/2022                 CHOL                     197                 11/11/2020                 TRIG                     169 (H)             11/11/2020                 HDL                      40                  11/11/2020                 ALT                      19                  01/22/2022                 AST                      15                  01/22/2022                 NA                       139                 01/22/2022                 K                        3.9                 01/22/2022                 CL                       106                 01/22/2022                 CREATININE               0.6                  01/22/2022                 BUN                      16                  01/22/2022                 CO2                      24                  01/22/2022                 TSH                      0.780               06/10/2020                 INR                      1.2                 06/10/2020                 HGBA1C                   5.7                 04/29/2021                    Fatigue  Associated symptoms include fatigue.   Nicotine Dependence  Symptoms include fatigue. Her urge triggers include company of smokers.   Medication Refill  Associated symptoms include fatigue.     Allergies and Medications:   Review of patient's allergies indicates:   Allergen Reactions    Aspirin     Dilaudid [hydromorphone]      Excessive sleepiness    Nsaids (non-steroidal anti-inflammatory drug) Hives    Teflaro [ceftaroline fosamil]      Allergic reaction/ hives/itching     Current Outpatient Medications   Medication Sig Dispense Refill    acetaminophen (TYLENOL) 650 MG TbSR Take 650 mg by mouth every 8 (eight) hours.      albuterol (PROVENTIL/VENTOLIN HFA) 90 mcg/actuation inhaler Inhale 2 puffs into the lungs every 6 (six) hours as needed.       ondansetron (ZOFRAN) 4 MG tablet Take 1 tablet (4 mg total) by mouth every 8 (eight) hours as needed for Nausea. 15 tablet 0    busPIRone (BUSPAR) 7.5 MG tablet Take 1 tablet (7.5 mg total) by mouth 2 (two) times daily. 60 tablet 11    citalopram (CELEXA) 20 MG tablet Take 1 tablet (20 mg total) by mouth once daily. 30 tablet 11    multivitamin-min-iron-FA-vit K (BARIATRIC MULTIVITAMINS) 45 mg iron- 800 mcg-120 mcg Cap Take 1 tablet by mouth once daily. 90 capsule 3     No current facility-administered medications for this visit.       Family History:   Family History   Problem Relation Age of Onset    Heart disease Mother     Diabetes Mother     Arthritis Mother     Depression Mother     Cancer Father     COPD Father     Arthritis Maternal Grandmother     Cancer Maternal  Grandmother     Cancer Maternal Grandfather     Cancer Paternal Grandmother     Kidney disease Paternal Grandmother     Diabetes Paternal Grandmother     Diabetes Paternal Grandfather     Hyperlipidemia Paternal Grandfather        Social History:   Social History     Socioeconomic History    Marital status:    Tobacco Use    Smoking status: Every Day     Packs/day: 1.00     Types: Cigarettes    Smokeless tobacco: Never   Substance and Sexual Activity    Alcohol use: No    Drug use: No    Sexual activity: Never       Review of Systems   Constitutional:  Positive for fatigue.   Respiratory:          She had a respiratory infection a week of June 1st she felt was COVID but COVID test was negative at the time.   Cardiovascular:         History of chest pain has had complete workup with Dr. BESS years ago.   Musculoskeletal:         Lymphedema with infections both legs chronic- Dr Rey   Psychiatric/Behavioral:  Positive for dysphoric mood. The patient is nervous/anxious.      Objective:     Vitals:    09/01/22 1253   BP: (!) 132/56   Pulse: 92   Resp: 18   Temp: 98.3 °F (36.8 °C)        Physical Exam  Vitals and nursing note reviewed.   Constitutional:       General: She is not in acute distress.     Appearance: Normal appearance. She is well-developed and normal weight. She is not ill-appearing, toxic-appearing or diaphoretic.   HENT:      Head: Normocephalic and atraumatic.   Eyes:      Pupils: Pupils are equal, round, and reactive to light.   Cardiovascular:      Rate and Rhythm: Normal rate and regular rhythm.      Heart sounds: Normal heart sounds. No murmur heard.    No friction rub. No gallop.   Pulmonary:      Effort: Pulmonary effort is normal. No respiratory distress.      Breath sounds: Normal breath sounds. No stridor. No wheezing, rhonchi or rales.   Chest:      Chest wall: No tenderness.   Musculoskeletal:      Right lower leg: No edema.      Left lower leg: No edema.   Neurological:      Mental  Status: She is alert.   Psychiatric:         Behavior: Behavior normal.         Thought Content: Thought content normal.         Judgment: Judgment normal.     EKG showed normal sinus rhythm without ST or T changes.  Assessment:       1. Chest pain, unspecified type    2. Generalized anxiety disorder    3. Moderate episode of recurrent major depressive disorder    4. S/P gastric bypass    5. Preventative health care    6. Adjustment disorder with anxiety    7. Iron deficiency anemia, unspecified iron deficiency anemia type    8. Other intestinal malabsorption    9. Intestinal malabsorption, unspecified type        Plan:       Adriana was seen today for fatigue, nicotine dependence and medication refill.    Diagnoses and all orders for this visit:    Chest pain, unspecified type  -     POCT EKG 12-LEAD (NOT FOR [facility] USE)  -     NM Myocardial Perfusion Spect Multi Pharmacologic; Future  -     Nuclear Stress Test; Future    Generalized anxiety disorder  -     busPIRone (BUSPAR) 7.5 MG tablet; Take 1 tablet (7.5 mg total) by mouth 2 (two) times daily.    Moderate episode of recurrent major depressive disorder  -     citalopram (CELEXA) 20 MG tablet; Take 1 tablet (20 mg total) by mouth once daily.    S/P gastric bypass  -     multivitamin-min-iron-FA-vit K (BARIATRIC MULTIVITAMINS) 45 mg iron- 800 mcg-120 mcg Cap; Take 1 tablet by mouth once daily.  -     CBC Auto Differential; Future  -     Vitamin D; Future  -     Vitamin B12; Future  -     Folate; Future    Preventative health care  -     Hepatitis C Antibody; Future  -     Comprehensive Metabolic Panel; Future    Adjustment disorder with anxiety    Iron deficiency anemia, unspecified iron deficiency anemia type  -     CBC Auto Differential; Future    Other intestinal malabsorption  -     Vitamin B12; Future    Intestinal malabsorption, unspecified type  -     Folate; Future       No follow-ups on file.

## 2022-09-07 ENCOUNTER — EXTERNAL HOME HEALTH (OUTPATIENT)
Dept: HOME HEALTH SERVICES | Facility: HOSPITAL | Age: 57
End: 2022-09-07
Payer: MEDICARE

## 2022-09-12 ENCOUNTER — EXTERNAL HOME HEALTH (OUTPATIENT)
Dept: HOME HEALTH SERVICES | Facility: HOSPITAL | Age: 57
End: 2022-09-12
Payer: MEDICARE

## 2022-09-14 ENCOUNTER — LAB VISIT (OUTPATIENT)
Dept: LAB | Facility: HOSPITAL | Age: 57
End: 2022-09-14
Attending: FAMILY MEDICINE
Payer: MEDICARE

## 2022-09-14 DIAGNOSIS — E55.9 VITAMIN D DEFICIENCY: Primary | ICD-10-CM

## 2022-09-14 DIAGNOSIS — Z00.00 PREVENTATIVE HEALTH CARE: ICD-10-CM

## 2022-09-14 DIAGNOSIS — Z98.84 S/P GASTRIC BYPASS: ICD-10-CM

## 2022-09-14 DIAGNOSIS — K90.89 OTHER INTESTINAL MALABSORPTION: ICD-10-CM

## 2022-09-14 DIAGNOSIS — K90.9 INTESTINAL MALABSORPTION, UNSPECIFIED TYPE: ICD-10-CM

## 2022-09-14 DIAGNOSIS — D50.9 IRON DEFICIENCY ANEMIA, UNSPECIFIED IRON DEFICIENCY ANEMIA TYPE: ICD-10-CM

## 2022-09-14 LAB
25(OH)D3+25(OH)D2 SERPL-MCNC: 18 NG/ML (ref 30–96)
ALBUMIN SERPL BCP-MCNC: 3.8 G/DL (ref 3.5–5.2)
ALP SERPL-CCNC: 71 U/L (ref 55–135)
ALT SERPL W/O P-5'-P-CCNC: 18 U/L (ref 10–44)
ANION GAP SERPL CALC-SCNC: 8 MMOL/L (ref 8–16)
AST SERPL-CCNC: 15 U/L (ref 10–40)
BASOPHILS # BLD AUTO: 0.07 K/UL (ref 0–0.2)
BASOPHILS NFR BLD: 0.6 % (ref 0–1.9)
BILIRUB SERPL-MCNC: 0.6 MG/DL (ref 0.1–1)
BUN SERPL-MCNC: 14 MG/DL (ref 6–20)
CALCIUM SERPL-MCNC: 9.1 MG/DL (ref 8.7–10.5)
CHLORIDE SERPL-SCNC: 102 MMOL/L (ref 95–110)
CO2 SERPL-SCNC: 24 MMOL/L (ref 23–29)
CREAT SERPL-MCNC: 0.6 MG/DL (ref 0.5–1.4)
DIFFERENTIAL METHOD: ABNORMAL
EOSINOPHIL # BLD AUTO: 0.2 K/UL (ref 0–0.5)
EOSINOPHIL NFR BLD: 1.9 % (ref 0–8)
ERYTHROCYTE [DISTWIDTH] IN BLOOD BY AUTOMATED COUNT: 15.8 % (ref 11.5–14.5)
EST. GFR  (NO RACE VARIABLE): >60 ML/MIN/1.73 M^2
FOLATE SERPL-MCNC: 10.9 NG/ML (ref 4–24)
GLUCOSE SERPL-MCNC: 108 MG/DL (ref 70–110)
HCT VFR BLD AUTO: 41.4 % (ref 37–48.5)
HGB BLD-MCNC: 13.1 G/DL (ref 12–16)
IMM GRANULOCYTES # BLD AUTO: 0.03 K/UL (ref 0–0.04)
IMM GRANULOCYTES NFR BLD AUTO: 0.3 % (ref 0–0.5)
LYMPHOCYTES # BLD AUTO: 2.8 K/UL (ref 1–4.8)
LYMPHOCYTES NFR BLD: 25.7 % (ref 18–48)
MCH RBC QN AUTO: 27.2 PG (ref 27–31)
MCHC RBC AUTO-ENTMCNC: 31.6 G/DL (ref 32–36)
MCV RBC AUTO: 86 FL (ref 82–98)
MONOCYTES # BLD AUTO: 0.6 K/UL (ref 0.3–1)
MONOCYTES NFR BLD: 5.8 % (ref 4–15)
NEUTROPHILS # BLD AUTO: 7.1 K/UL (ref 1.8–7.7)
NEUTROPHILS NFR BLD: 65.7 % (ref 38–73)
NRBC BLD-RTO: 0 /100 WBC
PLATELET # BLD AUTO: 302 K/UL (ref 150–450)
PMV BLD AUTO: 10.3 FL (ref 9.2–12.9)
POTASSIUM SERPL-SCNC: 3.8 MMOL/L (ref 3.5–5.1)
PROT SERPL-MCNC: 7.5 G/DL (ref 6–8.4)
RBC # BLD AUTO: 4.81 M/UL (ref 4–5.4)
SODIUM SERPL-SCNC: 134 MMOL/L (ref 136–145)
VIT B12 SERPL-MCNC: 281 PG/ML (ref 210–950)
WBC # BLD AUTO: 10.8 K/UL (ref 3.9–12.7)

## 2022-09-14 PROCEDURE — 80053 COMPREHEN METABOLIC PANEL: CPT | Performed by: FAMILY MEDICINE

## 2022-09-14 PROCEDURE — 85025 COMPLETE CBC W/AUTO DIFF WBC: CPT | Performed by: FAMILY MEDICINE

## 2022-09-14 PROCEDURE — 82306 VITAMIN D 25 HYDROXY: CPT | Performed by: FAMILY MEDICINE

## 2022-09-14 PROCEDURE — 82607 VITAMIN B-12: CPT | Performed by: FAMILY MEDICINE

## 2022-09-14 PROCEDURE — 82746 ASSAY OF FOLIC ACID SERUM: CPT | Performed by: FAMILY MEDICINE

## 2022-09-14 PROCEDURE — 86803 HEPATITIS C AB TEST: CPT | Performed by: FAMILY MEDICINE

## 2022-09-14 PROCEDURE — 36415 COLL VENOUS BLD VENIPUNCTURE: CPT | Performed by: FAMILY MEDICINE

## 2022-09-14 RX ORDER — ERGOCALCIFEROL 1.25 MG/1
50000 CAPSULE ORAL
Qty: 4 CAPSULE | Refills: 11 | Status: SHIPPED | OUTPATIENT
Start: 2022-09-14 | End: 2023-09-14

## 2022-09-14 NOTE — PROGRESS NOTES
Subjective:       Patient ID: Adriana Zaragoza is a 56 y.o. female.    Chief Complaint: No chief complaint on file.      HPI    Allergies and Medications:   Review of patient's allergies indicates:   Allergen Reactions    Aspirin     Dilaudid [hydromorphone]      Excessive sleepiness    Nsaids (non-steroidal anti-inflammatory drug) Hives    Teflaro [ceftaroline fosamil]      Allergic reaction/ hives/itching     Current Outpatient Medications   Medication Sig Dispense Refill    acetaminophen (TYLENOL) 650 MG TbSR Take 650 mg by mouth every 8 (eight) hours.      albuterol (PROVENTIL/VENTOLIN HFA) 90 mcg/actuation inhaler Inhale 2 puffs into the lungs every 6 (six) hours as needed.       busPIRone (BUSPAR) 7.5 MG tablet Take 1 tablet (7.5 mg total) by mouth 2 (two) times daily. 60 tablet 11    citalopram (CELEXA) 20 MG tablet Take 1 tablet (20 mg total) by mouth once daily. 30 tablet 11    ergocalciferol (ERGOCALCIFEROL) 50,000 unit Cap Take 1 capsule (50,000 Units total) by mouth every 7 days. 4 capsule 11    multivitamin-min-iron-FA-vit K (BARIATRIC MULTIVITAMINS) 45 mg iron- 800 mcg-120 mcg Cap Take 1 tablet by mouth once daily. 90 capsule 3    ondansetron (ZOFRAN) 4 MG tablet Take 1 tablet (4 mg total) by mouth every 8 (eight) hours as needed for Nausea. 15 tablet 0     No current facility-administered medications for this visit.       Family History:   Family History   Problem Relation Age of Onset    Heart disease Mother     Diabetes Mother     Arthritis Mother     Depression Mother     Cancer Father     COPD Father     Arthritis Maternal Grandmother     Cancer Maternal Grandmother     Cancer Maternal Grandfather     Cancer Paternal Grandmother     Kidney disease Paternal Grandmother     Diabetes Paternal Grandmother     Diabetes Paternal Grandfather     Hyperlipidemia Paternal Grandfather        Social History:   Social History     Socioeconomic History    Marital status:    Tobacco Use    Smoking  status: Every Day     Packs/day: 1.00     Types: Cigarettes    Smokeless tobacco: Never   Substance and Sexual Activity    Alcohol use: No    Drug use: No    Sexual activity: Never       Review of Systems    Objective:   There were no vitals filed for this visit.     Physical Exam    Assessment:       1. Vitamin D deficiency    2. Preventative health care    3. S/P gastric bypass    4. Iron deficiency anemia, unspecified iron deficiency anemia type    5. Other intestinal malabsorption    6. Intestinal malabsorption, unspecified type          Plan:       Diagnoses and all orders for this visit:    Vitamin D deficiency  -     ergocalciferol (ERGOCALCIFEROL) 50,000 unit Cap; Take 1 capsule (50,000 Units total) by mouth every 7 days.  -     Vitamin D; Future    Preventative health care  -     Hepatitis C Antibody  -     Comprehensive Metabolic Panel    S/P gastric bypass  -     CBC Auto Differential  -     Vitamin D  -     Vitamin B12  -     Folate    Iron deficiency anemia, unspecified iron deficiency anemia type  -     CBC Auto Differential    Other intestinal malabsorption  -     Vitamin B12    Intestinal malabsorption, unspecified type  -     Folate       No follow-ups on file.

## 2022-09-15 ENCOUNTER — TELEPHONE (OUTPATIENT)
Dept: FAMILY MEDICINE | Facility: CLINIC | Age: 57
End: 2022-09-15

## 2022-09-15 NOTE — TELEPHONE ENCOUNTER
----- Message from Jad Hua MD sent at 9/14/2022  4:43 PM CDT -----  Vitamin-D level is low.  We will initiate vitamin-D 36464 units per week, and recheck vitamin-D in 3 months.  I will send in orders.   Vitamin B12 level and other labs were normal.

## 2022-09-16 ENCOUNTER — TELEPHONE (OUTPATIENT)
Dept: FAMILY MEDICINE | Facility: CLINIC | Age: 57
End: 2022-09-16

## 2022-09-16 LAB — HCV AB S/CO SERPL IA: <0.1 S/CO RATIO (ref 0–0.9)

## 2022-10-12 PROCEDURE — G0179 PR HOME HEALTH MD RECERTIFICATION: ICD-10-PCS | Mod: ,,, | Performed by: FAMILY MEDICINE

## 2022-10-12 PROCEDURE — G0179 MD RECERTIFICATION HHA PT: HCPCS | Mod: ,,, | Performed by: FAMILY MEDICINE

## 2022-10-13 ENCOUNTER — EXTERNAL HOME HEALTH (OUTPATIENT)
Dept: HOME HEALTH SERVICES | Facility: HOSPITAL | Age: 57
End: 2022-10-13
Payer: MEDICARE

## 2022-10-19 ENCOUNTER — DOCUMENTATION ONLY (OUTPATIENT)
Dept: FAMILY MEDICINE | Facility: CLINIC | Age: 57
End: 2022-10-19

## 2022-10-19 NOTE — PROGRESS NOTES
Patient is requesting a critical-care registration form with click [employer] she does have a history of chronic respiratory failure but I do not see where she is on a nebulizer ventilator or other mechanical support requiring electricity.  This form should probably be filled out with her pulmonologist.

## 2022-10-20 ENCOUNTER — TELEPHONE (OUTPATIENT)
Dept: FAMILY MEDICINE | Facility: CLINIC | Age: 57
End: 2022-10-20

## 2022-10-20 NOTE — TELEPHONE ENCOUNTER
Patient was told Dr. Hua wants her Pulmonologist to address [place] and O2 issues.  She agrees and wants her Critical Care Form shredded.

## 2022-10-21 ENCOUNTER — HOSPITAL ENCOUNTER (INPATIENT)
Facility: HOSPITAL | Age: 57
LOS: 2 days | Discharge: HOME HEALTH | DRG: 607 | End: 2022-10-24
Attending: EMERGENCY MEDICINE | Admitting: INTERNAL MEDICINE
Payer: MEDICARE

## 2022-10-21 DIAGNOSIS — R07.9 CHEST PAIN: ICD-10-CM

## 2022-10-21 DIAGNOSIS — R52 BODY ACHES: ICD-10-CM

## 2022-10-21 DIAGNOSIS — M79.3 PANNICULITIS: Primary | ICD-10-CM

## 2022-10-21 DIAGNOSIS — L03.90 CELLULITIS: ICD-10-CM

## 2022-10-21 LAB
ALBUMIN SERPL BCP-MCNC: 3.8 G/DL (ref 3.5–5.2)
ALP SERPL-CCNC: 71 U/L (ref 55–135)
ALT SERPL W/O P-5'-P-CCNC: 16 U/L (ref 10–44)
ANION GAP SERPL CALC-SCNC: 8 MMOL/L (ref 8–16)
AST SERPL-CCNC: 20 U/L (ref 10–40)
BASOPHILS # BLD AUTO: 0.06 K/UL (ref 0–0.2)
BASOPHILS NFR BLD: 0.3 % (ref 0–1.9)
BILIRUB SERPL-MCNC: 0.6 MG/DL (ref 0.1–1)
BILIRUB UR QL STRIP: NEGATIVE
BNP SERPL-MCNC: 20 PG/ML (ref 0–99)
BUN SERPL-MCNC: 20 MG/DL (ref 6–20)
CALCIUM SERPL-MCNC: 9 MG/DL (ref 8.7–10.5)
CHLORIDE SERPL-SCNC: 104 MMOL/L (ref 95–110)
CLARITY UR: CLEAR
CO2 SERPL-SCNC: 25 MMOL/L (ref 23–29)
COLOR UR: YELLOW
CREAT SERPL-MCNC: 0.7 MG/DL (ref 0.5–1.4)
DIFFERENTIAL METHOD: ABNORMAL
EOSINOPHIL # BLD AUTO: 0 K/UL (ref 0–0.5)
EOSINOPHIL NFR BLD: 0.1 % (ref 0–8)
ERYTHROCYTE [DISTWIDTH] IN BLOOD BY AUTOMATED COUNT: 15 % (ref 11.5–14.5)
EST. GFR  (NO RACE VARIABLE): >60 ML/MIN/1.73 M^2
GLUCOSE SERPL-MCNC: 149 MG/DL (ref 70–110)
GLUCOSE UR QL STRIP: NEGATIVE
HCT VFR BLD AUTO: 40 % (ref 37–48.5)
HGB BLD-MCNC: 12.4 G/DL (ref 12–16)
HGB UR QL STRIP: NEGATIVE
IMM GRANULOCYTES # BLD AUTO: 0.07 K/UL (ref 0–0.04)
IMM GRANULOCYTES NFR BLD AUTO: 0.4 % (ref 0–0.5)
INFLUENZA A, MOLECULAR: NEGATIVE
INFLUENZA B, MOLECULAR: NEGATIVE
KETONES UR QL STRIP: NEGATIVE
LACTATE SERPL-SCNC: 1.1 MMOL/L (ref 0.5–1.9)
LEUKOCYTE ESTERASE UR QL STRIP: NEGATIVE
LIPASE SERPL-CCNC: 26 U/L (ref 4–60)
LYMPHOCYTES # BLD AUTO: 0.8 K/UL (ref 1–4.8)
LYMPHOCYTES NFR BLD: 4.2 % (ref 18–48)
MAGNESIUM SERPL-MCNC: 1.9 MG/DL (ref 1.6–2.6)
MCH RBC QN AUTO: 26.4 PG (ref 27–31)
MCHC RBC AUTO-ENTMCNC: 31 G/DL (ref 32–36)
MCV RBC AUTO: 85 FL (ref 82–98)
MONOCYTES # BLD AUTO: 0.7 K/UL (ref 0.3–1)
MONOCYTES NFR BLD: 3.8 % (ref 4–15)
NEUTROPHILS # BLD AUTO: 17.7 K/UL (ref 1.8–7.7)
NEUTROPHILS NFR BLD: 91.2 % (ref 38–73)
NITRITE UR QL STRIP: NEGATIVE
NRBC BLD-RTO: 0 /100 WBC
PH UR STRIP: 6 [PH] (ref 5–8)
PLATELET # BLD AUTO: 294 K/UL (ref 150–450)
PMV BLD AUTO: 9.3 FL (ref 9.2–12.9)
POTASSIUM SERPL-SCNC: 4.2 MMOL/L (ref 3.5–5.1)
PROT SERPL-MCNC: 7.9 G/DL (ref 6–8.4)
PROT UR QL STRIP: ABNORMAL
RBC # BLD AUTO: 4.7 M/UL (ref 4–5.4)
SARS-COV-2 RDRP RESP QL NAA+PROBE: NEGATIVE
SODIUM SERPL-SCNC: 137 MMOL/L (ref 136–145)
SP GR UR STRIP: >1.03 (ref 1–1.03)
SPECIMEN SOURCE: NORMAL
TROPONIN I SERPL DL<=0.01 NG/ML-MCNC: <0.03 NG/ML
TSH SERPL DL<=0.005 MIU/L-ACNC: 1.19 UIU/ML (ref 0.34–5.6)
URN SPEC COLLECT METH UR: ABNORMAL
UROBILINOGEN UR STRIP-ACNC: NEGATIVE EU/DL
WBC # BLD AUTO: 19.34 K/UL (ref 3.9–12.7)

## 2022-10-21 PROCEDURE — 63600175 PHARM REV CODE 636 W HCPCS: Performed by: STUDENT IN AN ORGANIZED HEALTH CARE EDUCATION/TRAINING PROGRAM

## 2022-10-21 PROCEDURE — 99285 EMERGENCY DEPT VISIT HI MDM: CPT | Mod: 25

## 2022-10-21 PROCEDURE — 87502 INFLUENZA DNA AMP PROBE: CPT | Performed by: NURSE PRACTITIONER

## 2022-10-21 PROCEDURE — 84484 ASSAY OF TROPONIN QUANT: CPT | Performed by: STUDENT IN AN ORGANIZED HEALTH CARE EDUCATION/TRAINING PROGRAM

## 2022-10-21 PROCEDURE — 85025 COMPLETE CBC W/AUTO DIFF WBC: CPT | Performed by: STUDENT IN AN ORGANIZED HEALTH CARE EDUCATION/TRAINING PROGRAM

## 2022-10-21 PROCEDURE — U0002 COVID-19 LAB TEST NON-CDC: HCPCS | Performed by: NURSE PRACTITIONER

## 2022-10-21 PROCEDURE — 25500020 PHARM REV CODE 255: Performed by: EMERGENCY MEDICINE

## 2022-10-21 PROCEDURE — 80053 COMPREHEN METABOLIC PANEL: CPT | Performed by: STUDENT IN AN ORGANIZED HEALTH CARE EDUCATION/TRAINING PROGRAM

## 2022-10-21 PROCEDURE — 93010 EKG 12-LEAD: ICD-10-PCS | Mod: ,,, | Performed by: INTERNAL MEDICINE

## 2022-10-21 PROCEDURE — 96375 TX/PRO/DX INJ NEW DRUG ADDON: CPT

## 2022-10-21 PROCEDURE — 83880 ASSAY OF NATRIURETIC PEPTIDE: CPT | Performed by: STUDENT IN AN ORGANIZED HEALTH CARE EDUCATION/TRAINING PROGRAM

## 2022-10-21 PROCEDURE — 96365 THER/PROPH/DIAG IV INF INIT: CPT

## 2022-10-21 PROCEDURE — 81003 URINALYSIS AUTO W/O SCOPE: CPT | Performed by: STUDENT IN AN ORGANIZED HEALTH CARE EDUCATION/TRAINING PROGRAM

## 2022-10-21 PROCEDURE — 84443 ASSAY THYROID STIM HORMONE: CPT | Performed by: STUDENT IN AN ORGANIZED HEALTH CARE EDUCATION/TRAINING PROGRAM

## 2022-10-21 PROCEDURE — 83735 ASSAY OF MAGNESIUM: CPT | Performed by: STUDENT IN AN ORGANIZED HEALTH CARE EDUCATION/TRAINING PROGRAM

## 2022-10-21 PROCEDURE — 83690 ASSAY OF LIPASE: CPT | Performed by: STUDENT IN AN ORGANIZED HEALTH CARE EDUCATION/TRAINING PROGRAM

## 2022-10-21 PROCEDURE — 83605 ASSAY OF LACTIC ACID: CPT | Performed by: STUDENT IN AN ORGANIZED HEALTH CARE EDUCATION/TRAINING PROGRAM

## 2022-10-21 PROCEDURE — 87040 BLOOD CULTURE FOR BACTERIA: CPT | Mod: 59 | Performed by: STUDENT IN AN ORGANIZED HEALTH CARE EDUCATION/TRAINING PROGRAM

## 2022-10-21 PROCEDURE — 93010 ELECTROCARDIOGRAM REPORT: CPT | Mod: ,,, | Performed by: INTERNAL MEDICINE

## 2022-10-21 PROCEDURE — 93005 ELECTROCARDIOGRAM TRACING: CPT | Performed by: INTERNAL MEDICINE

## 2022-10-21 RX ORDER — MORPHINE SULFATE 4 MG/ML
4 INJECTION, SOLUTION INTRAMUSCULAR; INTRAVENOUS
Status: COMPLETED | OUTPATIENT
Start: 2022-10-21 | End: 2022-10-21

## 2022-10-21 RX ORDER — VANCOMYCIN HCL IN 5 % DEXTROSE 1G/250ML
1000 PLASTIC BAG, INJECTION (ML) INTRAVENOUS ONCE
Status: COMPLETED | OUTPATIENT
Start: 2022-10-21 | End: 2022-10-22

## 2022-10-21 RX ADMIN — MORPHINE SULFATE 4 MG: 4 INJECTION, SOLUTION INTRAMUSCULAR; INTRAVENOUS at 10:10

## 2022-10-21 RX ADMIN — SODIUM CHLORIDE, SODIUM LACTATE, POTASSIUM CHLORIDE, AND CALCIUM CHLORIDE 1000 ML: .6; .31; .03; .02 INJECTION, SOLUTION INTRAVENOUS at 10:10

## 2022-10-21 RX ADMIN — PIPERACILLIN SODIUM AND TAZOBACTAM SODIUM 4.5 G: 4; .5 INJECTION, POWDER, LYOPHILIZED, FOR SOLUTION INTRAVENOUS at 10:10

## 2022-10-21 RX ADMIN — IOHEXOL 100 ML: 350 INJECTION, SOLUTION INTRAVENOUS at 11:10

## 2022-10-22 PROBLEM — E11.9 TYPE 2 DIABETES MELLITUS WITHOUT COMPLICATION: Status: RESOLVED | Noted: 2018-01-31 | Resolved: 2022-10-22

## 2022-10-22 PROBLEM — M79.3 PANNICULITIS: Status: ACTIVE | Noted: 2022-10-22

## 2022-10-22 LAB
ANION GAP SERPL CALC-SCNC: 9 MMOL/L (ref 8–16)
BASOPHILS # BLD AUTO: 0.05 K/UL (ref 0–0.2)
BASOPHILS NFR BLD: 0.2 % (ref 0–1.9)
BUN SERPL-MCNC: 16 MG/DL (ref 6–20)
CALCIUM SERPL-MCNC: 8.6 MG/DL (ref 8.7–10.5)
CHLORIDE SERPL-SCNC: 101 MMOL/L (ref 95–110)
CO2 SERPL-SCNC: 25 MMOL/L (ref 23–29)
CREAT SERPL-MCNC: 0.7 MG/DL (ref 0.5–1.4)
DIFFERENTIAL METHOD: ABNORMAL
EOSINOPHIL # BLD AUTO: 0 K/UL (ref 0–0.5)
EOSINOPHIL NFR BLD: 0 % (ref 0–8)
ERYTHROCYTE [DISTWIDTH] IN BLOOD BY AUTOMATED COUNT: 15.3 % (ref 11.5–14.5)
EST. GFR  (NO RACE VARIABLE): >60 ML/MIN/1.73 M^2
ESTIMATED AVG GLUCOSE: 126 MG/DL (ref 68–131)
GLUCOSE SERPL-MCNC: 115 MG/DL (ref 70–110)
HBA1C MFR BLD: 6 % (ref 4.5–6.2)
HCT VFR BLD AUTO: 41.2 % (ref 37–48.5)
HGB BLD-MCNC: 12.3 G/DL (ref 12–16)
IMM GRANULOCYTES # BLD AUTO: 0.14 K/UL (ref 0–0.04)
IMM GRANULOCYTES NFR BLD AUTO: 0.7 % (ref 0–0.5)
LYMPHOCYTES # BLD AUTO: 1.2 K/UL (ref 1–4.8)
LYMPHOCYTES NFR BLD: 5.6 % (ref 18–48)
MAGNESIUM SERPL-MCNC: 1.9 MG/DL (ref 1.6–2.6)
MCH RBC QN AUTO: 26.2 PG (ref 27–31)
MCHC RBC AUTO-ENTMCNC: 29.9 G/DL (ref 32–36)
MCV RBC AUTO: 88 FL (ref 82–98)
MONOCYTES # BLD AUTO: 0.7 K/UL (ref 0.3–1)
MONOCYTES NFR BLD: 3.3 % (ref 4–15)
NEUTROPHILS # BLD AUTO: 18.5 K/UL (ref 1.8–7.7)
NEUTROPHILS NFR BLD: 90.2 % (ref 38–73)
NRBC BLD-RTO: 0 /100 WBC
PLATELET # BLD AUTO: 241 K/UL (ref 150–450)
PMV BLD AUTO: 10.7 FL (ref 9.2–12.9)
POTASSIUM SERPL-SCNC: 4 MMOL/L (ref 3.5–5.1)
PROCALCITONIN SERPL IA-MCNC: 1.76 NG/ML (ref 0–0.5)
RBC # BLD AUTO: 4.69 M/UL (ref 4–5.4)
SODIUM SERPL-SCNC: 135 MMOL/L (ref 136–145)
WBC # BLD AUTO: 20.56 K/UL (ref 3.9–12.7)

## 2022-10-22 PROCEDURE — 80048 BASIC METABOLIC PNL TOTAL CA: CPT | Performed by: FAMILY MEDICINE

## 2022-10-22 PROCEDURE — 63600175 PHARM REV CODE 636 W HCPCS: Performed by: STUDENT IN AN ORGANIZED HEALTH CARE EDUCATION/TRAINING PROGRAM

## 2022-10-22 PROCEDURE — 36415 COLL VENOUS BLD VENIPUNCTURE: CPT | Performed by: FAMILY MEDICINE

## 2022-10-22 PROCEDURE — 63600175 PHARM REV CODE 636 W HCPCS: Performed by: INTERNAL MEDICINE

## 2022-10-22 PROCEDURE — 94640 AIRWAY INHALATION TREATMENT: CPT

## 2022-10-22 PROCEDURE — 25000003 PHARM REV CODE 250: Performed by: INTERNAL MEDICINE

## 2022-10-22 PROCEDURE — 99900031 HC PATIENT EDUCATION (STAT)

## 2022-10-22 PROCEDURE — 94761 N-INVAS EAR/PLS OXIMETRY MLT: CPT

## 2022-10-22 PROCEDURE — 96376 TX/PRO/DX INJ SAME DRUG ADON: CPT

## 2022-10-22 PROCEDURE — 83036 HEMOGLOBIN GLYCOSYLATED A1C: CPT | Performed by: FAMILY MEDICINE

## 2022-10-22 PROCEDURE — 83735 ASSAY OF MAGNESIUM: CPT | Performed by: FAMILY MEDICINE

## 2022-10-22 PROCEDURE — 84145 PROCALCITONIN (PCT): CPT | Performed by: FAMILY MEDICINE

## 2022-10-22 PROCEDURE — 63600175 PHARM REV CODE 636 W HCPCS: Performed by: EMERGENCY MEDICINE

## 2022-10-22 PROCEDURE — 85025 COMPLETE CBC W/AUTO DIFF WBC: CPT | Performed by: FAMILY MEDICINE

## 2022-10-22 PROCEDURE — 99900035 HC TECH TIME PER 15 MIN (STAT)

## 2022-10-22 PROCEDURE — 63600175 PHARM REV CODE 636 W HCPCS: Performed by: FAMILY MEDICINE

## 2022-10-22 PROCEDURE — 96366 THER/PROPH/DIAG IV INF ADDON: CPT

## 2022-10-22 PROCEDURE — 12000002 HC ACUTE/MED SURGE SEMI-PRIVATE ROOM

## 2022-10-22 PROCEDURE — 25000242 PHARM REV CODE 250 ALT 637 W/ HCPCS: Performed by: FAMILY MEDICINE

## 2022-10-22 PROCEDURE — 25000003 PHARM REV CODE 250: Performed by: FAMILY MEDICINE

## 2022-10-22 PROCEDURE — 96367 TX/PROPH/DG ADDL SEQ IV INF: CPT

## 2022-10-22 PROCEDURE — 94660 CPAP INITIATION&MGMT: CPT

## 2022-10-22 PROCEDURE — 27000221 HC OXYGEN, UP TO 24 HOURS

## 2022-10-22 PROCEDURE — 96372 THER/PROPH/DIAG INJ SC/IM: CPT | Performed by: FAMILY MEDICINE

## 2022-10-22 PROCEDURE — 25000003 PHARM REV CODE 250: Performed by: STUDENT IN AN ORGANIZED HEALTH CARE EDUCATION/TRAINING PROGRAM

## 2022-10-22 RX ORDER — SODIUM CHLORIDE 9 MG/ML
INJECTION, SOLUTION INTRAVENOUS CONTINUOUS
Status: DISCONTINUED | OUTPATIENT
Start: 2022-10-22 | End: 2022-10-23

## 2022-10-22 RX ORDER — FLUTICASONE FUROATE AND VILANTEROL 200; 25 UG/1; UG/1
1 POWDER RESPIRATORY (INHALATION) DAILY
Status: DISCONTINUED | OUTPATIENT
Start: 2022-10-22 | End: 2022-10-22

## 2022-10-22 RX ORDER — ENOXAPARIN SODIUM 100 MG/ML
40 INJECTION SUBCUTANEOUS EVERY 24 HOURS
Status: DISCONTINUED | OUTPATIENT
Start: 2022-10-22 | End: 2022-10-22

## 2022-10-22 RX ORDER — IPRATROPIUM BROMIDE 0.5 MG/2.5ML
0.5 SOLUTION RESPIRATORY (INHALATION) EVERY 6 HOURS
Status: DISCONTINUED | OUTPATIENT
Start: 2022-10-22 | End: 2022-10-22

## 2022-10-22 RX ORDER — ONDANSETRON 2 MG/ML
4 INJECTION INTRAMUSCULAR; INTRAVENOUS EVERY 6 HOURS PRN
Status: DISCONTINUED | OUTPATIENT
Start: 2022-10-22 | End: 2022-10-24 | Stop reason: HOSPADM

## 2022-10-22 RX ORDER — NALOXONE HCL 0.4 MG/ML
0.02 VIAL (ML) INJECTION
Status: DISCONTINUED | OUTPATIENT
Start: 2022-10-22 | End: 2022-10-24 | Stop reason: HOSPADM

## 2022-10-22 RX ORDER — POLYETHYLENE GLYCOL 3350 17 G/17G
17 POWDER, FOR SOLUTION ORAL 2 TIMES DAILY PRN
Status: DISCONTINUED | OUTPATIENT
Start: 2022-10-22 | End: 2022-10-24 | Stop reason: HOSPADM

## 2022-10-22 RX ORDER — BUDESONIDE 0.5 MG/2ML
1 INHALANT ORAL EVERY 12 HOURS
Status: DISCONTINUED | OUTPATIENT
Start: 2022-10-22 | End: 2022-10-24 | Stop reason: HOSPADM

## 2022-10-22 RX ORDER — LEVOFLOXACIN 5 MG/ML
750 INJECTION, SOLUTION INTRAVENOUS
Status: DISCONTINUED | OUTPATIENT
Start: 2022-10-22 | End: 2022-10-22

## 2022-10-22 RX ORDER — IBUPROFEN 200 MG
24 TABLET ORAL
Status: DISCONTINUED | OUTPATIENT
Start: 2022-10-22 | End: 2022-10-24 | Stop reason: HOSPADM

## 2022-10-22 RX ORDER — ACETAMINOPHEN 325 MG/1
650 TABLET ORAL EVERY 8 HOURS PRN
Status: DISCONTINUED | OUTPATIENT
Start: 2022-10-22 | End: 2022-10-24 | Stop reason: HOSPADM

## 2022-10-22 RX ORDER — IPRATROPIUM BROMIDE 0.5 MG/2.5ML
0.5 SOLUTION RESPIRATORY (INHALATION)
Status: DISCONTINUED | OUTPATIENT
Start: 2022-10-22 | End: 2022-10-24 | Stop reason: HOSPADM

## 2022-10-22 RX ORDER — IPRATROPIUM BROMIDE AND ALBUTEROL SULFATE 2.5; .5 MG/3ML; MG/3ML
3 SOLUTION RESPIRATORY (INHALATION) EVERY 4 HOURS PRN
Status: DISCONTINUED | OUTPATIENT
Start: 2022-10-22 | End: 2022-10-24 | Stop reason: HOSPADM

## 2022-10-22 RX ORDER — ACETAMINOPHEN 10 MG/ML
1000 INJECTION, SOLUTION INTRAVENOUS EVERY 8 HOURS
Status: COMPLETED | OUTPATIENT
Start: 2022-10-22 | End: 2022-10-23

## 2022-10-22 RX ORDER — ACETAMINOPHEN 500 MG
1000 TABLET ORAL
Status: COMPLETED | OUTPATIENT
Start: 2022-10-22 | End: 2022-10-22

## 2022-10-22 RX ORDER — MORPHINE SULFATE 4 MG/ML
4 INJECTION, SOLUTION INTRAMUSCULAR; INTRAVENOUS
Status: COMPLETED | OUTPATIENT
Start: 2022-10-22 | End: 2022-10-22

## 2022-10-22 RX ORDER — CLINDAMYCIN PHOSPHATE 900 MG/50ML
900 INJECTION, SOLUTION INTRAVENOUS
Status: DISCONTINUED | OUTPATIENT
Start: 2022-10-22 | End: 2022-10-24 | Stop reason: HOSPADM

## 2022-10-22 RX ORDER — SODIUM CHLORIDE 0.9 % (FLUSH) 0.9 %
10 SYRINGE (ML) INJECTION
Status: DISCONTINUED | OUTPATIENT
Start: 2022-10-22 | End: 2022-10-24 | Stop reason: HOSPADM

## 2022-10-22 RX ORDER — SIMETHICONE 80 MG
1 TABLET,CHEWABLE ORAL 4 TIMES DAILY PRN
Status: DISCONTINUED | OUTPATIENT
Start: 2022-10-22 | End: 2022-10-24 | Stop reason: HOSPADM

## 2022-10-22 RX ORDER — AMOXICILLIN 250 MG
1 CAPSULE ORAL 2 TIMES DAILY PRN
Status: DISCONTINUED | OUTPATIENT
Start: 2022-10-22 | End: 2022-10-24 | Stop reason: HOSPADM

## 2022-10-22 RX ORDER — CITALOPRAM 20 MG/1
20 TABLET, FILM COATED ORAL DAILY
Status: DISCONTINUED | OUTPATIENT
Start: 2022-10-22 | End: 2022-10-24 | Stop reason: HOSPADM

## 2022-10-22 RX ORDER — IBUPROFEN 200 MG
16 TABLET ORAL
Status: DISCONTINUED | OUTPATIENT
Start: 2022-10-22 | End: 2022-10-24 | Stop reason: HOSPADM

## 2022-10-22 RX ORDER — ARFORMOTEROL TARTRATE 15 UG/2ML
15 SOLUTION RESPIRATORY (INHALATION) 2 TIMES DAILY
Status: DISCONTINUED | OUTPATIENT
Start: 2022-10-22 | End: 2022-10-24 | Stop reason: HOSPADM

## 2022-10-22 RX ORDER — HYDROCODONE BITARTRATE AND ACETAMINOPHEN 5; 325 MG/1; MG/1
1 TABLET ORAL EVERY 4 HOURS PRN
Status: DISCONTINUED | OUTPATIENT
Start: 2022-10-22 | End: 2022-10-24 | Stop reason: HOSPADM

## 2022-10-22 RX ORDER — GLUCAGON 1 MG
1 KIT INJECTION
Status: DISCONTINUED | OUTPATIENT
Start: 2022-10-22 | End: 2022-10-24 | Stop reason: HOSPADM

## 2022-10-22 RX ORDER — BUSPIRONE HYDROCHLORIDE 7.5 MG/1
7.5 TABLET ORAL 2 TIMES DAILY
Status: DISCONTINUED | OUTPATIENT
Start: 2022-10-22 | End: 2022-10-22

## 2022-10-22 RX ORDER — TALC
6 POWDER (GRAM) TOPICAL NIGHTLY PRN
Status: DISCONTINUED | OUTPATIENT
Start: 2022-10-22 | End: 2022-10-24 | Stop reason: HOSPADM

## 2022-10-22 RX ORDER — ONDANSETRON 2 MG/ML
4 INJECTION INTRAMUSCULAR; INTRAVENOUS EVERY 6 HOURS PRN
Status: DISCONTINUED | OUTPATIENT
Start: 2022-10-22 | End: 2022-10-22

## 2022-10-22 RX ORDER — ENOXAPARIN SODIUM 100 MG/ML
40 INJECTION SUBCUTANEOUS EVERY 12 HOURS
Status: DISCONTINUED | OUTPATIENT
Start: 2022-10-22 | End: 2022-10-24 | Stop reason: HOSPADM

## 2022-10-22 RX ORDER — MORPHINE SULFATE 4 MG/ML
4 INJECTION, SOLUTION INTRAMUSCULAR; INTRAVENOUS EVERY 4 HOURS PRN
Status: DISCONTINUED | OUTPATIENT
Start: 2022-10-22 | End: 2022-10-24 | Stop reason: HOSPADM

## 2022-10-22 RX ADMIN — CLINDAMYCIN IN 5 PERCENT DEXTROSE 900 MG: 18 INJECTION, SOLUTION INTRAVENOUS at 09:10

## 2022-10-22 RX ADMIN — CITALOPRAM HYDROBROMIDE 20 MG: 20 TABLET ORAL at 09:10

## 2022-10-22 RX ADMIN — BUDESONIDE 1 MG: 0.5 INHALANT RESPIRATORY (INHALATION) at 08:10

## 2022-10-22 RX ADMIN — ACETAMINOPHEN 1000 MG: 10 INJECTION INTRAVENOUS at 12:10

## 2022-10-22 RX ADMIN — ARFORMOTEROL TARTRATE 15 MCG: 15 SOLUTION RESPIRATORY (INHALATION) at 08:10

## 2022-10-22 RX ADMIN — PIPERACILLIN SODIUM AND TAZOBACTAM SODIUM 3.38 G: 3; .375 INJECTION, POWDER, LYOPHILIZED, FOR SOLUTION INTRAVENOUS at 10:10

## 2022-10-22 RX ADMIN — BUSPIRONE HYDROCHLORIDE 7.5 MG: 5 TABLET ORAL at 10:10

## 2022-10-22 RX ADMIN — ACETAMINOPHEN 1000 MG: 10 INJECTION INTRAVENOUS at 09:10

## 2022-10-22 RX ADMIN — MORPHINE SULFATE 4 MG: 4 INJECTION, SOLUTION INTRAMUSCULAR; INTRAVENOUS at 05:10

## 2022-10-22 RX ADMIN — SENNOSIDES AND DOCUSATE SODIUM 1 TABLET: 50; 8.6 TABLET ORAL at 04:10

## 2022-10-22 RX ADMIN — IPRATROPIUM BROMIDE 0.5 MG: 0.5 SOLUTION RESPIRATORY (INHALATION) at 08:10

## 2022-10-22 RX ADMIN — ONDANSETRON 4 MG: 2 INJECTION INTRAMUSCULAR; INTRAVENOUS at 09:10

## 2022-10-22 RX ADMIN — VANCOMYCIN HYDROCHLORIDE 1000 MG: 1 INJECTION, POWDER, LYOPHILIZED, FOR SOLUTION INTRAVENOUS at 01:10

## 2022-10-22 RX ADMIN — ARFORMOTEROL TARTRATE 15 MCG: 15 SOLUTION RESPIRATORY (INHALATION) at 07:10

## 2022-10-22 RX ADMIN — VANCOMYCIN HYDROCHLORIDE 2000 MG: 500 INJECTION, POWDER, LYOPHILIZED, FOR SOLUTION INTRAVENOUS at 11:10

## 2022-10-22 RX ADMIN — ENOXAPARIN SODIUM 40 MG: 40 INJECTION SUBCUTANEOUS at 09:10

## 2022-10-22 RX ADMIN — IPRATROPIUM BROMIDE 0.5 MG: 0.5 SOLUTION RESPIRATORY (INHALATION) at 07:10

## 2022-10-22 RX ADMIN — CLINDAMYCIN IN 5 PERCENT DEXTROSE 900 MG: 18 INJECTION, SOLUTION INTRAVENOUS at 08:10

## 2022-10-22 RX ADMIN — CLINDAMYCIN IN 5 PERCENT DEXTROSE 900 MG: 18 INJECTION, SOLUTION INTRAVENOUS at 04:10

## 2022-10-22 RX ADMIN — PIPERACILLIN SODIUM AND TAZOBACTAM SODIUM 3.38 G: 3; .375 INJECTION, POWDER, LYOPHILIZED, FOR SOLUTION INTRAVENOUS at 06:10

## 2022-10-22 RX ADMIN — VANCOMYCIN HYDROCHLORIDE 1000 MG: 1 INJECTION, POWDER, LYOPHILIZED, FOR SOLUTION INTRAVENOUS at 12:10

## 2022-10-22 RX ADMIN — HYDROCODONE BITARTRATE AND ACETAMINOPHEN 1 TABLET: 5; 325 TABLET ORAL at 09:10

## 2022-10-22 RX ADMIN — ENOXAPARIN SODIUM 40 MG: 40 INJECTION SUBCUTANEOUS at 08:10

## 2022-10-22 RX ADMIN — MORPHINE SULFATE 4 MG: 4 INJECTION, SOLUTION INTRAMUSCULAR; INTRAVENOUS at 11:10

## 2022-10-22 RX ADMIN — BUSPIRONE HYDROCHLORIDE 7.5 MG: 5 TABLET ORAL at 09:10

## 2022-10-22 RX ADMIN — PIPERACILLIN SODIUM AND TAZOBACTAM SODIUM 3.38 G: 3; .375 INJECTION, POWDER, LYOPHILIZED, FOR SOLUTION INTRAVENOUS at 02:10

## 2022-10-22 RX ADMIN — IPRATROPIUM BROMIDE 0.5 MG: 0.5 SOLUTION RESPIRATORY (INHALATION) at 01:10

## 2022-10-22 RX ADMIN — MORPHINE SULFATE 4 MG: 4 INJECTION, SOLUTION INTRAMUSCULAR; INTRAVENOUS at 09:10

## 2022-10-22 RX ADMIN — ACETAMINOPHEN 1000 MG: 500 TABLET ORAL at 12:10

## 2022-10-22 RX ADMIN — HYDROCODONE BITARTRATE AND ACETAMINOPHEN 1 TABLET: 5; 325 TABLET ORAL at 04:10

## 2022-10-22 RX ADMIN — SODIUM CHLORIDE: 0.9 INJECTION, SOLUTION INTRAVENOUS at 04:10

## 2022-10-22 RX ADMIN — BUDESONIDE 1 MG: 0.5 INHALANT RESPIRATORY (INHALATION) at 07:10

## 2022-10-22 RX ADMIN — MORPHINE SULFATE 4 MG: 4 INJECTION, SOLUTION INTRAMUSCULAR; INTRAVENOUS at 12:10

## 2022-10-22 RX ADMIN — CLINDAMYCIN IN 5 PERCENT DEXTROSE 900 MG: 18 INJECTION, SOLUTION INTRAVENOUS at 02:10

## 2022-10-22 NOTE — PROGRESS NOTES
Automatic Inhaler to Nebulizer Interchange    Tiotropium (Spiriva Respimat) 5 mcg changed to Ipratropium 0.5 mg every 6 hours per [facility] Automatic Therapeutic Sustitutions Protocol.    Please contact pharmacy at extension [phone] with any questions.     Thank you,   Darling Johnson

## 2022-10-22 NOTE — ED PROVIDER NOTES
Encounter Date: 10/21/2022       History     Chief Complaint   Patient presents with    Generalized Body Aches     C/o pain all over, chills.     HPI    56-year-old female with a past medical history of morbid obesity s/p gastric bypass surgery, COPD on 2 L home oxygen, DM and anemia presenting with body aches.  Patient states that this feels similar to when she previously had sepsis due to panniculitis.  Reports that this morning she felt fine but then developed body aches/myalgias this afternoon and is having pain in her pannus.  She also states that she is having left-sided chest pain.  Reports this has been ongoing for the past couple months and is scheduled for an outpatient stress test but has not presented as she is scared.  She reports that the left-sided chest pain feels different today than it has previously.  She also reports that she is short of breath with associated cough.  Denies any fevers at home, endorses nausea without vomiting.  No diarrhea, melena or hematochezia.  Reports normal bowel movements.    Review of patient's allergies indicates:   Allergen Reactions    Aspirin     Dilaudid [hydromorphone]      Excessive sleepiness    Nsaids (non-steroidal anti-inflammatory drug) Hives    Teflaro [ceftaroline fosamil]      Allergic reaction/ hives/itching     Past Medical History:   Diagnosis Date    Allergy     Anemia     Asthma     COPD (chronic obstructive pulmonary disease)     Deep vein thrombosis     Depression     Diabetes mellitus, type 2     Encounter for blood transfusion     Heart murmur     Neuromuscular disorder      Past Surgical History:   Procedure Laterality Date     SECTION      DILATION AND CURETTAGE OF UTERUS      gastric sleeve      Ema     TONSILLECTOMY       Family History   Problem Relation Age of Onset    Heart disease Mother     Diabetes Mother     Arthritis Mother     Depression Mother     Cancer Father     COPD Father     Arthritis Maternal Grandmother      Cancer Maternal Grandmother     Cancer Maternal Grandfather     Cancer Paternal Grandmother     Kidney disease Paternal Grandmother     Diabetes Paternal Grandmother     Diabetes Paternal Grandfather     Hyperlipidemia Paternal Grandfather      Social History     Tobacco Use    Smoking status: Every Day     Packs/day: 1.00     Types: Cigarettes    Smokeless tobacco: Never   Substance Use Topics    Alcohol use: No    Drug use: No     Review of Systems   Constitutional:  Positive for activity change and chills. Negative for fever.   HENT:  Negative for congestion, facial swelling and sore throat.    Eyes:  Negative for discharge and redness.   Respiratory:  Positive for cough and shortness of breath.    Cardiovascular:  Positive for chest pain and leg swelling. Negative for palpitations.   Gastrointestinal:  Positive for abdominal pain (pannus) and nausea. Negative for vomiting.   Genitourinary:  Negative for dysuria and hematuria.   Musculoskeletal:  Positive for myalgias. Negative for back pain.   Skin:  Negative for rash.   Neurological:  Negative for seizures, weakness and headaches.   Hematological:  Does not bruise/bleed easily.     Physical Exam     Initial Vitals [10/21/22 1752]   BP Pulse Resp Temp SpO2   (!) 168/108 (!) 134 (!) 24 98.4 °F (36.9 °C) 100 %      MAP       --         Physical Exam    Nursing note and vitals reviewed.  Constitutional: She is not diaphoretic. No distress.   Obese female laying comfortably on stretcher   HENT:   Head: Normocephalic and atraumatic.   Mouth/Throat: Oropharynx is clear and moist. No oropharyngeal exudate.   Eyes: Conjunctivae and EOM are normal. Pupils are equal, round, and reactive to light.   Neck:   Normal range of motion.  Cardiovascular:  Regular rhythm.   Tachycardia present.         Pulmonary/Chest: Breath sounds normal. No respiratory distress. She has no wheezes. She has no rhonchi. She has no rales.   Coughing during exam   Abdominal: Abdomen is soft.    Pannus is tender to palpation, erythematous and warm to the touch There is no guarding.   Musculoskeletal:      Cervical back: Normal range of motion.     Neurological: She is alert and oriented to person, place, and time. She has normal strength. No cranial nerve deficit.   Skin: Skin is warm and dry.       ED Course   Procedures  Labs Reviewed   CBC W/ AUTO DIFFERENTIAL - Abnormal; Notable for the following components:       Result Value    WBC 19.34 (*)     MCH 26.4 (*)     MCHC 31.0 (*)     RDW 15.0 (*)     Gran # (ANC) 17.7 (*)     Immature Grans (Abs) 0.07 (*)     Lymph # 0.8 (*)     Gran % 91.2 (*)     Lymph % 4.2 (*)     Mono % 3.8 (*)     All other components within normal limits   COMPREHENSIVE METABOLIC PANEL - Abnormal; Notable for the following components:    Glucose 149 (*)     All other components within normal limits   URINALYSIS, REFLEX TO URINE CULTURE - Abnormal; Notable for the following components:    Specific Gravity, UA >1.030 (*)     Protein, UA Trace (*)     All other components within normal limits    Narrative:     Specimen Source->Urine   CULTURE, BLOOD   CULTURE, BLOOD   SARS-COV-2 RNA AMPLIFICATION, QUAL   INFLUENZA A AND B ANTIGEN    Narrative:     Specimen Source->Nasopharyngeal Swab   LACTIC ACID, PLASMA   LIPASE   MAGNESIUM   TROPONIN I   TSH   B-TYPE NATRIURETIC PEPTIDE          Imaging Results              CT Abdomen Pelvis With Contrast (Final result)  Result time 10/21/22 23:42:36      Final result by Yoni Horowitz MD (10/21/22 23:42:36)                   Narrative:      PROCEDURE: CT ABDOMEN PELVIS WITH IV CONTRAST, 10/21/2022 10:31 PM CDT    CLINICAL INDICATION:  Abdominal abscess/infection suspected.    COMPARISON: 1/22/2022    TECHNIQUE: Helical CT acquisition performed of the abdomen and pelvis with coronal and sagittal reformats. IV Contrast: Nonionic iodinated contrast was used.  PO Contrast: None.  Complications: None.    CT Dose reduction techniques were utilized  for this examination with 1 or more of the following: Automated exposure control and/or adjustment of the mA or kV according to patient size, and/or use of iterative reconstruction technique.    FINDINGS:    Examination is significantly limited due to body habitus and attenuation artifact from soft tissue interface with the CT tube wall as well as extensive beam hardening artifact in this setting.    Visualized chest base: No focal consolidations or pleural effusions.    Hepatobiliary system: No suspicious lesions.    Gallbladder: Distended gallbladder.    Spleen: Normal size.    Pancreas: No focal masses. No pancreatic ductal dilatation. No significant peripancreatic inflammatory changes.    Adrenal glands: Normal.    Kidneys and collecting system: No hydronephrosis. Excretory phase of contrast enhancement limits evaluation of kidneys. Extreme beam hardening artifact in this region limits evaluation of kidneys.    Bowel and mesentery: No free air identified. Ventral abdominal wall herniation/eventration again seen with traversing loops of small and large bowel. Portions of these are outside the field of view. No definite evidence of abnormal dilatation identified.    Appendix: Not reliably identified. No definite secondary signs of appendicitis.    Pelvic organs: Uterus present. Lyons catheter in urinary bladder.    Bones/MSK: Acute displaced fracture identified. Partially visualized large pannus extending from the right thigh with skin thickening and edema consistent with panniculitis.    IMPRESSION:    Partially visualized large pannus extending from the right thigh with skin thickening and edema consistent with panniculitis.    Ventral abdominal wall herniation/eventration again seen with traversing loops of small and large bowel. Portions of these are outside the field of view. No definite evidence of abnormal dilatation identified.      Electronically signed by:  Yoni Horowitz MD  10/21/2022 11:42 PM CDT  Workstation: [id number]                                     CTA Chest Non-Coronary (PE Studies) (Final result)  Result time 10/21/22 23:40:31      Final result by Alanna Bateman MD (10/21/22 23:40:31)                   Narrative:    EXAM:  CT Angiography Chest With Intravenous Contrast    CLINICAL HISTORY:  The patient is 56 years old and is Female; Pulmonary embolism (PE) suspected, high prob    TECHNIQUE:  Axial computed tomographic angiography images of the chest with intravenous contrast.  Sagittal and coronal reformatted images were created and reviewed.  This CT exam was performed using one or more of the following dose reduction techniques:  automated exposure control, adjustment of the mA and/or kV according to patient size, and/or use of iterative reconstruction technique.  MIP reconstructed images were created and reviewed.    COMPARISON:  None.    FINDINGS:  LIMITATIONS:  Markedly limited examination secondary to contrast timing bolus.  PULMONARY ARTERIES:  No large central pulmonary emboli.  AORTA:  No acute findings.  No thoracic aortic aneurysm.  LUNGS:  Lung bases are incompletely imaged.  Mild left basilar bandlike atelectasis.  Right lower lobe 4 mm pulmonary nodule (series 4, image 26), for which follow up imaging is not indicated.  PLEURAL SPACE:  Unremarkable.  No significant effusion.  No pneumothorax.  HEART:  Unremarkable.  No cardiomegaly.  No significant pericardial effusion.  No evidence of RV dysfunction.  BONES/JOINTS:  No acute fracture.  No dislocation.  SOFT TISSUES:  Unremarkable.  LYMPH NODES:  Unremarkable.  No enlarged lymph nodes.    IMPRESSION:  1.  Markedly limited examination secondary to contrast timing bolus.  2.  No large central pulmonary emboli.    Electronically signed by:  Alanna Guzmán MD  10/21/2022 11:40 PM CDT Workstation: [id number]                                     X-Ray Chest 1 View (Final result)  Result time 10/21/22 23:45:29      Final result by Alanna  MD Bhavana (10/21/22 23:45:29)                   Narrative:    EXAM:  XR Chest, 1 View    CLINICAL HISTORY:  The patient is 56 years old and is Female;     Generalized Body Aches (C/o pain all over, chills.)    TECHNIQUE:  Frontal view of the chest.    COMPARISON:  Chest radiograph 8/26/2021    FINDINGS:  LUNGS:  Mild bilateral peribronchovascular opacities.  PLEURAL SPACE:  Unremarkable.  No pneumothorax.  HEART:  Unremarkable.  No cardiomegaly.  MEDIASTINUM:  Unremarkable.  BONES/JOINTS:  Unremarkable.    IMPRESSION:  Mild bilateral peribronchovascular opacities may be secondary to mild pulmonary edema or infection.    Electronically signed by:  Alanna Guzmán MD  10/21/2022 11:45 PM CDT Workstation: [id number]                                     Medications   vancomycin - pharmacy to dose (has no administration in time range)   vancomycin in dextrose 5 % 1 gram/250 mL IVPB 1,000 mg (1,000 mg Intravenous New Bag 10/22/22 0011)     Followed by   vancomycin in dextrose 5 % 1 gram/250 mL IVPB 1,000 mg (has no administration in time range)   acetaminophen tablet 1,000 mg (has no administration in time range)   piperacillin-tazobactam 4.5 g in dextrose 5 % 100 mL IVPB (ready to mix system) (0 g Intravenous Stopped 10/22/22 0012)   lactated ringers bolus 1,000 mL (0 mLs Intravenous Stopped 10/22/22 0039)   morphine injection 4 mg (4 mg Intravenous Given 10/21/22 2242)   iohexoL (OMNIPAQUE 350) injection 100 mL (100 mLs Intravenous Given 10/21/22 2316)   morphine injection 4 mg (4 mg Intravenous Given 10/22/22 0042)     Medical Decision Making:   Initial Assessment:   56-year-old female with a past medical history of morbid obesity s/p gastric bypass surgery, COPD on 2 L home oxygen, DM and anemia presenting with body aches.   Differential Diagnosis:   Differential diagnosis includes but is not limited to influenza, COVID-19, cellulitis, abscess, UTI, pneumonia  Clinical Tests:   Lab Tests: Reviewed  Radiological  Study: Reviewed  Medical Tests: Reviewed  ED Management:  Physical exam significant for tenderness and erythema to the patient's pannus.  She is afebrile but arrives tachycardic and tachypneic.  Lab significant for leukocytosis, lactic acid within normal limits.  Blood cultures pending. CT abdomen and pelvis showed evidence of panniculitis, CTPE was limited but showed no evidence of large thrombus.  Will treat the patient with vancomycin and Zosyn.  Patient has a documented allergy to ceftaroline, will avoid giving cefepime.  Upon rechecking temp, she now has a temperature of 102°, will give Tylenol.  Case discussed with hospitalist for admission.    Kalyn Moss MD  [facility]           ED Course as of 10/22/22 0046   Fri Oct 21, 2022   2135 EKG 8:57 p.m. sinus tachycardia rate of 102, right axis deviation.  No STEMI.  EKG interpreted independently by me. [JR]   2150 WBC(!): 19.34 [EM]      ED Course User Index  [EM] Kalyn Moss MD  [JR] Deonte Evans DO                 Clinical Impression:   Final diagnoses:  [R52] Body aches  [R07.9] Chest pain        ED Disposition Condition    Admit Stable                Kalyn Moss MD  Resident  10/22/22 0046

## 2022-10-22 NOTE — PROGRESS NOTES
VANCOMYCIN PHARMACOKINETIC NOTE:  Vancomycin Day # 1    Objective/Assessment:    Diagnosis/Indication for Vancomycin: Panniculitis      56 y.o., female; Actual Body Weight = (!) 177.1 kg (390 lb 7 oz).    The patient has the following labs:  10/22/2022 Estimated Creatinine Clearance: 145.2 mL/min (based on SCr of 0.7 mg/dL). Lab Results   Component Value Date    BUN 20 10/21/2022     Lab Results   Component Value Date    WBC 19.34 (H) 10/21/2022          Plan:  Adjust vancomycin dose and/or frequency based on the patient's actual weight and renal function:  Initiate Vancomycin 2000 mg IV every 12 hours.  Orders have been entered into patient's chart.        Vancomycin trough level has been ordered for 10/23 at 11:00    Pharmacy will manage vancomycin therapy, monitor serum vancomycin levels, monitor renal function and adjust regimen as necessary.    Thank you for allowing us to participate in this patient's care.     Darling Johnosn 10/22/2022   Department of Pharmacy  Ext [phone]

## 2022-10-22 NOTE — PROGRESS NOTES
Pharmacist Renal Adjustment Note    Adriana Zaragoza is a 56 y.o. female being treated with Enoxaparin.     Patient Data:    Vital Signs (Most Recent):  Temp: 98.6 °F (37 °C) (10/22/22 0333)  Pulse: 92 (10/22/22 0333)  Resp: 20 (10/22/22 0405)  BP: (!) 98/58 (10/22/22 0333)  SpO2: 97 % (10/22/22 0333)   Vital Signs (72h Range):  Temp:  [98.4 °F (36.9 °C)-102 °F (38.9 °C)]   Pulse:  []   Resp:  [16-24]   BP: ()/()   SpO2:  [97 %-100 %]      Recent Labs   Lab 10/21/22  2110   CREATININE 0.7     Serum creatinine: 0.7 mg/dL 10/21/22 2110  Estimated creatinine clearance: 145.2 mL/min  Body mass index is 67.02 kg/m².    Enoxaparin 40 mg once daily will be changed to Enoxaparin 40 mg every 12 hours per pharmacy protocol.(BMI > 40)     Pharmacist's Name: Darling Johnson  Pharmacist's Extension: [phone]

## 2022-10-22 NOTE — CARE UPDATE
10/22/22 0729   Patient Assessment/Suction   Level of Consciousness (AVPU) alert   Respiratory Effort Normal;Unlabored   Expansion/Accessory Muscles/Retractions no use of accessory muscles;no retractions;expansion symmetric   All Lung Fields Breath Sounds diminished   Rhythm/Pattern, Respiratory unlabored;pattern regular;depth regular;chest wiggle adequate;no shortness of breath reported   Cough Frequency infrequent   Cough Type good   PRE-TX-O2   O2 Device (Oxygen Therapy) nasal cannula   $ Is the patient on Low Flow Oxygen? Yes   Flow (L/min) 2   SpO2 97 %   Pulse 80   Resp 17   Aerosol Therapy   $ Aerosol Therapy Charges Aerosol Treatment   Daily Review of Necessity (SVN) completed   Respiratory Treatment Status (SVN) given   Treatment Route (SVN) oxygen;mask   Patient Position (SVN) HOB elevated   Post Treatment Assessment (SVN) increased aeration   Signs of Intolerance (SVN) none   Breath Sounds Post-Respiratory Treatment   Throughout All Fields Post-Treatment All Fields   Throughout All Fields Post-Treatment aeration increased   Post-treatment Heart Rate (beats/min) 82   Post-treatment Resp Rate (breaths/min) 18   Preset CPAP/BiPAP Settings   Mode Of Delivery Standby;CPAP   $ CPAP/BiPAP Daily Charge BiPAP/CPAP Daily   $ Initial CPAP/BiPAP Setup? Yes   Education   $ Education Bronchodilator;15 min   Respiratory Evaluation   $ Care Plan Tech Time 15 min

## 2022-10-22 NOTE — PLAN OF CARE
"[facility]  Initial Discharge Assessment       Primary Care Provider: Jad Hua MD    Admission Diagnosis: Cellulitis [L03.90]  Body aches [R52]    Admission Date: 10/21/2022  Expected Discharge Date:     Discharge Barriers Identified: None    Payor: MEDICARE / Plan: MEDICARE PART A & B / Product Type: Government /     Extended Emergency Contact Information  Primary Emergency Contact: Abbi Brown  Address: [id number] [place] [address]  Home Phone: [phone]  Mobile Phone: [phone]  Relation: Mother  Preferred language: English   needed? No  Secondary Emergency Contact: Bryan Graham "Les"  Address: [address] [place] [address] [facility]  Home Phone: [phone]  Mobile Phone: [phone]  Relation: Significant other  Preferred language: English   needed? No    Discharge Plan A: Home  Discharge Plan B: Home      [facility] [address] [facility] [address] [place] [address]  [address] [facility] [address]  Phone: [phone] Fax: [phone]    [id number] - [place], [place] - [address]  [address] [facility] [address] & [facility]  [address] [facility] [address]  Phone: [phone] Fax: [phone]      Initial Assessment (most recent)       Adult Discharge Assessment - 10/22/22 1148          Discharge Assessment    Assessment Type Discharge Planning Assessment     Confirmed/corrected address, phone number and insurance Yes     Confirmed Demographics Correct on Facesheet     Source of Information patient;health record     Reason For Admission Kathie     Lives With friend(s)     Facility Arrived From: Home     Do you expect to return to your current living situation? Yes     Do you have help at home or someone to help you manage your care at home? Yes     Who are your caregiver(s) and their phone number(s)? [facility] [phone]     Prior to hospitilization cognitive status: Alert/Oriented     Current cognitive status: Alert/Oriented     Walking or Climbing Stairs Difficulty ambulation difficulty, requires equipment     Mobility Management Rollator     Dressing/Bathing Difficulty none     Home Accessibility wheelchair accessible     Home Layout Able to live on 1st floor     Equipment Currently Used at Home rollator;BIPAP;oxygen     Readmission within 30 days? No     Patient currently being followed by outpatient case management? No     Do you currently have service(s) that help you manage your care at home? Yes     Name and Contact number of agency [facility]     Is the pt/caregiver preference to resume services with current agency Yes     Do you take prescription medications? Yes     Do you have prescription coverage? Yes     Coverage Medicare     Do you have any problems affording any of your prescribed medications? No     Is the patient taking medications as prescribed? yes     Who is going to help you get home at discharge? Room Adriana [phone]     How do you get to doctors appointments? car, drives self     Are you on dialysis? No     Do you take coumadin? No     Discharge Plan A Home     Discharge Plan B Home     DME Needed Upon Discharge  none     Discharge Plan discussed with: Patient     Discharge Barriers Identified None        Physical Activity    On average, how many days per week do you engage in moderate to strenuous exercise (like a brisk walk)? Patient refused     On average, how many minutes do you engage in exercise at this level? Patient refused        Financial Resource Strain    How hard is it for you to pay for the very basics like food, housing, medical care, and heating? Patient refused        Housing Stability    In the last 12 months, was there a time when you were not able to pay the mortgage or rent on time? Patient refused     In the last 12 months, was there a time when you did not  have a steady place to sleep or slept in a shelter (including now)? Patient refused        Transportation Needs    In the past 12 months, has lack of transportation kept you from medical appointments or from getting medications? Patient refused     In the past 12 months, has lack of transportation kept you from meetings, work, or from getting things needed for daily living? Patient refused        Food Insecurity    Within the past 12 months, you worried that your food would run out before you got the money to buy more. Patient refused     Within the past 12 months, the food you bought just didn't last and you didn't have money to get more. Patient refused        Stress    Do you feel stress - tense, restless, nervous, or anxious, or unable to sleep at night because your mind is troubled all the time - these days? Patient refused        Social Connections    In a typical week, how many times do you talk on the phone with family, friends, or neighbors? Patient refused     How often do you get together with friends or relatives? Patient refused     How often do you attend Scientology or [employer] services? Patient refused     Do you belong to any clubs or organizations such as Scientology groups, unions, fraternal or athletic groups, or school groups? Patient refused     How often do you attend meetings of the clubs or organizations you belong to? Patient refused     Are you , , , , never , or living with a partner? Never         Alcohol Use    Q1: How often do you have a drink containing alcohol? Patient refused     Q2: How many drinks containing alcohol do you have on a typical day when you are drinking? Patient refused     Q3: How often do you have six or more drinks on one occasion? Patient refused        Relationship/Environment    Name(s) of Who Lives With Patient Juan Diego Wright [phone]

## 2022-10-22 NOTE — PROGRESS NOTES
Automatic Inhaler to Nebulizer Interchange    fluticasone/vilanterol (Breo Ellipta) 200 mcg/25 mcg changed to budesonide 1 mg twice daily AND arformoterol 15 mcg twice daily per [facility] Automatic Therapeutic Sustitutions Protocol.    Please contact pharmacy at extension [phone] with any questions.     Thank you,   Darling Johnson

## 2022-10-22 NOTE — H&P
[facility] Medicine   History & Physical   Patient Name: Adriana Zaragoza  MRN: [id number]  Admission Date: 10/21/2022  5:55 PM  Attending Physician: Inés Jeffery MD  Primary Care Provider: Jad Hua MD  Face-to-Face encounter date: 10/22/2022    Patient information was obtained from patient, past medical records, ER physician, and ER records.     HISTORY OF PRESENT ILLNESS:     Adriana Zaragoza is a 56 y.o. White female   With PMH of obesity BMI 67 s/p gastric surgery,  Chronic RLE lymphedematous mass,   Chronic respiratory failure on 2L 2/2 COPD,  who presents with myalgias.    Onset today  Located throughout body  Progressively worsening  Alleviated with tylenol  +fever and chills  +malaise  +diaphoresis  +nausea, no vomiting  Reports this occurs whenever RLE mass becomes infected    Previously admitted for this 2021  IV vanc and zosyn were not effective  This was thought 2/2 her BMI  She improved on IV clindamycin and IV quinolone  She had an allergic rxn with hives to teflaro    Also with chest pain for months  No CP currently   Chronic SOB 2/2 COPD  Has had cardiac stress tests scheduled   However she avoids them due to fear    REVIEW OF SYSTEMS:     All systems reviewed and are negative except as noted per above.    PAST MEDICAL HISTORY:     Past Medical History:   Diagnosis Date    Allergy     Anemia     Asthma     COPD (chronic obstructive pulmonary disease)     Deep vein thrombosis     Depression     Diabetes mellitus, type 2     Encounter for blood transfusion     Heart murmur     Neuromuscular disorder        PAST SURGICAL HISTORY:     Past Surgical History:   Procedure Laterality Date     SECTION      DILATION AND CURETTAGE OF UTERUS      gastric sleeve      [facility]    TONSILLECTOMY         ALLERGIES:   Aspirin, Dilaudid [hydromorphone], Nsaids (non-steroidal anti-inflammatory drug), and Teflaro [ceftaroline fosamil]    FAMILY HISTORY:      Family History   Problem Relation Age of Onset    Heart disease Mother     Diabetes Mother     Arthritis Mother     Depression Mother     Cancer Father     COPD Father     Arthritis Maternal Grandmother     Cancer Maternal Grandmother     Cancer Maternal Grandfather     Cancer Paternal Grandmother     Kidney disease Paternal Grandmother     Diabetes Paternal Grandmother     Diabetes Paternal Grandfather     Hyperlipidemia Paternal Grandfather        SOCIAL HISTORY:     Social History     Tobacco Use    Smoking status: Every Day     Packs/day: 1.00     Types: Cigarettes    Smokeless tobacco: Never   Substance Use Topics    Alcohol use: No        Social History     Substance and Sexual Activity   Sexual Activity Never        HOME MEDICATIONS:     Prior to Admission medications    Medication Sig Start Date End Date Taking? Authorizing Provider   acetaminophen (TYLENOL) 650 MG TbSR Take 650 mg by mouth every 8 (eight) hours.    Historical Provider   albuterol (PROVENTIL/VENTOLIN HFA) 90 mcg/actuation inhaler Inhale 2 puffs into the lungs every 6 (six) hours as needed.     Historical Provider   busPIRone (BUSPAR) 7.5 MG tablet Take 1 tablet (7.5 mg total) by mouth 2 (two) times daily. 9/1/22 9/1/23  Jad uHa MD   citalopram (CELEXA) 20 MG tablet Take 1 tablet (20 mg total) by mouth once daily. 9/1/22 9/1/23  Jad Hua MD   ergocalciferol (ERGOCALCIFEROL) 50,000 unit Cap Take 1 capsule (50,000 Units total) by mouth every 7 days. 9/14/22 9/14/23  Jad Hua MD   multivitamin-min-iron-FA-vit K (BARIATRIC MULTIVITAMINS) 45 mg iron- 800 mcg-120 mcg Cap Take 1 tablet by mouth once daily. 9/1/22 9/1/23  Jad Hua MD   ondansetron (ZOFRAN) 4 MG tablet Take 1 tablet (4 mg total) by mouth every 8 (eight) hours as needed for Nausea. 1/22/22   Deonte Evans, DO       PHYSICAL EXAM:     BP (!) 98/58 (BP Location: Right arm, Patient Position: Sitting)   Pulse 92   Temp 98.6 °F (37 °C) (Oral)   Resp  "20   Ht 5' 4" (1.626 m)   Wt (!) 177.1 kg (390 lb 7 oz)   SpO2 97%   BMI 67.02 kg/m²     Gen: alert, responsive, BMI as noted  HEENT:  Eyes - no pallor  External ears with no lesions  Nares patent  Mouth - lips chapped  CV: RRR  Lungs: CTA B/L on 2L NC  Abd: +BS, soft, NT, ND  Ext: R thigh lymphadematous mass with TTP, mild erythema  Skin: warm, dry  Neuro: grossly intact  Psych: pleasant     LABS AND IMAGING:     Labs Reviewed   CBC W/ AUTO DIFFERENTIAL - Abnormal; Notable for the following components:       Result Value    WBC 19.34 (*)     MCH 26.4 (*)     MCHC 31.0 (*)     RDW 15.0 (*)     Gran # (ANC) 17.7 (*)     Immature Grans (Abs) 0.07 (*)     Lymph # 0.8 (*)     Gran % 91.2 (*)     Lymph % 4.2 (*)     Mono % 3.8 (*)     All other components within normal limits   COMPREHENSIVE METABOLIC PANEL - Abnormal; Notable for the following components:    Glucose 149 (*)     All other components within normal limits   URINALYSIS, REFLEX TO URINE CULTURE - Abnormal; Notable for the following components:    Specific Gravity, UA >1.030 (*)     Protein, UA Trace (*)     All other components within normal limits    Narrative:     Specimen Source->Urine   CULTURE, BLOOD   CULTURE, BLOOD   SARS-COV-2 RNA AMPLIFICATION, QUAL   INFLUENZA A AND B ANTIGEN    Narrative:     Specimen Source->Nasopharyngeal Swab   LACTIC ACID, PLASMA   LIPASE   MAGNESIUM   TROPONIN I   TSH   B-TYPE NATRIURETIC PEPTIDE     Imaging Results              US Soft Tissue, Lower Extremity, Right (In process)                      CT Abdomen Pelvis With Contrast (Final result)  Result time 10/21/22 23:42:36      Final result by Yoni Horowitz MD (10/21/22 23:42:36)                   Narrative:      PROCEDURE: CT ABDOMEN PELVIS WITH IV CONTRAST, 10/21/2022 10:31 PM CDT    CLINICAL INDICATION:  Abdominal abscess/infection suspected.    COMPARISON: 1/22/2022    TECHNIQUE: Helical CT acquisition performed of the abdomen and pelvis with coronal and " sagittal reformats. IV Contrast: Nonionic iodinated contrast was used.  PO Contrast: None.  Complications: None.    CT Dose reduction techniques were utilized for this examination with 1 or more of the following: Automated exposure control and/or adjustment of the mA or kV according to patient size, and/or use of iterative reconstruction technique.    FINDINGS:    Examination is significantly limited due to body habitus and attenuation artifact from soft tissue interface with the CT tube wall as well as extensive beam hardening artifact in this setting.    Visualized chest base: No focal consolidations or pleural effusions.    Hepatobiliary system: No suspicious lesions.    Gallbladder: Distended gallbladder.    Spleen: Normal size.    Pancreas: No focal masses. No pancreatic ductal dilatation. No significant peripancreatic inflammatory changes.    Adrenal glands: Normal.    Kidneys and collecting system: No hydronephrosis. Excretory phase of contrast enhancement limits evaluation of kidneys. Extreme beam hardening artifact in this region limits evaluation of kidneys.    Bowel and mesentery: No free air identified. Ventral abdominal wall herniation/eventration again seen with traversing loops of small and large bowel. Portions of these are outside the field of view. No definite evidence of abnormal dilatation identified.    Appendix: Not reliably identified. No definite secondary signs of appendicitis.    Pelvic organs: Uterus present. Lyons catheter in urinary bladder.    Bones/MSK: Acute displaced fracture identified. Partially visualized large pannus extending from the right thigh with skin thickening and edema consistent with panniculitis.    IMPRESSION:    Partially visualized large pannus extending from the right thigh with skin thickening and edema consistent with panniculitis.    Ventral abdominal wall herniation/eventration again seen with traversing loops of small and large bowel. Portions of these are  outside the field of view. No definite evidence of abnormal dilatation identified.      Electronically signed by:  Yoni Horowitz MD  10/21/2022 11:42 PM CDT Workstation: [id number]                                     CTA Chest Non-Coronary (PE Studies) (Final result)  Result time 10/21/22 23:40:31      Final result by Alanna Bateman MD (10/21/22 23:40:31)                   Narrative:    EXAM:  CT Angiography Chest With Intravenous Contrast    CLINICAL HISTORY:  The patient is 56 years old and is Female; Pulmonary embolism (PE) suspected, high prob    TECHNIQUE:  Axial computed tomographic angiography images of the chest with intravenous contrast.  Sagittal and coronal reformatted images were created and reviewed.  This CT exam was performed using one or more of the following dose reduction techniques:  automated exposure control, adjustment of the mA and/or kV according to patient size, and/or use of iterative reconstruction technique.  MIP reconstructed images were created and reviewed.    COMPARISON:  None.    FINDINGS:  LIMITATIONS:  Markedly limited examination secondary to contrast timing bolus.  PULMONARY ARTERIES:  No large central pulmonary emboli.  AORTA:  No acute findings.  No thoracic aortic aneurysm.  LUNGS:  Lung bases are incompletely imaged.  Mild left basilar bandlike atelectasis.  Right lower lobe 4 mm pulmonary nodule (series 4, image 26), for which follow up imaging is not indicated.  PLEURAL SPACE:  Unremarkable.  No significant effusion.  No pneumothorax.  HEART:  Unremarkable.  No cardiomegaly.  No significant pericardial effusion.  No evidence of RV dysfunction.  BONES/JOINTS:  No acute fracture.  No dislocation.  SOFT TISSUES:  Unremarkable.  LYMPH NODES:  Unremarkable.  No enlarged lymph nodes.    IMPRESSION:  1.  Markedly limited examination secondary to contrast timing bolus.  2.  No large central pulmonary emboli.    Electronically signed by:  Alanna Guzmán MD  10/21/2022 11:40 PM  CDT Workstation: [id number]                                     X-Ray Chest 1 View (Final result)  Result time 10/21/22 23:45:29      Final result by Alanna Bateman MD (10/21/22 23:45:29)                   Narrative:    EXAM:  XR Chest, 1 View    CLINICAL HISTORY:  The patient is 56 years old and is Female;     Generalized Body Aches (C/o pain all over, chills.)    TECHNIQUE:  Frontal view of the chest.    COMPARISON:  Chest radiograph 8/26/2021    FINDINGS:  LUNGS:  Mild bilateral peribronchovascular opacities.  PLEURAL SPACE:  Unremarkable.  No pneumothorax.  HEART:  Unremarkable.  No cardiomegaly.  MEDIASTINUM:  Unremarkable.  BONES/JOINTS:  Unremarkable.    IMPRESSION:  Mild bilateral peribronchovascular opacities may be secondary to mild pulmonary edema or infection.    Electronically signed by:  Alanna Guzmán MD  10/21/2022 11:45 PM CDT Workstation: [id number]                                        ASSESSMENT & PLAN:   Adriana Zaragoza is a 56 y.o. female admitted for    Active Hospital Problems    Diagnosis  POA    *Panniculitis [M79.3]  Yes    Chronic respiratory failure [J96.10]  Yes    Mass of right thigh [R22.41]  Yes    S/P gastric bypass [Z98.84]  Not Applicable    KEESHA (obstructive sleep apnea) [G47.33]  Yes    Hidradenitis suppurativa [L73.2]  Yes      Resolved Hospital Problems   No resolved problems to display.        Plan    Panniculitis  - vanc, zosyn, clindamycin  - Bcx in progress  - RLE US to r/o abscess   - Hibiclens showers   - pt reports she is not diabetic  - HbA1c    Chronic respiratory failure  COPD, not in exacerbation  - continue supplemental oxygen  - pt not on controller meds outpt?  Starting now    KEESHA  - CPAP QHS    Chronic conditions as noted above/below; home medications reviewed personally by me and restarted as appropriate  Electrolyte derangement:  Trending BMP; Mg; replacement prn  DVT ppx: lovenox  FULL CODE    Inés Jeffery MD  [facility]  Hospitalist  10/22/2022

## 2022-10-22 NOTE — PROGRESS NOTES
"[facility]  Progress Note    Patient name: Adriana Zaragoza  MRN: [id number]  Admit Date: 10/21/2022   LOS: 0 days     SUBJECTIVE:     Principal problem: Panniculitis    Interval History:  Patient admitted with sepsis suspected to be secondary to Panniculitis. Started on IV Vanc, Zosyn, Clindamycin.  Patient having myalgias and significant back pain.  Imaging reviewed and no acute intra abdominal process.  CT report states "acute displaced fracture identified."  I reviewed with [facility] and no fracture identified.  This appears to be typo.  UA with no evidence of infection.  CXR with mild bilateral peribronchovascular opacities but CTA chest suggests this is likely atelectasis, not infection or fluid. CT abdomen with distended gallbladder but no other signs of acute infection and AST/ALT, Bilirubin and alk phosph are normal. Panniculitis seems to be the most logical source of infection at this time.  US of this area with no obvious abscess- just diffuse subcutaneous edema.     She does appear to be trying to run a fever this AM and is having sweats and then chills.  She is flushed.  She feels short of breath and is requesting her bipap though she has comfortable respirations on 2L NC with good oxygen saturations.  I think this is fever mediated.     Hospital Course:    Scheduled Meds:   budesonide  1 mg Nebulization Q12H    And    arformoteroL  15 mcg Nebulization BID    busPIRone  7.5 mg Oral BID    citalopram  20 mg Oral Daily    clindamycin (CLEOCIN) IVPB  900 mg Intravenous Q6H    enoxaparin  40 mg Subcutaneous Q12H    ipratropium  0.5 mg Nebulization Q6H WAKE    piperacillin-tazobactam (ZOSYN) IVPB  3.375 g Intravenous Q8H    vancomycin (VANCOCIN) IVPB  2,000 mg Intravenous Q12H     Continuous Infusions:   sodium chloride 0.9% 100 mL/hr at 10/22/22 0405     PRN Meds:acetaminophen, albuterol-ipratropium, dextrose 10%, dextrose 10%, glucagon (human recombinant), glucose, " glucose, HYDROcodone-acetaminophen, melatonin, morphine, naloxone, polyethylene glycol, senna-docusate 8.6-50 mg, simethicone, sodium chloride 0.9%, Pharmacy to dose Vancomycin consult **AND** vancomycin - pharmacy to dose    Review of patient's allergies indicates:   Allergen Reactions    Aspirin     Dilaudid [hydromorphone]      Excessive sleepiness    Nsaids (non-steroidal anti-inflammatory drug) Hives    Teflaro [ceftaroline fosamil]      Allergic reaction/ hives/itching       Review of Systems: As per interval history    OBJECTIVE:     Vital Signs (Most Recent)  Temp: 99.7 °F (37.6 °C) (10/22/22 0818)  Pulse: 88 (10/22/22 0818)  Resp: 19 (10/22/22 0912)  BP: (!) 134/57 (10/22/22 0818)  SpO2: (!) 94 % (10/22/22 0818)    Vital Signs Range (Last 24H):  Temp:  [98.4 °F (36.9 °C)-102 °F (38.9 °C)]   Pulse:  []   Resp:  [16-24]   BP: ()/()   SpO2:  [94 %-100 %]     I & O (Last 24H):  Intake/Output Summary (Last 24 hours) at 10/22/2022 1106  Last data filed at 10/22/2022 0630  Gross per 24 hour   Intake 1600 ml   Output 750 ml   Net 850 ml       Physical Exam:    Vitals and nursing note reviewed.     Constitutional:       General: Not in acute distress.     Appearance: Well-developed.   HENT:      Head: Normocephalic and atraumatic.   Eyes:      Pupils: Pupils are equal, round, and reactive to light.   Cardiovascular:      Rate and Rhythm: Regular rhythm.   Pulmonary:      Effort: Pulmonary effort is normal.      Breath sounds: Normal breath sounds. No wheezing.   O2 per NC  Abdominal:      General: There is no distension.      Palpations: Abdomen is soft.      Tenderness: There is no abdominal tenderness. There is no guarding or rebound.   Musculoskeletal:         General: Normal range of motion.      Cervical back: Normal range of motion.   Skin:     Findings: No rash.  Panniculitis with erythema, warmth  Neurological:      Mental Status: Alert and oriented to person, place, and time.      Cranial  Nerves: No cranial nerve deficit.      Sensory: No sensory deficit.     Laboratory:  I have reviewed all pertinent lab results within the past 24 hours.  CBC:   Recent Labs   Lab 10/22/22  0456   WBC 20.56*   RBC 4.69   HGB 12.3   HCT 41.2      MCV 88   MCH 26.2*   MCHC 29.9*     CMP:   Recent Labs   Lab 10/21/22  2110 10/22/22  0456   * 115*   CALCIUM 9.0 8.6*   ALBUMIN 3.8  --    PROT 7.9  --     135*   K 4.2 4.0   CO2 25 25    101   BUN 20 16   CREATININE 0.7 0.7   ALKPHOS 71  --    ALT 16  --    AST 20  --    BILITOT 0.6  --      Microbiology Results (last 7 days)       Procedure Component Value Units Date/Time    Blood culture #1 **CANNOT BE ORDERED STAT** [[id number]] Collected: 10/21/22 2130    Order Status: Completed Specimen: Blood from Peripheral, Antecubital, Left Updated: 10/22/22 0517     Blood Culture, Routine No Growth to date    Blood culture #2 **CANNOT BE ORDERED STAT** [[id number]] Collected: 10/21/22 2140    Order Status: Completed Specimen: Blood from Peripheral, Forearm, Left Updated: 10/22/22 0517     Blood Culture, Routine No Growth to date          Recent Labs   Lab 10/21/22  2149   COLORU Yellow   SPECGRAV >1.030*   PHUR 6.0   PROTEINUA Trace*   NITRITE Negative   LEUKOCYTESUR Negative   UROBILINOGEN Negative       Diagnostic Results:      ASSESSMENT/PLAN:         Active Hospital Problems    Diagnosis  POA    *Panniculitis [M79.3]  Yes    Chronic respiratory failure [J96.10]  Yes    Mass of right thigh [R22.41]  Yes    S/P gastric bypass [Z98.84]  Not Applicable    KEESHA (obstructive sleep apnea) [G47.33]  Yes    Hidradenitis suppurativa [L73.2]  Yes      Resolved Hospital Problems   No resolved problems to display.         Plan:     -Broad IV abx with Vanc, Zosyn, Clindamcyin.   At this time, source seems to be panniculitis.   -Will follow up on BCx given still trying to spike fever and overall feels quite poor.  -I do not see anything on imaging to explain back pain  at this time.  I suspect musculoskeletal pain from fever and infection but will monitor for any evolving process.  -IV tylenol trial for fever and pain.  Continuing prn norco and morphine.  Visitor at bedside reports she is intermittently confused- I suspect this is multifactorial including fever, infection, pain medication.  Oriented at the time of my exam.  No focal neuro deficit.   -CPAP QHS and when she naps  -DVT Px with lovenox        VTE Risk Mitigation (From admission, onward)           Ordered     enoxaparin injection 40 mg  Every 12 hours         10/22/22 0415     IP VTE HIGH RISK PATIENT  Once         10/22/22 0331     Place sequential compression device  Until discontinued         10/22/22 0331                      Department Hospital Medicine  [facility]  Reynaldo Dodge MD  Date of service: 10/22/2022

## 2022-10-23 LAB
ALBUMIN SERPL BCP-MCNC: 3 G/DL (ref 3.5–5.2)
ALP SERPL-CCNC: 61 U/L (ref 55–135)
ALT SERPL W/O P-5'-P-CCNC: 14 U/L (ref 10–44)
ANION GAP SERPL CALC-SCNC: 7 MMOL/L (ref 8–16)
AST SERPL-CCNC: 10 U/L (ref 10–40)
BASOPHILS # BLD AUTO: 0.03 K/UL (ref 0–0.2)
BASOPHILS NFR BLD: 0.3 % (ref 0–1.9)
BILIRUB SERPL-MCNC: 0.8 MG/DL (ref 0.1–1)
BUN SERPL-MCNC: 12 MG/DL (ref 6–20)
CALCIUM SERPL-MCNC: 8.1 MG/DL (ref 8.7–10.5)
CHLORIDE SERPL-SCNC: 100 MMOL/L (ref 95–110)
CO2 SERPL-SCNC: 25 MMOL/L (ref 23–29)
CREAT SERPL-MCNC: 0.6 MG/DL (ref 0.5–1.4)
DIFFERENTIAL METHOD: ABNORMAL
EOSINOPHIL # BLD AUTO: 0 K/UL (ref 0–0.5)
EOSINOPHIL NFR BLD: 0.3 % (ref 0–8)
ERYTHROCYTE [DISTWIDTH] IN BLOOD BY AUTOMATED COUNT: 15.2 % (ref 11.5–14.5)
EST. GFR  (NO RACE VARIABLE): >60 ML/MIN/1.73 M^2
GLUCOSE SERPL-MCNC: 108 MG/DL (ref 70–110)
GLUCOSE SERPL-MCNC: 120 MG/DL (ref 70–110)
GLUCOSE SERPL-MCNC: 123 MG/DL (ref 70–110)
HCT VFR BLD AUTO: 35.6 % (ref 37–48.5)
HGB BLD-MCNC: 10.9 G/DL (ref 12–16)
IMM GRANULOCYTES # BLD AUTO: 0.04 K/UL (ref 0–0.04)
IMM GRANULOCYTES NFR BLD AUTO: 0.4 % (ref 0–0.5)
LYMPHOCYTES # BLD AUTO: 1 K/UL (ref 1–4.8)
LYMPHOCYTES NFR BLD: 10 % (ref 18–48)
MAGNESIUM SERPL-MCNC: 2 MG/DL (ref 1.6–2.6)
MCH RBC QN AUTO: 26.2 PG (ref 27–31)
MCHC RBC AUTO-ENTMCNC: 30.6 G/DL (ref 32–36)
MCV RBC AUTO: 86 FL (ref 82–98)
MONOCYTES # BLD AUTO: 1 K/UL (ref 0.3–1)
MONOCYTES NFR BLD: 10 % (ref 4–15)
NEUTROPHILS # BLD AUTO: 7.9 K/UL (ref 1.8–7.7)
NEUTROPHILS NFR BLD: 79 % (ref 38–73)
NRBC BLD-RTO: 0 /100 WBC
PLATELET # BLD AUTO: 219 K/UL (ref 150–450)
PMV BLD AUTO: 8.9 FL (ref 9.2–12.9)
POTASSIUM SERPL-SCNC: 3.8 MMOL/L (ref 3.5–5.1)
PROT SERPL-MCNC: 6.8 G/DL (ref 6–8.4)
RBC # BLD AUTO: 4.16 M/UL (ref 4–5.4)
SODIUM SERPL-SCNC: 132 MMOL/L (ref 136–145)
VANCOMYCIN TROUGH SERPL-MCNC: 12.1 UG/ML
WBC # BLD AUTO: 9.96 K/UL (ref 3.9–12.7)

## 2022-10-23 PROCEDURE — 25000242 PHARM REV CODE 250 ALT 637 W/ HCPCS: Performed by: FAMILY MEDICINE

## 2022-10-23 PROCEDURE — 99900031 HC PATIENT EDUCATION (STAT)

## 2022-10-23 PROCEDURE — 63600175 PHARM REV CODE 636 W HCPCS: Performed by: STUDENT IN AN ORGANIZED HEALTH CARE EDUCATION/TRAINING PROGRAM

## 2022-10-23 PROCEDURE — 94761 N-INVAS EAR/PLS OXIMETRY MLT: CPT

## 2022-10-23 PROCEDURE — 94660 CPAP INITIATION&MGMT: CPT

## 2022-10-23 PROCEDURE — 63600175 PHARM REV CODE 636 W HCPCS: Performed by: INTERNAL MEDICINE

## 2022-10-23 PROCEDURE — 25000003 PHARM REV CODE 250: Performed by: INTERNAL MEDICINE

## 2022-10-23 PROCEDURE — 80053 COMPREHEN METABOLIC PANEL: CPT | Performed by: INTERNAL MEDICINE

## 2022-10-23 PROCEDURE — 63600175 PHARM REV CODE 636 W HCPCS: Performed by: FAMILY MEDICINE

## 2022-10-23 PROCEDURE — 94640 AIRWAY INHALATION TREATMENT: CPT

## 2022-10-23 PROCEDURE — 12000002 HC ACUTE/MED SURGE SEMI-PRIVATE ROOM

## 2022-10-23 PROCEDURE — 36415 COLL VENOUS BLD VENIPUNCTURE: CPT | Performed by: FAMILY MEDICINE

## 2022-10-23 PROCEDURE — 85025 COMPLETE CBC W/AUTO DIFF WBC: CPT | Performed by: FAMILY MEDICINE

## 2022-10-23 PROCEDURE — 99900035 HC TECH TIME PER 15 MIN (STAT)

## 2022-10-23 PROCEDURE — 83735 ASSAY OF MAGNESIUM: CPT | Performed by: FAMILY MEDICINE

## 2022-10-23 PROCEDURE — 27000221 HC OXYGEN, UP TO 24 HOURS

## 2022-10-23 PROCEDURE — 25000003 PHARM REV CODE 250: Performed by: FAMILY MEDICINE

## 2022-10-23 PROCEDURE — 80202 ASSAY OF VANCOMYCIN: CPT | Performed by: FAMILY MEDICINE

## 2022-10-23 RX ADMIN — ENOXAPARIN SODIUM 40 MG: 40 INJECTION SUBCUTANEOUS at 10:10

## 2022-10-23 RX ADMIN — VANCOMYCIN HYDROCHLORIDE 2000 MG: 500 INJECTION, POWDER, LYOPHILIZED, FOR SOLUTION INTRAVENOUS at 11:10

## 2022-10-23 RX ADMIN — POLYETHYLENE GLYCOL 3350 17 G: 17 POWDER, FOR SOLUTION ORAL at 01:10

## 2022-10-23 RX ADMIN — SENNOSIDES AND DOCUSATE SODIUM 1 TABLET: 50; 8.6 TABLET ORAL at 01:10

## 2022-10-23 RX ADMIN — ARFORMOTEROL TARTRATE 15 MCG: 15 SOLUTION RESPIRATORY (INHALATION) at 07:10

## 2022-10-23 RX ADMIN — CLINDAMYCIN IN 5 PERCENT DEXTROSE 900 MG: 18 INJECTION, SOLUTION INTRAVENOUS at 02:10

## 2022-10-23 RX ADMIN — MORPHINE SULFATE 4 MG: 4 INJECTION, SOLUTION INTRAMUSCULAR; INTRAVENOUS at 11:10

## 2022-10-23 RX ADMIN — BUSPIRONE HYDROCHLORIDE 7.5 MG: 5 TABLET ORAL at 08:10

## 2022-10-23 RX ADMIN — IPRATROPIUM BROMIDE 0.5 MG: 0.5 SOLUTION RESPIRATORY (INHALATION) at 07:10

## 2022-10-23 RX ADMIN — BUSPIRONE HYDROCHLORIDE 7.5 MG: 5 TABLET ORAL at 10:10

## 2022-10-23 RX ADMIN — CLINDAMYCIN IN 5 PERCENT DEXTROSE 900 MG: 18 INJECTION, SOLUTION INTRAVENOUS at 10:10

## 2022-10-23 RX ADMIN — BUDESONIDE 1 MG: 0.5 INHALANT RESPIRATORY (INHALATION) at 07:10

## 2022-10-23 RX ADMIN — ACETAMINOPHEN 1000 MG: 10 INJECTION INTRAVENOUS at 05:10

## 2022-10-23 RX ADMIN — CLINDAMYCIN IN 5 PERCENT DEXTROSE 900 MG: 18 INJECTION, SOLUTION INTRAVENOUS at 03:10

## 2022-10-23 RX ADMIN — ENOXAPARIN SODIUM 40 MG: 40 INJECTION SUBCUTANEOUS at 08:10

## 2022-10-23 RX ADMIN — ONDANSETRON 4 MG: 2 INJECTION INTRAMUSCULAR; INTRAVENOUS at 08:10

## 2022-10-23 RX ADMIN — ONDANSETRON 4 MG: 2 INJECTION INTRAMUSCULAR; INTRAVENOUS at 11:10

## 2022-10-23 RX ADMIN — MORPHINE SULFATE 4 MG: 4 INJECTION, SOLUTION INTRAMUSCULAR; INTRAVENOUS at 08:10

## 2022-10-23 RX ADMIN — BUDESONIDE 1 MG: 0.5 INHALANT RESPIRATORY (INHALATION) at 08:10

## 2022-10-23 RX ADMIN — CITALOPRAM HYDROBROMIDE 20 MG: 20 TABLET ORAL at 10:10

## 2022-10-23 RX ADMIN — IPRATROPIUM BROMIDE 0.5 MG: 0.5 SOLUTION RESPIRATORY (INHALATION) at 01:10

## 2022-10-23 RX ADMIN — VANCOMYCIN HYDROCHLORIDE 2000 MG: 500 INJECTION, POWDER, LYOPHILIZED, FOR SOLUTION INTRAVENOUS at 12:10

## 2022-10-23 RX ADMIN — ARFORMOTEROL TARTRATE 15 MCG: 15 SOLUTION RESPIRATORY (INHALATION) at 08:10

## 2022-10-23 RX ADMIN — IPRATROPIUM BROMIDE 0.5 MG: 0.5 SOLUTION RESPIRATORY (INHALATION) at 08:10

## 2022-10-23 RX ADMIN — PIPERACILLIN SODIUM AND TAZOBACTAM SODIUM 3.38 G: 3; .375 INJECTION, POWDER, LYOPHILIZED, FOR SOLUTION INTRAVENOUS at 05:10

## 2022-10-23 RX ADMIN — CLINDAMYCIN IN 5 PERCENT DEXTROSE 900 MG: 18 INJECTION, SOLUTION INTRAVENOUS at 09:10

## 2022-10-23 NOTE — CARE UPDATE
10/23/22 0724   Patient Assessment/Suction   Level of Consciousness (AVPU) alert   Respiratory Effort Normal;Unlabored   Expansion/Accessory Muscles/Retractions no use of accessory muscles;no retractions;expansion symmetric   All Lung Fields Breath Sounds diminished   Rhythm/Pattern, Respiratory unlabored;pattern regular;depth regular;chest wiggle adequate;no shortness of breath reported   Cough Frequency no cough   PRE-TX-O2   O2 Device (Oxygen Therapy) BiPAP   $ Is the patient on Low Flow Oxygen? Yes   Flow (L/min) 2   Oxygen Concentration (%) 30   SpO2 99 %   Pulse Oximetry Type Intermittent   $ Pulse Oximetry - Multiple Charge Pulse Oximetry - Multiple   Pulse 78   Resp 18   Aerosol Therapy   $ Aerosol Therapy Charges Aerosol Treatment   Daily Review of Necessity (SVN) completed   Respiratory Treatment Status (SVN) given   Treatment Route (SVN) in-line;oxygen   Patient Position (SVN) HOB elevated   Post Treatment Assessment (SVN) increased aeration   Signs of Intolerance (SVN) none   Breath Sounds Post-Respiratory Treatment   Throughout All Fields Post-Treatment All Fields   Throughout All Fields Post-Treatment aeration increased   Post-treatment Heart Rate (beats/min) 87   Post-treatment Resp Rate (breaths/min) 18   Preset CPAP/BiPAP Settings   Mode Of Delivery BiPAP S/T   $ CPAP/BiPAP Daily Charge BiPAP/CPAP Daily   $ Initial CPAP/BiPAP Setup? No   $ Is patient using? Yes   Size of Mask Medium/Large   Sized Appropriately? Yes   Equipment Type V60   Ipap 14   EPAP (cm H2O) 7   Pressure Support (cm H2O) 7   Set Rate (Breaths/Min) 14   Education   $ Education Bronchodilator;15 min   Respiratory Evaluation   $ Care Plan Tech Time 15 min

## 2022-10-23 NOTE — CARE UPDATE
10/22/22 2005   Patient Assessment/Suction   Level of Consciousness (AVPU) alert   Respiratory Effort Normal;Unlabored   Expansion/Accessory Muscles/Retractions no use of accessory muscles   All Lung Fields Breath Sounds Anterior:;diminished   Rhythm/Pattern, Respiratory unlabored   Cough Frequency infrequent   PRE-TX-O2   O2 Device (Oxygen Therapy) BiPAP   $ Is the patient on Low Flow Oxygen? Yes   Oxygen Concentration (%) 30   SpO2 97 %   Pulse Oximetry Type Intermittent   $ Pulse Oximetry - Multiple Charge Pulse Oximetry - Multiple   Pulse 83   Resp 20   Aerosol Therapy   $ Aerosol Therapy Charges Aerosol Treatment   Daily Review of Necessity (SVN) completed   Respiratory Treatment Status (SVN) given   Treatment Route (SVN) in-line   Patient Position (SVN) HOB elevated   Post Treatment Assessment (SVN) increased aeration   Signs of Intolerance (SVN) none   Breath Sounds Post-Respiratory Treatment   Post-treatment Heart Rate (beats/min) 86   Post-treatment Resp Rate (breaths/min) 18   Ready to Wean/Extubation Screen   FIO2<=50 (chart decimal) 0.3   Preset CPAP/BiPAP Settings   Mode Of Delivery BiPAP;CPAP   $ Is patient using? Yes   Size of Mask Medium/Large   Sized Appropriately? Yes   Equipment Type V60   Ipap 14   EPAP (cm H2O) 7   Pressure Support (cm H2O) 7   Set Rate (Breaths/Min) 14   ITime (sec) 1   Rise Time (sec) 3   Patient CPAP/BiPAP Settings   Timed Inspiration (Sec) 1   IPAP Rise Time (sec) 3   RR Total (Breaths/Min) 22   Tidal Volume (mL) 532   VE Minute Ventilation (L/min) 12.5 L/min   Peak Inspiratory Pressure (cm H2O) 15   TiTOT (%) 33   Total Leak (L/Min) 0   Patient Trigger - ST Mode Only (%) 98   Education   $ Education Bronchodilator;15 min   Respiratory Evaluation   $ Care Plan Tech Time 15 min

## 2022-10-23 NOTE — PLAN OF CARE
Problem: Adult Inpatient Plan of Care  Goal: Plan of Care Review  Outcome: Ongoing, Progressing  Goal: Patient-Specific Goal (Individualized)  Outcome: Ongoing, Progressing  Goal: Absence of Hospital-Acquired Illness or Injury  Outcome: Ongoing, Progressing  Goal: Optimal Comfort and Wellbeing  Outcome: Ongoing, Progressing  Goal: Readiness for Transition of Care  Outcome: Ongoing, Progressing     Problem: Bariatric Environmental Safety  Goal: Safety Maintained with Care  Outcome: Ongoing, Progressing     Problem: Diabetes Comorbidity  Goal: Blood Glucose Level Within Targeted Range  Outcome: Ongoing, Progressing     Problem: Impaired Wound Healing  Goal: Optimal Wound Healing  Outcome: Ongoing, Progressing     Problem: Skin Injury Risk Increased  Goal: Skin Health and Integrity  Outcome: Ongoing, Progressing     Problem: Fall Injury Risk  Goal: Absence of Fall and Fall-Related Injury  Outcome: Ongoing, Progressing     Problem: Infection  Goal: Absence of Infection Signs and Symptoms  Outcome: Ongoing, Progressing

## 2022-10-23 NOTE — PROGRESS NOTES
Pharmacokinetic Assessment Follow Up: IV Vancomycin    Vancomycin serum concentration assessment(s):    The trough level was drawn correctly and can be used to guide therapy at this time. The measurement is within the desired definitive target range of 10 to 15 mcg/mL.    Vancomycin Regimen Plan:    Continue regimen to Vancomycin 2000 mg IV every 12 hours with next serum trough concentration measured at 23:00 prior to 4th dose on 10/24/2022    Drug levels (last 3 results):  Recent Labs   Lab Result Units 10/23/22  1054   Vancomycin-Trough ug/mL 12.1       Pharmacy will continue to follow and monitor vancomycin.    Please contact pharmacy at extension [phone] for questions regarding this assessment.    Thank you for the consult,   Jessy Díaz       Patient brief summary:  Adriana Zaragoza is a 56 y.o. female initiated on antimicrobial therapy with IV Vancomycin for treatment of sepsis    The patient's current regimen is Vancomycin 2000 mg IV every 12 hours     Drug Allergies:   Review of patient's allergies indicates:   Allergen Reactions    Aspirin     Dilaudid [hydromorphone]      Excessive sleepiness    Nsaids (non-steroidal anti-inflammatory drug) Hives    Teflaro [ceftaroline fosamil]      Allergic reaction/ hives/itching       Actual Body Weight:   183.3 kg     Renal Function:   Estimated Creatinine Clearance: 173.3 mL/min (based on SCr of 0.6 mg/dL).,     Dialysis Method (if applicable):  N/A    CBC (last 72 hours):  Recent Labs   Lab Result Units 10/21/22  2110 10/22/22  0456 10/23/22  0514   WBC K/uL 19.34* 20.56* 9.96   Hemoglobin g/dL 12.4 12.3 10.9*   Hemoglobin A1C %  --  6.0  --    Hematocrit % 40.0 41.2 35.6*   Platelets K/uL 294 241 219   Gran % % 91.2* 90.2* 79.0*   Lymph % % 4.2* 5.6* 10.0*   Mono % % 3.8* 3.3* 10.0   Eosinophil % % 0.1 0.0 0.3   Basophil % % 0.3 0.2 0.3   Differential Method  Automated Automated Automated       Metabolic Panel (last 72 hours):  Recent Labs   Lab Result Units  10/21/22  2110 10/21/22  2149 10/22/22  0456 10/23/22  0514   Sodium mmol/L 137  --  135* 132*   Potassium mmol/L 4.2  --  4.0 3.8   Chloride mmol/L 104  --  101 100   CO2 mmol/L 25  --  25 25   Glucose mg/dL 149*  --  115* 108   Glucose, UA   --  Negative  --   --    BUN mg/dL 20  --  16 12   Creatinine mg/dL 0.7  --  0.7 0.6   Albumin g/dL 3.8  --   --  3.0*   Total Bilirubin mg/dL 0.6  --   --  0.8   Alkaline Phosphatase U/L 71  --   --  61   AST U/L 20  --   --  10   ALT U/L 16  --   --  14   Magnesium mg/dL 1.9  --  1.9 2.0       Vancomycin Administrations:  vancomycin given in the last 96 hours                     vancomycin (VANCOCIN) 2,000 mg in dextrose 5 % 500 mL IVPB (mg) 2,000 mg New Bag 10/22/22 2338     2,000 mg New Bag  1144    vancomycin in dextrose 5 % 1 gram/250 mL IVPB 1,000 mg (mg) 1,000 mg New Bag 10/22/22 0127    vancomycin in dextrose 5 % 1 gram/250 mL IVPB 1,000 mg (mg) 1,000 mg New Bag 10/22/22 0011                    Microbiologic Results:  Microbiology Results (last 7 days)       Procedure Component Value Units Date/Time    Blood culture #1 **CANNOT BE ORDERED STAT** [[id number]] Collected: 10/21/22 2130    Order Status: Completed Specimen: Blood from Peripheral, Antecubital, Left Updated: 10/22/22 2232     Blood Culture, Routine No Growth to date      No Growth to date    Blood culture #2 **CANNOT BE ORDERED STAT** [[id number]] Collected: 10/21/22 2140    Order Status: Completed Specimen: Blood from Peripheral, Forearm, Left Updated: 10/22/22 2232     Blood Culture, Routine No Growth to date      No Growth to date

## 2022-10-24 VITALS
OXYGEN SATURATION: 96 % | HEART RATE: 78 BPM | BODY MASS INDEX: 50.02 KG/M2 | RESPIRATION RATE: 26 BRPM | TEMPERATURE: 98 F | DIASTOLIC BLOOD PRESSURE: 51 MMHG | SYSTOLIC BLOOD PRESSURE: 111 MMHG | WEIGHT: 293 LBS | HEIGHT: 64 IN

## 2022-10-24 LAB
ALBUMIN SERPL BCP-MCNC: 2.9 G/DL (ref 3.5–5.2)
ALP SERPL-CCNC: 66 U/L (ref 55–135)
ALT SERPL W/O P-5'-P-CCNC: 11 U/L (ref 10–44)
ANION GAP SERPL CALC-SCNC: 8 MMOL/L (ref 8–16)
AST SERPL-CCNC: 12 U/L (ref 10–40)
BASOPHILS # BLD AUTO: 0.03 K/UL (ref 0–0.2)
BASOPHILS NFR BLD: 0.3 % (ref 0–1.9)
BILIRUB SERPL-MCNC: 0.5 MG/DL (ref 0.1–1)
BUN SERPL-MCNC: 10 MG/DL (ref 6–20)
CALCIUM SERPL-MCNC: 8.4 MG/DL (ref 8.7–10.5)
CHLORIDE SERPL-SCNC: 102 MMOL/L (ref 95–110)
CO2 SERPL-SCNC: 27 MMOL/L (ref 23–29)
CREAT SERPL-MCNC: 0.5 MG/DL (ref 0.5–1.4)
DIFFERENTIAL METHOD: ABNORMAL
EOSINOPHIL # BLD AUTO: 0.1 K/UL (ref 0–0.5)
EOSINOPHIL NFR BLD: 1.1 % (ref 0–8)
ERYTHROCYTE [DISTWIDTH] IN BLOOD BY AUTOMATED COUNT: 14.9 % (ref 11.5–14.5)
EST. GFR  (NO RACE VARIABLE): >60 ML/MIN/1.73 M^2
GLUCOSE SERPL-MCNC: 107 MG/DL (ref 70–110)
HCT VFR BLD AUTO: 34.8 % (ref 37–48.5)
HGB BLD-MCNC: 10.8 G/DL (ref 12–16)
IMM GRANULOCYTES # BLD AUTO: 0.04 K/UL (ref 0–0.04)
IMM GRANULOCYTES NFR BLD AUTO: 0.5 % (ref 0–0.5)
LYMPHOCYTES # BLD AUTO: 1 K/UL (ref 1–4.8)
LYMPHOCYTES NFR BLD: 11.4 % (ref 18–48)
MAGNESIUM SERPL-MCNC: 2.1 MG/DL (ref 1.6–2.6)
MCH RBC QN AUTO: 26.7 PG (ref 27–31)
MCHC RBC AUTO-ENTMCNC: 31 G/DL (ref 32–36)
MCV RBC AUTO: 86 FL (ref 82–98)
MONOCYTES # BLD AUTO: 0.8 K/UL (ref 0.3–1)
MONOCYTES NFR BLD: 8.8 % (ref 4–15)
NEUTROPHILS # BLD AUTO: 6.9 K/UL (ref 1.8–7.7)
NEUTROPHILS NFR BLD: 77.9 % (ref 38–73)
NRBC BLD-RTO: 0 /100 WBC
PLATELET # BLD AUTO: 227 K/UL (ref 150–450)
PMV BLD AUTO: 9.4 FL (ref 9.2–12.9)
POTASSIUM SERPL-SCNC: 4 MMOL/L (ref 3.5–5.1)
PROT SERPL-MCNC: 6.9 G/DL (ref 6–8.4)
RBC # BLD AUTO: 4.05 M/UL (ref 4–5.4)
SODIUM SERPL-SCNC: 137 MMOL/L (ref 136–145)
WBC # BLD AUTO: 8.87 K/UL (ref 3.9–12.7)

## 2022-10-24 PROCEDURE — 36415 COLL VENOUS BLD VENIPUNCTURE: CPT | Performed by: FAMILY MEDICINE

## 2022-10-24 PROCEDURE — 27000221 HC OXYGEN, UP TO 24 HOURS

## 2022-10-24 PROCEDURE — 99900035 HC TECH TIME PER 15 MIN (STAT)

## 2022-10-24 PROCEDURE — 94760 N-INVAS EAR/PLS OXIMETRY 1: CPT

## 2022-10-24 PROCEDURE — 83735 ASSAY OF MAGNESIUM: CPT | Performed by: FAMILY MEDICINE

## 2022-10-24 PROCEDURE — 97161 PT EVAL LOW COMPLEX 20 MIN: CPT

## 2022-10-24 PROCEDURE — 25000003 PHARM REV CODE 250: Performed by: FAMILY MEDICINE

## 2022-10-24 PROCEDURE — 63600175 PHARM REV CODE 636 W HCPCS: Performed by: FAMILY MEDICINE

## 2022-10-24 PROCEDURE — 97535 SELF CARE MNGMENT TRAINING: CPT

## 2022-10-24 PROCEDURE — 25000003 PHARM REV CODE 250: Performed by: INTERNAL MEDICINE

## 2022-10-24 PROCEDURE — 94640 AIRWAY INHALATION TREATMENT: CPT

## 2022-10-24 PROCEDURE — 85025 COMPLETE CBC W/AUTO DIFF WBC: CPT | Performed by: FAMILY MEDICINE

## 2022-10-24 PROCEDURE — 25000242 PHARM REV CODE 250 ALT 637 W/ HCPCS: Performed by: FAMILY MEDICINE

## 2022-10-24 PROCEDURE — 63600175 PHARM REV CODE 636 W HCPCS: Performed by: STUDENT IN AN ORGANIZED HEALTH CARE EDUCATION/TRAINING PROGRAM

## 2022-10-24 PROCEDURE — 97116 GAIT TRAINING THERAPY: CPT

## 2022-10-24 PROCEDURE — 80053 COMPREHEN METABOLIC PANEL: CPT | Performed by: INTERNAL MEDICINE

## 2022-10-24 PROCEDURE — 94799 UNLISTED PULMONARY SVC/PX: CPT

## 2022-10-24 PROCEDURE — 97165 OT EVAL LOW COMPLEX 30 MIN: CPT

## 2022-10-24 RX ORDER — FLUCONAZOLE 150 MG/1
150 TABLET ORAL
Qty: 3 TABLET | Refills: 2 | Status: SHIPPED | OUTPATIENT
Start: 2022-10-24 | End: 2022-11-02

## 2022-10-24 RX ORDER — CLINDAMYCIN HYDROCHLORIDE 300 MG/1
300 CAPSULE ORAL 3 TIMES DAILY
Qty: 30 CAPSULE | Refills: 2 | Status: ON HOLD | OUTPATIENT
Start: 2022-10-24 | End: 2023-03-07 | Stop reason: HOSPADM

## 2022-10-24 RX ADMIN — PIPERACILLIN SODIUM AND TAZOBACTAM SODIUM 3.38 G: 3; .375 INJECTION, POWDER, LYOPHILIZED, FOR SOLUTION INTRAVENOUS at 01:10

## 2022-10-24 RX ADMIN — HYDROCODONE BITARTRATE AND ACETAMINOPHEN 1 TABLET: 5; 325 TABLET ORAL at 11:10

## 2022-10-24 RX ADMIN — ONDANSETRON 4 MG: 2 INJECTION INTRAMUSCULAR; INTRAVENOUS at 05:10

## 2022-10-24 RX ADMIN — HYDROCODONE BITARTRATE AND ACETAMINOPHEN 1 TABLET: 5; 325 TABLET ORAL at 05:10

## 2022-10-24 RX ADMIN — IPRATROPIUM BROMIDE 0.5 MG: 0.5 SOLUTION RESPIRATORY (INHALATION) at 07:10

## 2022-10-24 RX ADMIN — PIPERACILLIN SODIUM AND TAZOBACTAM SODIUM 3.38 G: 3; .375 INJECTION, POWDER, LYOPHILIZED, FOR SOLUTION INTRAVENOUS at 02:10

## 2022-10-24 RX ADMIN — CITALOPRAM HYDROBROMIDE 20 MG: 20 TABLET ORAL at 09:10

## 2022-10-24 RX ADMIN — ARFORMOTEROL TARTRATE 15 MCG: 15 SOLUTION RESPIRATORY (INHALATION) at 07:10

## 2022-10-24 RX ADMIN — IPRATROPIUM BROMIDE 0.5 MG: 0.5 SOLUTION RESPIRATORY (INHALATION) at 12:10

## 2022-10-24 RX ADMIN — BUDESONIDE 1 MG: 0.5 INHALANT RESPIRATORY (INHALATION) at 09:10

## 2022-10-24 RX ADMIN — CLINDAMYCIN IN 5 PERCENT DEXTROSE 900 MG: 18 INJECTION, SOLUTION INTRAVENOUS at 03:10

## 2022-10-24 RX ADMIN — CLINDAMYCIN IN 5 PERCENT DEXTROSE 900 MG: 18 INJECTION, SOLUTION INTRAVENOUS at 10:10

## 2022-10-24 RX ADMIN — ENOXAPARIN SODIUM 40 MG: 40 INJECTION SUBCUTANEOUS at 09:10

## 2022-10-24 RX ADMIN — BUSPIRONE HYDROCHLORIDE 7.5 MG: 5 TABLET ORAL at 09:10

## 2022-10-24 NOTE — CONSULTS
Spoke with MD regarding patient no open wounds, keep enlarge panis clean using antibacterial wash. And dry.

## 2022-10-24 NOTE — PT/OT/SLP EVAL
Occupational Therapy   Evaluation    Name: Adriana Zaragoza  MRN: [id number]  Admitting Diagnosis:  Panniculitis  Recent Surgery: * No surgery found *      Recommendations:     Discharge Recommendations: home health OT  Discharge Equipment Recommendations:  none  Barriers to discharge:  None    Assessment:     Adriana Zaragoza is a 56 y.o. female with a medical diagnosis of Panniculitis.  Pt agreeable to OT evaluation this AM. Performance deficits affecting function: weakness, impaired endurance, impaired self care skills, gait instability, impaired functional mobility, impaired balance, decreased safety awareness, impaired skin, edema, impaired cardiopulmonary response to activity.      Rehab Prognosis: Fair; patient would benefit from acute skilled OT services to address these deficits and reach maximum level of function.       Plan:     Patient to be seen 3 x/week to address the above listed problems via self-care/home management, therapeutic activities, therapeutic exercises  Plan of Care Expires: 11/24/22  Plan of Care Reviewed with: patient    Subjective     Chief Complaint: fatigued   Patient/Family Comments/goals: to return home    Occupational Profile:  Living Environment: Pt lives with boyfriend and roommate in a 1 story home with 1 step to enter. Pt has a tub/shower combo and standard height toilet   Previous level of function: Mod I with ADLs, except assist needed for bathing; Pt, pt's boyfriend, and pt's roommate all share the chores   Roles and Routines: limited homemaker; drives  Equipment Used at Home:  rollator, BIPAP, oxygen, wheelchair  Assistance upon Discharge: yes, from boyfriend and roommate    Pain/Comfort:  Pain Rating 1: 0/10    Patients cultural, spiritual, Christian conflicts given the current situation:      Objective:     Communicated with: nursing prior to session.  Patient found up in chair with telemetry upon OT entry to room.    General Precautions: Standard, fall    Orthopedic Precautions:N/A   Braces: N/A  Respiratory Status: Room air    Occupational Performance:    Functional Mobility/Transfers:  Patient completed Sit <> Stand Transfer with stand by assistance  with  no assistive device   Functional Mobility: pt took a few steps forwards and back with CGA with HHA with no AD, no LOB, no SOB    Activities of Daily Living:  Grooming: setup assistance seated in chair to comb hair    Lower Body Dressing: stand by assistance seated in chair to don slip on shoes     Cognitive/Visual Perceptual:  Cognitive/Psychosocial Skills:     -       Oriented to: Person, Place, Time, and Situation   -       Follows Commands/attention:Follows two-step commands  -       Communication: clear/fluent  -       Memory: No Deficits noted  -       Safety awareness/insight to disability: impaired   -       Mood/Affect/Coping skills/emotional control: Appropriate to situation, Cooperative, and Pleasant    Physical Exam:  Balance:    -       SBA seated balance; CGA standing balance  Upper Extremity Range of Motion:     -       Right Upper Extremity: WFL  -       Left Upper Extremity: WFL  Upper Extremity Strength:    -       Right Upper Extremity: WFL  -       Left Upper Extremity: WFL   Strength:    -       Right Upper Extremity: WFL  -       Left Upper Extremity: WFL  Fine Motor Coordination:    -       Intact  Gross motor coordination:   WFL    AMPAC 6 Click ADL:  AMPAC Total Score: 18    Treatment & Education:  Pt educated on role of OT/POC, importance of OOB/EOB activity, use of call bell, d/c planning, and safety during ADLs, transfers, and functional mobility.    Patient left up in chair with all lines intact and call button in reach    GOALS:   Multidisciplinary Problems       Occupational Therapy Goals          Problem: Occupational Therapy    Goal Priority Disciplines Outcome Interventions   Occupational Therapy Goal     OT, PT/OT     Description: Goals to be met by: 11/24/22     Patient  will increase functional independence with ADLs by performing:    UE Dressing with Supervision.  LE Dressing with Supervision and Assistive Devices as needed.  Grooming while seated with Supervision.  Toileting from toilet with Supervision for hygiene and clothing management.   Toilet transfer to toilet with Supervision.                         History:     Past Medical History:   Diagnosis Date    Allergy     Anemia     Asthma     COPD (chronic obstructive pulmonary disease)     Deep vein thrombosis     Depression     Diabetes mellitus, type 2     Encounter for blood transfusion     Heart murmur     Neuromuscular disorder          Past Surgical History:   Procedure Laterality Date     SECTION      DILATION AND CURETTAGE OF UTERUS      gastric sleeve      [facility]    TONSILLECTOMY         Time Tracking:     OT Date of Treatment: 10/24/22  OT Start Time: 854  OT Stop Time: 908  OT Total Time (min): 14 min    Billable Minutes:Evaluation 6  Self Care/Home Management 8    10/24/2022

## 2022-10-24 NOTE — CARE UPDATE
10/23/22 2012   Patient Assessment/Suction   Level of Consciousness (AVPU) alert   Respiratory Effort Normal;Unlabored   Expansion/Accessory Muscles/Retractions no use of accessory muscles   All Lung Fields Breath Sounds Anterior:;diminished   Rhythm/Pattern, Respiratory unlabored   Cough Frequency infrequent   Skin Integrity   $ Wound Care Tech Time 15 min   Area Observed Bridge of nose   Skin Appearance   (red, bruised)   Barrier used?   (attmpted to place foam dressing on bridge of nose, but patient did not like the feeling of it.)   PRE-TX-O2   O2 Device (Oxygen Therapy) BiPAP   $ Is the patient on Low Flow Oxygen? Yes   Oxygen Concentration (%) 30   SpO2 98 %   Pulse Oximetry Type Intermittent   $ Pulse Oximetry - Multiple Charge Pulse Oximetry - Multiple   Pulse 79   Resp 18   Ready to Wean/Extubation Screen   FIO2<=50 (chart decimal) 0.3   Preset CPAP/BiPAP Settings   Mode Of Delivery BiPAP   $ Initial CPAP/BiPAP Setup? No   $ Is patient using? Yes   Size of Mask Medium/Large   Sized Appropriately? Yes   Equipment Type V60   Ipap 14   EPAP (cm H2O) 7   Pressure Support (cm H2O) 7   Set Rate (Breaths/Min) 14   ITime (sec) 1   Rise Time (sec) 3   Patient CPAP/BiPAP Settings   Timed Inspiration (Sec) 1   IPAP Rise Time (sec) 3   RR Total (Breaths/Min) 24   Tidal Volume (mL) 412   VE Minute Ventilation (L/min) 10.1 L/min   Peak Inspiratory Pressure (cm H2O) 14   TiTOT (%) 35   Total Leak (L/Min) 0   Patient Trigger - ST Mode Only (%) 94   Education   $ Education DME BiPAP;15 min;Bronchodilator   Respiratory Evaluation   $ Care Plan Tech Time 15 min

## 2022-10-24 NOTE — PLAN OF CARE
[facility]  Discharge Final Note    Primary Care Provider: Jad Hua MD    Expected Discharge Date: 10/24/2022     met with Pt at bedside to confirm discharge plans. Pt verbalized plan to discharge home via family transport.  sent email to case  to aid in scheduling Pt follow-up visit with PCP.  sent referral to [facility] via careport for Pt to resume services. Pt has no other needs to be addressed by case management. Pt cleared to discharge by case management.    Addendum:  received voicemail from Randy ([facility], [phone]) informing  Pt will be seen 10/25.      Final Discharge Note (most recent)       Final Note - 10/24/22 1402          Final Note    Assessment Type Final Discharge Note     Anticipated Discharge Disposition Home-Health Care [facility] [place]     What phone number can be called within the next 1-3 days to see how you are doing after discharge? [phone]     Hospital Resources/Appts/Education Provided Provided patient/caregiver with written discharge plan information;Appointments scheduled by Navigator/Coordinator        Post-Acute Status    Post-Acute Authorization Home Health     Home Health Status Set-up Complete/Auth obtained     Coverage Payor:  MEDICARE - MEDICARE PART A & B     Discharge Delays None known at this time                     Important Message from Medicare             Contact Info       Jad Hua MD   Specialty: Family Medicine   Relationship: PCP - General    Mali Conroy  [address] [place] [id number]   Phone: [phone]       Next Steps: Schedule an appointment as soon as possible for a visit in 2 week(s)    Instructions: post hospital discharge follow up for panniculitis

## 2022-10-24 NOTE — RESPIRATORY THERAPY
"Patient has red bruised area on nose. Patient stated this is from her home cpap masks that she uses on the daily basis. Foam dressing was attempted to place on nose by RT, but patient stated that area did not hurt, and the foam dressing did not feel comfortable. Patient stated "it's okay,thank you". Nurse notified.  "

## 2022-10-24 NOTE — PLAN OF CARE
Met with Patient/Caregiver to provide MOON.  MOON signed by Patient/Caregiver, copy of TRAN provided to Patient/Caregiver, and TRAN will be scanned to chart.

## 2022-10-24 NOTE — PROGRESS NOTES
"[facility] Medicine  Progress Note    Patient name: Adriana Zaragoza  MRN: [id number]  Admit Date: 10/21/2022   LOS: 1 day     SUBJECTIVE:     Principal problem: Panniculitis    Interval History:  No fever overnight.  Leukocytosis is better.  She overall clinically is better but still feels quite fatigued.  Panniculitis is still quite erythematous but less angry so than prior. BCx NGTD.    Hospital Course:    Patient admitted with sepsis suspected to be secondary to Panniculitis. Started on IV Vanc, Zosyn, Clindamycin.  Patient having myalgias and significant back pain.  Imaging reviewed and no acute intra abdominal process.  CT report states "acute displaced fracture identified."  I reviewed with [facility] and no fracture identified.  This appears to be typo.  UA with no evidence of infection.  CXR with mild bilateral peribronchovascular opacities but CTA chest suggests this is likely atelectasis, not infection or fluid. CT abdomen with distended gallbladder but no other signs of acute infection and AST/ALT, Bilirubin and alk phosph are normal. Panniculitis seems to be the most logical source of infection at this time.  US of this area with no obvious abscess- just diffuse subcutaneous edema.     Scheduled Meds:   budesonide  1 mg Nebulization Q12H    And    arformoteroL  15 mcg Nebulization BID    busPIRone  7.5 mg Oral BID    citalopram  20 mg Oral Daily    clindamycin (CLEOCIN) IVPB  900 mg Intravenous Q6H    enoxaparin  40 mg Subcutaneous Q12H    ipratropium  0.5 mg Nebulization Q6H WAKE    piperacillin-tazobactam (ZOSYN) IVPB  3.375 g Intravenous Q8H    vancomycin (VANCOCIN) IVPB  2,000 mg Intravenous Q12H     Continuous Infusions:   sodium chloride 0.9% 100 mL/hr at 10/22/22 0405     PRN Meds:acetaminophen, albuterol-ipratropium, dextrose 10%, dextrose 10%, glucagon (human recombinant), glucose, glucose, HYDROcodone-acetaminophen, melatonin, morphine, naloxone, ondansetron, " polyethylene glycol, senna-docusate 8.6-50 mg, simethicone, sodium chloride 0.9%, Pharmacy to dose Vancomycin consult **AND** vancomycin - pharmacy to dose    Review of patient's allergies indicates:   Allergen Reactions    Aspirin     Dilaudid [hydromorphone]      Excessive sleepiness    Nsaids (non-steroidal anti-inflammatory drug) Hives    Teflaro [ceftaroline fosamil]      Allergic reaction/ hives/itching       Review of Systems: As per interval history    OBJECTIVE:     Vital Signs (Most Recent)  Temp: 98.2 °F (36.8 °C) (10/23/22 1227)  Pulse: 79 (10/23/22 1313)  Resp: 18 (10/23/22 1313)  BP: 118/66 (10/23/22 1227)  SpO2: 95 % (10/23/22 1313)    Vital Signs Range (Last 24H):  Temp:  [98.1 °F (36.7 °C)-99 °F (37.2 °C)]   Pulse:  [72-83]   Resp:  [16-23]   BP: ()/(45-66)   SpO2:  [94 %-99 %]     I & O (Last 24H):  Intake/Output Summary (Last 24 hours) at 10/23/2022 1944  Last data filed at 10/23/2022 1751  Gross per 24 hour   Intake --   Output 4100 ml   Net -4100 ml         Physical Exam:    Vitals and nursing note reviewed.     Constitutional:       General: Not in acute distress.     Appearance: Well-developed.   HENT:      Head: Normocephalic and atraumatic.   Eyes:      Pupils: Pupils are equal, round, and reactive to light.   Cardiovascular:      Rate and Rhythm: Regular rhythm.   Pulmonary:      Effort: Pulmonary effort is normal.      Breath sounds: Normal breath sounds. No wheezing.   O2 per NC  Abdominal:      General: There is no distension.      Palpations: Abdomen is soft.      Tenderness: There is no abdominal tenderness. There is no guarding or rebound.   Musculoskeletal:         General: Normal range of motion.      Cervical back: Normal range of motion.   Skin:     Findings: No rash.  Panniculitis with erythema, warmth  Neurological:      Mental Status: Alert and oriented to person, place, and time.      Cranial Nerves: No cranial nerve deficit.      Sensory: No sensory deficit.      Laboratory:  I have reviewed all pertinent lab results within the past 24 hours.  CBC:   Recent Labs   Lab 10/23/22  0514   WBC 9.96   RBC 4.16   HGB 10.9*   HCT 35.6*      MCV 86   MCH 26.2*   MCHC 30.6*       CMP:   Recent Labs   Lab 10/23/22  0514      CALCIUM 8.1*   ALBUMIN 3.0*   PROT 6.8   *   K 3.8   CO2 25      BUN 12   CREATININE 0.6   ALKPHOS 61   ALT 14   AST 10   BILITOT 0.8       Microbiology Results (last 7 days)       Procedure Component Value Units Date/Time    Blood culture #1 **CANNOT BE ORDERED STAT** [[id number]] Collected: 10/21/22 2130    Order Status: Completed Specimen: Blood from Peripheral, Antecubital, Left Updated: 10/22/22 2232     Blood Culture, Routine No Growth to date      No Growth to date    Blood culture #2 **CANNOT BE ORDERED STAT** [[id number]] Collected: 10/21/22 2140    Order Status: Completed Specimen: Blood from Peripheral, Forearm, Left Updated: 10/22/22 2232     Blood Culture, Routine No Growth to date      No Growth to date          Recent Labs   Lab 10/21/22  2149   COLORU Yellow   SPECGRAV >1.030*   PHUR 6.0   PROTEINUA Trace*   NITRITE Negative   LEUKOCYTESUR Negative   UROBILINOGEN Negative         Diagnostic Results:      ASSESSMENT/PLAN:         Active Hospital Problems    Diagnosis  POA    *Panniculitis [M79.3]  Yes    Chronic respiratory failure [J96.10]  Yes    Mass of right thigh [R22.41]  Yes    S/P gastric bypass [Z98.84]  Not Applicable    KEESHA (obstructive sleep apnea) [G47.33]  Yes    Hidradenitis suppurativa [L73.2]  Yes      Resolved Hospital Problems   No resolved problems to display.         Plan:     -Broad IV abx with Vanc, Zosyn, Clindamcyin.   At this time, source seems to be panniculitis.   -BCX NGTD.  Fever better but did receive IV tylenol throughout yesterday.  -I do not see anything on imaging to explain back pain at this time.  I suspect musculoskeletal pain from fever and infection but will monitor for any evolving  process.  -Pain control with po and IV narcotic  -CPAP QHS and when she naps  -DVT Px with lovenox  -PT/OT.  Will start mobilizing.  Lyons out tomorrow.        VTE Risk Mitigation (From admission, onward)           Ordered     enoxaparin injection 40 mg  Every 12 hours         10/22/22 0415     IP VTE HIGH RISK PATIENT  Once         10/22/22 0331     Place sequential compression device  Until discontinued         10/22/22 0331                      Department Hospital Medicine  [facility]  Reynaldo Dodge MD  Date of service: 10/23/2022

## 2022-10-24 NOTE — CARE UPDATE
10/24/22 0738   Patient Assessment/Suction   Level of Consciousness (AVPU) alert   Respiratory Effort Normal;Unlabored   Expansion/Accessory Muscles/Retractions no use of accessory muscles   All Lung Fields Breath Sounds diminished   PRE-TX-O2   O2 Device (Oxygen Therapy) BiPAP   $ Is the patient on Low Flow Oxygen? Yes   $ Noninvasive Daily Charge Noninvasive Daily   SpO2 98 %   Pulse Oximetry Type Intermittent   $ Pulse Oximetry - Single Charge Pulse Oximetry - Single   Pulse 77   Resp (!) 22   Aerosol Therapy   $ Aerosol Therapy Charges Aerosol Treatment   Daily Review of Necessity (SVN) completed   Respiratory Treatment Status (SVN) given   Treatment Route (SVN) in-line   Patient Position (SVN) HOB elevated   Post Treatment Assessment (SVN) increased aeration   Signs of Intolerance (SVN) none   Preset CPAP/BiPAP Settings   Mode Of Delivery BiPAP;BiPAP S/T   Sized Appropriately? Yes   Equipment Type V60   Ipap 14   EPAP (cm H2O) 7   Pressure Support (cm H2O) 7   Set Rate (Breaths/Min) 14   ITime (sec) 1   Rise Time (sec) 3   Patient CPAP/BiPAP Settings   Timed Inspiration (Sec) 1   IPAP Rise Time (sec) 3   RR Total (Breaths/Min) 22   Tidal Volume (mL) 481   VE Minute Ventilation (L/min) 12.9 L/min   Peak Inspiratory Pressure (cm H2O) 16   TiTOT (%) 27   Patient Trigger - ST Mode Only (%) 94   CPAP/BiPAP Alarms   Apnea (Sec) 20   Respiratory Evaluation   $ Care Plan Tech Time 15 min

## 2022-10-24 NOTE — PLAN OF CARE
Problem: Adult Inpatient Plan of Care  Goal: Plan of Care Review  Outcome: Met  Goal: Patient-Specific Goal (Individualized)  Outcome: Met  Goal: Absence of Hospital-Acquired Illness or Injury  Outcome: Met  Goal: Optimal Comfort and Wellbeing  Outcome: Met  Goal: Readiness for Transition of Care  Outcome: Met     Problem: Bariatric Environmental Safety  Goal: Safety Maintained with Care  Outcome: Met     Problem: Diabetes Comorbidity  Goal: Blood Glucose Level Within Targeted Range  Outcome: Met     Problem: Impaired Wound Healing  Goal: Optimal Wound Healing  Outcome: Met     Problem: Skin Injury Risk Increased  Goal: Skin Health and Integrity  Outcome: Met     Problem: Fall Injury Risk  Goal: Absence of Fall and Fall-Related Injury  Outcome: Met     Problem: Infection  Goal: Absence of Infection Signs and Symptoms  Outcome: Met

## 2022-10-24 NOTE — PT/OT/SLP EVAL
Physical Therapy Evaluation    Patient Name:  Adriana Zaragoza   MRN:  [id number]    Recommendations:     Discharge Recommendations:  home health PT   Discharge Equipment Recommendations: none   Barriers to discharge:  medical status and increase assist with mobility    Assessment:     Adriana Zaragoza is a 56 y.o. female admitted with a medical diagnosis of Panniculitis.  She presents with the following impairments/functional limitations:  weakness, impaired endurance, impaired self care skills, gait instability, impaired functional mobility, impaired balance, decreased safety awareness, impaired cardiopulmonary response to activity, decreased lower extremity function.    Pt was found sitting up in chair. Performed STS transfer with CGA & RW. Then ambulated 70 ft with RW and CGA, and required one standing rest break. Pt limited by endurance deficits. Pt reports frequent falls, with her most recent fall occurring ~1 month ago. Pt reports numbness in BLE from prolonged standing is reason for falls.     Rehab Prognosis: Fair; patient would benefit from acute skilled PT services to address these deficits and reach maximum level of function.    Recent Surgery: * No surgery found *      Plan:     During this hospitalization, patient to be seen 5 x/week to address the identified rehab impairments via gait training, therapeutic activities, therapeutic exercises, neuromuscular re-education and progress toward the following goals:    Plan of Care Expires:  11/24/22    Subjective     Chief Complaint: none reported  Patient/Family Comments/goals: none reported  Pain/Comfort:  Pain Rating 1: other (see comments) (unrated)  Location - Side 1: Right  Location 1: leg    Patients cultural, spiritual, Mandaeism conflicts given the current situation: no    Living Environment:  Pt lives with her boyfriend and roommate in a single story home with 1 [address]. She uses rollator for short distances and wheelchair for long  distances. Uses oxygen PRN. (+)   Prior to admission, patients level of function was modified independent.  Equipment used at home: rollator, BIPAP, oxygen, wheelchair.  DME owned (not currently used): none.  Upon discharge, patient will have assistance from friend and boyfriend.    Objective:     Communicated with RN prior to session.  Patient found up in chair with telemetry  upon PT entry to room.    General Precautions: Standard, fall   Orthopedic Precautions:N/A   Braces: N/A  Respiratory Status: Room air    Exams:  RLE ROM: WFL  RLE Strength: 3+/5 hip flexion, 3+/5 knee extension, 5/5 ankle dorsiflexion  LLE ROM: WFL  LLE Strength: 3+/5 hip flexion, 3+/5 knee extension, 5/5 ankle dorsiflexion    Functional Mobility:  Transfers:     Sit to Stand:  contact guard assistance with rolling walker  Gait: 70ft with CGA & RW, 1 standing rest break      AM-PAC 6 CLICK MOBILITY  Total Score:17       Treatment & Education:  Pt educated on POC, discharge recommendation, need for assist with mobility, use of call bell to seek assistance as needed and fall prevention.    Patient left up in chair with all lines intact, call button in reach, RN notified, and pt's friend present.    GOALS:   Multidisciplinary Problems       Physical Therapy Goals          Problem: Physical Therapy    Goal Priority Disciplines Outcome Goal Variances Interventions   Physical Therapy Goal     PT, PT/OT      Description: Goals to be met by: 2022     Patient will increase functional independence with mobility by performin. Supine to sit with Supervision  2. Sit to stand transfer with Supervision  3. Bed to chair transfer with Supervision using Rolling Walker  4. Gait  x 150 feet with Supervision using Rolling Walker.                          History:     Past Medical History:   Diagnosis Date    Allergy     Anemia     Asthma     COPD (chronic obstructive pulmonary disease)     Deep vein thrombosis     Depression     Diabetes  mellitus, type 2     Encounter for blood transfusion     Heart murmur     Neuromuscular disorder        Past Surgical History:   Procedure Laterality Date     SECTION      DILATION AND CURETTAGE OF UTERUS      gastric sleeve      [facility]    TONSILLECTOMY         Time Tracking:     PT Received On: 10/24/22  PT Start Time: 1012     PT Stop Time: 1031  PT Total Time (min): 19 min     Billable Minutes: Evaluation 11 and Gait Training 8      10/24/2022

## 2022-10-24 NOTE — PROGRESS NOTES
"[facility]  Adult Nutrition   Progress Note (Initial Assessment)     SUMMARY     Recommendations  Recommendation/Intervention: 1. Change diet to Cardiac Diabetic per pt hx. 2. Monitor for intake, labs and weight as needed.  Goals: 1. Intake to be >/= 75% EEN and EPN. .2. Lab values trend to target range.  Communication of RD Recs: reviewed with RN    Dietitian Rounds Brief  Patient admit for panniculitis; noted to have right LE lymphadenoma mass and pain.. Patient with good intake of meals. Patient wti history of diabetes and obesity with Cardiac diet. RD added Diabetic diet.Last BM 10/21/22. Plan to dc today. RD to follow pt.    Diet order:   Current Diet Order: Cardiac Diabetic diet      Evaluation of Received Nutrient/Fluid Intake  Energy Calories Required: meeting needs  Protein Required: meeting needs  Fluid Required: meeting needs  Tolerance: tolerating     % Intake of Estimated Energy Needs: 50 - 75 %  % Meal Intake: 50 - 75 %    Intake/Output Summary (Last 24 hours) at 10/24/2022 1422  Last data filed at 10/24/2022 1100  Gross per 24 hour   Intake 500 ml   Output 2200 ml   Net -1700 ml      Anthropometrics  Temp: 98.1 °F (36.7 °C)  Height Method: Stated  Height: 5' 4" (162.6 cm)  Height (inches): 64 in  Weight Method: Bed Scale  Weight: (!) 183.3 kg (404 lb 1.7 oz)  Weight (lb): (!) 404.11 lb  Ideal Body Weight (IBW), Female: 120 lb  % Ideal Body Weight, Female (lb): 325.37 %  BMI (Calculated): 69.3     Estimated/Assessed Needs  Weight Used For Calorie Calculations: (!) 183.3 kg (404 lb 1.7 oz)  Energy Calorie Requirements (kcal): 0665-2387 (12-14 kcal/kg)  Energy Need Method: Opp-[place] (MSJ X 1.1 to MSJ X 1.1 - 500 kcal)  Protein Requirements:  gm/day (1.5-2.0 gm/kg IBW)  Weight Used For Protein Calculations: 55 kg (121 lb 4.1 oz) (IBW)  Fluid Requirements (mL): 3667 ml/day (20 ml/kg)  Estimated Fluid Requirement Method: RDA Method  RDA Method (mL): 2143     Reason for " Assessment  Reason For Assessment: identified at risk by screening criteria  Diagnosis: other (see comments) (cellulitis)  Interdisciplinary Rounds: did not attend    Nutrition/Diet History  Spiritual, Cultural Beliefs, Baptist Practices, Values that Affect Care: no  Food Allergies: NKFA  Factors Affecting Nutritional Intake: None identified at this time    Nutrition Risk Screen  Nutrition Risk Screen: no indicators present     MST Score: 0  Have you recently lost weight without trying?: No  Weight loss score: 0  Have you been eating poorly because of a decreased appetite?: No  Appetite score: 0     Weight History:  Wt Readings from Last 5 Encounters:   10/23/22 (!) 183.3 kg (404 lb 1.7 oz)   09/01/22 (!) 174.4 kg (384 lb 6.4 oz)   01/21/22 (!) 176 kg (388 lb)   08/26/21 (!) 176.2 kg (388 lb 7.2 oz)   05/03/21 (!) 176.2 kg (388 lb 7.2 oz)      Lab/Procedures/Meds: Pertinent Labs/Meds Reviewed    Medications:Pertinent Medications Reviewed  Scheduled Meds:   budesonide  1 mg Nebulization Q12H    And    arformoteroL  15 mcg Nebulization BID    busPIRone  7.5 mg Oral BID    citalopram  20 mg Oral Daily    clindamycin (CLEOCIN) IVPB  900 mg Intravenous Q6H    enoxaparin  40 mg Subcutaneous Q12H    ipratropium  0.5 mg Nebulization Q6H WAKE    piperacillin-tazobactam (ZOSYN) IVPB  3.375 g Intravenous Q8H    vancomycin (VANCOCIN) IVPB  2,000 mg Intravenous Q12H     Continuous Infusions:  PRN Meds:.acetaminophen, albuterol-ipratropium, dextrose 10%, dextrose 10%, glucagon (human recombinant), glucose, glucose, HYDROcodone-acetaminophen, melatonin, morphine, naloxone, ondansetron, polyethylene glycol, senna-docusate 8.6-50 mg, simethicone, sodium chloride 0.9%, Pharmacy to dose Vancomycin consult **AND** vancomycin - pharmacy to dose    Labs: Pertinent Labs Reviewed  Clinical Chemistry:  Recent Labs   Lab 10/21/22  2110 10/24/22  0513    137   K 4.2 4.0    102   CO2 25 27   * 107   BUN 20 10   CREATININE  0.7 0.5   CALCIUM 9.0 8.4*   PROT 7.9 6.9   ALBUMIN 3.8 2.9*   BILITOT 0.6 0.5   ALKPHOS 71 66   AST 20 12   ALT 16 11   ANIONGAP 8 8   MG 1.9 2.1   LIPASE 26  --     < > = values in this interval not displayed.     CBC:   Recent Labs   Lab 10/24/22  0513   WBC 8.87   RBC 4.05   HGB 10.8*   HCT 34.8*      MCV 86   MCH 26.7*   MCHC 31.0*     Cardiac Profile:  Recent Labs   Lab 10/21/22  2110 10/21/22  2113   BNP  --  20   TROPONINI <0.030  --      IDiabetes:  Recent Labs   Lab 10/22/22  0456   HGBA1C 6.0     Thyroid & Parathyroid:  Recent Labs   Lab 10/21/22  2110   TSH 1.190       Monitor and Evaluation  Food and Nutrient Intake: energy intake  Food and Nutrient Adminstration: diet order  Knowledge/Beliefs/Attitudes: food and nutrition knowledge/skill  Physical Activity and Function: nutrition-related ADLs and IADLs  Anthropometric Measurements: weight change, body mass index, weight  Biochemical Data, Medical Tests and Procedures: electrolyte and renal panel, gastrointestinal profile, glucose/endocrine profile, inflammatory profile, lipid profile  Nutrition-Focused Physical Findings: overall appearance     Nutrition Risk  Level of Risk/Frequency of Follow-up: moderate     Nutrition Follow-Up  RD Follow-up?: Yes      Lorena Leigh RD, GABI 10/24/2022 2:22 PM

## 2022-10-25 ENCOUNTER — TELEPHONE (OUTPATIENT)
Dept: FAMILY MEDICINE | Facility: CLINIC | Age: 57
End: 2022-10-25

## 2022-10-25 NOTE — DISCHARGE SUMMARY
"[facility]  Discharge Summary      Patient Name: Adriana Zaragoza  MRN: [id number]  Patient Class: IP- Inpatient  Admission Date: 10/21/2022  Hospital Length of Stay: 2 days  Discharge Date and Time: 10/24/2022  2:26 PM  Attending Physician: No att. providers found   Discharging Provider: Reynaldo Dodge MD  Primary Care Provider: Jad Hua MD      HPI:   No notes on file    * No surgery found *      Hospital Course:      Patient admitted with sepsis suspected to be secondary to Panniculitis. Started on IV Vanc, Zosyn, Clindamycin.  Patient having myalgias and significant back pain.  Imaging reviewed and no acute intra abdominal process.  CT report states "acute displaced fracture identified."  I reviewed with [facility] Radiology and no fracture identified.  This appears to be typo.  UA with no evidence of infection.  CXR with mild bilateral peribronchovascular opacities but CTA chest suggests this is likely atelectasis, not infection or fluid. CT abdomen with distended gallbladder but no other signs of acute infection and AST/ALT, Bilirubin and alk phosph are normal. Panniculitis seems to be the most logical source of infection at this time.  US of this area with no obvious abscess- just diffuse subcutaneous edema.  She responded well to abx and fever curve improved.  24 hours of IV tylenol every 8 hours significantly helped with pain and fever.  Leukocytosis has resolved. BCx have remained NGRD.  Panniculitis has continued to improve daily though still has some erythema.  Good exam performed on day of discharge and no breakdown. Discussed with wound care RN and recommended antibacterial wash.  Patient will be discharged on course of clindamycin with Rx for Difluxan.  HH services resumed.  Return precautions given.         Goals of Care Treatment Preferences:  Code Status: Full Code      Consults:     No new Assessment & Plan notes have been filed under this hospital service " since the last note was generated.  Service: Hospital Medicine    Final Active Diagnoses:    Diagnosis Date Noted POA    PRINCIPAL PROBLEM:  Panniculitis [M79.3] 10/22/2022 Yes    Chronic respiratory failure [J96.10] 08/27/2021 Yes    Mass of right thigh [R22.41] 08/26/2021 Yes    S/P gastric bypass [Z98.84] 10/17/2019 Not Applicable    KEESHA (obstructive sleep apnea) [G47.33]  Yes    Hidradenitis suppurativa [L73.2] 01/31/2018 Yes      Problems Resolved During this Admission:       Discharged Condition: good    [place]position[place] [facility]    Follow Up:   Follow-up Information     Jad Hua MD. Schedule an appointment as soon as possible for a visit in 2 week(s).    Specialty: Family Medicine  Why: post hospital discharge follow up for panniculitis  Contact information:  [address] [address]  [phone]                       Patient Instructions:      Ambulatory referral/consult to Home Health   Standing Status: Future   Referral Priority: Routine Referral Type: Home Health Care   Referral Reason: Specialty Services Required   Requested Specialty: Home Health Services   Number of Visits Requested: 1     Diet Adult Regular   Order Comments: Bariatric Diet     Notify your health care provider if you experience any of the following:  redness, tenderness, or signs of infection (pain, swelling, redness, odor or green/yellow discharge around incision site)     Notify your health care provider if you experience any of the following:  severe uncontrolled pain     Notify your health care provider if you experience any of the following:  increased confusion or weakness     Activity as tolerated       Significant Diagnostic Studies: Labs:   CMP   Recent Labs   Lab 10/23/22  0514 10/24/22  0513   * 137   K 3.8 4.0    102   CO2 25 27    107   BUN 12 10   CREATININE 0.6 0.5   CALCIUM 8.1* 8.4*   PROT 6.8 6.9   ALBUMIN 3.0* 2.9*   BILITOT 0.8 0.5   ALKPHOS 61 66   AST 10  12   ALT 14 11   ANIONGAP 7* 8    and CBC   Recent Labs   Lab 10/23/22  0514 10/24/22  0513   WBC 9.96 8.87   HGB 10.9* 10.8*   HCT 35.6* 34.8*    227       Pending Diagnostic Studies:     None         Medications:  Reconciled Home Medications:      Medication List      START taking these medications    clindamycin 300 MG capsule  Commonly known as: CLEOCIN  Take 1 capsule (300 mg total) by mouth 3 (three) times daily.     fluconazole 150 MG Tab  Commonly known as: DIFLUCAN  Take 1 tablet (150 mg total) by mouth every 3 (three) days.        CONTINUE taking these medications    acetaminophen 650 MG Tbsr  Commonly known as: TYLENOL  Take 650 mg by mouth every 8 (eight) hours.     albuterol 90 mcg/actuation inhaler  Commonly known as: PROVENTIL/VENTOLIN HFA  Inhale 2 puffs into the lungs every 6 (six) hours as needed.     BARIATRIC MULTIVITAMINS 45 mg iron- 800 mcg-120 mcg Cap  Generic drug: multivitamin-min-iron-FA-vit K  Take 1 tablet by mouth once daily.     busPIRone 7.5 MG tablet  Commonly known as: BUSPAR  Take 1 tablet (7.5 mg total) by mouth 2 (two) times daily.     citalopram 20 MG tablet  Commonly known as: CeleXA  Take 1 tablet (20 mg total) by mouth once daily.     ergocalciferol 50,000 unit Cap  Commonly known as: ERGOCALCIFEROL  Take 1 capsule (50,000 Units total) by mouth every 7 days.     ondansetron 4 MG tablet  Commonly known as: ZOFRAN  Take 1 tablet (4 mg total) by mouth every 8 (eight) hours as needed for Nausea.            Indwelling Lines/Drains at time of discharge:   Lines/Drains/Airways     None                 Time spent on the discharge of patient: 35 minutes         Reynaldo Dodge MD  Department of Hospital Medicine  [facility]

## 2022-10-25 NOTE — TELEPHONE ENCOUNTER
----- Message from Samantha Pan LPN sent at 10/25/2022  8:13 AM CDT -----  Regarding: hospital follow up  Please call patient - needs post-hospital phone call within 2 business days of discharge and hospital follow up visit scheduled within 7-14 days if not already scheduled.

## 2022-10-25 NOTE — HOSPITAL COURSE
"   Patient admitted with sepsis suspected to be secondary to Panniculitis. Started on IV Vanc, Zosyn, Clindamycin.  Patient having myalgias and significant back pain.  Imaging reviewed and no acute intra abdominal process.  CT report states "acute displaced fracture identified."  I reviewed with [facility] Radiology and no fracture identified.  This appears to be typo.  UA with no evidence of infection.  CXR with mild bilateral peribronchovascular opacities but CTA chest suggests this is likely atelectasis, not infection or fluid. CT abdomen with distended gallbladder but no other signs of acute infection and AST/ALT, Bilirubin and alk phosph are normal. Panniculitis seems to be the most logical source of infection at this time.  US of this area with no obvious abscess- just diffuse subcutaneous edema.  She responded well to abx and fever curve improved.  24 hours of IV tylenol every 8 hours significantly helped with pain and fever.  Leukocytosis has resolved. BCx have remained NGRD.  Panniculitis has continued to improve daily though still has some erythema.  Good exam performed on day of discharge and no breakdown. Discussed with wound care RN and recommended antibacterial wash.  Patient will be discharged on course of clindamycin with Rx for Difluxan.   services resumed.  Return precautions given.    "

## 2022-10-26 LAB
BACTERIA BLD CULT: NORMAL
BACTERIA BLD CULT: NORMAL

## 2022-10-28 NOTE — PT/OT/SLP DISCHARGE
Occupational Therapy Discharge Summary    Adriana Zaragoza  MRN: [id number]   Principal Problem: Panniculitis      Patient Discharged from acute Occupational Therapy on 10/24/22.  Please refer to prior OT note dated 10/24/22 for functional status.    Assessment:      Patient appropriate for care in another setting.    Objective:     GOALS:   Multidisciplinary Problems       Occupational Therapy Goals          Problem: Occupational Therapy    Goal Priority Disciplines Outcome Interventions   Occupational Therapy Goal     OT, PT/OT     Description: Goals to be met by: 11/24/22     Patient will increase functional independence with ADLs by performing:    UE Dressing with Supervision.  LE Dressing with Supervision and Assistive Devices as needed.  Grooming while seated with Supervision.  Toileting from toilet with Supervision for hygiene and clothing management.   Toilet transfer to toilet with Supervision.                         Reasons for Discontinuation of Therapy Services  Transfer to alternate level of care.      Plan:     Patient Discharged to: Home with Home Health Service    10/28/2022

## 2022-11-17 ENCOUNTER — TELEPHONE (OUTPATIENT)
Dept: FAMILY MEDICINE | Facility: CLINIC | Age: 57
End: 2022-11-17

## 2022-11-17 NOTE — TELEPHONE ENCOUNTER
----- Message from Milena Ortiz sent at 11/14/2022  3:40 PM CST -----  Regarding: Hospital folllow up  Please call patient - needs post-hospital phone call within 2 business days of discharge and hospital follow up visit scheduled within 7-14 days if not already scheduled.     Thank you

## 2022-12-11 PROCEDURE — G0179 MD RECERTIFICATION HHA PT: HCPCS | Mod: ,,, | Performed by: FAMILY MEDICINE

## 2022-12-11 PROCEDURE — G0179 PR HOME HEALTH MD RECERTIFICATION: ICD-10-PCS | Mod: ,,, | Performed by: FAMILY MEDICINE

## 2023-01-18 ENCOUNTER — DOCUMENT SCAN (OUTPATIENT)
Dept: HOME HEALTH SERVICES | Facility: HOSPITAL | Age: 58
End: 2023-01-18
Payer: MEDICARE

## 2023-01-23 ENCOUNTER — EXTERNAL HOME HEALTH (OUTPATIENT)
Dept: HOME HEALTH SERVICES | Facility: HOSPITAL | Age: 58
End: 2023-01-23
Payer: MEDICARE

## 2023-01-23 PROBLEM — J96.10 CHRONIC RESPIRATORY FAILURE: Status: RESOLVED | Noted: 2021-08-27 | Resolved: 2023-01-23

## 2023-02-16 ENCOUNTER — EXTERNAL HOME HEALTH (OUTPATIENT)
Dept: HOME HEALTH SERVICES | Facility: HOSPITAL | Age: 58
End: 2023-02-16
Payer: MEDICARE

## 2023-03-02 ENCOUNTER — HOSPITAL ENCOUNTER (INPATIENT)
Facility: HOSPITAL | Age: 58
LOS: 3 days | Discharge: HOME | DRG: 872 | End: 2023-03-07
Attending: EMERGENCY MEDICINE | Admitting: INTERNAL MEDICINE
Payer: MEDICARE

## 2023-03-02 DIAGNOSIS — A41.9 SEPSIS: ICD-10-CM

## 2023-03-02 DIAGNOSIS — M79.3 ACUTE PANNICULITIS: Primary | ICD-10-CM

## 2023-03-02 LAB
ALBUMIN SERPL BCP-MCNC: 3.7 G/DL (ref 3.5–5.2)
ALP SERPL-CCNC: 69 U/L (ref 55–135)
ALT SERPL W/O P-5'-P-CCNC: 15 U/L (ref 10–44)
ANION GAP SERPL CALC-SCNC: 8 MMOL/L (ref 8–16)
AST SERPL-CCNC: 22 U/L (ref 10–40)
BASOPHILS # BLD AUTO: 0.05 K/UL (ref 0–0.2)
BASOPHILS NFR BLD: 0.3 % (ref 0–1.9)
BILIRUB SERPL-MCNC: 1.1 MG/DL (ref 0.1–1)
BUN SERPL-MCNC: 13 MG/DL (ref 6–20)
CALCIUM SERPL-MCNC: 8.8 MG/DL (ref 8.7–10.5)
CHLORIDE SERPL-SCNC: 104 MMOL/L (ref 95–110)
CO2 SERPL-SCNC: 27 MMOL/L (ref 23–29)
CREAT SERPL-MCNC: 0.7 MG/DL (ref 0.5–1.4)
DIFFERENTIAL METHOD: ABNORMAL
EOSINOPHIL # BLD AUTO: 0 K/UL (ref 0–0.5)
EOSINOPHIL NFR BLD: 0.2 % (ref 0–8)
ERYTHROCYTE [DISTWIDTH] IN BLOOD BY AUTOMATED COUNT: 15.6 % (ref 11.5–14.5)
EST. GFR  (NO RACE VARIABLE): >60 ML/MIN/1.73 M^2
GLUCOSE SERPL-MCNC: 125 MG/DL (ref 70–110)
HCT VFR BLD AUTO: 39.8 % (ref 37–48.5)
HGB BLD-MCNC: 12.4 G/DL (ref 12–16)
IMM GRANULOCYTES # BLD AUTO: 0.08 K/UL (ref 0–0.04)
IMM GRANULOCYTES NFR BLD AUTO: 0.5 % (ref 0–0.5)
LACTATE SERPL-SCNC: 1.4 MMOL/L (ref 0.5–1.9)
LYMPHOCYTES # BLD AUTO: 0.8 K/UL (ref 1–4.8)
LYMPHOCYTES NFR BLD: 4.8 % (ref 18–48)
MCH RBC QN AUTO: 26.9 PG (ref 27–31)
MCHC RBC AUTO-ENTMCNC: 31.2 G/DL (ref 32–36)
MCV RBC AUTO: 86 FL (ref 82–98)
MONOCYTES # BLD AUTO: 0.6 K/UL (ref 0.3–1)
MONOCYTES NFR BLD: 3.6 % (ref 4–15)
NEUTROPHILS # BLD AUTO: 14.9 K/UL (ref 1.8–7.7)
NEUTROPHILS NFR BLD: 90.6 % (ref 38–73)
NRBC BLD-RTO: 0 /100 WBC
PLATELET # BLD AUTO: 290 K/UL (ref 150–450)
PMV BLD AUTO: 9.5 FL (ref 9.2–12.9)
POTASSIUM SERPL-SCNC: 4.8 MMOL/L (ref 3.5–5.1)
PROT SERPL-MCNC: 7.9 G/DL (ref 6–8.4)
RBC # BLD AUTO: 4.61 M/UL (ref 4–5.4)
SODIUM SERPL-SCNC: 139 MMOL/L (ref 136–145)
WBC # BLD AUTO: 16.5 K/UL (ref 3.9–12.7)

## 2023-03-02 PROCEDURE — 99900031 HC PATIENT EDUCATION (STAT)

## 2023-03-02 PROCEDURE — 96365 THER/PROPH/DIAG IV INF INIT: CPT

## 2023-03-02 PROCEDURE — 99900035 HC TECH TIME PER 15 MIN (STAT)

## 2023-03-02 PROCEDURE — 96376 TX/PRO/DX INJ SAME DRUG ADON: CPT

## 2023-03-02 PROCEDURE — 96375 TX/PRO/DX INJ NEW DRUG ADDON: CPT

## 2023-03-02 PROCEDURE — 25000003 PHARM REV CODE 250: Performed by: INTERNAL MEDICINE

## 2023-03-02 PROCEDURE — 94760 N-INVAS EAR/PLS OXIMETRY 1: CPT

## 2023-03-02 PROCEDURE — 27000221 HC OXYGEN, UP TO 24 HOURS

## 2023-03-02 PROCEDURE — 99285 EMERGENCY DEPT VISIT HI MDM: CPT | Mod: 25

## 2023-03-02 PROCEDURE — 96372 THER/PROPH/DIAG INJ SC/IM: CPT | Performed by: INTERNAL MEDICINE

## 2023-03-02 PROCEDURE — 83605 ASSAY OF LACTIC ACID: CPT | Performed by: EMERGENCY MEDICINE

## 2023-03-02 PROCEDURE — 80053 COMPREHEN METABOLIC PANEL: CPT | Performed by: EMERGENCY MEDICINE

## 2023-03-02 PROCEDURE — G0378 HOSPITAL OBSERVATION PER HR: HCPCS

## 2023-03-02 PROCEDURE — 63600175 PHARM REV CODE 636 W HCPCS: Performed by: INTERNAL MEDICINE

## 2023-03-02 PROCEDURE — 96366 THER/PROPH/DIAG IV INF ADDON: CPT

## 2023-03-02 PROCEDURE — 93010 EKG 12-LEAD: ICD-10-PCS | Mod: ,,, | Performed by: INTERNAL MEDICINE

## 2023-03-02 PROCEDURE — 63600175 PHARM REV CODE 636 W HCPCS: Performed by: EMERGENCY MEDICINE

## 2023-03-02 PROCEDURE — 87040 BLOOD CULTURE FOR BACTERIA: CPT | Performed by: EMERGENCY MEDICINE

## 2023-03-02 PROCEDURE — 85025 COMPLETE CBC W/AUTO DIFF WBC: CPT | Performed by: EMERGENCY MEDICINE

## 2023-03-02 PROCEDURE — 96367 TX/PROPH/DG ADDL SEQ IV INF: CPT

## 2023-03-02 PROCEDURE — 93005 ELECTROCARDIOGRAM TRACING: CPT | Performed by: INTERNAL MEDICINE

## 2023-03-02 PROCEDURE — 25000003 PHARM REV CODE 250: Performed by: EMERGENCY MEDICINE

## 2023-03-02 PROCEDURE — 94660 CPAP INITIATION&MGMT: CPT

## 2023-03-02 PROCEDURE — 93010 ELECTROCARDIOGRAM REPORT: CPT | Mod: ,,, | Performed by: INTERNAL MEDICINE

## 2023-03-02 RX ORDER — MORPHINE SULFATE 2 MG/ML
6 INJECTION, SOLUTION INTRAMUSCULAR; INTRAVENOUS EVERY 4 HOURS PRN
Status: DISCONTINUED | OUTPATIENT
Start: 2023-03-02 | End: 2023-03-07 | Stop reason: HOSPADM

## 2023-03-02 RX ORDER — ACETAMINOPHEN 325 MG/1
650 TABLET ORAL
Status: COMPLETED | OUTPATIENT
Start: 2023-03-02 | End: 2023-03-02

## 2023-03-02 RX ORDER — MORPHINE SULFATE 4 MG/ML
4 INJECTION, SOLUTION INTRAMUSCULAR; INTRAVENOUS
Status: COMPLETED | OUTPATIENT
Start: 2023-03-02 | End: 2023-03-02

## 2023-03-02 RX ORDER — ONDANSETRON 4 MG/1
8 TABLET, ORALLY DISINTEGRATING ORAL EVERY 8 HOURS PRN
Status: DISCONTINUED | OUTPATIENT
Start: 2023-03-02 | End: 2023-03-07 | Stop reason: HOSPADM

## 2023-03-02 RX ORDER — MORPHINE SULFATE 4 MG/ML
4 INJECTION, SOLUTION INTRAMUSCULAR; INTRAVENOUS EVERY 4 HOURS PRN
Status: DISCONTINUED | OUTPATIENT
Start: 2023-03-02 | End: 2023-03-07 | Stop reason: HOSPADM

## 2023-03-02 RX ORDER — VANCOMYCIN HCL IN 5 % DEXTROSE 1G/250ML
2000 PLASTIC BAG, INJECTION (ML) INTRAVENOUS ONCE
Status: COMPLETED | OUTPATIENT
Start: 2023-03-02 | End: 2023-03-02

## 2023-03-02 RX ORDER — ENOXAPARIN SODIUM 100 MG/ML
40 INJECTION SUBCUTANEOUS EVERY 12 HOURS
Status: DISCONTINUED | OUTPATIENT
Start: 2023-03-02 | End: 2023-03-07 | Stop reason: HOSPADM

## 2023-03-02 RX ORDER — CITALOPRAM 20 MG/1
20 TABLET, FILM COATED ORAL DAILY
Status: DISCONTINUED | OUTPATIENT
Start: 2023-03-03 | End: 2023-03-07 | Stop reason: HOSPADM

## 2023-03-02 RX ORDER — ACETAMINOPHEN 325 MG/1
650 TABLET ORAL EVERY 4 HOURS PRN
Status: DISCONTINUED | OUTPATIENT
Start: 2023-03-02 | End: 2023-03-07 | Stop reason: HOSPADM

## 2023-03-02 RX ORDER — SODIUM CHLORIDE 0.9 % (FLUSH) 0.9 %
10 SYRINGE (ML) INJECTION
Status: DISCONTINUED | OUTPATIENT
Start: 2023-03-02 | End: 2023-03-07 | Stop reason: HOSPADM

## 2023-03-02 RX ORDER — MORPHINE SULFATE 4 MG/ML
4 INJECTION, SOLUTION INTRAMUSCULAR; INTRAVENOUS EVERY 4 HOURS PRN
Status: DISCONTINUED | OUTPATIENT
Start: 2023-03-02 | End: 2023-03-02

## 2023-03-02 RX ORDER — PROCHLORPERAZINE EDISYLATE 5 MG/ML
5 INJECTION INTRAMUSCULAR; INTRAVENOUS EVERY 6 HOURS PRN
Status: DISCONTINUED | OUTPATIENT
Start: 2023-03-02 | End: 2023-03-07 | Stop reason: HOSPADM

## 2023-03-02 RX ORDER — ALBUTEROL SULFATE 0.83 MG/ML
2.5 SOLUTION RESPIRATORY (INHALATION) EVERY 6 HOURS PRN
Status: DISCONTINUED | OUTPATIENT
Start: 2023-03-02 | End: 2023-03-07 | Stop reason: HOSPADM

## 2023-03-02 RX ORDER — ONDANSETRON 2 MG/ML
4 INJECTION INTRAMUSCULAR; INTRAVENOUS
Status: COMPLETED | OUTPATIENT
Start: 2023-03-02 | End: 2023-03-02

## 2023-03-02 RX ORDER — MORPHINE SULFATE 2 MG/ML
2 INJECTION, SOLUTION INTRAMUSCULAR; INTRAVENOUS ONCE
Status: DISCONTINUED | OUTPATIENT
Start: 2023-03-02 | End: 2023-03-07 | Stop reason: HOSPADM

## 2023-03-02 RX ADMIN — ONDANSETRON 4 MG: 2 INJECTION INTRAMUSCULAR; INTRAVENOUS at 11:03

## 2023-03-02 RX ADMIN — VANCOMYCIN HYDROCHLORIDE 2000 MG: 1 INJECTION, POWDER, LYOPHILIZED, FOR SOLUTION INTRAVENOUS at 02:03

## 2023-03-02 RX ADMIN — BUSPIRONE HYDROCHLORIDE 7.5 MG: 5 TABLET ORAL at 09:03

## 2023-03-02 RX ADMIN — ACETAMINOPHEN 650 MG: 325 TABLET ORAL at 10:03

## 2023-03-02 RX ADMIN — SODIUM CHLORIDE, SODIUM LACTATE, POTASSIUM CHLORIDE, AND CALCIUM CHLORIDE 1641 ML: .6; .31; .03; .02 INJECTION, SOLUTION INTRAVENOUS at 12:03

## 2023-03-02 RX ADMIN — MORPHINE SULFATE 4 MG: 4 INJECTION, SOLUTION INTRAMUSCULAR; INTRAVENOUS at 02:03

## 2023-03-02 RX ADMIN — ONDANSETRON 8 MG: 4 TABLET, ORALLY DISINTEGRATING ORAL at 03:03

## 2023-03-02 RX ADMIN — MORPHINE SULFATE 4 MG: 4 INJECTION, SOLUTION INTRAMUSCULAR; INTRAVENOUS at 11:03

## 2023-03-02 RX ADMIN — PIPERACILLIN AND TAZOBACTAM 4.5 G: 4; .5 INJECTION, POWDER, LYOPHILIZED, FOR SOLUTION INTRAVENOUS; PARENTERAL at 11:03

## 2023-03-02 RX ADMIN — MORPHINE SULFATE 6 MG: 2 INJECTION, SOLUTION INTRAMUSCULAR; INTRAVENOUS at 07:03

## 2023-03-02 RX ADMIN — ENOXAPARIN SODIUM 40 MG: 100 INJECTION SUBCUTANEOUS at 09:03

## 2023-03-02 NOTE — H&P
[facility] - Emergency Dept  Hospital Medicine  History & Physical    Patient Name: Adriana Zaragoza  MRN: [id number]  Patient Class: OP- Observation  Admission Date: 3/2/2023  Attending Physician: Enrique Gutierrez MD  Primary Care Provider: Jad Hua MD         Patient information was obtained from patient, past medical records and ER records.     Subjective:     Principal Problem:Acute lymphangitis    Chief Complaint:   Chief Complaint   Patient presents with    Weakness    Fever        HPI: ED note  Patient presents complaining of fever and weakness.  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  Patient started with fever and chills this morning.  Patient has had sepsis before.    3/2/2023  Ms Zaragoza has a large appendage . She noted fever and chills this date with a Hx of cellulitis of the appendage. Pt has chronic low back pain that is an 8/10 at present.  The appendage is erythematous and tender to the touch.      Past Medical History:   Diagnosis Date    Allergy     Anemia     Asthma     COPD (chronic obstructive pulmonary disease)     Deep vein thrombosis     Depression     Diabetes mellitus, type 2     Encounter for blood transfusion     Heart murmur     Neuromuscular disorder        Past Surgical History:   Procedure Laterality Date     SECTION      DILATION AND CURETTAGE OF UTERUS      gastric sleeve      [facility]    TONSILLECTOMY         Review of patient's allergies indicates:   Allergen Reactions    Aspirin     Dilaudid [hydromorphone]      Excessive sleepiness    Nsaids (non-steroidal anti-inflammatory drug) Hives    Teflaro [ceftaroline fosamil]      Allergic reaction/ hives/itching       No current facility-administered medications on file prior to encounter.     Current Outpatient Medications on File Prior to Encounter   Medication Sig    acetaminophen (TYLENOL) 650 MG TbSR Take 650 mg by  mouth every 8 (eight) hours.    albuterol (PROVENTIL/VENTOLIN HFA) 90 mcg/actuation inhaler Inhale 2 puffs into the lungs every 6 (six) hours as needed.     busPIRone (BUSPAR) 7.5 MG tablet Take 1 tablet (7.5 mg total) by mouth 2 (two) times daily.    citalopram (CELEXA) 20 MG tablet Take 1 tablet (20 mg total) by mouth once daily.    clindamycin (CLEOCIN) 300 MG capsule Take 1 capsule (300 mg total) by mouth 3 (three) times daily.    ergocalciferol (ERGOCALCIFEROL) 50,000 unit Cap Take 1 capsule (50,000 Units total) by mouth every 7 days.    multivitamin-min-iron-FA-vit K (BARIATRIC MULTIVITAMINS) 45 mg iron- 800 mcg-120 mcg Cap Take 1 tablet by mouth once daily.    ondansetron (ZOFRAN) 4 MG tablet Take 1 tablet (4 mg total) by mouth every 8 (eight) hours as needed for Nausea.     Family History       Problem Relation (Age of Onset)    Arthritis Mother, Maternal Grandmother    COPD Father    Cancer Father, Maternal Grandmother, Maternal Grandfather, Paternal Grandmother    Depression Mother    Diabetes Mother, Paternal Grandmother, Paternal Grandfather    Heart disease Mother    Hyperlipidemia Paternal Grandfather    Kidney disease Paternal Grandmother          Tobacco Use    Smoking status: Every Day     Packs/day: 1.00     Types: Cigarettes    Smokeless tobacco: Never   Substance and Sexual Activity    Alcohol use: No    Drug use: No    Sexual activity: Never     Review of Systems   Constitutional:  Positive for activity change, chills, diaphoresis, fatigue and fever.   HENT: Negative.     Eyes: Negative.    Respiratory: Negative.     Cardiovascular:  Positive for palpitations.   Gastrointestinal: Negative.    Endocrine: Positive for cold intolerance and heat intolerance.   Genitourinary: Negative.    Musculoskeletal:  Positive for arthralgias, back pain, gait problem and myalgias.   Skin:  Positive for wound.   Allergic/Immunologic: Negative.    Neurological:  Positive for weakness.   Hematological:   Bruises/bleeds easily.   Psychiatric/Behavioral:  Positive for dysphoric mood. The patient is nervous/anxious.    Objective:     Vital Signs (Most Recent):  Temp: 100.2 °F (37.9 °C) (03/02/23 1233)  Pulse: 98 (03/02/23 1440)  Resp: (!) 22 (03/02/23 1448)  BP: (!) 140/62 (03/02/23 1404)  SpO2: 95 % (03/02/23 1440)   Vital Signs (24h Range):  Temp:  [100.2 °F (37.9 °C)-102.1 °F (38.9 °C)] 100.2 °F (37.9 °C)  Pulse:  [] 98  Resp:  [20-22] 22  SpO2:  [95 %-99 %] 95 %  BP: (140-145)/(62-87) 140/62     Weight: (!) 179.6 kg (396 lb)  Body mass index is 67.97 kg/m².    Physical Exam  Vitals and nursing note reviewed.   Constitutional:       General: She is not in acute distress.  HENT:      Head: Normocephalic and atraumatic.      Nose: Nose normal.      Mouth/Throat:      Mouth: Mucous membranes are moist.   Eyes:      Extraocular Movements: Extraocular movements intact.      Pupils: Pupils are equal, round, and reactive to light.   Cardiovascular:      Rate and Rhythm: Regular rhythm. Tachycardia present.   Pulmonary:      Effort: Pulmonary effort is normal.   Abdominal:      General: Bowel sounds are normal. There is no distension.      Tenderness: There is no abdominal tenderness. There is no guarding or rebound.   Musculoskeletal:      Cervical back: Normal range of motion and neck supple.      Comments: Limited cooperation with the exam   Skin:     Findings: Erythema and lesion present.      Comments: Large appendage with cellulitis tenderness and edema   Neurological:      Mental Status: She is alert and oriented to person, place, and time.   Psychiatric:      Comments: Anxious and dysphoric         CRANIAL NERVES     CN III, IV, VI   Pupils are equal, round, and reactive to light.     Significant Labs: All pertinent labs within the past 24 hours have been reviewed.  Recent Lab Results         03/02/23  1217   03/02/23  1150        Albumin   3.7       Alkaline Phosphatase   69       ALT   15       Anion Gap    8       AST   22       Baso #   0.05       Basophil %   0.3       BILIRUBIN TOTAL   1.1  Comment: For infants and newborns, interpretation of results should be based  on gestational age, weight and in agreement with clinical  observations.    Premature Infant recommended reference ranges:  Up to 24 hours.............<8.0 mg/dL  Up to 48 hours............<12.0 mg/dL  3-5 days..................<15.0 mg/dL  6-29 days.................<15.0 mg/dL         BUN   13       Calcium   8.8       Chloride   104       CO2   27       Creatinine   0.7       Differential Method   Automated       eGFR   >60.0       Eos #   0.0       Eosinophil %   0.2       Glucose   125       Gran # (ANC)   14.9       Gran %   90.6       Hematocrit   39.8       Hemoglobin   12.4       Immature Grans (Abs)   0.08  Comment: Mild elevation in immature granulocytes is non specific and   can be seen in a variety of conditions including stress response,   acute inflammation, trauma and pregnancy. Correlation with other   laboratory and clinical findings is essential.         Immature Granulocytes   0.5       Lactate, Lawson 1.4  Comment: Falsely low lactic acid results can be found in samples   containing >=13.0 mg/dL total bilirubin and/or >=3.5 mg/dL   direct bilirubin.           Lymph #   0.8       Lymph %   4.8       MCH   26.9       MCHC   31.2       MCV   86       Mono #   0.6       Mono %   3.6       MPV   9.5       nRBC   0       Platelets   290       Potassium   4.8       PROTEIN TOTAL   7.9       RBC   4.61       RDW   15.6       Sodium   139       WBC   16.50               Significant Imaging: I have reviewed all pertinent imaging results/findings within the past 24 hours.    Assessment/Plan:     * Acute lymphangitis  Of an appendage  Pt is morbidly obese and this has been a recurrent problem  Pt has multiple antibiotic allergies and to pain medication  Pharmacy to dose vancomycin  ID consulted  Observe med tele    Chronic low back pain with  bilateral sciatica  Allergic to NSAID'S and some opiates        VTE Risk Mitigation (From admission, onward)    None             Enrique Gutierrez MD  Department of Hospital Medicine   [facility] - Emergency Dept

## 2023-03-02 NOTE — HPI
ED note  Patient presents complaining of fever and weakness.  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  Patient started with fever and chills this morning.  Patient has had sepsis before.    3/2/2023  Ms Zaragoza has a large appendage . She noted fever and chills this date with a Hx of cellulitis of the appendage. Pt has chronic low back pain that is an 8/10 at present.  The appendage is erythematous and tender to the touch.

## 2023-03-02 NOTE — SUBJECTIVE & OBJECTIVE
Past Medical History:   Diagnosis Date    Allergy     Anemia     Asthma     COPD (chronic obstructive pulmonary disease)     Deep vein thrombosis     Depression     Diabetes mellitus, type 2     Encounter for blood transfusion     Heart murmur     Neuromuscular disorder        Past Surgical History:   Procedure Laterality Date     SECTION      DILATION AND CURETTAGE OF UTERUS      gastric sleeve      [facility]    TONSILLECTOMY         Review of patient's allergies indicates:   Allergen Reactions    Aspirin     Dilaudid [hydromorphone]      Excessive sleepiness    Nsaids (non-steroidal anti-inflammatory drug) Hives    Teflaro [ceftaroline fosamil]      Allergic reaction/ hives/itching       No current facility-administered medications on file prior to encounter.     Current Outpatient Medications on File Prior to Encounter   Medication Sig    acetaminophen (TYLENOL) 650 MG TbSR Take 650 mg by mouth every 8 (eight) hours.    albuterol (PROVENTIL/VENTOLIN HFA) 90 mcg/actuation inhaler Inhale 2 puffs into the lungs every 6 (six) hours as needed.     busPIRone (BUSPAR) 7.5 MG tablet Take 1 tablet (7.5 mg total) by mouth 2 (two) times daily.    citalopram (CELEXA) 20 MG tablet Take 1 tablet (20 mg total) by mouth once daily.    clindamycin (CLEOCIN) 300 MG capsule Take 1 capsule (300 mg total) by mouth 3 (three) times daily.    ergocalciferol (ERGOCALCIFEROL) 50,000 unit Cap Take 1 capsule (50,000 Units total) by mouth every 7 days.    multivitamin-min-iron-FA-vit K (BARIATRIC MULTIVITAMINS) 45 mg iron- 800 mcg-120 mcg Cap Take 1 tablet by mouth once daily.    ondansetron (ZOFRAN) 4 MG tablet Take 1 tablet (4 mg total) by mouth every 8 (eight) hours as needed for Nausea.     Family History       Problem Relation (Age of Onset)    Arthritis Mother, Maternal Grandmother    COPD Father    Cancer Father, Maternal Grandmother, Maternal Grandfather, Paternal Grandmother    Depression Mother    Diabetes Mother, Paternal  Grandmother, Paternal Grandfather    Heart disease Mother    Hyperlipidemia Paternal Grandfather    Kidney disease Paternal Grandmother          Tobacco Use    Smoking status: Every Day     Packs/day: 1.00     Types: Cigarettes    Smokeless tobacco: Never   Substance and Sexual Activity    Alcohol use: No    Drug use: No    Sexual activity: Never     Review of Systems   Constitutional:  Positive for activity change, chills, diaphoresis, fatigue and fever.   HENT: Negative.     Eyes: Negative.    Respiratory: Negative.     Cardiovascular:  Positive for palpitations.   Gastrointestinal: Negative.    Endocrine: Positive for cold intolerance and heat intolerance.   Genitourinary: Negative.    Musculoskeletal:  Positive for arthralgias, back pain, gait problem and myalgias.   Skin:  Positive for wound.   Allergic/Immunologic: Negative.    Neurological:  Positive for weakness.   Hematological:  Bruises/bleeds easily.   Psychiatric/Behavioral:  Positive for dysphoric mood. The patient is nervous/anxious.    Objective:     Vital Signs (Most Recent):  Temp: 100.2 °F (37.9 °C) (03/02/23 1233)  Pulse: 98 (03/02/23 1440)  Resp: (!) 22 (03/02/23 1448)  BP: (!) 140/62 (03/02/23 1404)  SpO2: 95 % (03/02/23 1440)   Vital Signs (24h Range):  Temp:  [100.2 °F (37.9 °C)-102.1 °F (38.9 °C)] 100.2 °F (37.9 °C)  Pulse:  [] 98  Resp:  [20-22] 22  SpO2:  [95 %-99 %] 95 %  BP: (140-145)/(62-87) 140/62     Weight: (!) 179.6 kg (396 lb)  Body mass index is 67.97 kg/m².    Physical Exam  Vitals and nursing note reviewed.   Constitutional:       General: She is not in acute distress.  HENT:      Head: Normocephalic and atraumatic.      Nose: Nose normal.      Mouth/Throat:      Mouth: Mucous membranes are moist.   Eyes:      Extraocular Movements: Extraocular movements intact.      Pupils: Pupils are equal, round, and reactive to light.   Cardiovascular:      Rate and Rhythm: Regular rhythm. Tachycardia present.   Pulmonary:      Effort:  Pulmonary effort is normal.   Abdominal:      General: Bowel sounds are normal. There is no distension.      Tenderness: There is no abdominal tenderness. There is no guarding or rebound.   Musculoskeletal:      Cervical back: Normal range of motion and neck supple.      Comments: Limited cooperation with the exam   Skin:     Findings: Erythema and lesion present.      Comments: Large appendage with cellulitis tenderness and edema   Neurological:      Mental Status: She is alert and oriented to person, place, and time.   Psychiatric:      Comments: Anxious and dysphoric         CRANIAL NERVES     CN III, IV, VI   Pupils are equal, round, and reactive to light.     Significant Labs: All pertinent labs within the past 24 hours have been reviewed.  Recent Lab Results         03/02/23  1217   03/02/23  1150        Albumin   3.7       Alkaline Phosphatase   69       ALT   15       Anion Gap   8       AST   22       Baso #   0.05       Basophil %   0.3       BILIRUBIN TOTAL   1.1  Comment: For infants and newborns, interpretation of results should be based  on gestational age, weight and in agreement with clinical  observations.    Premature Infant recommended reference ranges:  Up to 24 hours.............<8.0 mg/dL  Up to 48 hours............<12.0 mg/dL  3-5 days..................<15.0 mg/dL  6-29 days.................<15.0 mg/dL         BUN   13       Calcium   8.8       Chloride   104       CO2   27       Creatinine   0.7       Differential Method   Automated       eGFR   >60.0       Eos #   0.0       Eosinophil %   0.2       Glucose   125       Gran # (ANC)   14.9       Gran %   90.6       Hematocrit   39.8       Hemoglobin   12.4       Immature Grans (Abs)   0.08  Comment: Mild elevation in immature granulocytes is non specific and   can be seen in a variety of conditions including stress response,   acute inflammation, trauma and pregnancy. Correlation with other   laboratory and clinical findings is essential.          Immature Granulocytes   0.5       Lactate, Lawson 1.4  Comment: Falsely low lactic acid results can be found in samples   containing >=13.0 mg/dL total bilirubin and/or >=3.5 mg/dL   direct bilirubin.           Lymph #   0.8       Lymph %   4.8       MCH   26.9       MCHC   31.2       MCV   86       Mono #   0.6       Mono %   3.6       MPV   9.5       nRBC   0       Platelets   290       Potassium   4.8       PROTEIN TOTAL   7.9       RBC   4.61       RDW   15.6       Sodium   139       WBC   16.50               Significant Imaging: I have reviewed all pertinent imaging results/findings within the past 24 hours.

## 2023-03-02 NOTE — ASSESSMENT & PLAN NOTE
Of an appendage  Pt is morbidly obese and this has been a recurrent problem  Pt has multiple antibiotic allergies and to pain medication  Pharmacy to dose vancomycin  ID consulted

## 2023-03-02 NOTE — PLAN OF CARE
"[facility] - Emergency Dept  Initial Discharge Assessment       Primary Care Provider: Jad Hua MD    Admission Diagnosis: Sepsis [A41.9]    Admission Date: 3/2/2023  Expected Discharge Date:     Discharge Barriers Identified: None    Assessment completed at bedside.  Advanced directives not addressed.  Patient intends to discharge home with resumption of [facility], no other  needs identified at this time.    Payor: MEDICARE / Plan: MEDICARE PART A & B / Product Type: Government /     Extended Emergency Contact Information  Primary Emergency Contact: Bryan Graham"  Address: [address] [place] [address]  Home Phone: [phone]  Mobile Phone: [phone]  Relation: Significant other  Preferred language: English   needed? No    Discharge Plan A: Home with family, Home Health  Discharge Plan B: Home, Home Health      [facility] [address] [facility] [address] [facility]  [address] [place] [address]  Phone: [phone] Fax: [phone]    [id number] [address]  [address] & [facility]  [address] [facility] [address]  Phone: [phone] Fax: [phone]      Initial Assessment (most recent)       Adult Discharge Assessment - 03/02/23 1638          Discharge Assessment    Assessment Type Discharge Planning Assessment     Confirmed/corrected address, phone number and insurance Yes     Confirmed Demographics Correct on Facesheet     Source of Information patient     When was your last doctors appointment? --   few months ago    Reason For Admission sepsis     People in Home significant other     Facility Arrived From: home     Do you expect to return to your current living situation? Yes     Do you have help at home or someone to help you manage your care at home? Yes     Who are your caregiver(s) and their phone number(s)? Les graham" [phone]     Prior to hospitilization cognitive status: Alert/Oriented     Current cognitive status: Alert/Oriented     Walking or Climbing Stairs ambulation difficulty, requires equipment     Mobility Management rollator     Equipment Currently Used at Home BIPAP;oxygen;rollator     Readmission within 30 days? No     Patient currently being followed by outpatient case management? No     Do you currently have service(s) that help you manage your care at home? Yes     Name and Contact number of agency [facility]     Is the pt/caregiver preference to resume services with current agency Yes     Do you take prescription medications? Yes     Do you have prescription coverage? Yes     Coverage no     Do you have any problems affording any of your prescribed medications? TBD     Is the patient taking medications as prescribed? yes     Who is going to help you get home at discharge? Les whalen [phone]     How do you get to doctors appointments? family or friend will provide;car, drives self     Are you on dialysis? No     Do you take coumadin? No     Discharge Plan A Home with family;Home Health     Discharge Plan B Home;Home Health     DME Needed Upon Discharge  none     Discharge Plan discussed with: Patient     Discharge Barriers Identified None

## 2023-03-02 NOTE — ED PROVIDER NOTES
Encounter Date: 3/2/2023       History     Chief Complaint   Patient presents with    Weakness    Fever     Patient presents complaining of fever and weakness.  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  Patient started with fever and chills this morning.  Patient has had sepsis before.    Review of patient's allergies indicates:   Allergen Reactions    Aspirin     Dilaudid [hydromorphone]      Excessive sleepiness    Nsaids (non-steroidal anti-inflammatory drug) Hives    Teflaro [ceftaroline fosamil]      Allergic reaction/ hives/itching     Past Medical History:   Diagnosis Date    Allergy     Anemia     Asthma     COPD (chronic obstructive pulmonary disease)     Deep vein thrombosis     Depression     Diabetes mellitus, type 2     Encounter for blood transfusion     Heart murmur     Neuromuscular disorder      Past Surgical History:   Procedure Laterality Date     SECTION      DILATION AND CURETTAGE OF UTERUS      gastric sleeve      Ema     TONSILLECTOMY       Family History   Problem Relation Age of Onset    Heart disease Mother     Diabetes Mother     Arthritis Mother     Depression Mother     Cancer Father     COPD Father     Arthritis Maternal Grandmother     Cancer Maternal Grandmother     Cancer Maternal Grandfather     Cancer Paternal Grandmother     Kidney disease Paternal Grandmother     Diabetes Paternal Grandmother     Diabetes Paternal Grandfather     Hyperlipidemia Paternal Grandfather      Social History     Tobacco Use    Smoking status: Every Day     Packs/day: 1.00     Types: Cigarettes    Smokeless tobacco: Never   Substance Use Topics    Alcohol use: No    Drug use: No     Review of Systems   All other systems reviewed and are negative.    Physical Exam     Initial Vitals   BP Pulse Resp Temp SpO2   23 1048 23 1045 23 1048 23 1043 23 1045   (!) 145/87 (!) 128 (!) 21 (!) 102.1 °F (38.9 °C) 99 %       MAP       --                Physical Exam    Nursing note and vitals reviewed.  Constitutional: She appears well-developed and well-nourished.   Pleasant, polite, increased BMI   HENT:   Head: Normocephalic and atraumatic.   Mouth/Throat: Oropharynx is clear and moist.   Eyes: EOM are normal.   Neck: Neck supple.   Normal range of motion.  Cardiovascular:  Normal rate, regular rhythm, normal heart sounds and intact distal pulses.           Pulmonary/Chest: Breath sounds normal. No respiratory distress.   Abdominal: Abdomen is soft.   There is a large pannus that is erythematous and warm   Musculoskeletal:      Cervical back: Normal range of motion and neck supple.     Neurological: She is alert and oriented to person, place, and time.   Skin: Skin is warm and dry. Capillary refill takes less than 2 seconds.   Psychiatric: She has a normal mood and affect. Her behavior is normal. Judgment and thought content normal.       ED Course   Procedures  Labs Reviewed   CBC W/ AUTO DIFFERENTIAL - Abnormal; Notable for the following components:       Result Value    WBC 16.50 (*)     MCH 26.9 (*)     MCHC 31.2 (*)     RDW 15.6 (*)     Gran # (ANC) 14.9 (*)     Immature Grans (Abs) 0.08 (*)     Lymph # 0.8 (*)     Gran % 90.6 (*)     Lymph % 4.8 (*)     Mono % 3.6 (*)     All other components within normal limits   COMPREHENSIVE METABOLIC PANEL - Abnormal; Notable for the following components:    Glucose 125 (*)     Total Bilirubin 1.1 (*)     All other components within normal limits   CULTURE, BLOOD   CULTURE, BLOOD   LACTIC ACID, PLASMA   URINALYSIS, REFLEX TO URINE CULTURE        ECG Results              EKG 12-lead (In process)  Result time 03/02/23 10:27:56      In process by Interface, Lab In [facility] (03/02/23 10:27:56)                   Narrative:    Test Reason : A41.9,    Vent. Rate : 129 BPM     Atrial Rate : 129 BPM     P-R Int : 220 ms          QRS Dur : 066 ms      QT Int : 352 ms       P-R-T Axes : -17 085  047 degrees     QTc Int : 515 ms    Sinus tachycardia with 1st degree A-V block  Low voltage QRS  Borderline Abnormal ECG  When compared with ECG of 21-OCT-2022 20:57,  MS interval has increased  Nonspecific T wave abnormality, worse in Inferior leads    Referred By:             Confirmed By:                                   Imaging Results              X-Ray Chest AP Portable (Final result)  Result time 03/02/23 11:26:18      Final result by Cyril Rosales Jr., MD (03/02/23 11:26:18)                   Narrative:    HISTORY: Sepsis    COMPARISON:None available.    FINDINGS:Single AP view the chest reveals normal appearance of the cardiomediastinal silhouette. Pulmonary vascularity is not enlarged. Mild diffuse interstitial prominence likely related to viral etiology. No evidence of pulmonic infiltrate or significant pleural effusion or atelectasis. The hilar structures without evidence of enlargement. No significant tracheal shift. Chronic spondylosis changes of the thoracic spine.    IMPRESSION:Mild diffuse interstitial prominence likely on a chronic basis due to viral etiology.        Electronically signed by:  Cyril Rosales MD  3/2/2023 11:26 AM [facility] Workstation: [id number]                                     Medications   vancomycin - pharmacy to dose (has no administration in time range)   vancomycin in dextrose 5 % 1 gram/250 mL IVPB 2,000 mg (has no administration in time range)   lactated ringers bolus 1,641 mL (1,641 mLs Intravenous New Bag 3/2/23 1206)   piperacillin-tazobactam 4.5 g in dextrose 5 % 100 mL IVPB (ready to mix system) (4.5 g Intravenous New Bag 3/2/23 1148)   morphine injection 4 mg (4 mg Intravenous Given 3/2/23 1147)   ondansetron injection 4 mg (4 mg Intravenous Given 3/2/23 1148)   acetaminophen tablet 650 mg (650 mg Oral Given 3/2/23 1047)     Medical Decision Making:   Initial Assessment:   Patient is febrile  Differential Diagnosis:   Acute kidney injury, dehydration, sepsis,  end-organ damage  Clinical Tests:   Lab Tests: Reviewed and Ordered  Radiological Study: Ordered and Reviewed  Medical Tests: Reviewed and Ordered  ED Management:  MDM    Patient presents for emergent evaluation of acute fever that poses a possible threat to life and/or bodily function.    In the ED patient found to have acute sepsis.   I ordered labs and personally reviewed them.  Labs significant for lactate of 1.1, white blood cell count of 16.        Admission MDM  I discussed the patient presentation labs, ekg, X-rays, findings with the consultant(s) hospitalist   Patient was managed in the ED with IV vanc, Zosyn, fluid bolus.    The response to treatment was improved.  Pannus appears likely source for increased white count and fever.  Patient started on broad-spectrum antibiotics and given fluid ideal body weight bolus from the emergency department secondary to increased BMI  Patient required emergent consultation to hospitalist for admission.    ==============================================================  Critical care was necessary to treat or prevent imminent or life-threatening deterioration of the following conditions:  Sepsis.   Critical care was time spent personally by me on the following activities: obtaining history from patient or relative, examination of patient, review of x-rays / CT sent with the patient, ordering lab, x-rays, and/or EKG, development of treatment plan with patient or relative, ordering and performing treatments and interventions, interpretation of cardiac measurements and re-evaluation of patient's conition.   Critical Care Condition: potentially life-threatening sepsis .   Critical care was time spent personally by me on the following activities: obtaining history from patient or relative, examination of patient, review of x-rays / CT sent with the patient, ordering lab, x-rays, and/or EKG, development of treatment plan with patient or relative, ordering and performing  treatments and interventions, interpretation of cardiac measurements and re-evaluation of patient's conition.   Critical Care Condition: potentially life-threatening                         Clinical Impression:   Final diagnoses:  [A41.9] Sepsis        ED Disposition Condition    Admit Stable                Ronaldo Wu MD  03/02/23 3988

## 2023-03-02 NOTE — PLAN OF CARE
03/02/23 1635   TRAN Message   Medicare Outpatient and Observation Notification regarding financial responsibility Given to patient/caregiver;Explained to patient/caregiver;Signed/date by patient/caregiver   Date TRAN was signed 03/02/23   Time TRAN was signed 1633

## 2023-03-03 LAB
ALBUMIN SERPL BCP-MCNC: 3.1 G/DL (ref 3.5–5.2)
ALP SERPL-CCNC: 54 U/L (ref 55–135)
ALT SERPL W/O P-5'-P-CCNC: 11 U/L (ref 10–44)
ANION GAP SERPL CALC-SCNC: 6 MMOL/L (ref 8–16)
AST SERPL-CCNC: 12 U/L (ref 10–40)
BASOPHILS # BLD AUTO: 0.03 K/UL (ref 0–0.2)
BASOPHILS NFR BLD: 0.3 % (ref 0–1.9)
BILIRUB SERPL-MCNC: 0.6 MG/DL (ref 0.1–1)
BUN SERPL-MCNC: 13 MG/DL (ref 6–20)
CALCIUM SERPL-MCNC: 7.6 MG/DL (ref 8.7–10.5)
CHLORIDE SERPL-SCNC: 102 MMOL/L (ref 95–110)
CK SERPL-CCNC: 82 U/L (ref 20–180)
CO2 SERPL-SCNC: 26 MMOL/L (ref 23–29)
CREAT SERPL-MCNC: 0.7 MG/DL (ref 0.5–1.4)
CRP SERPL-MCNC: 19.52 MG/DL
DIFFERENTIAL METHOD: ABNORMAL
EOSINOPHIL # BLD AUTO: 0 K/UL (ref 0–0.5)
EOSINOPHIL NFR BLD: 0 % (ref 0–8)
ERYTHROCYTE [DISTWIDTH] IN BLOOD BY AUTOMATED COUNT: 15.6 % (ref 11.5–14.5)
EST. GFR  (NO RACE VARIABLE): >60 ML/MIN/1.73 M^2
GLUCOSE SERPL-MCNC: 114 MG/DL (ref 70–110)
HCT VFR BLD AUTO: 35 % (ref 37–48.5)
HGB BLD-MCNC: 11 G/DL (ref 12–16)
IMM GRANULOCYTES # BLD AUTO: 0.06 K/UL (ref 0–0.04)
IMM GRANULOCYTES NFR BLD AUTO: 0.5 % (ref 0–0.5)
LYMPHOCYTES # BLD AUTO: 1.5 K/UL (ref 1–4.8)
LYMPHOCYTES NFR BLD: 13.8 % (ref 18–48)
MCH RBC QN AUTO: 26.9 PG (ref 27–31)
MCHC RBC AUTO-ENTMCNC: 31.4 G/DL (ref 32–36)
MCV RBC AUTO: 86 FL (ref 82–98)
MONOCYTES # BLD AUTO: 0.6 K/UL (ref 0.3–1)
MONOCYTES NFR BLD: 5.6 % (ref 4–15)
NEUTROPHILS # BLD AUTO: 8.9 K/UL (ref 1.8–7.7)
NEUTROPHILS NFR BLD: 79.8 % (ref 38–73)
NRBC BLD-RTO: 0 /100 WBC
PLATELET # BLD AUTO: 246 K/UL (ref 150–450)
PMV BLD AUTO: 9.8 FL (ref 9.2–12.9)
POTASSIUM SERPL-SCNC: 3.2 MMOL/L (ref 3.5–5.1)
PROCALCITONIN SERPL IA-MCNC: 1.57 NG/ML (ref 0–0.5)
PROT SERPL-MCNC: 6.9 G/DL (ref 6–8.4)
RBC # BLD AUTO: 4.09 M/UL (ref 4–5.4)
SODIUM SERPL-SCNC: 134 MMOL/L (ref 136–145)
WBC # BLD AUTO: 11.12 K/UL (ref 3.9–12.7)

## 2023-03-03 PROCEDURE — 25000003 PHARM REV CODE 250: Performed by: INTERNAL MEDICINE

## 2023-03-03 PROCEDURE — 85025 COMPLETE CBC W/AUTO DIFF WBC: CPT | Performed by: INTERNAL MEDICINE

## 2023-03-03 PROCEDURE — 96366 THER/PROPH/DIAG IV INF ADDON: CPT

## 2023-03-03 PROCEDURE — 25000003 PHARM REV CODE 250: Performed by: NURSE PRACTITIONER

## 2023-03-03 PROCEDURE — 96372 THER/PROPH/DIAG INJ SC/IM: CPT | Performed by: INTERNAL MEDICINE

## 2023-03-03 PROCEDURE — 27000221 HC OXYGEN, UP TO 24 HOURS

## 2023-03-03 PROCEDURE — G0378 HOSPITAL OBSERVATION PER HR: HCPCS

## 2023-03-03 PROCEDURE — 99215 PR OFFICE/OUTPT VISIT, EST, LEVL V, 40-54 MIN: ICD-10-PCS | Mod: ,,, | Performed by: STUDENT IN AN ORGANIZED HEALTH CARE EDUCATION/TRAINING PROGRAM

## 2023-03-03 PROCEDURE — 96376 TX/PRO/DX INJ SAME DRUG ADON: CPT

## 2023-03-03 PROCEDURE — 84145 PROCALCITONIN (PCT): CPT | Performed by: STUDENT IN AN ORGANIZED HEALTH CARE EDUCATION/TRAINING PROGRAM

## 2023-03-03 PROCEDURE — 94799 UNLISTED PULMONARY SVC/PX: CPT

## 2023-03-03 PROCEDURE — 36415 COLL VENOUS BLD VENIPUNCTURE: CPT | Performed by: STUDENT IN AN ORGANIZED HEALTH CARE EDUCATION/TRAINING PROGRAM

## 2023-03-03 PROCEDURE — 99900035 HC TECH TIME PER 15 MIN (STAT)

## 2023-03-03 PROCEDURE — 94761 N-INVAS EAR/PLS OXIMETRY MLT: CPT

## 2023-03-03 PROCEDURE — 63600175 PHARM REV CODE 636 W HCPCS: Performed by: INTERNAL MEDICINE

## 2023-03-03 PROCEDURE — 80053 COMPREHEN METABOLIC PANEL: CPT | Performed by: INTERNAL MEDICINE

## 2023-03-03 PROCEDURE — 99900031 HC PATIENT EDUCATION (STAT)

## 2023-03-03 PROCEDURE — 96375 TX/PRO/DX INJ NEW DRUG ADDON: CPT

## 2023-03-03 PROCEDURE — 99215 OFFICE O/P EST HI 40 MIN: CPT | Mod: ,,, | Performed by: STUDENT IN AN ORGANIZED HEALTH CARE EDUCATION/TRAINING PROGRAM

## 2023-03-03 PROCEDURE — 25000003 PHARM REV CODE 250: Performed by: STUDENT IN AN ORGANIZED HEALTH CARE EDUCATION/TRAINING PROGRAM

## 2023-03-03 PROCEDURE — 82550 ASSAY OF CK (CPK): CPT | Performed by: STUDENT IN AN ORGANIZED HEALTH CARE EDUCATION/TRAINING PROGRAM

## 2023-03-03 PROCEDURE — 36415 COLL VENOUS BLD VENIPUNCTURE: CPT | Performed by: INTERNAL MEDICINE

## 2023-03-03 PROCEDURE — 96367 TX/PROPH/DG ADDL SEQ IV INF: CPT

## 2023-03-03 PROCEDURE — 63600175 PHARM REV CODE 636 W HCPCS: Mod: TB,JG | Performed by: STUDENT IN AN ORGANIZED HEALTH CARE EDUCATION/TRAINING PROGRAM

## 2023-03-03 PROCEDURE — 94660 CPAP INITIATION&MGMT: CPT

## 2023-03-03 PROCEDURE — 86140 C-REACTIVE PROTEIN: CPT | Performed by: STUDENT IN AN ORGANIZED HEALTH CARE EDUCATION/TRAINING PROGRAM

## 2023-03-03 RX ORDER — POTASSIUM CHLORIDE 20 MEQ/1
20 TABLET, EXTENDED RELEASE ORAL ONCE
Status: COMPLETED | OUTPATIENT
Start: 2023-03-03 | End: 2023-03-03

## 2023-03-03 RX ADMIN — ENOXAPARIN SODIUM 40 MG: 100 INJECTION SUBCUTANEOUS at 08:03

## 2023-03-03 RX ADMIN — DAPTOMYCIN 640 MG: 500 INJECTION, POWDER, LYOPHILIZED, FOR SOLUTION INTRAVENOUS at 08:03

## 2023-03-03 RX ADMIN — VANCOMYCIN HYDROCHLORIDE 2000 MG: 500 INJECTION, POWDER, LYOPHILIZED, FOR SOLUTION INTRAVENOUS at 03:03

## 2023-03-03 RX ADMIN — ONDANSETRON 8 MG: 4 TABLET, ORALLY DISINTEGRATING ORAL at 03:03

## 2023-03-03 RX ADMIN — CITALOPRAM HYDROBROMIDE 20 MG: 20 TABLET ORAL at 09:03

## 2023-03-03 RX ADMIN — ENOXAPARIN SODIUM 40 MG: 100 INJECTION SUBCUTANEOUS at 09:03

## 2023-03-03 RX ADMIN — POTASSIUM CHLORIDE 20 MEQ: 1500 TABLET, EXTENDED RELEASE ORAL at 04:03

## 2023-03-03 RX ADMIN — MORPHINE SULFATE 6 MG: 2 INJECTION, SOLUTION INTRAMUSCULAR; INTRAVENOUS at 09:03

## 2023-03-03 RX ADMIN — VANCOMYCIN HYDROCHLORIDE 2000 MG: 500 INJECTION, POWDER, LYOPHILIZED, FOR SOLUTION INTRAVENOUS at 02:03

## 2023-03-03 RX ADMIN — BUSPIRONE HYDROCHLORIDE 7.5 MG: 5 TABLET ORAL at 09:03

## 2023-03-03 RX ADMIN — MORPHINE SULFATE 6 MG: 2 INJECTION, SOLUTION INTRAMUSCULAR; INTRAVENOUS at 02:03

## 2023-03-03 RX ADMIN — BUSPIRONE HYDROCHLORIDE 7.5 MG: 5 TABLET ORAL at 08:03

## 2023-03-03 RX ADMIN — MORPHINE SULFATE 4 MG: 4 INJECTION, SOLUTION INTRAMUSCULAR; INTRAVENOUS at 08:03

## 2023-03-03 RX ADMIN — ACETAMINOPHEN 650 MG: 325 TABLET ORAL at 03:03

## 2023-03-03 RX ADMIN — PROCHLORPERAZINE EDISYLATE 5 MG: 5 INJECTION INTRAMUSCULAR; INTRAVENOUS at 09:03

## 2023-03-03 RX ADMIN — MORPHINE SULFATE 6 MG: 2 INJECTION, SOLUTION INTRAMUSCULAR; INTRAVENOUS at 03:03

## 2023-03-03 NOTE — PLAN OF CARE
Problem: Adult Inpatient Plan of Care  Goal: Absence of Hospital-Acquired Illness or Injury  Outcome: Ongoing, Progressing  Goal: Optimal Comfort and Wellbeing  Outcome: Ongoing, Progressing     Problem: Bariatric Environmental Safety  Goal: Safety Maintained with Care  Outcome: Ongoing, Progressing     Problem: Skin Injury Risk Increased  Goal: Skin Health and Integrity  Outcome: Ongoing, Progressing     Problem: Fall Injury Risk  Goal: Absence of Fall and Fall-Related Injury  Outcome: Ongoing, Progressing     Problem: Skin or Soft Tissue Infection  Goal: Absence of Infection Signs and Symptoms  Outcome: Ongoing, Progressing

## 2023-03-03 NOTE — ASSESSMENT & PLAN NOTE
Of an appendage of the right thigh  Pt is morbidly obese and this has been a recurrent problem  Pt has multiple antibiotic allergies and to pain medication  Pharmacy to dose vancomycin  ID consulted

## 2023-03-03 NOTE — PROGRESS NOTES
Pharmacokinetic Initial Assessment: IV Vancomycin    Assessment/Plan:    Initiate intravenous vancomycin with loading dose of 2000 mg once followed by a maintenance dose of vancomycin 2000 mg IV every 12 hours  Desired empiric serum trough concentration is 15 to 20 mcg/mL  Draw vancomycin trough level 60 min prior to fourth dose on 3/4/2023 at approximately 02:00 am.  Pharmacy will continue to follow and monitor vancomycin.      Please contact pharmacy at extension [phone] with any questions regarding this assessment.     Thank you for the consult,   Felisha Diaz       Patient brief summary:  Adriana Zaragoza is a 57 y.o. female initiated on antimicrobial therapy with IV Vancomycin for treatment of suspected bacteremia    Drug Allergies:   Review of patient's allergies indicates:   Allergen Reactions    Aspirin     Dilaudid [hydromorphone]      Excessive sleepiness    Nsaids (non-steroidal anti-inflammatory drug) Hives    Teflaro [ceftaroline fosamil]      Allergic reaction/ hives/itching       Actual Body Weight:   179.6 kg    Renal Function:   Estimated Creatinine Clearance: 144.8 mL/min (based on SCr of 0.7 mg/dL).,     Dialysis Method (if applicable):  N/A    CBC (last 72 hours):  Recent Labs   Lab Result Units 03/02/23  1150   WBC K/uL 16.50*   Hemoglobin g/dL 12.4   Hematocrit % 39.8   Platelets K/uL 290   Gran % % 90.6*   Lymph % % 4.8*   Mono % % 3.6*   Eosinophil % % 0.2   Basophil % % 0.3   Differential Method  Automated       Metabolic Panel (last 72 hours):  Recent Labs   Lab Result Units 03/02/23  1150   Sodium mmol/L 139   Potassium mmol/L 4.8   Chloride mmol/L 104   CO2 mmol/L 27   Glucose mg/dL 125*   BUN mg/dL 13   Creatinine mg/dL 0.7   Albumin g/dL 3.7   Total Bilirubin mg/dL 1.1*   Alkaline Phosphatase U/L 69   AST U/L 22   ALT U/L 15       Drug levels (last 3 results):  No results for input(s): VANCOMYCINRA, VANCORANDOM, VANCOMYCINPE, VANCOPEAK, VANCOMYCINTR, VANCOTROUGH in the last 72  hours.    Microbiologic Results:  Microbiology Results (last 7 days)       Procedure Component Value Units Date/Time    Blood culture x two cultures. Draw prior to antibiotics. [[id number]] Collected: 03/02/23 1137    Order Status: Completed Specimen: Blood from Peripheral, Forearm, Right Updated: 03/02/23 1917     Blood Culture, Routine No Growth to date    Narrative:      Aerobic and anaerobic    Blood culture x two cultures. Draw prior to antibiotics. [[id number]] Collected: 03/02/23 1208    Order Status: Completed Specimen: Blood from Peripheral, Antecubital, Right Updated: 03/02/23 1917     Blood Culture, Routine No Growth to date    Narrative:      Aerobic and anaerobic

## 2023-03-03 NOTE — PROGRESS NOTES
[facility] Medicine  Progress Note    Patient Name: Adriana Zaragoza  MRN: [id number]  Patient Class: OP- Observation   Admission Date: 3/2/2023  Length of Stay: 0 days  Attending Physician: Enrique Herring MD  Primary Care Provider: Jad Hua MD        Subjective:     Principal Problem:Panniculitis        HPI:  ED note  Patient presents complaining of fever and weakness.  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  Patient started with fever and chills this morning.  Patient has had sepsis before.    3/2/2023  Ms Zaragoza has a large appendage . She noted fever and chills this date with a Hx of cellulitis of the appendage. Pt has chronic low back pain that is an 8/10 at present.  The appendage is erythematous and tender to the touch.      Overview/Hospital Course:  No notes on file    Interval History:   03/03/23: continues with redness and swelling to right upper leg appendage. Tolerating IV antibiotic. Afebrile. Continue CPAP at night and with sleeping.  Replace K+        Review of Systems  Objective:     Vital Signs (Most Recent):  Temp: 98.4 °F (36.9 °C) (03/03/23 1140)  Pulse: 70 (03/03/23 1140)  Resp: 18 (03/03/23 1140)  BP: (!) 114/53 (03/03/23 1140)  SpO2: 98 % (03/03/23 1140)   Vital Signs (24h Range):  Temp:  [97.6 °F (36.4 °C)-101.7 °F (38.7 °C)] 98.4 °F (36.9 °C)  Pulse:  [69-97] 70  Resp:  [16-26] 18  SpO2:  [95 %-98 %] 98 %  BP: (110-135)/(48-66) 114/53     Weight: (!) 175.2 kg (386 lb 3.9 oz)  Body mass index is 60.49 kg/m².    Intake/Output Summary (Last 24 hours) at 3/3/2023 1444  Last data filed at 3/2/2023 1658  Gross per 24 hour   Intake 600 ml   Output --   Net 600 ml      Physical Exam  Constitutional:       Appearance: She is obese.   HENT:      Head: Normocephalic and atraumatic.      Mouth/Throat:      Mouth: Mucous membranes are moist.   Eyes:      Extraocular Movements: Extraocular movements  intact.      Pupils: Pupils are equal, round, and reactive to light.   Cardiovascular:      Rate and Rhythm: Normal rate and regular rhythm.   Pulmonary:      Breath sounds: Rhonchi present.   Abdominal:      General: Abdomen is flat. Bowel sounds are normal.      Palpations: Abdomen is soft.   Musculoskeletal:      Cervical back: Normal range of motion.      Right lower leg: Edema present.      Left lower leg: Edema present.      Comments: Large cellulitis appendage from leg with cellulitis see image   Skin:     General: Skin is warm and dry.   Neurological:      General: No focal deficit present.      Mental Status: She is oriented to person, place, and time.         Significant Labs: All pertinent labs within the past 24 hours have been reviewed.  A1C:   Recent Labs   Lab 10/22/22  0456   HGBA1C 6.0     ABGs: No results for input(s): PH, PCO2, HCO3, POCSATURATED, BE, TOTALHB, COHB, METHB, O2HB, POCFIO2, PO2 in the last 48 hours.  Bilirubin:   Recent Labs   Lab 03/02/23  1150 03/03/23  0559   BILITOT 1.1* 0.6     Blood Culture:   Recent Labs   Lab 03/02/23  1137 03/02/23  1208   LABBLOO No Growth to date  No Growth to date No Growth to date  No Growth to date     BMP:   Recent Labs   Lab 03/03/23  0559   *   *   K 3.2*      CO2 26   BUN 13   CREATININE 0.7   CALCIUM 7.6*     CBC:   Recent Labs   Lab 03/02/23  1150 03/03/23  0559   WBC 16.50* 11.12   HGB 12.4 11.0*   HCT 39.8 35.0*    246     CMP:   Recent Labs   Lab 03/02/23  1150 03/03/23  0559    134*   K 4.8 3.2*    102   CO2 27 26   * 114*   BUN 13 13   CREATININE 0.7 0.7   CALCIUM 8.8 7.6*   PROT 7.9 6.9   ALBUMIN 3.7 3.1*   BILITOT 1.1* 0.6   ALKPHOS 69 54*   AST 22 12   ALT 15 11   ANIONGAP 8 6*     Cardiac Markers: No results for input(s): CKMB, MYOGLOBIN, BNP, TROPISTAT in the last 48 hours.  Coagulation: No results for input(s): PT, INR, APTT in the last 48 hours.  Lactic Acid:   Recent Labs   Lab  03/02/23  1217   LACTATE 1.4     Lipase: No results for input(s): LIPASE in the last 48 hours.  Lipid Panel: No results for input(s): CHOL, HDL, LDLCALC, TRIG, CHOLHDL in the last 48 hours.  Magnesium: No results for input(s): MG in the last 48 hours.  Pathology Results  (Last 10 years)      None          POCT Glucose: No results for input(s): POCTGLUCOSE in the last 48 hours.  Prealbumin: No results for input(s): PREALBUMIN in the last 48 hours.  Respiratory Culture: No results for input(s): GSRESP, RESPIRATORYC in the last 48 hours.  Troponin: No results for input(s): TROPONINI, TROPONINIHS in the last 48 hours.  TSH:   Recent Labs   Lab 10/21/22  2110   TSH 1.190     Urine Culture: No results for input(s): LABURIN in the last 48 hours.  Urine Studies: No results for input(s): COLORU, APPEARANCEUA, PHUR, SPECGRAV, PROTEINUA, GLUCUA, KETONESU, BILIRUBINUA, OCCULTUA, NITRITE, UROBILINOGEN, LEUKOCYTESUR, RBCUA, WBCUA, BACTERIA, SQUAMEPITHEL, HYALINECASTS in the last 48 hours.    Invalid input(s): WRIGHTSUR  X-Ray Chest AP Portable    Result Date: 3/2/2023  HISTORY: Sepsis COMPARISON:None available. FINDINGS:Single AP view the chest reveals normal appearance of the cardiomediastinal silhouette. Pulmonary vascularity is not enlarged. Mild diffuse interstitial prominence likely related to viral etiology. No evidence of pulmonic infiltrate or significant pleural effusion or atelectasis. The hilar structures without evidence of enlargement. No significant tracheal shift. Chronic spondylosis changes of the thoracic spine. IMPRESSION:Mild diffuse interstitial prominence likely on a chronic basis due to viral etiology. Electronically signed by:  Cyril Rosales MD  3/2/2023 11:26 AM CST Workstation: [id number]        Significant Imaging: I have reviewed all pertinent imaging results/findings within the past 24 hours.  I have reviewed and interpreted all pertinent imaging results/findings within the past 24  hours.      Assessment/Plan:      * Panniculitis  Of an appendage of the right thigh  Pt is morbidly obese and this has been a recurrent problem  Pt has multiple antibiotic allergies and to pain medication  Pharmacy to dose vancomycin  ID consulted        Acute lymphangitis  Of an appendage  Pt is morbidly obese and this has been a recurrent problem  Pt has multiple antibiotic allergies and to pain medication  Pharmacy to dose vancomycin  ID consulted      KEESHA (obstructive sleep apnea)  Continue CPAP at night and while sleeping  Continuous pulse ox readings      Chronic low back pain with bilateral sciatica  Allergic to NSAID'S and some opiates        VTE Risk Mitigation (From admission, onward)         Ordered     enoxaparin injection 40 mg  Every 12 hours         03/02/23 2055     IP VTE HIGH RISK PATIENT  Once         03/02/23 2055     Place sequential compression device  Until discontinued         03/02/23 2055                Discharge Planning   JONATHAN:      Code Status: Full Code   Is the patient medically ready for discharge?:     Reason for patient still in hospital (select all that apply): Patient trending condition and Laboratory test  Discharge Plan A: Home with family, Home Health                  Yohana Thomas NP  Department of Hospital Medicine   [facility]

## 2023-03-03 NOTE — PROGRESS NOTES
Therapy with Vancomycin order discontinued by Dr. Humphreys on 3/3/23 @ 17:45.   Pharmacy will sign off, please re-consult as needed.    Thank you for allowing us to participate in this patient's care.  Felisha Diaz 3/3/2023 5:48 PM  Dept of Pharmacy  Ext [phone]

## 2023-03-03 NOTE — UM SECONDARY REVIEW
I notified jim kaur and leaders juan jose menendez, jackie hernandes, and jaylen cochran patient meets inpatient. [email or web address]

## 2023-03-03 NOTE — PLAN OF CARE
Spoke with Kimberly at [facility] [phone] who verified that patient is active on service with them and receives skilled nursing only. Informed Kimberly of possibility of patient d/c over the weekend and she states we should call the oncall nurse at [facility] at the above number for resumption of care.

## 2023-03-03 NOTE — PLAN OF CARE
03/03/23 0910   Patient Assessment/Suction   Level of Consciousness (AVPU) alert   Respiratory Effort Normal;Unlabored   Expansion/Accessory Muscles/Retractions no use of accessory muscles   All Lung Fields Breath Sounds diminished   Rhythm/Pattern, Respiratory unlabored;pattern regular;depth regular   Cough Frequency infrequent   PRE-TX-O2   Device (Oxygen Therapy) CPAP   $ Is the patient on Low Flow Oxygen? Yes   Flow (L/min) 2   SpO2 98 %   Pulse Oximetry Type Intermittent   $ Pulse Oximetry - Multiple Charge Pulse Oximetry - Multiple   Pulse 72   Resp 20   Aerosol Therapy   $ Aerosol Therapy Charges PRN treatment not required   Preset CPAP/BiPAP Settings   Mode Of Delivery CPAP   $ CPAP/BiPAP Daily Charge BiPAP/CPAP Daily   $ Initial CPAP/BiPAP Setup? Yes   Size of Mask Medium/Large   Sized Appropriately? Yes   Equipment Type [employer]iss   Airway Device Type medium full face mask   CPAP (cm H2O) 15   Patient CPAP/BiPAP Settings   RR Total (Breaths/Min) 19   Tidal Volume (mL) 732   VE Minute Ventilation (L/min) 14.8 L/min   Peak Inspiratory Pressure (cm H2O) 15   Total Leak (L/Min) 20   Education   $ Education 15 min;BiPAP   Respiratory Evaluation   $ Care Plan Tech Time 15 min   $ Eval/Re-eval Charges Evaluation   Evaluation For New Orders

## 2023-03-03 NOTE — PROCEDURES
Procedure Location:room 1120  Education/need:midline  Prep:chloraprep  Supplies:   Brand:Arrow   Gauge/ Length:20ga/8cm   Lot #:[id number]  Extremity:left upper  Vessel:cephalic  Attempts:1  Inserted by:Toni Lacy RN  Date/time placed:03/03/2023/1536  Tolerated:well  Dressing applied: Biopatch occlussive drsg

## 2023-03-03 NOTE — CONSULTS
Consult Note  Infectious Disease    Reason for Consult:  Cellulitis of appendage     HPI: Adriana Zaragoza is a 57 y.o. female super morbidly obese, BMI 60.4 kg/m2, known to our service from prior consultation on June/2020 for cellulitis of large medial thigh pannus, last admitted for the same Oct/2022, discharged on clindamycin.  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  She denies headache, cough or chest pain.  Admits to some nausea that is now improved, no vomiting, no abdominal pain, no dysuria increased urinary frequency, no change in bowel movements.  She recalls working on her house outside and lifting heavy objects during the weekend.     In the ER, /62, tachypneic, T-max 102.1   Labs on admission leukocytosis 16.5, left shift 90.6%, H&H 12.4/39.8, platelet count 290   Stable kidney function, normal LFTs  CRP 19.5, high   Procalcitonin 1.57, positive   Baseline CPK 82   Chest x-ray mild diffuse interstitial prominence likely chronic    Patient admitted for sepsis likely secondary to large medial thigh pannus cellulitis.    Empirically started on vancomycin IV.      ID consult for cellulitis of appendage.    Review of patient's allergies indicates:   Allergen Reactions    Aspirin     Dilaudid [hydromorphone]      Excessive sleepiness    Nsaids (non-steroidal anti-inflammatory drug) Hives    Teflaro [ceftaroline fosamil]      Allergic reaction/ hives/itching     Past Medical History:   Diagnosis Date    Allergy     Anemia     Asthma     COPD (chronic obstructive pulmonary disease)     Deep vein thrombosis     Depression     Diabetes mellitus, type 2     Encounter for  blood transfusion     Heart murmur     Neuromuscular disorder      Past Surgical History:   Procedure Laterality Date     SECTION      DILATION AND CURETTAGE OF UTERUS      gastric sleeve      [facility]    TONSILLECTOMY       Social History     Tobacco Use    Smoking status: Every Day     Packs/day: 1.00     Types: Cigarettes    Smokeless tobacco: Never   Substance Use Topics    Alcohol use: No        Family History   Problem Relation Age of Onset    Heart disease Mother     Diabetes Mother     Arthritis Mother     Depression Mother     Cancer Father     COPD Father     Arthritis Maternal Grandmother     Cancer Maternal Grandmother     Cancer Maternal Grandfather     Cancer Paternal Grandmother     Kidney disease Paternal Grandmother     Diabetes Paternal Grandmother     Diabetes Paternal Grandfather     Hyperlipidemia Paternal Grandfather        Review of Systems:   + chills, fever, no sweats, weight loss  No change in vision, loss of vision or diplopia  No sinus congestion, purulent nasal discharge, post nasal drip or facial pain  No pain in mouth or throat. No problems with teeth, gums.  No chest pain, palpitations, syncope  No cough, sputum production, shortness of breath, dyspnea on exertion, pleurisy, hemoptysis  No dysphagia, odynophagia  + nausea, no vomiting, diarrhea, constipation, blood in stool, or focal abd pain,    No dysuria, hesitancy, hematuria, retention, incontinence  No vaginal discharge, genital ulcers, risk for STD  Except for large medial thigh pannus, no swelling of joints, redness of joints, injuries, or new focal pain  No unusual headaches, dizziness, vertigo, numbness, paresthesias, neuropathy, falls  No anxiety, depression, substance abuse, sleep disturbance  No bleeding, lymphadenopathy  No new rashes, lesions, or wounds    Outdoor activities: Lives at home, boyfriend helps her, she reports she is active at home and ambulates with assistance.  Travel: None  Implants:  None  Antibiotic History: See HPi    EXAM & DIAGNOSTICS REVIEWED:   Vitals:     Temp:  [97.6 °F (36.4 °C)-101.7 °F (38.7 °C)]   Temp: 98.4 °F (36.9 °C) (03/03/23 1140)  Pulse: 70 (03/03/23 1140)  Resp: 18 (03/03/23 1140)  BP: (!) 114/53 (03/03/23 1140)  SpO2: 98 % (03/03/23 1140)    Intake/Output Summary (Last 24 hours) at 3/3/2023 1443  Last data filed at 3/2/2023 1658  Gross per 24 hour   Intake 600 ml   Output --   Net 600 ml       General:  In NAD. Alert and attentive, cooperative, comfortable on CPAP  Eyes:  Anicteric, PERRL  ENT:  No ulcers, exudates, thrush, nares patent, dentition is fair, poor oral hygiene  Neck:  Supple, no adenopathy appreciated  Lungs: Poor inspiratory effort, mostly clear to auscultation b/l  Heart:  S1/S2+, regular rhythm, no murmurs  Abd:  Morbidly obese, impressive large medial pannus on thigh with redness, edema and indurated area, not tender to palpation c/w cellutis  +BS, soft, non tender, non distended, no rebound, large skin folds with powder in place no evidence of intertrigo   :  Voids  Musc:  R thigh as described above  Joints without effusion, swelling,  erythema, synovitis, ambulatory  Skin:  Warm, no rash  Wound:   Neuro:  Following commands, alert, attentive, speech fluent, moves all extremities, no acute focal deficit   Psych:  Calm, cooperative  Lymphatic:       Extrem: Chronic lymphedema lower extremities  VAD:  L Midline 3/3        Isolation: None    3/3:        General Labs reviewed:  Recent Labs   Lab 03/02/23  1150 03/03/23  0559   WBC 16.50* 11.12   HGB 12.4 11.0*   HCT 39.8 35.0*    246       Recent Labs   Lab 03/02/23  1150 03/03/23  0559    134*   K 4.8 3.2*    102   CO2 27 26   BUN 13 13   CREATININE 0.7 0.7   CALCIUM 8.8 7.6*   PROT 7.9 6.9   BILITOT 1.1* 0.6   ALKPHOS 69 54*   ALT 15 11   AST 22 12     No results for input(s): CRP in the last 168 hours.  No results for input(s): SEDRATE in the last 168 hours.    Estimated Creatinine  Clearance: 148 mL/min (based on SCr of 0.7 mg/dL).     Micro:  Microbiology Results (last 7 days)       Procedure Component Value Units Date/Time    Blood culture x two cultures. Draw prior to antibiotics. [[id number]] Collected: 03/02/23 1208    Order Status: Completed Specimen: Blood from Peripheral, Antecubital, Right Updated: 03/03/23 1232     Blood Culture, Routine No Growth to date      No Growth to date    Narrative:      Aerobic and anaerobic    Blood culture x two cultures. Draw prior to antibiotics. [[id number]] Collected: 03/02/23 1137    Order Status: Completed Specimen: Blood from Peripheral, Forearm, Right Updated: 03/03/23 1232     Blood Culture, Routine No Growth to date      No Growth to date    Narrative:      Aerobic and anaerobic            Imaging Reviewed:  CXR    Cardiology:       IMPRESSION & PLAN     Sepsis secondary to recurrent cellulitis of large R thigh medial pannus   CRP 19.5, high  Procal 1.5 positive  Baseline CPK 82  Blood cultures x 2 no growth, pending final     2.  Super morbid obesity BMI 60.4 Kg/m2  with underlying chronic lymphedema of lower extremitis   3.  KEESHA  4. Diabetes     Recommendations:  Unable to obtain therapeutic vancomycin levels given BMI  Start Daptomycin 630mg IV daily   Follow cultures  Wound care to R thigh, clean with chlorhexidine, dry it and cover with Mepilex  Trend CRP  HbA1c ordered with AM labs     D/w patient, nursing    Medical Decision Making during this encounter was  [_] Low Complexity  [_] Moderate Complexity  [xx] High Complexity

## 2023-03-03 NOTE — SUBJECTIVE & OBJECTIVE
Interval History:   03/03/23: continues with redness and swelling to right upper leg appendage. Tolerating IV antibiotic. Afebrile. Continue CPAP at night and with sleeping.  Replace K+        Review of Systems  Objective:     Vital Signs (Most Recent):  Temp: 98.4 °F (36.9 °C) (03/03/23 1140)  Pulse: 70 (03/03/23 1140)  Resp: 18 (03/03/23 1140)  BP: (!) 114/53 (03/03/23 1140)  SpO2: 98 % (03/03/23 1140)   Vital Signs (24h Range):  Temp:  [97.6 °F (36.4 °C)-101.7 °F (38.7 °C)] 98.4 °F (36.9 °C)  Pulse:  [69-97] 70  Resp:  [16-26] 18  SpO2:  [95 %-98 %] 98 %  BP: (110-135)/(48-66) 114/53     Weight: (!) 175.2 kg (386 lb 3.9 oz)  Body mass index is 60.49 kg/m².    Intake/Output Summary (Last 24 hours) at 3/3/2023 1444  Last data filed at 3/2/2023 1658  Gross per 24 hour   Intake 600 ml   Output --   Net 600 ml      Physical Exam  Constitutional:       Appearance: She is obese.   HENT:      Head: Normocephalic and atraumatic.      Mouth/Throat:      Mouth: Mucous membranes are moist.   Eyes:      Extraocular Movements: Extraocular movements intact.      Pupils: Pupils are equal, round, and reactive to light.   Cardiovascular:      Rate and Rhythm: Normal rate and regular rhythm.   Pulmonary:      Breath sounds: Rhonchi present.   Abdominal:      General: Abdomen is flat. Bowel sounds are normal.      Palpations: Abdomen is soft.   Musculoskeletal:      Cervical back: Normal range of motion.      Right lower leg: Edema present.      Left lower leg: Edema present.      Comments: Large cellulitis appendage from leg with cellulitis see image   Skin:     General: Skin is warm and dry.   Neurological:      General: No focal deficit present.      Mental Status: She is oriented to person, place, and time.         Significant Labs: All pertinent labs within the past 24 hours have been reviewed.  A1C:   Recent Labs   Lab 10/22/22  0456   HGBA1C 6.0     ABGs: No results for input(s): PH, PCO2, HCO3, POCSATURATED, BE, TOTALHB,  COHB, METHB, O2HB, POCFIO2, PO2 in the last 48 hours.  Bilirubin:   Recent Labs   Lab 03/02/23  1150 03/03/23  0559   BILITOT 1.1* 0.6     Blood Culture:   Recent Labs   Lab 03/02/23  1137 03/02/23  1208   LABBLOO No Growth to date  No Growth to date No Growth to date  No Growth to date     BMP:   Recent Labs   Lab 03/03/23  0559   *   *   K 3.2*      CO2 26   BUN 13   CREATININE 0.7   CALCIUM 7.6*     CBC:   Recent Labs   Lab 03/02/23  1150 03/03/23  0559   WBC 16.50* 11.12   HGB 12.4 11.0*   HCT 39.8 35.0*    246     CMP:   Recent Labs   Lab 03/02/23  1150 03/03/23  0559    134*   K 4.8 3.2*    102   CO2 27 26   * 114*   BUN 13 13   CREATININE 0.7 0.7   CALCIUM 8.8 7.6*   PROT 7.9 6.9   ALBUMIN 3.7 3.1*   BILITOT 1.1* 0.6   ALKPHOS 69 54*   AST 22 12   ALT 15 11   ANIONGAP 8 6*     Cardiac Markers: No results for input(s): CKMB, MYOGLOBIN, BNP, TROPISTAT in the last 48 hours.  Coagulation: No results for input(s): PT, INR, APTT in the last 48 hours.  Lactic Acid:   Recent Labs   Lab 03/02/23  1217   LACTATE 1.4     Lipase: No results for input(s): LIPASE in the last 48 hours.  Lipid Panel: No results for input(s): CHOL, HDL, LDLCALC, TRIG, CHOLHDL in the last 48 hours.  Magnesium: No results for input(s): MG in the last 48 hours.  Pathology Results  (Last 10 years)      None          POCT Glucose: No results for input(s): POCTGLUCOSE in the last 48 hours.  Prealbumin: No results for input(s): PREALBUMIN in the last 48 hours.  Respiratory Culture: No results for input(s): GSRESP, RESPIRATORYC in the last 48 hours.  Troponin: No results for input(s): TROPONINI, TROPONINIHS in the last 48 hours.  TSH:   Recent Labs   Lab 10/21/22  2110   TSH 1.190     Urine Culture: No results for input(s): LABURIN in the last 48 hours.  Urine Studies: No results for input(s): COLORU, APPEARANCEUA, PHUR, SPECGRAV, PROTEINUA, GLUCUA, KETONESU, BILIRUBINUA, OCCULTUA, NITRITE, UROBILINOGEN,  LEUKOCYTESUR, RBCUA, WBCUA, BACTERIA, SQUAMEPITHEL, HYALINECASTS in the last 48 hours.    Invalid input(s): WRIGHTSUR  X-Ray Chest AP Portable    Result Date: 3/2/2023  HISTORY: Sepsis COMPARISON:None available. FINDINGS:Single AP view the chest reveals normal appearance of the cardiomediastinal silhouette. Pulmonary vascularity is not enlarged. Mild diffuse interstitial prominence likely related to viral etiology. No evidence of pulmonic infiltrate or significant pleural effusion or atelectasis. The hilar structures without evidence of enlargement. No significant tracheal shift. Chronic spondylosis changes of the thoracic spine. IMPRESSION:Mild diffuse interstitial prominence likely on a chronic basis due to viral etiology. Electronically signed by:  Cyril Rosales MD  3/2/2023 11:26 AM CST Workstation: [id number]        Significant Imaging: I have reviewed all pertinent imaging results/findings within the past 24 hours.  I have reviewed and interpreted all pertinent imaging results/findings within the past 24 hours.

## 2023-03-04 LAB
BASOPHILS # BLD AUTO: 0.03 K/UL (ref 0–0.2)
BASOPHILS NFR BLD: 0.4 % (ref 0–1.9)
DIFFERENTIAL METHOD: ABNORMAL
EOSINOPHIL # BLD AUTO: 0.1 K/UL (ref 0–0.5)
EOSINOPHIL NFR BLD: 0.9 % (ref 0–8)
ERYTHROCYTE [DISTWIDTH] IN BLOOD BY AUTOMATED COUNT: 15.2 % (ref 11.5–14.5)
ESTIMATED AVG GLUCOSE: 131 MG/DL (ref 68–131)
HBA1C MFR BLD: 6.2 % (ref 4.5–6.2)
HCT VFR BLD AUTO: 34 % (ref 37–48.5)
HGB BLD-MCNC: 10.6 G/DL (ref 12–16)
IMM GRANULOCYTES # BLD AUTO: 0.04 K/UL (ref 0–0.04)
IMM GRANULOCYTES NFR BLD AUTO: 0.5 % (ref 0–0.5)
LYMPHOCYTES # BLD AUTO: 1.3 K/UL (ref 1–4.8)
LYMPHOCYTES NFR BLD: 17 % (ref 18–48)
MCH RBC QN AUTO: 26.9 PG (ref 27–31)
MCHC RBC AUTO-ENTMCNC: 31.2 G/DL (ref 32–36)
MCV RBC AUTO: 86 FL (ref 82–98)
MONOCYTES # BLD AUTO: 0.6 K/UL (ref 0.3–1)
MONOCYTES NFR BLD: 7.8 % (ref 4–15)
NEUTROPHILS # BLD AUTO: 5.8 K/UL (ref 1.8–7.7)
NEUTROPHILS NFR BLD: 73.4 % (ref 38–73)
NRBC BLD-RTO: 0 /100 WBC
PLATELET # BLD AUTO: 225 K/UL (ref 150–450)
PMV BLD AUTO: 9.2 FL (ref 9.2–12.9)
RBC # BLD AUTO: 3.94 M/UL (ref 4–5.4)
WBC # BLD AUTO: 7.86 K/UL (ref 3.9–12.7)

## 2023-03-04 PROCEDURE — 99900035 HC TECH TIME PER 15 MIN (STAT)

## 2023-03-04 PROCEDURE — 12000002 HC ACUTE/MED SURGE SEMI-PRIVATE ROOM

## 2023-03-04 PROCEDURE — 96376 TX/PRO/DX INJ SAME DRUG ADON: CPT

## 2023-03-04 PROCEDURE — 94761 N-INVAS EAR/PLS OXIMETRY MLT: CPT

## 2023-03-04 PROCEDURE — 25000003 PHARM REV CODE 250: Performed by: INTERNAL MEDICINE

## 2023-03-04 PROCEDURE — 25000242 PHARM REV CODE 250 ALT 637 W/ HCPCS: Performed by: NURSE PRACTITIONER

## 2023-03-04 PROCEDURE — 83036 HEMOGLOBIN GLYCOSYLATED A1C: CPT | Performed by: STUDENT IN AN ORGANIZED HEALTH CARE EDUCATION/TRAINING PROGRAM

## 2023-03-04 PROCEDURE — 96372 THER/PROPH/DIAG INJ SC/IM: CPT | Performed by: INTERNAL MEDICINE

## 2023-03-04 PROCEDURE — 85025 COMPLETE CBC W/AUTO DIFF WBC: CPT | Performed by: INTERNAL MEDICINE

## 2023-03-04 PROCEDURE — 94660 CPAP INITIATION&MGMT: CPT

## 2023-03-04 PROCEDURE — 63600175 PHARM REV CODE 636 W HCPCS: Performed by: INTERNAL MEDICINE

## 2023-03-04 PROCEDURE — 99233 SBSQ HOSP IP/OBS HIGH 50: CPT | Mod: ,,, | Performed by: STUDENT IN AN ORGANIZED HEALTH CARE EDUCATION/TRAINING PROGRAM

## 2023-03-04 PROCEDURE — 99900031 HC PATIENT EDUCATION (STAT)

## 2023-03-04 PROCEDURE — 94640 AIRWAY INHALATION TREATMENT: CPT

## 2023-03-04 PROCEDURE — 36415 COLL VENOUS BLD VENIPUNCTURE: CPT | Performed by: INTERNAL MEDICINE

## 2023-03-04 PROCEDURE — 63600175 PHARM REV CODE 636 W HCPCS: Mod: TB,JG | Performed by: STUDENT IN AN ORGANIZED HEALTH CARE EDUCATION/TRAINING PROGRAM

## 2023-03-04 PROCEDURE — 25000003 PHARM REV CODE 250: Performed by: STUDENT IN AN ORGANIZED HEALTH CARE EDUCATION/TRAINING PROGRAM

## 2023-03-04 PROCEDURE — 99233 PR SUBSEQUENT HOSPITAL CARE,LEVL III: ICD-10-PCS | Mod: ,,, | Performed by: STUDENT IN AN ORGANIZED HEALTH CARE EDUCATION/TRAINING PROGRAM

## 2023-03-04 PROCEDURE — 27000221 HC OXYGEN, UP TO 24 HOURS

## 2023-03-04 RX ORDER — POLYETHYLENE GLYCOL 3350 17 G/17G
17 POWDER, FOR SOLUTION ORAL DAILY
Status: DISCONTINUED | OUTPATIENT
Start: 2023-03-04 | End: 2023-03-05

## 2023-03-04 RX ORDER — IPRATROPIUM BROMIDE AND ALBUTEROL SULFATE 2.5; .5 MG/3ML; MG/3ML
3 SOLUTION RESPIRATORY (INHALATION) EVERY 8 HOURS
Status: COMPLETED | OUTPATIENT
Start: 2023-03-04 | End: 2023-03-07

## 2023-03-04 RX ADMIN — CITALOPRAM HYDROBROMIDE 20 MG: 20 TABLET ORAL at 08:03

## 2023-03-04 RX ADMIN — ONDANSETRON 8 MG: 4 TABLET, ORALLY DISINTEGRATING ORAL at 11:03

## 2023-03-04 RX ADMIN — DAPTOMYCIN 640 MG: 500 INJECTION, POWDER, LYOPHILIZED, FOR SOLUTION INTRAVENOUS at 08:03

## 2023-03-04 RX ADMIN — ENOXAPARIN SODIUM 40 MG: 100 INJECTION SUBCUTANEOUS at 08:03

## 2023-03-04 RX ADMIN — MORPHINE SULFATE 6 MG: 2 INJECTION, SOLUTION INTRAMUSCULAR; INTRAVENOUS at 10:03

## 2023-03-04 RX ADMIN — BUSPIRONE HYDROCHLORIDE 7.5 MG: 5 TABLET ORAL at 09:03

## 2023-03-04 RX ADMIN — PROCHLORPERAZINE EDISYLATE 5 MG: 5 INJECTION INTRAMUSCULAR; INTRAVENOUS at 01:03

## 2023-03-04 RX ADMIN — IPRATROPIUM BROMIDE AND ALBUTEROL SULFATE 3 ML: 2.5; .5 SOLUTION RESPIRATORY (INHALATION) at 04:03

## 2023-03-04 RX ADMIN — MORPHINE SULFATE 6 MG: 2 INJECTION, SOLUTION INTRAMUSCULAR; INTRAVENOUS at 05:03

## 2023-03-04 RX ADMIN — BUSPIRONE HYDROCHLORIDE 7.5 MG: 5 TABLET ORAL at 08:03

## 2023-03-04 RX ADMIN — MORPHINE SULFATE 6 MG: 2 INJECTION, SOLUTION INTRAMUSCULAR; INTRAVENOUS at 11:03

## 2023-03-04 RX ADMIN — MORPHINE SULFATE 6 MG: 2 INJECTION, SOLUTION INTRAMUSCULAR; INTRAVENOUS at 01:03

## 2023-03-04 RX ADMIN — POLYETHYLENE GLYCOL 3350 17 G: 17 POWDER, FOR SOLUTION ORAL at 10:03

## 2023-03-04 RX ADMIN — ENOXAPARIN SODIUM 40 MG: 100 INJECTION SUBCUTANEOUS at 09:03

## 2023-03-04 NOTE — PROGRESS NOTES
Progress Note  Infectious Disease    Reason for Consult:  Cellulitis of appendage     HPI: Adriana Zaragoza is a 57 y.o. female super morbidly obese, BMI 60.4 kg/m2, known to our service from prior consultation on June/2020 for cellulitis of large medial thigh pannus, last admitted for the same Oct/2022, discharged on clindamycin.  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  She denies headache, cough or chest pain.  Admits to some nausea that is now improved, no vomiting, no abdominal pain, no dysuria increased urinary frequency, no change in bowel movements.  She recalls working on her house outside and lifting heavy objects during the weekend.     In the ER, /62, tachypneic, T-max 102.1   Labs on admission leukocytosis 16.5, left shift 90.6%, H&H 12.4/39.8, platelet count 290   Stable kidney function, normal LFTs  CRP 19.5, high   Procalcitonin 1.57, positive   Baseline CPK 82   Chest x-ray mild diffuse interstitial prominence likely chronic    Patient admitted for sepsis likely secondary to large medial thigh pannus cellulitis.    Empirically started on vancomycin IV.      ID consult for cellulitis of appendage.    3/4:  Interim reviewed, patient seen examined at bedside.  On BiPAP, states she is feeling better, noticed right medial appendage has decreased edema and redness, Mepilex in place, looks slightly better compared to yesterday.  Hemodynamically stable, afebrile.  Labs reviewed, white count 7.8, left shift 73.4%, trending down, H&H 10.6/34, platelet count 225.  Micro reviewed, blood cultures no growth to date, pending final.    Review of patient's allergies indicates:    Allergen Reactions    Aspirin     Dilaudid [hydromorphone]      Excessive sleepiness    Nsaids (non-steroidal anti-inflammatory drug) Hives    Teflaro [ceftaroline fosamil]      Allergic reaction/ hives/itching     Past Medical History:   Diagnosis Date    Allergy     Anemia     Asthma     COPD (chronic obstructive pulmonary disease)     Deep vein thrombosis     Depression     Diabetes mellitus, type 2     Encounter for blood transfusion     Heart murmur     Neuromuscular disorder      Past Surgical History:   Procedure Laterality Date     SECTION      DILATION AND CURETTAGE OF UTERUS      gastric sleeve      [facility]    TONSILLECTOMY       Social History     Tobacco Use    Smoking status: Every Day     Packs/day: 1.00     Types: Cigarettes    Smokeless tobacco: Never   Substance Use Topics    Alcohol use: No        Family History   Problem Relation Age of Onset    Heart disease Mother     Diabetes Mother     Arthritis Mother     Depression Mother     Cancer Father     COPD Father     Arthritis Maternal Grandmother     Cancer Maternal Grandmother     Cancer Maternal Grandfather     Cancer Paternal Grandmother     Kidney disease Paternal Grandmother     Diabetes Paternal Grandmother     Diabetes Paternal Grandfather     Hyperlipidemia Paternal Grandfather        Review of Systems:   + chills, fever, no sweats, weight loss  No change in vision, loss of vision or diplopia  No sinus congestion, purulent nasal discharge, post nasal drip or facial pain  No pain in mouth or throat. No problems with teeth, gums.  No chest pain, palpitations, syncope  No cough, sputum production, shortness of breath, dyspnea on exertion, pleurisy, hemoptysis  No dysphagia, odynophagia  + nausea, no vomiting, diarrhea, constipation, blood in stool, or focal abd pain,    No dysuria, hesitancy, hematuria, retention, incontinence  No vaginal discharge, genital ulcers, risk for STD  Except for large medial thigh pannus, no swelling of  joints, redness of joints, injuries, or new focal pain  No unusual headaches, dizziness, vertigo, numbness, paresthesias, neuropathy, falls  No anxiety, depression, substance abuse, sleep disturbance  No bleeding, lymphadenopathy  No new rashes, lesions, or wounds    Outdoor activities: Lives at home, boyfriend helps her, she reports she is active at home and ambulates with assistance.  Travel: None  Implants: None  Antibiotic History: See HPi    EXAM & DIAGNOSTICS REVIEWED:   Vitals:     Temp:  [97.8 °F (36.6 °C)-98.4 °F (36.9 °C)]   Temp: 97.9 °F (36.6 °C) (03/04/23 1139)  Pulse: 68 (03/04/23 1139)  Resp: 18 (03/04/23 1139)  BP: 121/60 (03/04/23 1139)  SpO2: 98 % (03/04/23 1139)    Intake/Output Summary (Last 24 hours) at 3/4/2023 1153  Last data filed at 3/4/2023 0503  Gross per 24 hour   Intake --   Output 1200 ml   Net -1200 ml       General:  In NAD. Alert and attentive, cooperative, comfortable on CPAP  Eyes:  Anicteric, PERRL  ENT:  No ulcers, exudates, thrush, nares patent, dentition is fair, poor oral hygiene  Neck:  Supple, no adenopathy appreciated  Lungs: Poor inspiratory effort, mostly clear to auscultation b/l  Heart:  S1/S2+, regular rhythm, no murmurs  Abd:  Morbidly obese, impressive large medial pannus on thigh with mepilex in place, wrinkling of skin noted, less induration, non tender to palpation  3/3: impressive large medial pannus on thigh with redness, edema and indurated area, not tender to palpation c/w cellutis  +BS, soft, non tender, non distended, no rebound, large skin folds with powder in place no evidence of intertrigo   :  Voids  Musc:  R thigh as described above  Joints without effusion, swelling,  erythema, synovitis, ambulatory  Skin:  Warm, no rash  Wound:   Neuro:  Following commands, alert, attentive, speech fluent, moves all extremities, no acute focal deficit   Psych:  Calm, cooperative  Lymphatic:       Extrem: Chronic lymphedema lower extremities  VAD:  L Midline 3/3         Isolation: None    3/3:        General Labs reviewed:  Recent Labs   Lab 03/02/23  1150 03/03/23  0559 03/04/23  0715   WBC 16.50* 11.12 7.86   HGB 12.4 11.0* 10.6*   HCT 39.8 35.0* 34.0*    246 225       Recent Labs   Lab 03/02/23  1150 03/03/23  0559    134*   K 4.8 3.2*    102   CO2 27 26   BUN 13 13   CREATININE 0.7 0.7   CALCIUM 8.8 7.6*   PROT 7.9 6.9   BILITOT 1.1* 0.6   ALKPHOS 69 54*   ALT 15 11   AST 22 12     Recent Labs   Lab 03/03/23  1427   CRP 19.52*     No results for input(s): SEDRATE in the last 168 hours.    Estimated Creatinine Clearance: 148 mL/min (based on SCr of 0.7 mg/dL).     Micro:  Microbiology Results (last 7 days)       Procedure Component Value Units Date/Time    Blood culture x two cultures. Draw prior to antibiotics. [[id number]] Collected: 03/02/23 1208    Order Status: Completed Specimen: Blood from Peripheral, Antecubital, Right Updated: 03/03/23 1232     Blood Culture, Routine No Growth to date      No Growth to date    Narrative:      Aerobic and anaerobic    Blood culture x two cultures. Draw prior to antibiotics. [[id number]] Collected: 03/02/23 1137    Order Status: Completed Specimen: Blood from Peripheral, Forearm, Right Updated: 03/03/23 1232     Blood Culture, Routine No Growth to date      No Growth to date    Narrative:      Aerobic and anaerobic            Imaging Reviewed:  CXR    Cardiology:       IMPRESSION & PLAN     Sepsis secondary to recurrent cellulitis of large R thigh medial pannus   CRP 19.5, high  Procal 1.5 positive  Baseline CPK 82  Blood cultures x 2 no growth, pending final     2.  Super morbid obesity BMI 60.4 Kg/m2  with underlying chronic lymphedema of lower extremitis     3.  KEESHA    4. Diabetes, hbA1c: 6/2%    Recommendations:  Continue Daptomycin 630mg IV daily   Follow cultures  Wound care to R thigh daily, clean with chlorhexidine, dry it and cover with Mepilex  CRP and procal ordered with AM labs    D/w patient,  nursing    Medical Decision Making during this encounter was  [_] Low Complexity  [_] Moderate Complexity  [xx] High Complexity

## 2023-03-04 NOTE — ASSESSMENT & PLAN NOTE
Body mass index is 60.49 kg/m². Morbid obesity complicates all aspects of disease management from diagnostic modalities to treatment. Weight loss encouraged and health benefits explained to patient.

## 2023-03-04 NOTE — SUBJECTIVE & OBJECTIVE
Interval History:   03/04/23: doing better c/o mild sob will add duo neb. Seen by ID continue Daptomycin and wound care. Monitor CRP.afebrile    03/03/23: continues with redness and swelling to right upper leg appendage. Tolerating IV antibiotic. Afebrile. Continue CPAP at night and with sleeping.  Replace K+        Review of Systems  Objective:     Vital Signs (Most Recent):  Temp: 97.9 °F (36.6 °C) (03/04/23 1139)  Pulse: 68 (03/04/23 1139)  Resp: 18 (03/04/23 1139)  BP: 121/60 (03/04/23 1139)  SpO2: 98 % (03/04/23 1139)   Vital Signs (24h Range):  Temp:  [97.8 °F (36.6 °C)-98.4 °F (36.9 °C)] 97.9 °F (36.6 °C)  Pulse:  [68-81] 68  Resp:  [16-19] 18  SpO2:  [92 %-98 %] 98 %  BP: (105-142)/(57-68) 121/60     Weight: (!) 175.2 kg (386 lb 3.9 oz)  Body mass index is 60.49 kg/m².    Intake/Output Summary (Last 24 hours) at 3/4/2023 1439  Last data filed at 3/4/2023 0503  Gross per 24 hour   Intake --   Output 1200 ml   Net -1200 ml        Physical Exam  Constitutional:       Appearance: She is obese.   HENT:      Head: Normocephalic and atraumatic.      Mouth/Throat:      Mouth: Mucous membranes are moist.   Eyes:      Extraocular Movements: Extraocular movements intact.      Pupils: Pupils are equal, round, and reactive to light.   Cardiovascular:      Rate and Rhythm: Normal rate and regular rhythm.   Pulmonary:      Breath sounds: Rhonchi present.   Abdominal:      General: Abdomen is flat. Bowel sounds are normal.      Palpations: Abdomen is soft.   Musculoskeletal:      Cervical back: Normal range of motion.      Right lower leg: Edema present.      Left lower leg: Edema present.      Comments: Large cellulitis appendage from leg with cellulitis see image   Skin:     General: Skin is warm and dry.   Neurological:      General: No focal deficit present.      Mental Status: She is oriented to person, place, and time.         Significant Labs: All pertinent labs within the past 24 hours have been reviewed.  A1C:    Recent Labs   Lab 10/22/22  0456 03/04/23  0715   HGBA1C 6.0 6.2       ABGs: No results for input(s): PH, PCO2, HCO3, POCSATURATED, BE, TOTALHB, COHB, METHB, O2HB, POCFIO2, PO2 in the last 48 hours.  Bilirubin:   Recent Labs   Lab 03/02/23  1150 03/03/23  0559   BILITOT 1.1* 0.6       Blood Culture:   No results for input(s): LABBLOO in the last 48 hours.    BMP:   Recent Labs   Lab 03/03/23  0559   *   *   K 3.2*      CO2 26   BUN 13   CREATININE 0.7   CALCIUM 7.6*       CBC:   Recent Labs   Lab 03/03/23  0559 03/04/23  0715   WBC 11.12 7.86   HGB 11.0* 10.6*   HCT 35.0* 34.0*    225       CMP:   Recent Labs   Lab 03/03/23  0559   *   K 3.2*      CO2 26   *   BUN 13   CREATININE 0.7   CALCIUM 7.6*   PROT 6.9   ALBUMIN 3.1*   BILITOT 0.6   ALKPHOS 54*   AST 12   ALT 11   ANIONGAP 6*       Cardiac Markers: No results for input(s): CKMB, MYOGLOBIN, BNP, TROPISTAT in the last 48 hours.  Coagulation: No results for input(s): PT, INR, APTT in the last 48 hours.  Lactic Acid:   No results for input(s): LACTATE in the last 48 hours.    Lipase: No results for input(s): LIPASE in the last 48 hours.  Lipid Panel: No results for input(s): CHOL, HDL, LDLCALC, TRIG, CHOLHDL in the last 48 hours.  Magnesium: No results for input(s): MG in the last 48 hours.  Pathology Results  (Last 10 years)      None          POCT Glucose: No results for input(s): POCTGLUCOSE in the last 48 hours.  Prealbumin: No results for input(s): PREALBUMIN in the last 48 hours.  Respiratory Culture: No results for input(s): GSRESP, RESPIRATORYC in the last 48 hours.  Troponin: No results for input(s): TROPONINI, TROPONINIHS in the last 48 hours.  TSH:   Recent Labs   Lab 10/21/22  2110   TSH 1.190       Urine Culture: No results for input(s): LABURIN in the last 48 hours.  Urine Studies: No results for input(s): COLORU, APPEARANCEUA, PHUR, SPECGRAV, PROTEINUA, GLUCUA, KETONESU, BILIRUBINUA, OCCULTUA, NITRITE,  UROBILINOGEN, LEUKOCYTESUR, RBCUA, WBCUA, BACTERIA, SQUAMEPITHEL, HYALINECASTS in the last 48 hours.    Invalid input(s): WRIGHTSUR  X-Ray Chest AP Portable    Result Date: 3/2/2023  HISTORY: Sepsis COMPARISON:None available. FINDINGS:Single AP view the chest reveals normal appearance of the cardiomediastinal silhouette. Pulmonary vascularity is not enlarged. Mild diffuse interstitial prominence likely related to viral etiology. No evidence of pulmonic infiltrate or significant pleural effusion or atelectasis. The hilar structures without evidence of enlargement. No significant tracheal shift. Chronic spondylosis changes of the thoracic spine. IMPRESSION:Mild diffuse interstitial prominence likely on a chronic basis due to viral etiology. Electronically signed by:  Cyril Rosales MD  3/2/2023 11:26 AM CST Workstation: [id number]        Significant Imaging: I have reviewed all pertinent imaging results/findings within the past 24 hours.  I have reviewed and interpreted all pertinent imaging results/findings within the past 24 hours.

## 2023-03-04 NOTE — ASSESSMENT & PLAN NOTE
Of an appendage of the right thigh  Pt is morbidly obese and this has been a recurrent problem  Pt has multiple antibiotic allergies and to pain medication  Continue Daptomycin

## 2023-03-04 NOTE — CARE UPDATE
03/04/23 0819   Patient Assessment/Suction   Level of Consciousness (AVPU) alert   Respiratory Effort Normal;Unlabored   Expansion/Accessory Muscles/Retractions expansion symmetric   All Lung Fields Breath Sounds diminished   Rhythm/Pattern, Respiratory unlabored   PRE-TX-O2   Device (Oxygen Therapy) CPAP   $ Is the patient on Low Flow Oxygen? Yes   Flow (L/min) 3   SpO2 96 %   Pulse Oximetry Type Intermittent   $ Pulse Oximetry - Multiple Charge Pulse Oximetry - Multiple   Pulse 77   Resp 18   Preset CPAP/BiPAP Settings   Mode Of Delivery CPAP   $ CPAP/BiPAP Daily Charge BiPAP/CPAP Daily   $ Is patient using? Yes   Size of Mask Medium/Large   Sized Appropriately? Yes   Equipment Type DeVilbiss   Airway Device Type medium full face mask   Humidifier not applicable   CPAP (cm H2O) 15   Education   $ Education BiPAP;15 min;DME Oxygen   Respiratory Evaluation   $ Care Plan Tech Time 15 min

## 2023-03-04 NOTE — CARE UPDATE
Patient is being admitted to inpatient status based upon severity of disease. No change in HPI, 10-point ROS, past medical history, past social history or family history as found in the H&P dated 3/2/2023 located in the electronic medical record of [facility].

## 2023-03-04 NOTE — PLAN OF CARE
Problem: Adult Inpatient Plan of Care  Goal: Absence of Hospital-Acquired Illness or Injury  Outcome: Ongoing, Progressing  Goal: Optimal Comfort and Wellbeing  Outcome: Ongoing, Progressing     Problem: Bariatric Environmental Safety  Goal: Safety Maintained with Care  Outcome: Ongoing, Progressing     Problem: Skin Injury Risk Increased  Goal: Skin Health and Integrity  Outcome: Ongoing, Progressing     Problem: Fall Injury Risk  Goal: Absence of Fall and Fall-Related Injury  Outcome: Ongoing, Progressing     Problem: Skin or Soft Tissue Infection  Goal: Absence of Infection Signs and Symptoms  Outcome: Ongoing, Progressing     Problem: Infection  Goal: Absence of Infection Signs and Symptoms  Outcome: Ongoing, Progressing

## 2023-03-04 NOTE — ASSESSMENT & PLAN NOTE
Of an appendage  Pt is morbidly obese and this has been a recurrent problem  Pt has multiple antibiotic allergies and to pain medication  Continue antibiotic per ID   Continue to trend inflammatory markers

## 2023-03-04 NOTE — PROGRESS NOTES
[facility] Medicine  Progress Note    Patient Name: Adriana Zaragoza  MRN: [id number]  Patient Class: IP- Inpatient   Admission Date: 3/2/2023  Length of Stay: 0 days  Attending Physician: Enrique Herring MD  Primary Care Provider: Jad Hua MD        Subjective:     Principal Problem:Panniculitis        HPI:  ED note  Patient presents complaining of fever and weakness.  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  Patient started with fever and chills this morning.  Patient has had sepsis before.    3/2/2023  Ms Zaragoza has a large appendage . She noted fever and chills this date with a Hx of cellulitis of the appendage. Pt has chronic low back pain that is an 8/10 at present.  The appendage is erythematous and tender to the touch.      Overview/Hospital Course:    Interval History:   03/04/23: doing better c/o mild sob will add duo neb. Seen by ID continue Daptomycin and wound care. Monitor CRP.afebrile    03/03/23: continues with redness and swelling to right upper leg appendage. Tolerating IV antibiotic. Afebrile. Continue CPAP at night and with sleeping.  Replace K+        Review of Systems  Objective:     Vital Signs (Most Recent):  Temp: 97.9 °F (36.6 °C) (03/04/23 1139)  Pulse: 68 (03/04/23 1139)  Resp: 18 (03/04/23 1139)  BP: 121/60 (03/04/23 1139)  SpO2: 98 % (03/04/23 1139)   Vital Signs (24h Range):  Temp:  [97.8 °F (36.6 °C)-98.4 °F (36.9 °C)] 97.9 °F (36.6 °C)  Pulse:  [68-81] 68  Resp:  [16-19] 18  SpO2:  [92 %-98 %] 98 %  BP: (105-142)/(57-68) 121/60     Weight: (!) 175.2 kg (386 lb 3.9 oz)  Body mass index is 60.49 kg/m².    Intake/Output Summary (Last 24 hours) at 3/4/2023 1439  Last data filed at 3/4/2023 0503  Gross per 24 hour   Intake --   Output 1200 ml   Net -1200 ml        Physical Exam  Constitutional:       Appearance: She is obese.   HENT:      Head: Normocephalic and atraumatic.       Mouth/Throat:      Mouth: Mucous membranes are moist.   Eyes:      Extraocular Movements: Extraocular movements intact.      Pupils: Pupils are equal, round, and reactive to light.   Cardiovascular:      Rate and Rhythm: Normal rate and regular rhythm.   Pulmonary:      Breath sounds: Rhonchi present.   Abdominal:      General: Abdomen is flat. Bowel sounds are normal.      Palpations: Abdomen is soft.   Musculoskeletal:      Cervical back: Normal range of motion.      Right lower leg: Edema present.      Left lower leg: Edema present.      Comments: Large cellulitis appendage from leg with cellulitis see image   Skin:     General: Skin is warm and dry.   Neurological:      General: No focal deficit present.      Mental Status: She is oriented to person, place, and time.         Significant Labs: All pertinent labs within the past 24 hours have been reviewed.  A1C:   Recent Labs   Lab 10/22/22  0456 03/04/23  0715   HGBA1C 6.0 6.2       ABGs: No results for input(s): PH, PCO2, HCO3, POCSATURATED, BE, TOTALHB, COHB, METHB, O2HB, POCFIO2, PO2 in the last 48 hours.  Bilirubin:   Recent Labs   Lab 03/02/23  1150 03/03/23  0559   BILITOT 1.1* 0.6       Blood Culture:   No results for input(s): LABBLOO in the last 48 hours.    BMP:   Recent Labs   Lab 03/03/23  0559   *   *   K 3.2*      CO2 26   BUN 13   CREATININE 0.7   CALCIUM 7.6*       CBC:   Recent Labs   Lab 03/03/23  0559 03/04/23  0715   WBC 11.12 7.86   HGB 11.0* 10.6*   HCT 35.0* 34.0*    225       CMP:   Recent Labs   Lab 03/03/23  0559   *   K 3.2*      CO2 26   *   BUN 13   CREATININE 0.7   CALCIUM 7.6*   PROT 6.9   ALBUMIN 3.1*   BILITOT 0.6   ALKPHOS 54*   AST 12   ALT 11   ANIONGAP 6*       Cardiac Markers: No results for input(s): CKMB, MYOGLOBIN, BNP, TROPISTAT in the last 48 hours.  Coagulation: No results for input(s): PT, INR, APTT in the last 48 hours.  Lactic Acid:   No results for input(s): LACTATE in  the last 48 hours.    Lipase: No results for input(s): LIPASE in the last 48 hours.  Lipid Panel: No results for input(s): CHOL, HDL, LDLCALC, TRIG, CHOLHDL in the last 48 hours.  Magnesium: No results for input(s): MG in the last 48 hours.  Pathology Results  (Last 10 years)      None          POCT Glucose: No results for input(s): POCTGLUCOSE in the last 48 hours.  Prealbumin: No results for input(s): PREALBUMIN in the last 48 hours.  Respiratory Culture: No results for input(s): GSRESP, RESPIRATORYC in the last 48 hours.  Troponin: No results for input(s): TROPONINI, TROPONINIHS in the last 48 hours.  TSH:   Recent Labs   Lab 10/21/22  2110   TSH 1.190       Urine Culture: No results for input(s): LABURIN in the last 48 hours.  Urine Studies: No results for input(s): COLORU, APPEARANCEUA, PHUR, SPECGRAV, PROTEINUA, GLUCUA, KETONESU, BILIRUBINUA, OCCULTUA, NITRITE, UROBILINOGEN, LEUKOCYTESUR, RBCUA, WBCUA, BACTERIA, SQUAMEPITHEL, HYALINECASTS in the last 48 hours.    Invalid input(s): WRIGHTSUR  X-Ray Chest AP Portable    Result Date: 3/2/2023  HISTORY: Sepsis COMPARISON:None available. FINDINGS:Single AP view the chest reveals normal appearance of the cardiomediastinal silhouette. Pulmonary vascularity is not enlarged. Mild diffuse interstitial prominence likely related to viral etiology. No evidence of pulmonic infiltrate or significant pleural effusion or atelectasis. The hilar structures without evidence of enlargement. No significant tracheal shift. Chronic spondylosis changes of the thoracic spine. IMPRESSION:Mild diffuse interstitial prominence likely on a chronic basis due to viral etiology. Electronically signed by:  Cyril Rosales MD  3/2/2023 11:26 AM CST Workstation: [id number]        Significant Imaging: I have reviewed all pertinent imaging results/findings within the past 24 hours.  I have reviewed and interpreted all pertinent imaging results/findings within the past 24  hours.      Assessment/Plan:      * Panniculitis  Of an appendage of the right thigh  Pt is morbidly obese and this has been a recurrent problem  Pt has multiple antibiotic allergies and to pain medication  Continue Daptomycin        Acute lymphangitis  Of an appendage  Pt is morbidly obese and this has been a recurrent problem  Pt has multiple antibiotic allergies and to pain medication  Continue antibiotic per ID   Continue to trend inflammatory markers      KEESHA (obstructive sleep apnea)  Continue CPAP at night and while sleeping  Continuous pulse ox readings  duo neb Tx      Chronic low back pain with bilateral sciatica  Allergic to NSAID'S and some opiates      Pickwickian syndrome  Body mass index is 60.49 kg/m². Morbid obesity complicates all aspects of disease management from diagnostic modalities to treatment. Weight loss encouraged and health benefits explained to patient.           VTE Risk Mitigation (From admission, onward)         Ordered     enoxaparin injection 40 mg  Every 12 hours         03/02/23 2055     IP VTE HIGH RISK PATIENT  Once         03/02/23 2055     Place sequential compression device  Until discontinued         03/02/23 2055                Discharge Planning   JONATHAN: 3/6/2023     Code Status: Full Code   Is the patient medically ready for discharge?:     Reason for patient still in hospital (select all that apply): Patient trending condition, Treatment and Consult recommendations  Discharge Plan A: Home with family, Home Health                  Yohana Thomas NP  Department of Hospital Medicine   [facility]

## 2023-03-05 LAB
ALBUMIN SERPL BCP-MCNC: 2.9 G/DL (ref 3.5–5.2)
ALP SERPL-CCNC: 56 U/L (ref 55–135)
ALT SERPL W/O P-5'-P-CCNC: 14 U/L (ref 10–44)
ANION GAP SERPL CALC-SCNC: 5 MMOL/L (ref 8–16)
AST SERPL-CCNC: 13 U/L (ref 10–40)
BASOPHILS # BLD AUTO: 0.03 K/UL (ref 0–0.2)
BASOPHILS # BLD AUTO: 0.03 K/UL (ref 0–0.2)
BASOPHILS NFR BLD: 0.4 % (ref 0–1.9)
BASOPHILS NFR BLD: 0.4 % (ref 0–1.9)
BILIRUB SERPL-MCNC: 0.4 MG/DL (ref 0.1–1)
BUN SERPL-MCNC: 11 MG/DL (ref 6–20)
CALCIUM SERPL-MCNC: 8.3 MG/DL (ref 8.7–10.5)
CHLORIDE SERPL-SCNC: 104 MMOL/L (ref 95–110)
CO2 SERPL-SCNC: 30 MMOL/L (ref 23–29)
CREAT SERPL-MCNC: 0.5 MG/DL (ref 0.5–1.4)
CRP SERPL-MCNC: 11.05 MG/DL
DIFFERENTIAL METHOD: ABNORMAL
DIFFERENTIAL METHOD: ABNORMAL
EOSINOPHIL # BLD AUTO: 0.1 K/UL (ref 0–0.5)
EOSINOPHIL # BLD AUTO: 0.1 K/UL (ref 0–0.5)
EOSINOPHIL NFR BLD: 1.2 % (ref 0–8)
EOSINOPHIL NFR BLD: 1.2 % (ref 0–8)
ERYTHROCYTE [DISTWIDTH] IN BLOOD BY AUTOMATED COUNT: 15 % (ref 11.5–14.5)
ERYTHROCYTE [DISTWIDTH] IN BLOOD BY AUTOMATED COUNT: 15 % (ref 11.5–14.5)
EST. GFR  (NO RACE VARIABLE): >60 ML/MIN/1.73 M^2
GLUCOSE SERPL-MCNC: 122 MG/DL (ref 70–110)
HCT VFR BLD AUTO: 33.2 % (ref 37–48.5)
HCT VFR BLD AUTO: 33.2 % (ref 37–48.5)
HGB BLD-MCNC: 10.4 G/DL (ref 12–16)
HGB BLD-MCNC: 10.4 G/DL (ref 12–16)
IMM GRANULOCYTES # BLD AUTO: 0.04 K/UL (ref 0–0.04)
IMM GRANULOCYTES # BLD AUTO: 0.04 K/UL (ref 0–0.04)
IMM GRANULOCYTES NFR BLD AUTO: 0.6 % (ref 0–0.5)
IMM GRANULOCYTES NFR BLD AUTO: 0.6 % (ref 0–0.5)
LYMPHOCYTES # BLD AUTO: 1.7 K/UL (ref 1–4.8)
LYMPHOCYTES # BLD AUTO: 1.7 K/UL (ref 1–4.8)
LYMPHOCYTES NFR BLD: 22.8 % (ref 18–48)
LYMPHOCYTES NFR BLD: 22.8 % (ref 18–48)
MCH RBC QN AUTO: 27.2 PG (ref 27–31)
MCH RBC QN AUTO: 27.2 PG (ref 27–31)
MCHC RBC AUTO-ENTMCNC: 31.3 G/DL (ref 32–36)
MCHC RBC AUTO-ENTMCNC: 31.3 G/DL (ref 32–36)
MCV RBC AUTO: 87 FL (ref 82–98)
MCV RBC AUTO: 87 FL (ref 82–98)
MONOCYTES # BLD AUTO: 0.6 K/UL (ref 0.3–1)
MONOCYTES # BLD AUTO: 0.6 K/UL (ref 0.3–1)
MONOCYTES NFR BLD: 8.1 % (ref 4–15)
MONOCYTES NFR BLD: 8.1 % (ref 4–15)
NEUTROPHILS # BLD AUTO: 4.9 K/UL (ref 1.8–7.7)
NEUTROPHILS # BLD AUTO: 4.9 K/UL (ref 1.8–7.7)
NEUTROPHILS NFR BLD: 66.9 % (ref 38–73)
NEUTROPHILS NFR BLD: 66.9 % (ref 38–73)
NRBC BLD-RTO: 0 /100 WBC
NRBC BLD-RTO: 0 /100 WBC
PLATELET # BLD AUTO: 231 K/UL (ref 150–450)
PLATELET # BLD AUTO: 231 K/UL (ref 150–450)
PMV BLD AUTO: 9.5 FL (ref 9.2–12.9)
PMV BLD AUTO: 9.5 FL (ref 9.2–12.9)
POTASSIUM SERPL-SCNC: 3.5 MMOL/L (ref 3.5–5.1)
PROCALCITONIN SERPL IA-MCNC: 0.61 NG/ML (ref 0–0.5)
PROT SERPL-MCNC: 6.7 G/DL (ref 6–8.4)
RBC # BLD AUTO: 3.83 M/UL (ref 4–5.4)
RBC # BLD AUTO: 3.83 M/UL (ref 4–5.4)
SODIUM SERPL-SCNC: 139 MMOL/L (ref 136–145)
WBC # BLD AUTO: 7.27 K/UL (ref 3.9–12.7)
WBC # BLD AUTO: 7.27 K/UL (ref 3.9–12.7)

## 2023-03-05 PROCEDURE — 27000221 HC OXYGEN, UP TO 24 HOURS

## 2023-03-05 PROCEDURE — 99900031 HC PATIENT EDUCATION (STAT)

## 2023-03-05 PROCEDURE — 94640 AIRWAY INHALATION TREATMENT: CPT

## 2023-03-05 PROCEDURE — 86140 C-REACTIVE PROTEIN: CPT | Performed by: NURSE PRACTITIONER

## 2023-03-05 PROCEDURE — 63600175 PHARM REV CODE 636 W HCPCS: Performed by: INTERNAL MEDICINE

## 2023-03-05 PROCEDURE — 12000002 HC ACUTE/MED SURGE SEMI-PRIVATE ROOM

## 2023-03-05 PROCEDURE — 25000242 PHARM REV CODE 250 ALT 637 W/ HCPCS: Performed by: NURSE PRACTITIONER

## 2023-03-05 PROCEDURE — 99233 SBSQ HOSP IP/OBS HIGH 50: CPT | Mod: ,,, | Performed by: STUDENT IN AN ORGANIZED HEALTH CARE EDUCATION/TRAINING PROGRAM

## 2023-03-05 PROCEDURE — 80053 COMPREHEN METABOLIC PANEL: CPT | Performed by: NURSE PRACTITIONER

## 2023-03-05 PROCEDURE — 36415 COLL VENOUS BLD VENIPUNCTURE: CPT | Performed by: NURSE PRACTITIONER

## 2023-03-05 PROCEDURE — 94660 CPAP INITIATION&MGMT: CPT

## 2023-03-05 PROCEDURE — 25000003 PHARM REV CODE 250: Performed by: INTERNAL MEDICINE

## 2023-03-05 PROCEDURE — 94761 N-INVAS EAR/PLS OXIMETRY MLT: CPT

## 2023-03-05 PROCEDURE — 25000003 PHARM REV CODE 250: Performed by: STUDENT IN AN ORGANIZED HEALTH CARE EDUCATION/TRAINING PROGRAM

## 2023-03-05 PROCEDURE — 84145 PROCALCITONIN (PCT): CPT | Performed by: NURSE PRACTITIONER

## 2023-03-05 PROCEDURE — 63600175 PHARM REV CODE 636 W HCPCS: Mod: TB,JG | Performed by: STUDENT IN AN ORGANIZED HEALTH CARE EDUCATION/TRAINING PROGRAM

## 2023-03-05 PROCEDURE — 99233 PR SUBSEQUENT HOSPITAL CARE,LEVL III: ICD-10-PCS | Mod: ,,, | Performed by: STUDENT IN AN ORGANIZED HEALTH CARE EDUCATION/TRAINING PROGRAM

## 2023-03-05 PROCEDURE — 94799 UNLISTED PULMONARY SVC/PX: CPT

## 2023-03-05 PROCEDURE — 99900035 HC TECH TIME PER 15 MIN (STAT)

## 2023-03-05 PROCEDURE — 85025 COMPLETE CBC W/AUTO DIFF WBC: CPT | Performed by: INTERNAL MEDICINE

## 2023-03-05 RX ORDER — POLYETHYLENE GLYCOL 3350 17 G/17G
17 POWDER, FOR SOLUTION ORAL 2 TIMES DAILY
Status: DISCONTINUED | OUTPATIENT
Start: 2023-03-05 | End: 2023-03-07 | Stop reason: HOSPADM

## 2023-03-05 RX ADMIN — CITALOPRAM HYDROBROMIDE 20 MG: 20 TABLET ORAL at 09:03

## 2023-03-05 RX ADMIN — ENOXAPARIN SODIUM 40 MG: 100 INJECTION SUBCUTANEOUS at 09:03

## 2023-03-05 RX ADMIN — BUSPIRONE HYDROCHLORIDE 7.5 MG: 5 TABLET ORAL at 08:03

## 2023-03-05 RX ADMIN — DAPTOMYCIN 640 MG: 500 INJECTION, POWDER, LYOPHILIZED, FOR SOLUTION INTRAVENOUS at 08:03

## 2023-03-05 RX ADMIN — MORPHINE SULFATE 6 MG: 2 INJECTION, SOLUTION INTRAMUSCULAR; INTRAVENOUS at 12:03

## 2023-03-05 RX ADMIN — IPRATROPIUM BROMIDE AND ALBUTEROL SULFATE 3 ML: 2.5; .5 SOLUTION RESPIRATORY (INHALATION) at 03:03

## 2023-03-05 RX ADMIN — BUSPIRONE HYDROCHLORIDE 7.5 MG: 5 TABLET ORAL at 09:03

## 2023-03-05 RX ADMIN — MORPHINE SULFATE 6 MG: 2 INJECTION, SOLUTION INTRAMUSCULAR; INTRAVENOUS at 08:03

## 2023-03-05 RX ADMIN — MORPHINE SULFATE 6 MG: 2 INJECTION, SOLUTION INTRAMUSCULAR; INTRAVENOUS at 03:03

## 2023-03-05 RX ADMIN — POLYETHYLENE GLYCOL 3350 17 G: 17 POWDER, FOR SOLUTION ORAL at 08:03

## 2023-03-05 RX ADMIN — POLYETHYLENE GLYCOL 3350 17 G: 17 POWDER, FOR SOLUTION ORAL at 09:03

## 2023-03-05 RX ADMIN — IPRATROPIUM BROMIDE AND ALBUTEROL SULFATE 3 ML: 2.5; .5 SOLUTION RESPIRATORY (INHALATION) at 12:03

## 2023-03-05 RX ADMIN — ENOXAPARIN SODIUM 40 MG: 100 INJECTION SUBCUTANEOUS at 08:03

## 2023-03-05 RX ADMIN — IPRATROPIUM BROMIDE AND ALBUTEROL SULFATE 3 ML: 2.5; .5 SOLUTION RESPIRATORY (INHALATION) at 07:03

## 2023-03-05 NOTE — RESPIRATORY THERAPY
03/05/23 1534   Patient Assessment/Suction   Level of Consciousness (AVPU) alert   Respiratory Effort Normal   Expansion/Accessory Muscles/Retractions no retractions;no use of accessory muscles   All Lung Fields Breath Sounds Anterior:;Lateral:   RUL Breath Sounds wheezes, expiratory   RLL Breath Sounds wheezes, expiratory   Rhythm/Pattern, Respiratory no shortness of breath reported;depth regular;pattern regular;unlabored   Cough Frequency infrequent   Skin Integrity   $ Wound Care Tech Time 15 min   Area Observed Bridge of nose;Cheek;Forehead;Upper lip;Lower lip   Skin Appearance redness blanchable  (bridge of nose)   PRE-TX-O2   Device (Oxygen Therapy) CPAP;nasal cannula   Flow (L/min) 3   SpO2 96 %   Pulse Oximetry Type Intermittent   $ Pulse Oximetry - Multiple Charge Pulse Oximetry - Multiple   Pulse 70   Resp (!) 22   Positioning   Head of Bed (HOB) Positioning HOB at 30-45 degrees   Aerosol Therapy   $ Aerosol Therapy Charges Aerosol Treatment   Daily Review of Necessity (SVN) completed   Respiratory Treatment Status (SVN) given   Treatment Route (SVN) mask;oxygen   Patient Position (SVN) HOB elevated   Post Treatment Assessment (SVN) increased aeration   Signs of Intolerance (SVN) none   Breath Sounds Post-Respiratory Treatment   Throughout All Fields Post-Treatment All Fields   Throughout All Fields Post-Treatment aeration increased   Post-treatment Heart Rate (beats/min) 76   Post-treatment Resp Rate (breaths/min) 20   Preset CPAP/BiPAP Settings   Mode Of Delivery CPAP   $ Initial CPAP/BiPAP Setup? Yes   Size of Mask Medium/Large   Sized Appropriately? Yes   Equipment Type DeVilbiss   Airway Device Type medium full face mask   CPAP (cm H2O) 15

## 2023-03-05 NOTE — ASSESSMENT & PLAN NOTE
Of an appendage of the right thigh  Pt is morbidly obese and this has been a recurrent problem  Pt has multiple antibiotic allergies and to pain medication  Continue Daptomycin  CRP trending downward. afebrile  Final cultures pending

## 2023-03-05 NOTE — SUBJECTIVE & OBJECTIVE
Interval History:     03/05/23: afebrile redness to appendage decreased and with decreased warmth. Continue Daptomycin per ID recommendations. Final cultures pending, CRP Trending downward.    03/04/23: doing better c/o mild sob will add duo neb. Seen by ID continue Daptomycin and wound care. Monitor CRP.afebrile    03/03/23: continues with redness and swelling to right upper leg appendage. Tolerating IV antibiotic. Afebrile. Continue CPAP at night and with sleeping.  Replace K+        Review of Systems  Objective:     Vital Signs (Most Recent):  Temp: 98.5 °F (36.9 °C) (03/05/23 1214)  Pulse: 73 (03/05/23 1214)  Resp: 16 (03/05/23 1215)  BP: 133/66 (03/05/23 1214)  SpO2: 96 % (03/05/23 1214)   Vital Signs (24h Range):  Temp:  [97.4 °F (36.3 °C)-98.7 °F (37.1 °C)] 98.5 °F (36.9 °C)  Pulse:  [68-78] 73  Resp:  [16-24] 16  SpO2:  [93 %-99 %] 96 %  BP: ()/(55-75) 133/66     Weight: (!) 175.2 kg (386 lb 3.9 oz)  Body mass index is 60.49 kg/m².    Intake/Output Summary (Last 24 hours) at 3/5/2023 1341  Last data filed at 3/4/2023 1613  Gross per 24 hour   Intake 480 ml   Output 800 ml   Net -320 ml      Physical Exam  Constitutional:       Appearance: She is obese.   HENT:      Head: Normocephalic and atraumatic.      Mouth/Throat:      Mouth: Mucous membranes are moist.   Eyes:      Extraocular Movements: Extraocular movements intact.      Pupils: Pupils are equal, round, and reactive to light.   Cardiovascular:      Rate and Rhythm: Normal rate and regular rhythm.   Pulmonary:      Breath sounds: Rhonchi present.   Abdominal:      General: Abdomen is flat. Bowel sounds are normal.      Palpations: Abdomen is soft.   Musculoskeletal:      Cervical back: Normal range of motion.      Right lower leg: Edema present.      Left lower leg: Edema present.      Comments: Large cellulitis appendage from leg with cellulitis see image   Skin:     General: Skin is warm and dry.   Neurological:      General: No focal deficit  present.      Mental Status: She is oriented to person, place, and time.                                 Significant Labs: All pertinent labs within the past 24 hours have been reviewed.  A1C:   Recent Labs   Lab 10/22/22  0456 03/04/23  0715   HGBA1C 6.0 6.2      Latest Reference Range & Units 03/03/23 14:27 03/05/23 05:48   CRP <0.76 mg/dL 19.52 (H) 11.05 (H)   (H): Data is abnormally high  ABGs: No results for input(s): PH, PCO2, HCO3, POCSATURATED, BE, TOTALHB, COHB, METHB, O2HB, POCFIO2, PO2 in the last 48 hours.  Bilirubin:   Recent Labs   Lab 03/02/23  1150 03/03/23  0559 03/05/23  0548   BILITOT 1.1* 0.6 0.4     Blood Culture:   No results for input(s): LABBLOO in the last 48 hours.    BMP:   Recent Labs   Lab 03/05/23  0548   *      K 3.5      CO2 30*   BUN 11   CREATININE 0.5   CALCIUM 8.3*     CBC:   Recent Labs   Lab 03/04/23  0715 03/05/23  0548   WBC 7.86 7.27  7.27   HGB 10.6* 10.4*  10.4*   HCT 34.0* 33.2*  33.2*    231  231     CMP:   Recent Labs   Lab 03/05/23  0548      K 3.5      CO2 30*   *   BUN 11   CREATININE 0.5   CALCIUM 8.3*   PROT 6.7   ALBUMIN 2.9*   BILITOT 0.4   ALKPHOS 56   AST 13   ALT 14   ANIONGAP 5*     Cardiac Markers: No results for input(s): CKMB, MYOGLOBIN, BNP, TROPISTAT in the last 48 hours.  Coagulation: No results for input(s): PT, INR, APTT in the last 48 hours.  Lactic Acid:   No results for input(s): LACTATE in the last 48 hours.    Lipase: No results for input(s): LIPASE in the last 48 hours.  Lipid Panel: No results for input(s): CHOL, HDL, LDLCALC, TRIG, CHOLHDL in the last 48 hours.  Magnesium: No results for input(s): MG in the last 48 hours.  Pathology Results  (Last 10 years)      None          POCT Glucose: No results for input(s): POCTGLUCOSE in the last 48 hours.  Prealbumin: No results for input(s): PREALBUMIN in the last 48 hours.  Respiratory Culture: No results for input(s): GSRESP, RESPIRATORYC in the last  48 hours.  Troponin: No results for input(s): TROPONINI, TROPONINIHS in the last 48 hours.  TSH:   Recent Labs   Lab 10/21/22  2110   TSH 1.190     Urine Culture: No results for input(s): LABURIN in the last 48 hours.  Urine Studies: No results for input(s): COLORU, APPEARANCEUA, PHUR, SPECGRAV, PROTEINUA, GLUCUA, KETONESU, BILIRUBINUA, OCCULTUA, NITRITE, UROBILINOGEN, LEUKOCYTESUR, RBCUA, WBCUA, BACTERIA, SQUAMEPITHEL, HYALINECASTS in the last 48 hours.    Invalid input(s): WRIGHTSUR  X-Ray Chest AP Portable    Result Date: 3/2/2023  HISTORY: Sepsis COMPARISON:None available. FINDINGS:Single AP view the chest reveals normal appearance of the cardiomediastinal silhouette. Pulmonary vascularity is not enlarged. Mild diffuse interstitial prominence likely related to viral etiology. No evidence of pulmonic infiltrate or significant pleural effusion or atelectasis. The hilar structures without evidence of enlargement. No significant tracheal shift. Chronic spondylosis changes of the thoracic spine. IMPRESSION:Mild diffuse interstitial prominence likely on a chronic basis due to viral etiology. Electronically signed by:  Cyril Rosales MD  3/2/2023 11:26 AM CST Workstation: [id number]        Significant Imaging: I have reviewed all pertinent imaging results/findings within the past 24 hours.  I have reviewed and interpreted all pertinent imaging results/findings within the past 24 hours.

## 2023-03-05 NOTE — ASSESSMENT & PLAN NOTE
Of an appendage  Pt is morbidly obese and this has been a recurrent problem  Pt has multiple antibiotic allergies and to pain medication  Continue antibiotic per ID   Continue to trend inflammatory markers  CRP trending downward     Latest Reference Range & Units 03/03/23 14:27 03/05/23 05:48   CRP <0.76 mg/dL 19.52 (H) 11.05 (H)   (H): Data is abnormally high

## 2023-03-05 NOTE — PROGRESS NOTES
Progress Note  Infectious Disease    Reason for Consult:  Cellulitis of appendage     HPI: Adriana Zaragoza is a 57 y.o. female super morbidly obese, BMI 60.4 kg/m2, known to our service from prior consultation on June/2020 for cellulitis of large medial thigh pannus, last admitted for the same Oct/2022, discharged on clindamycin.  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  She denies headache, cough or chest pain.  Admits to some nausea that is now improved, no vomiting, no abdominal pain, no dysuria increased urinary frequency, no change in bowel movements.  She recalls working on her house outside and lifting heavy objects during the weekend.     In the ER, /62, tachypneic, T-max 102.1   Labs on admission leukocytosis 16.5, left shift 90.6%, H&H 12.4/39.8, platelet count 290   Stable kidney function, normal LFTs  CRP 19.5, high   Procalcitonin 1.57, positive   Baseline CPK 82   Chest x-ray mild diffuse interstitial prominence likely chronic    Patient admitted for sepsis likely secondary to large medial thigh pannus cellulitis.    Empirically started on vancomycin IV.      ID consult for cellulitis of appendage.    3/4:  Interim reviewed, patient seen examined at bedside.  On BiPAP, states she is feeling better, noticed right medial appendage has decreased edema and redness, Mepilex in place, looks slightly better compared to yesterday.  Hemodynamically stable, afebrile.  Labs reviewed, white count 7.8, left shift 73.4%, trending down, H&H 10.6/34, platelet count 225.  Micro reviewed, blood cultures no growth to date, pending final.    3/5:  Interim reviewed, patient seen  examined at bedside, sitting in the chair, able to walk to her bed to look at her large right medial thigh pannus.  She states she is feeling better, mentions noticing some improvement on her pannus.  Hemodynamically stable, afebrile.  Labs reviewed, stable white count, no left shift, H&H 10.4/33.2, platelet count 231.  CRP down to 11.05, procalcitonin still in process.  Micro reviewed, blood cultures no growth.    Review of patient's allergies indicates:   Allergen Reactions    Aspirin     Dilaudid [hydromorphone]      Excessive sleepiness    Nsaids (non-steroidal anti-inflammatory drug) Hives    Teflaro [ceftaroline fosamil]      Allergic reaction/ hives/itching     Past Medical History:   Diagnosis Date    Allergy     Anemia     Asthma     COPD (chronic obstructive pulmonary disease)     Deep vein thrombosis     Depression     Diabetes mellitus, type 2     Encounter for blood transfusion     Heart murmur     Neuromuscular disorder      Past Surgical History:   Procedure Laterality Date     SECTION      DILATION AND CURETTAGE OF UTERUS      gastric sleeve      [facility]    TONSILLECTOMY       Social History     Tobacco Use    Smoking status: Every Day     Packs/day: 1.00     Types: Cigarettes    Smokeless tobacco: Never   Substance Use Topics    Alcohol use: No        Family History   Problem Relation Age of Onset    Heart disease Mother     Diabetes Mother     Arthritis Mother     Depression Mother     Cancer Father     COPD Father     Arthritis Maternal Grandmother     Cancer Maternal Grandmother     Cancer Maternal Grandfather     Cancer Paternal Grandmother     Kidney disease Paternal Grandmother     Diabetes Paternal Grandmother     Diabetes Paternal Grandfather     Hyperlipidemia Paternal Grandfather        Review of Systems:   + chills, fever, no sweats, weight loss  No change in vision, loss of vision or diplopia  No sinus congestion, purulent nasal discharge, post nasal drip or facial pain  No  pain in mouth or throat. No problems with teeth, gums.  No chest pain, palpitations, syncope  No cough, sputum production, shortness of breath, dyspnea on exertion, pleurisy, hemoptysis  No dysphagia, odynophagia  + nausea, no vomiting, diarrhea, constipation, blood in stool, or focal abd pain,    No dysuria, hesitancy, hematuria, retention, incontinence  No vaginal discharge, genital ulcers, risk for STD  Except for large medial thigh pannus, no swelling of joints, redness of joints, injuries, or new focal pain  No unusual headaches, dizziness, vertigo, numbness, paresthesias, neuropathy, falls  No anxiety, depression, substance abuse, sleep disturbance  No bleeding, lymphadenopathy  No new rashes, lesions, or wounds    Outdoor activities: Lives at home, boyfriend helps her, she reports she is active at home and ambulates with assistance.  Travel: None  Implants: None  Antibiotic History: See HPi    EXAM & DIAGNOSTICS REVIEWED:   Vitals:     Temp:  [97.4 °F (36.3 °C)-98.7 °F (37.1 °C)]   Temp: 97.9 °F (36.6 °C) (03/05/23 0823)  Pulse: 74 (03/05/23 0823)  Resp: 18 (03/05/23 0823)  BP: (!) 143/66 (03/05/23 0823)  SpO2: 99 % (03/05/23 0823)    Intake/Output Summary (Last 24 hours) at 3/5/2023 1113  Last data filed at 3/4/2023 1613  Gross per 24 hour   Intake 480 ml   Output 800 ml   Net -320 ml       General:  In NAD. Alert and attentive, cooperative, comfortable on room air in the chair  Eyes:  Anicteric, PERRL  ENT:  No ulcers, exudates, thrush, nares patent, dentition is fair, poor oral hygiene  Neck:  Supple  Lungs: Poor inspiratory effort, mostly clear to auscultation b/l  Heart:  S1/S2+, regular rhythm, no murmurs  Abd:  Morbidly obese, large medial pannus less warm to touch, redness slowly improving, non tender to palpation  3/4: impressive large medial pannus on thigh with mepilex in place, wrinkling of skin noted, less induration, non tender to palpation  3/3: impressive large medial pannus on thigh with  redness, edema and indurated area, not tender to palpation c/w cellutis  +BS, soft, non tender, non distended, no rebound, large skin folds with powder in place no evidence of intertrigo   :  Voids  Musc:  R thigh as described above  Joints without effusion, swelling,  erythema, synovitis, ambulatory  Skin:  Warm, no rash  Wound:   Neuro:  Following commands, alert, attentive, speech fluent, moves all extremities, no acute focal deficit   Psych:  Calm, cooperative  Lymphatic:       Extrem: Chronic lymphedema lower extremities  VAD:  L Midline 3/3        Isolation: None    3/5:        3/3:        General Labs reviewed:  Recent Labs   Lab 03/03/23  0559 03/04/23  0715 03/05/23  0548   WBC 11.12 7.86 7.27  7.27   HGB 11.0* 10.6* 10.4*  10.4*   HCT 35.0* 34.0* 33.2*  33.2*    225 231  231       Recent Labs   Lab 03/02/23  1150 03/03/23  0559 03/05/23  0548    134* 139   K 4.8 3.2* 3.5    102 104   CO2 27 26 30*   BUN 13 13 11   CREATININE 0.7 0.7 0.5   CALCIUM 8.8 7.6* 8.3*   PROT 7.9 6.9 6.7   BILITOT 1.1* 0.6 0.4   ALKPHOS 69 54* 56   ALT 15 11 14   AST 22 12 13     Recent Labs   Lab 03/03/23  1427 03/05/23  0548   CRP 19.52* 11.05*     No results for input(s): SEDRATE in the last 168 hours.    Estimated Creatinine Clearance: 207.2 mL/min (based on SCr of 0.5 mg/dL).     Micro:  Microbiology Results (last 7 days)       Procedure Component Value Units Date/Time    Blood culture x two cultures. Draw prior to antibiotics. [[id number]] Collected: 03/02/23 1137    Order Status: Completed Specimen: Blood from Peripheral, Forearm, Right Updated: 03/04/23 1232     Blood Culture, Routine No Growth to date      No Growth to date      No Growth to date    Narrative:      Aerobic and anaerobic    Blood culture x two cultures. Draw prior to antibiotics. [[id number]] Collected: 03/02/23 1208    Order Status: Completed Specimen: Blood from Peripheral, Antecubital, Right Updated: 03/04/23 1232     Blood  Culture, Routine No Growth to date      No Growth to date      No Growth to date    Narrative:      Aerobic and anaerobic            Imaging Reviewed:  CXR    Cardiology:       IMPRESSION & PLAN     Sepsis secondary to recurrent cellulitis of large R thigh medial pannus, improving  CRP 19.5-->11, trending down   Procal 1.5 positive  Baseline CPK 82  Blood cultures x 2 no growth, pending final     2.  Super morbid obesity BMI 60.4 Kg/m2  with underlying chronic lymphedema of lower extremitis     3.  KEESHA    4. Diabetes, hbA1c: 6/2%    Recommendations:  Continue Daptomycin 630mg IV daily   Follow cultures  Wound care to R thigh daily, clean with chlorhexidine, dry it and cover with Mepilex  Follow procal   Will contact infusion company for IV abx at home    D/w patient, nursing    Medical Decision Making during this encounter was  [_] Low Complexity  [_] Moderate Complexity  [xx] High Complexity

## 2023-03-05 NOTE — RESPIRATORY THERAPY
03/05/23 0739   Patient Assessment/Suction   Level of Consciousness (AVPU) alert   Respiratory Effort Normal;Unlabored   Expansion/Accessory Muscles/Retractions no retractions;no use of accessory muscles   All Lung Fields Breath Sounds Anterior:;Lateral:   RUL Breath Sounds wheezes, expiratory   RLL Breath Sounds wheezes, expiratory   Rhythm/Pattern, Respiratory unlabored   Cough Frequency infrequent   Skin Integrity   $ Wound Care Tech Time 15 min   Area Observed Bridge of nose  (redness, she stated caused from home mask)   Skin Appearance redness blanchable  (redness, she stated caused from home mask)   PRE-TX-O2   Device (Oxygen Therapy) room air   $ Is the patient on Low Flow Oxygen? Yes   SpO2 98 %   Pulse Oximetry Type Intermittent   $ Pulse Oximetry - Multiple Charge Pulse Oximetry - Multiple   Pulse 71   Resp (!) 24   Positioning   Head of Bed (HOB) Positioning HOB at 30-45 degrees   Aerosol Therapy   $ Aerosol Therapy Charges Aerosol Treatment   Daily Review of Necessity (SVN) completed   Respiratory Treatment Status (SVN) given   Treatment Route (SVN) mask;mouthpiece   Patient Position (SVN) semi-Cox's   Post Treatment Assessment (SVN) increased aeration   Signs of Intolerance (SVN) none   Breath Sounds Post-Respiratory Treatment   Throughout All Fields Post-Treatment All Fields   Throughout All Fields Post-Treatment aeration increased   Post-treatment Heart Rate (beats/min) 98   Post-treatment Resp Rate (breaths/min) 16   Preset CPAP/BiPAP Settings   Mode Of Delivery Standby;CPAP   $ CPAP/BiPAP Daily Charge BiPAP/CPAP Daily   $ Is patient using? No/refused   Reason patient is not wearing?   (prn use)   Education   $ Education BiPAP;Bronchodilator;15 min   Respiratory Evaluation   $ Care Plan Tech Time 15 min   $ Eval/Re-eval Charges Re-evaluation

## 2023-03-06 LAB
ALBUMIN SERPL BCP-MCNC: 2.8 G/DL (ref 3.5–5.2)
ALP SERPL-CCNC: 56 U/L (ref 55–135)
ALT SERPL W/O P-5'-P-CCNC: 15 U/L (ref 10–44)
ANION GAP SERPL CALC-SCNC: 5 MMOL/L (ref 8–16)
AST SERPL-CCNC: 12 U/L (ref 10–40)
BASOPHILS # BLD AUTO: 0.04 K/UL (ref 0–0.2)
BASOPHILS # BLD AUTO: 0.04 K/UL (ref 0–0.2)
BASOPHILS NFR BLD: 0.6 % (ref 0–1.9)
BASOPHILS NFR BLD: 0.6 % (ref 0–1.9)
BILIRUB SERPL-MCNC: 0.4 MG/DL (ref 0.1–1)
BUN SERPL-MCNC: 11 MG/DL (ref 6–20)
CALCIUM SERPL-MCNC: 8.3 MG/DL (ref 8.7–10.5)
CHLORIDE SERPL-SCNC: 101 MMOL/L (ref 95–110)
CO2 SERPL-SCNC: 31 MMOL/L (ref 23–29)
CREAT SERPL-MCNC: 0.6 MG/DL (ref 0.5–1.4)
DIFFERENTIAL METHOD: ABNORMAL
DIFFERENTIAL METHOD: ABNORMAL
EOSINOPHIL # BLD AUTO: 0.1 K/UL (ref 0–0.5)
EOSINOPHIL # BLD AUTO: 0.1 K/UL (ref 0–0.5)
EOSINOPHIL NFR BLD: 1.7 % (ref 0–8)
EOSINOPHIL NFR BLD: 1.7 % (ref 0–8)
ERYTHROCYTE [DISTWIDTH] IN BLOOD BY AUTOMATED COUNT: 14.9 % (ref 11.5–14.5)
ERYTHROCYTE [DISTWIDTH] IN BLOOD BY AUTOMATED COUNT: 14.9 % (ref 11.5–14.5)
EST. GFR  (NO RACE VARIABLE): >60 ML/MIN/1.73 M^2
GLUCOSE SERPL-MCNC: 96 MG/DL (ref 70–110)
HCT VFR BLD AUTO: 31.7 % (ref 37–48.5)
HCT VFR BLD AUTO: 31.7 % (ref 37–48.5)
HGB BLD-MCNC: 9.8 G/DL (ref 12–16)
HGB BLD-MCNC: 9.8 G/DL (ref 12–16)
IMM GRANULOCYTES # BLD AUTO: 0.03 K/UL (ref 0–0.04)
IMM GRANULOCYTES # BLD AUTO: 0.03 K/UL (ref 0–0.04)
IMM GRANULOCYTES NFR BLD AUTO: 0.5 % (ref 0–0.5)
IMM GRANULOCYTES NFR BLD AUTO: 0.5 % (ref 0–0.5)
LYMPHOCYTES # BLD AUTO: 1.9 K/UL (ref 1–4.8)
LYMPHOCYTES # BLD AUTO: 1.9 K/UL (ref 1–4.8)
LYMPHOCYTES NFR BLD: 29.4 % (ref 18–48)
LYMPHOCYTES NFR BLD: 29.4 % (ref 18–48)
MCH RBC QN AUTO: 26.5 PG (ref 27–31)
MCH RBC QN AUTO: 26.5 PG (ref 27–31)
MCHC RBC AUTO-ENTMCNC: 30.9 G/DL (ref 32–36)
MCHC RBC AUTO-ENTMCNC: 30.9 G/DL (ref 32–36)
MCV RBC AUTO: 86 FL (ref 82–98)
MCV RBC AUTO: 86 FL (ref 82–98)
MONOCYTES # BLD AUTO: 0.6 K/UL (ref 0.3–1)
MONOCYTES # BLD AUTO: 0.6 K/UL (ref 0.3–1)
MONOCYTES NFR BLD: 9 % (ref 4–15)
MONOCYTES NFR BLD: 9 % (ref 4–15)
NEUTROPHILS # BLD AUTO: 3.7 K/UL (ref 1.8–7.7)
NEUTROPHILS # BLD AUTO: 3.7 K/UL (ref 1.8–7.7)
NEUTROPHILS NFR BLD: 58.8 % (ref 38–73)
NEUTROPHILS NFR BLD: 58.8 % (ref 38–73)
NRBC BLD-RTO: 0 /100 WBC
NRBC BLD-RTO: 0 /100 WBC
PLATELET # BLD AUTO: 245 K/UL (ref 150–450)
PLATELET # BLD AUTO: 245 K/UL (ref 150–450)
PMV BLD AUTO: 9.2 FL (ref 9.2–12.9)
PMV BLD AUTO: 9.2 FL (ref 9.2–12.9)
POTASSIUM SERPL-SCNC: 3.7 MMOL/L (ref 3.5–5.1)
PROT SERPL-MCNC: 6.5 G/DL (ref 6–8.4)
RBC # BLD AUTO: 3.7 M/UL (ref 4–5.4)
RBC # BLD AUTO: 3.7 M/UL (ref 4–5.4)
SODIUM SERPL-SCNC: 137 MMOL/L (ref 136–145)
WBC # BLD AUTO: 6.3 K/UL (ref 3.9–12.7)
WBC # BLD AUTO: 6.3 K/UL (ref 3.9–12.7)

## 2023-03-06 PROCEDURE — 94761 N-INVAS EAR/PLS OXIMETRY MLT: CPT

## 2023-03-06 PROCEDURE — 99900031 HC PATIENT EDUCATION (STAT)

## 2023-03-06 PROCEDURE — 94660 CPAP INITIATION&MGMT: CPT

## 2023-03-06 PROCEDURE — 63600175 PHARM REV CODE 636 W HCPCS: Performed by: INTERNAL MEDICINE

## 2023-03-06 PROCEDURE — 99900035 HC TECH TIME PER 15 MIN (STAT)

## 2023-03-06 PROCEDURE — 94640 AIRWAY INHALATION TREATMENT: CPT

## 2023-03-06 PROCEDURE — 85025 COMPLETE CBC W/AUTO DIFF WBC: CPT | Performed by: INTERNAL MEDICINE

## 2023-03-06 PROCEDURE — 25000242 PHARM REV CODE 250 ALT 637 W/ HCPCS: Performed by: NURSE PRACTITIONER

## 2023-03-06 PROCEDURE — 99233 SBSQ HOSP IP/OBS HIGH 50: CPT | Mod: ,,, | Performed by: STUDENT IN AN ORGANIZED HEALTH CARE EDUCATION/TRAINING PROGRAM

## 2023-03-06 PROCEDURE — 12000002 HC ACUTE/MED SURGE SEMI-PRIVATE ROOM

## 2023-03-06 PROCEDURE — 36415 COLL VENOUS BLD VENIPUNCTURE: CPT | Performed by: NURSE PRACTITIONER

## 2023-03-06 PROCEDURE — 25000003 PHARM REV CODE 250: Performed by: INTERNAL MEDICINE

## 2023-03-06 PROCEDURE — 25000003 PHARM REV CODE 250: Performed by: STUDENT IN AN ORGANIZED HEALTH CARE EDUCATION/TRAINING PROGRAM

## 2023-03-06 PROCEDURE — 63600175 PHARM REV CODE 636 W HCPCS: Mod: TB,JG | Performed by: STUDENT IN AN ORGANIZED HEALTH CARE EDUCATION/TRAINING PROGRAM

## 2023-03-06 PROCEDURE — 99233 PR SUBSEQUENT HOSPITAL CARE,LEVL III: ICD-10-PCS | Mod: ,,, | Performed by: STUDENT IN AN ORGANIZED HEALTH CARE EDUCATION/TRAINING PROGRAM

## 2023-03-06 PROCEDURE — 27000221 HC OXYGEN, UP TO 24 HOURS

## 2023-03-06 PROCEDURE — 80053 COMPREHEN METABOLIC PANEL: CPT | Performed by: NURSE PRACTITIONER

## 2023-03-06 RX ADMIN — MORPHINE SULFATE 6 MG: 2 INJECTION, SOLUTION INTRAMUSCULAR; INTRAVENOUS at 12:03

## 2023-03-06 RX ADMIN — BUSPIRONE HYDROCHLORIDE 7.5 MG: 5 TABLET ORAL at 08:03

## 2023-03-06 RX ADMIN — IPRATROPIUM BROMIDE AND ALBUTEROL SULFATE 3 ML: 2.5; .5 SOLUTION RESPIRATORY (INHALATION) at 07:03

## 2023-03-06 RX ADMIN — ENOXAPARIN SODIUM 40 MG: 100 INJECTION SUBCUTANEOUS at 08:03

## 2023-03-06 RX ADMIN — POLYETHYLENE GLYCOL 3350 17 G: 17 POWDER, FOR SOLUTION ORAL at 08:03

## 2023-03-06 RX ADMIN — MORPHINE SULFATE 4 MG: 4 INJECTION, SOLUTION INTRAMUSCULAR; INTRAVENOUS at 05:03

## 2023-03-06 RX ADMIN — IPRATROPIUM BROMIDE AND ALBUTEROL SULFATE 3 ML: 2.5; .5 SOLUTION RESPIRATORY (INHALATION) at 01:03

## 2023-03-06 RX ADMIN — CITALOPRAM HYDROBROMIDE 20 MG: 20 TABLET ORAL at 08:03

## 2023-03-06 RX ADMIN — DAPTOMYCIN 640 MG: 500 INJECTION, POWDER, LYOPHILIZED, FOR SOLUTION INTRAVENOUS at 08:03

## 2023-03-06 RX ADMIN — MORPHINE SULFATE 6 MG: 2 INJECTION, SOLUTION INTRAMUSCULAR; INTRAVENOUS at 08:03

## 2023-03-06 NOTE — ASSESSMENT & PLAN NOTE
Of an appendage of the right thigh  Pt is morbidly obese and this has been a recurrent problem  Pt has multiple antibiotic allergies and to pain medication  Continue Daptomycin  CRP trending downward. afebrile  Final cultures pending no growth so far

## 2023-03-06 NOTE — ASSESSMENT & PLAN NOTE
Of an appendage  Pt is morbidly obese and this has been a recurrent problem  Pt has multiple antibiotic allergies and to pain medication  Continue antibiotic per ID   Continue to trend inflammatory markers  CRP trending downward     Latest Reference Range & Units 03/03/23 14:27 03/05/23 05:48   CRP <0.76 mg/dL 19.52 (H) 11.05 (H)   (H): Data is abnormally high  Continue to monitor

## 2023-03-06 NOTE — SUBJECTIVE & OBJECTIVE
Interval History:     03/06/23: redness to right leg appendage improved, decreased warmth. Pt states feels much better. ID following for antibiotic duration recommendations. Cultures with no growth so far.  CRP improving rend downward    03/05/23: afebrile redness to appendage decreased and with decreased warmth. Continue Daptomycin per ID recommendations. Final cultures pending, CRP Trending downward.    03/04/23: doing better c/o mild sob will add duo neb. Seen by ID continue Daptomycin and wound care. Monitor CRP.afebrile    03/03/23: continues with redness and swelling to right upper leg appendage. Tolerating IV antibiotic. Afebrile. Continue CPAP at night and with sleeping.  Replace K+        Objective:     Vital Signs (Most Recent):  Temp: 98.1 °F (36.7 °C) (03/06/23 1135)  Pulse: 70 (03/06/23 1334)  Resp: 18 (03/06/23 1334)  BP: 121/67 (03/06/23 1135)  SpO2: 95 % (03/06/23 1334)   Vital Signs (24h Range):  Temp:  [97.4 °F (36.3 °C)-98.1 °F (36.7 °C)] 98.1 °F (36.7 °C)  Pulse:  [66-90] 70  Resp:  [16-22] 18  SpO2:  [93 %-99 %] 95 %  BP: (121-160)/(49-67) 121/67     Weight: (!) 175.2 kg (386 lb 3.9 oz)  Body mass index is 60.49 kg/m².    Intake/Output Summary (Last 24 hours) at 3/6/2023 1347  Last data filed at 3/5/2023 1629  Gross per 24 hour   Intake 480 ml   Output 400 ml   Net 80 ml      Physical Exam  Constitutional:       Appearance: She is obese.   HENT:      Head: Normocephalic and atraumatic.      Mouth/Throat:      Mouth: Mucous membranes are moist.   Eyes:      Extraocular Movements: Extraocular movements intact.      Pupils: Pupils are equal, round, and reactive to light.   Cardiovascular:      Rate and Rhythm: Normal rate and regular rhythm.   Pulmonary:      Breath sounds: Rhonchi present.   Abdominal:      General: Abdomen is flat. Bowel sounds are normal.      Palpations: Abdomen is soft.   Musculoskeletal:      Cervical back: Normal range of motion.      Right lower leg: Edema present.       Left lower leg: Edema present.      Comments: Large cellulitis appendage from leg with cellulitis see image   Skin:     General: Skin is warm and dry.   Neurological:      General: No focal deficit present.      Mental Status: She is oriented to person, place, and time.                                 Significant Labs: All pertinent labs within the past 24 hours have been reviewed.  A1C:   Recent Labs   Lab 10/22/22  0456 03/04/23  0715   HGBA1C 6.0 6.2      Latest Reference Range & Units 03/03/23 14:27 03/05/23 05:48   CRP <0.76 mg/dL 19.52 (H) 11.05 (H)   (H): Data is abnormally high   Latest Reference Range & Units 03/03/23 14:27 03/05/23 05:48   CRP <0.76 mg/dL 19.52 (H) 11.05 (H)   (H): Data is abnormally high  ABGs: No results for input(s): PH, PCO2, HCO3, POCSATURATED, BE, TOTALHB, COHB, METHB, O2HB, POCFIO2, PO2 in the last 48 hours.  Bilirubin:   Recent Labs   Lab 03/02/23  1150 03/03/23  0559 03/05/23  0548 03/06/23  0449   BILITOT 1.1* 0.6 0.4 0.4     Blood Culture:   No results for input(s): LABBLOO in the last 48 hours.    BMP:   Recent Labs   Lab 03/06/23  0449   GLU 96      K 3.7      CO2 31*   BUN 11   CREATININE 0.6   CALCIUM 8.3*     CBC:   Recent Labs   Lab 03/05/23  0548 03/06/23  0449   WBC 7.27  7.27 6.30  6.30   HGB 10.4*  10.4* 9.8*  9.8*   HCT 33.2*  33.2* 31.7*  31.7*     231 245  245     CMP:   Recent Labs   Lab 03/05/23  0548 03/06/23  0449    137   K 3.5 3.7    101   CO2 30* 31*   * 96   BUN 11 11   CREATININE 0.5 0.6   CALCIUM 8.3* 8.3*   PROT 6.7 6.5   ALBUMIN 2.9* 2.8*   BILITOT 0.4 0.4   ALKPHOS 56 56   AST 13 12   ALT 14 15   ANIONGAP 5* 5*     Cardiac Markers: No results for input(s): CKMB, MYOGLOBIN, BNP, TROPISTAT in the last 48 hours.  Coagulation: No results for input(s): PT, INR, APTT in the last 48 hours.  Lactic Acid:   No results for input(s): LACTATE in the last 48 hours.    Lipase: No results for input(s): LIPASE in the last  48 hours.  Lipid Panel: No results for input(s): CHOL, HDL, LDLCALC, TRIG, CHOLHDL in the last 48 hours.  Magnesium: No results for input(s): MG in the last 48 hours.  Pathology Results  (Last 10 years)      None          POCT Glucose: No results for input(s): POCTGLUCOSE in the last 48 hours.  Prealbumin: No results for input(s): PREALBUMIN in the last 48 hours.  Respiratory Culture: No results for input(s): GSRESP, RESPIRATORYC in the last 48 hours.  Troponin: No results for input(s): TROPONINI, TROPONINIHS in the last 48 hours.  TSH:   Recent Labs   Lab 10/21/22  2110   TSH 1.190     Urine Culture: No results for input(s): LABURIN in the last 48 hours.  Urine Studies: No results for input(s): COLORU, APPEARANCEUA, PHUR, SPECGRAV, PROTEINUA, GLUCUA, KETONESU, BILIRUBINUA, OCCULTUA, NITRITE, UROBILINOGEN, LEUKOCYTESUR, RBCUA, WBCUA, BACTERIA, SQUAMEPITHEL, HYALINECASTS in the last 48 hours.    Invalid input(s): WRIGHTSUR  X-Ray Chest AP Portable    Result Date: 3/2/2023  HISTORY: Sepsis COMPARISON:None available. FINDINGS:Single AP view the chest reveals normal appearance of the cardiomediastinal silhouette. Pulmonary vascularity is not enlarged. Mild diffuse interstitial prominence likely related to viral etiology. No evidence of pulmonic infiltrate or significant pleural effusion or atelectasis. The hilar structures without evidence of enlargement. No significant tracheal shift. Chronic spondylosis changes of the thoracic spine. IMPRESSION:Mild diffuse interstitial prominence likely on a chronic basis due to viral etiology. Electronically signed by:  Cyril Rosales MD  3/2/2023 11:26 AM CST Workstation: [id number]        Significant Imaging: I have reviewed all pertinent imaging results/findings within the past 24 hours.  I have reviewed and interpreted all pertinent imaging results/findings within the past 24 hours.

## 2023-03-06 NOTE — PLAN OF CARE
03/06/23 1610   Post-Acute Status   Post-Acute Authorization IV Infusion   IV Infusion Status Referral(s) sent     Referrals sent to [facility] IV infusions via [facility]. CM to follow.     IV orders also faxed to [facility]  fax # [phone] b/c I couldn't find company in [employer] [place].

## 2023-03-06 NOTE — PROGRESS NOTES
Progress Note  Infectious Disease    Reason for Consult:  Cellulitis of appendage     HPI: Adriana Zaragoza is a 57 y.o. female super morbidly obese, BMI 60.4 kg/m2, known to our service from prior consultation on June/2020 for cellulitis of large medial thigh pannus, last admitted for the same Oct/2022, discharged on clindamycin.  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  She denies headache, cough or chest pain.  Admits to some nausea that is now improved, no vomiting, no abdominal pain, no dysuria increased urinary frequency, no change in bowel movements.  She recalls working on her house outside and lifting heavy objects during the weekend.     In the ER, /62, tachypneic, T-max 102.1   Labs on admission leukocytosis 16.5, left shift 90.6%, H&H 12.4/39.8, platelet count 290   Stable kidney function, normal LFTs  CRP 19.5, high   Procalcitonin 1.57, positive   Baseline CPK 82   Chest x-ray mild diffuse interstitial prominence likely chronic    Patient admitted for sepsis likely secondary to large medial thigh pannus cellulitis.    Empirically started on vancomycin IV.      ID consult for cellulitis of appendage.    3/4:  Interim reviewed, patient seen examined at bedside.  On BiPAP, states she is feeling better, noticed right medial appendage has decreased edema and redness, Mepilex in place, looks slightly better compared to yesterday.  Hemodynamically stable, afebrile.  Labs reviewed, white count 7.8, left shift 73.4%, trending down, H&H 10.6/34, platelet count 225.  Micro reviewed, blood cultures no growth to date, pending final.    3/5:  Interim reviewed, patient seen  examined at bedside, sitting in the chair, able to walk to her bed to look at her large right medial thigh pannus.  She states she is feeling better, mentions noticing some improvement on her pannus.  Hemodynamically stable, afebrile.  Labs reviewed, stable white count, no left shift, H&H 10.4/33.2, platelet count 231.  CRP down to 11.05, procalcitonin still in process.  Micro reviewed, blood cultures no growth.    3/6: Interim reviewed, patient seen and examined at bedside, she just took a shower, feeling better overall, R medial pannus with resolving redness, edema present, indurated area decreasing in size.  Labs reviewed, stable white count, no left shift, H&H 7.8/31.7, platelet count 245.  Stable kidney function, CRP trending down, will check tomorrow.  Will contact infusion company to continue IV abx at home for 7 days.      Review of patient's allergies indicates:   Allergen Reactions    Aspirin     Dilaudid [hydromorphone]      Excessive sleepiness    Nsaids (non-steroidal anti-inflammatory drug) Hives    Teflaro [ceftaroline fosamil]      Allergic reaction/ hives/itching     Past Medical History:   Diagnosis Date    Allergy     Anemia     Asthma     COPD (chronic obstructive pulmonary disease)     Deep vein thrombosis     Depression     Diabetes mellitus, type 2     Encounter for blood transfusion     Heart murmur     Neuromuscular disorder      Past Surgical History:   Procedure Laterality Date     SECTION      DILATION AND CURETTAGE OF UTERUS      gastric sleeve      [facility]    TONSILLECTOMY       Social History     Tobacco Use    Smoking status: Every Day     Packs/day: 1.00     Types: Cigarettes    Smokeless tobacco: Never   Substance Use Topics    Alcohol use: No        Family History   Problem Relation Age of Onset    Heart disease Mother     Diabetes Mother     Arthritis Mother     Depression Mother     Cancer Father     COPD Father     Arthritis Maternal Grandmother     Cancer Maternal  Grandmother     Cancer Maternal Grandfather     Cancer Paternal Grandmother     Kidney disease Paternal Grandmother     Diabetes Paternal Grandmother     Diabetes Paternal Grandfather     Hyperlipidemia Paternal Grandfather        Outdoor activities: Lives at home, boyfriend helps her, she reports she is active at home and ambulates with assistance.  Travel: None  Implants: None  Antibiotic History: See HPi    EXAM & DIAGNOSTICS REVIEWED:   Vitals:     Temp:  [97.4 °F (36.3 °C)-98.1 °F (36.7 °C)]   Temp: 98.1 °F (36.7 °C) (03/06/23 1135)  Pulse: 70 (03/06/23 1334)  Resp: 18 (03/06/23 1334)  BP: 121/67 (03/06/23 1135)  SpO2: 95 % (03/06/23 1334)    Intake/Output Summary (Last 24 hours) at 3/6/2023 1446  Last data filed at 3/5/2023 1629  Gross per 24 hour   Intake 480 ml   Output 400 ml   Net 80 ml       General:  In NAD. Alert and attentive, cooperative, comfortable on room air in the chair  Eyes:  Anicteric, PERRL  ENT:  No ulcers, exudates, thrush, nares patent, dentition is fair, poor oral hygiene  Neck:  Supple  Lungs: Poor inspiratory effort, mostly clear to auscultation b/l  Heart:  S1/S2+, regular rhythm, no murmurs  Abd:  Morbidly obese, large medial pannus less warm to touch, redness improving, non tender to palpation, indurated area starting to improve  3/4: impressive large medial pannus on thigh with mepilex in place, wrinkling of skin noted, less induration, non tender to palpation  3/3: impressive large medial pannus on thigh with redness, edema and indurated area, not tender to palpation c/w cellutis  +BS, soft, non tender, non distended, no rebound, large skin folds with powder in place no evidence of intertrigo   :  Voids  Musc:  R thigh as described above  Joints without effusion, swelling,  erythema, synovitis, ambulatory  Skin:  Warm, no rash  Wound:   Neuro:  Following commands, alert, attentive, speech fluent, moves all extremities, no acute focal deficit   Psych:  Calm,  cooperative  Lymphatic:       Extrem: Chronic lymphedema lower extremities  VAD:  L Midline 3/3        Isolation: None    3/6:        3/5:        3/3:        General Labs reviewed:  Recent Labs   Lab 03/04/23  0715 03/05/23  0548 03/06/23  0449   WBC 7.86 7.27  7.27 6.30  6.30   HGB 10.6* 10.4*  10.4* 9.8*  9.8*   HCT 34.0* 33.2*  33.2* 31.7*  31.7*    231  231 245  245       Recent Labs   Lab 03/03/23  0559 03/05/23  0548 03/06/23  0449   * 139 137   K 3.2* 3.5 3.7    104 101   CO2 26 30* 31*   BUN 13 11 11   CREATININE 0.7 0.5 0.6   CALCIUM 7.6* 8.3* 8.3*   PROT 6.9 6.7 6.5   BILITOT 0.6 0.4 0.4   ALKPHOS 54* 56 56   ALT 11 14 15   AST 12 13 12     Recent Labs   Lab 03/03/23  1427 03/05/23  0548   CRP 19.52* 11.05*     No results for input(s): SEDRATE in the last 168 hours.    Estimated Creatinine Clearance: 172.6 mL/min (based on SCr of 0.6 mg/dL).     Micro:  Microbiology Results (last 7 days)       Procedure Component Value Units Date/Time    Blood culture x two cultures. Draw prior to antibiotics. [[id number]] Collected: 03/02/23 1137    Order Status: Completed Specimen: Blood from Peripheral, Forearm, Right Updated: 03/06/23 1232     Blood Culture, Routine No Growth to date      No Growth to date      No Growth to date      No Growth to date      No Growth to date    Narrative:      Aerobic and anaerobic    Blood culture x two cultures. Draw prior to antibiotics. [[id number]] Collected: 03/02/23 1208    Order Status: Completed Specimen: Blood from Peripheral, Antecubital, Right Updated: 03/06/23 1232     Blood Culture, Routine No Growth to date      No Growth to date      No Growth to date      No Growth to date      No Growth to date    Narrative:      Aerobic and anaerobic            Imaging Reviewed:  CXR    Cardiology:       IMPRESSION & PLAN     Sepsis secondary to recurrent cellulitis of large R thigh medial pannus, improving  CRP 19.5-->11, trending down   Procal 1.5-->0.6  improving  4 positive  Baseline CPK 82  Blood cultures x 2 no growth, pending final     2.  Super morbid obesity BMI 60.4 Kg/m2  with underlying chronic lymphedema of lower extremities     3.  KEESHA    4. Diabetes, hbA1c: 6/2%    Recommendations:  L Midline placed  CRP and Procal ordered with AM labs   Continue Daptomycin 640mg IV daily for 7 days, estimated end of care 3/13/23  CBC w/diff, CMP, CPK weekly while on antibiotics  Follow up ID clinic/Dr Humphreys in 4 weeks  Wound care to R thigh daily, clean with chlorhexidine, dry it and cover with Mepilex    D/w patient, nursing, NP    Medical Decision Making during this encounter was  [_] Low Complexity  [_] Moderate Complexity  [xx] High Complexity

## 2023-03-06 NOTE — CARE UPDATE
03/06/23 0739   Patient Assessment/Suction   Level of Consciousness (AVPU) responds to voice   Respiratory Effort Normal;Unlabored   Expansion/Accessory Muscles/Retractions no use of accessory muscles;no retractions;expansion symmetric   All Lung Fields Breath Sounds diminished   Rhythm/Pattern, Respiratory unlabored;pattern regular;depth regular;chest wiggle adequate;no shortness of breath reported   Cough Frequency infrequent   Cough Type good   PRE-TX-O2   Device (Oxygen Therapy) nasal cannula;room air   $ Is the patient on Low Flow Oxygen? Yes   SpO2 99 %   Pulse Oximetry Type Intermittent   $ Pulse Oximetry - Multiple Charge Pulse Oximetry - Multiple   Pulse 68   Resp 18   Aerosol Therapy   $ Aerosol Therapy Charges Aerosol Treatment   Daily Review of Necessity (SVN) completed   Respiratory Treatment Status (SVN) given   Treatment Route (SVN) oxygen;mask   Patient Position (SVN) HOB elevated   Post Treatment Assessment (SVN) increased aeration   Signs of Intolerance (SVN) none   Breath Sounds Post-Respiratory Treatment   Throughout All Fields Post-Treatment All Fields   Throughout All Fields Post-Treatment aeration increased   Post-treatment Heart Rate (beats/min) 78   Post-treatment Resp Rate (breaths/min) 20   Education   $ Education Bronchodilator;15 min   Respiratory Evaluation   $ Care Plan Tech Time 15 min

## 2023-03-06 NOTE — PROGRESS NOTES
Critical access hospital Medicine  Progress Note    Patient Name: Adriana Zaragoza  MRN: [id number]  Patient Class: IP- Inpatient   Admission Date: 3/2/2023  Length of Stay: 2 days  Attending Physician: Enrique Herring MD  Primary Care Provider: Jad Hua MD        Subjective:     Principal Problem:Panniculitis        HPI:  ED note  Patient presents complaining of fever and weakness.  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  Patient started with fever and chills this morning.  Patient has had sepsis before.    3/2/2023  Ms Zaragoza has a large appendage . She noted fever and chills this date with a Hx of cellulitis of the appendage. Pt has chronic low back pain that is an 8/10 at present.  The appendage is erythematous and tender to the touch.      Overview/Hospital Course:    Interval History:     03/06/23: redness to right leg appendage improved, decreased warmth. Pt states feels much better. ID following for antibiotic duration recommendations. Cultures with no growth so far.  CRP improving rend downward    03/05/23: afebrile redness to appendage decreased and with decreased warmth. Continue Daptomycin per ID recommendations. Final cultures pending, CRP Trending downward.    03/04/23: doing better c/o mild sob will add duo neb. Seen by ID continue Daptomycin and wound care. Monitor CRP.afebrile    03/03/23: continues with redness and swelling to right upper leg appendage. Tolerating IV antibiotic. Afebrile. Continue CPAP at night and with sleeping.  Replace K+        Objective:     Vital Signs (Most Recent):  Temp: 98.1 °F (36.7 °C) (03/06/23 1135)  Pulse: 70 (03/06/23 1334)  Resp: 18 (03/06/23 1334)  BP: 121/67 (03/06/23 1135)  SpO2: 95 % (03/06/23 1334)   Vital Signs (24h Range):  Temp:  [97.4 °F (36.3 °C)-98.1 °F (36.7 °C)] 98.1 °F (36.7 °C)  Pulse:  [66-90] 70  Resp:  [16-22] 18  SpO2:  [93 %-99 %] 95 %  BP: (121-160)/(49-67)  121/67     Weight: (!) 175.2 kg (386 lb 3.9 oz)  Body mass index is 60.49 kg/m².    Intake/Output Summary (Last 24 hours) at 3/6/2023 1347  Last data filed at 3/5/2023 1629  Gross per 24 hour   Intake 480 ml   Output 400 ml   Net 80 ml      Physical Exam  Constitutional:       Appearance: She is obese.   HENT:      Head: Normocephalic and atraumatic.      Mouth/Throat:      Mouth: Mucous membranes are moist.   Eyes:      Extraocular Movements: Extraocular movements intact.      Pupils: Pupils are equal, round, and reactive to light.   Cardiovascular:      Rate and Rhythm: Normal rate and regular rhythm.   Pulmonary:      Breath sounds: Rhonchi present.   Abdominal:      General: Abdomen is flat. Bowel sounds are normal.      Palpations: Abdomen is soft.   Musculoskeletal:      Cervical back: Normal range of motion.      Right lower leg: Edema present.      Left lower leg: Edema present.      Comments: Large cellulitis appendage from leg with cellulitis see image   Skin:     General: Skin is warm and dry.   Neurological:      General: No focal deficit present.      Mental Status: She is oriented to person, place, and time.                                 Significant Labs: All pertinent labs within the past 24 hours have been reviewed.  A1C:   Recent Labs   Lab 10/22/22  0456 03/04/23  0715   HGBA1C 6.0 6.2      Latest Reference Range & Units 03/03/23 14:27 03/05/23 05:48   CRP <0.76 mg/dL 19.52 (H) 11.05 (H)   (H): Data is abnormally high   Latest Reference Range & Units 03/03/23 14:27 03/05/23 05:48   CRP <0.76 mg/dL 19.52 (H) 11.05 (H)   (H): Data is abnormally high  ABGs: No results for input(s): PH, PCO2, HCO3, POCSATURATED, BE, TOTALHB, COHB, METHB, O2HB, POCFIO2, PO2 in the last 48 hours.  Bilirubin:   Recent Labs   Lab 03/02/23  1150 03/03/23  0559 03/05/23  0548 03/06/23  0449   BILITOT 1.1* 0.6 0.4 0.4     Blood Culture:   No results for input(s): LABBLOO in the last 48 hours.    BMP:   Recent Labs   Lab  03/06/23  0449   GLU 96      K 3.7      CO2 31*   BUN 11   CREATININE 0.6   CALCIUM 8.3*     CBC:   Recent Labs   Lab 03/05/23  0548 03/06/23 0449   WBC 7.27  7.27 6.30  6.30   HGB 10.4*  10.4* 9.8*  9.8*   HCT 33.2*  33.2* 31.7*  31.7*     231 245  245     CMP:   Recent Labs   Lab 03/05/23  0548 03/06/23 0449    137   K 3.5 3.7    101   CO2 30* 31*   * 96   BUN 11 11   CREATININE 0.5 0.6   CALCIUM 8.3* 8.3*   PROT 6.7 6.5   ALBUMIN 2.9* 2.8*   BILITOT 0.4 0.4   ALKPHOS 56 56   AST 13 12   ALT 14 15   ANIONGAP 5* 5*     Cardiac Markers: No results for input(s): CKMB, MYOGLOBIN, BNP, TROPISTAT in the last 48 hours.  Coagulation: No results for input(s): PT, INR, APTT in the last 48 hours.  Lactic Acid:   No results for input(s): LACTATE in the last 48 hours.    Lipase: No results for input(s): LIPASE in the last 48 hours.  Lipid Panel: No results for input(s): CHOL, HDL, LDLCALC, TRIG, CHOLHDL in the last 48 hours.  Magnesium: No results for input(s): MG in the last 48 hours.  Pathology Results  (Last 10 years)      None          POCT Glucose: No results for input(s): POCTGLUCOSE in the last 48 hours.  Prealbumin: No results for input(s): PREALBUMIN in the last 48 hours.  Respiratory Culture: No results for input(s): GSRESP, RESPIRATORYC in the last 48 hours.  Troponin: No results for input(s): TROPONINI, TROPONINIHS in the last 48 hours.  TSH:   Recent Labs   Lab 10/21/22  2110   TSH 1.190     Urine Culture: No results for input(s): LABURIN in the last 48 hours.  Urine Studies: No results for input(s): COLORU, APPEARANCEUA, PHUR, SPECGRAV, PROTEINUA, GLUCUA, KETONESU, BILIRUBINUA, OCCULTUA, NITRITE, UROBILINOGEN, LEUKOCYTESUR, RBCUA, WBCUA, BACTERIA, SQUAMEPITHEL, HYALINECASTS in the last 48 hours.    Invalid input(s): WRIGHTSUR  X-Ray Chest AP Portable    Result Date: 3/2/2023  HISTORY: Sepsis COMPARISON:None available. FINDINGS:Single AP view the chest reveals normal  appearance of the cardiomediastinal silhouette. Pulmonary vascularity is not enlarged. Mild diffuse interstitial prominence likely related to viral etiology. No evidence of pulmonic infiltrate or significant pleural effusion or atelectasis. The hilar structures without evidence of enlargement. No significant tracheal shift. Chronic spondylosis changes of the thoracic spine. IMPRESSION:Mild diffuse interstitial prominence likely on a chronic basis due to viral etiology. Electronically signed by:  Cyril Rosales MD  3/2/2023 11:26 AM [facility] Workstation: [id number]        Significant Imaging: I have reviewed all pertinent imaging results/findings within the past 24 hours.  I have reviewed and interpreted all pertinent imaging results/findings within the past 24 hours.      Assessment/Plan:      * Panniculitis  Of an appendage of the right thigh  Pt is morbidly obese and this has been a recurrent problem  Pt has multiple antibiotic allergies and to pain medication  Continue Daptomycin  CRP trending downward. afebrile  Final cultures pending no growth so far        Acute lymphangitis  Of an appendage  Pt is morbidly obese and this has been a recurrent problem  Pt has multiple antibiotic allergies and to pain medication  Continue antibiotic per ID   Continue to trend inflammatory markers  CRP trending downward     Latest Reference Range & Units 03/03/23 14:27 03/05/23 05:48   CRP <0.76 mg/dL 19.52 (H) 11.05 (H)   (H): Data is abnormally high  Continue to monitor     KEESHA (obstructive sleep apnea)  Continue CPAP at night and while sleeping  Continuous pulse ox readings  duo neb Tx      Chronic low back pain with bilateral sciatica  Allergic to NSAID'S and some opiates      Pickwickian syndrome  Body mass index is 60.49 kg/m². Morbid obesity complicates all aspects of disease management from diagnostic modalities to treatment. Weight loss encouraged and health benefits explained to patient.           VTE Risk Mitigation (From  admission, onward)         Ordered     enoxaparin injection 40 mg  Every 12 hours         03/02/23 2055     IP VTE HIGH RISK PATIENT  Once         03/02/23 2055     Place sequential compression device  Until discontinued         03/02/23 2055                Discharge Planning   JONATHAN: 3/6/2023     Code Status: Full Code   Is the patient medically ready for discharge?:     Reason for patient still in hospital (select all that apply): Patient trending condition and Consult recommendations  Discharge Plan A: Home with family, Home Health                  Yohana Thomas NP  Department of Hospital Medicine   [facility]

## 2023-03-07 VITALS
OXYGEN SATURATION: 97 % | TEMPERATURE: 98 F | WEIGHT: 293 LBS | RESPIRATION RATE: 18 BRPM | BODY MASS INDEX: 45.99 KG/M2 | HEIGHT: 67 IN | DIASTOLIC BLOOD PRESSURE: 61 MMHG | HEART RATE: 75 BPM | SYSTOLIC BLOOD PRESSURE: 138 MMHG

## 2023-03-07 LAB
ALBUMIN SERPL BCP-MCNC: 2.9 G/DL (ref 3.5–5.2)
ALP SERPL-CCNC: 60 U/L (ref 55–135)
ALT SERPL W/O P-5'-P-CCNC: 18 U/L (ref 10–44)
ANION GAP SERPL CALC-SCNC: 6 MMOL/L (ref 8–16)
AST SERPL-CCNC: 16 U/L (ref 10–40)
BACTERIA BLD CULT: NORMAL
BACTERIA BLD CULT: NORMAL
BASOPHILS # BLD AUTO: 0.04 K/UL (ref 0–0.2)
BASOPHILS # BLD AUTO: 0.04 K/UL (ref 0–0.2)
BASOPHILS NFR BLD: 0.6 % (ref 0–1.9)
BASOPHILS NFR BLD: 0.6 % (ref 0–1.9)
BILIRUB SERPL-MCNC: 0.5 MG/DL (ref 0.1–1)
BUN SERPL-MCNC: 12 MG/DL (ref 6–20)
CALCIUM SERPL-MCNC: 8.9 MG/DL (ref 8.7–10.5)
CHLORIDE SERPL-SCNC: 104 MMOL/L (ref 95–110)
CO2 SERPL-SCNC: 29 MMOL/L (ref 23–29)
CREAT SERPL-MCNC: 0.5 MG/DL (ref 0.5–1.4)
CRP SERPL-MCNC: 3.94 MG/DL
DIFFERENTIAL METHOD: ABNORMAL
DIFFERENTIAL METHOD: ABNORMAL
EOSINOPHIL # BLD AUTO: 0.1 K/UL (ref 0–0.5)
EOSINOPHIL # BLD AUTO: 0.1 K/UL (ref 0–0.5)
EOSINOPHIL NFR BLD: 1.9 % (ref 0–8)
EOSINOPHIL NFR BLD: 1.9 % (ref 0–8)
ERYTHROCYTE [DISTWIDTH] IN BLOOD BY AUTOMATED COUNT: 14.8 % (ref 11.5–14.5)
ERYTHROCYTE [DISTWIDTH] IN BLOOD BY AUTOMATED COUNT: 14.8 % (ref 11.5–14.5)
EST. GFR  (NO RACE VARIABLE): >60 ML/MIN/1.73 M^2
GLUCOSE SERPL-MCNC: 96 MG/DL (ref 70–110)
HCT VFR BLD AUTO: 33.6 % (ref 37–48.5)
HCT VFR BLD AUTO: 33.6 % (ref 37–48.5)
HGB BLD-MCNC: 10.3 G/DL (ref 12–16)
HGB BLD-MCNC: 10.3 G/DL (ref 12–16)
IMM GRANULOCYTES # BLD AUTO: 0.03 K/UL (ref 0–0.04)
IMM GRANULOCYTES # BLD AUTO: 0.03 K/UL (ref 0–0.04)
IMM GRANULOCYTES NFR BLD AUTO: 0.4 % (ref 0–0.5)
IMM GRANULOCYTES NFR BLD AUTO: 0.4 % (ref 0–0.5)
LYMPHOCYTES # BLD AUTO: 1.9 K/UL (ref 1–4.8)
LYMPHOCYTES # BLD AUTO: 1.9 K/UL (ref 1–4.8)
LYMPHOCYTES NFR BLD: 27.2 % (ref 18–48)
LYMPHOCYTES NFR BLD: 27.2 % (ref 18–48)
MCH RBC QN AUTO: 26.8 PG (ref 27–31)
MCH RBC QN AUTO: 26.8 PG (ref 27–31)
MCHC RBC AUTO-ENTMCNC: 30.7 G/DL (ref 32–36)
MCHC RBC AUTO-ENTMCNC: 30.7 G/DL (ref 32–36)
MCV RBC AUTO: 87 FL (ref 82–98)
MCV RBC AUTO: 87 FL (ref 82–98)
MONOCYTES # BLD AUTO: 0.6 K/UL (ref 0.3–1)
MONOCYTES # BLD AUTO: 0.6 K/UL (ref 0.3–1)
MONOCYTES NFR BLD: 8.6 % (ref 4–15)
MONOCYTES NFR BLD: 8.6 % (ref 4–15)
NEUTROPHILS # BLD AUTO: 4.2 K/UL (ref 1.8–7.7)
NEUTROPHILS # BLD AUTO: 4.2 K/UL (ref 1.8–7.7)
NEUTROPHILS NFR BLD: 61.3 % (ref 38–73)
NEUTROPHILS NFR BLD: 61.3 % (ref 38–73)
NRBC BLD-RTO: 0 /100 WBC
NRBC BLD-RTO: 0 /100 WBC
PLATELET # BLD AUTO: 284 K/UL (ref 150–450)
PLATELET # BLD AUTO: 284 K/UL (ref 150–450)
PMV BLD AUTO: 9.2 FL (ref 9.2–12.9)
PMV BLD AUTO: 9.2 FL (ref 9.2–12.9)
POTASSIUM SERPL-SCNC: 4 MMOL/L (ref 3.5–5.1)
PROCALCITONIN SERPL IA-MCNC: 0.23 NG/ML (ref 0–0.5)
PROT SERPL-MCNC: 6.7 G/DL (ref 6–8.4)
RBC # BLD AUTO: 3.85 M/UL (ref 4–5.4)
RBC # BLD AUTO: 3.85 M/UL (ref 4–5.4)
SODIUM SERPL-SCNC: 139 MMOL/L (ref 136–145)
WBC # BLD AUTO: 6.9 K/UL (ref 3.9–12.7)
WBC # BLD AUTO: 6.9 K/UL (ref 3.9–12.7)

## 2023-03-07 PROCEDURE — 25000003 PHARM REV CODE 250: Performed by: INTERNAL MEDICINE

## 2023-03-07 PROCEDURE — 99233 SBSQ HOSP IP/OBS HIGH 50: CPT | Mod: ,,, | Performed by: STUDENT IN AN ORGANIZED HEALTH CARE EDUCATION/TRAINING PROGRAM

## 2023-03-07 PROCEDURE — 86140 C-REACTIVE PROTEIN: CPT | Performed by: STUDENT IN AN ORGANIZED HEALTH CARE EDUCATION/TRAINING PROGRAM

## 2023-03-07 PROCEDURE — 25000242 PHARM REV CODE 250 ALT 637 W/ HCPCS: Performed by: NURSE PRACTITIONER

## 2023-03-07 PROCEDURE — 99233 PR SUBSEQUENT HOSPITAL CARE,LEVL III: ICD-10-PCS | Mod: ,,, | Performed by: STUDENT IN AN ORGANIZED HEALTH CARE EDUCATION/TRAINING PROGRAM

## 2023-03-07 PROCEDURE — 99900035 HC TECH TIME PER 15 MIN (STAT)

## 2023-03-07 PROCEDURE — 94660 CPAP INITIATION&MGMT: CPT

## 2023-03-07 PROCEDURE — 85025 COMPLETE CBC W/AUTO DIFF WBC: CPT | Performed by: INTERNAL MEDICINE

## 2023-03-07 PROCEDURE — 80053 COMPREHEN METABOLIC PANEL: CPT | Performed by: NURSE PRACTITIONER

## 2023-03-07 PROCEDURE — 63600175 PHARM REV CODE 636 W HCPCS: Performed by: INTERNAL MEDICINE

## 2023-03-07 PROCEDURE — 84145 PROCALCITONIN (PCT): CPT | Performed by: STUDENT IN AN ORGANIZED HEALTH CARE EDUCATION/TRAINING PROGRAM

## 2023-03-07 PROCEDURE — 94761 N-INVAS EAR/PLS OXIMETRY MLT: CPT

## 2023-03-07 PROCEDURE — 94640 AIRWAY INHALATION TREATMENT: CPT

## 2023-03-07 PROCEDURE — 27000221 HC OXYGEN, UP TO 24 HOURS

## 2023-03-07 PROCEDURE — 36415 COLL VENOUS BLD VENIPUNCTURE: CPT | Performed by: STUDENT IN AN ORGANIZED HEALTH CARE EDUCATION/TRAINING PROGRAM

## 2023-03-07 PROCEDURE — 99900031 HC PATIENT EDUCATION (STAT)

## 2023-03-07 RX ORDER — DOCUSATE SODIUM 100 MG/1
100 CAPSULE, LIQUID FILLED ORAL 2 TIMES DAILY
Qty: 30 CAPSULE | Refills: 0 | Status: SHIPPED | OUTPATIENT
Start: 2023-03-07

## 2023-03-07 RX ORDER — DOCUSATE SODIUM 100 MG/1
100 CAPSULE, LIQUID FILLED ORAL 2 TIMES DAILY
Status: DISCONTINUED | OUTPATIENT
Start: 2023-03-07 | End: 2023-03-07 | Stop reason: HOSPADM

## 2023-03-07 RX ORDER — OXYCODONE HYDROCHLORIDE 5 MG/1
5 TABLET ORAL EVERY 4 HOURS PRN
Refills: 0
Start: 2023-03-07

## 2023-03-07 RX ADMIN — CITALOPRAM HYDROBROMIDE 20 MG: 20 TABLET ORAL at 08:03

## 2023-03-07 RX ADMIN — IPRATROPIUM BROMIDE AND ALBUTEROL SULFATE 3 ML: 2.5; .5 SOLUTION RESPIRATORY (INHALATION) at 07:03

## 2023-03-07 RX ADMIN — POLYETHYLENE GLYCOL 3350 17 G: 17 POWDER, FOR SOLUTION ORAL at 08:03

## 2023-03-07 RX ADMIN — IPRATROPIUM BROMIDE AND ALBUTEROL SULFATE 3 ML: 2.5; .5 SOLUTION RESPIRATORY (INHALATION) at 12:03

## 2023-03-07 RX ADMIN — BUSPIRONE HYDROCHLORIDE 7.5 MG: 5 TABLET ORAL at 08:03

## 2023-03-07 RX ADMIN — MORPHINE SULFATE 4 MG: 4 INJECTION, SOLUTION INTRAMUSCULAR; INTRAVENOUS at 02:03

## 2023-03-07 RX ADMIN — DOCUSATE SODIUM 100 MG: 100 CAPSULE, LIQUID FILLED ORAL at 02:03

## 2023-03-07 RX ADMIN — MORPHINE SULFATE 4 MG: 4 INJECTION, SOLUTION INTRAMUSCULAR; INTRAVENOUS at 07:03

## 2023-03-07 RX ADMIN — ENOXAPARIN SODIUM 40 MG: 100 INJECTION SUBCUTANEOUS at 08:03

## 2023-03-07 NOTE — PLAN OF CARE
03/07/23 1525   Final Note   Assessment Type Final Discharge Note   Anticipated Discharge Disposition Home   What phone number can be called within the next 1-3 days to see how you are doing after discharge? [phone]   Hospital Resources/Appts/Education Provided Appointments scheduled and added to AVS   Post-Acute Status   Post-Acute Authorization IV Infusion   IV Infusion Status Set-up Complete/Auth obtained   Discharge Delays None known at this time     Patient cleared for discharge from case management standpoint.    Follow up appointments scheduled and added to AVS.    Chart and discharge orders reviewed.  [facility] to contact patient with arrival time for  iv antibiotic infusion set for 3/8(Nell ext [phone]) Patient discharged home with no further case management needs.

## 2023-03-07 NOTE — PROGRESS NOTES
Progress Note  Infectious Disease    Reason for Consult:  Cellulitis of appendage     HPI: Adriana Zaragoza is a 57 y.o. female super morbidly obese, BMI 60.4 kg/m2, known to our service from prior consultation on June/2020 for cellulitis of large medial thigh pannus, last admitted for the same Oct/2022, discharged on clindamycin.  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  She denies headache, cough or chest pain.  Admits to some nausea that is now improved, no vomiting, no abdominal pain, no dysuria increased urinary frequency, no change in bowel movements.  She recalls working on her house outside and lifting heavy objects during the weekend.     In the ER, /62, tachypneic, T-max 102.1   Labs on admission leukocytosis 16.5, left shift 90.6%, H&H 12.4/39.8, platelet count 290   Stable kidney function, normal LFTs  CRP 19.5, high   Procalcitonin 1.57, positive   Baseline CPK 82   Chest x-ray mild diffuse interstitial prominence likely chronic    Patient admitted for sepsis likely secondary to large medial thigh pannus cellulitis.    Empirically started on vancomycin IV.      ID consult for cellulitis of appendage.    3/4:  Interim reviewed, patient seen examined at bedside.  On BiPAP, states she is feeling better, noticed right medial appendage has decreased edema and redness, Mepilex in place, looks slightly better compared to yesterday.  Hemodynamically stable, afebrile.  Labs reviewed, white count 7.8, left shift 73.4%, trending down, H&H 10.6/34, platelet count 225.  Micro reviewed, blood cultures no growth to date, pending final.    3/5:  Interim reviewed, patient seen  examined at bedside, sitting in the chair, able to walk to her bed to look at her large right medial thigh pannus.  She states she is feeling better, mentions noticing some improvement on her pannus.  Hemodynamically stable, afebrile.  Labs reviewed, stable white count, no left shift, H&H 10.4/33.2, platelet count 231.  CRP down to 11.05, procalcitonin still in process.  Micro reviewed, blood cultures no growth.    3/6: Interim reviewed, patient seen and examined at bedside, she just took a shower, feeling better overall, R medial pannus with resolving redness, edema present, indurated area decreasing in size.  Labs reviewed, stable white count, no left shift, H&H 7.8/31.7, platelet count 245.  Stable kidney function, CRP trending down, will check tomorrow.  Will contact infusion company to continue IV abx at home for 7 days.      3/7:  Interim reviewed, patient seen examined at bedside.  Discussed with case management, unfortunately, patient does not have medication benefits.  Patient agrees to come every day to infusions before antibiotics.  Discussed with case management to IV antibiotics outpatient at the infusion suite.  Labs reviewed, stable white count, no left shift, H&H 10.3/33.6, platelet count 284.  Stable kidney function, CRP down to 3.9, procalcitonin down to 0.2, normal.    Review of patient's allergies indicates:   Allergen Reactions    Aspirin     Dilaudid [hydromorphone]      Excessive sleepiness    Nsaids (non-steroidal anti-inflammatory drug) Hives    Teflaro [ceftaroline fosamil]      Allergic reaction/ hives/itching     Past Medical History:   Diagnosis Date    Allergy     Anemia     Asthma     COPD (chronic obstructive pulmonary disease)     Deep vein thrombosis     Depression     Diabetes mellitus, type 2     Encounter for blood transfusion     Heart murmur     Neuromuscular disorder      Past Surgical History:   Procedure Laterality Date     SECTION      DILATION AND CURETTAGE OF  UTERUS      gastric sleeve      [place]     TONSILLECTOMY       Social History     Tobacco Use    Smoking status: Every Day     Packs/day: 1.00     Types: Cigarettes    Smokeless tobacco: Never   Substance Use Topics    Alcohol use: No        Family History   Problem Relation Age of Onset    Heart disease Mother     Diabetes Mother     Arthritis Mother     Depression Mother     Cancer Father     COPD Father     Arthritis Maternal Grandmother     Cancer Maternal Grandmother     Cancer Maternal Grandfather     Cancer Paternal Grandmother     Kidney disease Paternal Grandmother     Diabetes Paternal Grandmother     Diabetes Paternal Grandfather     Hyperlipidemia Paternal Grandfather        Outdoor activities: Lives at home, boyfriend helps her, she reports she is active at home and ambulates with assistance.  Travel: None  Implants: None  Antibiotic History: See HPi    EXAM & DIAGNOSTICS REVIEWED:   Vitals:     Temp:  [97.5 °F (36.4 °C)-98.4 °F (36.9 °C)]   Temp: 98.4 °F (36.9 °C) (03/07/23 0747)  Pulse: 75 (03/07/23 0747)  Resp: 18 (03/07/23 0747)  BP: 138/61 (03/07/23 0747)  SpO2: 97 % (03/07/23 0747)    Intake/Output Summary (Last 24 hours) at 3/7/2023 1045  Last data filed at 3/7/2023 0552  Gross per 24 hour   Intake --   Output 500 ml   Net -500 ml       General:  In NAD. Alert and attentive, cooperative, comfortable on room air, sitting in bed  Eyes:  Anicteric, PERRL  ENT:  No ulcers, exudates, thrush, nares patent, dentition is fair, poor oral hygiene  Neck:  Supple  Lungs: Poor inspiratory effort, mostly clear to auscultation b/l  Heart:  S1/S2+, regular rhythm, no murmurs  Abd:  Morbidly obese, large medial pannus slightly decreased in size, less warm to touch, redness improving, non tender to palpation, indurated area improving   3/4: impressive large medial pannus on thigh with mepilex in place, wrinkling of skin noted, less induration, non tender to palpation  3/3: impressive large medial pannus on thigh  with redness, edema and indurated area, not tender to palpation c/w cellutis  +BS, reducible small umbilical hernia, extensive pannus, soft, non tender, non distended, no rebound, large skin folds with powder in place no evidence of intertrigo   :  Voids  Musc:  R thigh as described above  Joints without effusion, swelling,  erythema, synovitis, ambulatory  Skin:  Warm, no rash  Wound:   Neuro:  Following commands, alert, attentive, speech fluent, moves all extremities, no acute focal deficit   Psych:  Calm, cooperative  Lymphatic:       Extrem: Chronic lymphedema lower extremities  VAD:  L Midline 3/3        Isolation: None    3/7:        3/6:        3/5:        3/3:        General Labs reviewed:  Recent Labs   Lab 03/05/23  0548 03/06/23 0449 03/07/23  0436   WBC 7.27  7.27 6.30  6.30 6.90  6.90   HGB 10.4*  10.4* 9.8*  9.8* 10.3*  10.3*   HCT 33.2*  33.2* 31.7*  31.7* 33.6*  33.6*     231 245  245 284  284       Recent Labs   Lab 03/05/23  0548 03/06/23  0449 03/07/23  0436    137 139   K 3.5 3.7 4.0    101 104   CO2 30* 31* 29   BUN 11 11 12   CREATININE 0.5 0.6 0.5   CALCIUM 8.3* 8.3* 8.9   PROT 6.7 6.5 6.7   BILITOT 0.4 0.4 0.5   ALKPHOS 56 56 60   ALT 14 15 18   AST 13 12 16     Recent Labs   Lab 03/03/23  1427 03/05/23  0548 03/07/23  0436   CRP 19.52* 11.05* 3.94*     No results for input(s): SEDRATE in the last 168 hours.    Estimated Creatinine Clearance: 207.2 mL/min (based on SCr of 0.5 mg/dL).     Micro:  Microbiology Results (last 7 days)       Procedure Component Value Units Date/Time    Blood culture x two cultures. Draw prior to antibiotics. [[id number]] Collected: 03/02/23 1137    Order Status: Completed Specimen: Blood from Peripheral, Forearm, Right Updated: 03/06/23 1232     Blood Culture, Routine No Growth to date      No Growth to date      No Growth to date      No Growth to date      No Growth to date    Narrative:      Aerobic and anaerobic    Blood culture  x two cultures. Draw prior to antibiotics. [[id number]] Collected: 03/02/23 1208    Order Status: Completed Specimen: Blood from Peripheral, Antecubital, Right Updated: 03/06/23 1232     Blood Culture, Routine No Growth to date      No Growth to date      No Growth to date      No Growth to date      No Growth to date    Narrative:      Aerobic and anaerobic            Imaging Reviewed:  CXR    Cardiology:       IMPRESSION & PLAN     Sepsis secondary to recurrent cellulitis of large R thigh medial pannus, improving  CRP 19.5-->11-->3.7, trending down   Procal 1.5-->0.6-->0.2 improved  Baseline CPK 82  Blood cultures x 2 no growth, pending final     2.  Super morbid obesity BMI 60.4 Kg/m2  with underlying chronic lymphedema of lower extremities     3.  KEESHA    4. Diabetes, hbA1c: 6/2%    Recommendations:  CM working on IV abx outpatient to be done everyday at [facility]  Continue Daptomycin 640mg IV daily for 6 days more, estimated end of care 3/13/23  CBC w/diff, CMP, CPK weekly while on antibiotics  Please remove midline after abx are completed on 3/13  Follow up ID clinic/Dr Humphreys in 4 weeks  Wound care to R thigh daily, clean with chlorhexidine, dry it and cover with Mepilex    D/w patient, nursing, NP, CM, Dr Martinez    Medical Decision Making during this encounter was  [_] Low Complexity  [_] Moderate Complexity  [xx] High Complexity

## 2023-03-07 NOTE — DISCHARGE SUMMARY
[facility]  Discharge Summary      Patient Name: Adriana Zaragoza  MRN: [id number]  MINI: [id number]  Patient Class: IP- Inpatient  Admission Date: 3/2/2023  Hospital Length of Stay: 3 days  Discharge Date and Time:  03/07/2023 3:17 PM  Attending Physician: Reg Martinez MD   Discharging Provider: Reg Martinez MD  Primary Care Provider: Jad Hua MD    Primary Care Team: Networked reference to record PCT     HPI:   ED note  Patient presents complaining of fever and weakness.  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  Patient started with fever and chills this morning.  Patient has had sepsis before.    3/2/2023  Ms Zaragoza has a large appendage . She noted fever and chills this date with a Hx of cellulitis of the appendage. Pt has chronic low back pain that is an 8/10 at present.  The appendage is erythematous and tender to the touch.    Hospital Course:   03/07/2023:  Patient seen and examined.  Patient doing well today without fever, nausea, or vomiting.  Tolerating diet.  Patient agreeable for outpatient IV antibiotics, weekly labs.  She will follow up as outpatient.  Case discussed with infectious disease.  She will continue IV daptomycin until March 13th.  Case discussed with case management.     On examination at discharge patient is comfortable appearing and nontoxic, nondiaphoretic.  Alert and oriented.  Comfortable work of breathing with clear lungs bilaterally.  2+ radial pulses.  In good spirits.     03/06/23: redness to right leg appendage improved, decreased warmth. Pt states feels much better. ID following for antibiotic duration recommendations. Cultures with no growth so far.  CRP improving rend downward    03/05/23: afebrile redness to appendage decreased and with decreased warmth. Continue Daptomycin per ID recommendations. Final cultures pending, CRP Trending downward.    03/04/23:  doing better c/o mild sob will add duo neb. Seen by ID continue Daptomycin and wound care. Monitor CRP.afebrile    03/03/23: continues with redness and swelling to right upper leg appendage. Tolerating IV antibiotic. Afebrile. Continue CPAP at night and with sleeping.  Replace K+    Goals of Care Treatment Preferences:  Code Status: Full Code      Consults:   Consults (From admission, onward)          Status Ordering Provider     Inpatient consult to Social Work/Case Management  Once        Provider:  (Not yet assigned)    Acknowledged MOISE JOHNSON     Inpatient consult to Midline team  Once        Provider:  (Not yet assigned)    Acknowledged MOISE JOHNSON     Inpatient consult to Infectious Diseases  Once        Provider:  Lashell Llamas MD    Completed KIRK MAC     Inpatient consult to Hospitalist  Once        Provider:  Esperanza Albrecht MD    Acknowledged KIRK MAC          Discharge diagnoses:   Cellulitis/acute panniculitis right lower extremity   Obstructive sleep apnea with Pickwickian syndrome on home CPAP  Final Active Diagnoses:    Diagnosis Date Noted POA    PRINCIPAL PROBLEM:  Panniculitis [M79.3] 10/22/2022 Yes    Acute lymphangitis [L03.91] 03/05/2020 Yes    KEESHA (obstructive sleep apnea) [G47.33]  Yes    Chronic low back pain with bilateral sciatica [M54.41, M54.42, G89.29] 01/31/2018 Yes    Lymphedema [I89.0] 01/31/2018 Yes    Pickwickian syndrome [E66.2] 01/31/2018 Yes    Morbid obesity [E66.01] 01/31/2018 Yes      Problems Resolved During this Admission:       Discharged Condition: stable    Disposition: Home or Self Care    Follow Up:   Follow-up Information       Jad Hua MD Follow up.    Specialty: Family Medicine  Why: Appointment Scheduled on 03/13 @ 9:00 am  Contact information:  [address] [place] [address]  [address] [place] [address]  [phone]               Moise Merlos MD Follow up in 2 week(s).    Specialty: Infectious  Diseases  Contact information:  [address] [facility]  [address] [place] [id number]  [phone]                           Patient Instructions:      Comprehensive metabolic panel   Standing Status: Future Standing Exp. Date: 05/05/24     CBC auto differential   Standing Status: Future Standing Exp. Date: 05/05/24     CK   Standing Status: Future Standing Exp. Date: 05/05/24     Diet Adult Regular     Order Specific Question Answer Comments   Total calories: 1800 Calorie      Notify your health care provider if you experience any of the following:  temperature >100.4     Notify your health care provider if you experience any of the following:  persistent nausea and vomiting or diarrhea     Notify your health care provider if you experience any of the following:  severe uncontrolled pain     Notify your health care provider if you experience any of the following:  increased confusion or weakness     Notify your health care provider if you experience any of the following:  difficulty breathing or increased cough     Activity as tolerated       Pending Diagnostic Studies:       None           Medications:  Reconciled Home Medications:      Medication List        START taking these medications      docusate sodium 100 MG capsule  Commonly known as: COLACE  Take 1 capsule (100 mg total) by mouth 2 (two) times daily.     oxyCODONE 5 MG immediate release tablet  Commonly known as: ROXICODONE  Take 1 tablet (5 mg total) by mouth every 4 (four) hours as needed for Pain.     sodium chloride 0.9% SolP 50 mL with DAPTOmycin 500 mg SolR 642 mg  Inject 642 mg into the vein once daily. for 6 days            CONTINUE taking these medications      acetaminophen 650 MG Tbsr  Commonly known as: TYLENOL  Take 650 mg by mouth every 8 (eight) hours.     albuterol 90 mcg/actuation inhaler  Commonly known as: PROVENTIL/VENTOLIN HFA  Inhale 2 puffs into the lungs every 6 (six) hours as needed.     BARIATRIC MULTIVITAMINS 45 mg iron- 800 mcg-120  mcg Cap  Generic drug: multivitamin-min-iron-FA-vit K  Take 1 tablet by mouth once daily.     ergocalciferol 50,000 unit Cap  Commonly known as: ERGOCALCIFEROL  Take 1 capsule (50,000 Units total) by mouth every 7 days.            STOP taking these medications      busPIRone 7.5 MG tablet  Commonly known as: BUSPAR     citalopram 20 MG tablet  Commonly known as: CeleXA     clindamycin 300 MG capsule  Commonly known as: CLEOCIN              Indwelling Lines/Drains at time of discharge:   Lines/Drains/Airways       None                   Time spent on the discharge of patient: 35 minutes         Reg Martinez MD  Department of Hospital Medicine  [facility]

## 2023-03-07 NOTE — PHYSICIAN QUERY
"PT Name: Adriana Zaragoza  MR #: [id number]    DOCUMENTATION CLARIFICATION     CDS/: Aaliyah Jamafina               Contact information: [phone]  This form is a permanent document in the medical record.    Query Date: March 7, 2023      By submitting this query, we are merely seeking further clarification of documentation.   Please utilize your independent clinical judgment when addressing the question(s) below.    The Medical Record reflects the following:    Clinical Information Location in Medical Record   Risk factors; 58yo female presents w/ fever, weakness, fever and chills this am.  PMHx obesity s/p gastric sleeve 2019.  ED   Clinical indicators;  "Sepsis"     "Patient admitted for sepsis likely secondary to large medial thigh pannus cellulitis. known to our service from prior consultation on June/2020 for cellulitis of large medial thigh pannus, last admitted for the same Oct/2022, discharged on clindamycin."    WBC 16.50 > nml  Gran % 90.6 > 79.8 > 73.4 > nml  ANC 14.9 > 8.9 > nml  CRP 19.52 > 11.05 > 3.94  Procalcitonin 1.57 > 0.61 > nml    T-max 102.4 > 101.7  HR range 128 to 62  RR range 26 to 16     ED, ID 03/03-03/06    ID 03/03-03/06        Labs            VS flowsheet   Interventions;  Unable to obtain therapeutic vancomycin levels given BMI; Start Daptomycin 630mg IV daily     Will contact infusion company to continue IV abx at home for 7 days.    LR 1.641L IV bolus x1 (ED)  Zosyn 4.5g IV x1 (ED)  Vancomycin 2g IV Q12 hrs (03/03)  Vancomycin 2g IV x1 (ED)  Daptomycin 640mg IV Q24 hrs (03/03 to current)     ID 03/03      ID 03/06    MAR       Dear Dr Martinez please indicate if you agree w/ ID's Dx of Sepsis. Thank-you.      [XXXX  ] Sepsis ruled in     [  ] Sepsis ruled out     [  ] Other (please specify)               "

## 2023-03-07 NOTE — CARE UPDATE
03/07/23 0741   Patient Assessment/Suction   Level of Consciousness (AVPU) alert   Respiratory Effort Normal;Unlabored   Expansion/Accessory Muscles/Retractions no use of accessory muscles;no retractions;expansion symmetric   All Lung Fields Breath Sounds diminished   Rhythm/Pattern, Respiratory unlabored;pattern regular;depth regular;chest wiggle adequate;no shortness of breath reported   Cough Frequency no cough   Skin Integrity   $ Wound Care Tech Time 15 min   Area Observed Bridge of nose   Skin Appearance without discoloration   PRE-TX-O2   Device (Oxygen Therapy) room air   $ Is the patient on Low Flow Oxygen? Yes   SpO2 98 %   Pulse Oximetry Type Intermittent   $ Pulse Oximetry - Multiple Charge Pulse Oximetry - Multiple   Pulse 79   Resp 18   Aerosol Therapy   $ Aerosol Therapy Charges Aerosol Treatment   Daily Review of Necessity (SVN) completed   Respiratory Treatment Status (SVN) given   Treatment Route (SVN) oxygen;mask   Patient Position (SVN) HOB elevated   Post Treatment Assessment (SVN) increased aeration   Signs of Intolerance (SVN) none   Breath Sounds Post-Respiratory Treatment   Throughout All Fields Post-Treatment All Fields   Throughout All Fields Post-Treatment aeration increased   Post-treatment Heart Rate (beats/min) 74   Post-treatment Resp Rate (breaths/min) 18   Preset CPAP/BiPAP Settings   Mode Of Delivery Standby;CPAP   $ CPAP/BiPAP Daily Charge BiPAP/CPAP Daily   Equipment Type DeVilbiss   Education   $ Education Bronchodilator;15 min   Respiratory Evaluation   $ Care Plan Tech Time 15 min

## 2023-03-07 NOTE — PLAN OF CARE
03/07/23 1434   Post-Acute Status   Post-Acute Authorization IV Infusion   IV Infusion Status Set-up Complete/Auth obtained     Antibiotic infusion scheduled with [facility]) who will contact patient with arrival time.  Patient aware.

## 2023-03-07 NOTE — PLAN OF CARE
03/07/23 1238   Post-Acute Status   Post-Acute Authorization IV Infusion   IV Infusion Status Referral(s) sent     Order for iv antibiotics noted and sent to scheduling for review(robin [id number])

## 2023-03-08 ENCOUNTER — TELEPHONE (OUTPATIENT)
Dept: FAMILY MEDICINE | Facility: CLINIC | Age: 58
End: 2023-03-08

## 2023-03-08 ENCOUNTER — INFUSION (OUTPATIENT)
Dept: INFUSION THERAPY | Facility: HOSPITAL | Age: 58
End: 2023-03-08
Attending: STUDENT IN AN ORGANIZED HEALTH CARE EDUCATION/TRAINING PROGRAM
Payer: MEDICARE

## 2023-03-08 VITALS
DIASTOLIC BLOOD PRESSURE: 72 MMHG | SYSTOLIC BLOOD PRESSURE: 120 MMHG | RESPIRATION RATE: 18 BRPM | TEMPERATURE: 98 F | OXYGEN SATURATION: 93 % | HEART RATE: 89 BPM

## 2023-03-08 DIAGNOSIS — M79.3 PANNICULITIS: Primary | ICD-10-CM

## 2023-03-08 PROCEDURE — 63600175 PHARM REV CODE 636 W HCPCS: Mod: TB,JG | Performed by: STUDENT IN AN ORGANIZED HEALTH CARE EDUCATION/TRAINING PROGRAM

## 2023-03-08 PROCEDURE — 99211 OFF/OP EST MAY X REQ PHY/QHP: CPT | Mod: 25

## 2023-03-08 PROCEDURE — 96365 THER/PROPH/DIAG IV INF INIT: CPT

## 2023-03-08 PROCEDURE — 25000003 PHARM REV CODE 250: Performed by: STUDENT IN AN ORGANIZED HEALTH CARE EDUCATION/TRAINING PROGRAM

## 2023-03-08 RX ADMIN — DAPTOMYCIN 640 MG: 500 INJECTION, POWDER, LYOPHILIZED, FOR SOLUTION INTRAVENOUS at 01:03

## 2023-03-09 ENCOUNTER — INFUSION (OUTPATIENT)
Dept: INFUSION THERAPY | Facility: HOSPITAL | Age: 58
End: 2023-03-09
Attending: STUDENT IN AN ORGANIZED HEALTH CARE EDUCATION/TRAINING PROGRAM
Payer: MEDICARE

## 2023-03-09 VITALS
OXYGEN SATURATION: 95 % | RESPIRATION RATE: 16 BRPM | TEMPERATURE: 98 F | SYSTOLIC BLOOD PRESSURE: 116 MMHG | DIASTOLIC BLOOD PRESSURE: 62 MMHG | HEART RATE: 88 BPM

## 2023-03-09 DIAGNOSIS — M79.3 PANNICULITIS: Primary | ICD-10-CM

## 2023-03-09 PROCEDURE — 63600175 PHARM REV CODE 636 W HCPCS: Mod: TB,JG | Performed by: STUDENT IN AN ORGANIZED HEALTH CARE EDUCATION/TRAINING PROGRAM

## 2023-03-09 PROCEDURE — 25000003 PHARM REV CODE 250: Performed by: STUDENT IN AN ORGANIZED HEALTH CARE EDUCATION/TRAINING PROGRAM

## 2023-03-09 PROCEDURE — 96365 THER/PROPH/DIAG IV INF INIT: CPT

## 2023-03-09 RX ADMIN — DAPTOMYCIN 640 MG: 500 INJECTION, POWDER, LYOPHILIZED, FOR SOLUTION INTRAVENOUS at 01:03

## 2023-03-10 ENCOUNTER — INFUSION (OUTPATIENT)
Dept: INFUSION THERAPY | Facility: HOSPITAL | Age: 58
End: 2023-03-10
Attending: STUDENT IN AN ORGANIZED HEALTH CARE EDUCATION/TRAINING PROGRAM
Payer: MEDICARE

## 2023-03-10 VITALS
DIASTOLIC BLOOD PRESSURE: 72 MMHG | OXYGEN SATURATION: 95 % | RESPIRATION RATE: 18 BRPM | SYSTOLIC BLOOD PRESSURE: 105 MMHG | HEART RATE: 78 BPM | TEMPERATURE: 98 F

## 2023-03-10 DIAGNOSIS — M79.3 PANNICULITIS: Primary | ICD-10-CM

## 2023-03-10 PROCEDURE — 96366 THER/PROPH/DIAG IV INF ADDON: CPT

## 2023-03-10 PROCEDURE — 63600175 PHARM REV CODE 636 W HCPCS: Mod: TB,JG | Performed by: STUDENT IN AN ORGANIZED HEALTH CARE EDUCATION/TRAINING PROGRAM

## 2023-03-10 PROCEDURE — 96365 THER/PROPH/DIAG IV INF INIT: CPT

## 2023-03-10 PROCEDURE — 25000003 PHARM REV CODE 250: Performed by: STUDENT IN AN ORGANIZED HEALTH CARE EDUCATION/TRAINING PROGRAM

## 2023-03-10 RX ADMIN — DAPTOMYCIN 640 MG: 500 INJECTION, POWDER, LYOPHILIZED, FOR SOLUTION INTRAVENOUS at 01:03

## 2023-03-11 ENCOUNTER — INFUSION (OUTPATIENT)
Dept: INFUSION THERAPY | Facility: HOSPITAL | Age: 58
End: 2023-03-11
Attending: STUDENT IN AN ORGANIZED HEALTH CARE EDUCATION/TRAINING PROGRAM
Payer: MEDICARE

## 2023-03-11 VITALS
HEART RATE: 67 BPM | DIASTOLIC BLOOD PRESSURE: 84 MMHG | RESPIRATION RATE: 18 BRPM | OXYGEN SATURATION: 95 % | SYSTOLIC BLOOD PRESSURE: 151 MMHG

## 2023-03-11 DIAGNOSIS — M79.3 PANNICULITIS: Primary | ICD-10-CM

## 2023-03-11 PROCEDURE — 25000003 PHARM REV CODE 250: Performed by: STUDENT IN AN ORGANIZED HEALTH CARE EDUCATION/TRAINING PROGRAM

## 2023-03-11 PROCEDURE — 63600175 PHARM REV CODE 636 W HCPCS: Mod: TB,JG | Performed by: STUDENT IN AN ORGANIZED HEALTH CARE EDUCATION/TRAINING PROGRAM

## 2023-03-11 PROCEDURE — 96365 THER/PROPH/DIAG IV INF INIT: CPT

## 2023-03-11 RX ADMIN — DAPTOMYCIN 640 MG: 500 INJECTION, POWDER, LYOPHILIZED, FOR SOLUTION INTRAVENOUS at 08:03

## 2023-03-12 ENCOUNTER — INFUSION (OUTPATIENT)
Dept: INFUSION THERAPY | Facility: HOSPITAL | Age: 58
End: 2023-03-12
Attending: STUDENT IN AN ORGANIZED HEALTH CARE EDUCATION/TRAINING PROGRAM
Payer: MEDICARE

## 2023-03-12 DIAGNOSIS — M79.3 PANNICULITIS: Primary | ICD-10-CM

## 2023-03-12 PROCEDURE — 63600175 PHARM REV CODE 636 W HCPCS: Mod: TB,JG | Performed by: STUDENT IN AN ORGANIZED HEALTH CARE EDUCATION/TRAINING PROGRAM

## 2023-03-12 PROCEDURE — 96365 THER/PROPH/DIAG IV INF INIT: CPT

## 2023-03-12 PROCEDURE — 25000003 PHARM REV CODE 250: Performed by: STUDENT IN AN ORGANIZED HEALTH CARE EDUCATION/TRAINING PROGRAM

## 2023-03-12 RX ADMIN — DAPTOMYCIN 640 MG: 500 INJECTION, POWDER, LYOPHILIZED, FOR SOLUTION INTRAVENOUS at 08:03

## 2023-03-13 ENCOUNTER — INFUSION (OUTPATIENT)
Dept: INFUSION THERAPY | Facility: HOSPITAL | Age: 58
End: 2023-03-13
Attending: STUDENT IN AN ORGANIZED HEALTH CARE EDUCATION/TRAINING PROGRAM
Payer: MEDICARE

## 2023-03-13 VITALS
SYSTOLIC BLOOD PRESSURE: 161 MMHG | OXYGEN SATURATION: 95 % | RESPIRATION RATE: 20 BRPM | TEMPERATURE: 98 F | HEART RATE: 63 BPM | DIASTOLIC BLOOD PRESSURE: 82 MMHG

## 2023-03-13 DIAGNOSIS — M79.3 PANNICULITIS: Primary | ICD-10-CM

## 2023-03-13 LAB
ALBUMIN SERPL BCP-MCNC: 4 G/DL (ref 3.5–5.2)
ALP SERPL-CCNC: 66 U/L (ref 55–135)
ALT SERPL W/O P-5'-P-CCNC: 21 U/L (ref 10–44)
ANION GAP SERPL CALC-SCNC: 8 MMOL/L (ref 8–16)
AST SERPL-CCNC: 16 U/L (ref 10–40)
BASOPHILS # BLD AUTO: 0.07 K/UL (ref 0–0.2)
BASOPHILS NFR BLD: 0.6 % (ref 0–1.9)
BILIRUB SERPL-MCNC: 0.4 MG/DL (ref 0.1–1)
BUN SERPL-MCNC: 17 MG/DL (ref 6–20)
CALCIUM SERPL-MCNC: 9.5 MG/DL (ref 8.7–10.5)
CHLORIDE SERPL-SCNC: 104 MMOL/L (ref 95–110)
CK SERPL-CCNC: 71 U/L (ref 20–180)
CO2 SERPL-SCNC: 26 MMOL/L (ref 23–29)
CREAT SERPL-MCNC: 0.6 MG/DL (ref 0.5–1.4)
DIFFERENTIAL METHOD: ABNORMAL
EOSINOPHIL # BLD AUTO: 0.1 K/UL (ref 0–0.5)
EOSINOPHIL NFR BLD: 1.2 % (ref 0–8)
ERYTHROCYTE [DISTWIDTH] IN BLOOD BY AUTOMATED COUNT: 15 % (ref 11.5–14.5)
EST. GFR  (NO RACE VARIABLE): >60 ML/MIN/1.73 M^2
GLUCOSE SERPL-MCNC: 99 MG/DL (ref 70–110)
HCT VFR BLD AUTO: 39 % (ref 37–48.5)
HGB BLD-MCNC: 12.1 G/DL (ref 12–16)
IMM GRANULOCYTES # BLD AUTO: 0.08 K/UL (ref 0–0.04)
IMM GRANULOCYTES NFR BLD AUTO: 0.7 % (ref 0–0.5)
LYMPHOCYTES # BLD AUTO: 3.3 K/UL (ref 1–4.8)
LYMPHOCYTES NFR BLD: 27.8 % (ref 18–48)
MCH RBC QN AUTO: 26.6 PG (ref 27–31)
MCHC RBC AUTO-ENTMCNC: 31 G/DL (ref 32–36)
MCV RBC AUTO: 86 FL (ref 82–98)
MONOCYTES # BLD AUTO: 0.7 K/UL (ref 0.3–1)
MONOCYTES NFR BLD: 5.8 % (ref 4–15)
NEUTROPHILS # BLD AUTO: 7.5 K/UL (ref 1.8–7.7)
NEUTROPHILS NFR BLD: 63.9 % (ref 38–73)
NRBC BLD-RTO: 0 /100 WBC
PLATELET # BLD AUTO: 403 K/UL (ref 150–450)
PLATELET BLD QL SMEAR: ABNORMAL
PMV BLD AUTO: 9.2 FL (ref 9.2–12.9)
POTASSIUM SERPL-SCNC: 3.9 MMOL/L (ref 3.5–5.1)
PROT SERPL-MCNC: 8.1 G/DL (ref 6–8.4)
RBC # BLD AUTO: 4.55 M/UL (ref 4–5.4)
SODIUM SERPL-SCNC: 138 MMOL/L (ref 136–145)
WBC # BLD AUTO: 11.77 K/UL (ref 3.9–12.7)

## 2023-03-13 PROCEDURE — 63600175 PHARM REV CODE 636 W HCPCS: Mod: TB,JG | Performed by: STUDENT IN AN ORGANIZED HEALTH CARE EDUCATION/TRAINING PROGRAM

## 2023-03-13 PROCEDURE — 25000003 PHARM REV CODE 250: Performed by: STUDENT IN AN ORGANIZED HEALTH CARE EDUCATION/TRAINING PROGRAM

## 2023-03-13 PROCEDURE — 96365 THER/PROPH/DIAG IV INF INIT: CPT

## 2023-03-13 PROCEDURE — 80053 COMPREHEN METABOLIC PANEL: CPT | Performed by: STUDENT IN AN ORGANIZED HEALTH CARE EDUCATION/TRAINING PROGRAM

## 2023-03-13 PROCEDURE — 85025 COMPLETE CBC W/AUTO DIFF WBC: CPT | Performed by: STUDENT IN AN ORGANIZED HEALTH CARE EDUCATION/TRAINING PROGRAM

## 2023-03-13 PROCEDURE — 82550 ASSAY OF CK (CPK): CPT | Performed by: STUDENT IN AN ORGANIZED HEALTH CARE EDUCATION/TRAINING PROGRAM

## 2023-03-13 RX ADMIN — DAPTOMYCIN 640 MG: 500 INJECTION, POWDER, LYOPHILIZED, FOR SOLUTION INTRAVENOUS at 01:03

## 2023-03-27 ENCOUNTER — TELEPHONE (OUTPATIENT)
Dept: FAMILY MEDICINE | Facility: CLINIC | Age: 58
End: 2023-03-27

## 2023-04-11 ENCOUNTER — PATIENT MESSAGE (OUTPATIENT)
Dept: FAMILY MEDICINE | Facility: CLINIC | Age: 58
End: 2023-04-11

## 2023-05-10 ENCOUNTER — OFFICE VISIT (OUTPATIENT)
Dept: INFECTIOUS DISEASES | Facility: CLINIC | Age: 58
End: 2023-05-10
Payer: MEDICARE

## 2023-05-10 VITALS
OXYGEN SATURATION: 95 % | TEMPERATURE: 98 F | DIASTOLIC BLOOD PRESSURE: 70 MMHG | HEIGHT: 67 IN | SYSTOLIC BLOOD PRESSURE: 130 MMHG | BODY MASS INDEX: 45.99 KG/M2 | WEIGHT: 293 LBS | HEART RATE: 96 BPM

## 2023-05-10 DIAGNOSIS — G47.33 OSA (OBSTRUCTIVE SLEEP APNEA): ICD-10-CM

## 2023-05-10 DIAGNOSIS — F32.A DEPRESSION, UNSPECIFIED DEPRESSION TYPE: Primary | ICD-10-CM

## 2023-05-10 DIAGNOSIS — M54.41 CHRONIC BILATERAL LOW BACK PAIN WITH BILATERAL SCIATICA: ICD-10-CM

## 2023-05-10 DIAGNOSIS — R22.41 MASS OF RIGHT THIGH: ICD-10-CM

## 2023-05-10 DIAGNOSIS — M54.42 CHRONIC BILATERAL LOW BACK PAIN WITH BILATERAL SCIATICA: ICD-10-CM

## 2023-05-10 DIAGNOSIS — M79.3 PANNICULITIS: ICD-10-CM

## 2023-05-10 DIAGNOSIS — G89.29 CHRONIC BILATERAL LOW BACK PAIN WITH BILATERAL SCIATICA: ICD-10-CM

## 2023-05-10 PROCEDURE — 99214 OFFICE O/P EST MOD 30 MIN: CPT | Mod: S$GLB,,, | Performed by: STUDENT IN AN ORGANIZED HEALTH CARE EDUCATION/TRAINING PROGRAM

## 2023-05-10 PROCEDURE — 99214 PR OFFICE/OUTPT VISIT, EST, LEVL IV, 30-39 MIN: ICD-10-PCS | Mod: S$GLB,,, | Performed by: STUDENT IN AN ORGANIZED HEALTH CARE EDUCATION/TRAINING PROGRAM

## 2023-05-10 NOTE — PROGRESS NOTES
Subjective: Hospital follow up       Patient ID: Adriana Zaragoza is a 57 y.o. female.    Chief Complaint:: hospital follow up visit     HPI Adriana Zaragoza is a 57 y.o. female super morbidly obese, BMI 74.2kg/m2, with past medical history of asthma/COPD on BiPAP home, diabetes, depression, gastric bypass in 2019, in addition to large medial thigh pannnus which she has had for the last 20 years. She is known to our service for R large medial panniculitis.  She was last seen by myself in March, she was discharged on IV daptomycin which she completed outpatient.    She she is here for follow-up.  She struggles with ambulation, reports she barely leaves her house, she reports feeling depressed, tearful, we will referred to psych. Does not do much at home.  She reports her CPAP machine broke and she is being more tired than usual.  She is still base at least 3 to 4 times a day, has no longer home health services at home.  Will reorder for her to continue to have assistance to her R large medial panniculitis.    Review of patient's allergies indicates:   Allergen Reactions    Aspirin     Dilaudid [hydromorphone]      Excessive sleepiness    Nsaids (non-steroidal anti-inflammatory drug) Hives    Teflaro [ceftaroline fosamil]      Allergic reaction/ hives/itching     Past Medical History:   Diagnosis Date    Allergy     Anemia     Asthma     COPD (chronic obstructive pulmonary disease)     Deep vein thrombosis     Depression     Diabetes mellitus, type 2     Encounter for blood transfusion     Heart murmur     Neuromuscular disorder      Past Surgical History:   Procedure Laterality Date     SECTION      DILATION AND CURETTAGE OF UTERUS      gastric sleeve      [facility]     TONS[facility]OMY       Social History     Tobacco Use    Smoking status: Every Day     Packs/day: 1.00     Types: Cigarettes    Smokeless tobacco: Never   Substance Use Topics    Alcohol use: No        Family History   Problem  "Relation Age of Onset    Heart disease Mother     Diabetes Mother     Arthritis Mother     Depression Mother     Cancer Father     COPD Father     Arthritis Maternal Grandmother     Cancer Maternal Grandmother     Cancer Maternal Grandfather     Cancer Paternal Grandmother     Kidney disease Paternal Grandmother     Diabetes Paternal Grandmother     Diabetes Paternal Grandfather     Hyperlipidemia Paternal Grandfather          Review of Systems   Constitutional:  Positive for activity change. Negative for chills and fever.   HENT:  Negative for sinus pain.    Respiratory:  Negative for cough and shortness of breath.    Cardiovascular:  Negative for chest pain.   Gastrointestinal:  Negative for abdominal pain, diarrhea and nausea.   Genitourinary:  Negative for dysuria and urgency.   Musculoskeletal:  Positive for back pain.   Skin:  Positive for color change.        Large R panniculus    Neurological:  Negative for headaches.   Psychiatric/Behavioral:  Positive for decreased concentration. The patient is nervous/anxious.          Objective:      Blood pressure 130/70, pulse 96, temperature 97.9 °F (36.6 °C), height 5' 7" (1.702 m), weight (!) 215 kg (473 lb 15.8 oz), SpO2 95 %. Body mass index is 74.24 kg/m².  Physical Exam  Constitutional:       Appearance: She is obese.      Comments: Super morbidly obese    HENT:      Mouth/Throat:      Mouth: Mucous membranes are moist.      Pharynx: Oropharynx is clear.   Eyes:      Extraocular Movements: Extraocular movements intact.      Pupils: Pupils are equal, round, and reactive to light.   Cardiovascular:      Rate and Rhythm: Normal rate and regular rhythm.      Pulses: Normal pulses.      Heart sounds: Normal heart sounds.   Pulmonary:      Effort: Pulmonary effort is normal.      Breath sounds: Normal breath sounds.      Comments: Decreased breath sounds due to body habitus   Abdominal:      General: Bowel sounds are normal.      Palpations: Abdomen is soft.      " Tenderness: There is no abdominal tenderness. There is no rebound.      Comments: Large abdomen, reducible umbilical hernia, large abdominal folds and impressive R medial thigh panniculus; mild redness noted, non tender to palpation, chronic induration appreciated - cleaned with hibiclens and saline, mepilex applied    Musculoskeletal:      Cervical back: Normal range of motion and neck supple.      Right lower leg: Edema present.      Left lower leg: Edema present.      Comments: Large LE extremities form chronic lymphedema   Neurological:      Mental Status: She is alert.        5/10:          3/3:         Prior Labs;  CRP 19.5-->11-->3.7, trending down   Procal 1.5-->0.6-->0.2 improved  Baseline CPK 82  Blood cultures x 2 no growth    Recent Diagnostics:  CXR         Assessment and Plan:       H/o sepsis secondary to recurrent cellulitis of large R thigh medial pannus, improved  S/p Daptomycin IV  Home health ordered for wound care at home  Hibiclens daily   RTC in 3 months     2.  Super morbid obesity BMI 74.2 Kg/m2  with underlying chronic lymphedema of lower extremities    Patient waiting for plastic surgery to remove all redundant skin     3.  KEESHA   Follows with Pulmonary outpatient/Dr Gibson      4. Diabetes, hbA1c: 6.2%    5. Anxiety/depression h/o OCD   Psych referral       Depression, unspecified depression type  -     Ambulatory referral/consult to Psychiatry; Future; Expected date: 05/17/2023    Panniculitis  -     SUBSEQUENT HOME HEALTH ORDERS    Mass of right thigh    KEESHA (obstructive sleep apnea)    Chronic bilateral low back pain with bilateral sciatica          This note was created using Dragon voice recognition software that occasionally misinterpreted phrases or words.

## 2023-07-12 PROCEDURE — G0179 MD RECERTIFICATION HHA PT: HCPCS | Mod: ,,, | Performed by: FAMILY MEDICINE

## 2023-07-21 ENCOUNTER — PATIENT MESSAGE (OUTPATIENT)
Dept: INFECTIOUS DISEASES | Facility: CLINIC | Age: 58
End: 2023-07-21

## 2023-07-21 ENCOUNTER — TELEPHONE (OUTPATIENT)
Dept: INFECTIOUS DISEASES | Facility: CLINIC | Age: 58
End: 2023-07-21

## 2023-07-21 DIAGNOSIS — M79.3 PANNICULITIS: Primary | ICD-10-CM

## 2023-07-21 RX ORDER — CLINDAMYCIN PHOSPHATE 10 UG/ML
LOTION TOPICAL 2 TIMES DAILY
Qty: 60 ML | Refills: 3 | Status: SHIPPED | OUTPATIENT
Start: 2023-07-21

## 2023-07-21 RX ORDER — DOXYCYCLINE 100 MG/1
100 CAPSULE ORAL 2 TIMES DAILY
Qty: 14 CAPSULE | Refills: 0 | Status: SHIPPED | OUTPATIENT
Start: 2023-07-21 | End: 2023-07-28

## 2023-07-21 NOTE — TELEPHONE ENCOUNTER
"Unable to reach out to patient, left a voicemail, script for doxycycline sent to her pharmacy.   Patient called [phone]    She is c/o breaking out with boil "like " that are oozing  X 3-4 days  She has one under the loose tissue of Rt and Lt breasts  And Rt and Lt inner thighs     She will try to upload pictures     She has home health and they advised her to call the office    She uses [facility] pharmacy on [address] [facility]  7/21/23  "

## 2023-08-08 ENCOUNTER — OFFICE VISIT (OUTPATIENT)
Dept: INFECTIOUS DISEASES | Facility: CLINIC | Age: 58
End: 2023-08-08
Payer: MEDICARE

## 2023-08-08 VITALS
WEIGHT: 293 LBS | HEIGHT: 67 IN | DIASTOLIC BLOOD PRESSURE: 68 MMHG | HEART RATE: 106 BPM | BODY MASS INDEX: 45.99 KG/M2 | SYSTOLIC BLOOD PRESSURE: 126 MMHG | TEMPERATURE: 97 F | OXYGEN SATURATION: 97 %

## 2023-08-08 DIAGNOSIS — G47.33 OSA (OBSTRUCTIVE SLEEP APNEA): ICD-10-CM

## 2023-08-08 DIAGNOSIS — M54.42 CHRONIC BILATERAL LOW BACK PAIN WITH BILATERAL SCIATICA: ICD-10-CM

## 2023-08-08 DIAGNOSIS — R22.41 MASS OF RIGHT THIGH: Primary | ICD-10-CM

## 2023-08-08 DIAGNOSIS — E66.01 MORBID OBESITY: ICD-10-CM

## 2023-08-08 DIAGNOSIS — M79.10 MYALGIA: ICD-10-CM

## 2023-08-08 DIAGNOSIS — I89.0 LYMPHEDEMA: ICD-10-CM

## 2023-08-08 DIAGNOSIS — G89.29 CHRONIC BILATERAL LOW BACK PAIN WITH BILATERAL SCIATICA: ICD-10-CM

## 2023-08-08 DIAGNOSIS — M79.3 PANNICULITIS: ICD-10-CM

## 2023-08-08 DIAGNOSIS — M54.41 CHRONIC BILATERAL LOW BACK PAIN WITH BILATERAL SCIATICA: ICD-10-CM

## 2023-08-08 PROCEDURE — 99214 PR OFFICE/OUTPT VISIT, EST, LEVL IV, 30-39 MIN: ICD-10-PCS | Mod: S$GLB,,, | Performed by: STUDENT IN AN ORGANIZED HEALTH CARE EDUCATION/TRAINING PROGRAM

## 2023-08-08 PROCEDURE — 3044F PR MOST RECENT HEMOGLOBIN A1C LEVEL <7.0%: ICD-10-PCS | Mod: CPTII,S$GLB,, | Performed by: STUDENT IN AN ORGANIZED HEALTH CARE EDUCATION/TRAINING PROGRAM

## 2023-08-08 PROCEDURE — 1159F MED LIST DOCD IN RCRD: CPT | Mod: CPTII,S$GLB,, | Performed by: STUDENT IN AN ORGANIZED HEALTH CARE EDUCATION/TRAINING PROGRAM

## 2023-08-08 PROCEDURE — 3074F PR MOST RECENT SYSTOLIC BLOOD PRESSURE < 130 MM HG: ICD-10-PCS | Mod: CPTII,S$GLB,, | Performed by: STUDENT IN AN ORGANIZED HEALTH CARE EDUCATION/TRAINING PROGRAM

## 2023-08-08 PROCEDURE — 3044F HG A1C LEVEL LT 7.0%: CPT | Mod: CPTII,S$GLB,, | Performed by: STUDENT IN AN ORGANIZED HEALTH CARE EDUCATION/TRAINING PROGRAM

## 2023-08-08 PROCEDURE — 3078F PR MOST RECENT DIASTOLIC BLOOD PRESSURE < 80 MM HG: ICD-10-PCS | Mod: CPTII,S$GLB,, | Performed by: STUDENT IN AN ORGANIZED HEALTH CARE EDUCATION/TRAINING PROGRAM

## 2023-08-08 PROCEDURE — 1159F PR MEDICATION LIST DOCUMENTED IN MEDICAL RECORD: ICD-10-PCS | Mod: CPTII,S$GLB,, | Performed by: STUDENT IN AN ORGANIZED HEALTH CARE EDUCATION/TRAINING PROGRAM

## 2023-08-08 PROCEDURE — 3074F SYST BP LT 130 MM HG: CPT | Mod: CPTII,S$GLB,, | Performed by: STUDENT IN AN ORGANIZED HEALTH CARE EDUCATION/TRAINING PROGRAM

## 2023-08-08 PROCEDURE — 3078F DIAST BP <80 MM HG: CPT | Mod: CPTII,S$GLB,, | Performed by: STUDENT IN AN ORGANIZED HEALTH CARE EDUCATION/TRAINING PROGRAM

## 2023-08-08 PROCEDURE — 99214 OFFICE O/P EST MOD 30 MIN: CPT | Mod: S$GLB,,, | Performed by: STUDENT IN AN ORGANIZED HEALTH CARE EDUCATION/TRAINING PROGRAM

## 2023-08-08 PROCEDURE — 3008F PR BODY MASS INDEX (BMI) DOCUMENTED: ICD-10-PCS | Mod: CPTII,S$GLB,, | Performed by: STUDENT IN AN ORGANIZED HEALTH CARE EDUCATION/TRAINING PROGRAM

## 2023-08-08 PROCEDURE — 3008F BODY MASS INDEX DOCD: CPT | Mod: CPTII,S$GLB,, | Performed by: STUDENT IN AN ORGANIZED HEALTH CARE EDUCATION/TRAINING PROGRAM

## 2023-08-08 RX ORDER — SEMAGLUTIDE 0.68 MG/ML
0.5 INJECTION, SOLUTION SUBCUTANEOUS
COMMUNITY

## 2023-08-08 RX ORDER — BUPROPION HYDROCHLORIDE 150 MG/1
150 TABLET ORAL DAILY
COMMUNITY
End: 2024-04-02 | Stop reason: SDUPTHER

## 2023-08-08 RX ORDER — DOXYCYCLINE 100 MG/1
100 CAPSULE ORAL 2 TIMES DAILY
Qty: 20 CAPSULE | Refills: 1 | Status: SHIPPED | OUTPATIENT
Start: 2023-08-08 | End: 2023-08-29

## 2023-08-08 NOTE — PROGRESS NOTES
Subjective: Hospital follow up       Patient ID: Adriana Zaragoza is a 57 y.o. female.    Chief Complaint:: Follow-up    HPI Adriana Zaragoza is a 57 y.o. female super morbidly obese, BMI 74.2kg/m2, with past medical history of asthma/COPD on BiPAP home, diabetes, depression, gastric bypass in 2019, in addition to large medial thigh pannnus which she has had for the last 20 years. She is known to our service for R large medial panniculitis.  She was last seen by myself in March, she was discharged on IV daptomycin which she completed outpatient.    She she is here for follow-up.  She struggles with ambulation, reports she barely leaves her house, she reports feeling depressed, tearful, we will referred to psych. Does not do much at home.  She reports her CPAP machine broke and she is being more tired than usual.  She is still base at least 3 to 4 times a day, has no longer home health services at home.  Will reorder for her to continue to have assistance to her R large medial panniculitis.    8/8:  Interim reviewed, patient is here for follow-up.  She was finally able to change insurance and now follows with PCP.  She started Ozempic in May, has lost 107 lb since last visit, her BMI is down to 57.3 kg/m2. She is more active, and feels better with herself.  She is hopeful she can reach her goal to get surgery and excise right thigh panniculus and redundant abdominal adipose tissue.  She also started Wellbutrin, she is trying her best to quit smoking.  Will refer for home health so she can continue care at home.  She reports about 2 weeks ago she fell she had a flare on her thighs, contacted the office, doxycycline and topical clindamycin ordered, she reports resolved.  She complains of having a small wound on her posterior right thigh, dressing in place, thickening of the skin noted, no evidence purulent drainage noted, nontender to palpation.    Review of patient's allergies indicates:   Allergen  Reactions    Aspirin     Dilaudid [hydromorphone]      Excessive sleepiness    Nsaids (non-steroidal anti-inflammatory drug) Hives    Teflaro [ceftaroline fosamil]      Allergic reaction/ hives/itching     Past Medical History:   Diagnosis Date    Allergy     Anemia     Asthma     COPD (chronic obstructive pulmonary disease)     Deep vein thrombosis     Depression     Diabetes mellitus, type 2     Encounter for blood transfusion     Heart murmur     Neuromuscular disorder      Past Surgical History:   Procedure Laterality Date     SECTION      DILATION AND CURETTAGE OF UTERUS      gastric sleeve      [facility]    TONSILLECTOMY       Social History     Tobacco Use    Smoking status: Every Day     Current packs/day: 1.00     Types: Cigarettes    Smokeless tobacco: Never   Substance Use Topics    Alcohol use: No        Family History   Problem Relation Age of Onset    Heart disease Mother     Diabetes Mother     Arthritis Mother     Depression Mother     Cancer Father     COPD Father     Arthritis Maternal Grandmother     Cancer Maternal Grandmother     Cancer Maternal Grandfather     Cancer Paternal Grandmother     Kidney disease Paternal Grandmother     Diabetes Paternal Grandmother     Diabetes Paternal Grandfather     Hyperlipidemia Paternal Grandfather          Review of Systems   Constitutional:  Negative for activity change, chills and fever.   HENT:  Negative for sinus pain.    Respiratory:  Negative for cough and shortness of breath.    Cardiovascular:  Negative for chest pain.   Gastrointestinal:  Negative for abdominal pain, diarrhea and nausea.   Genitourinary:  Negative for dysuria and urgency.   Musculoskeletal:  Negative for back pain.        She has lost 107lb and feels more active now   Skin:  Positive for color change.        Large R panniculus    Neurological:  Negative for headaches.   Psychiatric/Behavioral:  Negative for decreased concentration. The patient is not nervous/anxious.       "      Objective:      Blood pressure 126/68, pulse 106, temperature 96.8 °F (36 °C), height 5' 7" (1.702 m), weight (!) 166.1 kg (366 lb 3.2 oz), SpO2 97 %. Body mass index is 57.36 kg/m².  Physical Exam  Constitutional:       Appearance: She is obese.      Comments: Morbidly obese    HENT:      Mouth/Throat:      Mouth: Mucous membranes are moist.      Pharynx: Oropharynx is clear.   Eyes:      Extraocular Movements: Extraocular movements intact.      Pupils: Pupils are equal, round, and reactive to light.   Cardiovascular:      Rate and Rhythm: Normal rate and regular rhythm.      Pulses: Normal pulses.      Heart sounds: Normal heart sounds.   Pulmonary:      Effort: Pulmonary effort is normal.      Breath sounds: Normal breath sounds.      Comments: Mostly clear to auscultation b/l   Abdominal:      General: Bowel sounds are normal.      Palpations: Abdomen is soft.      Tenderness: There is no abdominal tenderness. There is no rebound.      Comments: Large abdomen, reducible umbilical hernia, large abdominal folds and R medial thigh panniculus; has decreased in size sin last visit, dressing in place   Musculoskeletal:      Cervical back: Normal range of motion and neck supple.      Right lower leg: Edema present.      Left lower leg: Edema present.      Comments: Large LE extremities form chronic lymphedema  R thigh panniculus has decreased in size, wrinkling of the skin noted, non tender to palpation    Skin:     General: Skin is warm.      Capillary Refill: Capillary refill takes less than 2 seconds.   Neurological:      Mental Status: She is alert and oriented to person, place, and time. Mental status is at baseline.   Psychiatric:         Thought Content: Thought content normal.          8/8/23:        5/10:          3/3:         Prior Labs;  CRP 19.5-->11-->3.7, trending down   Procal 1.5-->0.6-->0.2 improved  Baseline CPK 82  Blood cultures x 2 no growth    Recent Diagnostics:  CXR         Assessment and " Plan:       H/o sepsis secondary to recurrent cellulitis of large R thigh medial pannus, improved  Home health ordered for wound care at home  Doxycycline BID 7 days  Topical clindamycin BID to affected areas as needed  RTC in 4 months     2.  Super morbid obesity BMI down to 57.3Kg/m2, (prior 74.2 Kg/m2) has lost 107 lb since las visit  with underlying chronic lymphedema of lower extremities    On Ozempic   Follows with PCP   Patient waiting for plastic surgery to remove all redundant skin     3.  KEESHA   Follows with Pulmonary outpatient/Dr Gibson      4. Diabetes, HbA1c: 6.2%    5. Anxiety/depression h/o OCD   PCP follow up    6. Active smoker   On Wellbutrin      Panniculitis  -     Ambulatory referral/consult to Home Health; Future; Expected date: 08/09/2023    Mass of right thigh    KEESHA (obstructive sleep apnea)    Chronic bilateral low back pain with bilateral sciatica    Morbid obesity    Lymphedema    Myalgia      This note was created using Dragon voice recognition software that occasionally misinterpreted phrases or words.

## 2023-08-08 NOTE — PATIENT INSTRUCTIONS
Doxycycline BID for 7 days and topical clinda as needed  PCP follow up - on Ozempic  RTC in 4 months

## 2024-01-08 ENCOUNTER — EXTERNAL HOME HEALTH (OUTPATIENT)
Dept: HOME HEALTH SERVICES | Facility: HOSPITAL | Age: 59
End: 2024-01-08
Payer: MEDICARE

## 2024-01-11 ENCOUNTER — DOCUMENT SCAN (OUTPATIENT)
Dept: HOME HEALTH SERVICES | Facility: HOSPITAL | Age: 59
End: 2024-01-11
Payer: MEDICARE

## 2024-01-11 DIAGNOSIS — Z00.00 ENCOUNTER FOR MEDICARE ANNUAL WELLNESS EXAM: ICD-10-CM

## 2024-01-18 ENCOUNTER — DOCUMENT SCAN (OUTPATIENT)
Dept: HOME HEALTH SERVICES | Facility: HOSPITAL | Age: 59
End: 2024-01-18
Payer: MEDICARE

## 2024-02-04 ENCOUNTER — HOSPITAL ENCOUNTER (INPATIENT)
Facility: HOSPITAL | Age: 59
LOS: 2 days | Discharge: HOME | DRG: 607 | End: 2024-02-07
Attending: EMERGENCY MEDICINE | Admitting: STUDENT IN AN ORGANIZED HEALTH CARE EDUCATION/TRAINING PROGRAM
Payer: MEDICARE

## 2024-02-04 DIAGNOSIS — S21.002A OPEN WOUND OF LEFT BREAST, INITIAL ENCOUNTER: ICD-10-CM

## 2024-02-04 DIAGNOSIS — M79.3 PANNICULITIS: Primary | ICD-10-CM

## 2024-02-04 DIAGNOSIS — R50.9 FEVER: ICD-10-CM

## 2024-02-04 DIAGNOSIS — L03.90 CELLULITIS: ICD-10-CM

## 2024-02-04 LAB
BASOPHILS # BLD AUTO: 0.06 K/UL (ref 0–0.2)
BASOPHILS NFR BLD: 0.5 % (ref 0–1.9)
DIFFERENTIAL METHOD BLD: ABNORMAL
EOSINOPHIL # BLD AUTO: 0 K/UL (ref 0–0.5)
EOSINOPHIL NFR BLD: 0.1 % (ref 0–8)
ERYTHROCYTE [DISTWIDTH] IN BLOOD BY AUTOMATED COUNT: 15.6 % (ref 11.5–14.5)
HCT VFR BLD AUTO: 37.6 % (ref 37–48.5)
HGB BLD-MCNC: 11.7 G/DL (ref 12–16)
IMM GRANULOCYTES # BLD AUTO: 0.04 K/UL (ref 0–0.04)
IMM GRANULOCYTES NFR BLD AUTO: 0.4 % (ref 0–0.5)
LYMPHOCYTES # BLD AUTO: 1.6 K/UL (ref 1–4.8)
LYMPHOCYTES NFR BLD: 14.1 % (ref 18–48)
MCH RBC QN AUTO: 26.9 PG (ref 27–31)
MCHC RBC AUTO-ENTMCNC: 31.1 G/DL (ref 32–36)
MCV RBC AUTO: 86 FL (ref 82–98)
MONOCYTES # BLD AUTO: 1 K/UL (ref 0.3–1)
MONOCYTES NFR BLD: 8.3 % (ref 4–15)
NEUTROPHILS # BLD AUTO: 8.7 K/UL (ref 1.8–7.7)
NEUTROPHILS NFR BLD: 76.6 % (ref 38–73)
NRBC BLD-RTO: 0 /100 WBC
PLATELET # BLD AUTO: 287 K/UL (ref 150–450)
PMV BLD AUTO: 9.6 FL (ref 9.2–12.9)
RBC # BLD AUTO: 4.35 M/UL (ref 4–5.4)
WBC # BLD AUTO: 11.39 K/UL (ref 3.9–12.7)

## 2024-02-04 PROCEDURE — 99285 EMERGENCY DEPT VISIT HI MDM: CPT | Mod: 25

## 2024-02-04 PROCEDURE — 87040 BLOOD CULTURE FOR BACTERIA: CPT | Mod: 59 | Performed by: STUDENT IN AN ORGANIZED HEALTH CARE EDUCATION/TRAINING PROGRAM

## 2024-02-04 PROCEDURE — 85025 COMPLETE CBC W/AUTO DIFF WBC: CPT | Performed by: STUDENT IN AN ORGANIZED HEALTH CARE EDUCATION/TRAINING PROGRAM

## 2024-02-04 PROCEDURE — 96365 THER/PROPH/DIAG IV INF INIT: CPT

## 2024-02-04 PROCEDURE — 36415 COLL VENOUS BLD VENIPUNCTURE: CPT | Performed by: STUDENT IN AN ORGANIZED HEALTH CARE EDUCATION/TRAINING PROGRAM

## 2024-02-04 PROCEDURE — 96375 TX/PRO/DX INJ NEW DRUG ADDON: CPT

## 2024-02-04 PROCEDURE — 87502 INFLUENZA DNA AMP PROBE: CPT | Performed by: STUDENT IN AN ORGANIZED HEALTH CARE EDUCATION/TRAINING PROGRAM

## 2024-02-04 PROCEDURE — U0002 COVID-19 LAB TEST NON-CDC: HCPCS | Performed by: STUDENT IN AN ORGANIZED HEALTH CARE EDUCATION/TRAINING PROGRAM

## 2024-02-05 PROBLEM — E87.6 HYPOKALEMIA: Status: ACTIVE | Noted: 2024-02-05

## 2024-02-05 PROBLEM — L03.90 CELLULITIS: Status: ACTIVE | Noted: 2024-02-05

## 2024-02-05 LAB
ALBUMIN SERPL BCP-MCNC: 3.1 G/DL (ref 3.5–5.2)
ALP SERPL-CCNC: 53 U/L (ref 55–135)
ALT SERPL W/O P-5'-P-CCNC: 7 U/L (ref 10–44)
ANION GAP SERPL CALC-SCNC: 6 MMOL/L (ref 8–16)
ANION GAP SERPL CALC-SCNC: 8 MMOL/L (ref 8–16)
AST SERPL-CCNC: 9 U/L (ref 10–40)
BASOPHILS # BLD AUTO: 0.03 K/UL (ref 0–0.2)
BASOPHILS NFR BLD: 0.3 % (ref 0–1.9)
BILIRUB SERPL-MCNC: 0.4 MG/DL (ref 0.1–1)
BILIRUB UR QL STRIP: NEGATIVE
BNP SERPL-MCNC: 19 PG/ML (ref 0–99)
BUN SERPL-MCNC: 13 MG/DL (ref 6–20)
BUN SERPL-MCNC: 14 MG/DL (ref 6–20)
CALCIUM SERPL-MCNC: 7.4 MG/DL (ref 8.7–10.5)
CALCIUM SERPL-MCNC: 8.7 MG/DL (ref 8.7–10.5)
CHLORIDE SERPL-SCNC: 103 MMOL/L (ref 95–110)
CHLORIDE SERPL-SCNC: 108 MMOL/L (ref 95–110)
CK SERPL-CCNC: 49 U/L (ref 20–180)
CLARITY UR: CLEAR
CO2 SERPL-SCNC: 21 MMOL/L (ref 23–29)
CO2 SERPL-SCNC: 27 MMOL/L (ref 23–29)
COLOR UR: YELLOW
CREAT SERPL-MCNC: 0.5 MG/DL (ref 0.5–1.4)
CREAT SERPL-MCNC: 0.6 MG/DL (ref 0.5–1.4)
DIFFERENTIAL METHOD BLD: ABNORMAL
EOSINOPHIL # BLD AUTO: 0 K/UL (ref 0–0.5)
EOSINOPHIL NFR BLD: 0.3 % (ref 0–8)
ERYTHROCYTE [DISTWIDTH] IN BLOOD BY AUTOMATED COUNT: 15.5 % (ref 11.5–14.5)
EST. GFR  (NO RACE VARIABLE): >60 ML/MIN/1.73 M^2
EST. GFR  (NO RACE VARIABLE): >60 ML/MIN/1.73 M^2
GLUCOSE SERPL-MCNC: 100 MG/DL (ref 70–110)
GLUCOSE SERPL-MCNC: 108 MG/DL (ref 70–110)
GLUCOSE SERPL-MCNC: 109 MG/DL (ref 70–110)
GLUCOSE SERPL-MCNC: 109 MG/DL (ref 70–110)
GLUCOSE SERPL-MCNC: 164 MG/DL (ref 70–110)
GLUCOSE UR QL STRIP: NEGATIVE
HCT VFR BLD AUTO: 37.6 % (ref 37–48.5)
HGB BLD-MCNC: 11.5 G/DL (ref 12–16)
HGB UR QL STRIP: NEGATIVE
IMM GRANULOCYTES # BLD AUTO: 0.03 K/UL (ref 0–0.04)
IMM GRANULOCYTES NFR BLD AUTO: 0.3 % (ref 0–0.5)
INFLUENZA A, MOLECULAR: NEGATIVE
INFLUENZA B, MOLECULAR: NEGATIVE
KETONES UR QL STRIP: NEGATIVE
LACTATE SERPL-SCNC: 0.5 MMOL/L (ref 0.5–1.9)
LEUKOCYTE ESTERASE UR QL STRIP: NEGATIVE
LYMPHOCYTES # BLD AUTO: 1.9 K/UL (ref 1–4.8)
LYMPHOCYTES NFR BLD: 19.9 % (ref 18–48)
MAGNESIUM SERPL-MCNC: 1.7 MG/DL (ref 1.6–2.6)
MCH RBC QN AUTO: 26.4 PG (ref 27–31)
MCHC RBC AUTO-ENTMCNC: 30.6 G/DL (ref 32–36)
MCV RBC AUTO: 86 FL (ref 82–98)
MONOCYTES # BLD AUTO: 0.8 K/UL (ref 0.3–1)
MONOCYTES NFR BLD: 7.9 % (ref 4–15)
MRSA SCREEN BY PCR: NEGATIVE
NEUTROPHILS # BLD AUTO: 6.7 K/UL (ref 1.8–7.7)
NEUTROPHILS NFR BLD: 71.3 % (ref 38–73)
NITRITE UR QL STRIP: NEGATIVE
NRBC BLD-RTO: 0 /100 WBC
PH UR STRIP: 7 [PH] (ref 5–8)
PLATELET # BLD AUTO: 278 K/UL (ref 150–450)
PMV BLD AUTO: 9.2 FL (ref 9.2–12.9)
POTASSIUM SERPL-SCNC: 3.1 MMOL/L (ref 3.5–5.1)
POTASSIUM SERPL-SCNC: 4.1 MMOL/L (ref 3.5–5.1)
PROCALCITONIN SERPL IA-MCNC: 0.44 NG/ML (ref 0–0.5)
PROT SERPL-MCNC: 6.1 G/DL (ref 6–8.4)
PROT UR QL STRIP: NEGATIVE
RBC # BLD AUTO: 4.36 M/UL (ref 4–5.4)
SARS-COV-2 RDRP RESP QL NAA+PROBE: NEGATIVE
SODIUM SERPL-SCNC: 136 MMOL/L (ref 136–145)
SODIUM SERPL-SCNC: 137 MMOL/L (ref 136–145)
SP GR UR STRIP: 1.02 (ref 1–1.03)
SPECIMEN SOURCE: NORMAL
TROPONIN I SERPL HS-MCNC: 2.4 PG/ML (ref 0–14.9)
URN SPEC COLLECT METH UR: NORMAL
UROBILINOGEN UR STRIP-ACNC: NEGATIVE EU/DL
WBC # BLD AUTO: 9.44 K/UL (ref 3.9–12.7)

## 2024-02-05 PROCEDURE — 99900035 HC TECH TIME PER 15 MIN (STAT)

## 2024-02-05 PROCEDURE — 25000003 PHARM REV CODE 250: Performed by: EMERGENCY MEDICINE

## 2024-02-05 PROCEDURE — 97161 PT EVAL LOW COMPLEX 20 MIN: CPT

## 2024-02-05 PROCEDURE — 84484 ASSAY OF TROPONIN QUANT: CPT | Performed by: STUDENT IN AN ORGANIZED HEALTH CARE EDUCATION/TRAINING PROGRAM

## 2024-02-05 PROCEDURE — 84145 PROCALCITONIN (PCT): CPT | Performed by: STUDENT IN AN ORGANIZED HEALTH CARE EDUCATION/TRAINING PROGRAM

## 2024-02-05 PROCEDURE — 27000221 HC OXYGEN, UP TO 24 HOURS

## 2024-02-05 PROCEDURE — 80048 BASIC METABOLIC PNL TOTAL CA: CPT | Performed by: STUDENT IN AN ORGANIZED HEALTH CARE EDUCATION/TRAINING PROGRAM

## 2024-02-05 PROCEDURE — 63600175 PHARM REV CODE 636 W HCPCS: Performed by: EMERGENCY MEDICINE

## 2024-02-05 PROCEDURE — 36415 COLL VENOUS BLD VENIPUNCTURE: CPT | Performed by: STUDENT IN AN ORGANIZED HEALTH CARE EDUCATION/TRAINING PROGRAM

## 2024-02-05 PROCEDURE — 97535 SELF CARE MNGMENT TRAINING: CPT

## 2024-02-05 PROCEDURE — 83735 ASSAY OF MAGNESIUM: CPT | Performed by: STUDENT IN AN ORGANIZED HEALTH CARE EDUCATION/TRAINING PROGRAM

## 2024-02-05 PROCEDURE — 80053 COMPREHEN METABOLIC PANEL: CPT | Performed by: STUDENT IN AN ORGANIZED HEALTH CARE EDUCATION/TRAINING PROGRAM

## 2024-02-05 PROCEDURE — 85025 COMPLETE CBC W/AUTO DIFF WBC: CPT | Performed by: STUDENT IN AN ORGANIZED HEALTH CARE EDUCATION/TRAINING PROGRAM

## 2024-02-05 PROCEDURE — 81003 URINALYSIS AUTO W/O SCOPE: CPT | Performed by: STUDENT IN AN ORGANIZED HEALTH CARE EDUCATION/TRAINING PROGRAM

## 2024-02-05 PROCEDURE — 63600175 PHARM REV CODE 636 W HCPCS: Mod: JZ,JG | Performed by: STUDENT IN AN ORGANIZED HEALTH CARE EDUCATION/TRAINING PROGRAM

## 2024-02-05 PROCEDURE — 25000003 PHARM REV CODE 250: Performed by: STUDENT IN AN ORGANIZED HEALTH CARE EDUCATION/TRAINING PROGRAM

## 2024-02-05 PROCEDURE — 83880 ASSAY OF NATRIURETIC PEPTIDE: CPT | Performed by: STUDENT IN AN ORGANIZED HEALTH CARE EDUCATION/TRAINING PROGRAM

## 2024-02-05 PROCEDURE — 87641 MR-STAPH DNA AMP PROBE: CPT | Performed by: STUDENT IN AN ORGANIZED HEALTH CARE EDUCATION/TRAINING PROGRAM

## 2024-02-05 PROCEDURE — 93010 ELECTROCARDIOGRAM REPORT: CPT | Mod: ,,, | Performed by: GENERAL PRACTICE

## 2024-02-05 PROCEDURE — 12000002 HC ACUTE/MED SURGE SEMI-PRIVATE ROOM

## 2024-02-05 PROCEDURE — 96372 THER/PROPH/DIAG INJ SC/IM: CPT | Performed by: STUDENT IN AN ORGANIZED HEALTH CARE EDUCATION/TRAINING PROGRAM

## 2024-02-05 PROCEDURE — 93005 ELECTROCARDIOGRAM TRACING: CPT | Performed by: GENERAL PRACTICE

## 2024-02-05 PROCEDURE — 94761 N-INVAS EAR/PLS OXIMETRY MLT: CPT

## 2024-02-05 PROCEDURE — 94660 CPAP INITIATION&MGMT: CPT

## 2024-02-05 PROCEDURE — 97165 OT EVAL LOW COMPLEX 30 MIN: CPT

## 2024-02-05 PROCEDURE — 63600175 PHARM REV CODE 636 W HCPCS: Performed by: STUDENT IN AN ORGANIZED HEALTH CARE EDUCATION/TRAINING PROGRAM

## 2024-02-05 PROCEDURE — 82550 ASSAY OF CK (CPK): CPT | Performed by: STUDENT IN AN ORGANIZED HEALTH CARE EDUCATION/TRAINING PROGRAM

## 2024-02-05 PROCEDURE — 96376 TX/PRO/DX INJ SAME DRUG ADON: CPT

## 2024-02-05 PROCEDURE — 83605 ASSAY OF LACTIC ACID: CPT | Performed by: STUDENT IN AN ORGANIZED HEALTH CARE EDUCATION/TRAINING PROGRAM

## 2024-02-05 RX ORDER — ENOXAPARIN SODIUM 100 MG/ML
40 INJECTION SUBCUTANEOUS EVERY 24 HOURS
Status: DISCONTINUED | OUTPATIENT
Start: 2024-02-05 | End: 2024-02-05

## 2024-02-05 RX ORDER — IBUPROFEN 200 MG
24 TABLET ORAL
Status: DISCONTINUED | OUTPATIENT
Start: 2024-02-05 | End: 2024-02-07 | Stop reason: HOSPADM

## 2024-02-05 RX ORDER — IBUPROFEN 200 MG
16 TABLET ORAL
Status: DISCONTINUED | OUTPATIENT
Start: 2024-02-05 | End: 2024-02-07 | Stop reason: HOSPADM

## 2024-02-05 RX ORDER — OXYCODONE HYDROCHLORIDE 5 MG/1
5 TABLET ORAL EVERY 6 HOURS PRN
Status: DISCONTINUED | OUTPATIENT
Start: 2024-02-05 | End: 2024-02-07 | Stop reason: HOSPADM

## 2024-02-05 RX ORDER — MORPHINE SULFATE 4 MG/ML
4 INJECTION, SOLUTION INTRAMUSCULAR; INTRAVENOUS
Status: COMPLETED | OUTPATIENT
Start: 2024-02-05 | End: 2024-02-05

## 2024-02-05 RX ORDER — ONDANSETRON HYDROCHLORIDE 2 MG/ML
4 INJECTION, SOLUTION INTRAVENOUS
Status: COMPLETED | OUTPATIENT
Start: 2024-02-05 | End: 2024-02-05

## 2024-02-05 RX ORDER — HYDROMORPHONE HYDROCHLORIDE 1 MG/ML
0.5 INJECTION, SOLUTION INTRAMUSCULAR; INTRAVENOUS; SUBCUTANEOUS EVERY 4 HOURS PRN
Status: DISCONTINUED | OUTPATIENT
Start: 2024-02-05 | End: 2024-02-07 | Stop reason: HOSPADM

## 2024-02-05 RX ORDER — SODIUM CHLORIDE 0.9 % (FLUSH) 0.9 %
10 SYRINGE (ML) INJECTION
Status: DISCONTINUED | OUTPATIENT
Start: 2024-02-05 | End: 2024-02-07 | Stop reason: HOSPADM

## 2024-02-05 RX ORDER — VANCOMYCIN HCL IN 5 % DEXTROSE 1G/250ML
2000 PLASTIC BAG, INJECTION (ML) INTRAVENOUS ONCE
Status: COMPLETED | OUTPATIENT
Start: 2024-02-05 | End: 2024-02-05

## 2024-02-05 RX ORDER — TALC
6 POWDER (GRAM) TOPICAL NIGHTLY PRN
Status: DISCONTINUED | OUTPATIENT
Start: 2024-02-05 | End: 2024-02-07 | Stop reason: HOSPADM

## 2024-02-05 RX ORDER — GLUCAGON 1 MG
1 KIT INJECTION
Status: DISCONTINUED | OUTPATIENT
Start: 2024-02-05 | End: 2024-02-07 | Stop reason: HOSPADM

## 2024-02-05 RX ORDER — ENOXAPARIN SODIUM 100 MG/ML
40 INJECTION SUBCUTANEOUS EVERY 12 HOURS
Status: DISCONTINUED | OUTPATIENT
Start: 2024-02-05 | End: 2024-02-06

## 2024-02-05 RX ORDER — BUPROPION HYDROCHLORIDE 150 MG/1
150 TABLET ORAL DAILY
Status: DISCONTINUED | OUTPATIENT
Start: 2024-02-05 | End: 2024-02-07 | Stop reason: HOSPADM

## 2024-02-05 RX ORDER — METHOCARBAMOL 500 MG/1
500 TABLET, FILM COATED ORAL 3 TIMES DAILY PRN
Status: DISCONTINUED | OUTPATIENT
Start: 2024-02-05 | End: 2024-02-07 | Stop reason: HOSPADM

## 2024-02-05 RX ORDER — SODIUM CHLORIDE 9 MG/ML
INJECTION, SOLUTION INTRAVENOUS CONTINUOUS
Status: ACTIVE | OUTPATIENT
Start: 2024-02-05 | End: 2024-02-05

## 2024-02-05 RX ORDER — INSULIN ASPART 100 [IU]/ML
0-10 INJECTION, SOLUTION INTRAVENOUS; SUBCUTANEOUS
Status: DISCONTINUED | OUTPATIENT
Start: 2024-02-05 | End: 2024-02-07 | Stop reason: HOSPADM

## 2024-02-05 RX ORDER — ALBUTEROL SULFATE 0.83 MG/ML
2.5 SOLUTION RESPIRATORY (INHALATION) EVERY 4 HOURS PRN
Status: DISCONTINUED | OUTPATIENT
Start: 2024-02-05 | End: 2024-02-07 | Stop reason: HOSPADM

## 2024-02-05 RX ORDER — ONDANSETRON HYDROCHLORIDE 2 MG/ML
4 INJECTION, SOLUTION INTRAVENOUS EVERY 6 HOURS PRN
Status: DISCONTINUED | OUTPATIENT
Start: 2024-02-05 | End: 2024-02-07 | Stop reason: HOSPADM

## 2024-02-05 RX ADMIN — HYDROMORPHONE HYDROCHLORIDE 0.5 MG: 0.5 INJECTION, SOLUTION INTRAMUSCULAR; INTRAVENOUS; SUBCUTANEOUS at 07:02

## 2024-02-05 RX ADMIN — ONDANSETRON 4 MG: 2 INJECTION INTRAMUSCULAR; INTRAVENOUS at 05:02

## 2024-02-05 RX ADMIN — METHOCARBAMOL 500 MG: 500 TABLET ORAL at 08:02

## 2024-02-05 RX ADMIN — SODIUM CHLORIDE: 9 INJECTION, SOLUTION INTRAVENOUS at 05:02

## 2024-02-05 RX ADMIN — HYDROMORPHONE HYDROCHLORIDE 0.5 MG: 0.5 INJECTION, SOLUTION INTRAMUSCULAR; INTRAVENOUS; SUBCUTANEOUS at 01:02

## 2024-02-05 RX ADMIN — OXYCODONE HYDROCHLORIDE 5 MG: 5 TABLET ORAL at 10:02

## 2024-02-05 RX ADMIN — POTASSIUM BICARBONATE 50 MEQ: 977.5 TABLET, EFFERVESCENT ORAL at 04:02

## 2024-02-05 RX ADMIN — SODIUM CHLORIDE: 9 INJECTION, SOLUTION INTRAVENOUS at 01:02

## 2024-02-05 RX ADMIN — SODIUM CHLORIDE: 9 INJECTION, SOLUTION INTRAVENOUS at 04:02

## 2024-02-05 RX ADMIN — ONDANSETRON 4 MG: 2 INJECTION INTRAMUSCULAR; INTRAVENOUS at 02:02

## 2024-02-05 RX ADMIN — ENOXAPARIN SODIUM 40 MG: 100 INJECTION SUBCUTANEOUS at 08:02

## 2024-02-05 RX ADMIN — DAPTOMYCIN 820 MG: 500 INJECTION, POWDER, LYOPHILIZED, FOR SOLUTION INTRAVENOUS at 03:02

## 2024-02-05 RX ADMIN — OXYCODONE HYDROCHLORIDE 5 MG: 5 TABLET ORAL at 05:02

## 2024-02-05 RX ADMIN — MORPHINE SULFATE 4 MG: 4 INJECTION, SOLUTION INTRAMUSCULAR; INTRAVENOUS at 02:02

## 2024-02-05 RX ADMIN — BUPROPION HYDROCHLORIDE 150 MG: 150 TABLET, EXTENDED RELEASE ORAL at 08:02

## 2024-02-05 RX ADMIN — VANCOMYCIN HYDROCHLORIDE 2000 MG: 1 INJECTION, POWDER, LYOPHILIZED, FOR SOLUTION INTRAVENOUS at 02:02

## 2024-02-05 NOTE — ASSESSMENT & PLAN NOTE
May be 2/2 gastric bypass   -replete prn   -may need to start PO supplementation at discharge if persistently low in hospital   -may be cause of persistent muscle spasms

## 2024-02-05 NOTE — ASSESSMENT & PLAN NOTE
-vancomycin  -per prior notes, patient had difficulty becoming therapeutic on vanc and was switched to daptomycin 2/2 weight   -monitor vanc troughs and consider switching to dapto if needed   -will need to follow up w/ID outpatient

## 2024-02-05 NOTE — SUBJECTIVE & OBJECTIVE
Past Medical History:   Diagnosis Date    Allergy     Anemia     Asthma     COPD (chronic obstructive pulmonary disease)     Deep vein thrombosis     Depression     Diabetes mellitus, type 2     Encounter for blood transfusion     Heart murmur     Neuromuscular disorder        Past Surgical History:   Procedure Laterality Date     SECTION      DILATION AND CURETTAGE OF UTERUS      gastric sleeve      [facility]    TONSILLECTOMY         Review of patient's allergies indicates:   Allergen Reactions    Aspirin     Dilaudid [hydromorphone]      Excessive sleepiness    Nsaids (non-steroidal anti-inflammatory drug) Hives    Teflaro [ceftaroline fosamil]      Allergic reaction/ hives/itching       No current facility-administered medications on file prior to encounter.     Current Outpatient Medications on File Prior to Encounter   Medication Sig    acetaminophen (TYLENOL) 650 MG TbSR Take 650 mg by mouth every 8 (eight) hours.    albuterol (PROVENTIL/VENTOLIN HFA) 90 mcg/actuation inhaler Inhale 2 puffs into the lungs every 6 (six) hours as needed.     buPROPion (WELLBUTRIN XL) 150 MG TB24 tablet Take 150 mg by mouth once daily.    clindamycin (CLEOCIN T) 1 % lotion Apply topically 2 (two) times daily.    docusate sodium (COLACE) 100 MG capsule Take 1 capsule (100 mg total) by mouth 2 (two) times daily.    oxyCODONE (ROXICODONE) 5 MG immediate release tablet Take 1 tablet (5 mg total) by mouth every 4 (four) hours as needed for Pain. (Patient not taking: Reported on 2023)    semaglutide (OZEMPIC) 0.25 mg or 0.5 mg (2 mg/3 mL) pen injector Inject 0.5 mg into the skin every 7 days.     Family History       Problem Relation (Age of Onset)    Arthritis Mother, Maternal Grandmother    COPD Father    Cancer Father, Maternal Grandmother, Maternal Grandfather, Paternal Grandmother    Depression Mother    Diabetes Mother, Paternal Grandmother, Paternal Grandfather    Heart disease Mother    Hyperlipidemia Paternal  Grandfather    Kidney disease Paternal Grandmother          Tobacco Use    Smoking status: Every Day     Current packs/day: 1.00     Types: Cigarettes    Smokeless tobacco: Never   Substance and Sexual Activity    Alcohol use: No    Drug use: No    Sexual activity: Never     Review of Systems   Constitutional:  Positive for chills and fever.   Gastrointestinal:  Positive for abdominal pain, nausea and vomiting. Negative for diarrhea.   Genitourinary:  Negative for dysuria.   Musculoskeletal:  Positive for myalgias.     Objective:     Vital Signs (Most Recent):  Temp: 98.5 °F (36.9 °C) (02/04/24 2311)  Pulse: 79 (02/05/24 0231)  Resp: 16 (02/05/24 0240)  BP: 139/63 (02/05/24 0231)  SpO2: 96 % (02/05/24 0231) Vital Signs (24h Range):  Temp:  [98.5 °F (36.9 °C)] 98.5 °F (36.9 °C)  Pulse:  [77-79] 79  Resp:  [16-20] 16  SpO2:  [96 %-98 %] 96 %  BP: (123-139)/(63-64) 139/63     Weight: (!) 174.6 kg (385 lb)  Body mass index is 66.09 kg/m².     Physical Exam  Constitutional:       Appearance: She is obese.   HENT:      Head: Normocephalic and atraumatic.      Right Ear: External ear normal.      Left Ear: External ear normal.      Nose: Nose normal.      Mouth/Throat:      Mouth: Mucous membranes are moist.   Eyes:      Extraocular Movements: Extraocular movements intact.   Cardiovascular:      Rate and Rhythm: Normal rate and regular rhythm.      Heart sounds: No murmur heard.  Pulmonary:      Effort: Pulmonary effort is normal. No respiratory distress.      Breath sounds: No wheezing.   Abdominal:      General: Abdomen is flat.      Tenderness: There is abdominal tenderness (epigastric).   Musculoskeletal:      Right lower leg: No edema.      Left lower leg: No edema.      Comments: No spinal or paraspinal tenderness to palpation    Skin:     Comments: Large pannus attached to the right thigh; erythematous and firm; when compared to pictures from 8/2023 does appear more erythematous than baseline   No purulent drainage     Neurological:      General: No focal deficit present.      Mental Status: She is alert. Mental status is at baseline.   Psychiatric:         Mood and Affect: Mood normal.         Behavior: Behavior normal.                Significant Labs: All pertinent labs within the past 24 hours have been reviewed.    Significant Imaging: I have reviewed all pertinent imaging results/findings within the past 24 hours.

## 2024-02-05 NOTE — PLAN OF CARE
Problem: Adult Inpatient Plan of Care  Goal: Absence of Hospital-Acquired Illness or Injury  Outcome: Adequate for Care Transition  Goal: Optimal Comfort and Wellbeing  Outcome: Adequate for Care Transition     Problem: Bariatric Environmental Safety  Goal: Safety Maintained with Care  Outcome: Adequate for Care Transition

## 2024-02-05 NOTE — H&P
[facility] - Emergency Dept  Hospital Medicine  History & Physical    Patient Name: Adriana Zaragoza  MRN: [id number]  Patient Class: OP- Observation  Admission Date: 2024  Attending Physician: Gio Jasso MD   Primary Care Provider: Jad Hua MD         Patient information was obtained from patient and ER records.     Subjective:     Principal Problem:Cellulitis    Chief Complaint:   Chief Complaint   Patient presents with    Fever     She has been having fever, chills,shaking, vomiting.        HPI: Ms. Zaragoza is a 59 yo w/pmhx of morbid obesity, asthma/COPD, diabetes, depression, gastric bypass in 2019, lymphedema, large right thigh pannus who presents with weakness. Patient reports she has been feeling weak for the past week. She also reports chills, subjective fevers, malaise, bilateral lower extremity spasms, lower back pain, nausea/vomiting, and abdominal pain. She was helping her friends parents move when these symptoms started. She states she has a large pannus connected to her right thigh that has become infected in the past. However, she is unable to see herself if it has enlarged or is erythematous. She does note that her skin feels tight and while she has had spasms in the past, it has worsened the last few days and she is also reporting worsening pain around the pannus. She has seen Dr. Humphreys in clinic for the pannus with her last appt being in 2023. She was prescribed PO doxy at that time for cellulitis of the pannus. Denies any dysuria or diarrhea.     Labs notable for WBC 11, k 3.1, cr 0.5. Flu/covid negative. Lactate 0.5.     Past Medical History:   Diagnosis Date    Allergy     Anemia     Asthma     COPD (chronic obstructive pulmonary disease)     Deep vein thrombosis     Depression     Diabetes mellitus, type 2     Encounter for blood transfusion     Heart murmur     Neuromuscular disorder        Past Surgical History:   Procedure Laterality Date      SECTION      DILATION AND CURETTAGE OF UTERUS      gastric sleeve      [facility]    TONSILLECTOMY         Review of patient's allergies indicates:   Allergen Reactions    Aspirin     Dilaudid [hydromorphone]      Excessive sleepiness    Nsaids (non-steroidal anti-inflammatory drug) Hives    Teflaro [ceftaroline fosamil]      Allergic reaction/ hives/itching       No current facility-administered medications on file prior to encounter.     Current Outpatient Medications on File Prior to Encounter   Medication Sig    acetaminophen (TYLENOL) 650 MG TbSR Take 650 mg by mouth every 8 (eight) hours.    albuterol (PROVENTIL/VENTOLIN HFA) 90 mcg/actuation inhaler Inhale 2 puffs into the lungs every 6 (six) hours as needed.     buPROPion (WELLBUTRIN XL) 150 MG TB24 tablet Take 150 mg by mouth once daily.    clindamycin (CLEOCIN T) 1 % lotion Apply topically 2 (two) times daily.    docusate sodium (COLACE) 100 MG capsule Take 1 capsule (100 mg total) by mouth 2 (two) times daily.    oxyCODONE (ROXICODONE) 5 MG immediate release tablet Take 1 tablet (5 mg total) by mouth every 4 (four) hours as needed for Pain. (Patient not taking: Reported on 8/8/2023)    semaglutide (OZEMPIC) 0.25 mg or 0.5 mg (2 mg/3 mL) pen injector Inject 0.5 mg into the skin every 7 days.     Family History       Problem Relation (Age of Onset)    Arthritis Mother, Maternal Grandmother    COPD Father    Cancer Father, Maternal Grandmother, Maternal Grandfather, Paternal Grandmother    Depression Mother    Diabetes Mother, Paternal Grandmother, Paternal Grandfather    Heart disease Mother    Hyperlipidemia Paternal Grandfather    Kidney disease Paternal Grandmother          Tobacco Use    Smoking status: Every Day     Current packs/day: 1.00     Types: Cigarettes    Smokeless tobacco: Never   Substance and Sexual Activity    Alcohol use: No    Drug use: No    Sexual activity: Never     Review of Systems   Constitutional:  Positive for chills and fever.    Gastrointestinal:  Positive for abdominal pain, nausea and vomiting. Negative for diarrhea.   Genitourinary:  Negative for dysuria.   Musculoskeletal:  Positive for myalgias.     Objective:     Vital Signs (Most Recent):  Temp: 98.5 °F (36.9 °C) (02/04/24 2311)  Pulse: 79 (02/05/24 0231)  Resp: 16 (02/05/24 0240)  BP: 139/63 (02/05/24 0231)  SpO2: 96 % (02/05/24 0231) Vital Signs (24h Range):  Temp:  [98.5 °F (36.9 °C)] 98.5 °F (36.9 °C)  Pulse:  [77-79] 79  Resp:  [16-20] 16  SpO2:  [96 %-98 %] 96 %  BP: (123-139)/(63-64) 139/63     Weight: (!) 174.6 kg (385 lb)  Body mass index is 66.09 kg/m².     Physical Exam  Constitutional:       Appearance: She is obese.   HENT:      Head: Normocephalic and atraumatic.      Right Ear: External ear normal.      Left Ear: External ear normal.      Nose: Nose normal.      Mouth/Throat:      Mouth: Mucous membranes are moist.   Eyes:      Extraocular Movements: Extraocular movements intact.   Cardiovascular:      Rate and Rhythm: Normal rate and regular rhythm.      Heart sounds: No murmur heard.  Pulmonary:      Effort: Pulmonary effort is normal. No respiratory distress.      Breath sounds: No wheezing.   Abdominal:      General: Abdomen is flat.      Tenderness: There is abdominal tenderness (epigastric).   Musculoskeletal:      Right lower leg: No edema.      Left lower leg: No edema.      Comments: No spinal or paraspinal tenderness to palpation    Skin:     Comments: Large pannus attached to the right thigh; erythematous and firm; when compared to pictures from 8/2023 does appear more erythematous than baseline   No purulent drainage    Neurological:      General: No focal deficit present.      Mental Status: She is alert. Mental status is at baseline.   Psychiatric:         Mood and Affect: Mood normal.         Behavior: Behavior normal.                Significant Labs: All pertinent labs within the past 24 hours have been reviewed.    Significant Imaging: I have  "reviewed all pertinent imaging results/findings within the past 24 hours.  Assessment/Plan:     * Cellulitis  -vancomycin  -per prior notes, patient had difficulty becoming therapeutic on vanc and was switched to daptomycin 2/2 weight   -monitor vanc troughs and consider switching to dapto if needed   -will need to follow up w/ID outpatient   -blood cultures   -MRSA nares     Hypokalemia  May be 2/2 gastric bypass   -replete prn   -may need to start PO supplementation at discharge if persistently low in hospital   -may be cause of persistent muscle spasms     S/P gastric bypass  Noted     Moderate episode of recurrent major depressive disorder  -bupropion     Chronic low back pain with bilateral sciatica  -robaxin prn   -will defer spinal imaging for now as exam w/benign findings       Lymphedema  Noted      Morbid obesity  Body mass index is 66.09 kg/m². Morbid obesity complicates all aspects of disease management from diagnostic modalities to treatment. Weight loss encouraged and health benefits explained to patient.     -PT/OT        VTE Risk Mitigation (From admission, onward)           Ordered     enoxaparin injection 40 mg  Every 12 hours         02/05/24 0356     IP VTE HIGH RISK PATIENT  Once         02/05/24 0355     Place sequential compression device  Until discontinued         02/05/24 0355                       On 02/05/2024, patient should be placed in hospital observation services under my care.        Pharmacist Renal Dose Adjustment Note    Adriana Zaragoza is a 58 y.o. female being treated with the medication Enoxaparin    Patient Data:    Vital Signs (Most Recent):  Temp: 98.5 °F (36.9 °C) (02/04/24 2311)  Pulse: 79 (02/05/24 0231)  Resp: 16 (02/05/24 0240)  BP: 139/63 (02/05/24 0231)  SpO2: 96 % (02/05/24 0231) Vital Signs (72h Range):  Temp:  [98.5 °F (36.9 °C)]   Pulse:  [77-79]   Resp:  [16-20]   BP: (123-139)/(63-64)   SpO2:  [96 %-98 %]        Ht: 5' 4" (1.626 m)  Wt: (!) 174.6 kg " (385 lb)  Estimated Creatinine Clearance: 196.4 mL/min (based on SCr of 0.5 mg/dL).  Body mass index is 66.09 kg/m².    Per [facility] renal dosing protocol:     Previous Order: Enoxaparin 40 mg Q24H    Will be changed to:     New Order: Enoxaparin 40 mg Q12H,    Due to: Per Pharmacy Protocol    Renal dose adjustments performed as noted above.    We will continue monitoring and adjusting as necessary.    Pharmacist: Zion Damico, PharmD  Ext: [phone]        Gio Jasso MD  Department of Hospital Medicine  [facility] - Emergency Dept

## 2024-02-05 NOTE — NURSING
Nurses Note -- 4 Eyes      2/5/2024   5:31 AM      Skin assessed during: Admit      [] No Altered Skin Integrity Present    []Prevention Measures Documented      [x] Yes- Altered Skin Integrity Present or Discovered   [] LDA Added if Not in Epic (Describe Wound)   [] New Altered Skin Integrity was Present on Admit and Documented in LDA   [x] Wound Image Taken    Wound Care Consulted? Yes    Attending Nurse:  Mignon Mcdermott RN/Staff Member:   miguelina

## 2024-02-05 NOTE — ASSESSMENT & PLAN NOTE
-vancomycin  -per prior notes, patient had difficulty becoming therapeutic on vanc and was switched to daptomycin 2/2 weight   -monitor vanc troughs and consider switching to dapto if needed   -will need to follow up w/ID outpatient   -blood cultures   -MRSA nares

## 2024-02-05 NOTE — PROGRESS NOTES
"Pharmacist Renal Dose Adjustment Note    Adriana Zaragoza is a 58 y.o. female being treated with the medication Enoxaparin    Patient Data:    Vital Signs (Most Recent):  Temp: 98.5 °F (36.9 °C) (02/04/24 2311)  Pulse: 79 (02/05/24 0231)  Resp: 16 (02/05/24 0240)  BP: 139/63 (02/05/24 0231)  SpO2: 96 % (02/05/24 0231) Vital Signs (72h Range):  Temp:  [98.5 °F (36.9 °C)]   Pulse:  [77-79]   Resp:  [16-20]   BP: (123-139)/(63-64)   SpO2:  [96 %-98 %]        Ht: 5' 4" (1.626 m)  Wt: (!) 174.6 kg (385 lb)  Estimated Creatinine Clearance: 196.4 mL/min (based on SCr of 0.5 mg/dL).  Body mass index is 66.09 kg/m².    Per [facility] renal dosing protocol:     Previous Order: Enoxaparin 40 mg Q24H    Will be changed to:     New Order: Enoxaparin 40 mg Q12H,    Due to: Per Pharmacy Protocol    Renal dose adjustments performed as noted above.    We will continue monitoring and adjusting as necessary.    Pharmacist: Zion Damico PharmD  Ext: [phone]      "

## 2024-02-05 NOTE — HPI
Ms. Zaragoza is a 57 yo w/pmhx of morbid obesity, asthma/COPD, diabetes, depression, gastric bypass in 2019, lymphedema, large right thigh pannus who presents with weakness. Patient reports she has been feeling weak for the past week. She also reports chills, subjective fevers, malaise, bilateral lower extremity spasms, lower back pain, nausea/vomiting, and abdominal pain. She was helping her friends parents move when these symptoms started. She states she has a large pannus connected to her right thigh that has become infected in the past. However, she is unable to see herself if it has enlarged or is erythematous. She does note that her skin feels tight and while she has had spasms in the past, it has worsened the last few days and she is also reporting worsening pain around the pannus. She has seen Dr. Humphreys in clinic for the pannus with her last appt being in 8/2023. She was prescribed PO doxy at that time for cellulitis of the pannus. Denies any dysuria or diarrhea.     Labs notable for WBC 11, k 3.1, cr 0.5. Flu/covid negative. Lactate 0.5.

## 2024-02-05 NOTE — ASSESSMENT & PLAN NOTE
Body mass index is 66.09 kg/m². Morbid obesity complicates all aspects of disease management from diagnostic modalities to treatment. Weight loss encouraged and health benefits explained to patient.     -PT/OT

## 2024-02-05 NOTE — PT/OT/SLP EVAL
Physical Therapy Evaluation and Discharge Note    Patient Name:  Adriana Zaragoza   MRN:  [id number]    Recommendations:     Discharge Recommendations: No Therapy Indicated  Discharge Equipment Recommendations: none   Barriers to discharge: None    Assessment:     Adriana Zaragoza is a 58 y.o. female admitted with a medical diagnosis of Cellulitis. .  At this time, patient is functioning at their prior level of function and does not require further acute PT services.     Recent Surgery: * No surgery found *      Plan:     During this hospitalization, patient does not require further acute PT services.  Please re-consult if situation changes.      Subjective     Chief Complaint: feeling better today  Patient/Family Comments/goals: home  Pain/Comfort:  Pain Rating 1: 0/10  Pain Rating Post-Intervention 1: 0/10    Patients cultural, spiritual, Gnosticist conflicts given the current situation:      Living Environment:  Lives with boyfriend and 2 room  mates, drives, SSH, sunken living room, sleeps in recliner, uses rollator for community mobility  Prior to admission, patients level of function was mod I.  Equipment used at home: BIPAP, grab bar, rollator.  DME owned (not currently used): none.  Upon discharge, patient will have assistance from boyfriend.    Objective:     Communicated with nurse prior to session.  Patient found HOB elevated with oxygen, peripheral IV upon PT entry to room.    General Precautions: Standard, fall    Orthopedic Precautions:N/A   Braces: N/A  Respiratory Status: Nasal cannula, flow 2 L/min    Exams:  Cognitive Exam:  Patient is oriented to Person, Place, Time, and Situation  Gross Motor Coordination:  WFL  RLE Strength: WFL  LLE Strength: WFL    Functional Mobility:  Bed Mobility:     Supine to Sit: stand by assistance and for panus management only  Sit to Supine: stand by assistance and for panus management only  Transfers:     Sit to Stand:  modified independence with no  AD  Bed to Chair: modified independence with  no AD  using  Step Transfer  Gait: amb in room without AD on 2 NC, fair kvng, steady. SPO@ 92-94%    AM-PAC 6 CLICK MOBILITY  Total Score:22       Treatment and Education:  Pt educated in PT roles and goals, DC recommendations, fall prevention, use of call light, importance of activity OOB    AM-PAC 6 CLICK MOBILITY  Total Score:22     Patient left HOB elevated with all lines intact and call button in reach.    GOALS:   Multidisciplinary Problems       Physical Therapy Goals       Not on file                    History:     Past Medical History:   Diagnosis Date    Allergy     Anemia     Asthma     COPD (chronic obstructive pulmonary disease)     Deep vein thrombosis     Depression     Diabetes mellitus, type 2     Encounter for blood transfusion     Heart murmur     Neuromuscular disorder        Past Surgical History:   Procedure Laterality Date     SECTION      DILATION AND CURETTAGE OF UTERUS      gastric sleeve      [facility]    TONSILLECTOMY         Time Tracking:     PT Received On: 24  PT Start Time: 1007     PT Stop Time: 1023  PT Total Time (min): 16 min     Billable Minutes: Evaluation 16      2024

## 2024-02-05 NOTE — PROGRESS NOTES
Pharmacy Consult Discontinuation: Vancomycin    Adriana Zaragoza [id number] is a 58 y.o. female was consulted for vancomycin pharmacotherapy management by pharmacy.    Pharmacy consult for vancomycin dosing is no longer required.  Vancomycin was discontinued 02/05/2024 by Dr. Max  @ 8400.    Thank you for allowing us to participate in this patient's care. Should you have any questions or concerns please feel free to contact the pharmacy department at [phone].    Robert Squires, VERNELL

## 2024-02-05 NOTE — PLAN OF CARE
02/05/24 1143   TRAN Message   Medicare Outpatient and Observation Notification regarding financial responsibility Given to patient/caregiver;Explained to patient/caregiver;Signed/date by patient/caregiver   Date TRAN was signed 02/05/24   Time TRAN was signed 1132

## 2024-02-05 NOTE — PT/OT/SLP EVAL
Occupational Therapy   Evaluation and Discharge Note    Name: Adriana Zaragoza  MRN: [id number]  Admitting Diagnosis: Cellulitis  Recent Surgery: * No surgery found *      Recommendations:     Discharge Recommendations: No Therapy Indicated  Discharge Equipment Recommendations: none  Barriers to discharge:  None    Assessment:     Adriana Zaragoza is a 58 y.o. female with a medical diagnosis of Cellulitis. At this time, patient is functioning at their prior level of function and does not require further acute OT services.     Plan:     During this hospitalization, patient does not require further acute OT services.  Please re-consult if situation changes.    Plan of Care Reviewed with: patient    Subjective     Chief Complaint: None  Patient/Family Comments/goals: To go home.    Occupational Profile:  Living Environment: lives with boyfriend and 2 roommates in a 1 story home, 1 step to enter.  Previous level of function: modified independent with ADLs and ambulation using a rollator.   Roles and Routines: primary homemaker  Equipment Used at home: rollator, shower chair, grab bar, oxygen  Assistance upon Discharge: Boyfriend and 2 roommates    Pain/Comfort:  Pain Rating 1: 8/10  Location 1: back  Pain Addressed 1: Reposition, Distraction  Pain Rating Post-Intervention 1: 8/10    Patients cultural, spiritual, Restorationism conflicts given the current situation: no    Objective:     Communicated with: nurse prior to session.  Patient found HOB elevated with telemetry, peripheral IV, oxygen upon OT entry to room.    General Precautions: Standard, fall  Orthopedic Precautions: N/A  Braces: N/A  Respiratory Status: Room air     Occupational Performance:    Bed Mobility:    Patient completed Scooting/Bridging with minimum assistance, assist holding panus  Patient completed Supine to Sit with minimum assistance, assist holding panus  Patient completed Sit to Supine with minimum assistance, assist holding  panus    Functional Mobility/Transfers:  Patient completed Sit <> Stand Transfer with modified independence  with  rolling walker   Patient completed Toilet Transfer Step Transfer technique with modified independence with  rolling walker  Functional Mobility: ambulated 25 feet in hospital room and bathroom with modified independence using rolling walker.    Activities of Daily Living:  Grooming: modified independence to wash hand standing at sink.  Toileting: modified independence to perform toilet hygiene from commode.    Cognitive/Visual Perceptual:  Cognitive/Psychosocial Skills:     -       Oriented to: Person, Place, Time, and Situation   -       Follows Commands/attention:Follows multistep  commands  -       Communication: clear/fluent  -       Memory: No Deficits noted  -       Safety awareness/insight to disability: intact   -       Mood/Affect/Coping skills/emotional control: Cooperative and Pleasant  Visual/Perceptual:      -Intact Acuity    Physical Exam:  Balance:    -       Sitting/Standing: WFL  Upper Extremity Range of Motion:     -       Right Upper Extremity: WFL  -       Left Upper Extremity: WFL  Upper Extremity Strength:    -       Right Upper Extremity: WFL  -       Left Upper Extremity: WFL   Strength:    -       Right Upper Extremity: WFL  -       Left Upper Extremity: WFL  Fine Motor Coordination:    -       Intact    AMPAC 6 Click ADL:  AMPAC Total Score: 24    Treatment & Education:  Patient is currently modified independent with sitting/standing ADL activity using rolling walker. Needs assistance to hold panus when getting in/out of bed.    Patient left HOB elevated with all lines intact and call button in reach    GOALS:   Multidisciplinary Problems       Occupational Therapy Goals       Not on file                    History:     Past Medical History:   Diagnosis Date    Allergy     Anemia     Asthma     COPD (chronic obstructive pulmonary disease)     Deep vein thrombosis      Depression     Diabetes mellitus, type 2     Encounter for blood transfusion     Heart murmur     Neuromuscular disorder          Past Surgical History:   Procedure Laterality Date     SECTION      DILATION AND CURETTAGE OF UTERUS      gastric sleeve      [facility]     TONSILLECTOMY         Time Tracking:     OT Date of Treatment: 24  OT Start Time: 919  OT Stop Time: 950  OT Total Time (min): 31 min    Billable Minutes:Evaluation 12  Self Care/Home Management 19    2024

## 2024-02-05 NOTE — PROGRESS NOTES
"Pharmacokinetic Initial Assessment: IV Vancomycin    Assessment/Plan:    Initiate intravenous vancomycin with loading dose of 2000 mg once followed by a maintenance dose of vancomycin 2000 mg IV every 12 hours  Desired empiric serum trough concentration is 10 to 20 mcg/mL  Draw vancomycin trough level 60 min prior to fourth dose on 02/06 at approximately 1400  Pharmacy will continue to follow and monitor vancomycin.      Please contact pharmacy at extension [phone] with any questions regarding this assessment.     Thank you for the consult,   Zion Damico       Patient brief summary:  Adriana Zaragoza is a 58 y.o. female initiated on antimicrobial therapy with IV Vancomycin for treatment of suspected skin & soft tissue infection    Drug Allergies:   Review of patient's allergies indicates:   Allergen Reactions    Aspirin     Dilaudid [hydromorphone]      Excessive sleepiness    Nsaids (non-steroidal anti-inflammatory drug) Hives    Teflaro [ceftaroline fosamil]      Allergic reaction/ hives/itching       Actual Body Weight:   174.6 kg    Renal Function:   Estimated Creatinine Clearance: 196.4 mL/min (based on SCr of 0.5 mg/dL).,     CBC (last 72 hours):  Recent Labs   Lab Result Units 02/04/24  2328   WBC K/uL 11.39   Hemoglobin g/dL 11.7*   Hematocrit % 37.6   Platelets K/uL 287   Gran % % 76.6*   Lymph % % 14.1*   Mono % % 8.3   Eosinophil % % 0.1   Basophil % % 0.5   Differential Method  Automated       Metabolic Panel (last 72 hours):  Recent Labs   Lab Result Units 02/05/24  0023   Sodium mmol/L 137   Potassium mmol/L 3.1*   Chloride mmol/L 108   CO2 mmol/L 21*   Glucose mg/dL 100   BUN mg/dL 14   Creatinine mg/dL 0.5   Albumin g/dL 3.1*   Total Bilirubin mg/dL 0.4   Alkaline Phosphatase U/L 53*   AST U/L 9*   ALT U/L 7*   Magnesium mg/dL 1.7       Drug levels (last 3 results):  No results for input(s): "VANCOMYCINRA", "VANCORANDOM", "VANCOMYCINPE", "VANCOPEAK", "VANCOMYCINTR", "VANCOTROUGH" in the last 72 " hours.    Microbiologic Results:  Microbiology Results (last 7 days)       Procedure Component Value Units Date/Time    MRSA Screen by PCR [[id number]] Collected: 02/05/24 0408    Order Status: Sent Specimen: Nasopharyngeal Swab from Nasal Updated: 02/05/24 0427    Blood culture x two cultures. Draw prior to antibiotics. [[id number]] Collected: 02/04/24 2346    Order Status: Sent Specimen: Blood from Peripheral, Left Hand Updated: 02/05/24 0004    Blood culture x two cultures. Draw prior to antibiotics. [[id number]] Collected: 02/04/24 2342    Order Status: Sent Specimen: Blood from Peripheral, Right Hand Updated: 02/05/24 0004

## 2024-02-05 NOTE — PLAN OF CARE
02/05/24 0829   Patient Assessment/Suction   Level of Consciousness (AVPU) alert   Respiratory Effort Unlabored   PRE-TX-O2   Device (Oxygen Therapy) nasal cannula   $ Is the patient on Low Flow Oxygen? Yes   Flow (L/min) 2   SpO2 95 %   Pulse Oximetry Type Intermittent   $ Pulse Oximetry - Multiple Charge Pulse Oximetry - Multiple   Pulse 91   Resp 18

## 2024-02-05 NOTE — ED PROVIDER NOTES
Encounter Date: 2024       History     Chief Complaint   Patient presents with    Fever     She has been having fever, chills,shaking, vomiting.     Chief complaint is weakness.  This patient is thinks she may have a infection in her body she has been here for sepsis many time she states she complains of fever and chills with no documented fever taken she has been sick for 24 hours having lower back pain.  She has a huge growth from her right leg that is pink to red which is not normally pink to red.  This is a normal site for her to have problems with.  She states she is episodes of uncontrollable movements since spasms to her legs.        Review of patient's allergies indicates:   Allergen Reactions    Aspirin     Dilaudid [hydromorphone]      Excessive sleepiness    Nsaids (non-steroidal anti-inflammatory drug) Hives    Teflaro [ceftaroline fosamil]      Allergic reaction/ hives/itching     Past Medical History:   Diagnosis Date    Allergy     Anemia     Asthma     COPD (chronic obstructive pulmonary disease)     Deep vein thrombosis     Depression     Diabetes mellitus, type 2     Encounter for blood transfusion     Heart murmur     Neuromuscular disorder      Past Surgical History:   Procedure Laterality Date     SECTION      DILATION AND CURETTAGE OF UTERUS      gastric sleeve      [facility]    TONSILLECTOMY       Family History   Problem Relation Age of Onset    Heart disease Mother     Diabetes Mother     Arthritis Mother     Depression Mother     Cancer Father     COPD Father     Arthritis Maternal Grandmother     Cancer Maternal Grandmother     Cancer Maternal Grandfather     Cancer Paternal Grandmother     Kidney disease Paternal Grandmother     Diabetes Paternal Grandmother     Diabetes Paternal Grandfather     Hyperlipidemia Paternal Grandfather      Social History     Tobacco Use    Smoking status: Every Day     Current packs/day: 1.00     Types: Cigarettes    Smokeless tobacco: Never    Substance Use Topics    Alcohol use: No    Drug use: No     Review of Systems   Constitutional:  Positive for chills and fever.   HENT:  Negative for ear pain, rhinorrhea and sore throat.    Eyes:  Negative for pain and visual disturbance.   Respiratory:  Negative for cough and shortness of breath.    Cardiovascular:  Negative for chest pain and palpitations.   Gastrointestinal:  Negative for abdominal pain, constipation, diarrhea, nausea and vomiting.   Genitourinary:  Negative for dysuria, frequency, hematuria and urgency.   Musculoskeletal:  Negative for back pain, joint swelling and myalgias.        Lower back with spasms down the legs per her history   Skin:  Negative for rash.   Neurological:  Negative for dizziness, seizures, weakness and headaches.   Psychiatric/Behavioral:  Negative for dysphoric mood. The patient is not nervous/anxious.        Physical Exam     Initial Vitals   BP Pulse Resp Temp SpO2   02/04/24 2311 02/04/24 2318 02/04/24 2311 02/04/24 2311 02/04/24 2317   123/63 77 18 98.5 °F (36.9 °C) 98 %      MAP       --                Physical Exam    Nursing note and vitals reviewed.  Constitutional: She appears well-developed and well-nourished.   HENT:   Head: Normocephalic and atraumatic.   Eyes: Conjunctivae, EOM and lids are normal. Pupils are equal, round, and reactive to light.   Neck: Trachea normal. Neck supple. No thyroid mass and no thyromegaly present.   Normal range of motion.  Cardiovascular:  Normal rate, regular rhythm and normal heart sounds.           Pulmonary/Chest: Effort normal and breath sounds normal.   Abdominal: Abdomen is soft. There is no abdominal tenderness.   Musculoskeletal:         General: Normal range of motion.      Cervical back: Normal range of motion and neck supple.     Neurological: She is alert and oriented to person, place, and time. She has normal strength and normal reflexes. No cranial nerve deficit or sensory deficit.   Skin: Skin is warm and dry.    Patient with large pedunculated mass to the right thigh that is pink to red in color.  She is morbidly obese with a large panniculus no redness noted below that area.  No redness to the back.   Psychiatric: She has a normal mood and affect. Her speech is normal and behavior is normal. Judgment and thought content normal.         ED Course   Procedures  Labs Reviewed   CBC W/ AUTO DIFFERENTIAL - Abnormal; Notable for the following components:       Result Value    Hemoglobin 11.7 (*)     MCH 26.9 (*)     MCHC 31.1 (*)     RDW 15.6 (*)     Gran # (ANC) 8.7 (*)     Gran % 76.6 (*)     Lymph % 14.1 (*)     All other components within normal limits   COMPREHENSIVE METABOLIC PANEL - Abnormal; Notable for the following components:    Potassium 3.1 (*)     CO2 21 (*)     Calcium 7.4 (*)     Albumin 3.1 (*)     Alkaline Phosphatase 53 (*)     AST 9 (*)     ALT 7 (*)     All other components within normal limits   CULTURE, BLOOD   CULTURE, BLOOD   INFLUENZA A AND B ANTIGEN    Narrative:     Specimen Source->Nasopharyngeal Swab   SARS-COV-2 RNA AMPLIFICATION, QUAL   PROCALCITONIN   MAGNESIUM   TROPONIN I HIGH SENSITIVITY   LACTIC ACID, PLASMA   URINALYSIS, REFLEX TO URINE CULTURE   B-TYPE NATRIURETIC PEPTIDE          Imaging Results              X-Ray Chest AP Portable (In process)                      Medications   vancomycin - pharmacy to dose (has no administration in time range)   vancomycin in dextrose 5 % 1 gram/250 mL IVPB 2,000 mg (2,000 mg Intravenous New Bag 2/5/24 0231)   potassium bicarbonate disintegrating tablet 50 mEq (has no administration in time range)   morphine injection 4 mg (4 mg Intravenous Given 2/5/24 0240)   ondansetron injection 4 mg (4 mg Intravenous Given 2/5/24 0240)     Medical Decision Making  The patient states she has fever home and weakness she has what appears to be cellulitis to a pedunculated mass over her right thigh.  Case discussed with hospitalist patient to be admitted for treatment  with antibiotics.    Risk  Prescription drug management.                                      Clinical Impression:  Final diagnoses:  [R50.9] Fever                 Dheeraj Sterling MD  02/05/24 0324

## 2024-02-05 NOTE — PLAN OF CARE
"[facility]  Initial Discharge Assessment    Assessment completed at bedside with patient, demographics verified, PCP updated.  Patient lives with her significant other but is independent with ADL's, uses rollator for ambulation, home Oxygen (2L) and Bipap supplied by [facility] (in process of switching due to insurance change).  Plan to dc home with s.o., no post-acute service needs expressed or identified at this time.     Primary Care Provider: Melba Green MD    Admission Diagnosis: Fever [R50.9]    Admission Date: 2/4/2024  Expected Discharge Date: 2/7/2024    Transition of Care Barriers: None    Payor: [employer] MEDICARE / Plan: [employer] MEDICARE HMO / Product Type: Capitation /     Extended Emergency Contact Information  Primary Emergency Contact: Bryan Graham"  Address: [address] [place], [place] [address] [place] [address]  Home Phone: [phone]  Mobile Phone: [phone]  Relation: Significant other  Preferred language: English   needed? No  Secondary Emergency Contact: Estephanie Lopes  Mobile Phone: [phone]  Relation: Friend  Preferred language: English   needed? No    Discharge Plan A: Home with family  Discharge Plan B: Home with family      [facility] [address] [facility] [address] [place] [address]  Phone: [phone] Fax: [phone]    [facility] ST[id number] [place], [place] - [address] [facility]  [address] [facility] [address]  Phone: [phone] Fax: [phone]      Initial Assessment (most recent)       Adult Discharge Assessment - 02/05/24 1143          Discharge Assessment    Assessment Type Discharge Planning Assessment     Confirmed/corrected address, phone number and insurance Yes     Confirmed Demographics Correct on Facesheet     Source of Information patient     Does patient/caregiver understand observation status " "Yes     Communicated JONATHAN with patient/caregiver Yes     People in Home significant other     Do you expect to return to your current living situation? Yes     Do you have help at home or someone to help you manage your care at home? Yes     Who are your caregiver(s) and their phone number(s)? Bryan Graham" (Significant other) [phone]     Prior to hospitilization cognitive status: Alert/Oriented;No Deficits     Current cognitive status: Alert/Oriented;No Deficits     Walking or Climbing Stairs Difficulty yes     Walking or Climbing Stairs ambulation difficulty, requires equipment     Mobility Management Rollator     Dressing/Bathing Difficulty yes     Dressing/Bathing bathing difficulty, requires equipment     Dressing/Bathing Management Grab bars     Equipment Currently Used at Home rollator;shower chair;grab bar;oxygen;BIPAP     Readmission within 30 days? No     Patient currently being followed by outpatient case management? No     Do you currently have service(s) that help you manage your care at home? No     Do you take prescription medications? Yes     Do you have prescription coverage? Yes     Coverage [employer]     Do you have any problems affording any of your prescribed medications? No     Is the patient taking medications as prescribed? yes     Who is going to help you get home at discharge? Bryan Graham" (Significant other) [phone]     How do you get to doctors appointments? car, drives self     Are you on dialysis? No     Do you take coumadin? No     Discharge Plan A Home with family     Discharge Plan B Home with family     DME Needed Upon Discharge  none     Discharge Plan discussed with: Patient     Transition of Care Barriers None                                "

## 2024-02-05 NOTE — ASSESSMENT & PLAN NOTE
Body mass index is 66.09 kg/m². Morbid obesity complicates all aspects of disease management from diagnostic modalities to treatment. Weight loss encouraged and health benefits explained to patient.

## 2024-02-06 ENCOUNTER — CLINICAL SUPPORT (OUTPATIENT)
Dept: SMOKING CESSATION | Facility: CLINIC | Age: 59
End: 2024-02-06
Payer: MEDICARE

## 2024-02-06 DIAGNOSIS — F17.200 NICOTINE DEPENDENCE: Primary | ICD-10-CM

## 2024-02-06 PROBLEM — S21.002A OPEN WOUND OF LEFT BREAST: Status: ACTIVE | Noted: 2024-02-06

## 2024-02-06 LAB
ANION GAP SERPL CALC-SCNC: 6 MMOL/L (ref 8–16)
BASOPHILS # BLD AUTO: 0.04 K/UL (ref 0–0.2)
BASOPHILS NFR BLD: 0.5 % (ref 0–1.9)
BUN SERPL-MCNC: 12 MG/DL (ref 6–20)
CALCIUM SERPL-MCNC: 8.3 MG/DL (ref 8.7–10.5)
CHLORIDE SERPL-SCNC: 104 MMOL/L (ref 95–110)
CK SERPL-CCNC: 52 U/L (ref 20–180)
CO2 SERPL-SCNC: 27 MMOL/L (ref 23–29)
CREAT SERPL-MCNC: 0.6 MG/DL (ref 0.5–1.4)
CRP SERPL-MCNC: 115.2 MG/L (ref 0–8.2)
DIFFERENTIAL METHOD BLD: ABNORMAL
EOSINOPHIL # BLD AUTO: 0.1 K/UL (ref 0–0.5)
EOSINOPHIL NFR BLD: 1.1 % (ref 0–8)
ERYTHROCYTE [DISTWIDTH] IN BLOOD BY AUTOMATED COUNT: 15.2 % (ref 11.5–14.5)
EST. GFR  (NO RACE VARIABLE): >60 ML/MIN/1.73 M^2
GLUCOSE SERPL-MCNC: 102 MG/DL (ref 70–110)
GLUCOSE SERPL-MCNC: 108 MG/DL (ref 70–110)
GLUCOSE SERPL-MCNC: 122 MG/DL (ref 70–110)
GLUCOSE SERPL-MCNC: 136 MG/DL (ref 70–110)
GLUCOSE SERPL-MCNC: 142 MG/DL (ref 70–110)
HCT VFR BLD AUTO: 36.1 % (ref 37–48.5)
HGB BLD-MCNC: 11 G/DL (ref 12–16)
IMM GRANULOCYTES # BLD AUTO: 0.03 K/UL (ref 0–0.04)
IMM GRANULOCYTES NFR BLD AUTO: 0.4 % (ref 0–0.5)
LYMPHOCYTES # BLD AUTO: 2.1 K/UL (ref 1–4.8)
LYMPHOCYTES NFR BLD: 24.8 % (ref 18–48)
MCH RBC QN AUTO: 26.5 PG (ref 27–31)
MCHC RBC AUTO-ENTMCNC: 30.5 G/DL (ref 32–36)
MCV RBC AUTO: 87 FL (ref 82–98)
MONOCYTES # BLD AUTO: 0.8 K/UL (ref 0.3–1)
MONOCYTES NFR BLD: 9.1 % (ref 4–15)
NEUTROPHILS # BLD AUTO: 5.5 K/UL (ref 1.8–7.7)
NEUTROPHILS NFR BLD: 64.1 % (ref 38–73)
NRBC BLD-RTO: 0 /100 WBC
PLATELET # BLD AUTO: 265 K/UL (ref 150–450)
PMV BLD AUTO: 9.2 FL (ref 9.2–12.9)
POTASSIUM SERPL-SCNC: 3.9 MMOL/L (ref 3.5–5.1)
RBC # BLD AUTO: 4.15 M/UL (ref 4–5.4)
SODIUM SERPL-SCNC: 137 MMOL/L (ref 136–145)
WBC # BLD AUTO: 8.55 K/UL (ref 3.9–12.7)

## 2024-02-06 PROCEDURE — 99406 BEHAV CHNG SMOKING 3-10 MIN: CPT

## 2024-02-06 PROCEDURE — 87077 CULTURE AEROBIC IDENTIFY: CPT | Mod: 59 | Performed by: FAMILY MEDICINE

## 2024-02-06 PROCEDURE — 63600175 PHARM REV CODE 636 W HCPCS: Performed by: STUDENT IN AN ORGANIZED HEALTH CARE EDUCATION/TRAINING PROGRAM

## 2024-02-06 PROCEDURE — 25000003 PHARM REV CODE 250: Performed by: STUDENT IN AN ORGANIZED HEALTH CARE EDUCATION/TRAINING PROGRAM

## 2024-02-06 PROCEDURE — C1751 CATH, INF, PER/CENT/MIDLINE: HCPCS

## 2024-02-06 PROCEDURE — S4991 NICOTINE PATCH NONLEGEND: HCPCS | Performed by: STUDENT IN AN ORGANIZED HEALTH CARE EDUCATION/TRAINING PROGRAM

## 2024-02-06 PROCEDURE — 63700000 PHARM REV CODE 250 ALT 637 W/O HCPCS: Performed by: STUDENT IN AN ORGANIZED HEALTH CARE EDUCATION/TRAINING PROGRAM

## 2024-02-06 PROCEDURE — 86140 C-REACTIVE PROTEIN: CPT | Performed by: STUDENT IN AN ORGANIZED HEALTH CARE EDUCATION/TRAINING PROGRAM

## 2024-02-06 PROCEDURE — 87070 CULTURE OTHR SPECIMN AEROBIC: CPT | Performed by: FAMILY MEDICINE

## 2024-02-06 PROCEDURE — 36415 COLL VENOUS BLD VENIPUNCTURE: CPT | Performed by: STUDENT IN AN ORGANIZED HEALTH CARE EDUCATION/TRAINING PROGRAM

## 2024-02-06 PROCEDURE — 85025 COMPLETE CBC W/AUTO DIFF WBC: CPT | Performed by: STUDENT IN AN ORGANIZED HEALTH CARE EDUCATION/TRAINING PROGRAM

## 2024-02-06 PROCEDURE — 87186 SC STD MICRODIL/AGAR DIL: CPT | Performed by: FAMILY MEDICINE

## 2024-02-06 PROCEDURE — 99222 1ST HOSP IP/OBS MODERATE 55: CPT | Mod: ,,, | Performed by: FAMILY MEDICINE

## 2024-02-06 PROCEDURE — 82550 ASSAY OF CK (CPK): CPT | Performed by: STUDENT IN AN ORGANIZED HEALTH CARE EDUCATION/TRAINING PROGRAM

## 2024-02-06 PROCEDURE — 80048 BASIC METABOLIC PNL TOTAL CA: CPT | Performed by: STUDENT IN AN ORGANIZED HEALTH CARE EDUCATION/TRAINING PROGRAM

## 2024-02-06 PROCEDURE — 12000002 HC ACUTE/MED SURGE SEMI-PRIVATE ROOM

## 2024-02-06 PROCEDURE — 99406 BEHAV CHNG SMOKING 3-10 MIN: CPT | Mod: S$GLB,,, | Performed by: INTERNAL MEDICINE

## 2024-02-06 PROCEDURE — 94660 CPAP INITIATION&MGMT: CPT

## 2024-02-06 PROCEDURE — 99223 1ST HOSP IP/OBS HIGH 75: CPT | Mod: ,,, | Performed by: STUDENT IN AN ORGANIZED HEALTH CARE EDUCATION/TRAINING PROGRAM

## 2024-02-06 PROCEDURE — 27000221 HC OXYGEN, UP TO 24 HOURS

## 2024-02-06 PROCEDURE — 94760 N-INVAS EAR/PLS OXIMETRY 1: CPT

## 2024-02-06 RX ORDER — IBUPROFEN 200 MG
1 TABLET ORAL DAILY
Status: DISCONTINUED | OUTPATIENT
Start: 2024-02-06 | End: 2024-02-07 | Stop reason: HOSPADM

## 2024-02-06 RX ORDER — FLUCONAZOLE 200 MG/1
200 TABLET ORAL ONCE
Qty: 1 TABLET | Refills: 0 | Status: SHIPPED | OUTPATIENT
Start: 2024-02-06 | End: 2024-02-06

## 2024-02-06 RX ORDER — FLUCONAZOLE 100 MG/1
200 TABLET ORAL ONCE
Status: COMPLETED | OUTPATIENT
Start: 2024-02-06 | End: 2024-02-06

## 2024-02-06 RX ORDER — AMOXICILLIN 250 MG
1 CAPSULE ORAL DAILY PRN
Status: DISCONTINUED | OUTPATIENT
Start: 2024-02-06 | End: 2024-02-07 | Stop reason: HOSPADM

## 2024-02-06 RX ORDER — IBUPROFEN 200 MG
1 TABLET ORAL DAILY
Qty: 30 PATCH | Refills: 0 | Status: SHIPPED | OUTPATIENT
Start: 2024-02-06 | End: 2024-03-07

## 2024-02-06 RX ORDER — POLYETHYLENE GLYCOL 3350 17 G/17G
17 POWDER, FOR SOLUTION ORAL 2 TIMES DAILY
Status: DISCONTINUED | OUTPATIENT
Start: 2024-02-06 | End: 2024-02-07 | Stop reason: HOSPADM

## 2024-02-06 RX ORDER — ENOXAPARIN SODIUM 100 MG/ML
60 INJECTION SUBCUTANEOUS EVERY 12 HOURS
Status: DISCONTINUED | OUTPATIENT
Start: 2024-02-06 | End: 2024-02-07 | Stop reason: HOSPADM

## 2024-02-06 RX ORDER — POLYETHYLENE GLYCOL 3350 17 G/17G
17 POWDER, FOR SOLUTION ORAL 2 TIMES DAILY
Qty: 14 PACKET | Refills: 0 | Status: SHIPPED | OUTPATIENT
Start: 2024-02-06 | End: 2024-02-13

## 2024-02-06 RX ADMIN — ENOXAPARIN SODIUM 60 MG: 60 INJECTION SUBCUTANEOUS at 08:02

## 2024-02-06 RX ADMIN — HYDROMORPHONE HYDROCHLORIDE 0.5 MG: 0.5 INJECTION, SOLUTION INTRAMUSCULAR; INTRAVENOUS; SUBCUTANEOUS at 01:02

## 2024-02-06 RX ADMIN — HYDROMORPHONE HYDROCHLORIDE 0.5 MG: 0.5 INJECTION, SOLUTION INTRAMUSCULAR; INTRAVENOUS; SUBCUTANEOUS at 12:02

## 2024-02-06 RX ADMIN — FLUCONAZOLE 200 MG: 100 TABLET ORAL at 01:02

## 2024-02-06 RX ADMIN — HYDROMORPHONE HYDROCHLORIDE 0.5 MG: 0.5 INJECTION, SOLUTION INTRAMUSCULAR; INTRAVENOUS; SUBCUTANEOUS at 04:02

## 2024-02-06 RX ADMIN — HYDROMORPHONE HYDROCHLORIDE 0.5 MG: 0.5 INJECTION, SOLUTION INTRAMUSCULAR; INTRAVENOUS; SUBCUTANEOUS at 05:02

## 2024-02-06 RX ADMIN — ENOXAPARIN SODIUM 40 MG: 100 INJECTION SUBCUTANEOUS at 09:02

## 2024-02-06 RX ADMIN — BUPROPION HYDROCHLORIDE 150 MG: 150 TABLET, EXTENDED RELEASE ORAL at 09:02

## 2024-02-06 RX ADMIN — ONDANSETRON 4 MG: 2 INJECTION INTRAMUSCULAR; INTRAVENOUS at 12:02

## 2024-02-06 RX ADMIN — POLYETHYLENE GLYCOL 3350 17 G: 17 POWDER, FOR SOLUTION ORAL at 11:02

## 2024-02-06 RX ADMIN — DAPTOMYCIN 820 MG: 500 INJECTION, POWDER, LYOPHILIZED, FOR SOLUTION INTRAVENOUS at 05:02

## 2024-02-06 RX ADMIN — HYDROMORPHONE HYDROCHLORIDE 0.5 MG: 0.5 INJECTION, SOLUTION INTRAMUSCULAR; INTRAVENOUS; SUBCUTANEOUS at 11:02

## 2024-02-06 RX ADMIN — NICOTINE 1 PATCH: 14 PATCH, EXTENDED RELEASE TRANSDERMAL at 11:02

## 2024-02-06 RX ADMIN — HYDROMORPHONE HYDROCHLORIDE 0.5 MG: 0.5 INJECTION, SOLUTION INTRAMUSCULAR; INTRAVENOUS; SUBCUTANEOUS at 09:02

## 2024-02-06 NOTE — PROCEDURES
18 Gx 10cm PowerGlide Midline placed to pts LEFT cephalic vein with the use of ultrasound guidance.    Ultrasound guidance: yes  Vessel Caliber: large and patent, compressibility normal  Needle advanced into vessel with real time Ultrasound guidance.  Guidewire confirmed in vessel.  Sterile sheath used.  Sterile dressing applied  Dressing dated   Education provided to patient re: proper maintenance of line- pt verbalized understanding  Limb alert applied.

## 2024-02-06 NOTE — CARE UPDATE
02/05/24 2035   Patient Assessment/Suction   Level of Consciousness (AVPU) alert   PRE-TX-O2   Device (Oxygen Therapy) CPAP   Oxygen Concentration (%) 28   Ready to Wean/Extubation Screen   FIO2<=50 (chart decimal) 0.28   Preset CPAP/BiPAP Settings   Mode Of Delivery CPAP   $ CPAP/BiPAP Daily Charge BiPAP/CPAP Daily   $ Initial CPAP/BiPAP Setup? Yes   $ Is patient using? Yes   Size of Mask Small   Sized Appropriately? Yes   Equipment Type V60   Airway Device Type small full face mask   Humidifier not applicable   CPAP (cm H2O) 12   Patient CPAP/BiPAP Settings   Tidal Volume (mL) 764   VE Minute Ventilation (L/min) 13.7 L/min   TiTOT (%) 26   Patient Trigger - ST Mode Only (%) 100   CPAP/BiPAP Alarms   High Pressure (cm H2O) 30   Low Pressure (cm H2O) 6   Minute Ventilation (L/Min) 3   High RR (breaths/min) 40   Low RR (breaths/min) 8   Apnea (Sec) 20

## 2024-02-06 NOTE — PLAN OF CARE
Referral for infusion at [facility] infusion center sent via secure chat to Catherine with infusion clinic for setup. Awaiting response.        02/06/24 5585   Post-Acute Status   Post-Acute Authorization IV Infusion   IV Infusion Status Referral(s) sent

## 2024-02-06 NOTE — CONSULTS
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 daily for vaginal yeast  Weekly labs x2 CBC with diff, CMP, CRP, and CPK twice a week  Please remove midline by end of therapy on 02/14  Needs daily Hibiclens baths  Follow-up at the office when feasible likely in 4-5 weeks, office to set up appointment   She understands urgency of setting up appointment with plastic surgeon to remove pannus    Discussed with patient, Dr. Max    ID will sign off, call us back with any questions    Thank you for this consult. Please send Epic secure chat with any questions.    HPI      Adriana Zaragoza is a 58 y.o. female morbidly, BMI 66 Kg/m2, with past medical history of asthma/COPD on BiPAP home, diabetes, depression, gastric bypass in 2019 very well known to our service, last seen by myself in clinic in August of 2023.  She also has a large medial thigh pannus for the last 21 years.  She is very well known to our service for recurrent/relapse abscess of right large medial thigh panniculitis/cellulitis.  Last episode was in  July, improved after daptomycin IV outpatient.      She presents this time to the hospital on  for not feeling well, she reports she was having nausea, through subjective fever.  She mentions even though she has been doing as instructed wound care to her right pannus she noticed it got more red and swollen which prompted her to come to the emergency room.  She denies headache, no shortness of breath, no chest pain, no abdominal pain, no dysuria increased urinary frequency, no change in bowel movements.      In the ER, hemodynamically stable, afebrile   Labs on admission white count 11.3, left shift 76.6%, H&H 11.7/37.6, platelet count 287  Normal kidney function, AST 9/ALT 7   Baseline CPK 49   Lactic acid 0.5, procalcitonin 0.4, negative   MRSA nasal swab negative   UA negative for UTI     Empirically started on vancomycin, due to body index switched to daptomycin IV.     Outdoor activities:  Lives at home with her boyfriend who helps her she reports she is active at home and ambulates with assistance.  Travel:  None  Implants:  None  Antibiotic history:  See HPI    Past Medical History:   Diagnosis Date    Allergy     Anemia     Asthma     COPD (chronic obstructive pulmonary disease)     Deep vein thrombosis     Depression     Diabetes mellitus, type 2     Encounter for blood transfusion     Heart murmur     Neuromuscular disorder        Past Surgical History:   Procedure Laterality Date     SECTION      DILATION AND CURETTAGE OF UTERUS      gastric sleeve      [facility]    TONSILLECTOMY         Family History   Problem Relation Age of Onset    Heart disease Mother     Diabetes Mother     Arthritis Mother     Depression Mother     Cancer Father     COPD Father     Arthritis Maternal Grandmother     Cancer Maternal Grandmother     Cancer Maternal Grandfather     Cancer Paternal Grandmother     Kidney disease Paternal Grandmother     Diabetes Paternal Grandmother     Diabetes Paternal Grandfather      Hyperlipidemia Paternal Grandfather        Social History     Socioeconomic History    Marital status:    Tobacco Use    Smoking status: Every Day     Current packs/day: 1.00     Average packs/day: 1 pack/day for 45.0 years (45.0 ttl pk-yrs)     Types: Cigarettes     Start date: 2/6/1979    Smokeless tobacco: Never    Tobacco comments:     Pt states she has smoked since her teenage years, smokes around 1pk/day. Interested and quitting. Interested in the outpatient smoking cessation program. Referral sent.    Substance and Sexual Activity    Alcohol use: No    Drug use: No    Sexual activity: Never     Social Determinants of Health     Financial Resource Strain: Unknown (10/22/2022)    Overall Financial Resource Strain (CARDIA)     Difficulty of Paying Living Expenses: Patient declined   Food Insecurity: Unknown (10/22/2022)    Hunger Vital Sign     Worried About Running Out of Food in the Last Year: Patient declined     Ran Out of Food in the Last Year: Patient declined   Transportation Needs: Unknown (10/22/2022)    PRAPARE - Transportation     Lack of Transportation (Medical): Patient declined     Lack of Transportation (Non-Medical): Patient declined   Physical Activity: Unknown (10/22/2022)    Exercise Vital Sign     Days of Exercise per Week: Patient declined     Minutes of Exercise per Session: Patient declined   Stress: Unknown (10/22/2022)    [employer] [place] [employer] - Occupational Stress Questionnaire     Feeling of Stress : Patient declined   Social Connections: Unknown (10/22/2022)    Social Connection and Isolation Panel [NHANES]     Frequency of Communication with Friends and Family: Patient declined     Frequency of Social Gatherings with Friends and Family: Patient declined     Attends Adventism Services: Patient declined     Active Member of Clubs or Organizations: Patient declined     Attends Club or Organization Meetings: Patient declined     Marital Status: Never     Housing Stability: Unknown (10/22/2022)    Housing Stability Vital Sign     Unable to Pay for Housing in the Last Year: Patient refused     Unstable Housing in the Last Year: Patient refused       Review of patient's allergies indicates:   Allergen Reactions    Aspirin     Dilaudid [hydromorphone]      Excessive sleepiness    Nsaids (non-steroidal anti-inflammatory drug) Hives    Teflaro [ceftaroline fosamil]      Allergic reaction/ hives/itching       Current Outpatient Medications   Medication Instructions    acetaminophen (TYLENOL) 650 mg, Oral, Every 8 hours    albuterol (PROVENTIL/VENTOLIN HFA) 90 mcg/actuation inhaler 2 puffs, Inhalation, Every 6 hours PRN    buPROPion (WELLBUTRIN XL) 150 mg, Oral, Daily    clindamycin (CLEOCIN T) 1 % lotion Topical (Top), 2 times daily    docusate sodium (COLACE) 100 mg, Oral, 2 times daily    oxyCODONE (ROXICODONE) 5 mg, Oral, Every 4 hours PRN    OZEMPIC 0.5 mg, Subcutaneous, Every 7 days         Review of Systems    Constitutional:  + fevers, +chills, no night sweats, loss of appetite.  HEENT: Denies visual changes, ear pain, sinus congestion, mouth pain or trouble swallowing, sore throat or dental pain.  Neck: Denies neck pain or lumps.  Respiratory: Denies shortness of breath, coughing, wheezing or hemoptysis.  Cardiovascular:  Denies chest pain, palpitations or edema.  Gastrointestinal: + nausea, +vomiting, no constipation or diarrhea.  Genitourinary:  Denies dysuria, frequency, urgency or hematuria   Musculoskeletal:  Denies joint pain or swelling, difficulty walking.    Skin:  Except for right upper thigh, Denies rash or itching.  VAD:  PIV  Neurologic:  Denies motor or sensory loss, headaches or dizziness.    Psychiatric:  Denies changes in mood or behavior.       OBJECTIVE     Temp:  [97.8 °F (36.6 °C)-98.5 °F (36.9 °C)] 97.8 °F (36.6 °C)  Pulse:  [71-83] 74  Resp:  [16-20] 18  SpO2:  [94 %-98 %] 94 %  BP: (106-143)/(57-64) 110/59         Physical Exam       General: Morbidly obese female sitting in bed, breathing comfortable on O2 by NC 2 L  Eyes: Eyes with no icterus or injection. Vision grossly normal  Ears: Hearing grossly normal.  Nose: Nares patent  Mouth: Moist mucous membranes, dentition is fair. No ulcerations, erythema or exudates.  Neck: Supple, no tenderness to palpation.  Cardiovascular: Regular rate and rhythm, no murmurs, no edema.    Respiratory:  Clear to auscultation bilaterally, no tachypnea or increased work of breathing.  Gastrointestinal:  Soft with active bowel sounds, no tenderness to palpation, no distention.  Genitourinary:  No suprapubic tenderness.  Musculoskeletal:  Moves all extremities with good strength.    Skin:  Warm and dry, left breast with resolving erythematous lesion, healing scabs all around breast.  Large right medial upper thigh appendage with erythema, tender to palpation consistent with cellulitis/panniculitis  Neuro:   Oriented, conversant, follows commands.  Psych: Good mood, normal affect.     Wounds:   2/5:          VAD:  Peripheral IV  ISOLATION:  None    CBC LAST 7  Recent Labs   Lab 02/04/24 2328 02/05/24 0552 02/06/24 0424   WBC 11.39 9.44 8.55   RBC 4.35 4.36 4.15   HGB 11.7* 11.5* 11.0*   HCT 37.6 37.6 36.1*   MCV 86 86 87   MCH 26.9* 26.4* 26.5*   MCHC 31.1* 30.6* 30.5*   RDW 15.6* 15.5* 15.2*    278 265   MPV 9.6 9.2 9.2   GRAN 76.6*  8.7* 71.3  6.7 64.1  5.5   LYMPH 14.1*  1.6 19.9  1.9 24.8  2.1   MONO 8.3  1.0 7.9  0.8 9.1  0.8   BASO 0.06 0.03 0.04   NRBC 0 0 0       CHEMISTRY LAST 7  Recent Labs   Lab 02/05/24  0023 02/05/24  0552 02/06/24 0424    136 137   K 3.1* 4.1 3.9    103 104   CO2 21* 27 27   ANIONGAP 8 6* 6*   BUN 14 13 12   CREATININE 0.5 0.6 0.6    109 108   CALCIUM 7.4* 8.7 8.3*   MG 1.7  --   --    ALBUMIN 3.1*  --   --    PROT 6.1  --   --    ALKPHOS 53*  --   --    ALT 7*  --   --    AST 9*  --   --    BILITOT 0.4  --   --        Estimated Creatinine  "Clearance: 163.7 mL/min (based on SCr of 0.6 mg/dL).    INFLAMMATORY/PROCAL  LAST 7  Recent Labs   Lab 02/05/24  0023   PROCAL 0.437     No results found for: "ESR"  CRP   Date Value Ref Range Status   03/07/2023 3.94 (H) <0.76 mg/dL Final   03/05/2023 11.05 (H) <0.76 mg/dL Final   03/03/2023 19.52 (H) <0.76 mg/dL Final   05/02/2021 2.97 (H) <0.76 mg/dL Final   06/11/2020 6.52 (H) <0.76 mg/dL Final   03/04/2020 4.73 (H) 0.00 - 0.75 mg/dL Final       PRIOR  MICROBIOLOGY:    No results found for the last 90 days.        LAST 7 DAYS MICROBIOLOGY   Microbiology Results (last 7 days)       Procedure Component Value Units Date/Time    Blood culture x two cultures. Draw prior to antibiotics. [[id number]] Collected: 02/04/24 2346    Order Status: Completed Specimen: Blood from Peripheral, Left Hand Updated: 02/06/24 0232     Blood Culture, Routine No Growth to date      No Growth to date    Narrative:      Aerobic and anaerobic    Blood culture x two cultures. Draw prior to antibiotics. [[id number]] Collected: 02/04/24 2342    Order Status: Completed Specimen: Blood from Peripheral, Right Hand Updated: 02/06/24 0232     Blood Culture, Routine No Growth to date      No Growth to date    Narrative:      Aerobic and anaerobic    MRSA Screen by PCR [[id number]] Collected: 02/05/24 0408    Order Status: Completed Specimen: Nasopharyngeal Swab from Nasal Updated: 02/05/24 0732     MRSA SCREEN BY PCR Negative              CURRENT/PREVIOUS VISIT EKG  Results for orders placed or performed during the hospital encounter of 02/04/24   EKG 12-lead    Collection Time: 02/05/24  2:46 AM    Narrative    Test Reason : R50.9,    Vent. Rate : 082 BPM     Atrial Rate : 082 BPM     P-R Int : 178 ms          QRS Dur : 090 ms      QT Int : 372 ms       P-R-T Axes : 058 069 049 degrees     QTc Int : 434 ms    Normal sinus rhythm  Low voltage QRS  Borderline Abnormal ECG  When compared with ECG of 02-MAR-2023 10:23,  KS interval has " decreased  Vent. rate has decreased BY  47 BPM    Referred By: [email or web address]   SELF           Confirmed By:        Significant Labs: All pertinent labs within the past 24 hours have been reviewed.     Significant Imaging: I have reviewed all relevant and available imaging results/findings within the past 24 hours.    CXR    Reentta Merlos MD  Date of Service: 02/06/2024  11:57 AM

## 2024-02-06 NOTE — CONSULTS
Red maroon area on bridge of nose, says this is chronic, Resp. Therapist coming to bring padding for nose.  Dry rash/abscesses left breast, states had been draining, states this is a chronic situation that comes and goes.  Large red growth right inner leg.  Recommend Triad to left breast daily, Dr. Alvarez saw today also.

## 2024-02-06 NOTE — PLAN OF CARE
02/06/24 1141   Patient Assessment/Suction   Level of Consciousness (AVPU) alert   Respiratory Effort Unlabored   PRE-TX-O2   Device (Oxygen Therapy) nasal cannula   $ Is the patient on Low Flow Oxygen? Yes   Flow (L/min) 2   SpO2 (!) 94 %  (On room air at this time)   Pulse Oximetry Type Intermittent   Pulse 74   Resp 18   Preset CPAP/BiPAP Settings   Mode Of Delivery Standby;CPAP   $ CPAP/BiPAP Daily Charge BiPAP/CPAP Daily   Equipment Type V60

## 2024-02-06 NOTE — PROGRESS NOTES
Pharmacist Renal Dose Adjustment Note    Adriana Zaragoza is a 58 y.o. female being treated with the medication enoxaparin    Patient Data:    Vital Signs (Most Recent):  Temp: 97.8 °F (36.6 °C) (02/06/24 0828)  Pulse: 72 (02/06/24 0828)  Resp: 18 (02/06/24 0916)  BP: (!) 110/59 (02/06/24 0828)  SpO2: 98 % (02/06/24 0828) Vital Signs (72h Range):  Temp:  [97.4 °F (36.3 °C)-98.5 °F (36.9 °C)]   Pulse:  [71-91]   Resp:  [16-24]   BP: (106-143)/(55-64)   SpO2:  [91 %-98 %]      Recent Labs   Lab 02/05/24  0023 02/05/24  0552 02/06/24  0424   CREATININE 0.5 0.6 0.6     Serum creatinine: 0.6 mg/dL 02/06/24 0424  Estimated creatinine clearance: 163.7 mL/min    Enoxaparin 40 mg every 12 hours will be changed to enoxaparin 60 mg every 12 hour for BMI = 66.07 kg/mg2    Pharmacist's Name: Luz Elena Damico  Pharmacist's Extension: [phone]

## 2024-02-06 NOTE — PROGRESS NOTES
02/06/24 0800   Tobacco Cessation Intervention   Do you use any type of tobacco product? Yes   Are you interested in quitting use of tobacco products? Ready to quit   Are you interested in Nicotine Replacement for symptom relief? Yes   $ Smoking Cessation Charges Smoking Cessation - Intermediate (Non-CTTS)

## 2024-02-07 VITALS
BODY MASS INDEX: 50.02 KG/M2 | RESPIRATION RATE: 17 BRPM | OXYGEN SATURATION: 99 % | SYSTOLIC BLOOD PRESSURE: 146 MMHG | DIASTOLIC BLOOD PRESSURE: 86 MMHG | HEART RATE: 76 BPM | HEIGHT: 64 IN | TEMPERATURE: 98 F | WEIGHT: 293 LBS

## 2024-02-07 LAB
ANION GAP SERPL CALC-SCNC: 4 MMOL/L (ref 8–16)
BASOPHILS # BLD AUTO: 0.05 K/UL (ref 0–0.2)
BASOPHILS NFR BLD: 0.7 % (ref 0–1.9)
BUN SERPL-MCNC: 12 MG/DL (ref 6–20)
CALCIUM SERPL-MCNC: 8.4 MG/DL (ref 8.7–10.5)
CHLORIDE SERPL-SCNC: 102 MMOL/L (ref 95–110)
CK SERPL-CCNC: 48 U/L (ref 20–180)
CO2 SERPL-SCNC: 30 MMOL/L (ref 23–29)
CREAT SERPL-MCNC: 0.5 MG/DL (ref 0.5–1.4)
DIFFERENTIAL METHOD BLD: ABNORMAL
EOSINOPHIL # BLD AUTO: 0.1 K/UL (ref 0–0.5)
EOSINOPHIL NFR BLD: 1.3 % (ref 0–8)
ERYTHROCYTE [DISTWIDTH] IN BLOOD BY AUTOMATED COUNT: 14.8 % (ref 11.5–14.5)
EST. GFR  (NO RACE VARIABLE): >60 ML/MIN/1.73 M^2
GLUCOSE SERPL-MCNC: 112 MG/DL (ref 70–110)
GLUCOSE SERPL-MCNC: 121 MG/DL (ref 70–110)
GLUCOSE SERPL-MCNC: 97 MG/DL (ref 70–110)
HCT VFR BLD AUTO: 34.8 % (ref 37–48.5)
HGB BLD-MCNC: 10.5 G/DL (ref 12–16)
IMM GRANULOCYTES # BLD AUTO: 0.02 K/UL (ref 0–0.04)
IMM GRANULOCYTES NFR BLD AUTO: 0.3 % (ref 0–0.5)
LYMPHOCYTES # BLD AUTO: 2.2 K/UL (ref 1–4.8)
LYMPHOCYTES NFR BLD: 28.4 % (ref 18–48)
MCH RBC QN AUTO: 26.4 PG (ref 27–31)
MCHC RBC AUTO-ENTMCNC: 30.2 G/DL (ref 32–36)
MCV RBC AUTO: 88 FL (ref 82–98)
MONOCYTES # BLD AUTO: 0.7 K/UL (ref 0.3–1)
MONOCYTES NFR BLD: 8.5 % (ref 4–15)
NEUTROPHILS # BLD AUTO: 4.7 K/UL (ref 1.8–7.7)
NEUTROPHILS NFR BLD: 60.8 % (ref 38–73)
NRBC BLD-RTO: 0 /100 WBC
PLATELET # BLD AUTO: 278 K/UL (ref 150–450)
PMV BLD AUTO: 9.2 FL (ref 9.2–12.9)
POTASSIUM SERPL-SCNC: 4 MMOL/L (ref 3.5–5.1)
RBC # BLD AUTO: 3.97 M/UL (ref 4–5.4)
SODIUM SERPL-SCNC: 136 MMOL/L (ref 136–145)
WBC # BLD AUTO: 7.64 K/UL (ref 3.9–12.7)

## 2024-02-07 PROCEDURE — 94660 CPAP INITIATION&MGMT: CPT

## 2024-02-07 PROCEDURE — 99900031 HC PATIENT EDUCATION (STAT)

## 2024-02-07 PROCEDURE — 94761 N-INVAS EAR/PLS OXIMETRY MLT: CPT

## 2024-02-07 PROCEDURE — 63600175 PHARM REV CODE 636 W HCPCS: Mod: JZ,JG | Performed by: STUDENT IN AN ORGANIZED HEALTH CARE EDUCATION/TRAINING PROGRAM

## 2024-02-07 PROCEDURE — S4991 NICOTINE PATCH NONLEGEND: HCPCS | Performed by: STUDENT IN AN ORGANIZED HEALTH CARE EDUCATION/TRAINING PROGRAM

## 2024-02-07 PROCEDURE — 25000003 PHARM REV CODE 250: Performed by: STUDENT IN AN ORGANIZED HEALTH CARE EDUCATION/TRAINING PROGRAM

## 2024-02-07 PROCEDURE — 82550 ASSAY OF CK (CPK): CPT | Performed by: STUDENT IN AN ORGANIZED HEALTH CARE EDUCATION/TRAINING PROGRAM

## 2024-02-07 PROCEDURE — 27100171 HC OXYGEN HIGH FLOW UP TO 24 HOURS

## 2024-02-07 PROCEDURE — 85025 COMPLETE CBC W/AUTO DIFF WBC: CPT | Performed by: STUDENT IN AN ORGANIZED HEALTH CARE EDUCATION/TRAINING PROGRAM

## 2024-02-07 PROCEDURE — 36415 COLL VENOUS BLD VENIPUNCTURE: CPT | Performed by: STUDENT IN AN ORGANIZED HEALTH CARE EDUCATION/TRAINING PROGRAM

## 2024-02-07 PROCEDURE — 63600175 PHARM REV CODE 636 W HCPCS: Performed by: STUDENT IN AN ORGANIZED HEALTH CARE EDUCATION/TRAINING PROGRAM

## 2024-02-07 PROCEDURE — 80048 BASIC METABOLIC PNL TOTAL CA: CPT | Performed by: STUDENT IN AN ORGANIZED HEALTH CARE EDUCATION/TRAINING PROGRAM

## 2024-02-07 PROCEDURE — 99900035 HC TECH TIME PER 15 MIN (STAT)

## 2024-02-07 RX ORDER — OXYCODONE HYDROCHLORIDE 5 MG/1
10 TABLET ORAL EVERY 6 HOURS PRN
Qty: 56 TABLET | Refills: 0 | Status: SHIPPED | OUTPATIENT
Start: 2024-02-07 | End: 2024-02-14

## 2024-02-07 RX ORDER — LEVOFLOXACIN 750 MG/1
750 TABLET ORAL DAILY
Qty: 7 TABLET | Refills: 0 | Status: SHIPPED | OUTPATIENT
Start: 2024-02-07 | End: 2024-02-14

## 2024-02-07 RX ADMIN — HYDROMORPHONE HYDROCHLORIDE 0.5 MG: 0.5 INJECTION, SOLUTION INTRAMUSCULAR; INTRAVENOUS; SUBCUTANEOUS at 09:02

## 2024-02-07 RX ADMIN — BUPROPION HYDROCHLORIDE 150 MG: 150 TABLET, EXTENDED RELEASE ORAL at 09:02

## 2024-02-07 RX ADMIN — HYDROMORPHONE HYDROCHLORIDE 0.5 MG: 0.5 INJECTION, SOLUTION INTRAMUSCULAR; INTRAVENOUS; SUBCUTANEOUS at 03:02

## 2024-02-07 RX ADMIN — ENOXAPARIN SODIUM 60 MG: 60 INJECTION SUBCUTANEOUS at 09:02

## 2024-02-07 RX ADMIN — HYDROMORPHONE HYDROCHLORIDE 0.5 MG: 0.5 INJECTION, SOLUTION INTRAMUSCULAR; INTRAVENOUS; SUBCUTANEOUS at 01:02

## 2024-02-07 RX ADMIN — NICOTINE 1 PATCH: 14 PATCH, EXTENDED RELEASE TRANSDERMAL at 09:02

## 2024-02-07 RX ADMIN — DAPTOMYCIN 820 MG: 500 INJECTION, POWDER, LYOPHILIZED, FOR SOLUTION INTRAVENOUS at 02:02

## 2024-02-07 RX ADMIN — OXYCODONE HYDROCHLORIDE 5 MG: 5 TABLET ORAL at 05:02

## 2024-02-07 RX ADMIN — ONDANSETRON 4 MG: 2 INJECTION INTRAMUSCULAR; INTRAVENOUS at 06:02

## 2024-02-07 NOTE — CARE UPDATE
02/06/24 5271   Patient Assessment/Suction   Level of Consciousness (AVPU) alert   Respiratory Effort Unlabored;Normal   Expansion/Accessory Muscles/Retractions no use of accessory muscles;no retractions;expansion symmetric   All Lung Fields Breath Sounds crackles, coarse;diminished   Rhythm/Pattern, Respiratory no shortness of breath reported;unlabored;shallow   Cough Frequency infrequent   Cough Type nonproductive;dry   PRE-TX-O2   Device (Oxygen Therapy) nasal cannula   $ Is the patient on Low Flow Oxygen? Yes   Flow (L/min) 2   SpO2 95 %   Pulse Oximetry Type Intermittent   $ Pulse Oximetry - Single Charge Pulse Oximetry - Single   Pulse 76   Resp 16   Positioning   Head of Bed (HOB) Positioning HOB at 20-30 degrees

## 2024-02-07 NOTE — CONSULTS
Pt to discharge home with outpatient infusion services through [facility] [place]. Per Catherine, Pt able to be seen tomorrow at 0900 to continue Dapto. Pt notified of appointment at bedside.

## 2024-02-07 NOTE — PLAN OF CARE
Problem: Adult Inpatient Plan of Care  Goal: Plan of Care Review  Outcome: Met  Goal: Patient-Specific Goal (Individualized)  Outcome: Met  Goal: Absence of Hospital-Acquired Illness or Injury  Outcome: Met  Goal: Optimal Comfort and Wellbeing  Outcome: Met  Goal: Readiness for Transition of Care  Outcome: Met     Problem: Bariatric Environmental Safety  Goal: Safety Maintained with Care  Outcome: Met     Problem: Skin Injury Risk Increased  Goal: Skin Health and Integrity  Outcome: Met     Problem: Infection  Goal: Absence of Infection Signs and Symptoms  Outcome: Met

## 2024-02-07 NOTE — CONSULTS
Chief complaint:  Fever (She has been having fever, chills,shaking, vomiting.)      HPI:  Adriana Zaragoza is a 58 y.o. female presenting with cellulitis of a large pannus of the right thigh, and multiple open wounds of the left breast laterally and inferior. Inferior has multiple pustules that were cultured at this visit. Wounds were POA. No other complaints today.    PMH:  As per HPI and below:  Past Medical History:   Diagnosis Date    Allergy     Anemia     Asthma     COPD (chronic obstructive pulmonary disease)     Deep vein thrombosis     Depression     Diabetes mellitus, type 2     Encounter for blood transfusion     Heart murmur     Neuromuscular disorder        Social History     Socioeconomic History    Marital status:    Tobacco Use    Smoking status: Every Day     Current packs/day: 1.00     Average packs/day: 1 pack/day for 45.0 years (45.0 ttl pk-yrs)     Types: Cigarettes     Start date: 2/6/1979    Smokeless tobacco: Never    Tobacco comments:     Pt states she has smoked since her teenage years, smokes around 1pk/day. Interested and quitting. Interested in the outpatient smoking cessation program. Referral sent.    Substance and Sexual Activity    Alcohol use: No    Drug use: No    Sexual activity: Never     Social Determinants of Health     Financial Resource Strain: Unknown (10/22/2022)    Overall Financial Resource Strain (CARDIA)     Difficulty of Paying Living Expenses: Patient declined   Food Insecurity: Unknown (10/22/2022)    Hunger Vital Sign     Worried About Running Out of Food in the Last Year: Patient declined     Ran Out of Food in the Last Year: Patient declined   Transportation Needs: Unknown (10/22/2022)    PRAPARE - Transportation     Lack of Transportation (Medical): Patient declined     Lack of Transportation (Non-Medical): Patient declined   Physical Activity: Unknown (10/22/2022)    Exercise Vital Sign     Days of Exercise per Week: Patient declined     Minutes  of Exercise per Session: Patient declined   Stress: Unknown (10/22/2022)    [employer] [place] [employer] - Occupational Stress Questionnaire     Feeling of Stress : Patient declined   Social Connections: Unknown (10/22/2022)    Social Connection and Isolation Panel [NHANES]     Frequency of Communication with Friends and Family: Patient declined     Frequency of Social Gatherings with Friends and Family: Patient declined     Attends Sabianist Services: Patient declined     Active Member of Clubs or Organizations: Patient declined     Attends Club or Organization Meetings: Patient declined     Marital Status: Never    Housing Stability: Unknown (10/22/2022)    Housing Stability Vital Sign     Unable to Pay for Housing in the Last Year: Patient refused     Unstable Housing in the Last Year: Patient refused       Past Surgical History:   Procedure Laterality Date     SECTION      DILATION AND CURETTAGE OF UTERUS      gastric sleeve      [facility]    TONSILLECTOMY         Family History   Problem Relation Age of Onset    Heart disease Mother     Diabetes Mother     Arthritis Mother     Depression Mother     Cancer Father     COPD Father     Arthritis Maternal Grandmother     Cancer Maternal Grandmother     Cancer Maternal Grandfather     Cancer Paternal Grandmother     Kidney disease Paternal Grandmother     Diabetes Paternal Grandmother     Diabetes Paternal Grandfather     Hyperlipidemia Paternal Grandfather        Review of patient's allergies indicates:   Allergen Reactions    Aspirin     Dilaudid [hydromorphone]      Excessive sleepiness    Nsaids (non-steroidal anti-inflammatory drug) Hives    Teflaro [ceftaroline fosamil]      Allergic reaction/ hives/itching       No current facility-administered medications on file prior to encounter.     Current Outpatient Medications on File Prior to Encounter   Medication Sig Dispense Refill    acetaminophen (TYLENOL) 650 MG TbSR Take 650 mg by  "mouth every 8 (eight) hours.      oxyCODONE (ROXICODONE) 5 MG immediate release tablet Take 1 tablet (5 mg total) by mouth every 4 (four) hours as needed for Pain.  0    albuterol (PROVENTIL/VENTOLIN HFA) 90 mcg/actuation inhaler Inhale 2 puffs into the lungs every 6 (six) hours as needed for Wheezing or Shortness of Breath.      buPROPion (WELLBUTRIN XL) 150 MG TB24 tablet Take 150 mg by mouth once daily.      clindamycin (CLEOCIN T) 1 % lotion Apply topically 2 (two) times daily. (Patient taking differently: Apply 1 g topically 2 (two) times daily.) 60 mL 3    docusate sodium (COLACE) 100 MG capsule Take 1 capsule (100 mg total) by mouth 2 (two) times daily. 30 capsule 0    semaglutide (OZEMPIC) 0.25 mg or 0.5 mg (2 mg/3 mL) pen injector Inject 0.5 mg into the skin every 7 days.         ROS: As per HPI and below:  Pertinent items are noted in HPI.      Physical Exam:     Vitals:    24 1328 24 1645 24 1749 24 1945   BP:  122/72  108/63   Pulse:  76  73   Resp: 18 18 17 16   Temp:  97.8 °F (36.6 °C)  98.5 °F (36.9 °C)   TempSrc:  Oral     SpO2:  95%  95%   Weight:       Height:           BP  Min: 104/63  Max: 143/57  Temp  Av °F (36.7 °C)  Min: 97.4 °F (36.3 °C)  Max: 98.5 °F (36.9 °C)  Pulse  Av.8  Min: 71  Max: 91  Resp  Av.8  Min: 16  Max: 24  SpO2  Av.7 %  Min: 91 %  Max: 98 %  Height  Av' 4" (162.6 cm)  Min: 5' 4" (162.6 cm)  Max: 5' 4" (162.6 cm)  Weight  Av.6 kg (384 lb 15.4 oz)  Min: 174.6 kg (384 lb 14.8 oz)  Max: 174.6 kg (385 lb)    Body mass index is 66.07 kg/m².          General:             Well developed, well nourished, no apparent distress  HEENT:              NCAT, no JVD, mucous membranes moist, EOM intact  Cardiovascular:  Regular rate and rhythm, normal S1, normal S2, No murmurs, rubs, or gallops  Respiratory:        Normal breath sounds, no wheezes, no rales, no rhonchi  Abdomen:           Bowel sounds present, non tender, non distended, no " masses, no hepatojugular reflux  Extremities:        No clubbing, no cyanosis, no edema  Vascular:            2+ b/l radial.  Peripheral pulses intact.  No carotid bruits.  Neurological:      No focal deficits  Skin:                   No obvious rashes or erythema, left lateral and inferior breast open wounds, cellulitic mass omn the right side of the buttock                Lab Results   Component Value Date    WBC 8.55 02/06/2024    HGB 11.0 (L) 02/06/2024    HCT 36.1 (L) 02/06/2024    MCV 87 02/06/2024     02/06/2024     Lab Results   Component Value Date    CHOL 197 11/11/2020    CHOL 203 (H) 10/22/2019     Lab Results   Component Value Date    HDL 40 11/11/2020    HDL 38 (L) 10/22/2019     Lab Results   Component Value Date    LDLCALC 123.2 11/11/2020    LDLCALC 135.2 10/22/2019     Lab Results   Component Value Date    TRIG 169 (H) 11/11/2020    TRIG 149 10/22/2019     Lab Results   Component Value Date    CHOLHDL 20.3 11/11/2020    CHOLHDL 18.7 (L) 10/22/2019     CMP  Recent Labs   Lab 02/06/24  0424      CALCIUM 8.3*      K 3.9   CO2 27      BUN 12   CREATININE 0.6      Lab Results   Component Value Date    TSH 1.190 10/21/2022         Assessment and Recommendations       Diagnoses:    1. Left lateral breast open wound  2. Left inferior breast with multiple pustules  3. Cellulitis of a mass on the right side    Plan:  1. Fluid from the inferior breast cultured  Triad to the cellulitic area  3. Mepilex to the left breast open wound daily  4. Pt will FU in the OP clinic on DC    Complexity:    moderate

## 2024-02-07 NOTE — PLAN OF CARE
Problem: Adult Inpatient Plan of Care  Goal: Plan of Care Review  Outcome: Ongoing, Progressing  Goal: Patient-Specific Goal (Individualized)  Outcome: Ongoing, Progressing  Goal: Absence of Hospital-Acquired Illness or Injury  Outcome: Ongoing, Progressing  Goal: Optimal Comfort and Wellbeing  Outcome: Ongoing, Progressing  Goal: Readiness for Transition of Care  Outcome: Ongoing, Progressing     Problem: Bariatric Environmental Safety  Goal: Safety Maintained with Care  Outcome: Ongoing, Progressing     Problem: Skin Injury Risk Increased  Goal: Skin Health and Integrity  Outcome: Ongoing, Progressing     Problem: Infection  Goal: Absence of Infection Signs and Symptoms  Outcome: Ongoing, Progressing

## 2024-02-07 NOTE — CARE UPDATE
02/07/24 0900   Patient Assessment/Suction   Level of Consciousness (AVPU) alert   Respiratory Effort Normal;Unlabored   Expansion/Accessory Muscles/Retractions no use of accessory muscles;no retractions;expansion symmetric   All Lung Fields Breath Sounds diminished   Rhythm/Pattern, Respiratory assisted mechanically   Cough Frequency no cough   PRE-TX-O2   Device (Oxygen Therapy) CPAP   $ Is the patient on High Flow Oxygen? Yes   Oxygen Concentration (%) 28   SpO2 95 %   Pulse Oximetry Type Intermittent   $ Pulse Oximetry - Multiple Charge Pulse Oximetry - Multiple   Pulse 80   Resp 15   Positioning   Body Position weight shifting   Head of Bed (HOB) Positioning HOB elevated   Positioning/Transfer Devices pillows;in use   Ready to Wean/Extubation Screen   FIO2<=50 (chart decimal) 0.28   Preset CPAP/BiPAP Settings   Mode Of Delivery CPAP   $ CPAP/BiPAP Daily Charge BiPAP/CPAP Daily   $ Is patient using? Yes   Sized Appropriately? Yes   Equipment Type V60   Airway Device Type small full face mask   Humidifier not applicable   CPAP (cm H2O) 12   Patient CPAP/BiPAP Settings   RR Total (Breaths/Min) 15   Tidal Volume (mL) 601   VE Minute Ventilation (L/min) 9 L/min   Peak Inspiratory Pressure (cm H2O) 13   TiTOT (%) 24   Total Leak (L/Min) 0   Patient Trigger - ST Mode Only (%) 100   CPAP/BiPAP Alarms   High Pressure (cm H2O) 30   Low Pressure (cm H2O) 6   Minute Ventilation (L/Min) 3   High RR (breaths/min) 40   Low RR (breaths/min) 8   Apnea (Sec) 20   Education   $ Education BiPAP;15 min

## 2024-02-07 NOTE — NURSING
Pt transported off unit via wheelchair. Belongings and discharge information in hand family at side. ALAN.

## 2024-02-07 NOTE — NURSING
Upon discharge pt received information regarding hospital stay, when to contact hcp, and future follow-up appointments. Pt verbalized an understanding of all the information discussed, questions/concerns were addressed accordingly. Pt's telemetry discontinued per order. Pt's mid-line remain intact for outpatient IV abx administration per order. Paper script given to pt. Pt currently receiving IV abx. Will transport off unit when complete. Family at bedside ALAN.

## 2024-02-08 ENCOUNTER — INFUSION (OUTPATIENT)
Dept: INFUSION THERAPY | Facility: HOSPITAL | Age: 59
End: 2024-02-08
Attending: STUDENT IN AN ORGANIZED HEALTH CARE EDUCATION/TRAINING PROGRAM
Payer: MEDICARE

## 2024-02-08 VITALS
HEIGHT: 64 IN | WEIGHT: 293 LBS | RESPIRATION RATE: 16 BRPM | DIASTOLIC BLOOD PRESSURE: 86 MMHG | TEMPERATURE: 99 F | OXYGEN SATURATION: 94 % | HEART RATE: 96 BPM | SYSTOLIC BLOOD PRESSURE: 143 MMHG | BODY MASS INDEX: 50.02 KG/M2

## 2024-02-08 DIAGNOSIS — L03.90 CELLULITIS: ICD-10-CM

## 2024-02-08 DIAGNOSIS — M79.3 PANNICULITIS: Primary | ICD-10-CM

## 2024-02-08 LAB
BACTERIA SPEC AEROBE CULT: ABNORMAL
BACTERIA SPEC AEROBE CULT: ABNORMAL

## 2024-02-08 PROCEDURE — 96365 THER/PROPH/DIAG IV INF INIT: CPT

## 2024-02-08 PROCEDURE — 25000003 PHARM REV CODE 250: Performed by: STUDENT IN AN ORGANIZED HEALTH CARE EDUCATION/TRAINING PROGRAM

## 2024-02-08 PROCEDURE — 63600175 PHARM REV CODE 636 W HCPCS: Performed by: STUDENT IN AN ORGANIZED HEALTH CARE EDUCATION/TRAINING PROGRAM

## 2024-02-08 RX ADMIN — DAPTOMYCIN 800 MG: 350 INJECTION, POWDER, LYOPHILIZED, FOR SOLUTION INTRAVENOUS at 09:02

## 2024-02-08 NOTE — PLAN OF CARE
Problem: Adult Inpatient Plan of Care  Goal: Optimal Comfort and Wellbeing  Outcome: Ongoing, Progressing  Intervention: Provide Person-Centered Care  Flowsheets (Taken 2/8/2024 0912)  Trust Relationship/Rapport:   care explained   reassurance provided   choices provided   thoughts/feelings acknowledged   emotional support provided   empathic listening provided   questions answered   questions encouraged

## 2024-02-09 ENCOUNTER — INFUSION (OUTPATIENT)
Dept: INFUSION THERAPY | Facility: HOSPITAL | Age: 59
End: 2024-02-09
Attending: STUDENT IN AN ORGANIZED HEALTH CARE EDUCATION/TRAINING PROGRAM
Payer: MEDICARE

## 2024-02-09 VITALS
TEMPERATURE: 98 F | DIASTOLIC BLOOD PRESSURE: 67 MMHG | RESPIRATION RATE: 18 BRPM | SYSTOLIC BLOOD PRESSURE: 152 MMHG | OXYGEN SATURATION: 96 % | HEART RATE: 88 BPM

## 2024-02-09 DIAGNOSIS — M79.3 PANNICULITIS: Primary | ICD-10-CM

## 2024-02-09 PROCEDURE — 96365 THER/PROPH/DIAG IV INF INIT: CPT

## 2024-02-09 PROCEDURE — 25000003 PHARM REV CODE 250: Performed by: STUDENT IN AN ORGANIZED HEALTH CARE EDUCATION/TRAINING PROGRAM

## 2024-02-09 PROCEDURE — 63600175 PHARM REV CODE 636 W HCPCS: Performed by: STUDENT IN AN ORGANIZED HEALTH CARE EDUCATION/TRAINING PROGRAM

## 2024-02-09 RX ADMIN — DAPTOMYCIN 800 MG: 350 INJECTION, POWDER, LYOPHILIZED, FOR SOLUTION INTRAVENOUS at 09:02

## 2024-02-09 NOTE — PROGRESS NOTES
PT tolerated Daptomycin infusion well. No adverse reaction noted. Pt education reinforced on Daptomycin side effects, what to expect and when to call Dr. Kunal Max. Pt verbalized understanding. PAC flushed with heparin and de-accessed per protocol. Pt tolerated well.  amr

## 2024-02-09 NOTE — PLAN OF CARE
Problem: Adult Inpatient Plan of Care  Goal: Optimal Comfort and Wellbeing  Intervention: Provide Person-Centered Care  Flowsheets (Taken 2/9/2024 0918)  Trust Relationship/Rapport:   care explained   reassurance provided   choices provided   thoughts/feelings acknowledged   emotional support provided   empathic listening provided   questions answered   questions encouraged

## 2024-02-10 ENCOUNTER — INFUSION (OUTPATIENT)
Dept: INFUSION THERAPY | Facility: HOSPITAL | Age: 59
End: 2024-02-10
Attending: STUDENT IN AN ORGANIZED HEALTH CARE EDUCATION/TRAINING PROGRAM
Payer: MEDICARE

## 2024-02-10 VITALS
TEMPERATURE: 97 F | RESPIRATION RATE: 16 BRPM | DIASTOLIC BLOOD PRESSURE: 56 MMHG | OXYGEN SATURATION: 95 % | SYSTOLIC BLOOD PRESSURE: 112 MMHG | HEART RATE: 92 BPM

## 2024-02-10 DIAGNOSIS — M79.3 PANNICULITIS: Primary | ICD-10-CM

## 2024-02-10 LAB
BACTERIA BLD CULT: NORMAL
BACTERIA BLD CULT: NORMAL
OHS QRS DURATION: 90 MS
OHS QTC CALCULATION: 434 MS

## 2024-02-10 PROCEDURE — 25000003 PHARM REV CODE 250: Performed by: STUDENT IN AN ORGANIZED HEALTH CARE EDUCATION/TRAINING PROGRAM

## 2024-02-10 PROCEDURE — 63600175 PHARM REV CODE 636 W HCPCS: Performed by: STUDENT IN AN ORGANIZED HEALTH CARE EDUCATION/TRAINING PROGRAM

## 2024-02-10 PROCEDURE — 96365 THER/PROPH/DIAG IV INF INIT: CPT

## 2024-02-10 RX ADMIN — DAPTOMYCIN 800 MG: 350 INJECTION, POWDER, LYOPHILIZED, FOR SOLUTION INTRAVENOUS at 02:02

## 2024-02-11 ENCOUNTER — INFUSION (OUTPATIENT)
Dept: INFUSION THERAPY | Facility: HOSPITAL | Age: 59
End: 2024-02-11
Attending: STUDENT IN AN ORGANIZED HEALTH CARE EDUCATION/TRAINING PROGRAM
Payer: MEDICARE

## 2024-02-11 VITALS
OXYGEN SATURATION: 95 % | DIASTOLIC BLOOD PRESSURE: 57 MMHG | RESPIRATION RATE: 16 BRPM | SYSTOLIC BLOOD PRESSURE: 106 MMHG | TEMPERATURE: 96 F | HEART RATE: 73 BPM

## 2024-02-11 DIAGNOSIS — M79.3 PANNICULITIS: Primary | ICD-10-CM

## 2024-02-11 PROCEDURE — 25000003 PHARM REV CODE 250: Performed by: STUDENT IN AN ORGANIZED HEALTH CARE EDUCATION/TRAINING PROGRAM

## 2024-02-11 PROCEDURE — 96365 THER/PROPH/DIAG IV INF INIT: CPT

## 2024-02-11 PROCEDURE — 63600175 PHARM REV CODE 636 W HCPCS: Performed by: STUDENT IN AN ORGANIZED HEALTH CARE EDUCATION/TRAINING PROGRAM

## 2024-02-11 RX ADMIN — DAPTOMYCIN 800 MG: 350 INJECTION, POWDER, LYOPHILIZED, FOR SOLUTION INTRAVENOUS at 03:02

## 2024-02-12 ENCOUNTER — INFUSION (OUTPATIENT)
Dept: INFUSION THERAPY | Facility: HOSPITAL | Age: 59
End: 2024-02-12
Attending: STUDENT IN AN ORGANIZED HEALTH CARE EDUCATION/TRAINING PROGRAM
Payer: MEDICARE

## 2024-02-12 VITALS
RESPIRATION RATE: 18 BRPM | HEART RATE: 71 BPM | DIASTOLIC BLOOD PRESSURE: 60 MMHG | SYSTOLIC BLOOD PRESSURE: 130 MMHG | OXYGEN SATURATION: 99 % | BODY MASS INDEX: 50.02 KG/M2 | TEMPERATURE: 98 F | HEIGHT: 64 IN | WEIGHT: 293 LBS

## 2024-02-12 DIAGNOSIS — M79.3 PANNICULITIS: Primary | ICD-10-CM

## 2024-02-12 PROCEDURE — 96365 THER/PROPH/DIAG IV INF INIT: CPT

## 2024-02-12 PROCEDURE — 63600175 PHARM REV CODE 636 W HCPCS: Performed by: STUDENT IN AN ORGANIZED HEALTH CARE EDUCATION/TRAINING PROGRAM

## 2024-02-12 PROCEDURE — 25000003 PHARM REV CODE 250: Performed by: STUDENT IN AN ORGANIZED HEALTH CARE EDUCATION/TRAINING PROGRAM

## 2024-02-12 RX ADMIN — DAPTOMYCIN 800 MG: 350 INJECTION, POWDER, LYOPHILIZED, FOR SOLUTION INTRAVENOUS at 10:02

## 2024-02-12 NOTE — PLAN OF CARE
Problem: Adult Inpatient Plan of Care  Goal: Optimal Comfort and Wellbeing  Outcome: Ongoing, Progressing  Intervention: Provide Person-Centered Care  Flowsheets (Taken 2/12/2024 0911)  Trust Relationship/Rapport:   care explained   choices provided   emotional support provided   empathic listening provided   questions answered   questions encouraged   reassurance provided   thoughts/feelings acknowledged

## 2024-02-12 NOTE — DISCHARGE SUMMARY
[facility]  Discharge Summary  Patient Name: Adriana Zaragoza MRN: [id number]   Patient Class: IP- Inpatient  Length of Stay: 2   Admission Date: 2/4/2024 11:00 PM Attending Physician: Kunal Max MD   Primary Care Provider: Melba Green MD Face-to-Face encounter date: 2/7/24   Chief Complaint: Fever (She has been having fever, chills,shaking, vomiting.)    Date of Discharge: 2/7/24  Discharge Disposition:Home or Self Care   Condition: Stable       Reason for Hospitalization     Active Hospital Problems    Diagnosis    *Cellulitis    Open wound of left breast    Hypokalemia    S/P gastric bypass    Morbid obesity    Lymphedema    Chronic low back pain with bilateral sciatica    Moderate episode of recurrent major depressive disorder         Brief History of Present Illness      Ms. Zaragoza is a 57 yo w/pmhx of morbid obesity, asthma/COPD, diabetes, depression, gastric bypass in 2019, lymphedema, large right thigh pannus who presents with weakness. Patient reports she has been feeling weak for the past week. She also reports chills, subjective fevers, malaise, bilateral lower extremity spasms, lower back pain, nausea/vomiting, and abdominal pain. She was helping her friends parents move when these symptoms started. She states she has a large pannus connected to her right thigh that has become infected in the past. However, she is unable to see herself if it has enlarged or is erythematous. She does note that her skin feels tight and while she has had spasms in the past, it has worsened the last few days and she is also reporting worsening pain around the pannus. She has seen Dr. Humphreys in clinic for the pannus with her last appt being in 8/2023. She was prescribed PO doxy at that time for cellulitis of the pannus. Denies any dysuria or diarrhea.       For the full HPI please refer to the History & Physical from this admission.    Hospital Course By Problem with Pertinent  "Findings        Cellulitis                                         -vancomycin  -per prior notes, patient had difficulty becoming therapeutic on vanc and was switched to daptomycin 2/2 weight   -switched to dapto  -will need to follow up w/ID outpatient   -blood cultures   -MRSA nares     Per ID  Recommendations:    CRP added on  Discussed at length with patient plan of care, she agrees to continue IV antibiotics outpatient, she will come for daily infusions at [facility]   Midline ordered   Daptomycin 800 mg IV daily for 9 more days, end date 02/14/2024   Fluconazole 200 mg p.o. daily for vaginal yeast  Weekly labs x2 CBC with diff, CMP, CRP, and CPK twice a week  Please remove midline by end of therapy on 02/14  Needs daily Hibiclens baths  Follow-up at the office when feasible likely in 4-5 weeks, office to set up appointment   She understands urgency of setting up appointment with plastic surgeon to remove pannus        Patient was seen and examined on the date of discharge and determined to be suitable for discharge.    Physical Exam  BP (!) 146/86   Pulse 76   Temp 98.4 °F (36.9 °C)   Resp 17   Ht 5' 4" (1.626 m)   Wt (!) 174.6 kg (384 lb 14.8 oz)   SpO2 99%   BMI 66.07 kg/m²   Vitals reviewed.      HENT: Atraumatic.   Cardiovascular: Normal rate, regular rhythm.  S1 S2.    Pulmonary/Chest: Effort normal. Clear to auscultation bilaterally. No wheezes.   Abdominal: Soft. Bowel sounds are normal. Exhibits no distension and no mass. No tenderness  Neurological: Alert.   Skin: Skin is warm and dry.  Large pannus attached to the right thigh; erythematous and firm; when compared to pictures from 8/2023 does appear more erythematous than baseline   No purulent drainage      Following labs were Reviewed   No results for input(s): "WBC", "HGB", "HCT", "PLT", "GLUCOSE", "CALCIUM", "ALBUMIN", "PROT", "NA", "K", "CO2", "CL", "BUN", "CREATININE", "ALKPHOS", "ALT", "AST", "BILITOT" in the last 24 hours.  No results found " "for: "POCTGLUCOSE"     All labs within the past 24 hours have been reviewed    Microbiology Results (last 7 days)       Procedure Component Value Units Date/Time    MRSA Screen by PCR [[id number]] Collected: 02/05/24 0408    Order Status: Completed Specimen: Nasopharyngeal Swab from Nasal Updated: 02/05/24 0732     MRSA SCREEN BY PCR Negative          X-Ray Chest AP Portable   Final Result          No results found for this or any previous visit.      Consultants and Procedures   Consultants:  Consults (From admission, onward)          Status Ordering Provider     Inpatient consult to   Once        Provider:  (Not yet assigned)    Completed MOISE JOHNSON     Inpatient consult to Social Work/Case Management  Once        Provider:  (Not yet assigned)    Completed SWATI PLUMMER     Inpatient consult to Midline team  Once        Provider:  (Not yet assigned)    Completed RALPH AGUIRRE     Inpatient consult to Infectious Diseases  Once        Provider:  Moise Johnson MD    Completed SWATI PLUMMER            Procedures:   * No surgery found *     Discharge Information:   Diet:  Resume Cardiac diet/Diabetic Diet    Physical Activity:  Activity as tolerated    Instructions:  1. Take all medications as prescribed  2. Keep all follow-up appointments  3. Return to the hospital or call your primary care physicians if any worsening symptoms such as chest pain, shortness of breath, bleeding,  intractable pain, fever >101 occur.      Follow-Up Appointments:  Please call your primary care physician to schedule an appointment in 1 week time.     Follow-up Information       Melba Green MD. Go on 2/12/2024.    Specialties: Hospitalist, Internal Medicine  Why: For hospital follow up 1:40pm  Contact information:  [address]  [address]  [facility] [address]  [phone]               Moise Johnson MD Follow up in 4 week(s).    Specialty: " Infectious Diseases  Contact information:  [address]  [address]  [id number]  [phone]               [facility]. Go on 2/8/2024.    Why: For 1st infusion appointment at 9am.  Contact information:  [address]   [place] [address]    [phone]                             Pending laboratory work/Tests to be performed/followed by the Primary care Physician:    The patient was discharged with discharge instructions reviewed in written and verbal form. All questions were answered and prescriptions were provided. The importance of making follow up appointments and compliance of medications has been emphasized. The patient will follow up in 1 week or sooner as needed with the PCP. Tthe patient understands and agrees with the plan. Upon discharge, patient needs to be on following medications.    Discharge Medications:     Medication List        START taking these medications      levoFLOXacin 750 MG tablet  Commonly known as: LEVAQUIN  Take 1 tablet (750 mg total) by mouth once daily. for 7 days     nicotine 14 mg/24 hr  Commonly known as: NICODERM CQ  Place 1 patch onto the skin once daily.     polyethylene glycol 17 gram Pwpk  Commonly known as: GLYCOLAX  Take 17 g by mouth 2 (two) times daily. for 7 days     sodium chloride 0.9% SolP 50 mL with DAPTOmycin 500 mg SolR 821.5 mg  Inject 821.5 mg into the vein once daily. for 8 days            CHANGE how you take these medications      * oxyCODONE 5 MG immediate release tablet  Commonly known as: ROXICODONE  Take 1 tablet (5 mg total) by mouth every 4 (four) hours as needed for Pain.  What changed: Another medication with the same name was added. Make sure you understand how and when to take each.     * oxyCODONE 5 MG immediate release tablet  Commonly known as: ROXICODONE  Take 2 tablets (10 mg total) by mouth every 6 (six) hours as needed for Pain.  What changed: You were already taking a medication with  the same name, and this prescription was added. Make sure you understand how and when to take each.           * This list has 2 medication(s) that are the same as other medications prescribed for you. Read the directions carefully, and ask your doctor or other care provider to review them with you.                CONTINUE taking these medications      acetaminophen 650 MG Tbsr  Commonly known as: TYLENOL     albuterol 90 mcg/actuation inhaler  Commonly known as: PROVENTIL/VENTOLIN HFA     buPROPion 150 MG TB24 tablet  Commonly known as: WELLBUTRIN XL     clindamycin 1 % lotion  Commonly known as: CLEOCIN T  Apply topically 2 (two) times daily.     docusate sodium 100 MG capsule  Commonly known as: COLACE  Take 1 capsule (100 mg total) by mouth 2 (two) times daily.     OZEMPIC 0.25 mg or 0.5 mg (2 mg/3 mL) pen injector  Generic drug: semaglutide            ASK your doctor about these medications      fluconazole 200 MG Tab  Commonly known as: DIFLUCAN  Take 1 tablet (200 mg total) by mouth once. for 1 dose  Ask about: Should I take this medication?               Where to Get Your Medications        These medications were sent to [facility] [address] [facility] [address] [place] [address]      Phone: [phone]   fluconazole 200 MG Tab  levoFLOXacin 750 MG tablet  nicotine 14 mg/24 hr  polyethylene glycol 17 gram Pwpk       Information about where to get these medications is not yet available    Ask your nurse or doctor about these medications  oxyCODONE 5 MG immediate release tablet  sodium chloride 0.9% SolP 50 mL with DAPTOmycin 500 mg SolR 821.5 mg           I spent 33 minutes preparing the discharge for this patient including reviewing records from previous encounters, preparation of discharge summary, assessing and final examination of the patient, discharge medicine reconciliation, discussing plan of care, follow up and education and prescriptions.        Kunal Max  [facility] Hospitalist

## 2024-02-14 ENCOUNTER — TELEPHONE (OUTPATIENT)
Dept: INFUSION THERAPY | Facility: HOSPITAL | Age: 59
End: 2024-02-14

## 2024-02-14 ENCOUNTER — INFUSION (OUTPATIENT)
Dept: INFUSION THERAPY | Facility: HOSPITAL | Age: 59
End: 2024-02-14
Attending: STUDENT IN AN ORGANIZED HEALTH CARE EDUCATION/TRAINING PROGRAM
Payer: MEDICARE

## 2024-02-14 VITALS
OXYGEN SATURATION: 97 % | TEMPERATURE: 98 F | SYSTOLIC BLOOD PRESSURE: 122 MMHG | WEIGHT: 293 LBS | DIASTOLIC BLOOD PRESSURE: 63 MMHG | HEIGHT: 64 IN | BODY MASS INDEX: 50.02 KG/M2 | HEART RATE: 87 BPM | RESPIRATION RATE: 20 BRPM

## 2024-02-14 DIAGNOSIS — M79.3 PANNICULITIS: Primary | ICD-10-CM

## 2024-02-14 PROCEDURE — 25000003 PHARM REV CODE 250: Performed by: STUDENT IN AN ORGANIZED HEALTH CARE EDUCATION/TRAINING PROGRAM

## 2024-02-14 PROCEDURE — 96365 THER/PROPH/DIAG IV INF INIT: CPT

## 2024-02-14 PROCEDURE — 63600175 PHARM REV CODE 636 W HCPCS: Performed by: STUDENT IN AN ORGANIZED HEALTH CARE EDUCATION/TRAINING PROGRAM

## 2024-02-14 RX ADMIN — DAPTOMYCIN 800 MG: 350 INJECTION, POWDER, LYOPHILIZED, FOR SOLUTION INTRAVENOUS at 09:02

## 2024-02-14 NOTE — PLAN OF CARE
Problem: Adult Inpatient Plan of Care  Goal: Optimal Comfort and Wellbeing  Outcome: Ongoing, Progressing  Intervention: Provide Person-Centered Care  Flowsheets (Taken 2/14/2024 1021)  Trust Relationship/Rapport:   care explained   choices provided   emotional support provided   empathic listening provided   questions answered   questions encouraged   reassurance provided   thoughts/feelings acknowledged

## 2024-02-14 NOTE — NURSING
Mid line removed , pressure dressing was applied, checked 15 minutes later. No bleeding at site, dressing reapplied

## 2024-03-20 ENCOUNTER — DOCUMENT SCAN (OUTPATIENT)
Dept: HOME HEALTH SERVICES | Facility: HOSPITAL | Age: 59
End: 2024-03-20
Payer: MEDICARE

## 2024-04-01 ENCOUNTER — TELEPHONE (OUTPATIENT)
Dept: PSYCHIATRY | Facility: CLINIC | Age: 59
End: 2024-04-01
Payer: MEDICARE

## 2024-04-01 NOTE — TELEPHONE ENCOUNTER
Spoke to patient and scheduled an in person new patient medication management appointment for 04/02/2024 @ 9 am with Dr. Kunal Olguin. Advised patient of the location, contact phone number, to arrive 15 minutes early for the appointment, to bring ID, insurance card and list of current medications, informed of security presence and mandatory electronic search, and of the cancellation policy. Patient states understanding and no additional questions at this time.       Patient states she has new insurance and will try to input the information through my chart prior to the appointment.

## 2024-04-02 ENCOUNTER — OFFICE VISIT (OUTPATIENT)
Dept: PSYCHIATRY | Facility: CLINIC | Age: 59
End: 2024-04-02
Payer: MEDICARE

## 2024-04-02 VITALS
WEIGHT: 293 LBS | SYSTOLIC BLOOD PRESSURE: 147 MMHG | HEART RATE: 87 BPM | HEIGHT: 64 IN | DIASTOLIC BLOOD PRESSURE: 81 MMHG | BODY MASS INDEX: 50.02 KG/M2

## 2024-04-02 DIAGNOSIS — Z98.890 HISTORY OF GASTRIC SURGERY: ICD-10-CM

## 2024-04-02 DIAGNOSIS — Z90.3 H/O GASTRIC SLEEVE: ICD-10-CM

## 2024-04-02 DIAGNOSIS — F41.1 GENERALIZED ANXIETY DISORDER: ICD-10-CM

## 2024-04-02 DIAGNOSIS — D53.9 NUTRITIONAL ANEMIA, UNSPECIFIED: ICD-10-CM

## 2024-04-02 DIAGNOSIS — F17.210 NICOTINE DEPENDENCE, CIGARETTES, UNCOMPLICATED: ICD-10-CM

## 2024-04-02 DIAGNOSIS — E55.9 VITAMIN D INSUFFICIENCY: ICD-10-CM

## 2024-04-02 DIAGNOSIS — R53.83 FATIGUE, UNSPECIFIED TYPE: ICD-10-CM

## 2024-04-02 DIAGNOSIS — G47.00 INSOMNIA, UNSPECIFIED TYPE: ICD-10-CM

## 2024-04-02 DIAGNOSIS — F33.1 MODERATE EPISODE OF RECURRENT MAJOR DEPRESSIVE DISORDER: Primary | ICD-10-CM

## 2024-04-02 DIAGNOSIS — F43.10 PTSD (POST-TRAUMATIC STRESS DISORDER): ICD-10-CM

## 2024-04-02 PROBLEM — E65 ABDOMINAL PANNICULUS: Status: ACTIVE | Noted: 2023-06-14

## 2024-04-02 PROBLEM — Z87.39: Status: ACTIVE | Noted: 2023-06-14

## 2024-04-02 PROBLEM — G47.33 OSA (OBSTRUCTIVE SLEEP APNEA): Status: ACTIVE | Noted: 2023-06-14

## 2024-04-02 PROCEDURE — 90792 PSYCH DIAG EVAL W/MED SRVCS: CPT | Mod: S$GLB,,, | Performed by: STUDENT IN AN ORGANIZED HEALTH CARE EDUCATION/TRAINING PROGRAM

## 2024-04-02 PROCEDURE — 1159F MED LIST DOCD IN RCRD: CPT | Mod: CPTII,S$GLB,, | Performed by: STUDENT IN AN ORGANIZED HEALTH CARE EDUCATION/TRAINING PROGRAM

## 2024-04-02 PROCEDURE — 3077F SYST BP >= 140 MM HG: CPT | Mod: CPTII,S$GLB,, | Performed by: STUDENT IN AN ORGANIZED HEALTH CARE EDUCATION/TRAINING PROGRAM

## 2024-04-02 PROCEDURE — 1160F RVW MEDS BY RX/DR IN RCRD: CPT | Mod: CPTII,S$GLB,, | Performed by: STUDENT IN AN ORGANIZED HEALTH CARE EDUCATION/TRAINING PROGRAM

## 2024-04-02 PROCEDURE — 99999 PR PBB SHADOW E&M-EST. PATIENT-LVL V: CPT | Mod: PBBFAC,,, | Performed by: STUDENT IN AN ORGANIZED HEALTH CARE EDUCATION/TRAINING PROGRAM

## 2024-04-02 PROCEDURE — 3079F DIAST BP 80-89 MM HG: CPT | Mod: CPTII,S$GLB,, | Performed by: STUDENT IN AN ORGANIZED HEALTH CARE EDUCATION/TRAINING PROGRAM

## 2024-04-02 RX ORDER — BUPROPION HYDROCHLORIDE 150 MG/1
150 TABLET ORAL DAILY
Qty: 30 TABLET | Refills: 1 | Status: SHIPPED | OUTPATIENT
Start: 2024-04-02 | End: 2024-06-01

## 2024-04-02 RX ORDER — HYDROXYZINE HYDROCHLORIDE 25 MG/1
25 TABLET, FILM COATED ORAL 3 TIMES DAILY
COMMUNITY

## 2024-04-02 RX ORDER — FERROUS SULFATE 324(65)MG
324 TABLET, DELAYED RELEASE (ENTERIC COATED) ORAL DAILY
COMMUNITY

## 2024-04-02 NOTE — PATIENT INSTRUCTIONS
Please complete lab work at your earliest convenience. Your labs are NON-FASTING.     Referral placed to Dr. Sanders for trauma therapy     Restart WELLBUTRIN XL 150mg daily in the morning    Referral placed to smoking cessation program    Don't go to bed unless you're DROWSY. If you're awake in bed for longer than 30 MINUTES, leave the bedroom, and don't return until you're DROWSY.  Limit NAP DURATION to 30 MINUTES (or less).

## 2024-04-02 NOTE — PROGRESS NOTES
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 frequency within three visits from now  Continue medication management      Billing Documentation:     Method of Encounter IN PERSON visit at the clinic   Type of Encounter New patient to me, NOT seen by department within last 3 years   Counseling;  Psychotherapy Not applicable: Not done or UNDER 16 minutes   Counseling;  Tobacco and/or Nicotine Not applicable: Not done or UNDER 3 minutes   Additional Codes and Modifiers N/A   Time Remaining Chart/Pt 69960: NEW patient to me and DID prescribe meds (and two or fewer 44167/89651 codes within a year), 45+mins   Total Mins  (4/2/2024) 45+mins face-to-face with patient AND 30+mins charting        Kunal Olguin DO  Department of Psychiatry, [facility]

## 2024-04-03 ENCOUNTER — TELEPHONE (OUTPATIENT)
Dept: PSYCHIATRY | Facility: CLINIC | Age: 59
End: 2024-04-03
Payer: MEDICARE

## 2024-04-03 NOTE — TELEPHONE ENCOUNTER
Called patient to schedule a new patient PTSD Trauma  therapy appointment with Dr. Sanders from the referral sent by Dr. Olguin. No answer, left voice message, sent my chart

## 2024-04-04 ENCOUNTER — LAB VISIT (OUTPATIENT)
Dept: LAB | Facility: HOSPITAL | Age: 59
End: 2024-04-04
Attending: STUDENT IN AN ORGANIZED HEALTH CARE EDUCATION/TRAINING PROGRAM
Payer: MEDICARE

## 2024-04-04 DIAGNOSIS — Z90.3 H/O GASTRIC SLEEVE: ICD-10-CM

## 2024-04-04 DIAGNOSIS — D53.9 NUTRITIONAL ANEMIA, UNSPECIFIED: ICD-10-CM

## 2024-04-04 DIAGNOSIS — E55.9 VITAMIN D INSUFFICIENCY: ICD-10-CM

## 2024-04-04 DIAGNOSIS — F41.1 GENERALIZED ANXIETY DISORDER: ICD-10-CM

## 2024-04-04 DIAGNOSIS — F33.1 MODERATE EPISODE OF RECURRENT MAJOR DEPRESSIVE DISORDER: ICD-10-CM

## 2024-04-04 DIAGNOSIS — R53.83 FATIGUE, UNSPECIFIED TYPE: ICD-10-CM

## 2024-04-04 DIAGNOSIS — G47.00 INSOMNIA, UNSPECIFIED TYPE: ICD-10-CM

## 2024-04-04 LAB
TSH SERPL DL<=0.005 MIU/L-ACNC: 1.64 UIU/ML (ref 0.4–4)
VIT B12 SERPL-MCNC: 408 PG/ML (ref 210–950)

## 2024-04-04 PROCEDURE — 84443 ASSAY THYROID STIM HORMONE: CPT | Performed by: STUDENT IN AN ORGANIZED HEALTH CARE EDUCATION/TRAINING PROGRAM

## 2024-04-04 PROCEDURE — 82652 VIT D 1 25-DIHYDROXY: CPT | Performed by: STUDENT IN AN ORGANIZED HEALTH CARE EDUCATION/TRAINING PROGRAM

## 2024-04-04 PROCEDURE — 82607 VITAMIN B-12: CPT | Performed by: STUDENT IN AN ORGANIZED HEALTH CARE EDUCATION/TRAINING PROGRAM

## 2024-04-08 LAB — 1,25(OH)2D3 SERPL-MCNC: 66 PG/ML (ref 20–79)

## 2024-04-16 ENCOUNTER — TELEPHONE (OUTPATIENT)
Dept: PSYCHIATRY | Facility: CLINIC | Age: 59
End: 2024-04-16
Payer: MEDICARE

## 2024-04-17 NOTE — TELEPHONE ENCOUNTER
Spoke to patient and scheduled a new patient PTSD short term therapy appointment with Dr. Marilyn ospina on 04/19/2024 @ 1pm. Advised patient of the location for any future in person appointments, contact phone number, to log in the night before to complete the epre check, to log in to the visit 5-10 minutes prior to the appointment time, informed of security presence and mandatory electronic search, and of the cancellation policy. Patient states understanding and no additional questions at this time.

## 2024-04-18 ENCOUNTER — CLINICAL SUPPORT (OUTPATIENT)
Dept: SMOKING CESSATION | Facility: CLINIC | Age: 59
End: 2024-04-18

## 2024-04-18 DIAGNOSIS — F17.210 LIGHT CIGARETTE SMOKER (1-9 CIGS/DAY): Primary | ICD-10-CM

## 2024-04-18 PROCEDURE — 99999 PR PBB SHADOW E&M-EST. PATIENT-LVL II: CPT | Mod: PBBFAC,,,

## 2024-04-18 RX ORDER — IBUPROFEN 200 MG
1 TABLET ORAL DAILY
Qty: 28 PATCH | Refills: 0 | Status: SHIPPED | OUTPATIENT
Start: 2024-04-18

## 2024-04-19 ENCOUNTER — OFFICE VISIT (OUTPATIENT)
Dept: PSYCHIATRY | Facility: CLINIC | Age: 59
End: 2024-04-19
Payer: MEDICARE

## 2024-04-19 DIAGNOSIS — F41.1 GENERALIZED ANXIETY DISORDER: ICD-10-CM

## 2024-04-19 DIAGNOSIS — F33.1 MODERATE EPISODE OF RECURRENT MAJOR DEPRESSIVE DISORDER: ICD-10-CM

## 2024-04-19 DIAGNOSIS — F43.10 PTSD (POST-TRAUMATIC STRESS DISORDER): Primary | ICD-10-CM

## 2024-04-19 PROCEDURE — 90791 PSYCH DIAGNOSTIC EVALUATION: CPT | Mod: 95,,, | Performed by: PSYCHOLOGIST

## 2024-04-19 PROCEDURE — 1159F MED LIST DOCD IN RCRD: CPT | Mod: CPTII,95,, | Performed by: PSYCHOLOGIST

## 2024-04-19 PROCEDURE — 1160F RVW MEDS BY RX/DR IN RCRD: CPT | Mod: CPTII,95,, | Performed by: PSYCHOLOGIST
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
## 2024-05-02 ENCOUNTER — TELEPHONE (OUTPATIENT)
Dept: PSYCHIATRY | Facility: CLINIC | Age: 59
End: 2024-05-02
Payer: MEDICARE

## 2024-05-02 NOTE — TELEPHONE ENCOUNTER
Patient called and left message needing to reschedule appt with Dr. Olgiun today, she is not feeling well.

## 2024-05-13 ENCOUNTER — TELEPHONE (OUTPATIENT)
Dept: PSYCHIATRY | Facility: CLINIC | Age: 59
End: 2024-05-13
Payer: MEDICARE

## 2024-05-13 NOTE — TELEPHONE ENCOUNTER
Called patient due to missing her last appt so we could reschedule her to another day. Patient states she is available any day this week in the afternoon. Offered patient 5/15 @ 3pm and she accepted.

## 2024-06-18 ENCOUNTER — TELEPHONE (OUTPATIENT)
Dept: SMOKING CESSATION | Facility: CLINIC | Age: 59
End: 2024-06-18
Payer: MEDICARE

## 2024-07-06 NOTE — PROGRESS NOTES
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  She is here with an injury to her nose.  She is here for occupational medicine who are in the office today.  There is no loss of consciousness.  Pain or nasal bridge with swelling.  Here for an x-ray to rule out fracture    Her past medical history is significant for:  No past medical history on file.    ALLERGIES:   Allergen Reactions    Hydroxychloroquine RASH and Other (See Comments)       Carol Dumont had no medications administered during this visit.    Review of Systems:    All other systems reviewed are negative    OBJECTIVE  Vitals:    07/06/24 1240   BP: 112/70   Pulse: 66   Resp: 18   Temp: 96.9 °F (36.1 °C)         Physical Exam:  Alert and oriented ×3.  NAD.  No respiratory distress.    HEENT: PERRLA, EOMI. no ocular inflammation or discharge.  TMs well visualized without erythema or bulging fluid retraction.  External auditory canals patent.  Nares clear without any erythema, lesions or purulent discharge.  Oral cavity without any erythema edema or suspicious oral lesions.  Neck supple.  No lymphadenopathy or masses noted.  There is pain with palpation of the bridge of the nose but there is no ecchymosis or deformity.    H RSR normal S1-S2, no murmur, no rubs, no discernible S3 or S4    Lungs: CTA    Nasal series x-ray: No acute fracture  ASSESSMENT/PLAN  Contusion to the nose.  She will take ibuprofen for pain.  Off work tomorrow and Monday.  Follow-up appoint with occupational medicine.  If symptoms should suddenly change or worsen, seek immediate reevaluation with PCP or return to Immediate Care.  The patient verbalized understanding.    Anthony Chavez,      [facility]  Progress Note    Patient Name: Adriana Zaragoza  MRN: [id number]  Patient Class: IP- Inpatient   Admission Date: 3/2/2023  Length of Stay: 1 days  Attending Physician: Enrique Herring MD  Primary Care Provider: Jad Hua MD        Subjective:     Principal Problem:Panniculitis        HPI:  ED note  Patient presents complaining of fever and weakness.  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  Patient started with fever and chills this morning.  Patient has had sepsis before.    3/2/2023  Ms Zaragoza has a large appendage . She noted fever and chills this date with a Hx of cellulitis of the appendage. Pt has chronic low back pain that is an 8/10 at present.  The appendage is erythematous and tender to the touch.      Overview/Hospital Course:  No notes on file    Interval History:     03/05/23: afebrile redness to appendage decreased and with decreased warmth. Continue Daptomycin per ID recommendations. Final cultures pending, CRP Trending downward.    03/04/23: doing better c/o mild sob will add duo neb. Seen by ID continue Daptomycin and wound care. Monitor CRP.afebrile    03/03/23: continues with redness and swelling to right upper leg appendage. Tolerating IV antibiotic. Afebrile. Continue CPAP at night and with sleeping.  Replace K+        Review of Systems  Objective:     Vital Signs (Most Recent):  Temp: 98.5 °F (36.9 °C) (03/05/23 1214)  Pulse: 73 (03/05/23 1214)  Resp: 16 (03/05/23 1215)  BP: 133/66 (03/05/23 1214)  SpO2: 96 % (03/05/23 1214)   Vital Signs (24h Range):  Temp:  [97.4 °F (36.3 °C)-98.7 °F (37.1 °C)] 98.5 °F (36.9 °C)  Pulse:  [68-78] 73  Resp:  [16-24] 16  SpO2:  [93 %-99 %] 96 %  BP: ()/(55-75) 133/66     Weight: (!) 175.2 kg (386 lb 3.9 oz)  Body mass index is 60.49 kg/m².    Intake/Output Summary (Last 24 hours) at 3/5/2023 1341  Last data filed at 3/4/2023 1613  Gross  per 24 hour   Intake 480 ml   Output 800 ml   Net -320 ml      Physical Exam  Constitutional:       Appearance: She is obese.   HENT:      Head: Normocephalic and atraumatic.      Mouth/Throat:      Mouth: Mucous membranes are moist.   Eyes:      Extraocular Movements: Extraocular movements intact.      Pupils: Pupils are equal, round, and reactive to light.   Cardiovascular:      Rate and Rhythm: Normal rate and regular rhythm.   Pulmonary:      Breath sounds: Rhonchi present.   Abdominal:      General: Abdomen is flat. Bowel sounds are normal.      Palpations: Abdomen is soft.   Musculoskeletal:      Cervical back: Normal range of motion.      Right lower leg: Edema present.      Left lower leg: Edema present.      Comments: Large cellulitis appendage from leg with cellulitis see image   Skin:     General: Skin is warm and dry.   Neurological:      General: No focal deficit present.      Mental Status: She is oriented to person, place, and time.                                 Significant Labs: All pertinent labs within the past 24 hours have been reviewed.  A1C:   Recent Labs   Lab 10/22/22  0456 03/04/23  0715   HGBA1C 6.0 6.2      Latest Reference Range & Units 03/03/23 14:27 03/05/23 05:48   CRP <0.76 mg/dL 19.52 (H) 11.05 (H)   (H): Data is abnormally high  ABGs: No results for input(s): PH, PCO2, HCO3, POCSATURATED, BE, TOTALHB, COHB, METHB, O2HB, POCFIO2, PO2 in the last 48 hours.  Bilirubin:   Recent Labs   Lab 03/02/23  1150 03/03/23  0559 03/05/23  0548   BILITOT 1.1* 0.6 0.4     Blood Culture:   No results for input(s): LABBLOO in the last 48 hours.    BMP:   Recent Labs   Lab 03/05/23  0548   *      K 3.5      CO2 30*   BUN 11   CREATININE 0.5   CALCIUM 8.3*     CBC:   Recent Labs   Lab 03/04/23  0715 03/05/23  0548   WBC 7.86 7.27  7.27   HGB 10.6* 10.4*  10.4*   HCT 34.0* 33.2*  33.2*    231  231     CMP:   Recent Labs   Lab 03/05/23  0548      K 3.5      CO2  30*   *   BUN 11   CREATININE 0.5   CALCIUM 8.3*   PROT 6.7   ALBUMIN 2.9*   BILITOT 0.4   ALKPHOS 56   AST 13   ALT 14   ANIONGAP 5*     Cardiac Markers: No results for input(s): CKMB, MYOGLOBIN, BNP, TROPISTAT in the last 48 hours.  Coagulation: No results for input(s): PT, INR, APTT in the last 48 hours.  Lactic Acid:   No results for input(s): LACTATE in the last 48 hours.    Lipase: No results for input(s): LIPASE in the last 48 hours.  Lipid Panel: No results for input(s): CHOL, HDL, LDLCALC, TRIG, CHOLHDL in the last 48 hours.  Magnesium: No results for input(s): MG in the last 48 hours.  Pathology Results  (Last 10 years)      None          POCT Glucose: No results for input(s): POCTGLUCOSE in the last 48 hours.  Prealbumin: No results for input(s): PREALBUMIN in the last 48 hours.  Respiratory Culture: No results for input(s): GSRESP, RESPIRATORYC in the last 48 hours.  Troponin: No results for input(s): TROPONINI, TROPONINIHS in the last 48 hours.  TSH:   Recent Labs   Lab 10/21/22  2110   TSH 1.190     Urine Culture: No results for input(s): LABURIN in the last 48 hours.  Urine Studies: No results for input(s): COLORU, APPEARANCEUA, PHUR, SPECGRAV, PROTEINUA, GLUCUA, KETONESU, BILIRUBINUA, OCCULTUA, NITRITE, UROBILINOGEN, LEUKOCYTESUR, RBCUA, WBCUA, BACTERIA, SQUAMEPITHEL, HYALINECASTS in the last 48 hours.    Invalid input(s): WRIGHTSUR  X-Ray Chest AP Portable    Result Date: 3/2/2023  HISTORY: Sepsis COMPARISON:None available. FINDINGS:Single AP view the chest reveals normal appearance of the cardiomediastinal silhouette. Pulmonary vascularity is not enlarged. Mild diffuse interstitial prominence likely related to viral etiology. No evidence of pulmonic infiltrate or significant pleural effusion or atelectasis. The hilar structures without evidence of enlargement. No significant tracheal shift. Chronic spondylosis changes of the thoracic spine. IMPRESSION:Mild diffuse interstitial prominence  likely on a chronic basis due to viral etiology. Electronically signed by:  Cyril Rosales MD  3/2/2023 11:26 AM CST Workstation: [id number]        Significant Imaging: I have reviewed all pertinent imaging results/findings within the past 24 hours.  I have reviewed and interpreted all pertinent imaging results/findings within the past 24 hours.      Assessment/Plan:      * Panniculitis  Of an appendage of the right thigh  Pt is morbidly obese and this has been a recurrent problem  Pt has multiple antibiotic allergies and to pain medication  Continue Daptomycin  CRP trending downward. afebrile  Final cultures pending        Acute lymphangitis  Of an appendage  Pt is morbidly obese and this has been a recurrent problem  Pt has multiple antibiotic allergies and to pain medication  Continue antibiotic per ID   Continue to trend inflammatory markers  CRP trending downward     Latest Reference Range & Units 03/03/23 14:27 03/05/23 05:48   CRP <0.76 mg/dL 19.52 (H) 11.05 (H)   (H): Data is abnormally high    KEESHA (obstructive sleep apnea)  Continue CPAP at night and while sleeping  Continuous pulse ox readings  duo neb Tx      Chronic low back pain with bilateral sciatica  Allergic to NSAID'S and some opiates      Pickwickian syndrome  Body mass index is 60.49 kg/m². Morbid obesity complicates all aspects of disease management from diagnostic modalities to treatment. Weight loss encouraged and health benefits explained to patient.           VTE Risk Mitigation (From admission, onward)         Ordered     enoxaparin injection 40 mg  Every 12 hours         03/02/23 2055     IP VTE HIGH RISK PATIENT  Once         03/02/23 2055     Place sequential compression device  Until discontinued         03/02/23 2055                Discharge Planning   JONATHAN: 3/6/2023     Code Status: Full Code   Is the patient medically ready for discharge?:     Reason for patient still in hospital (select all that apply): Patient trending condition,  Treatment and Consult recommendations  Discharge Plan A: Home with family, Home Health                  Yohana Thomas NP  Department of Hospital Medicine   [facility]

## 2024-07-11 ENCOUNTER — CLINICAL SUPPORT (OUTPATIENT)
Dept: SMOKING CESSATION | Facility: CLINIC | Age: 59
End: 2024-07-11

## 2024-07-11 DIAGNOSIS — F17.200 NICOTINE DEPENDENCE: Primary | ICD-10-CM

## 2024-08-11 ENCOUNTER — HOSPITAL ENCOUNTER (EMERGENCY)
Facility: HOSPITAL | Age: 59
Discharge: HOME | End: 2024-08-11
Attending: STUDENT IN AN ORGANIZED HEALTH CARE EDUCATION/TRAINING PROGRAM
Payer: MEDICARE

## 2024-08-11 VITALS
HEIGHT: 64 IN | TEMPERATURE: 99 F | BODY MASS INDEX: 50.02 KG/M2 | DIASTOLIC BLOOD PRESSURE: 60 MMHG | SYSTOLIC BLOOD PRESSURE: 131 MMHG | WEIGHT: 293 LBS | HEART RATE: 70 BPM | OXYGEN SATURATION: 98 % | RESPIRATION RATE: 18 BRPM

## 2024-08-11 DIAGNOSIS — M79.603 ARM PAIN: ICD-10-CM

## 2024-08-11 LAB
ALBUMIN SERPL BCP-MCNC: 3.9 G/DL (ref 3.5–5.2)
ALP SERPL-CCNC: 66 U/L (ref 55–135)
ALT SERPL W/O P-5'-P-CCNC: 11 U/L (ref 10–44)
ANION GAP SERPL CALC-SCNC: 6 MMOL/L (ref 8–16)
APTT PPP: 26 SEC (ref 21–32)
AST SERPL-CCNC: 11 U/L (ref 10–40)
BASOPHILS # BLD AUTO: 0.05 K/UL (ref 0–0.2)
BASOPHILS NFR BLD: 0.4 % (ref 0–1.9)
BILIRUB SERPL-MCNC: 0.3 MG/DL (ref 0.1–1)
BUN SERPL-MCNC: 13 MG/DL (ref 6–20)
CALCIUM SERPL-MCNC: 8.7 MG/DL (ref 8.7–10.5)
CHLORIDE SERPL-SCNC: 105 MMOL/L (ref 95–110)
CO2 SERPL-SCNC: 27 MMOL/L (ref 23–29)
CREAT SERPL-MCNC: 0.7 MG/DL (ref 0.5–1.4)
DIFFERENTIAL METHOD BLD: ABNORMAL
EOSINOPHIL # BLD AUTO: 0.1 K/UL (ref 0–0.5)
EOSINOPHIL NFR BLD: 1.1 % (ref 0–8)
ERYTHROCYTE [DISTWIDTH] IN BLOOD BY AUTOMATED COUNT: 15.9 % (ref 11.5–14.5)
EST. GFR  (NO RACE VARIABLE): >60 ML/MIN/1.73 M^2
GLUCOSE SERPL-MCNC: 98 MG/DL (ref 70–110)
HCT VFR BLD AUTO: 38.2 % (ref 37–48.5)
HGB BLD-MCNC: 11.9 G/DL (ref 12–16)
IMM GRANULOCYTES # BLD AUTO: 0.05 K/UL (ref 0–0.04)
IMM GRANULOCYTES NFR BLD AUTO: 0.4 % (ref 0–0.5)
INR PPP: 1 (ref 0.8–1.2)
LYMPHOCYTES # BLD AUTO: 3 K/UL (ref 1–4.8)
LYMPHOCYTES NFR BLD: 25.9 % (ref 18–48)
MCH RBC QN AUTO: 27.1 PG (ref 27–31)
MCHC RBC AUTO-ENTMCNC: 31.2 G/DL (ref 32–36)
MCV RBC AUTO: 87 FL (ref 82–98)
MONOCYTES # BLD AUTO: 0.8 K/UL (ref 0.3–1)
MONOCYTES NFR BLD: 6.8 % (ref 4–15)
NEUTROPHILS # BLD AUTO: 7.6 K/UL (ref 1.8–7.7)
NEUTROPHILS NFR BLD: 65.4 % (ref 38–73)
NRBC BLD-RTO: 0 /100 WBC
PLATELET # BLD AUTO: 340 K/UL (ref 150–450)
PMV BLD AUTO: 9.1 FL (ref 9.2–12.9)
POTASSIUM SERPL-SCNC: 3.9 MMOL/L (ref 3.5–5.1)
PROT SERPL-MCNC: 7.3 G/DL (ref 6–8.4)
PROTHROMBIN TIME: 10.8 SEC (ref 9–12.5)
RBC # BLD AUTO: 4.39 M/UL (ref 4–5.4)
SODIUM SERPL-SCNC: 138 MMOL/L (ref 136–145)
TROPONIN I SERPL HS-MCNC: <2.3 PG/ML (ref 0–14.9)
WBC # BLD AUTO: 11.6 K/UL (ref 3.9–12.7)

## 2024-08-11 PROCEDURE — 85610 PROTHROMBIN TIME: CPT | Performed by: STUDENT IN AN ORGANIZED HEALTH CARE EDUCATION/TRAINING PROGRAM

## 2024-08-11 PROCEDURE — 99285 EMERGENCY DEPT VISIT HI MDM: CPT | Mod: 25

## 2024-08-11 PROCEDURE — 25000003 PHARM REV CODE 250: Performed by: STUDENT IN AN ORGANIZED HEALTH CARE EDUCATION/TRAINING PROGRAM

## 2024-08-11 PROCEDURE — 85025 COMPLETE CBC W/AUTO DIFF WBC: CPT | Performed by: STUDENT IN AN ORGANIZED HEALTH CARE EDUCATION/TRAINING PROGRAM

## 2024-08-11 PROCEDURE — 93005 ELECTROCARDIOGRAM TRACING: CPT | Performed by: GENERAL PRACTICE

## 2024-08-11 PROCEDURE — 85730 THROMBOPLASTIN TIME PARTIAL: CPT | Performed by: STUDENT IN AN ORGANIZED HEALTH CARE EDUCATION/TRAINING PROGRAM

## 2024-08-11 PROCEDURE — 84484 ASSAY OF TROPONIN QUANT: CPT | Performed by: STUDENT IN AN ORGANIZED HEALTH CARE EDUCATION/TRAINING PROGRAM

## 2024-08-11 PROCEDURE — 93010 ELECTROCARDIOGRAM REPORT: CPT | Mod: ,,, | Performed by: GENERAL PRACTICE

## 2024-08-11 PROCEDURE — 80053 COMPREHEN METABOLIC PANEL: CPT | Performed by: STUDENT IN AN ORGANIZED HEALTH CARE EDUCATION/TRAINING PROGRAM

## 2024-08-11 RX ORDER — OXYCODONE AND ACETAMINOPHEN 5; 325 MG/1; MG/1
1 TABLET ORAL
Status: COMPLETED | OUTPATIENT
Start: 2024-08-11 | End: 2024-08-11

## 2024-08-11 RX ORDER — OXYCODONE AND ACETAMINOPHEN 5; 325 MG/1; MG/1
1 TABLET ORAL
Status: DISCONTINUED | OUTPATIENT
Start: 2024-08-11 | End: 2024-08-11

## 2024-08-11 RX ADMIN — OXYCODONE HYDROCHLORIDE AND ACETAMINOPHEN 1 TABLET: 5; 325 TABLET ORAL at 06:08

## 2024-08-11 RX ADMIN — OXYCODONE HYDROCHLORIDE AND ACETAMINOPHEN 1 TABLET: 5; 325 TABLET ORAL at 05:08

## 2024-08-12 NOTE — ED PROVIDER NOTES
Encounter Date: 2024       History     Chief Complaint   Patient presents with    Arm Pain     LEFT, X 2 DAYS , NOTICED PAIN AFTER PLACING NICOTINE PATCH ON THAT ARM     HPI    Adriana Zaragoza is a 58 y.o. female with a past medical history of previous DVTs, type 2 diabetes, COPD chronically on 2 L, and asthma that presented to emergency department for evaluation of left upper extremity pain that has been ongoing for approximately 2 days.  The only trigger that patient can remember is that she was caring a heavy pot 2 days ago.  Otherwise she has not had any trauma or strenuous activity to the wrist.  The pain was initially in the upper arm and migrated down to the left wrist.  Now the upper arm is feeling well, but the wrists is continuing to hurt.  Denies chest pain, shortness of breath, numbness, weakness, discoloration, warmth, and fevers.    Review of patient's allergies indicates:   Allergen Reactions    Aspirin     Nsaids (non-steroidal anti-inflammatory drug) Hives    Teflaro [ceftaroline fosamil]      Allergic reaction/ hives/itching     Past Medical History:   Diagnosis Date    Allergy     Anemia     Asthma     COPD (chronic obstructive pulmonary disease)     Deep vein thrombosis     Depression     Diabetes mellitus, type 2     Encounter for blood transfusion     Heart murmur     Neuromuscular disorder      Past Surgical History:   Procedure Laterality Date     SECTION      DILATION AND CURETTAGE OF UTERUS      gastric sleeve      Ema     TONSILLECTOMY       Family History   Problem Relation Name Age of Onset    Heart disease Mother      Diabetes Mother      Arthritis Mother      Depression Mother      Cancer Father      COPD Father      Arthritis Maternal Grandmother      Cancer Maternal Grandmother      Cancer Maternal Grandfather      Cancer Paternal Grandmother      Kidney disease Paternal Grandmother      Diabetes Paternal Grandmother      Diabetes Paternal Grandfather       Hyperlipidemia Paternal Grandfather       Social History     Tobacco Use    Smoking status: Former     Current packs/day: 1.00     Average packs/day: 1 pack/day for 15.5 years (15.5 ttl pk-yrs)     Types: Cigarettes     Start date: 2/6/1979     Quit date: 1998    Smokeless tobacco: Never    Tobacco comments:     Pt states she has smoked since her teenage years, smokes around 1pk/day. Interested and quitting. Interested in the outpatient smoking cessation program. Referral sent.    Substance Use Topics    Alcohol use: No    Drug use: No     Review of Systems   Constitutional:  Negative for fever.   HENT:  Negative for sore throat.    Respiratory:  Negative for shortness of breath.    Cardiovascular:  Negative for chest pain.   Gastrointestinal:  Negative for nausea.   Genitourinary:  Negative for dysuria.   Musculoskeletal:  Negative for back pain.        Left arm pain.  No swelling, redness, or warmth.   Skin:  Negative for rash.   Neurological:  Negative for weakness and numbness.   Hematological:  Does not bruise/bleed easily.       Physical Exam     Initial Vitals [08/11/24 1646]   BP Pulse Resp Temp SpO2   (!) 172/72 84 20 99.2 °F (37.3 °C) 97 %      MAP       --         Physical Exam    Nursing note and vitals reviewed.  Constitutional: She appears well-developed and well-nourished.   HENT:   Head: Normocephalic and atraumatic.   Eyes: EOM are normal. Pupils are equal, round, and reactive to light.   Neck:   Normal range of motion.  Cardiovascular:  Normal rate, regular rhythm and normal heart sounds.           Pulmonary/Chest: Breath sounds normal. No respiratory distress. She has no wheezes. She has no rhonchi. She has no rales.   Abdominal: Abdomen is soft. She exhibits no distension. There is no abdominal tenderness. There is no rebound.   Musculoskeletal:         General: Normal range of motion.      Cervical back: Normal range of motion.      Comments: Tenderness to palpation along the forearm and left  wrist.  2+ radial pulse.  Patient has a dopplerable brachial, radial, ulnar, and palmar arch.  No discoloration.  The left wrist which is where her point of maximal tenderness is does not appear to be swollen, erythematous, or discolored.  There is no evidence of joint effusions.     Neurological: She is alert and oriented to person, place, and time. She has normal strength. No cranial nerve deficit or sensory deficit. GCS score is 15. GCS eye subscore is 4. GCS verbal subscore is 5. GCS motor subscore is 6.   Skin: Capillary refill takes less than 2 seconds. No rash noted.   Psychiatric: She has a normal mood and affect. Thought content normal.         ED Course   Procedures  Labs Reviewed   CBC W/ AUTO DIFFERENTIAL - Abnormal       Result Value    WBC 11.60      RBC 4.39      Hemoglobin 11.9 (*)     Hematocrit 38.2      MCV 87      MCH 27.1      MCHC 31.2 (*)     RDW 15.9 (*)     Platelets 340      MPV 9.1 (*)     Immature Granulocytes 0.4      Gran # (ANC) 7.6      Immature Grans (Abs) 0.05 (*)     Lymph # 3.0      Mono # 0.8      Eos # 0.1      Baso # 0.05      nRBC 0      Gran % 65.4      Lymph % 25.9      Mono % 6.8      Eosinophil % 1.1      Basophil % 0.4      Differential Method Automated     COMPREHENSIVE METABOLIC PANEL - Abnormal    Sodium 138      Potassium 3.9      Chloride 105      CO2 27      Glucose 98      BUN 13      Creatinine 0.7      Calcium 8.7      Total Protein 7.3      Albumin 3.9      Total Bilirubin 0.3      Alkaline Phosphatase 66      AST 11      ALT 11      eGFR >60.0      Anion Gap 6 (*)    TROPONIN I HIGH SENSITIVITY    Troponin I High Sensitivity <2.3     PROTIME-INR    Prothrombin Time 10.8      INR 1.0     APTT    aPTT 26.0            Imaging Results              X-Ray Elbow Complete Left (Final result)  Result time 08/11/24 17:55:05      Final result by Bryan Denise MD (08/11/24 17:55:05)                   Impression:      As above.      Electronically signed by: Bryan  MD Camelia  Date:    08/11/2024  Time:    17:55               Narrative:    EXAMINATION:  XR ELBOW COMPLETE 3 VIEW LEFT; XR FOREARM LEFT    CLINICAL HISTORY:  Pain in arm, unspecified    TECHNIQUE:  AP, lateral, and oblique views of the left elbow were performed.  AP and lateral views of the left forearm were performed.    COMPARISON:  None    FINDINGS:  There is no radiographic evidence of acute displaced fracture or dislocation of the left elbow.  There is mild degenerative osteoarthrosis.  No significant suprapatellar joint effusion appreciated.  The left forearm is intact without evidence of acute displaced fracture or dislocation.    Within the dorsal soft tissues of the proximal forearm, there is a nonspecific 1.4 cm linear ill-defined subcutaneous calcification.  Clinical correlation with reported site of point tenderness advised.  No retained metallic foreign body appreciated within the visualized soft tissues.                                       X-Ray Forearm Left (Final result)  Result time 08/11/24 17:55:05      Final result by Bryan Denise MD (08/11/24 17:55:05)                   Impression:      As above.      Electronically signed by: Bryan Denise MD  Date:    08/11/2024  Time:    17:55               Narrative:    EXAMINATION:  XR ELBOW COMPLETE 3 VIEW LEFT; XR FOREARM LEFT    CLINICAL HISTORY:  Pain in arm, unspecified    TECHNIQUE:  AP, lateral, and oblique views of the left elbow were performed.  AP and lateral views of the left forearm were performed.    COMPARISON:  None    FINDINGS:  There is no radiographic evidence of acute displaced fracture or dislocation of the left elbow.  There is mild degenerative osteoarthrosis.  No significant suprapatellar joint effusion appreciated.  The left forearm is intact without evidence of acute displaced fracture or dislocation.    Within the dorsal soft tissues of the proximal forearm, there is a nonspecific 1.4 cm linear ill-defined subcutaneous  calcification.  Clinical correlation with reported site of point tenderness advised.  No retained metallic foreign body appreciated within the visualized soft tissues.                                       X-Ray Wrist Complete Left (Final result)  Result time 08/11/24 17:56:42      Final result by Bryan Denise MD (08/11/24 17:56:42)                   Impression:      No radiographic evidence of acute displaced fracture or dislocation of the left wrist.      Electronically signed by: Bryan Denise MD  Date:    08/11/2024  Time:    17:56               Narrative:    EXAMINATION:  XR WRIST COMPLETE 3 VIEWS LEFT    CLINICAL HISTORY:  Pain in arm, unspecified    TECHNIQUE:  PA, lateral, and oblique views of the left wrist were performed.    COMPARISON:  None    FINDINGS:  There is no radiographic evidence of acute displaced fracture of the left wrist.  Alignment appears within normal limits without evidence of dislocation.  There are mild degenerative changes present.  No unexpected retained radiopaque foreign body appreciated.                                       X-Ray Chest AP Portable (Final result)  Result time 08/11/24 17:49:18      Final result by Bryan Denise MD (08/11/24 17:49:18)                   Impression:      No convincing radiographic evidence of acute intrathoracic process on this single view.      Electronically signed by: Bryan Denise MD  Date:    08/11/2024  Time:    17:49               Narrative:    EXAMINATION:  XR CHEST AP PORTABLE    CLINICAL HISTORY:  Pain in arm, unspecified    TECHNIQUE:  Single frontal view of the chest was performed.    COMPARISON:  02/04/2024    FINDINGS:  Cardiac silhouette is mildly prominent.  Lungs are symmetrically expanded with mild chronic coarse interstitial attenuation.  Linear subsegmental opacity at the left lung base is favored to reflect atelectasis or scarring.  No significant volume of pleural fluid or pneumothorax identified.  Osseous  structures demonstrate degenerative change.                                       US Upper Extremity Veins Left (Final result)  Result time 08/11/24 17:33:10      Final result by Bryan Denise MD (08/11/24 17:33:10)                   Impression:      No thrombus in central veins of the left upper extremity.      Electronically signed by: Bryan Denise MD  Date:    08/11/2024  Time:    17:33               Narrative:    EXAMINATION:  US UPPER EXTREMITY VEINS LEFT    CLINICAL HISTORY:  Arm pain and swelling;    TECHNIQUE:  Duplex and color flow Doppler evaluation and dynamic compression was performed of the left upper extremity veins.    COMPARISON:  None    FINDINGS:  Central veins: The internal jugular, subclavian, and axillary veins are patent and free of thrombus.    Arm veins: The brachial, basilic, and cephalic veins are patent and compressible.    Other findings: None.                                       Medications   oxyCODONE-acetaminophen 5-325 mg per tablet 1 tablet (1 tablet Oral Given 8/11/24 1745)   oxyCODONE-acetaminophen 5-325 mg per tablet 1 tablet (1 tablet Oral Given 8/11/24 1856)     Medical Decision Making  Adriana Zaragoza is a 58 y.o. female  with a past medical history of previous DVTs, type 2 diabetes, COPD chronically on 2 L, and asthma that presented to emergency department for evaluation of left upper extremity pain that has been ongoing for approximately 2 days.  Vitals stable.  Exam with nontoxic female.  Tenderness to palpation along the left forearm and wrist.  No appreciable swelling, erythema, joint effusions, or evidence of trauma.  Sensation is intact throughout.  Pulses are intact in the brachial, radial, ulnar, and palmar arch.  Intrinsic muscles of the hand appear to be intact.  Lower suspicion for arterial injury given intact pulses.  Lower suspicion for neurologic injury given the intact sensation and motor function.  No neck pain, so lower suspicion for  cervical radiculopathy.  Considering ACS, but this is less likely.  Exam is inconsistent with septic joint.    Amount and/or Complexity of Data Reviewed  Labs: ordered.     Details: Troponin is within normal limits.  No coagulopathy.  No significant electrolyte derangements.  CBC with hemoglobin 11.9 but otherwise unremarkable.  Radiology: ordered.     Details: Plain films of the upper extremity were without evidence of fracture.  There was a linear calcification.  It is not at the site of maximal tenderness.  DVT study was negative.  ECG/medicine tests: ordered and independent interpretation performed.     Details: Normal sinus rhythm without acute ST segment or T-wave changes.  Discussion of management or test interpretation with external provider(s): Patient given 2 doses of Percocet for pain.  Pain is now well controlled.  Exhaustive search for etiology of the pain was performed.  There is no evidence of infection on the exam and vascular and neurologic causes do not appear to be evident.  Return precautions were given.  Discharged home.    Risk  Prescription drug management.                                      Clinical Impression:  Final diagnoses:  [M79.603] Arm pain                 Pacheco Gonzales MD  08/11/24 2248

## 2024-08-12 NOTE — DISCHARGE INSTRUCTIONS
Please return to the emergency department if you have new or worsening symptoms.  Follow up with the primary care doctor for re-evaluation of your left wrist pain.

## 2024-08-16 LAB
OHS QRS DURATION: 88 MS
OHS QTC CALCULATION: 434 MS

## 2024-08-25 ENCOUNTER — HOSPITAL ENCOUNTER (INPATIENT)
Facility: HOSPITAL | Age: 59
LOS: 4 days | Discharge: HOME | DRG: 872 | End: 2024-08-29
Attending: EMERGENCY MEDICINE | Admitting: INTERNAL MEDICINE
Payer: MEDICARE

## 2024-08-25 DIAGNOSIS — A41.9 SEPSIS: ICD-10-CM

## 2024-08-25 DIAGNOSIS — R06.00 DYSPNEA: ICD-10-CM

## 2024-08-25 DIAGNOSIS — L03.90 CELLULITIS, UNSPECIFIED CELLULITIS SITE: Primary | ICD-10-CM

## 2024-08-25 DIAGNOSIS — R00.0 SINUS TACHYCARDIA: ICD-10-CM

## 2024-08-25 DIAGNOSIS — R07.9 CHEST PAIN: ICD-10-CM

## 2024-08-25 LAB
ALBUMIN SERPL BCP-MCNC: 4 G/DL (ref 3.5–5.2)
ALP SERPL-CCNC: 73 U/L (ref 55–135)
ALT SERPL W/O P-5'-P-CCNC: 10 U/L (ref 10–44)
ANION GAP SERPL CALC-SCNC: 7 MMOL/L (ref 8–16)
AST SERPL-CCNC: 11 U/L (ref 10–40)
BASOPHILS # BLD AUTO: 0.06 K/UL (ref 0–0.2)
BASOPHILS NFR BLD: 0.3 % (ref 0–1.9)
BILIRUB SERPL-MCNC: 0.4 MG/DL (ref 0.1–1)
BILIRUB UR QL STRIP: NEGATIVE
BNP SERPL-MCNC: 12 PG/ML (ref 0–99)
BUN SERPL-MCNC: 14 MG/DL (ref 6–20)
CALCIUM SERPL-MCNC: 9 MG/DL (ref 8.7–10.5)
CHLORIDE SERPL-SCNC: 104 MMOL/L (ref 95–110)
CLARITY UR: CLEAR
CO2 SERPL-SCNC: 27 MMOL/L (ref 23–29)
COLOR UR: YELLOW
CREAT SERPL-MCNC: 0.6 MG/DL (ref 0.5–1.4)
DIFFERENTIAL METHOD BLD: ABNORMAL
EOSINOPHIL # BLD AUTO: 0.1 K/UL (ref 0–0.5)
EOSINOPHIL NFR BLD: 0.3 % (ref 0–8)
ERYTHROCYTE [DISTWIDTH] IN BLOOD BY AUTOMATED COUNT: 15.5 % (ref 11.5–14.5)
EST. GFR  (NO RACE VARIABLE): >60 ML/MIN/1.73 M^2
GLUCOSE SERPL-MCNC: 129 MG/DL (ref 70–110)
GLUCOSE SERPL-MCNC: 136 MG/DL (ref 70–110)
GLUCOSE UR QL STRIP: NEGATIVE
HCT VFR BLD AUTO: 42.4 % (ref 37–48.5)
HGB BLD-MCNC: 12.8 G/DL (ref 12–16)
HGB UR QL STRIP: NEGATIVE
IMM GRANULOCYTES # BLD AUTO: 0.11 K/UL (ref 0–0.04)
IMM GRANULOCYTES NFR BLD AUTO: 0.6 % (ref 0–0.5)
INFLUENZA A, MOLECULAR: NEGATIVE
INFLUENZA B, MOLECULAR: NEGATIVE
KETONES UR QL STRIP: NEGATIVE
LACTATE SERPL-SCNC: 1.4 MMOL/L (ref 0.5–1.9)
LDH SERPL L TO P-CCNC: 2.03 MMOL/L (ref 0.5–2.2)
LEUKOCYTE ESTERASE UR QL STRIP: NEGATIVE
LYMPHOCYTES # BLD AUTO: 1.8 K/UL (ref 1–4.8)
LYMPHOCYTES NFR BLD: 9.5 % (ref 18–48)
MAGNESIUM SERPL-MCNC: 1.9 MG/DL (ref 1.6–2.6)
MCH RBC QN AUTO: 26.5 PG (ref 27–31)
MCHC RBC AUTO-ENTMCNC: 30.2 G/DL (ref 32–36)
MCV RBC AUTO: 88 FL (ref 82–98)
MONOCYTES # BLD AUTO: 0.7 K/UL (ref 0.3–1)
MONOCYTES NFR BLD: 3.8 % (ref 4–15)
NEUTROPHILS # BLD AUTO: 16.5 K/UL (ref 1.8–7.7)
NEUTROPHILS NFR BLD: 85.5 % (ref 38–73)
NITRITE UR QL STRIP: NEGATIVE
NRBC BLD-RTO: 0 /100 WBC
PH UR STRIP: 8 [PH] (ref 5–8)
PHOSPHATE SERPL-MCNC: 2.6 MG/DL (ref 2.7–4.5)
PLATELET # BLD AUTO: 350 K/UL (ref 150–450)
PMV BLD AUTO: 9.4 FL (ref 9.2–12.9)
POTASSIUM SERPL-SCNC: 3.9 MMOL/L (ref 3.5–5.1)
PROCALCITONIN SERPL IA-MCNC: <0.05 NG/ML (ref 0–0.5)
PROT SERPL-MCNC: 7.8 G/DL (ref 6–8.4)
PROT UR QL STRIP: NEGATIVE
RBC # BLD AUTO: 4.83 M/UL (ref 4–5.4)
SAMPLE: NORMAL
SARS-COV-2 RDRP RESP QL NAA+PROBE: NEGATIVE
SODIUM SERPL-SCNC: 138 MMOL/L (ref 136–145)
SP GR UR STRIP: 1.03 (ref 1–1.03)
SPECIMEN SOURCE: NORMAL
TROPONIN I SERPL HS-MCNC: 2.7 PG/ML (ref 0–14.9)
URN SPEC COLLECT METH UR: NORMAL
UROBILINOGEN UR STRIP-ACNC: NEGATIVE EU/DL
WBC # BLD AUTO: 19.24 K/UL (ref 3.9–12.7)

## 2024-08-25 PROCEDURE — 21400001 HC TELEMETRY ROOM

## 2024-08-25 PROCEDURE — 99285 EMERGENCY DEPT VISIT HI MDM: CPT | Mod: 25

## 2024-08-25 PROCEDURE — 83605 ASSAY OF LACTIC ACID: CPT | Performed by: EMERGENCY MEDICINE

## 2024-08-25 PROCEDURE — 94640 AIRWAY INHALATION TREATMENT: CPT

## 2024-08-25 PROCEDURE — 84100 ASSAY OF PHOSPHORUS: CPT | Performed by: EMERGENCY MEDICINE

## 2024-08-25 PROCEDURE — 93005 ELECTROCARDIOGRAM TRACING: CPT | Performed by: INTERNAL MEDICINE

## 2024-08-25 PROCEDURE — 94660 CPAP INITIATION&MGMT: CPT

## 2024-08-25 PROCEDURE — 5A09457 ASSISTANCE WITH RESPIRATORY VENTILATION, 24-96 CONSECUTIVE HOURS, CONTINUOUS POSITIVE AIRWAY PRESSURE: ICD-10-PCS | Performed by: INTERNAL MEDICINE

## 2024-08-25 PROCEDURE — 93010 ELECTROCARDIOGRAM REPORT: CPT | Mod: ,,, | Performed by: INTERNAL MEDICINE

## 2024-08-25 PROCEDURE — U0002 COVID-19 LAB TEST NON-CDC: HCPCS | Performed by: EMERGENCY MEDICINE

## 2024-08-25 PROCEDURE — 96365 THER/PROPH/DIAG IV INF INIT: CPT

## 2024-08-25 PROCEDURE — 63600175 PHARM REV CODE 636 W HCPCS: Performed by: PHYSICAL THERAPY ASSISTANT

## 2024-08-25 PROCEDURE — 87502 INFLUENZA DNA AMP PROBE: CPT | Performed by: EMERGENCY MEDICINE

## 2024-08-25 PROCEDURE — 25000003 PHARM REV CODE 250: Performed by: INTERNAL MEDICINE

## 2024-08-25 PROCEDURE — 25000242 PHARM REV CODE 250 ALT 637 W/ HCPCS: Performed by: EMERGENCY MEDICINE

## 2024-08-25 PROCEDURE — 99900031 HC PATIENT EDUCATION (STAT)

## 2024-08-25 PROCEDURE — 63600175 PHARM REV CODE 636 W HCPCS: Mod: JZ,JG | Performed by: INTERNAL MEDICINE

## 2024-08-25 PROCEDURE — 25000003 PHARM REV CODE 250: Performed by: EMERGENCY MEDICINE

## 2024-08-25 PROCEDURE — 99900035 HC TECH TIME PER 15 MIN (STAT)

## 2024-08-25 PROCEDURE — 94799 UNLISTED PULMONARY SVC/PX: CPT

## 2024-08-25 PROCEDURE — 84145 PROCALCITONIN (PCT): CPT | Performed by: EMERGENCY MEDICINE

## 2024-08-25 PROCEDURE — 25000003 PHARM REV CODE 250: Performed by: PHYSICAL THERAPY ASSISTANT

## 2024-08-25 PROCEDURE — 96367 TX/PROPH/DG ADDL SEQ IV INF: CPT

## 2024-08-25 PROCEDURE — 36415 COLL VENOUS BLD VENIPUNCTURE: CPT | Performed by: EMERGENCY MEDICINE

## 2024-08-25 PROCEDURE — 83880 ASSAY OF NATRIURETIC PEPTIDE: CPT | Performed by: EMERGENCY MEDICINE

## 2024-08-25 PROCEDURE — 81003 URINALYSIS AUTO W/O SCOPE: CPT | Performed by: EMERGENCY MEDICINE

## 2024-08-25 PROCEDURE — 83735 ASSAY OF MAGNESIUM: CPT | Performed by: EMERGENCY MEDICINE

## 2024-08-25 PROCEDURE — 84484 ASSAY OF TROPONIN QUANT: CPT | Performed by: EMERGENCY MEDICINE

## 2024-08-25 PROCEDURE — 27100171 HC OXYGEN HIGH FLOW UP TO 24 HOURS

## 2024-08-25 PROCEDURE — 85025 COMPLETE CBC W/AUTO DIFF WBC: CPT | Performed by: EMERGENCY MEDICINE

## 2024-08-25 PROCEDURE — 87040 BLOOD CULTURE FOR BACTERIA: CPT | Performed by: EMERGENCY MEDICINE

## 2024-08-25 PROCEDURE — 80053 COMPREHEN METABOLIC PANEL: CPT | Performed by: EMERGENCY MEDICINE

## 2024-08-25 PROCEDURE — 96375 TX/PRO/DX INJ NEW DRUG ADDON: CPT

## 2024-08-25 PROCEDURE — 63600175 PHARM REV CODE 636 W HCPCS: Performed by: EMERGENCY MEDICINE

## 2024-08-25 PROCEDURE — 94761 N-INVAS EAR/PLS OXIMETRY MLT: CPT

## 2024-08-25 RX ORDER — AMOXICILLIN 250 MG
1 CAPSULE ORAL 2 TIMES DAILY PRN
Status: DISCONTINUED | OUTPATIENT
Start: 2024-08-25 | End: 2024-08-29 | Stop reason: HOSPADM

## 2024-08-25 RX ORDER — SODIUM CHLORIDE, SODIUM LACTATE, POTASSIUM CHLORIDE, CALCIUM CHLORIDE 600; 310; 30; 20 MG/100ML; MG/100ML; MG/100ML; MG/100ML
INJECTION, SOLUTION INTRAVENOUS CONTINUOUS
Status: ACTIVE | OUTPATIENT
Start: 2024-08-25 | End: 2024-08-26

## 2024-08-25 RX ORDER — DOCUSATE SODIUM 100 MG/1
100 CAPSULE, LIQUID FILLED ORAL 2 TIMES DAILY
Status: DISCONTINUED | OUTPATIENT
Start: 2024-08-25 | End: 2024-08-29 | Stop reason: HOSPADM

## 2024-08-25 RX ORDER — GLUCAGON 1 MG
1 KIT INJECTION
Status: DISCONTINUED | OUTPATIENT
Start: 2024-08-25 | End: 2024-08-25

## 2024-08-25 RX ORDER — LEVOFLOXACIN 5 MG/ML
750 INJECTION, SOLUTION INTRAVENOUS
Status: DISCONTINUED | OUTPATIENT
Start: 2024-08-25 | End: 2024-08-26

## 2024-08-25 RX ORDER — LANOLIN ALCOHOL/MO/W.PET/CERES
800 CREAM (GRAM) TOPICAL
Status: DISCONTINUED | OUTPATIENT
Start: 2024-08-25 | End: 2024-08-29 | Stop reason: HOSPADM

## 2024-08-25 RX ORDER — IBUPROFEN 200 MG
24 TABLET ORAL
Status: DISCONTINUED | OUTPATIENT
Start: 2024-08-25 | End: 2024-08-25

## 2024-08-25 RX ORDER — ALUMINUM HYDROXIDE, MAGNESIUM HYDROXIDE, AND SIMETHICONE 1200; 120; 1200 MG/30ML; MG/30ML; MG/30ML
30 SUSPENSION ORAL 4 TIMES DAILY PRN
Status: DISCONTINUED | OUTPATIENT
Start: 2024-08-25 | End: 2024-08-29 | Stop reason: HOSPADM

## 2024-08-25 RX ORDER — SODIUM,POTASSIUM PHOSPHATES 280-250MG
2 POWDER IN PACKET (EA) ORAL
Status: DISCONTINUED | OUTPATIENT
Start: 2024-08-25 | End: 2024-08-29 | Stop reason: HOSPADM

## 2024-08-25 RX ORDER — IBUPROFEN 200 MG
16 TABLET ORAL
Status: DISCONTINUED | OUTPATIENT
Start: 2024-08-25 | End: 2024-08-25

## 2024-08-25 RX ORDER — ACETAMINOPHEN 325 MG/1
650 TABLET ORAL EVERY 8 HOURS PRN
Status: DISCONTINUED | OUTPATIENT
Start: 2024-08-25 | End: 2024-08-29 | Stop reason: HOSPADM

## 2024-08-25 RX ORDER — SODIUM CHLORIDE 0.9 % (FLUSH) 0.9 %
10 SYRINGE (ML) INJECTION EVERY 12 HOURS PRN
Status: DISCONTINUED | OUTPATIENT
Start: 2024-08-25 | End: 2024-08-29 | Stop reason: HOSPADM

## 2024-08-25 RX ORDER — CLINDAMYCIN PHOSPHATE 900 MG/50ML
900 INJECTION, SOLUTION INTRAVENOUS
Status: DISCONTINUED | OUTPATIENT
Start: 2024-08-25 | End: 2024-08-29 | Stop reason: HOSPADM

## 2024-08-25 RX ORDER — NALOXONE HCL 0.4 MG/ML
0.02 VIAL (ML) INJECTION
Status: DISCONTINUED | OUTPATIENT
Start: 2024-08-25 | End: 2024-08-29 | Stop reason: HOSPADM

## 2024-08-25 RX ORDER — ENOXAPARIN SODIUM 100 MG/ML
40 INJECTION SUBCUTANEOUS EVERY 24 HOURS
Status: DISCONTINUED | OUTPATIENT
Start: 2024-08-25 | End: 2024-08-26

## 2024-08-25 RX ORDER — ACETAMINOPHEN 325 MG/1
650 TABLET ORAL EVERY 4 HOURS PRN
Status: DISCONTINUED | OUTPATIENT
Start: 2024-08-25 | End: 2024-08-29 | Stop reason: HOSPADM

## 2024-08-25 RX ORDER — HYDROMORPHONE HYDROCHLORIDE 1 MG/ML
1 INJECTION, SOLUTION INTRAMUSCULAR; INTRAVENOUS; SUBCUTANEOUS
Status: COMPLETED | OUTPATIENT
Start: 2024-08-25 | End: 2024-08-25

## 2024-08-25 RX ORDER — IPRATROPIUM BROMIDE AND ALBUTEROL SULFATE 2.5; .5 MG/3ML; MG/3ML
3 SOLUTION RESPIRATORY (INHALATION) EVERY 6 HOURS PRN
Status: DISCONTINUED | OUTPATIENT
Start: 2024-08-25 | End: 2024-08-29 | Stop reason: HOSPADM

## 2024-08-25 RX ORDER — OXYCODONE AND ACETAMINOPHEN 5; 325 MG/1; MG/1
1 TABLET ORAL EVERY 6 HOURS PRN
Status: DISCONTINUED | OUTPATIENT
Start: 2024-08-25 | End: 2024-08-29 | Stop reason: HOSPADM

## 2024-08-25 RX ORDER — ACETAMINOPHEN 500 MG
1000 TABLET ORAL
Status: COMPLETED | OUTPATIENT
Start: 2024-08-25 | End: 2024-08-25

## 2024-08-25 RX ORDER — IPRATROPIUM BROMIDE AND ALBUTEROL SULFATE 2.5; .5 MG/3ML; MG/3ML
3 SOLUTION RESPIRATORY (INHALATION) ONCE
Status: COMPLETED | OUTPATIENT
Start: 2024-08-25 | End: 2024-08-25

## 2024-08-25 RX ORDER — VANCOMYCIN HCL IN 5 % DEXTROSE 1G/250ML
1000 PLASTIC BAG, INJECTION (ML) INTRAVENOUS ONCE
Status: COMPLETED | OUTPATIENT
Start: 2024-08-25 | End: 2024-08-25

## 2024-08-25 RX ORDER — FAMOTIDINE 20 MG/1
20 TABLET, FILM COATED ORAL 2 TIMES DAILY
Status: DISCONTINUED | OUTPATIENT
Start: 2024-08-25 | End: 2024-08-29 | Stop reason: HOSPADM

## 2024-08-25 RX ORDER — HYDROCODONE BITARTRATE AND ACETAMINOPHEN 5; 325 MG/1; MG/1
1 TABLET ORAL EVERY 6 HOURS PRN
Status: DISCONTINUED | OUTPATIENT
Start: 2024-08-25 | End: 2024-08-25

## 2024-08-25 RX ORDER — ONDANSETRON HYDROCHLORIDE 2 MG/ML
4 INJECTION, SOLUTION INTRAVENOUS EVERY 6 HOURS PRN
Status: DISCONTINUED | OUTPATIENT
Start: 2024-08-25 | End: 2024-08-29 | Stop reason: HOSPADM

## 2024-08-25 RX ORDER — OXYCODONE AND ACETAMINOPHEN 5; 325 MG/1; MG/1
1 TABLET ORAL EVERY 6 HOURS PRN
COMMUNITY

## 2024-08-25 RX ORDER — TALC
6 POWDER (GRAM) TOPICAL NIGHTLY PRN
Status: DISCONTINUED | OUTPATIENT
Start: 2024-08-25 | End: 2024-08-29 | Stop reason: HOSPADM

## 2024-08-25 RX ORDER — HYDROXYZINE HYDROCHLORIDE 25 MG/1
25 TABLET, FILM COATED ORAL 3 TIMES DAILY
Status: DISCONTINUED | OUTPATIENT
Start: 2024-08-25 | End: 2024-08-29 | Stop reason: HOSPADM

## 2024-08-25 RX ORDER — HYDROMORPHONE HYDROCHLORIDE 1 MG/ML
1 INJECTION, SOLUTION INTRAMUSCULAR; INTRAVENOUS; SUBCUTANEOUS EVERY 4 HOURS PRN
Status: DISCONTINUED | OUTPATIENT
Start: 2024-08-25 | End: 2024-08-29

## 2024-08-25 RX ADMIN — PIPERACILLIN SODIUM AND TAZOBACTAM SODIUM 4.5 G: 4; .5 INJECTION, POWDER, LYOPHILIZED, FOR SOLUTION INTRAVENOUS at 03:08

## 2024-08-25 RX ADMIN — SODIUM CHLORIDE 1000 ML: 9 INJECTION, SOLUTION INTRAVENOUS at 03:08

## 2024-08-25 RX ADMIN — HYDROMORPHONE HYDROCHLORIDE 1 MG: 1 INJECTION, SOLUTION INTRAMUSCULAR; INTRAVENOUS; SUBCUTANEOUS at 08:08

## 2024-08-25 RX ADMIN — ENOXAPARIN SODIUM 40 MG: 40 INJECTION SUBCUTANEOUS at 06:08

## 2024-08-25 RX ADMIN — IPRATROPIUM BROMIDE AND ALBUTEROL SULFATE 3 ML: 2.5; .5 SOLUTION RESPIRATORY (INHALATION) at 03:08

## 2024-08-25 RX ADMIN — FAMOTIDINE 20 MG: 20 TABLET ORAL at 09:08

## 2024-08-25 RX ADMIN — SODIUM CHLORIDE 1641 ML: 9 INJECTION, SOLUTION INTRAVENOUS at 03:08

## 2024-08-25 RX ADMIN — LEVOFLOXACIN 750 MG: 5 INJECTION, SOLUTION INTRAVENOUS at 08:08

## 2024-08-25 RX ADMIN — OXYCODONE HYDROCHLORIDE AND ACETAMINOPHEN 1 TABLET: 5; 325 TABLET ORAL at 06:08

## 2024-08-25 RX ADMIN — ACETAMINOPHEN 1000 MG: 500 TABLET, FILM COATED ORAL at 03:08

## 2024-08-25 RX ADMIN — HYDROMORPHONE HYDROCHLORIDE 1 MG: 1 INJECTION, SOLUTION INTRAMUSCULAR; INTRAVENOUS; SUBCUTANEOUS at 04:08

## 2024-08-25 RX ADMIN — VANCOMYCIN HYDROCHLORIDE 1000 MG: 1 INJECTION, POWDER, LYOPHILIZED, FOR SOLUTION INTRAVENOUS at 04:08

## 2024-08-25 RX ADMIN — SODIUM CHLORIDE, POTASSIUM CHLORIDE, SODIUM LACTATE AND CALCIUM CHLORIDE: 600; 310; 30; 20 INJECTION, SOLUTION INTRAVENOUS at 06:08

## 2024-08-25 RX ADMIN — CLINDAMYCIN IN 5 PERCENT DEXTROSE 900 MG: 18 INJECTION, SOLUTION INTRAVENOUS at 09:08

## 2024-08-25 RX ADMIN — ACETAMINOPHEN 650 MG: 325 TABLET ORAL at 08:08

## 2024-08-25 RX ADMIN — ONDANSETRON 4 MG: 2 INJECTION INTRAMUSCULAR; INTRAVENOUS at 06:08

## 2024-08-25 NOTE — H&P
[facility] - Emergency Dept  Hospital Medicine  History & Physical    Patient Name: Adriana Zaragoza  MRN: [id number]  Patient Class: IP- Inpatient  Admission Date: 8/25/2024  Attending Physician: Frank Chaparro MD   Primary Care Provider: Melba Trivedi MD         Patient information was obtained from patient and ER records.     Subjective:     Principal Problem:Sepsis    Chief Complaint:   Chief Complaint   Patient presents with    Shortness of Breath     Started this am, worse when infection gets worse    Fever     At 0700 but didn't check temp    Chills    Wound Infection     Mass off inner right thigh with redness        HPI: Patient is a 58-year-old female with a past medical history COPD, DM 2, and anemia.  Patient presents to the ED today with complaints of shortness a breath, racing heart, fever and chills starting this morning.  Patient has mass of in her right thigh which she states has become more swollen and red.  Patient has history of recurrent panniculitis.  Patient states that symptoms are similar to previous exacerbation.  Patient does have chronic cough and does report mild increase in wheeze.  Patient was given DuoNeb in the ED and Dilaudid.  Patient states she feels much better now.  Patient found with [place] of [address], tachycardic, respirations greater than 20, and cellulitic to abdominal pannus/right thigh.  Patient was given IV vancomycin and Zosyn.  Patient denies current chest pain, shortness of breath, nausea, vomiting, abdominal pain, lower extremity edema, difficulty urinating, and constipation/diarrhea.  Patient will be admitted to hospital medicine for further treatment and evaluation.    In the ED: Procalcitonin less than 0.050, troponin 2.7, BNP 12, phosphorus 2.6, sodium 138, potassium 3.9, glucose 136, creatinine 0.6, magnesium 1.9, WBC 19.24, hemoglobin 12.8.  Influenza and COVID negative.  UA is still pending.  Chest x-ray negative.    Past Medical  History:   Diagnosis Date    Allergy     Anemia     Asthma     COPD (chronic obstructive pulmonary disease)     Deep vein thrombosis     Depression     Diabetes mellitus, type 2     Encounter for blood transfusion     Heart murmur     Neuromuscular disorder        Past Surgical History:   Procedure Laterality Date     SECTION      DILATION AND CURETTAGE OF UTERUS      gastric sleeve      [facility]    TONSILLECTOMY         Review of patient's allergies indicates:   Allergen Reactions    Aspirin     Nsaids (non-steroidal anti-inflammatory drug) Hives    Teflaro [ceftaroline fosamil]      Allergic reaction/ hives/itching       No current facility-administered medications on file prior to encounter.     Current Outpatient Medications on File Prior to Encounter   Medication Sig    ferrous sulfate 324 mg (65 mg iron) TbEC Take 324 mg by mouth every 7 days.    hydrOXYzine HCL (ATARAX) 25 MG tablet Take 25 mg by mouth 3 (three) times daily.    oxyCODONE-acetaminophen (PERCOCET) 5-325 mg per tablet Take 1 tablet by mouth every 6 (six) hours as needed.    acetaminophen (TYLENOL) 650 MG TbSR Take 650 mg by mouth every 8 (eight) hours.    albuterol (PROVENTIL/VENTOLIN HFA) 90 mcg/actuation inhaler Inhale 2 puffs into the lungs every 6 (six) hours as needed for Wheezing or Shortness of Breath.    buPROPion (WELLBUTRIN XL) 150 MG TB24 tablet Take 1 tablet (150 mg total) by mouth once daily.    clindamycin (CLEOCIN T) 1 % lotion Apply topically 2 (two) times daily. (Patient taking differently: Apply 1 g topically 2 (two) times daily.)    docusate sodium (COLACE) 100 MG capsule Take 1 capsule (100 mg total) by mouth 2 (two) times daily.    nicotine (NICODERM CQ) 21 mg/24 hr Place 1 patch onto the skin once daily.    oxyCODONE (ROXICODONE) 5 MG immediate release tablet Take 1 tablet (5 mg total) by mouth every 4 (four) hours as needed for Pain. (Patient not taking: Reported on 2024)    semaglutide (OZEMPIC) 0.25 mg or  0.5 mg (2 mg/3 mL) pen injector Inject 0.5 mg into the skin every 7 days.     Family History       Problem Relation (Age of Onset)    Arthritis Mother, Maternal Grandmother    COPD Father    Cancer Father, Maternal Grandmother, Maternal Grandfather, Paternal Grandmother    Depression Mother    Diabetes Mother, Paternal Grandmother, Paternal Grandfather    Heart disease Mother    Hyperlipidemia Paternal Grandfather    Kidney disease Paternal Grandmother          Tobacco Use    Smoking status: Former     Current packs/day: 1.00     Average packs/day: 1 pack/day for 15.5 years (15.5 ttl pk-yrs)     Types: Cigarettes     Start date: 2/6/1979     Quit date: 1998    Smokeless tobacco: Never    Tobacco comments:     Pt states she has smoked since her teenage years, smokes around 1pk/day. Interested and quitting. Interested in the outpatient smoking cessation program. Referral sent.    Substance and Sexual Activity    Alcohol use: No    Drug use: No    Sexual activity: Never     Review of Systems   Constitutional:  Positive for chills and fever.   Respiratory:  Positive for cough (chronic) and wheezing. Negative for shortness of breath.    Cardiovascular:  Negative for chest pain.   Gastrointestinal:  Positive for nausea. Negative for abdominal pain, constipation, diarrhea and vomiting.   Genitourinary:  Negative for difficulty urinating and dysuria.   Musculoskeletal:  Positive for back pain.     Objective:     Vital Signs (Most Recent):  Temp: 99.9 °F (37.7 °C) (08/25/24 1837)  Pulse: 93 (08/25/24 1837)  Resp: (!) 21 (08/25/24 1837)  BP: 125/60 (08/25/24 1830)  SpO2: 98 % (08/25/24 1837) Vital Signs (24h Range):  Temp:  [99.9 °F (37.7 °C)] 99.9 °F (37.7 °C)  Pulse:  [] 93  Resp:  [20-25] 21  SpO2:  [94 %-99 %] 98 %  BP: (121-143)/(54-79) 125/60     Weight: (!) 156.5 kg (345 lb)  Body mass index is 59.22 kg/m².     Physical Exam  Vitals reviewed.   Constitutional:       General: She is not in acute distress.      Appearance: Normal appearance. She is obese.   HENT:      Head: Normocephalic and atraumatic.      Nose: Nose normal.      Mouth/Throat:      Mouth: Mucous membranes are moist.      Pharynx: Oropharynx is clear.   Eyes:      Extraocular Movements: Extraocular movements intact.      Conjunctiva/sclera: Conjunctivae normal.      Pupils: Pupils are equal, round, and reactive to light.   Cardiovascular:      Rate and Rhythm: Regular rhythm. Tachycardia present.      Pulses: Normal pulses.      Heart sounds: Normal heart sounds.   Pulmonary:      Effort: Pulmonary effort is normal. No respiratory distress.      Breath sounds: Normal breath sounds.   Abdominal:      General: Abdomen is flat. There is no distension.      Palpations: Abdomen is soft.      Tenderness: There is no guarding.   Musculoskeletal:         General: Normal range of motion.      Cervical back: Normal range of motion.   Skin:     General: Skin is warm and dry.      Comments: Erythema, edema, and warmth to abdominal pannus and right thigh   Neurological:      General: No focal deficit present.      Mental Status: She is alert and oriented to person, place, and time. Mental status is at baseline.   Psychiatric:         Mood and Affect: Mood normal.            CRANIAL NERVES     CN III, IV, VI   Pupils are equal, round, and reactive to light.       Significant Labs: All pertinent labs within the past 24 hours have been reviewed.  CBC:   Recent Labs   Lab 08/25/24  1524   WBC 19.24*   HGB 12.8   HCT 42.4        CMP:   Recent Labs   Lab 08/25/24  1524      K 3.9      CO2 27   *   BUN 14   CREATININE 0.6   CALCIUM 9.0   PROT 7.8   ALBUMIN 4.0   BILITOT 0.4   ALKPHOS 73   AST 11   ALT 10   ANIONGAP 7*     Magnesium:   Recent Labs   Lab 08/25/24  1524   MG 1.9     Troponin:   Recent Labs   Lab 08/25/24  1524   TROPONINIHS 2.7     TSH:   Recent Labs   Lab 04/04/24  1615   TSH 1.638     Significant Imaging: I have reviewed all  "pertinent imaging results/findings within the past 24 hours.    Assessment/Plan:     * Sepsis  This patient does have evidence of infective focus  My overall impression is sepsis.  Cellulitis with tachycardia up to 134, respirations >20, WBC 19,000  Source: Skin and Soft Tissue (location abdominal)  Chest x-ray negative, COVID and flu negative.  CT of the abdomen pending, UA pending.  IV fluids and Antibiotics given-   Antibiotics (72h ago, onward)      Start     Stop Route Frequency Ordered    08/26/24 0000  piperacillin-tazobactam 4.5 g in dextrose 5 % 100 mL IVPB (ready to mix)         -- IV Every 8 hours (non-standard times) 08/25/24 1801    08/25/24 1636  vancomycin - pharmacy to dose  (vancomycin IVPB (PEDS and ADULTS))        Placed in "And" Linked Group    -- IV pharmacy to manage frequency 08/25/24 1536          Latest lactate reviewed-  Recent Labs   Lab 08/25/24  1524   POCLAC 2.03       Cellulitis  To abdominal pannus/right thigh mass, pt has recurrent history of panniculitis   CT of abdomen and pelvis pending   IV vancomycin and Zosyn  Skin assessments, turn patient, inpatient wound care  Monitor vitals and labs      COPD (chronic obstructive pulmonary disease)  Patient's COPD is controlled currently.  Patient is currently off COPD Pathway.  Duo nebs p.r.n. and monitor respiratory status closely.     Chronic low back pain with bilateral sciatica  Pain control p.r.n.        VTE Risk Mitigation (From admission, onward)           Ordered     enoxaparin injection 40 mg  Daily         08/25/24 1759     IP VTE HIGH RISK PATIENT  Once         08/25/24 1759     Place sequential compression device  Until discontinued         08/25/24 1759                     Patient will be admitted inpatient status in collaboration with Dr. Chaparro.        EDDIE Reed  Department of Hospital Medicine  [facility] - Emergency Dept          "

## 2024-08-25 NOTE — ED PROVIDER NOTES
Encounter Date: 2024       History     Chief Complaint   Patient presents with    Shortness of Breath     Started this am, worse when infection gets worse    Fever     At 0700 but didn't check temp    Chills    Wound Infection     Mass off inner right thigh with redness     58-year-old presenting with complaint chills, subjective fever, generalized weakness.  Began earlier in the night.  She took 2 Percocets at 10:00 a.m. but denies improvement.  She says that she gets septic from cellulitis of her pannus frequently and this feels similar.  She also complains of mildly increased shortness for breath and wheeze, says that her COPD is acting up but that these symptoms are mild.    The history is provided by the patient.     Review of patient's allergies indicates:   Allergen Reactions    Aspirin     Nsaids (non-steroidal anti-inflammatory drug) Hives    Teflaro [ceftaroline fosamil]      Allergic reaction/ hives/itching     Past Medical History:   Diagnosis Date    Allergy     Anemia     Asthma     COPD (chronic obstructive pulmonary disease)     Deep vein thrombosis     Depression     Diabetes mellitus, type 2     Encounter for blood transfusion     Heart murmur     Neuromuscular disorder      Past Surgical History:   Procedure Laterality Date     SECTION      DILATION AND CURETTAGE OF UTERUS      gastric sleeve      [facility]    TONSILLECTOMY       Family History   Problem Relation Name Age of Onset    Heart disease Mother      Diabetes Mother      Arthritis Mother      Depression Mother      Cancer Father      COPD Father      Arthritis Maternal Grandmother      Cancer Maternal Grandmother      Cancer Maternal Grandfather      Cancer Paternal Grandmother      Kidney disease Paternal Grandmother      Diabetes Paternal Grandmother      Diabetes Paternal Grandfather      Hyperlipidemia Paternal Grandfather       Social History     Tobacco Use    Smoking status: Former     Current packs/day: 1.00      Average packs/day: 1 pack/day for 15.5 years (15.5 ttl pk-yrs)     Types: Cigarettes     Start date: 2/6/1979     Quit date: 1998    Smokeless tobacco: Never    Tobacco comments:     Pt states she has smoked since her teenage years, smokes around 1pk/day. Interested and quitting. Interested in the outpatient smoking cessation program. Referral sent.    Substance Use Topics    Alcohol use: No    Drug use: No     Review of Systems   Constitutional:  Positive for chills and fever.   Respiratory:  Positive for shortness of breath and wheezing.    Skin:  Positive for color change.   All other systems reviewed and are negative.      Physical Exam     Initial Vitals [08/25/24 1508]   BP Pulse Resp Temp SpO2   (!) 143/79 (!) 134 (!) 25 99.9 °F (37.7 °C) 99 %      MAP       --         Physical Exam    Nursing note and vitals reviewed.  Constitutional: She appears well-developed and well-nourished. She is not diaphoretic. No distress.   HENT:   Head: Normocephalic.   Eyes: Conjunctivae are normal.   Neck: Neck supple.   Normal range of motion.  Cardiovascular:            Tachycardic   Pulmonary/Chest: No respiratory distress. She has wheezes.   Expiratory wheeze bilaterally   Abdominal: She exhibits no distension.   Musculoskeletal:         General: No edema.      Cervical back: Normal range of motion and neck supple.     Neurological: She is alert. She has normal strength.   Skin: Skin is warm and dry. There is erythema.   Lower extremity pannus is red and warm and cellulitic   Psychiatric: She has a normal mood and affect.         ED Course   Procedures  Labs Reviewed   CBC W/ AUTO DIFFERENTIAL - Abnormal       Result Value    WBC 19.24 (*)     RBC 4.83      Hemoglobin 12.8      Hematocrit 42.4      MCV 88      MCH 26.5 (*)     MCHC 30.2 (*)     RDW 15.5 (*)     Platelets 350      MPV 9.4      Immature Granulocytes 0.6 (*)     Gran # (ANC) 16.5 (*)     Immature Grans (Abs) 0.11 (*)     Lymph # 1.8      Mono # 0.7      Eos #  0.1      Baso # 0.06      nRBC 0      Gran % 85.5 (*)     Lymph % 9.5 (*)     Mono % 3.8 (*)     Eosinophil % 0.3      Basophil % 0.3      Differential Method Automated     COMPREHENSIVE METABOLIC PANEL - Abnormal    Sodium 138      Potassium 3.9      Chloride 104      CO2 27      Glucose 136 (*)     BUN 14      Creatinine 0.6      Calcium 9.0      Total Protein 7.8      Albumin 4.0      Total Bilirubin 0.4      Alkaline Phosphatase 73      AST 11      ALT 10      eGFR >60.0      Anion Gap 7 (*)    PHOSPHORUS - Abnormal    Phosphorus 2.6 (*)    CULTURE, BLOOD   CULTURE, BLOOD   MAGNESIUM    Magnesium 1.9     PROCALCITONIN    Procalcitonin <0.050     B-TYPE NATRIURETIC PEPTIDE    BNP 12     INFLUENZA A AND B ANTIGEN    Influenza A, Molecular Negative      Influenza B, Molecular Negative      Flu A & B Source Nasal swab      Narrative:     Specimen Source->Nasopharyngeal Swab   TROPONIN I HIGH SENSITIVITY    Troponin I High Sensitivity 2.7     SARS-COV-2 RNA AMPLIFICATION, QUAL    SARS-CoV-2 RNA, Amplification, Qual Negative     C-REACTIVE PROTEIN   URINALYSIS, REFLEX TO URINE CULTURE   LACTIC ACID, PLASMA   ISTAT LACTATE    POC Lactate 2.03      Sample VENOUS     POCT LACTATE     EKG Readings: (Independently Interpreted)   Sinus rhythm.  One hundred thirty-six beats/minute.  Normal axis.  No ST elevation.     ECG Results              EKG 12-lead (In process)        Collection Time Result Time QRS Duration OHS QTC Calculation    08/25/24 15:25:56 08/25/24 16:13:00 80 394                     In process by Interface, Lab In [facility] (08/25/24 16:13:08)                   Narrative:    Test Reason : R00.0,    Vent. Rate : 136 BPM     Atrial Rate : 136 BPM     P-R Int : 198 ms          QRS Dur : 080 ms      QT Int : 262 ms       P-R-T Axes : 038 077 041 degrees     QTc Int : 394 ms    Sinus tachycardia with Premature atrial complexes with Aberrant conduction  Low voltage QRS  Nonspecific T wave abnormality  Abnormal  ECG  When compared with ECG of 11-AUG-2024 18:28,  Aberrant conduction is now Present  Vent. rate has increased BY  66 BPM  Nonspecific T wave abnormality now evident in Inferior leads    Referred By: [email or web address]   SELF           Confirmed By:                                   Imaging Results              X-Ray Chest AP Portable (Final result)  Result time 08/25/24 16:22:33      Final result by Bryan Denise MD (08/25/24 16:22:33)                   Impression:      No significant detrimental change compared to prior study of 08/11/2024.      Electronically signed by: Bryan Denise MD  Date:    08/25/2024  Time:    16:22               Narrative:    EXAMINATION:  XR CHEST AP PORTABLE    CLINICAL HISTORY:  Dyspnea, unspecified    TECHNIQUE:  Single frontal view of the chest was performed.    COMPARISON:  08/11/2024    FINDINGS:  Cardiac monitoring leads overlie the chest.  Cardiac silhouette is mildly enlarged.  Lungs demonstrate coarse interstitial attenuation, similar to prior examination.  Linear subsegmental opacity projecting over the left lung base is favored to reflect atelectasis or scarring.  No new large confluent airspace compared to prior examination.  No definite pneumothorax.  Osseous structures demonstrate degenerative changes.                                       Medications   vancomycin - pharmacy to dose (has no administration in time range)   vancomycin in dextrose 5 % 1 gram/250 mL IVPB 1,000 mg (1,000 mg Intravenous New Bag 8/25/24 1625)     And   vancomycin in dextrose 5 % 1 gram/250 mL IVPB 1,000 mg (1,000 mg Intravenous New Bag 8/25/24 1625)   acetaminophen tablet 1,000 mg (1,000 mg Oral Given 8/25/24 1532)   sodium chloride 0.9% bolus 1,641 mL 1,641 mL (1,641 mLs Intravenous New Bag 8/25/24 1547)   piperacillin-tazobactam 4.5 g in dextrose 5 % 100 mL IVPB (ready to mix) (0 g Intravenous Stopped 8/25/24 1617)   albuterol-ipratropium 2.5 mg-0.5 mg/3 mL nebulizer solution 3 mL (3 mLs  Nebulization Given 8/25/24 7102)   HYDROmorphone injection 1 mg (1 mg Intravenous Given 8/25/24 1324)     Medical Decision Making  Patient was tachycardic into the 140s, sinus mechanism on EKG.  She was normotensive.  Temp is 99.9°.  Sepsis workup initiated.  Her lower extremity pannus is markedly red and erythematous, likely source of her infection.  30 cc/kg IVF bolus and broad-spectrum antibiotics ordered.  Her lactic acid is normal.  CXR is clear.  She was given a breathing treatment and Tylenol.  She also complained of increased chronic low back pain and and was given a dose of IV pain medication.  Hospitalist has been consulted for admission.    Amount and/or Complexity of Data Reviewed  Labs: ordered.  Radiology: ordered.    Risk  OTC drugs.  Prescription drug management.      Additional MDM:   Sepsis:                  Attending Attestation:         Attending Critical Care:   Critical Care Times:   Direct Patient Care (initial evaluation, reassessments, and time considering the case)................................................................19 minutes.   Ordering, Reviewing, and Interpreting Diagnostic Studies...............................................................................................................6 minutes.   Documentation..................................................................................................................................................................................5 minutes.   Consultation with other Physicians. .................................................................................................................................................5 minutes.   ==============================================================  Total Critical Care Time - exclusive of procedural time: 35 minutes.  ==============================================================  Critical care was necessary to treat or prevent imminent or life-threatening deterioration  of the following conditions: sepsis.   Critical care was time spent personally by me on the following activities: obtaining history from patient or relative, examination of patient, review of old charts, ordering lab, x-rays, and/or EKG, development of treatment plan with patient or relative, ordering and performing treatments and interventions, evaluation of patient's response to treatment, discussion with consultants and re-evaluation of patient's conition.   Critical Care Condition: potentially life-threatening                                  Clinical Impression:  Final diagnoses:  [R06.00] Dyspnea  [R00.0] Sinus tachycardia  [L03.90] Cellulitis, unspecified cellulitis site (Primary)                 Tomy Baumann MD  08/25/24 9020       Tomy Baumann MD  08/25/24 8575

## 2024-08-25 NOTE — CARE UPDATE
08/25/24 1837   Patient Assessment/Suction   Level of Consciousness (AVPU) alert   Respiratory Effort Normal;Unlabored   Rhythm/Pattern, Respiratory pattern regular   PRE-TX-O2   Device (Oxygen Therapy) room air   SpO2 98 %   Pulse Oximetry Type Continuous   Pulse 93   Resp (!) 21   Aerosol Therapy   $ Aerosol Therapy Charges PRN treatment not required   Respiratory Treatment Status (SVN) PRN treatment not required   Tobacco Cessation Intervention   Do you use any type of tobacco product? Yes   Respiratory Evaluation   $ Care Plan Tech Time 15 min   $ Respiratory Evaluation Complete   Evaluation For New Orders   Admitting Diagnosis sepsis   Cardiac Diagnosis heart murmur   Pulmonary Diagnosis kristie, copd, asthma   Current Surgeries na   Home Oxygen   Has Home Oxygen? Yes   Liter Flow 2   Duration with sleep  (and rn)   Route mask  (with cpap)   Mode continuous   Device home concentrator   Home Aerosol, MDI, DPI, and Other Treatments/Therapies   Home Respiratory Therapy Per Patient/Review of Chart Yes   MDI Home Meds/Freq albuterol q6prn   Oxygen Care Plan   Oxygen Care Plan Per Protocol   SPO2 Goal (%) 92% non-cardiac   Rationale Maintain Home oxygen   Bronchodilator Care Plan   Bronchodilator Care Plan Aerosol   Aerosol Meds w/ frequency Ventolin/Proventil(Albuterol Sulfate) 2.5mg PRN   Rationale Maintain home respiratory medicine   Atelectasis Care Plan   Rationale No Rational Found   Airway Clearance Care Plan   Rationale No rationale found

## 2024-08-25 NOTE — ASSESSMENT & PLAN NOTE
"This patient does have evidence of infective focus  My overall impression is sepsis.  Cellulitis with tachycardia up to 134, respirations >20, WBC 19,000  Source: Skin and Soft Tissue (location abdominal)  Chest x-ray negative, COVID and flu negative.  CT of the abdomen pending, UA pending.  IV fluids and Antibiotics given-   Antibiotics (72h ago, onward)      Start     Stop Route Frequency Ordered    08/26/24 0000  piperacillin-tazobactam 4.5 g in dextrose 5 % 100 mL IVPB (ready to mix)         -- IV Every 8 hours (non-standard times) 08/25/24 1801    08/25/24 1636  vancomycin - pharmacy to dose  (vancomycin IVPB (PEDS and ADULTS))        Placed in "And" Linked Group    -- IV pharmacy to manage frequency 08/25/24 1536          Latest lactate reviewed-  Recent Labs   Lab 08/25/24  1524   POCLAC 2.03     "

## 2024-08-25 NOTE — HPI
Patient is a 58-year-old female with a past medical history COPD, DM 2, and anemia.  Patient presents to the ED today with complaints of shortness a breath, racing heart, fever and chills starting this morning.  Patient has mass of in her right thigh which she states has become more swollen and red.  Patient has history of recurrent panniculitis.  Patient states that symptoms are similar to previous exacerbation.  Patient does have chronic cough and does report mild increase in wheeze.  Patient was given DuoNeb in the ED and Dilaudid.  Patient states she feels much better now.  Patient [place] [id number], tachycardic, respirations greater than 20, and cellulitic to abdominal pannus/right thigh.  Patient was given IV vancomycin and Zosyn.  Patient denies current chest pain, shortness of breath, nausea, vomiting, abdominal pain, lower extremity edema, difficulty urinating, and constipation/diarrhea.  Patient will be admitted to hospital medicine for further treatment and evaluation.    In the ED: Procalcitonin less than 0.050, troponin 2.7, BNP 12, phosphorus 2.6, sodium 138, potassium 3.9, glucose 136, creatinine 0.6, magnesium 1.9, WBC 19.24, hemoglobin 12.8.  Influenza and COVID negative.  UA is still pending.  Chest x-ray negative.

## 2024-08-25 NOTE — PLAN OF CARE
08/25/24 1552   Patient Assessment/Suction   Level of Consciousness (AVPU) alert   Respiratory Effort Normal;Unlabored   Expansion/Accessory Muscles/Retractions no use of accessory muscles   All Lung Fields Breath Sounds Anterior:;Lateral:;coarse   Rhythm/Pattern, Respiratory pattern regular   Cough Frequency infrequent   Cough Type nonproductive   PRE-TX-O2   Device (Oxygen Therapy) room air   SpO2 97 %   Pulse Oximetry Type Continuous   $ Pulse Oximetry - Multiple Charge Pulse Oximetry - Multiple   Pulse (!) 132   Resp (!) 22   Aerosol Therapy   $ Aerosol Therapy Charges Aerosol Treatment   Daily Review of Necessity (SVN) completed   Respiratory Treatment Status (SVN) given   Treatment Route (SVN) mask;air   Patient Position HOB elevated   Post Treatment Assessment (SVN) breath sounds unchanged   Signs of Intolerance (SVN) none   Breath Sounds Post-Respiratory Treatment   Throughout All Fields Post-Treatment All Fields   Throughout All Fields Post-Treatment no change   Post-treatment Heart Rate (beats/min) 129   Post-treatment Resp Rate (breaths/min) 21   General Safety Checklist   Safety Promotion/Fall Prevention side rails raised   Equipment Change   $ RT Equipment Aerosol treatment mask;Treatment nebulizer   Respiratory Interventions   Cough And Deep Breathing done with encouragement   Education   $ Education Bronchodilator;15 min

## 2024-08-25 NOTE — SUBJECTIVE & OBJECTIVE
Past Medical History:   Diagnosis Date    Allergy     Anemia     Asthma     COPD (chronic obstructive pulmonary disease)     Deep vein thrombosis     Depression     Diabetes mellitus, type 2     Encounter for blood transfusion     Heart murmur     Neuromuscular disorder        Past Surgical History:   Procedure Laterality Date     SECTION      DILATION AND CURETTAGE OF UTERUS      gastric sleeve      [facility]    TONSILLECTOMY         Review of patient's allergies indicates:   Allergen Reactions    Aspirin     Nsaids (non-steroidal anti-inflammatory drug) Hives    Teflaro [ceftaroline fosamil]      Allergic reaction/ hives/itching       No current facility-administered medications on file prior to encounter.     Current Outpatient Medications on File Prior to Encounter   Medication Sig    ferrous sulfate 324 mg (65 mg iron) TbEC Take 324 mg by mouth every 7 days.    hydrOXYzine HCL (ATARAX) 25 MG tablet Take 25 mg by mouth 3 (three) times daily.    oxyCODONE-acetaminophen (PERCOCET) 5-325 mg per tablet Take 1 tablet by mouth every 6 (six) hours as needed.    acetaminophen (TYLENOL) 650 MG TbSR Take 650 mg by mouth every 8 (eight) hours.    albuterol (PROVENTIL/VENTOLIN HFA) 90 mcg/actuation inhaler Inhale 2 puffs into the lungs every 6 (six) hours as needed for Wheezing or Shortness of Breath.    buPROPion (WELLBUTRIN XL) 150 MG TB24 tablet Take 1 tablet (150 mg total) by mouth once daily.    clindamycin (CLEOCIN T) 1 % lotion Apply topically 2 (two) times daily. (Patient taking differently: Apply 1 g topically 2 (two) times daily.)    docusate sodium (COLACE) 100 MG capsule Take 1 capsule (100 mg total) by mouth 2 (two) times daily.    nicotine (NICODERM CQ) 21 mg/24 hr Place 1 patch onto the skin once daily.    oxyCODONE (ROXICODONE) 5 MG immediate release tablet Take 1 tablet (5 mg total) by mouth every 4 (four) hours as needed for Pain. (Patient not taking: Reported on 2024)    semaglutide (OZEMPIC)  0.25 mg or 0.5 mg (2 mg/3 mL) pen injector Inject 0.5 mg into the skin every 7 days.     Family History       Problem Relation (Age of Onset)    Arthritis Mother, Maternal Grandmother    COPD Father    Cancer Father, Maternal Grandmother, Maternal Grandfather, Paternal Grandmother    Depression Mother    Diabetes Mother, Paternal Grandmother, Paternal Grandfather    Heart disease Mother    Hyperlipidemia Paternal Grandfather    Kidney disease Paternal Grandmother          Tobacco Use    Smoking status: Former     Current packs/day: 1.00     Average packs/day: 1 pack/day for 15.5 years (15.5 ttl pk-yrs)     Types: Cigarettes     Start date: 2/6/1979     Quit date: 1998    Smokeless tobacco: Never    Tobacco comments:     Pt states she has smoked since her teenage years, smokes around 1pk/day. Interested and quitting. Interested in the outpatient smoking cessation program. Referral sent.    Substance and Sexual Activity    Alcohol use: No    Drug use: No    Sexual activity: Never     Review of Systems   Constitutional:  Positive for chills and fever.   Respiratory:  Positive for cough (chronic) and wheezing. Negative for shortness of breath.    Cardiovascular:  Negative for chest pain.   Gastrointestinal:  Positive for nausea. Negative for abdominal pain, constipation, diarrhea and vomiting.   Genitourinary:  Negative for difficulty urinating and dysuria.   Musculoskeletal:  Positive for back pain.     Objective:     Vital Signs (Most Recent):  Temp: 99.9 °F (37.7 °C) (08/25/24 1837)  Pulse: 93 (08/25/24 1837)  Resp: (!) 21 (08/25/24 1837)  BP: 125/60 (08/25/24 1830)  SpO2: 98 % (08/25/24 1837) Vital Signs (24h Range):  Temp:  [99.9 °F (37.7 °C)] 99.9 °F (37.7 °C)  Pulse:  [] 93  Resp:  [20-25] 21  SpO2:  [94 %-99 %] 98 %  BP: (121-143)/(54-79) 125/60     Weight: (!) 156.5 kg (345 lb)  Body mass index is 59.22 kg/m².     Physical Exam  Vitals reviewed.   Constitutional:       General: She is not in acute  distress.     Appearance: Normal appearance. She is obese.   HENT:      Head: Normocephalic and atraumatic.      Nose: Nose normal.      Mouth/Throat:      Mouth: Mucous membranes are moist.      Pharynx: Oropharynx is clear.   Eyes:      Extraocular Movements: Extraocular movements intact.      Conjunctiva/sclera: Conjunctivae normal.      Pupils: Pupils are equal, round, and reactive to light.   Cardiovascular:      Rate and Rhythm: Regular rhythm. Tachycardia present.      Pulses: Normal pulses.      Heart sounds: Normal heart sounds.   Pulmonary:      Effort: Pulmonary effort is normal. No respiratory distress.      Breath sounds: Normal breath sounds.   Abdominal:      General: Abdomen is flat. There is no distension.      Palpations: Abdomen is soft.      Tenderness: There is no guarding.   Musculoskeletal:         General: Normal range of motion.      Cervical back: Normal range of motion.   Skin:     General: Skin is warm and dry.      Comments: Erythema, edema, and warmth to abdominal pannus and right thigh   Neurological:      General: No focal deficit present.      Mental Status: She is alert and oriented to person, place, and time. Mental status is at baseline.   Psychiatric:         Mood and Affect: Mood normal.            CRANIAL NERVES     CN III, IV, VI   Pupils are equal, round, and reactive to light.       Significant Labs: All pertinent labs within the past 24 hours have been reviewed.  CBC:   Recent Labs   Lab 08/25/24  1524   WBC 19.24*   HGB 12.8   HCT 42.4        CMP:   Recent Labs   Lab 08/25/24  1524      K 3.9      CO2 27   *   BUN 14   CREATININE 0.6   CALCIUM 9.0   PROT 7.8   ALBUMIN 4.0   BILITOT 0.4   ALKPHOS 73   AST 11   ALT 10   ANIONGAP 7*     Magnesium:   Recent Labs   Lab 08/25/24  1524   MG 1.9     Troponin:   Recent Labs   Lab 08/25/24  1524   TROPONINIHS 2.7     TSH:   Recent Labs   Lab 04/04/24  1615   TSH 1.638     Significant Imaging: I have  reviewed all pertinent imaging results/findings within the past 24 hours.

## 2024-08-25 NOTE — ASSESSMENT & PLAN NOTE
To abdominal pannus/right thigh, pt has recurrent history of panniculitis   CT of abdomen and pelvis pending   IV vancomycin and Zosyn  Skin assessments, turn patient, inpatient wound care  Monitor vitals and labs

## 2024-08-25 NOTE — ASSESSMENT & PLAN NOTE
Patient's COPD is controlled currently.  Patient is currently off COPD Pathway.  Duo nebs p.r.n. and monitor respiratory status closely.

## 2024-08-26 LAB
ALBUMIN SERPL BCP-MCNC: 3.3 G/DL (ref 3.5–5.2)
ALP SERPL-CCNC: 57 U/L (ref 55–135)
ALT SERPL W/O P-5'-P-CCNC: 7 U/L (ref 10–44)
ANION GAP SERPL CALC-SCNC: 6 MMOL/L (ref 8–16)
AST SERPL-CCNC: 9 U/L (ref 10–40)
BASOPHILS # BLD AUTO: 0.03 K/UL (ref 0–0.2)
BASOPHILS NFR BLD: 0.2 % (ref 0–1.9)
BILIRUB SERPL-MCNC: 0.5 MG/DL (ref 0.1–1)
BUN SERPL-MCNC: 12 MG/DL (ref 6–20)
CALCIUM SERPL-MCNC: 8 MG/DL (ref 8.7–10.5)
CHLORIDE SERPL-SCNC: 105 MMOL/L (ref 95–110)
CO2 SERPL-SCNC: 24 MMOL/L (ref 23–29)
CREAT SERPL-MCNC: 0.6 MG/DL (ref 0.5–1.4)
CRP SERPL-MCNC: 8 MG/DL
DIFFERENTIAL METHOD BLD: ABNORMAL
EOSINOPHIL # BLD AUTO: 0 K/UL (ref 0–0.5)
EOSINOPHIL NFR BLD: 0.2 % (ref 0–8)
ERYTHROCYTE [DISTWIDTH] IN BLOOD BY AUTOMATED COUNT: 15.7 % (ref 11.5–14.5)
EST. GFR  (NO RACE VARIABLE): >60 ML/MIN/1.73 M^2
ESTIMATED AVG GLUCOSE: 117 MG/DL (ref 68–131)
GLUCOSE SERPL-MCNC: 106 MG/DL (ref 70–110)
HBA1C MFR BLD: 5.7 % (ref 4.5–6.2)
HCT VFR BLD AUTO: 35.5 % (ref 37–48.5)
HGB BLD-MCNC: 11 G/DL (ref 12–16)
IMM GRANULOCYTES # BLD AUTO: 0.06 K/UL (ref 0–0.04)
IMM GRANULOCYTES NFR BLD AUTO: 0.5 % (ref 0–0.5)
LYMPHOCYTES # BLD AUTO: 1.8 K/UL (ref 1–4.8)
LYMPHOCYTES NFR BLD: 14.8 % (ref 18–48)
MAGNESIUM SERPL-MCNC: 1.8 MG/DL (ref 1.6–2.6)
MCH RBC QN AUTO: 27.2 PG (ref 27–31)
MCHC RBC AUTO-ENTMCNC: 31 G/DL (ref 32–36)
MCV RBC AUTO: 88 FL (ref 82–98)
MONOCYTES # BLD AUTO: 1 K/UL (ref 0.3–1)
MONOCYTES NFR BLD: 8.2 % (ref 4–15)
MRSA SCREEN BY PCR: NEGATIVE
NEUTROPHILS # BLD AUTO: 9.3 K/UL (ref 1.8–7.7)
NEUTROPHILS NFR BLD: 76.1 % (ref 38–73)
NRBC BLD-RTO: 0 /100 WBC
PHOSPHATE SERPL-MCNC: 3.7 MG/DL (ref 2.7–4.5)
PLATELET # BLD AUTO: 274 K/UL (ref 150–450)
PMV BLD AUTO: 9.4 FL (ref 9.2–12.9)
POTASSIUM SERPL-SCNC: 3.8 MMOL/L (ref 3.5–5.1)
PROT SERPL-MCNC: 6.3 G/DL (ref 6–8.4)
RBC # BLD AUTO: 4.05 M/UL (ref 4–5.4)
SODIUM SERPL-SCNC: 135 MMOL/L (ref 136–145)
WBC # BLD AUTO: 12.2 K/UL (ref 3.9–12.7)

## 2024-08-26 PROCEDURE — 94761 N-INVAS EAR/PLS OXIMETRY MLT: CPT

## 2024-08-26 PROCEDURE — 21400001 HC TELEMETRY ROOM

## 2024-08-26 PROCEDURE — 85025 COMPLETE CBC W/AUTO DIFF WBC: CPT | Performed by: PHYSICAL THERAPY ASSISTANT

## 2024-08-26 PROCEDURE — 84100 ASSAY OF PHOSPHORUS: CPT | Performed by: PHYSICAL THERAPY ASSISTANT

## 2024-08-26 PROCEDURE — 63700000 PHARM REV CODE 250 ALT 637 W/O HCPCS: Performed by: NURSE PRACTITIONER

## 2024-08-26 PROCEDURE — 99900035 HC TECH TIME PER 15 MIN (STAT)

## 2024-08-26 PROCEDURE — 63600175 PHARM REV CODE 636 W HCPCS: Performed by: PHYSICAL THERAPY ASSISTANT

## 2024-08-26 PROCEDURE — 63600175 PHARM REV CODE 636 W HCPCS: Mod: JZ,JG | Performed by: INTERNAL MEDICINE

## 2024-08-26 PROCEDURE — 36415 COLL VENOUS BLD VENIPUNCTURE: CPT | Performed by: PHYSICAL THERAPY ASSISTANT

## 2024-08-26 PROCEDURE — 27100171 HC OXYGEN HIGH FLOW UP TO 24 HOURS

## 2024-08-26 PROCEDURE — 87641 MR-STAPH DNA AMP PROBE: CPT | Performed by: NURSE PRACTITIONER

## 2024-08-26 PROCEDURE — 25000003 PHARM REV CODE 250: Performed by: PHYSICAL THERAPY ASSISTANT

## 2024-08-26 PROCEDURE — 86140 C-REACTIVE PROTEIN: CPT | Performed by: PHYSICAL THERAPY ASSISTANT

## 2024-08-26 PROCEDURE — 25000003 PHARM REV CODE 250: Performed by: NURSE PRACTITIONER

## 2024-08-26 PROCEDURE — 80053 COMPREHEN METABOLIC PANEL: CPT | Performed by: PHYSICAL THERAPY ASSISTANT

## 2024-08-26 PROCEDURE — 25000003 PHARM REV CODE 250: Performed by: INTERNAL MEDICINE

## 2024-08-26 PROCEDURE — 63600175 PHARM REV CODE 636 W HCPCS: Performed by: HOSPITALIST

## 2024-08-26 PROCEDURE — 83735 ASSAY OF MAGNESIUM: CPT | Performed by: PHYSICAL THERAPY ASSISTANT

## 2024-08-26 PROCEDURE — 83036 HEMOGLOBIN GLYCOSYLATED A1C: CPT | Performed by: PHYSICAL THERAPY ASSISTANT

## 2024-08-26 RX ORDER — FLUCONAZOLE 100 MG/1
200 TABLET ORAL DAILY
Status: DISCONTINUED | OUTPATIENT
Start: 2024-08-26 | End: 2024-08-29 | Stop reason: HOSPADM

## 2024-08-26 RX ORDER — ENOXAPARIN SODIUM 100 MG/ML
40 INJECTION SUBCUTANEOUS EVERY 12 HOURS
Status: DISCONTINUED | OUTPATIENT
Start: 2024-08-26 | End: 2024-08-29 | Stop reason: HOSPADM

## 2024-08-26 RX ADMIN — HYDROMORPHONE HYDROCHLORIDE 1 MG: 1 INJECTION, SOLUTION INTRAMUSCULAR; INTRAVENOUS; SUBCUTANEOUS at 04:08

## 2024-08-26 RX ADMIN — ENOXAPARIN SODIUM 40 MG: 40 INJECTION SUBCUTANEOUS at 08:08

## 2024-08-26 RX ADMIN — OXYCODONE HYDROCHLORIDE AND ACETAMINOPHEN 1 TABLET: 5; 325 TABLET ORAL at 09:08

## 2024-08-26 RX ADMIN — HYDROMORPHONE HYDROCHLORIDE 1 MG: 1 INJECTION, SOLUTION INTRAMUSCULAR; INTRAVENOUS; SUBCUTANEOUS at 12:08

## 2024-08-26 RX ADMIN — Medication 800 MG: at 09:08

## 2024-08-26 RX ADMIN — FAMOTIDINE 20 MG: 20 TABLET ORAL at 08:08

## 2024-08-26 RX ADMIN — Medication 4 TABLET: at 05:08

## 2024-08-26 RX ADMIN — DOCUSATE SODIUM 100 MG: 100 CAPSULE, LIQUID FILLED ORAL at 09:08

## 2024-08-26 RX ADMIN — FLUCONAZOLE 200 MG: 100 TABLET ORAL at 05:08

## 2024-08-26 RX ADMIN — FAMOTIDINE 20 MG: 20 TABLET ORAL at 09:08

## 2024-08-26 RX ADMIN — OXYCODONE HYDROCHLORIDE AND ACETAMINOPHEN 1 TABLET: 5; 325 TABLET ORAL at 01:08

## 2024-08-26 RX ADMIN — CLINDAMYCIN IN 5 PERCENT DEXTROSE 900 MG: 18 INJECTION, SOLUTION INTRAVENOUS at 03:08

## 2024-08-26 RX ADMIN — HYDROMORPHONE HYDROCHLORIDE 1 MG: 1 INJECTION, SOLUTION INTRAMUSCULAR; INTRAVENOUS; SUBCUTANEOUS at 11:08

## 2024-08-26 RX ADMIN — CLINDAMYCIN IN 5 PERCENT DEXTROSE 900 MG: 18 INJECTION, SOLUTION INTRAVENOUS at 09:08

## 2024-08-26 RX ADMIN — ENOXAPARIN SODIUM 40 MG: 40 INJECTION SUBCUTANEOUS at 09:08

## 2024-08-26 RX ADMIN — HYDROXYZINE HYDROCHLORIDE 25 MG: 25 TABLET ORAL at 05:08

## 2024-08-26 RX ADMIN — ONDANSETRON 4 MG: 2 INJECTION INTRAMUSCULAR; INTRAVENOUS at 01:08

## 2024-08-26 RX ADMIN — OXYCODONE HYDROCHLORIDE AND ACETAMINOPHEN 1 TABLET: 5; 325 TABLET ORAL at 05:08

## 2024-08-26 RX ADMIN — CLINDAMYCIN IN 5 PERCENT DEXTROSE 900 MG: 18 INJECTION, SOLUTION INTRAVENOUS at 05:08

## 2024-08-26 RX ADMIN — SODIUM CHLORIDE, POTASSIUM CHLORIDE, SODIUM LACTATE AND CALCIUM CHLORIDE: 600; 310; 30; 20 INJECTION, SOLUTION INTRAVENOUS at 02:08

## 2024-08-26 RX ADMIN — HYDROMORPHONE HYDROCHLORIDE 1 MG: 1 INJECTION, SOLUTION INTRAMUSCULAR; INTRAVENOUS; SUBCUTANEOUS at 08:08

## 2024-08-26 RX ADMIN — HYDROXYZINE HYDROCHLORIDE 25 MG: 25 TABLET ORAL at 09:08

## 2024-08-26 RX ADMIN — POTASSIUM BICARBONATE 50 MEQ: 977.5 TABLET, EFFERVESCENT ORAL at 09:08

## 2024-08-26 RX ADMIN — CLINDAMYCIN IN 5 PERCENT DEXTROSE 900 MG: 18 INJECTION, SOLUTION INTRAVENOUS at 11:08

## 2024-08-26 NOTE — ASSESSMENT & PLAN NOTE
Dangers of cigarette smoking were reviewed with patient in detail. Patient was Counseled for 3-10 minutes. Nicotine replacement options were discussed. Nicotine replacement was discussed- not prescribed as it is medically contra-indicated - allergy

## 2024-08-26 NOTE — NURSING
Nurses Note -- 4 Eyes      8/26/2024   11:15 AM      Skin assessed during: Daily Assessment      [] No Altered Skin Integrity Present    []Prevention Measures Documented      [x] Yes- Altered Skin Integrity Present or Discovered   [] LDA Added if Not in Epic (Describe Wound)   [] New Altered Skin Integrity was Present on Admit and Documented in LDA   [] Wound Image Taken    Wound Care Consulted? No    Attending Nurse:  Mari Mcdermott RN/Staff Member:  POPEYE Davenport

## 2024-08-26 NOTE — HOSPITAL COURSE
Ms. Zaragoza has been monitored closely during her hospitalization. 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 She was empirically started on a zosyn/vanc in the ED and transitioned to clinda/levaquin on admission. WBC trended down from 19K -->12K-->9K. CRP is 8. Procal and lactate are WNL. She received a 30mL/kg bolus of NS of ideal body weight in ED with improvement of tachycardia. There is no documented hypotension. UA is bland, flu/covid is negative, and CXR with no acute consolidations noted. She reports a small abscess to the right buttock her boyfriend poked and drained earlier this week. She is well known to Dr. Humphreys and has been treated in the past with PO doxy and IV daptomycin. Blood cultures are NGTD. MRSA screen is negative. Will continue with IV clindamycin for now and add PO diflucan and probiotics. Wound care has been consulted. She reports finding a surgeon who will remove the mass, but it will be a complex surgery up to 22 hours, and she does not feel emotional ready for this. Also, she is smoking cigarettes and must stop this to qualify for surgery. She reports an allergic rxn to nicotine patches. She remained afebrile over 24 hours. Lymphedema mass with improved erythematous.  Patient ambulated in room with no difficulty.  Case management following for discharge planning.  Patient was recently followed by home health the patient's but declined re-certification.    Patient seen and examined on day of discharge.  Patient deemed appropriate for discharge.  Patient educated on discharge planning, she verbalized understanding.  Patient was discharged home on p.o. antibiotics, probiotic, antifungal.  Medications were sent to patient's preferred pharmacy.  Patient is to follow with primary care provider 9/4.

## 2024-08-26 NOTE — PROGRESS NOTES
Pharmacokinetic Initial Assessment: IV Vancomycin    Assessment/Plan:    Initiate intravenous vancomycin with loading dose of 2000 mg once followed by a maintenance dose of vancomycin 2000 mg IV every 12 hours  Desired empiric serum trough concentration is 15 to 20 mcg/mL  Draw vancomycin trough level 60 min prior to fourth dose on 8/27/24 at approximately 0300  Pharmacy will continue to follow and monitor vancomycin.      Please contact pharmacy at extension [phone] with any questions regarding this assessment.     Thank you for the consult,   Winsome Gutierrez       Patient brief summary:  Adriana Zaragoza is a 58 y.o. female initiated on antimicrobial therapy with IV Vancomycin for treatment of suspected sepsis    Drug Allergies:   Review of patient's allergies indicates:   Allergen Reactions    Aspirin     Nsaids (non-steroidal anti-inflammatory drug) Hives    Teflaro [ceftaroline fosamil]      Allergic reaction/ hives/itching       Actual Body Weight:   156.5 K    Renal Function:   Estimated Creatinine Clearance: 152 mL/min (based on SCr of 0.6 mg/dL).,     Dialysis Method (if applicable):  N/A    CBC (last 72 hours):  Recent Labs   Lab Result Units 08/25/24  1524   WBC K/uL 19.24*   Hemoglobin g/dL 12.8   Hematocrit % 42.4   Platelets K/uL 350   Gran % % 85.5*   Lymph % % 9.5*   Mono % % 3.8*   Eosinophil % % 0.3   Basophil % % 0.3   Differential Method  Automated       Metabolic Panel (last 72 hours):  Recent Labs   Lab Result Units 08/25/24  1524 08/25/24  1847   Sodium mmol/L 138  --    Potassium mmol/L 3.9  --    Chloride mmol/L 104  --    CO2 mmol/L 27  --    Glucose mg/dL 136*  --    Glucose, UA   --  Negative   BUN mg/dL 14  --    Creatinine mg/dL 0.6  --    Albumin g/dL 4.0  --    Total Bilirubin mg/dL 0.4  --    Alkaline Phosphatase U/L 73  --    AST U/L 11  --    ALT U/L 10  --    Magnesium mg/dL 1.9  --    Phosphorus mg/dL 2.6*  --        Drug levels (last 3 results):  No results for input(s):  ""VANCOMYCINRA", "VANCORANDOM", "VANCOMYCINPE", "VANCOPEAK", "VANCOMYCINTR", "VANCOTROUGH" in the last 72 hours.    Microbiologic Results:  Microbiology Results (last 7 days)       Procedure Component Value Units Date/Time    Blood culture x two cultures. Draw prior to antibiotics [[id number]] Collected: 08/25/24 1523    Order Status: Sent Specimen: Blood from Peripheral, Hand, Left Updated: 08/25/24 1548    Blood culture x two cultures. Draw prior to antibiotics [[id number]] Collected: 08/25/24 1544    Order Status: Sent Specimen: Blood from Peripheral, Hand, Right Updated: 08/25/24 1548            "

## 2024-08-26 NOTE — PLAN OF CARE
Problem: Adult Inpatient Plan of Care  Goal: Plan of Care Review  Outcome: Progressing  Goal: Patient-Specific Goal (Individualized)  Outcome: Progressing  Goal: Absence of Hospital-Acquired Illness or Injury  Outcome: Progressing  Goal: Optimal Comfort and Wellbeing  Outcome: Progressing  Goal: Readiness for Transition of Care  Outcome: Progressing     Problem: Bariatric Environmental Safety  Goal: Safety Maintained with Care  Outcome: Progressing     Problem: Sepsis/Septic Shock  Goal: Optimal Coping  Outcome: Progressing  Goal: Absence of Bleeding  Outcome: Progressing  Goal: Blood Glucose Level Within Targeted Range  Outcome: Progressing  Goal: Absence of Infection Signs and Symptoms  Outcome: Progressing  Goal: Optimal Nutrition Intake  Outcome: Progressing     Problem: Fall Injury Risk  Goal: Absence of Fall and Fall-Related Injury  Outcome: Progressing     Problem: Skin Injury Risk Increased  Goal: Skin Health and Integrity  Outcome: Progressing     Problem: Wound  Goal: Optimal Coping  Outcome: Progressing  Goal: Optimal Functional Ability  Outcome: Progressing  Goal: Absence of Infection Signs and Symptoms  Outcome: Progressing  Goal: Improved Oral Intake  Outcome: Progressing  Goal: Optimal Pain Control and Function  Outcome: Progressing  Goal: Skin Health and Integrity  Outcome: Progressing  Goal: Optimal Wound Healing  Outcome: Progressing

## 2024-08-26 NOTE — NURSING
"Pt. Seen for Wound Consult.     Very Small skin lesion to the Lt. Buttock.  Appears that it has no drainage, No S/Sx of infection. Reports "boyfriend squeezed it, I get these all the time".  Same type of little pustule is noted to the Rt. Posterior thigh. Wound care is not needed to provide assistance with caring for the Lymphatic growth to the Rt. Medial thigh. Wound Care Consult not needed for the above.  We will sign off.                 "

## 2024-08-26 NOTE — SUBJECTIVE & OBJECTIVE
Interval History: continue with clindamycin, will stop levaquin. Add diflucan and probiotic. Fever curve down. MRSA negative.    Review of Systems   Constitutional:  Positive for diaphoresis and fatigue.   Musculoskeletal:  Positive for myalgias.   Neurological:  Positive for weakness.     Objective:     Vital Signs (Most Recent):  Temp: 99.5 °F (37.5 °C) (08/26/24 1135)  Pulse: 85 (08/26/24 1220)  Resp: 18 (08/26/24 1220)  BP: (!) 140/56 (map 75) (08/26/24 1135)  SpO2: 97 % (08/26/24 1220) Vital Signs (24h Range):  Temp:  [99.5 °F (37.5 °C)-101.6 °F (38.7 °C)] 99.5 °F (37.5 °C)  Pulse:  [] 85  Resp:  [18-25] 18  SpO2:  [93 %-99 %] 97 %  BP: (101-157)/(47-79) 140/56     Weight: (!) 172 kg (379 lb 3.1 oz)  Body mass index is 65.09 kg/m².    Intake/Output Summary (Last 24 hours) at 8/26/2024 1458  Last data filed at 8/26/2024 0506  Gross per 24 hour   Intake 2831 ml   Output 450 ml   Net 2381 ml         Physical Exam  Vitals and nursing note reviewed.   Constitutional:       Appearance: She is obese.   HENT:      Head: Normocephalic and atraumatic.      Nose: Nose normal.      Mouth/Throat:      Mouth: Mucous membranes are moist.      Pharynx: Oropharynx is clear.   Eyes:      Extraocular Movements: Extraocular movements intact.      Pupils: Pupils are equal, round, and reactive to light.   Cardiovascular:      Rate and Rhythm: Normal rate and regular rhythm.      Pulses: Normal pulses.   Pulmonary:      Effort: Pulmonary effort is normal.      Comments: Diminished in bases may be d/t body habitus  Abdominal:      General: Bowel sounds are normal.      Palpations: Abdomen is soft.   Musculoskeletal:         General: Deformity present.      Cervical back: Normal range of motion and neck supple.      Comments: Bilat lower ext lymphedema. Large pediculated lymphedema mass on thighs   Skin:     General: Skin is warm.      Capillary Refill: Capillary refill takes less than 2 seconds.      Findings: Erythema present.       Comments: Sweaty  Mass is hot to touch erythematous  There is a healing abscess to the right buttock, no fluctuance, mild induration   Neurological:      General: No focal deficit present.      Mental Status: She is alert and oriented to person, place, and time.   Psychiatric:         Mood and Affect: Mood normal.         Behavior: Behavior normal.             Significant Labs: All pertinent labs within the past 24 hours have been reviewed.  CBC:   Recent Labs   Lab 08/25/24  1524 08/26/24  0409   WBC 19.24* 12.20   HGB 12.8 11.0*   HCT 42.4 35.5*    274     CMP:   Recent Labs   Lab 08/25/24  1524 08/26/24  0409    135*   K 3.9 3.8    105   CO2 27 24   * 106   BUN 14 12   CREATININE 0.6 0.6   CALCIUM 9.0 8.0*   PROT 7.8 6.3   ALBUMIN 4.0 3.3*   BILITOT 0.4 0.5   ALKPHOS 73 57   AST 11 9*   ALT 10 7*   ANIONGAP 7* 6*     Urine Studies:   Recent Labs   Lab 08/25/24  1847   COLORU Yellow   APPEARANCEUA Clear   PHUR 8.0   SPECGRAV 1.030   PROTEINUA Negative   GLUCUA Negative   KETONESU Negative   BILIRUBINUA Negative   OCCULTUA Negative   NITRITE Negative   UROBILINOGEN Negative   LEUKOCYTESUR Negative       Significant Imaging: I have reviewed all pertinent imaging results/findings within the past 24 hours.    US Soft Tissue, Lower Extremity, Right    Result Date: 8/26/2024  EXAMINATION: US SOFT TISSUE, LOWER EXTREMITY, RIGHT CLINICAL HISTORY: abscess; TECHNIQUE: Targeted sonographic evaluation is directed at the soft tissues of the medial right thigh. COMPARISON: None FINDINGS: Scattered soft tissue edema is demonstrated.  No localized fluid collection is observed.     Scattered soft tissue edema within the area of clinical concern in the medial right thigh without localized fluid collection. Electronically signed by: Arnold Swift Date:    08/26/2024 Time:    07:04    CT Abdomen Pelvis  Without Contrast    Result Date: 8/25/2024  EXAMINATION: CT ABDOMEN PELVIS WITHOUT CONTRAST CLINICAL  HISTORY: Sepsis; TECHNIQUE: Low dose axial images, sagittal and coronal reformations were obtained from the lung bases to the pubic symphysis without IV contrast.  Oral contrast was not administered. COMPARISON: CT 10/21/2022 FINDINGS: Please note image quality is degraded secondary to patient body habitus and streak artifact from the patient's soft tissues abutting the gantry. There is a stable nodular opacity at the right lung base.  There is mild bibasilar atelectasis or scarring.  No significant pleural fluid.  The visualized portions of the heart appear normal. The liver is enlarged.  No focal hepatic abnormality appreciated on this limited noncontrast examination.  No discrete radiopaque calculi identified in the gallbladder lumen.  There is no intra-or extrahepatic biliary ductal dilatation. There is postoperative change of the stomach.  Stomach otherwise appears within normal limits.  There is fatty involutional change of the pancreas.  Adrenal glands are unremarkable.  Spleen is enlarged. The kidneys are normal in size and location. There is no hydronephrosis.  Possible nonobstructing left-sided nephrolithiasis.  Evaluation of the pelvic contents is limited due to extensive streak artifact.  Bladder is grossly unremarkable although not well evaluated.  Uterus appears to be present and unremarkable. The abdominal aorta is normal in course and caliber without significant atherosclerotic calcifications. There is no evidence to suggest small bowel obstruction.  There is no free intraperitoneal air or ascites.  There is a large anterior abdominal hernia containing mesenteric fat as well as nonobstructed small and large bowel.  The appendix is not definitively visualized. There is large soft tissue pannus arising from the medial aspect of the proximal right thigh.  This demonstrates extensive skin thickening and soft tissue induration suggestive of soft tissue infection/cellulitis.  This appears similar to prior  study of 10/21/2022.  No evidence of soft tissue gas. Osseous structures demonstrate degenerative changes.     Large pannus arising from the medial aspect of the proximal right thigh with extensive skin thickening and soft tissue induration suggestive of a nonspecific soft tissue infection/cellulitis.  This appears similar to prior study of 10/21/2022. Large anterior abdominal wall hernia containing nonobstructed small and large bowel. Hepatomegaly. Additional findings as above. Electronically signed by: Bryan Denise MD Date:    08/25/2024 Time:    21:05    X-Ray Chest AP Portable    Result Date: 8/25/2024  EXAMINATION: XR CHEST AP PORTABLE CLINICAL HISTORY: Dyspnea, unspecified TECHNIQUE: Single frontal view of the chest was performed. COMPARISON: 08/11/2024 FINDINGS: Cardiac monitoring leads overlie the chest.  Cardiac silhouette is mildly enlarged.  Lungs demonstrate coarse interstitial attenuation, similar to prior examination.  Linear subsegmental opacity projecting over the left lung base is favored to reflect atelectasis or scarring.  No new large confluent airspace compared to prior examination.  No definite pneumothorax.  Osseous structures demonstrate degenerative changes.     No significant detrimental change compared to prior study of 08/11/2024. Electronically signed by: Bryan Denise MD Date:    08/25/2024 Time:    16:22

## 2024-08-26 NOTE — PROGRESS NOTES
[facility] Medicine  Progress Note    Patient Name: Adriana Zaragoza  MRN: [id number]  Patient Class: IP- Inpatient   Admission Date: 8/25/2024  Length of Stay: 1 days  Attending Physician: Roel Lopez MD  Primary Care Provider: Melba Trivedi MD        Subjective:     Principal Problem:Sepsis        HPI:  Patient is a 58-year-old female with a past medical history COPD, DM 2, and anemia.  Patient presents to the ED today with complaints of shortness a breath, racing heart, fever and chills starting this morning.  Patient has mass of in her right thigh which she states has become more swollen and red.  Patient has history of recurrent panniculitis.  Patient states that symptoms are similar to previous exacerbation.  Patient does have chronic cough and does report mild increase in wheeze.  Patient was given DuoNeb in the ED and Dilaudid.  Patient states she feels much better now.  Patient found with [place] of [address], tachycardic, respirations greater than 20, and cellulitic to abdominal pannus/right thigh.  Patient was given IV vancomycin and Zosyn.  Patient denies current chest pain, shortness of breath, nausea, vomiting, abdominal pain, lower extremity edema, difficulty urinating, and constipation/diarrhea.  Patient will be admitted to hospital medicine for further treatment and evaluation.    In the ED: Procalcitonin less than 0.050, troponin 2.7, BNP 12, phosphorus 2.6, sodium 138, potassium 3.9, glucose 136, creatinine 0.6, magnesium 1.9, WBC 19.24, hemoglobin 12.8.  Influenza and COVID negative.  UA is still pending.  Chest x-ray negative.    Overview/Hospital Course:  Ms. Zaragoza has been monitored closely during her hospitalization. 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 She was empirically started on a zosyn/vanc in the ED and transitioned to  clinda/levaquin on admission. WBC trended down from 19K -->12K. CRP is 8. Procal and lactate are WNL. She received a 30mL/kg bolus of NS of ideal body weight in ED with improvement of tachycardia. There is no documented hypotension. UA is bland, flu/covid is negative, and CXR with no acute consolidations noted. She reports a small abscess to the right buttock her boyfriend poked and drained earlier this week. She is well known to Dr. Humphreys and has been treated in the past with PO doxy and IV daptomycin. Blood cultures are NGTD. MRSA screen is negative. Will continue with IV clindamycin for now and add PO diflucan and probiotics. Wound care has been consulted. She reports finding a surgeon who will remove the mass, but it will be a complex surgery up to 22 hours, and she does not feel emotional ready for this. Also, she is smoking cigarettes and must stop this to qualify for surgery. She reports an allergic rxn to nicotine patches.    Interval History: continue with clindamycin, will stop levaquin. Add diflucan and probiotic. Fever curve down. MRSA negative.    Review of Systems   Constitutional:  Positive for diaphoresis and fatigue.   Musculoskeletal:  Positive for myalgias.   Neurological:  Positive for weakness.     Objective:     Vital Signs (Most Recent):  Temp: 99.5 °F (37.5 °C) (08/26/24 1135)  Pulse: 85 (08/26/24 1220)  Resp: 18 (08/26/24 1220)  BP: (!) 140/56 (map 75) (08/26/24 1135)  SpO2: 97 % (08/26/24 1220) Vital Signs (24h Range):  Temp:  [99.5 °F (37.5 °C)-101.6 °F (38.7 °C)] 99.5 °F (37.5 °C)  Pulse:  [] 85  Resp:  [18-25] 18  SpO2:  [93 %-99 %] 97 %  BP: (101-157)/(47-79) 140/56     Weight: (!) 172 kg (379 lb 3.1 oz)  Body mass index is 65.09 kg/m².    Intake/Output Summary (Last 24 hours) at 8/26/2024 0499  Last data filed at 8/26/2024 0506  Gross per 24 hour   Intake 2831 ml   Output 450 ml   Net 2381 ml         Physical Exam  Vitals and nursing note reviewed.   Constitutional:        Appearance: She is obese.   HENT:      Head: Normocephalic and atraumatic.      Nose: Nose normal.      Mouth/Throat:      Mouth: Mucous membranes are moist.      Pharynx: Oropharynx is clear.   Eyes:      Extraocular Movements: Extraocular movements intact.      Pupils: Pupils are equal, round, and reactive to light.   Cardiovascular:      Rate and Rhythm: Normal rate and regular rhythm.      Pulses: Normal pulses.   Pulmonary:      Effort: Pulmonary effort is normal.      Comments: Diminished in bases may be d/t body habitus  Abdominal:      General: Bowel sounds are normal.      Palpations: Abdomen is soft.   Musculoskeletal:         General: Deformity present.      Cervical back: Normal range of motion and neck supple.      Comments: Bilat lower ext lymphedema. Large pediculated lymphedema mass on thighs   Skin:     General: Skin is warm.      Capillary Refill: Capillary refill takes less than 2 seconds.      Findings: Erythema present.      Comments: Sweaty  Mass is hot to touch erythematous  There is a healing abscess to the right buttock, no fluctuance, mild induration   Neurological:      General: No focal deficit present.      Mental Status: She is alert and oriented to person, place, and time.   Psychiatric:         Mood and Affect: Mood normal.         Behavior: Behavior normal.             Significant Labs: All pertinent labs within the past 24 hours have been reviewed.  CBC:   Recent Labs   Lab 08/25/24  1524 08/26/24  0409   WBC 19.24* 12.20   HGB 12.8 11.0*   HCT 42.4 35.5*    274     CMP:   Recent Labs   Lab 08/25/24  1524 08/26/24  0409    135*   K 3.9 3.8    105   CO2 27 24   * 106   BUN 14 12   CREATININE 0.6 0.6   CALCIUM 9.0 8.0*   PROT 7.8 6.3   ALBUMIN 4.0 3.3*   BILITOT 0.4 0.5   ALKPHOS 73 57   AST 11 9*   ALT 10 7*   ANIONGAP 7* 6*     Urine Studies:   Recent Labs   Lab 08/25/24  1847   COLORU Yellow   APPEARANCEUA Clear   PHUR 8.0   SPECGRAV 1.030   PROTEINUA  Negative   GLUCUA Negative   KETONESU Negative   BILIRUBINUA Negative   OCCULTUA Negative   NITRITE Negative   UROBILINOGEN Negative   LEUKOCYTESUR Negative       Significant Imaging: I have reviewed all pertinent imaging results/findings within the past 24 hours.    US Soft Tissue, Lower Extremity, Right    Result Date: 8/26/2024  EXAMINATION: US SOFT TISSUE, LOWER EXTREMITY, RIGHT CLINICAL HISTORY: abscess; TECHNIQUE: Targeted sonographic evaluation is directed at the soft tissues of the medial right thigh. COMPARISON: None FINDINGS: Scattered soft tissue edema is demonstrated.  No localized fluid collection is observed.     Scattered soft tissue edema within the area of clinical concern in the medial right thigh without localized fluid collection. Electronically signed by: Arnold Swift Date:    08/26/2024 Time:    07:04    CT Abdomen Pelvis  Without Contrast    Result Date: 8/25/2024  EXAMINATION: CT ABDOMEN PELVIS WITHOUT CONTRAST CLINICAL HISTORY: Sepsis; TECHNIQUE: Low dose axial images, sagittal and coronal reformations were obtained from the lung bases to the pubic symphysis without IV contrast.  Oral contrast was not administered. COMPARISON: CT 10/21/2022 FINDINGS: Please note image quality is degraded secondary to patient body habitus and streak artifact from the patient's soft tissues abutting the gantry. There is a stable nodular opacity at the right lung base.  There is mild bibasilar atelectasis or scarring.  No significant pleural fluid.  The visualized portions of the heart appear normal. The liver is enlarged.  No focal hepatic abnormality appreciated on this limited noncontrast examination.  No discrete radiopaque calculi identified in the gallbladder lumen.  There is no intra-or extrahepatic biliary ductal dilatation. There is postoperative change of the stomach.  Stomach otherwise appears within normal limits.  There is fatty involutional change of the pancreas.  Adrenal glands are unremarkable.   Spleen is enlarged. The kidneys are normal in size and location. There is no hydronephrosis.  Possible nonobstructing left-sided nephrolithiasis.  Evaluation of the pelvic contents is limited due to extensive streak artifact.  Bladder is grossly unremarkable although not well evaluated.  Uterus appears to be present and unremarkable. The abdominal aorta is normal in course and caliber without significant atherosclerotic calcifications. There is no evidence to suggest small bowel obstruction.  There is no free intraperitoneal air or ascites.  There is a large anterior abdominal hernia containing mesenteric fat as well as nonobstructed small and large bowel.  The appendix is not definitively visualized. There is large soft tissue pannus arising from the medial aspect of the proximal right thigh.  This demonstrates extensive skin thickening and soft tissue induration suggestive of soft tissue infection/cellulitis.  This appears similar to prior study of 10/21/2022.  No evidence of soft tissue gas. Osseous structures demonstrate degenerative changes.     Large pannus arising from the medial aspect of the proximal right thigh with extensive skin thickening and soft tissue induration suggestive of a nonspecific soft tissue infection/cellulitis.  This appears similar to prior study of 10/21/2022. Large anterior abdominal wall hernia containing nonobstructed small and large bowel. Hepatomegaly. Additional findings as above. Electronically signed by: Bryan Denise MD Date:    08/25/2024 Time:    21:05    X-Ray Chest AP Portable    Result Date: 8/25/2024  EXAMINATION: XR CHEST AP PORTABLE CLINICAL HISTORY: Dyspnea, unspecified TECHNIQUE: Single frontal view of the chest was performed. COMPARISON: 08/11/2024 FINDINGS: Cardiac monitoring leads overlie the chest.  Cardiac silhouette is mildly enlarged.  Lungs demonstrate coarse interstitial attenuation, similar to prior examination.  Linear subsegmental opacity projecting over  the left lung base is favored to reflect atelectasis or scarring.  No new large confluent airspace compared to prior examination.  No definite pneumothorax.  Osseous structures demonstrate degenerative changes.     No significant detrimental change compared to prior study of 08/11/2024. Electronically signed by: Bryan Denise MD Date:    08/25/2024 Time:    16:22                Assessment/Plan:      * Sepsis  Admitted to telemetry  This patient does have evidence of infective focus  My overall impression is sepsis.  Cellulitis with tachycardia up to 134, respirations >20, WBC 19,000  Source: Skin and Soft Tissue (location abdominal)  Chest x-ray negative, COVID and flu negative.  CT of the abdomen c/w cellulitis   UA bland  IV fluids and Antibiotics given-   Antibiotics (72h ago, onward)      Start     Stop Route Frequency Ordered    08/25/24 2030  clindamycin in D5W 900 mg/50 mL IVPB 900 mg         -- IV Every 6 hours (non-standard times) 08/25/24 2030          Latest lactate reviewed-  Recent Labs   Lab 08/25/24  1524 08/25/24 2043   LACTATE  --  1.4   POCLAC 2.03  --          Dependence on nicotine from cigarettes  Dangers of cigarette smoking were reviewed with patient in detail. Patient was Counseled for 3-10 minutes. Nicotine replacement options were discussed. Nicotine replacement was discussed- not prescribed as it is medically contra-indicated - allergy    COPD (chronic obstructive pulmonary disease)  Patient's COPD is controlled currently.  Patient is currently off COPD Pathway.  Duo nebs p.r.n. and monitor respiratory status closely.     Cellulitis  To abdominal pannus/right thigh mass, pt has recurrent history of panniculitis   CT of abdomen and pelvis with no abscesses noted  IV vancomycin and Zosyn --> clinda/levaquin --> clinda (8/26)  Skin assessments, turn patient, inpatient wound care  Monitor vitals and labs      KEESHA (obstructive sleep apnea)  Continue CPAP qHS and with naps      Chronic low  back pain with bilateral sciatica  Continue home pain medication regimen      Candidal intertrigo  diflucan        VTE Risk Mitigation (From admission, onward)           Ordered     enoxaparin injection 40 mg  Every 12 hours         08/26/24 0751     IP VTE HIGH RISK PATIENT  Once         08/25/24 1759     Place sequential compression device  Until discontinued         08/25/24 1759                    Discharge Planning   JONATHAN: 8/28/2024     Code Status: Full Code   Is the patient medically ready for discharge?:     Reason for patient still in hospital (select all that apply): Patient trending condition and Treatment  Discharge Plan A: Home Health                  Ethel Abbott NP  Department of Hospital Medicine   [facility]

## 2024-08-26 NOTE — NURSING
Nurses Note -- 4 Eyes      8/25/2024   11:24 PM      Skin assessed during: Admit      [] No Altered Skin Integrity Present    []Prevention Measures Documented      [x] Yes- Altered Skin Integrity Present or Discovered   [x] LDA Added if Not in Epic (Describe Wound)   [x] New Altered Skin Integrity was Present on Admit and Documented in LDA   [x] Wound Image Taken    Wound Care Consulted? Yes    Attending Nurse:  AYAD Vargas     Second RN/Staff Member:   AYAD Bennett

## 2024-08-26 NOTE — CARE UPDATE
08/25/24 2100   Patient Assessment/Suction   Level of Consciousness (AVPU) alert   Respiratory Effort Normal;Unlabored   Expansion/Accessory Muscles/Retractions no use of accessory muscles   All Lung Fields Breath Sounds clear;diminished   Rhythm/Pattern, Respiratory unlabored   Cough Frequency no cough   Cough Type nonproductive   PRE-TX-O2   Device (Oxygen Therapy) CPAP   $ Is the patient on Low Flow Oxygen? Yes   $ Is the patient on High Flow Oxygen? Yes   Flow (L/min) (Oxygen Therapy) 2   SpO2 97 %   Pulse Oximetry Type Continuous   $ Pulse Oximetry - Multiple Charge Pulse Oximetry - Multiple   Pulse 102   Resp 18   Positioning HOB elevated 30 degrees   Aerosol Therapy   $ Aerosol Therapy Charges PRN treatment not required   Preset CPAP/BiPAP Settings   Mode Of Delivery CPAP   $ CPAP/BiPAP Daily Charge BiPAP/CPAP Daily   $ Initial CPAP/BiPAP Setup? Yes   $ Is patient using? Yes   Size of Mask Small   Sized Appropriately? Yes   Equipment Type DeVilbiss   Airway Device Type small full face mask   Humidifier not applicable   CPAP (cm H2O) 12   Patient CPAP/BiPAP Settings   CPAP/BIPAP ID 3   Education   $ Education PEP Therapy;Oxygen;30 min

## 2024-08-26 NOTE — ASSESSMENT & PLAN NOTE
To abdominal pannus/right thigh mass, pt has recurrent history of panniculitis   CT of abdomen and pelvis with no abscesses noted  IV vancomycin and Zosyn --> clinda/levaquin --> clinda (8/26)  Skin assessments, turn patient, inpatient wound care  Monitor vitals and labs

## 2024-08-26 NOTE — ASSESSMENT & PLAN NOTE
Admitted to telemetry  This patient does have evidence of infective focus  My overall impression is sepsis.  Cellulitis with tachycardia up to 134, respirations >20, WBC 19,000  Source: Skin and Soft Tissue (location abdominal)  Chest x-ray negative, COVID and flu negative.  CT of the abdomen c/w cellulitis   UA bland  IV fluids and Antibiotics given-   Antibiotics (72h ago, onward)      Start     Stop Route Frequency Ordered    08/25/24 2030  clindamycin in D5W 900 mg/50 mL IVPB 900 mg         -- IV Every 6 hours (non-standard times) 08/25/24 2030          Latest lactate reviewed-  Recent Labs   Lab 08/25/24  1524 08/25/24  2043   LACTATE  --  1.4   POCLAC 2.03  --         Lab Facility: [id number]

## 2024-08-27 LAB
ALBUMIN SERPL BCP-MCNC: 3.2 G/DL (ref 3.5–5.2)
ALP SERPL-CCNC: 59 U/L (ref 55–135)
ALT SERPL W/O P-5'-P-CCNC: 10 U/L (ref 10–44)
ANION GAP SERPL CALC-SCNC: 6 MMOL/L (ref 8–16)
AST SERPL-CCNC: 10 U/L (ref 10–40)
BASOPHILS # BLD AUTO: 0.02 K/UL (ref 0–0.2)
BASOPHILS NFR BLD: 0.2 % (ref 0–1.9)
BILIRUB SERPL-MCNC: 0.4 MG/DL (ref 0.1–1)
BUN SERPL-MCNC: 11 MG/DL (ref 6–20)
CALCIUM SERPL-MCNC: 8.1 MG/DL (ref 8.7–10.5)
CHLORIDE SERPL-SCNC: 103 MMOL/L (ref 95–110)
CO2 SERPL-SCNC: 27 MMOL/L (ref 23–29)
CREAT SERPL-MCNC: 0.6 MG/DL (ref 0.5–1.4)
DIFFERENTIAL METHOD BLD: ABNORMAL
EOSINOPHIL # BLD AUTO: 0.1 K/UL (ref 0–0.5)
EOSINOPHIL NFR BLD: 0.9 % (ref 0–8)
ERYTHROCYTE [DISTWIDTH] IN BLOOD BY AUTOMATED COUNT: 15.6 % (ref 11.5–14.5)
EST. GFR  (NO RACE VARIABLE): >60 ML/MIN/1.73 M^2
GLUCOSE SERPL-MCNC: 124 MG/DL (ref 70–110)
HCT VFR BLD AUTO: 34.9 % (ref 37–48.5)
HGB BLD-MCNC: 10.5 G/DL (ref 12–16)
IMM GRANULOCYTES # BLD AUTO: 0.04 K/UL (ref 0–0.04)
IMM GRANULOCYTES NFR BLD AUTO: 0.4 % (ref 0–0.5)
LYMPHOCYTES # BLD AUTO: 1.9 K/UL (ref 1–4.8)
LYMPHOCYTES NFR BLD: 20.5 % (ref 18–48)
MAGNESIUM SERPL-MCNC: 1.9 MG/DL (ref 1.6–2.6)
MCH RBC QN AUTO: 26.2 PG (ref 27–31)
MCHC RBC AUTO-ENTMCNC: 30.1 G/DL (ref 32–36)
MCV RBC AUTO: 87 FL (ref 82–98)
MONOCYTES # BLD AUTO: 1 K/UL (ref 0.3–1)
MONOCYTES NFR BLD: 10.5 % (ref 4–15)
NEUTROPHILS # BLD AUTO: 6.2 K/UL (ref 1.8–7.7)
NEUTROPHILS NFR BLD: 67.5 % (ref 38–73)
NRBC BLD-RTO: 0 /100 WBC
PHOSPHATE SERPL-MCNC: 3.3 MG/DL (ref 2.7–4.5)
PLATELET # BLD AUTO: 256 K/UL (ref 150–450)
PMV BLD AUTO: 9.5 FL (ref 9.2–12.9)
POTASSIUM SERPL-SCNC: 4.3 MMOL/L (ref 3.5–5.1)
PROT SERPL-MCNC: 6.3 G/DL (ref 6–8.4)
RBC # BLD AUTO: 4.01 M/UL (ref 4–5.4)
SODIUM SERPL-SCNC: 136 MMOL/L (ref 136–145)
WBC # BLD AUTO: 9.23 K/UL (ref 3.9–12.7)

## 2024-08-27 PROCEDURE — 63700000 PHARM REV CODE 250 ALT 637 W/O HCPCS: Performed by: NURSE PRACTITIONER

## 2024-08-27 PROCEDURE — 99900035 HC TECH TIME PER 15 MIN (STAT)

## 2024-08-27 PROCEDURE — 94761 N-INVAS EAR/PLS OXIMETRY MLT: CPT

## 2024-08-27 PROCEDURE — 84100 ASSAY OF PHOSPHORUS: CPT | Performed by: PHYSICAL THERAPY ASSISTANT

## 2024-08-27 PROCEDURE — 25000003 PHARM REV CODE 250: Performed by: PHYSICAL THERAPY ASSISTANT

## 2024-08-27 PROCEDURE — 63600175 PHARM REV CODE 636 W HCPCS: Performed by: PHYSICAL THERAPY ASSISTANT

## 2024-08-27 PROCEDURE — 83735 ASSAY OF MAGNESIUM: CPT | Performed by: PHYSICAL THERAPY ASSISTANT

## 2024-08-27 PROCEDURE — 94660 CPAP INITIATION&MGMT: CPT

## 2024-08-27 PROCEDURE — 80053 COMPREHEN METABOLIC PANEL: CPT | Performed by: PHYSICAL THERAPY ASSISTANT

## 2024-08-27 PROCEDURE — 85025 COMPLETE CBC W/AUTO DIFF WBC: CPT | Performed by: PHYSICAL THERAPY ASSISTANT

## 2024-08-27 PROCEDURE — 63600175 PHARM REV CODE 636 W HCPCS: Performed by: HOSPITALIST

## 2024-08-27 PROCEDURE — 27100171 HC OXYGEN HIGH FLOW UP TO 24 HOURS

## 2024-08-27 PROCEDURE — 21400001 HC TELEMETRY ROOM

## 2024-08-27 PROCEDURE — 36415 COLL VENOUS BLD VENIPUNCTURE: CPT | Performed by: PHYSICAL THERAPY ASSISTANT

## 2024-08-27 PROCEDURE — 99900031 HC PATIENT EDUCATION (STAT)

## 2024-08-27 PROCEDURE — 25000003 PHARM REV CODE 250: Performed by: NURSE PRACTITIONER

## 2024-08-27 PROCEDURE — 25000003 PHARM REV CODE 250: Performed by: INTERNAL MEDICINE

## 2024-08-27 PROCEDURE — 63600175 PHARM REV CODE 636 W HCPCS: Mod: JZ,JG | Performed by: INTERNAL MEDICINE

## 2024-08-27 RX ADMIN — HYDROXYZINE HYDROCHLORIDE 25 MG: 25 TABLET ORAL at 08:08

## 2024-08-27 RX ADMIN — FAMOTIDINE 20 MG: 20 TABLET ORAL at 08:08

## 2024-08-27 RX ADMIN — CLINDAMYCIN IN 5 PERCENT DEXTROSE 900 MG: 18 INJECTION, SOLUTION INTRAVENOUS at 12:08

## 2024-08-27 RX ADMIN — HYDROMORPHONE HYDROCHLORIDE 1 MG: 1 INJECTION, SOLUTION INTRAMUSCULAR; INTRAVENOUS; SUBCUTANEOUS at 10:08

## 2024-08-27 RX ADMIN — HYDROMORPHONE HYDROCHLORIDE 1 MG: 1 INJECTION, SOLUTION INTRAMUSCULAR; INTRAVENOUS; SUBCUTANEOUS at 01:08

## 2024-08-27 RX ADMIN — OXYCODONE HYDROCHLORIDE AND ACETAMINOPHEN 1 TABLET: 5; 325 TABLET ORAL at 08:08

## 2024-08-27 RX ADMIN — ENOXAPARIN SODIUM 40 MG: 40 INJECTION SUBCUTANEOUS at 08:08

## 2024-08-27 RX ADMIN — DOCUSATE SODIUM 100 MG: 100 CAPSULE, LIQUID FILLED ORAL at 08:08

## 2024-08-27 RX ADMIN — Medication 4 TABLET: at 08:08

## 2024-08-27 RX ADMIN — HYDROMORPHONE HYDROCHLORIDE 1 MG: 1 INJECTION, SOLUTION INTRAMUSCULAR; INTRAVENOUS; SUBCUTANEOUS at 05:08

## 2024-08-27 RX ADMIN — Medication 4 TABLET: at 04:08

## 2024-08-27 RX ADMIN — OXYCODONE HYDROCHLORIDE AND ACETAMINOPHEN 1 TABLET: 5; 325 TABLET ORAL at 12:08

## 2024-08-27 RX ADMIN — Medication 4 TABLET: at 12:08

## 2024-08-27 RX ADMIN — CLINDAMYCIN IN 5 PERCENT DEXTROSE 900 MG: 18 INJECTION, SOLUTION INTRAVENOUS at 08:08

## 2024-08-27 RX ADMIN — CLINDAMYCIN IN 5 PERCENT DEXTROSE 900 MG: 18 INJECTION, SOLUTION INTRAVENOUS at 05:08

## 2024-08-27 RX ADMIN — FLUCONAZOLE 200 MG: 100 TABLET ORAL at 08:08

## 2024-08-27 NOTE — PLAN OF CARE
Hospital f/u scheduled with PCP for 09/04/2024 @ 10:20 AM. Details added to AVS.        08/27/24 0953   Post-Acute Status   Hospital Resources/Appts/Education Provided Appointments scheduled and added to AVS

## 2024-08-27 NOTE — CARE UPDATE
08/26/24 9230   Patient Assessment/Suction   Level of Consciousness (AVPU) alert   Respiratory Effort Normal;Unlabored   Expansion/Accessory Muscles/Retractions no retractions   All Lung Fields Breath Sounds diminished   Skin Integrity   $ Wound Care Tech Time 15 min   Area Observed Bridge of nose;Left;Right;Cheek   Skin Appearance without discoloration  (pt has a bruise on the bridge of nose from her home cpap pads are being provided to prevent further bruising)   Barrier used? Liquid Filled Cushion   PRE-TX-O2   Device (Oxygen Therapy) CPAP   $ Is the patient on High Flow Oxygen? Yes   Flow (L/min) (Oxygen Therapy) 2  (bled into cpap)   SpO2 97 %   Pulse Oximetry Type Continuous   $ Pulse Oximetry - Multiple Charge Pulse Oximetry - Multiple   Pulse 75   Resp 18   Positioning HOB elevated 30 degrees   Positioning   Head of Bed (HOB) Positioning HOB at 30 degrees   Positioning/Transfer Devices pillows;in use   Preset CPAP/BiPAP Settings   Mode Of Delivery CPAP   $ Initial CPAP/BiPAP Setup? No   $ Is patient using? Yes   Size of Mask Small   Sized Appropriately? Yes   Equipment Type DeVilbiss   Airway Device Type small full face mask   Humidifier not applicable   CPAP (cm H2O) 12   Patient CPAP/BiPAP Settings   CPAP/BIPAP ID [id number]

## 2024-08-27 NOTE — PROGRESS NOTES
Counts include 234 beds at the [facility] Medicine  Progress Note    Patient Name: Adriana Zaragoza  MRN: [id number]  Patient Class: IP- Inpatient   Admission Date: 8/25/2024  Length of Stay: 2 days  Attending Physician: Roel Lopez MD  Primary Care Provider: Melba Trivedi MD        Subjective:     Principal Problem:Sepsis        HPI:  Patient is a 58-year-old female with a past medical history COPD, DM 2, and anemia.  Patient presents to the ED today with complaints of shortness a breath, racing heart, fever and chills starting this morning.  Patient has mass of in her right thigh which she states has become more swollen and red.  Patient has history of recurrent panniculitis.  Patient states that symptoms are similar to previous exacerbation.  Patient does have chronic cough and does report mild increase in wheeze.  Patient was given DuoNeb in the ED and Dilaudid.  Patient states she feels much better now.  Patient found with [place] [address], tachycardic, respirations greater than 20, and cellulitic to abdominal pannus/right thigh.  Patient was given IV vancomycin and Zosyn.  Patient denies current chest pain, shortness of breath, nausea, vomiting, abdominal pain, lower extremity edema, difficulty urinating, and constipation/diarrhea.  Patient will be admitted to hospital medicine for further treatment and evaluation.    In the ED: Procalcitonin less than 0.050, troponin 2.7, BNP 12, phosphorus 2.6, sodium 138, potassium 3.9, glucose 136, creatinine 0.6, magnesium 1.9, WBC 19.24, hemoglobin 12.8.  Influenza and COVID negative.  UA is still pending.  Chest x-ray negative.    Overview/Hospital Course:  Ms. Zaragoza has been monitored closely during her hospitalization. 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 She was empirically started on a zosyn/vanc in the ED and transitioned to  clinda/levaquin on admission. WBC trended down from 19K -->12K-->9K. CRP is 8. Procal and lactate are WNL. She received a 30mL/kg bolus of NS of ideal body weight in ED with improvement of tachycardia. There is no documented hypotension. UA is bland, flu/covid is negative, and CXR with no acute consolidations noted. She reports a small abscess to the right buttock her boyfriend poked and drained earlier this week. She is well known to Dr. Humphreys and has been treated in the past with PO doxy and IV daptomycin. Blood cultures are NGTD. MRSA screen is negative. Will continue with IV clindamycin for now and add PO diflucan and probiotics. Wound care has been consulted. She reports finding a surgeon who will remove the mass, but it will be a complex surgery up to 22 hours, and she does not feel emotional ready for this. Also, she is smoking cigarettes and must stop this to qualify for surgery. She reports an allergic rxn to nicotine patches. She remains afebrile over 24 hours, but lymphedema mass is still erythematous and hot to touch.     Interval History: will continue with IV clinda today, check inflammatory markers in AM. Blood cultures NGTD    Review of Systems   Constitutional:  Positive for diaphoresis and fatigue.   Musculoskeletal:  Positive for myalgias.   Neurological:  Positive for weakness.     Objective:     Vital Signs (Most Recent):  Temp: 100.2 °F (37.9 °C) (08/27/24 1430)  Pulse: 75 (08/27/24 1430)  Resp: 16 (08/27/24 1430)  BP: 125/64 (08/27/24 1430)  SpO2: 99 % (08/27/24 1430) Vital Signs (24h Range):  Temp:  [98.2 °F (36.8 °C)-100.2 °F (37.9 °C)] 100.2 °F (37.9 °C)  Pulse:  [72-87] 75  Resp:  [16-18] 16  SpO2:  [95 %-99 %] 99 %  BP: (112-127)/(53-64) 125/64     Weight: (!) 172.6 kg (380 lb 8.2 oz)  Body mass index is 65.32 kg/m².    Intake/Output Summary (Last 24 hours) at 8/27/2024 1625  Last data filed at 8/27/2024 1100  Gross per 24 hour   Intake 300 ml   Output 775 ml   Net -475 ml          Physical Exam  Vitals and nursing note reviewed.   Constitutional:       Appearance: She is obese.   HENT:      Head: Normocephalic and atraumatic.      Nose: Nose normal.      Mouth/Throat:      Mouth: Mucous membranes are moist.      Pharynx: Oropharynx is clear.   Eyes:      Extraocular Movements: Extraocular movements intact.      Pupils: Pupils are equal, round, and reactive to light.   Cardiovascular:      Rate and Rhythm: Normal rate and regular rhythm.      Pulses: Normal pulses.   Pulmonary:      Effort: Pulmonary effort is normal.      Comments: Diminished in bases may be d/t body habitus  Abdominal:      General: Bowel sounds are normal.      Palpations: Abdomen is soft.   Musculoskeletal:         General: Deformity present.      Cervical back: Normal range of motion and neck supple.      Comments: Bilat lower ext lymphedema. Large pediculated lymphedema mass on thighs   Skin:     General: Skin is warm.      Capillary Refill: Capillary refill takes less than 2 seconds.      Findings: Erythema present.      Comments: Sweaty  Mass is hot to touch erythematous  There is a healing abscess to the right buttock, no fluctuance, mild induration   Neurological:      General: No focal deficit present.      Mental Status: She is alert and oriented to person, place, and time.   Psychiatric:         Mood and Affect: Mood normal.         Behavior: Behavior normal.             Significant Labs: All pertinent labs within the past 24 hours have been reviewed.  CBC:   Recent Labs   Lab 08/26/24  0409 08/27/24  0445   WBC 12.20 9.23   HGB 11.0* 10.5*   HCT 35.5* 34.9*    256     CMP:   Recent Labs   Lab 08/26/24  0409 08/27/24  0445   * 136   K 3.8 4.3    103   CO2 24 27    124*   BUN 12 11   CREATININE 0.6 0.6   CALCIUM 8.0* 8.1*   PROT 6.3 6.3   ALBUMIN 3.3* 3.2*   BILITOT 0.5 0.4   ALKPHOS 57 59   AST 9* 10   ALT 7* 10   ANIONGAP 6* 6*     Urine Studies:   Recent Labs   Lab 08/25/24  5553    COLORU Yellow   APPEARANCEUA Clear   PHUR 8.0   SPECGRAV 1.030   PROTEINUA Negative   GLUCUA Negative   KETONESU Negative   BILIRUBINUA Negative   OCCULTUA Negative   NITRITE Negative   UROBILINOGEN Negative   LEUKOCYTESUR Negative       Significant Imaging: I have reviewed all pertinent imaging results/findings within the past 24 hours.    US Soft Tissue, Lower Extremity, Right    Result Date: 8/26/2024  EXAMINATION: US SOFT TISSUE, LOWER EXTREMITY, RIGHT CLINICAL HISTORY: abscess; TECHNIQUE: Targeted sonographic evaluation is directed at the soft tissues of the medial right thigh. COMPARISON: None FINDINGS: Scattered soft tissue edema is demonstrated.  No localized fluid collection is observed.     Scattered soft tissue edema within the area of clinical concern in the medial right thigh without localized fluid collection. Electronically signed by: Arnold Swift Date:    08/26/2024 Time:    07:04    CT Abdomen Pelvis  Without Contrast    Result Date: 8/25/2024  EXAMINATION: CT ABDOMEN PELVIS WITHOUT CONTRAST CLINICAL HISTORY: Sepsis; TECHNIQUE: Low dose axial images, sagittal and coronal reformations were obtained from the lung bases to the pubic symphysis without IV contrast.  Oral contrast was not administered. COMPARISON: CT 10/21/2022 FINDINGS: Please note image quality is degraded secondary to patient body habitus and streak artifact from the patient's soft tissues abutting the gantry. There is a stable nodular opacity at the right lung base.  There is mild bibasilar atelectasis or scarring.  No significant pleural fluid.  The visualized portions of the heart appear normal. The liver is enlarged.  No focal hepatic abnormality appreciated on this limited noncontrast examination.  No discrete radiopaque calculi identified in the gallbladder lumen.  There is no intra-or extrahepatic biliary ductal dilatation. There is postoperative change of the stomach.  Stomach otherwise appears within normal limits.  There is  fatty involutional change of the pancreas.  Adrenal glands are unremarkable.  Spleen is enlarged. The kidneys are normal in size and location. There is no hydronephrosis.  Possible nonobstructing left-sided nephrolithiasis.  Evaluation of the pelvic contents is limited due to extensive streak artifact.  Bladder is grossly unremarkable although not well evaluated.  Uterus appears to be present and unremarkable. The abdominal aorta is normal in course and caliber without significant atherosclerotic calcifications. There is no evidence to suggest small bowel obstruction.  There is no free intraperitoneal air or ascites.  There is a large anterior abdominal hernia containing mesenteric fat as well as nonobstructed small and large bowel.  The appendix is not definitively visualized. There is large soft tissue pannus arising from the medial aspect of the proximal right thigh.  This demonstrates extensive skin thickening and soft tissue induration suggestive of soft tissue infection/cellulitis.  This appears similar to prior study of 10/21/2022.  No evidence of soft tissue gas. Osseous structures demonstrate degenerative changes.     Large pannus arising from the medial aspect of the proximal right thigh with extensive skin thickening and soft tissue induration suggestive of a nonspecific soft tissue infection/cellulitis.  This appears similar to prior study of 10/21/2022. Large anterior abdominal wall hernia containing nonobstructed small and large bowel. Hepatomegaly. Additional findings as above. Electronically signed by: Bryan Denise MD Date:    08/25/2024 Time:    21:05    X-Ray Chest AP Portable    Result Date: 8/25/2024  EXAMINATION: XR CHEST AP PORTABLE CLINICAL HISTORY: Dyspnea, unspecified TECHNIQUE: Single frontal view of the chest was performed. COMPARISON: 08/11/2024 FINDINGS: Cardiac monitoring leads overlie the chest.  Cardiac silhouette is mildly enlarged.  Lungs demonstrate coarse interstitial  attenuation, similar to prior examination.  Linear subsegmental opacity projecting over the left lung base is favored to reflect atelectasis or scarring.  No new large confluent airspace compared to prior examination.  No definite pneumothorax.  Osseous structures demonstrate degenerative changes.     No significant detrimental change compared to prior study of 08/11/2024. Electronically signed by: Bryan Denise MD Date:    08/25/2024 Time:    16:22      Assessment/Plan:      * Sepsis  Admitted to telemetry  This patient does have evidence of infective focus  My overall impression is sepsis.  Cellulitis with tachycardia up to 134, respirations >20, WBC 19,000  Source: Skin and Soft Tissue (location abdominal)  Chest x-ray negative, COVID and flu negative.  CT of the abdomen c/w cellulitis   UA bland  IV fluids and Antibiotics given-   Antibiotics (72h ago, onward)      Start     Stop Route Frequency Ordered    08/25/24 2030  clindamycin in D5W 900 mg/50 mL IVPB 900 mg         -- IV Every 6 hours (non-standard times) 08/25/24 2030          Latest lactate reviewed-  Recent Labs   Lab 08/25/24  1524 08/25/24  2043   LACTATE  --  1.4   POCLAC 2.03  --          Dependence on nicotine from cigarettes  Dangers of cigarette smoking were reviewed with patient in detail. Patient was Counseled for 3-10 minutes. Nicotine replacement options were discussed. Nicotine replacement was discussed- not prescribed as it is medically contra-indicated - allergy    COPD (chronic obstructive pulmonary disease)  Patient's COPD is controlled currently.  Patient is currently off COPD Pathway.  Duo nebs p.r.n. and monitor respiratory status closely.     Cellulitis  To abdominal pannus/right thigh mass, pt has recurrent history of panniculitis   CT of abdomen and pelvis with no abscesses noted  IV vancomycin and Zosyn --> clinda/levaquin --> clinda (8/26)  Skin assessments, turn patient, inpatient wound care  Monitor vitals and labs      KEESHA  (obstructive sleep apnea)  Continue CPAP qHS and with naps      Chronic low back pain with bilateral sciatica  Continue home pain medication regimen      Candidal intertrigo  diflucan        VTE Risk Mitigation (From admission, onward)           Ordered     enoxaparin injection 40 mg  Every 12 hours         08/26/24 0751     IP VTE HIGH RISK PATIENT  Once         08/25/24 1759     Place sequential compression device  Until discontinued         08/25/24 1759                    Discharge Planning   JONATHAN: 8/28/2024     Code Status: Full Code   Is the patient medically ready for discharge?:     Reason for patient still in hospital (select all that apply): Patient trending condition and Treatment  Discharge Plan A: Home Health                  Ethel Abbott NP  Department of Hospital Medicine   [facility]

## 2024-08-27 NOTE — ASSESSMENT & PLAN NOTE
Admitted to telemetry  This patient does have evidence of infective focus  My overall impression is sepsis.  Cellulitis with tachycardia up to 134, respirations >20, WBC 19,000  Source: Skin and Soft Tissue (location abdominal)  Chest x-ray negative, COVID and flu negative.  CT of the abdomen c/w cellulitis   UA bland  IV fluids and Antibiotics given-   Antibiotics (72h ago, onward)    Start     Stop Route Frequency Ordered    08/25/24 2030  clindamycin in D5W 900 mg/50 mL IVPB 900 mg         -- IV Every 6 hours (non-standard times) 08/25/24 2030        Latest lactate reviewed-  Recent Labs   Lab 08/25/24  1524 08/25/24  2043   LACTATE  --  1.4   POCLAC 2.03  --

## 2024-08-27 NOTE — CARE UPDATE
08/27/24 0812   Patient Assessment/Suction   Level of Consciousness (AVPU) alert   Respiratory Effort Normal;Unlabored   Expansion/Accessory Muscles/Retractions no use of accessory muscles;no retractions   All Lung Fields Breath Sounds Anterior:;diminished;clear   Skin Integrity   $ Wound Care Tech Time 15 min   Area Observed Bridge of nose   Skin Appearance without discoloration  (bruise on bridge of nose)   PRE-TX-O2   Device (Oxygen Therapy) room air  (placed cpap on standby)   $ Is the patient on High Flow Oxygen? Yes   SpO2 95 %   Pulse Oximetry Type Intermittent   $ Pulse Oximetry - Multiple Charge Pulse Oximetry - Multiple   Pulse 82   Resp 18   Aerosol Therapy   $ Aerosol Therapy Charges PRN treatment not required   Respiratory Treatment Status (SVN) PRN treatment not required   Preset CPAP/BiPAP Settings   Mode Of Delivery CPAP;Standby   Education   $ Education 15 min;Other (see comment)

## 2024-08-27 NOTE — NURSING
Nurses Note -- 4 Eyes      8/27/2024   1:01 AM      Skin assessed during: Q Shift Change      [] No Altered Skin Integrity Present    []Prevention Measures Documented      [x] Yes- Altered Skin Integrity Present or Discovered   [] LDA Added if Not in Epic (Describe Wound)   [] New Altered Skin Integrity was Present on Admit and Documented in LDA   [] Wound Image Taken    Wound Care Consulted? Yes, already on case.    Attending Nurse:  Alicia Mcdermott RN/Staff Member:  Yes

## 2024-08-27 NOTE — SUBJECTIVE & OBJECTIVE
Interval History: will continue with IV clinda today, check inflammatory markers in AM. Blood cultures NGTD    Review of Systems   Constitutional:  Positive for diaphoresis and fatigue.   Musculoskeletal:  Positive for myalgias.   Neurological:  Positive for weakness.     Objective:     Vital Signs (Most Recent):  Temp: 100.2 °F (37.9 °C) (08/27/24 1430)  Pulse: 75 (08/27/24 1430)  Resp: 16 (08/27/24 1430)  BP: 125/64 (08/27/24 1430)  SpO2: 99 % (08/27/24 1430) Vital Signs (24h Range):  Temp:  [98.2 °F (36.8 °C)-100.2 °F (37.9 °C)] 100.2 °F (37.9 °C)  Pulse:  [72-87] 75  Resp:  [16-18] 16  SpO2:  [95 %-99 %] 99 %  BP: (112-127)/(53-64) 125/64     Weight: (!) 172.6 kg (380 lb 8.2 oz)  Body mass index is 65.32 kg/m².    Intake/Output Summary (Last 24 hours) at 8/27/2024 1625  Last data filed at 8/27/2024 1100  Gross per 24 hour   Intake 300 ml   Output 775 ml   Net -475 ml         Physical Exam  Vitals and nursing note reviewed.   Constitutional:       Appearance: She is obese.   HENT:      Head: Normocephalic and atraumatic.      Nose: Nose normal.      Mouth/Throat:      Mouth: Mucous membranes are moist.      Pharynx: Oropharynx is clear.   Eyes:      Extraocular Movements: Extraocular movements intact.      Pupils: Pupils are equal, round, and reactive to light.   Cardiovascular:      Rate and Rhythm: Normal rate and regular rhythm.      Pulses: Normal pulses.   Pulmonary:      Effort: Pulmonary effort is normal.      Comments: Diminished in bases may be d/t body habitus  Abdominal:      General: Bowel sounds are normal.      Palpations: Abdomen is soft.   Musculoskeletal:         General: Deformity present.      Cervical back: Normal range of motion and neck supple.      Comments: Bilat lower ext lymphedema. Large pediculated lymphedema mass on thighs   Skin:     General: Skin is warm.      Capillary Refill: Capillary refill takes less than 2 seconds.      Findings: Erythema present.      Comments: Sweaty  Mass  is hot to touch erythematous  There is a healing abscess to the right buttock, no fluctuance, mild induration   Neurological:      General: No focal deficit present.      Mental Status: She is alert and oriented to person, place, and time.   Psychiatric:         Mood and Affect: Mood normal.         Behavior: Behavior normal.             Significant Labs: All pertinent labs within the past 24 hours have been reviewed.  CBC:   Recent Labs   Lab 08/26/24  0409 08/27/24  0445   WBC 12.20 9.23   HGB 11.0* 10.5*   HCT 35.5* 34.9*    256     CMP:   Recent Labs   Lab 08/26/24  0409 08/27/24  0445   * 136   K 3.8 4.3    103   CO2 24 27    124*   BUN 12 11   CREATININE 0.6 0.6   CALCIUM 8.0* 8.1*   PROT 6.3 6.3   ALBUMIN 3.3* 3.2*   BILITOT 0.5 0.4   ALKPHOS 57 59   AST 9* 10   ALT 7* 10   ANIONGAP 6* 6*     Urine Studies:   Recent Labs   Lab 08/25/24  1847   COLORU Yellow   APPEARANCEUA Clear   PHUR 8.0   SPECGRAV 1.030   PROTEINUA Negative   GLUCUA Negative   KETONESU Negative   BILIRUBINUA Negative   OCCULTUA Negative   NITRITE Negative   UROBILINOGEN Negative   LEUKOCYTESUR Negative       Significant Imaging: I have reviewed all pertinent imaging results/findings within the past 24 hours.    US Soft Tissue, Lower Extremity, Right    Result Date: 8/26/2024  EXAMINATION: US SOFT TISSUE, LOWER EXTREMITY, RIGHT CLINICAL HISTORY: abscess; TECHNIQUE: Targeted sonographic evaluation is directed at the soft tissues of the medial right thigh. COMPARISON: None FINDINGS: Scattered soft tissue edema is demonstrated.  No localized fluid collection is observed.     Scattered soft tissue edema within the area of clinical concern in the medial right thigh without localized fluid collection. Electronically signed by: Arnold Swift Date:    08/26/2024 Time:    07:04    CT Abdomen Pelvis  Without Contrast    Result Date: 8/25/2024  EXAMINATION: CT ABDOMEN PELVIS WITHOUT CONTRAST CLINICAL HISTORY: Sepsis; TECHNIQUE:  Low dose axial images, sagittal and coronal reformations were obtained from the lung bases to the pubic symphysis without IV contrast.  Oral contrast was not administered. COMPARISON: CT 10/21/2022 FINDINGS: Please note image quality is degraded secondary to patient body habitus and streak artifact from the patient's soft tissues abutting the gantry. There is a stable nodular opacity at the right lung base.  There is mild bibasilar atelectasis or scarring.  No significant pleural fluid.  The visualized portions of the heart appear normal. The liver is enlarged.  No focal hepatic abnormality appreciated on this limited noncontrast examination.  No discrete radiopaque calculi identified in the gallbladder lumen.  There is no intra-or extrahepatic biliary ductal dilatation. There is postoperative change of the stomach.  Stomach otherwise appears within normal limits.  There is fatty involutional change of the pancreas.  Adrenal glands are unremarkable.  Spleen is enlarged. The kidneys are normal in size and location. There is no hydronephrosis.  Possible nonobstructing left-sided nephrolithiasis.  Evaluation of the pelvic contents is limited due to extensive streak artifact.  Bladder is grossly unremarkable although not well evaluated.  Uterus appears to be present and unremarkable. The abdominal aorta is normal in course and caliber without significant atherosclerotic calcifications. There is no evidence to suggest small bowel obstruction.  There is no free intraperitoneal air or ascites.  There is a large anterior abdominal hernia containing mesenteric fat as well as nonobstructed small and large bowel.  The appendix is not definitively visualized. There is large soft tissue pannus arising from the medial aspect of the proximal right thigh.  This demonstrates extensive skin thickening and soft tissue induration suggestive of soft tissue infection/cellulitis.  This appears similar to prior study of 10/21/2022.  No  evidence of soft tissue gas. Osseous structures demonstrate degenerative changes.     Large pannus arising from the medial aspect of the proximal right thigh with extensive skin thickening and soft tissue induration suggestive of a nonspecific soft tissue infection/cellulitis.  This appears similar to prior study of 10/21/2022. Large anterior abdominal wall hernia containing nonobstructed small and large bowel. Hepatomegaly. Additional findings as above. Electronically signed by: Bryan Denise MD Date:    08/25/2024 Time:    21:05    X-Ray Chest AP Portable    Result Date: 8/25/2024  EXAMINATION: XR CHEST AP PORTABLE CLINICAL HISTORY: Dyspnea, unspecified TECHNIQUE: Single frontal view of the chest was performed. COMPARISON: 08/11/2024 FINDINGS: Cardiac monitoring leads overlie the chest.  Cardiac silhouette is mildly enlarged.  Lungs demonstrate coarse interstitial attenuation, similar to prior examination.  Linear subsegmental opacity projecting over the left lung base is favored to reflect atelectasis or scarring.  No new large confluent airspace compared to prior examination.  No definite pneumothorax.  Osseous structures demonstrate degenerative changes.     No significant detrimental change compared to prior study of 08/11/2024. Electronically signed by: Bryan Denise MD Date:    08/25/2024 Time:    16:22

## 2024-08-28 LAB
ALBUMIN SERPL BCP-MCNC: 3.1 G/DL (ref 3.5–5.2)
ALP SERPL-CCNC: 63 U/L (ref 55–135)
ALT SERPL W/O P-5'-P-CCNC: 12 U/L (ref 10–44)
ANION GAP SERPL CALC-SCNC: 6 MMOL/L (ref 8–16)
AST SERPL-CCNC: 14 U/L (ref 10–40)
BASOPHILS # BLD AUTO: 0.03 K/UL (ref 0–0.2)
BASOPHILS NFR BLD: 0.4 % (ref 0–1.9)
BILIRUB SERPL-MCNC: 0.4 MG/DL (ref 0.1–1)
BUN SERPL-MCNC: 11 MG/DL (ref 6–20)
CALCIUM SERPL-MCNC: 8.2 MG/DL (ref 8.7–10.5)
CHLORIDE SERPL-SCNC: 103 MMOL/L (ref 95–110)
CO2 SERPL-SCNC: 28 MMOL/L (ref 23–29)
CREAT SERPL-MCNC: 0.5 MG/DL (ref 0.5–1.4)
CRP SERPL-MCNC: 10.6 MG/DL
DIFFERENTIAL METHOD BLD: ABNORMAL
EOSINOPHIL # BLD AUTO: 0.1 K/UL (ref 0–0.5)
EOSINOPHIL NFR BLD: 1.5 % (ref 0–8)
ERYTHROCYTE [DISTWIDTH] IN BLOOD BY AUTOMATED COUNT: 15.2 % (ref 11.5–14.5)
EST. GFR  (NO RACE VARIABLE): >60 ML/MIN/1.73 M^2
GLUCOSE SERPL-MCNC: 100 MG/DL (ref 70–110)
HCT VFR BLD AUTO: 35 % (ref 37–48.5)
HGB BLD-MCNC: 10.9 G/DL (ref 12–16)
IMM GRANULOCYTES # BLD AUTO: 0.05 K/UL (ref 0–0.04)
IMM GRANULOCYTES NFR BLD AUTO: 0.6 % (ref 0–0.5)
LYMPHOCYTES # BLD AUTO: 2.3 K/UL (ref 1–4.8)
LYMPHOCYTES NFR BLD: 27.1 % (ref 18–48)
MAGNESIUM SERPL-MCNC: 2 MG/DL (ref 1.6–2.6)
MCH RBC QN AUTO: 27.2 PG (ref 27–31)
MCHC RBC AUTO-ENTMCNC: 31.1 G/DL (ref 32–36)
MCV RBC AUTO: 87 FL (ref 82–98)
MONOCYTES # BLD AUTO: 0.9 K/UL (ref 0.3–1)
MONOCYTES NFR BLD: 10.2 % (ref 4–15)
NEUTROPHILS # BLD AUTO: 5.2 K/UL (ref 1.8–7.7)
NEUTROPHILS NFR BLD: 60.2 % (ref 38–73)
NRBC BLD-RTO: 0 /100 WBC
PLATELET # BLD AUTO: 275 K/UL (ref 150–450)
PMV BLD AUTO: 9.6 FL (ref 9.2–12.9)
POTASSIUM SERPL-SCNC: 4.1 MMOL/L (ref 3.5–5.1)
PROCALCITONIN SERPL IA-MCNC: <0.05 NG/ML (ref 0–0.5)
PROT SERPL-MCNC: 6.3 G/DL (ref 6–8.4)
RBC # BLD AUTO: 4.01 M/UL (ref 4–5.4)
SODIUM SERPL-SCNC: 137 MMOL/L (ref 136–145)
WBC # BLD AUTO: 8.56 K/UL (ref 3.9–12.7)

## 2024-08-28 PROCEDURE — 80053 COMPREHEN METABOLIC PANEL: CPT | Performed by: PHYSICAL THERAPY ASSISTANT

## 2024-08-28 PROCEDURE — 85025 COMPLETE CBC W/AUTO DIFF WBC: CPT | Performed by: PHYSICAL THERAPY ASSISTANT

## 2024-08-28 PROCEDURE — 25000003 PHARM REV CODE 250: Performed by: PHYSICAL THERAPY ASSISTANT

## 2024-08-28 PROCEDURE — 21400001 HC TELEMETRY ROOM

## 2024-08-28 PROCEDURE — 63600175 PHARM REV CODE 636 W HCPCS: Mod: JZ,JG | Performed by: INTERNAL MEDICINE

## 2024-08-28 PROCEDURE — 25000003 PHARM REV CODE 250: Performed by: INTERNAL MEDICINE

## 2024-08-28 PROCEDURE — 25000003 PHARM REV CODE 250: Performed by: NURSE PRACTITIONER

## 2024-08-28 PROCEDURE — 83735 ASSAY OF MAGNESIUM: CPT | Performed by: PHYSICAL THERAPY ASSISTANT

## 2024-08-28 PROCEDURE — 36415 COLL VENOUS BLD VENIPUNCTURE: CPT | Performed by: NURSE PRACTITIONER

## 2024-08-28 PROCEDURE — 99900031 HC PATIENT EDUCATION (STAT)

## 2024-08-28 PROCEDURE — 86140 C-REACTIVE PROTEIN: CPT | Performed by: NURSE PRACTITIONER

## 2024-08-28 PROCEDURE — 63700000 PHARM REV CODE 250 ALT 637 W/O HCPCS: Performed by: NURSE PRACTITIONER

## 2024-08-28 PROCEDURE — 84145 PROCALCITONIN (PCT): CPT | Performed by: NURSE PRACTITIONER

## 2024-08-28 PROCEDURE — 99900035 HC TECH TIME PER 15 MIN (STAT)

## 2024-08-28 PROCEDURE — 94761 N-INVAS EAR/PLS OXIMETRY MLT: CPT

## 2024-08-28 PROCEDURE — 63600175 PHARM REV CODE 636 W HCPCS: Performed by: PHYSICAL THERAPY ASSISTANT

## 2024-08-28 PROCEDURE — 63600175 PHARM REV CODE 636 W HCPCS: Performed by: HOSPITALIST

## 2024-08-28 RX ADMIN — HYDROMORPHONE HYDROCHLORIDE 1 MG: 1 INJECTION, SOLUTION INTRAMUSCULAR; INTRAVENOUS; SUBCUTANEOUS at 04:08

## 2024-08-28 RX ADMIN — CLINDAMYCIN IN 5 PERCENT DEXTROSE 900 MG: 18 INJECTION, SOLUTION INTRAVENOUS at 08:08

## 2024-08-28 RX ADMIN — FLUCONAZOLE 200 MG: 100 TABLET ORAL at 09:08

## 2024-08-28 RX ADMIN — Medication 4 TABLET: at 05:08

## 2024-08-28 RX ADMIN — CLINDAMYCIN IN 5 PERCENT DEXTROSE 900 MG: 18 INJECTION, SOLUTION INTRAVENOUS at 12:08

## 2024-08-28 RX ADMIN — HYDROMORPHONE HYDROCHLORIDE 1 MG: 1 INJECTION, SOLUTION INTRAMUSCULAR; INTRAVENOUS; SUBCUTANEOUS at 09:08

## 2024-08-28 RX ADMIN — FAMOTIDINE 20 MG: 20 TABLET ORAL at 08:08

## 2024-08-28 RX ADMIN — HYDROMORPHONE HYDROCHLORIDE 1 MG: 1 INJECTION, SOLUTION INTRAMUSCULAR; INTRAVENOUS; SUBCUTANEOUS at 01:08

## 2024-08-28 RX ADMIN — DOCUSATE SODIUM 100 MG: 100 CAPSULE, LIQUID FILLED ORAL at 09:08

## 2024-08-28 RX ADMIN — ENOXAPARIN SODIUM 40 MG: 40 INJECTION SUBCUTANEOUS at 09:08

## 2024-08-28 RX ADMIN — FAMOTIDINE 20 MG: 20 TABLET ORAL at 09:08

## 2024-08-28 RX ADMIN — CLINDAMYCIN IN 5 PERCENT DEXTROSE 900 MG: 18 INJECTION, SOLUTION INTRAVENOUS at 02:08

## 2024-08-28 RX ADMIN — DOCUSATE SODIUM 100 MG: 100 CAPSULE, LIQUID FILLED ORAL at 08:08

## 2024-08-28 RX ADMIN — HYDROXYZINE HYDROCHLORIDE 25 MG: 25 TABLET ORAL at 08:08

## 2024-08-28 RX ADMIN — CLINDAMYCIN IN 5 PERCENT DEXTROSE 900 MG: 18 INJECTION, SOLUTION INTRAVENOUS at 05:08

## 2024-08-28 RX ADMIN — Medication 4 TABLET: at 09:08

## 2024-08-28 RX ADMIN — Medication 4 TABLET: at 12:08

## 2024-08-28 RX ADMIN — ENOXAPARIN SODIUM 40 MG: 40 INJECTION SUBCUTANEOUS at 08:08

## 2024-08-28 RX ADMIN — OXYCODONE HYDROCHLORIDE AND ACETAMINOPHEN 1 TABLET: 5; 325 TABLET ORAL at 02:08

## 2024-08-28 RX ADMIN — HYDROXYZINE HYDROCHLORIDE 25 MG: 25 TABLET ORAL at 09:08

## 2024-08-28 RX ADMIN — HYDROMORPHONE HYDROCHLORIDE 1 MG: 1 INJECTION, SOLUTION INTRAMUSCULAR; INTRAVENOUS; SUBCUTANEOUS at 08:08

## 2024-08-28 NOTE — ASSESSMENT & PLAN NOTE
To abdominal pannus/right thigh mass, pt has recurrent history of panniculitis   CT of abdomen and pelvis with no abscesses noted  IV vancomycin and Zosyn --> clinda/levaquin --> clinda (8/26)  Skin assessments, turn patient, inpatient wound care  Monitor vitals and labs  MRSA negative

## 2024-08-28 NOTE — SUBJECTIVE & OBJECTIVE
Interval History:   Patient seen and examined.  Patient in no acute distress.  Lymphedema mass continues to be arrhythmias and warm to touch.  CRP elevated.  Continue IV antibiotics.  Review of Systems   Constitutional:  Positive for diaphoresis and fatigue. Negative for chills and fever.   Gastrointestinal:  Negative for diarrhea, nausea and vomiting.   Musculoskeletal:  Positive for myalgias.     Objective:     Vital Signs (Most Recent):  Temp: 97.7 °F (36.5 °C) (08/28/24 1119)  Pulse: 70 (08/28/24 1119)  Resp: 20 (08/28/24 1119)  BP: (!) 103/55 (map 76) (08/28/24 1119)  SpO2: 99 % (08/28/24 1119) Vital Signs (24h Range):  Temp:  [97.7 °F (36.5 °C)-100.2 °F (37.9 °C)] 97.7 °F (36.5 °C)  Pulse:  [65-76] 70  Resp:  [16-20] 20  SpO2:  [96 %-100 %] 99 %  BP: (103-125)/(46-64) 103/55     Weight: (!) 170.9 kg (376 lb 12.3 oz)  Body mass index is 64.67 kg/m².    Intake/Output Summary (Last 24 hours) at 8/28/2024 1144  Last data filed at 8/28/2024 0537  Gross per 24 hour   Intake 480 ml   Output 1300 ml   Net -820 ml         Physical Exam  Vitals and nursing note reviewed.   Constitutional:       Appearance: Normal appearance. She is well-developed. She is obese.   HENT:      Head: Normocephalic and atraumatic.      Nose: Nose normal.      Mouth/Throat:      Mouth: Mucous membranes are moist.      Pharynx: Oropharynx is clear.   Eyes:      Extraocular Movements: Extraocular movements intact.      Conjunctiva/sclera: Conjunctivae normal.      Pupils: Pupils are equal, round, and reactive to light.   Neck:      Thyroid: No thyromegaly.      Vascular: No JVD.   Cardiovascular:      Rate and Rhythm: Normal rate and regular rhythm.      Pulses: Normal pulses.      Heart sounds: No murmur heard.     No friction rub. No gallop.   Pulmonary:      Effort: Pulmonary effort is normal.      Comments: Lung sounds diminished  Abdominal:      General: Bowel sounds are normal. There is no distension.      Palpations: Abdomen is soft.  There is no mass.      Tenderness: There is no abdominal tenderness.   Musculoskeletal:         General: Deformity present. Normal range of motion.      Cervical back: Normal range of motion and neck supple.      Comments: Lymphedema mass warm to touch   Skin:     General: Skin is warm and dry.      Capillary Refill: Capillary refill takes less than 2 seconds.      Findings: Erythema present.      Comments: Mass is warm to touch erythematous  There is a healing abscess to the right buttock, no fluctuance, mild induration   Neurological:      General: No focal deficit present.      Mental Status: She is alert and oriented to person, place, and time.      Cranial Nerves: No cranial nerve deficit.   Psychiatric:         Mood and Affect: Mood normal.         Behavior: Behavior normal.             Significant Labs: All pertinent labs within the past 24 hours have been reviewed.  Recent Lab Results         08/28/24  0404        Procalcitonin <0.050  Comment: The Procalcitonin test is intended to aid in the risk assessment of   critically ill patients on their first day of ICU admission for   progression   to severe sepsis and septic shock.    A result of <0.50 ng/mL is associated with a low risk of severe   sepsis   and/or septic shock.  This does not exclude localized infection.    A result between 0.50 ng/mL and 2.0 ng/mL should be interpreted with   consideration of the patient's history and is recommended to retest   within 6   to 24 hours.        A result of >2.0 ng/mL is associated with a high risk of severe   sepsis   and/or septic shock.    Procalcitonin may not be accurate among patients with   localized  infection, recent trauma or major surgery, immunosuppressed   state,  invasive fungal infection, renal dysfunction. Decisions   regarding  initiation or continuation of antibiotic therapy should not be   based  solely on procalcitonin levels.         Albumin 3.1       ALP 63       ALT 12       Anion Gap 6        AST 14       Baso # 0.03       Basophil % 0.4       BILIRUBIN TOTAL 0.4  Comment: For infants and newborns, interpretation of results should be based  on gestational age, weight and in agreement with clinical  observations.    Premature Infant recommended reference ranges:  Up to 24 hours.............<8.0 mg/dL  Up to 48 hours............<12.0 mg/dL  3-5 days..................<15.0 mg/dL  6-29 days.................<15.0 mg/dL         BUN 11       Calcium 8.2       Chloride 103       CO2 28       Creatinine 0.5       CRP 10.60  Comment: CRP-Normal Application expected values:   <1.0        mg/dL   Normal Range  1.0 - 5.0  mg/dL   Indicates mild inflammation  5.0 - 10.0 mg/dL   Indicates severe inflammation  >10.0        mg/dL   Represents serious processes and   frequently         indicates the presence of a bacterial   infection.          Differential Method Automated       eGFR >60.0       Eos # 0.1       Eos % 1.5       Glucose 100       Gran # (ANC) 5.2       Gran % 60.2       Hematocrit 35.0       Hemoglobin 10.9       Immature Grans (Abs) 0.05  Comment: Mild elevation in immature granulocytes is non specific and   can be seen in a variety of conditions including stress response,   acute inflammation, trauma and pregnancy. Correlation with other   laboratory and clinical findings is essential.         Immature Granulocytes 0.6       Lymph # 2.3       Lymph % 27.1       Magnesium  2.0       MCH 27.2       MCHC 31.1       MCV 87       Mono # 0.9       Mono % 10.2       MPV 9.6       nRBC 0       Platelet Count 275       Potassium 4.1       PROTEIN TOTAL 6.3       RBC 4.01       RDW 15.2       Sodium 137       WBC 8.56               Significant Imaging: I have reviewed all pertinent imaging results/findings within the past 24 hours.  Imaging Results              CT Abdomen Pelvis  Without Contrast (Final result)  Result time 08/25/24 21:05:05      Final result by Bryan Denise MD (08/25/24 21:05:05)                    Impression:      Large pannus arising from the medial aspect of the proximal right thigh with extensive skin thickening and soft tissue induration suggestive of a nonspecific soft tissue infection/cellulitis.  This appears similar to prior study of 10/21/2022.    Large anterior abdominal wall hernia containing nonobstructed small and large bowel.    Hepatomegaly.    Additional findings as above.      Electronically signed by: Bryan Denise MD  Date:    08/25/2024  Time:    21:05               Narrative:    EXAMINATION:  CT ABDOMEN PELVIS WITHOUT CONTRAST    CLINICAL HISTORY:  Sepsis;    TECHNIQUE:  Low dose axial images, sagittal and coronal reformations were obtained from the lung bases to the pubic symphysis without IV contrast.  Oral contrast was not administered.    COMPARISON:  CT 10/21/2022    FINDINGS:  Please note image quality is degraded secondary to patient body habitus and streak artifact from the patient's soft tissues abutting the gantry.    There is a stable nodular opacity at the right lung base.  There is mild bibasilar atelectasis or scarring.  No significant pleural fluid.  The visualized portions of the heart appear normal.    The liver is enlarged.  No focal hepatic abnormality appreciated on this limited noncontrast examination.  No discrete radiopaque calculi identified in the gallbladder lumen.  There is no intra-or extrahepatic biliary ductal dilatation.    There is postoperative change of the stomach.  Stomach otherwise appears within normal limits.  There is fatty involutional change of the pancreas.  Adrenal glands are unremarkable.  Spleen is enlarged.    The kidneys are normal in size and location. There is no hydronephrosis.  Possible nonobstructing left-sided nephrolithiasis.  Evaluation of the pelvic contents is limited due to extensive streak artifact.  Bladder is grossly unremarkable although not well evaluated.  Uterus appears to be present and unremarkable.    The  abdominal aorta is normal in course and caliber without significant atherosclerotic calcifications.    There is no evidence to suggest small bowel obstruction.  There is no free intraperitoneal air or ascites.  There is a large anterior abdominal hernia containing mesenteric fat as well as nonobstructed small and large bowel.  The appendix is not definitively visualized.    There is large soft tissue pannus arising from the medial aspect of the proximal right thigh.  This demonstrates extensive skin thickening and soft tissue induration suggestive of soft tissue infection/cellulitis.  This appears similar to prior study of 10/21/2022.  No evidence of soft tissue gas.    Osseous structures demonstrate degenerative changes.                                       X-Ray Chest AP Portable (Final result)  Result time 08/25/24 16:22:33      Final result by Bryan Denise MD (08/25/24 16:22:33)                   Impression:      No significant detrimental change compared to prior study of 08/11/2024.      Electronically signed by: Bryan Denise MD  Date:    08/25/2024  Time:    16:22               Narrative:    EXAMINATION:  XR CHEST AP PORTABLE    CLINICAL HISTORY:  Dyspnea, unspecified    TECHNIQUE:  Single frontal view of the chest was performed.    COMPARISON:  08/11/2024    FINDINGS:  Cardiac monitoring leads overlie the chest.  Cardiac silhouette is mildly enlarged.  Lungs demonstrate coarse interstitial attenuation, similar to prior examination.  Linear subsegmental opacity projecting over the left lung base is favored to reflect atelectasis or scarring.  No new large confluent airspace compared to prior examination.  No definite pneumothorax.  Osseous structures demonstrate degenerative changes.

## 2024-08-28 NOTE — PROGRESS NOTES
[facility] Medicine  Progress Note    Patient Name: Adriana Zaragoza  MRN: [id number]  Patient Class: IP- Inpatient   Admission Date: 8/25/2024  Length of Stay: 3 days  Attending Physician: Roel Lopez MD  Primary Care Provider: Melba Trivedi MD        Subjective:     Principal Problem:Sepsis        HPI:  Patient is a 58-year-old female with a past medical history COPD, DM 2, and anemia.  Patient presents to the ED today with complaints of shortness a breath, racing heart, fever and chills starting this morning.  Patient has mass of in her right thigh which she states has become more swollen and red.  Patient has history of recurrent panniculitis.  Patient states that symptoms are similar to previous exacerbation.  Patient does have chronic cough and does report mild increase in wheeze.  Patient was given DuoNeb in the ED and Dilaudid.  Patient states she feels much better now.  Patient found [place] [address], tachycardic, respirations greater than 20, and cellulitic to abdominal pannus/right thigh.  Patient was given IV vancomycin and Zosyn.  Patient denies current chest pain, shortness of breath, nausea, vomiting, abdominal pain, lower extremity edema, difficulty urinating, and constipation/diarrhea.  Patient will be admitted to hospital medicine for further treatment and evaluation.    In the ED: Procalcitonin less than 0.050, troponin 2.7, BNP 12, phosphorus 2.6, sodium 138, potassium 3.9, glucose 136, creatinine 0.6, magnesium 1.9, WBC 19.24, hemoglobin 12.8.  Influenza and COVID negative.  UA is still pending.  Chest x-ray negative.    Overview/Hospital Course:  Ms. Zaragoza has been monitored closely during her hospitalization. 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 She was empirically started on a zosyn/vanc in the ED and transitioned to  clinda/levaquin on admission. WBC trended down from 19K -->12K-->9K. CRP is 8. Procal and lactate are WNL. She received a 30mL/kg bolus of NS of ideal body weight in ED with improvement of tachycardia. There is no documented hypotension. UA is bland, flu/covid is negative, and CXR with no acute consolidations noted. She reports a small abscess to the right buttock her boyfriend poked and drained earlier this week. She is well known to Dr. Humphreys and has been treated in the past with PO doxy and IV daptomycin. Blood cultures are NGTD. MRSA screen is negative. Will continue with IV clindamycin for now and add PO diflucan and probiotics. Wound care has been consulted. She reports finding a surgeon who will remove the mass, but it will be a complex surgery up to 22 hours, and she does not feel emotional ready for this. Also, she is smoking cigarettes and must stop this to qualify for surgery. She reports an allergic rxn to nicotine patches. She remains afebrile over 24 hours, but lymphedema mass is still erythematous and hot to touch.     Interval History:   Patient seen and examined.  Patient in no acute distress.  Lymphedema mass continues to be arrhythmias and warm to touch.  CRP elevated.  Continue IV antibiotics.  Review of Systems   Constitutional:  Positive for diaphoresis and fatigue. Negative for chills and fever.   Gastrointestinal:  Negative for diarrhea, nausea and vomiting.   Musculoskeletal:  Positive for myalgias.     Objective:     Vital Signs (Most Recent):  Temp: 97.7 °F (36.5 °C) (08/28/24 1119)  Pulse: 70 (08/28/24 1119)  Resp: 20 (08/28/24 1119)  BP: (!) 103/55 (map 76) (08/28/24 1119)  SpO2: 99 % (08/28/24 1119) Vital Signs (24h Range):  Temp:  [97.7 °F (36.5 °C)-100.2 °F (37.9 °C)] 97.7 °F (36.5 °C)  Pulse:  [65-76] 70  Resp:  [16-20] 20  SpO2:  [96 %-100 %] 99 %  BP: (103-125)/(46-64) 103/55     Weight: (!) 170.9 kg (376 lb 12.3 oz)  Body mass index is 64.67 kg/m².    Intake/Output Summary (Last  24 hours) at 8/28/2024 1144  Last data filed at 8/28/2024 0537  Gross per 24 hour   Intake 480 ml   Output 1300 ml   Net -820 ml         Physical Exam  Vitals and nursing note reviewed.   Constitutional:       Appearance: Normal appearance. She is well-developed. She is obese.   HENT:      Head: Normocephalic and atraumatic.      Nose: Nose normal.      Mouth/Throat:      Mouth: Mucous membranes are moist.      Pharynx: Oropharynx is clear.   Eyes:      Extraocular Movements: Extraocular movements intact.      Conjunctiva/sclera: Conjunctivae normal.      Pupils: Pupils are equal, round, and reactive to light.   Neck:      Thyroid: No thyromegaly.      Vascular: No JVD.   Cardiovascular:      Rate and Rhythm: Normal rate and regular rhythm.      Pulses: Normal pulses.      Heart sounds: No murmur heard.     No friction rub. No gallop.   Pulmonary:      Effort: Pulmonary effort is normal.      Comments: Lung sounds diminished  Abdominal:      General: Bowel sounds are normal. There is no distension.      Palpations: Abdomen is soft. There is no mass.      Tenderness: There is no abdominal tenderness.   Musculoskeletal:         General: Deformity present. Normal range of motion.      Cervical back: Normal range of motion and neck supple.      Comments: Lymphedema mass warm to touch   Skin:     General: Skin is warm and dry.      Capillary Refill: Capillary refill takes less than 2 seconds.      Findings: Erythema present.      Comments: Mass is warm to touch erythematous  There is a healing abscess to the right buttock, no fluctuance, mild induration   Neurological:      General: No focal deficit present.      Mental Status: She is alert and oriented to person, place, and time.      Cranial Nerves: No cranial nerve deficit.   Psychiatric:         Mood and Affect: Mood normal.         Behavior: Behavior normal.             Significant Labs: All pertinent labs within the past 24 hours have been reviewed.  Recent Lab  Results         08/28/24  0404        Procalcitonin <0.050  Comment: The Procalcitonin test is intended to aid in the risk assessment of   critically ill patients on their first day of ICU admission for   progression   to severe sepsis and septic shock.    A result of <0.50 ng/mL is associated with a low risk of severe   sepsis   and/or septic shock.  This does not exclude localized infection.    A result between 0.50 ng/mL and 2.0 ng/mL should be interpreted with   consideration of the patient's history and is recommended to retest   within 6   to 24 hours.        A result of >2.0 ng/mL is associated with a high risk of severe   sepsis   and/or septic shock.    Procalcitonin may not be accurate among patients with   localized  infection, recent trauma or major surgery, immunosuppressed   state,  invasive fungal infection, renal dysfunction. Decisions   regarding  initiation or continuation of antibiotic therapy should not be   based  solely on procalcitonin levels.         Albumin 3.1       ALP 63       ALT 12       Anion Gap 6       AST 14       Baso # 0.03       Basophil % 0.4       BILIRUBIN TOTAL 0.4  Comment: For infants and newborns, interpretation of results should be based  on gestational age, weight and in agreement with clinical  observations.    Premature Infant recommended reference ranges:  Up to 24 hours.............<8.0 mg/dL  Up to 48 hours............<12.0 mg/dL  3-5 days..................<15.0 mg/dL  6-29 days.................<15.0 mg/dL         BUN 11       Calcium 8.2       Chloride 103       CO2 28       Creatinine 0.5       CRP 10.60  Comment: CRP-Normal Application expected values:   <1.0        mg/dL   Normal Range  1.0 - 5.0  mg/dL   Indicates mild inflammation  5.0 - 10.0 mg/dL   Indicates severe inflammation  >10.0        mg/dL   Represents serious processes and   frequently         indicates the presence of a bacterial   infection.          Differential Method Automated       eGFR  >60.0       Eos # 0.1       Eos % 1.5       Glucose 100       Gran # (ANC) 5.2       Gran % 60.2       Hematocrit 35.0       Hemoglobin 10.9       Immature Grans (Abs) 0.05  Comment: Mild elevation in immature granulocytes is non specific and   can be seen in a variety of conditions including stress response,   acute inflammation, trauma and pregnancy. Correlation with other   laboratory and clinical findings is essential.         Immature Granulocytes 0.6       Lymph # 2.3       Lymph % 27.1       Magnesium  2.0       MCH 27.2       MCHC 31.1       MCV 87       Mono # 0.9       Mono % 10.2       MPV 9.6       nRBC 0       Platelet Count 275       Potassium 4.1       PROTEIN TOTAL 6.3       RBC 4.01       RDW 15.2       Sodium 137       WBC 8.56               Significant Imaging: I have reviewed all pertinent imaging results/findings within the past 24 hours.  Imaging Results              CT Abdomen Pelvis  Without Contrast (Final result)  Result time 08/25/24 21:05:05      Final result by Bryan Denise MD (08/25/24 21:05:05)                   Impression:      Large pannus arising from the medial aspect of the proximal right thigh with extensive skin thickening and soft tissue induration suggestive of a nonspecific soft tissue infection/cellulitis.  This appears similar to prior study of 10/21/2022.    Large anterior abdominal wall hernia containing nonobstructed small and large bowel.    Hepatomegaly.    Additional findings as above.      Electronically signed by: Bryan Denise MD  Date:    08/25/2024  Time:    21:05               Narrative:    EXAMINATION:  CT ABDOMEN PELVIS WITHOUT CONTRAST    CLINICAL HISTORY:  Sepsis;    TECHNIQUE:  Low dose axial images, sagittal and coronal reformations were obtained from the lung bases to the pubic symphysis without IV contrast.  Oral contrast was not administered.    COMPARISON:  CT 10/21/2022    FINDINGS:  Please note image quality is degraded secondary to patient  body habitus and streak artifact from the patient's soft tissues abutting the gantry.    There is a stable nodular opacity at the right lung base.  There is mild bibasilar atelectasis or scarring.  No significant pleural fluid.  The visualized portions of the heart appear normal.    The liver is enlarged.  No focal hepatic abnormality appreciated on this limited noncontrast examination.  No discrete radiopaque calculi identified in the gallbladder lumen.  There is no intra-or extrahepatic biliary ductal dilatation.    There is postoperative change of the stomach.  Stomach otherwise appears within normal limits.  There is fatty involutional change of the pancreas.  Adrenal glands are unremarkable.  Spleen is enlarged.    The kidneys are normal in size and location. There is no hydronephrosis.  Possible nonobstructing left-sided nephrolithiasis.  Evaluation of the pelvic contents is limited due to extensive streak artifact.  Bladder is grossly unremarkable although not well evaluated.  Uterus appears to be present and unremarkable.    The abdominal aorta is normal in course and caliber without significant atherosclerotic calcifications.    There is no evidence to suggest small bowel obstruction.  There is no free intraperitoneal air or ascites.  There is a large anterior abdominal hernia containing mesenteric fat as well as nonobstructed small and large bowel.  The appendix is not definitively visualized.    There is large soft tissue pannus arising from the medial aspect of the proximal right thigh.  This demonstrates extensive skin thickening and soft tissue induration suggestive of soft tissue infection/cellulitis.  This appears similar to prior study of 10/21/2022.  No evidence of soft tissue gas.    Osseous structures demonstrate degenerative changes.                                       X-Ray Chest AP Portable (Final result)  Result time 08/25/24 16:22:33      Final result by Bryan Denise MD (08/25/24  16:22:33)                   Impression:      No significant detrimental change compared to prior study of 08/11/2024.      Electronically signed by: Bryan Denise MD  Date:    08/25/2024  Time:    16:22               Narrative:    EXAMINATION:  XR CHEST AP PORTABLE    CLINICAL HISTORY:  Dyspnea, unspecified    TECHNIQUE:  Single frontal view of the chest was performed.    COMPARISON:  08/11/2024    FINDINGS:  Cardiac monitoring leads overlie the chest.  Cardiac silhouette is mildly enlarged.  Lungs demonstrate coarse interstitial attenuation, similar to prior examination.  Linear subsegmental opacity projecting over the left lung base is favored to reflect atelectasis or scarring.  No new large confluent airspace compared to prior examination.  No definite pneumothorax.  Osseous structures demonstrate degenerative changes.                                       Assessment/Plan:      * Sepsis  Admitted to telemetry  This patient does have evidence of infective focus  My overall impression is sepsis.  Cellulitis with tachycardia up to 134, respirations >20, WBC 19,000  Source: Skin and Soft Tissue (location abdominal)  Chest x-ray negative, COVID and flu negative.  CT of the abdomen c/w cellulitis   UA bland  IV fluids and Antibiotics given-   Antibiotics (72h ago, onward)      Start     Stop Route Frequency Ordered    08/25/24 2030  clindamycin in D5W 900 mg/50 mL IVPB 900 mg         -- IV Every 6 hours (non-standard times) 08/25/24 2030          Latest lactate reviewed-  Recent Labs   Lab 08/25/24  1524 08/25/24  2043   LACTATE  --  1.4   POCLAC 2.03  --          Dependence on nicotine from cigarettes  Dangers of cigarette smoking were reviewed with patient in detail. Patient was Counseled for 3-10 minutes. Nicotine replacement options were discussed. Nicotine replacement was discussed- not prescribed as it is medically contra-indicated - allergy    COPD (chronic obstructive pulmonary disease)  Patient's COPD is  controlled currently.  Patient is currently off COPD Pathway.  Duo nebs p.r.n. and monitor respiratory status closely.     Cellulitis  To abdominal pannus/right thigh mass, pt has recurrent history of panniculitis   CT of abdomen and pelvis with no abscesses noted  IV vancomycin and Zosyn --> clinda/levaquin --> clinda (8/26)  Skin assessments, turn patient, inpatient wound care  Monitor vitals and labs  MRSA negative    KEESHA (obstructive sleep apnea)  Continue CPAP qHS and with naps      Chronic low back pain with bilateral sciatica  Continue home pain medication regimen  P.r.n. analgesics  Encourage ambulation      Candidal intertrigo  Continue diflucan        VTE Risk Mitigation (From admission, onward)           Ordered     enoxaparin injection 40 mg  Every 12 hours         08/26/24 0751     IP VTE HIGH RISK PATIENT  Once         08/25/24 1759     Place sequential compression device  Until discontinued         08/25/24 1759                    Discharge Planning   JONATHAN: 8/30/2024     Code Status: Full Code   Is the patient medically ready for discharge?:     Reason for patient still in hospital (select all that apply): Patient trending condition and Treatment  Discharge Plan A: Home Health                  Brenda Farris NP  Department of Hospital Medicine   [facility]

## 2024-08-28 NOTE — CARE UPDATE
08/27/24 2100   Patient Assessment/Suction   Level of Consciousness (AVPU) alert   Respiratory Effort Normal;Unlabored   Expansion/Accessory Muscles/Retractions no use of accessory muscles   All Lung Fields Breath Sounds diminished   Rhythm/Pattern, Respiratory assisted mechanically   Cough Frequency no cough   Skin Integrity   $ Wound Care Tech Time 15 min   Area Observed Bridge of nose   Skin Appearance without discoloration  (bruise is looking somewhat better)   Barrier used? Liquid Filled Cushion   PRE-TX-O2   Device (Oxygen Therapy) CPAP   $ Is the patient on High Flow Oxygen? Yes   Flow (L/min) (Oxygen Therapy) 2   SpO2 98 %   Pulse Oximetry Type Continuous   $ Pulse Oximetry - Multiple Charge Pulse Oximetry - Multiple   Pulse 72   Resp 18   Positioning HOB elevated 30 degrees   Positioning   Head of Bed (HOB) Positioning HOB at 30-45 degrees   Positioning/Transfer Devices pillows;in use   Aerosol Therapy   $ Aerosol Therapy Charges PRN treatment not required   Preset CPAP/BiPAP Settings   Mode Of Delivery CPAP   $ Initial CPAP/BiPAP Setup? No   $ Is patient using? Yes   Size of Mask Small   Sized Appropriately? Yes   Equipment Type DeVilbiss   Airway Device Type small full face mask   Humidifier not applicable   CPAP (cm H2O) 12   Patient CPAP/BiPAP Settings   CPAP/BIPAP ID 3   Education   $ Education Other (see comment);Oxygen;15 min  (cpap)

## 2024-08-29 VITALS
TEMPERATURE: 98 F | HEIGHT: 64 IN | SYSTOLIC BLOOD PRESSURE: 110 MMHG | OXYGEN SATURATION: 95 % | HEART RATE: 72 BPM | DIASTOLIC BLOOD PRESSURE: 59 MMHG | BODY MASS INDEX: 50.02 KG/M2 | WEIGHT: 293 LBS | RESPIRATION RATE: 18 BRPM

## 2024-08-29 LAB
ALBUMIN SERPL BCP-MCNC: 3.1 G/DL (ref 3.5–5.2)
ALP SERPL-CCNC: 68 U/L (ref 55–135)
ALT SERPL W/O P-5'-P-CCNC: 15 U/L (ref 10–44)
ANION GAP SERPL CALC-SCNC: 7 MMOL/L (ref 8–16)
AST SERPL-CCNC: 15 U/L (ref 10–40)
BASOPHILS # BLD AUTO: 0.02 K/UL (ref 0–0.2)
BASOPHILS NFR BLD: 0.2 % (ref 0–1.9)
BILIRUB SERPL-MCNC: 0.3 MG/DL (ref 0.1–1)
BUN SERPL-MCNC: 10 MG/DL (ref 6–20)
CALCIUM SERPL-MCNC: 8.3 MG/DL (ref 8.7–10.5)
CHLORIDE SERPL-SCNC: 102 MMOL/L (ref 95–110)
CO2 SERPL-SCNC: 28 MMOL/L (ref 23–29)
CREAT SERPL-MCNC: 0.6 MG/DL (ref 0.5–1.4)
DIFFERENTIAL METHOD BLD: ABNORMAL
EOSINOPHIL # BLD AUTO: 0.2 K/UL (ref 0–0.5)
EOSINOPHIL NFR BLD: 2 % (ref 0–8)
ERYTHROCYTE [DISTWIDTH] IN BLOOD BY AUTOMATED COUNT: 14.8 % (ref 11.5–14.5)
EST. GFR  (NO RACE VARIABLE): >60 ML/MIN/1.73 M^2
GLUCOSE SERPL-MCNC: 111 MG/DL (ref 70–110)
HCT VFR BLD AUTO: 34.4 % (ref 37–48.5)
HGB BLD-MCNC: 10.6 G/DL (ref 12–16)
IMM GRANULOCYTES # BLD AUTO: 0.04 K/UL (ref 0–0.04)
IMM GRANULOCYTES NFR BLD AUTO: 0.5 % (ref 0–0.5)
LYMPHOCYTES # BLD AUTO: 2.3 K/UL (ref 1–4.8)
LYMPHOCYTES NFR BLD: 28 % (ref 18–48)
MAGNESIUM SERPL-MCNC: 2 MG/DL (ref 1.6–2.6)
MCH RBC QN AUTO: 26.4 PG (ref 27–31)
MCHC RBC AUTO-ENTMCNC: 30.8 G/DL (ref 32–36)
MCV RBC AUTO: 86 FL (ref 82–98)
MONOCYTES # BLD AUTO: 0.7 K/UL (ref 0.3–1)
MONOCYTES NFR BLD: 9.1 % (ref 4–15)
NEUTROPHILS # BLD AUTO: 4.9 K/UL (ref 1.8–7.7)
NEUTROPHILS NFR BLD: 60.2 % (ref 38–73)
NRBC BLD-RTO: 0 /100 WBC
PLATELET # BLD AUTO: 308 K/UL (ref 150–450)
PMV BLD AUTO: 9.2 FL (ref 9.2–12.9)
POTASSIUM SERPL-SCNC: 4 MMOL/L (ref 3.5–5.1)
PROT SERPL-MCNC: 6.4 G/DL (ref 6–8.4)
RBC # BLD AUTO: 4.01 M/UL (ref 4–5.4)
SODIUM SERPL-SCNC: 137 MMOL/L (ref 136–145)
WBC # BLD AUTO: 8.14 K/UL (ref 3.9–12.7)

## 2024-08-29 PROCEDURE — 25000003 PHARM REV CODE 250: Performed by: NURSE PRACTITIONER

## 2024-08-29 PROCEDURE — 80053 COMPREHEN METABOLIC PANEL: CPT | Performed by: PHYSICAL THERAPY ASSISTANT

## 2024-08-29 PROCEDURE — 99900031 HC PATIENT EDUCATION (STAT)

## 2024-08-29 PROCEDURE — 99900035 HC TECH TIME PER 15 MIN (STAT)

## 2024-08-29 PROCEDURE — 25000003 PHARM REV CODE 250: Performed by: PHYSICAL THERAPY ASSISTANT

## 2024-08-29 PROCEDURE — 27100171 HC OXYGEN HIGH FLOW UP TO 24 HOURS

## 2024-08-29 PROCEDURE — 63700000 PHARM REV CODE 250 ALT 637 W/O HCPCS: Performed by: NURSE PRACTITIONER

## 2024-08-29 PROCEDURE — 25000003 PHARM REV CODE 250: Performed by: INTERNAL MEDICINE

## 2024-08-29 PROCEDURE — 94660 CPAP INITIATION&MGMT: CPT

## 2024-08-29 PROCEDURE — 94761 N-INVAS EAR/PLS OXIMETRY MLT: CPT

## 2024-08-29 PROCEDURE — 85025 COMPLETE CBC W/AUTO DIFF WBC: CPT | Performed by: PHYSICAL THERAPY ASSISTANT

## 2024-08-29 PROCEDURE — 63600175 PHARM REV CODE 636 W HCPCS: Mod: JZ,JG | Performed by: INTERNAL MEDICINE

## 2024-08-29 PROCEDURE — 83735 ASSAY OF MAGNESIUM: CPT | Performed by: PHYSICAL THERAPY ASSISTANT

## 2024-08-29 PROCEDURE — 36415 COLL VENOUS BLD VENIPUNCTURE: CPT | Performed by: PHYSICAL THERAPY ASSISTANT

## 2024-08-29 PROCEDURE — 63600175 PHARM REV CODE 636 W HCPCS: Performed by: PHYSICAL THERAPY ASSISTANT

## 2024-08-29 RX ORDER — FLUCONAZOLE 200 MG/1
200 TABLET ORAL DAILY
Qty: 3 TABLET | Refills: 0 | Status: SHIPPED | OUTPATIENT
Start: 2024-08-29 | End: 2024-09-01

## 2024-08-29 RX ORDER — CLINDAMYCIN HYDROCHLORIDE 300 MG/1
300 CAPSULE ORAL EVERY 6 HOURS
Qty: 12 CAPSULE | Refills: 0 | Status: SHIPPED | OUTPATIENT
Start: 2024-08-29 | End: 2024-09-01

## 2024-08-29 RX ORDER — FAMOTIDINE 20 MG/1
20 TABLET, FILM COATED ORAL 2 TIMES DAILY
Qty: 60 TABLET | Refills: 11 | Status: SHIPPED | OUTPATIENT
Start: 2024-08-29 | End: 2025-08-29

## 2024-08-29 RX ADMIN — FLUCONAZOLE 200 MG: 100 TABLET ORAL at 11:08

## 2024-08-29 RX ADMIN — CLINDAMYCIN IN 5 PERCENT DEXTROSE 900 MG: 18 INJECTION, SOLUTION INTRAVENOUS at 02:08

## 2024-08-29 RX ADMIN — Medication 4 TABLET: at 11:08

## 2024-08-29 RX ADMIN — OXYCODONE HYDROCHLORIDE AND ACETAMINOPHEN 1 TABLET: 5; 325 TABLET ORAL at 07:08

## 2024-08-29 RX ADMIN — HYDROMORPHONE HYDROCHLORIDE 1 MG: 1 INJECTION, SOLUTION INTRAMUSCULAR; INTRAVENOUS; SUBCUTANEOUS at 12:08

## 2024-08-29 RX ADMIN — DOCUSATE SODIUM 100 MG: 100 CAPSULE, LIQUID FILLED ORAL at 11:08

## 2024-08-29 RX ADMIN — FAMOTIDINE 20 MG: 20 TABLET ORAL at 11:08

## 2024-08-29 RX ADMIN — ACETAMINOPHEN 650 MG: 325 TABLET ORAL at 02:08

## 2024-08-29 NOTE — PLAN OF CARE
DC orders and chart reviewed. No discharge needs noted.  Patient cleared for discharge from .    Patient is discharging to home.  Follow up appointment is scheduled and added to AVS.  Patient was recently discharged from [facility] services and states she will be ok without home health again at discharge.  Significant other will transport home.         08/29/24 0930   Final Note   Assessment Type Final Discharge Note   Anticipated Discharge Disposition Home   What phone number can be called within the next 1-3 days to see how you are doing after discharge? [phone]   Hospital Resources/Appts/Education Provided Appointments scheduled and added to AVS   Post-Acute Status   Discharge Delays None known at this time

## 2024-08-29 NOTE — NURSING
Discharge instructions given to patient. Patient verbalized understanding of follow up appointments and medication changes. PIV removed from left arm without difficulty, catheter tip intact. Tele removed and returned to monitor room. Patient discharged home via private vehicle.

## 2024-08-29 NOTE — CARE UPDATE
08/29/24 0757   Patient Assessment/Suction   Level of Consciousness (AVPU) alert   Respiratory Effort Normal;Unlabored   Expansion/Accessory Muscles/Retractions no use of accessory muscles;no retractions;expansion symmetric   All Lung Fields Breath Sounds clear   Rhythm/Pattern, Respiratory unlabored;pattern regular;depth regular;no shortness of breath reported   Cough Frequency no cough   PRE-TX-O2   Device (Oxygen Therapy) CPAP   $ Is the patient on High Flow Oxygen? Yes   Flow (L/min) (Oxygen Therapy) 2   SpO2 99 %   Pulse Oximetry Type Intermittent   $ Pulse Oximetry - Multiple Charge Pulse Oximetry - Multiple   Pulse 78   Resp 18   Aerosol Therapy   $ Aerosol Therapy Charges PRN treatment not required   Daily Review of Necessity (SVN) completed   Respiratory Treatment Status (SVN) PRN treatment not required   Preset CPAP/BiPAP Settings   Mode Of Delivery CPAP   $ CPAP/BiPAP Daily Charge BiPAP/CPAP Daily   $ Is patient using? Yes   Size of Mask Small   Sized Appropriately? Yes   Equipment Type DeVilbiss   Airway Device Type small full face mask   Humidifier not applicable   CPAP (cm H2O) 12   Patient CPAP/BiPAP Settings   CPAP/BIPAP ID 3   RR Total (Breaths/Min) 22   Tidal Volume (mL) 600   VE Minute Ventilation (L/min) 15 L/min   Peak Inspiratory Pressure (cm H2O) 10   Education   $ Education BiPAP;15 min

## 2024-08-29 NOTE — PLAN OF CARE
Problem: Adult Inpatient Plan of Care  Goal: Plan of Care Review  Outcome: Met  Goal: Patient-Specific Goal (Individualized)  Outcome: Met  Goal: Absence of Hospital-Acquired Illness or Injury  Outcome: Met  Goal: Optimal Comfort and Wellbeing  Outcome: Met  Goal: Readiness for Transition of Care  Outcome: Met     Problem: Bariatric Environmental Safety  Goal: Safety Maintained with Care  Outcome: Met     Problem: Sepsis/Septic Shock  Goal: Optimal Coping  Outcome: Met  Goal: Absence of Bleeding  Outcome: Met  Goal: Blood Glucose Level Within Targeted Range  Outcome: Met  Goal: Absence of Infection Signs and Symptoms  Outcome: Met  Goal: Optimal Nutrition Intake  Outcome: Met     Problem: Fall Injury Risk  Goal: Absence of Fall and Fall-Related Injury  Outcome: Met     Problem: Skin Injury Risk Increased  Goal: Skin Health and Integrity  Outcome: Met     Problem: Wound  Goal: Optimal Coping  Outcome: Met  Goal: Optimal Functional Ability  Outcome: Met  Goal: Absence of Infection Signs and Symptoms  Outcome: Met  Goal: Improved Oral Intake  Outcome: Met  Goal: Optimal Pain Control and Function  Outcome: Met  Goal: Skin Health and Integrity  Outcome: Met  Goal: Optimal Wound Healing  Outcome: Met

## 2024-08-29 NOTE — DISCHARGE SUMMARY
[facility] Medicine  Discharge Summary      Patient Name: Adriana Zaragoza  MRN: [id number]  MINI: [id number]  Patient Class: IP- Inpatient  Admission Date: 8/25/2024  Hospital Length of Stay: 4 days  Discharge Date and Time: 8/29/2024  1:00 PM  Attending Physician: No att. providers found   Discharging Provider: Brenda Farris NP  Primary Care Provider: Melba Trivedi MD    Primary Care Team: Networked reference to record PCT     HPI:   Patient is a 58-year-old female with a past medical history COPD, DM 2, and anemia.  Patient presents to the ED today with complaints of shortness a breath, racing heart, fever and chills starting this morning.  Patient has mass of in her right thigh which she states has become more swollen and red.  Patient has history of recurrent panniculitis.  Patient states that symptoms are similar to previous exacerbation.  Patient does have chronic cough and does report mild increase in wheeze.  Patient was given DuoNeb in the ED and Dilaudid.  Patient states she feels much better now.  Patient found [place] [address], tachycardic, respirations greater than 20, and cellulitic to abdominal pannus/right thigh.  Patient was given IV vancomycin and Zosyn.  Patient denies current chest pain, shortness of breath, nausea, vomiting, abdominal pain, lower extremity edema, difficulty urinating, and constipation/diarrhea.  Patient will be admitted to hospital medicine for further treatment and evaluation.    In the ED: Procalcitonin less than 0.050, troponin 2.7, BNP 12, phosphorus 2.6, sodium 138, potassium 3.9, glucose 136, creatinine 0.6, magnesium 1.9, WBC 19.24, hemoglobin 12.8.  Influenza and COVID negative.  UA is still pending.  Chest x-ray negative.    * No surgery found *      Hospital Course:   Ms. Zaragoza has been monitored closely during her hospitalization. She was admitted on 8/25/24 with sepsis d/t recurrent panniculitis/cellulitis of  her thigh and large pedunculated lymphadematous mass from the thigh. She was empirically started on a zosyn/vanc in the ED and transitioned to clinda/levaquin on admission. WBC trended down from 19K -->12K-->9K. CRP is 8. Procal and lactate are WNL. She received a 30mL/kg bolus of NS of ideal body weight in ED with improvement of tachycardia. There is no documented hypotension. UA is bland, flu/covid is negative, and CXR with no acute consolidations noted. She reports a small abscess to the right buttock her boyfriend poked and drained earlier this week. She is well known to Dr. Humphreys and has been treated in the past with PO doxy and IV daptomycin. Blood cultures are NGTD. MRSA screen is negative. Will continue with IV clindamycin for now and add PO diflucan and probiotics. Wound care has been consulted. She reports finding a surgeon who will remove the mass, but it will be a complex surgery up to 22 hours, and she does not feel emotional ready for this. Also, she is smoking cigarettes and must stop this to qualify for surgery. She reports an allergic rxn to nicotine patches. She remained afebrile over 24 hours. Lymphedema mass with improved erythematous.  Patient ambulated in room with no difficulty.  Case management following for discharge planning.  Patient was recently followed by home health the patient's but declined re-certification.    Patient seen and examined on day of discharge.  Patient deemed appropriate for discharge.  Patient educated on discharge planning, she verbalized understanding.  Patient was discharged home on p.o. antibiotics, probiotic, antifungal.  Medications were sent to patient's preferred pharmacy.  Patient is to follow with primary care provider 9/4.       Goals of Care Treatment Preferences:  Code Status: Full Code      SDOH Screening:  The patient was screened for utility difficulties, food insecurity, transport difficulties, housing insecurity, and interpersonal safety and there  were no concerns identified this admission.     Consults:     No new Assessment & Plan notes have been filed under this hospital service since the last note was generated.  Service: Hospital Medicine    Final Active Diagnoses:    Diagnosis Date Noted POA    PRINCIPAL PROBLEM:  Sepsis [A41.9] 08/25/2024 Yes    Dependence on nicotine from cigarettes [F17.210] 04/02/2024 Yes    Cellulitis [L03.90] 02/05/2024 Yes    KEESHA (obstructive sleep apnea) [G47.33] 06/14/2023 Yes    Chronic low back pain with bilateral sciatica [M54.41, M54.42, G89.29] 01/31/2018 Yes    Candidal intertrigo [B37.2] 01/31/2018 Yes    COPD (chronic obstructive pulmonary disease) [J44.9] 01/05/2016 Yes      Problems Resolved During this Admission:       Discharged Condition: stable    Disposition: Home or Self Care    Follow Up:   Follow-up Information       Melba Trivedi MD. Go on 9/4/2024.    Specialties: Hospitalist, Internal Medicine  Why: hospital f/u @ 10:20 AM  Contact information:  [address]  [address]  [facility] [address]  [phone]                           Patient Instructions:   No discharge procedures on file.    Significant Diagnostic Studies: Labs: CMP   Recent Labs   Lab 08/28/24  0404 08/29/24  0435    137   K 4.1 4.0    102   CO2 28 28    111*   BUN 11 10   CREATININE 0.5 0.6   CALCIUM 8.2* 8.3*   PROT 6.3 6.4   ALBUMIN 3.1* 3.1*   BILITOT 0.4 0.3   ALKPHOS 63 68   AST 14 15   ALT 12 15   ANIONGAP 6* 7*   , CBC   Recent Labs   Lab 08/28/24  0404 08/29/24  0435   WBC 8.56 8.14   HGB 10.9* 10.6*   HCT 35.0* 34.4*    308   , A1C:   Recent Labs   Lab 08/26/24  0409   HGBA1C 5.7   , and All labs within the past 24 hours have been reviewed  Microbiology Results (last 7 days)       Procedure Component Value Units Date/Time    Blood culture x two cultures. Draw prior to antibiotics [[id number]] Collected: 08/25/24 1546    Order Status: Completed Specimen: Blood from Peripheral, Hand, Right  Updated: 08/28/24 1632     Blood Culture, Routine No Growth to date      No Growth to date      No Growth to date      No Growth to date    Narrative:      Aerobic and anaerobic    Blood culture x two cultures. Draw prior to antibiotics [[id number]] Collected: 08/25/24 1523    Order Status: Completed Specimen: Blood from Peripheral, Hand, Left Updated: 08/28/24 1632     Blood Culture, Routine No Growth to date      No Growth to date      No Growth to date      No Growth to date    Narrative:      Aerobic and anaerobic    MRSA Screen by PCR [[id number]] Collected: 08/26/24 0925    Order Status: Completed Specimen: Nasal Swab Updated: 08/26/24 1228     MRSA SCREEN BY PCR Negative            Pending Diagnostic Studies:       None           Medications:  Reconciled Home Medications:      Medication List        START taking these medications      clindamycin 300 MG capsule  Commonly known as: CLEOCIN  Take 1 capsule (300 mg total) by mouth every 6 (six) hours. for 3 days     famotidine 20 MG tablet  Commonly known as: PEPCID  Take 1 tablet (20 mg total) by mouth 2 (two) times daily.     fluconazole 200 MG Tab  Commonly known as: DIFLUCAN  Take 1 tablet (200 mg total) by mouth once daily. for 3 days     Lactobacillus acidophilus 1 billion cell Cap  Take 1 capsule by mouth once daily. for 7 days            CONTINUE taking these medications      acetaminophen 650 MG Tbsr  Commonly known as: TYLENOL  Take 650 mg by mouth every 8 (eight) hours.     albuterol 90 mcg/actuation inhaler  Commonly known as: PROVENTIL/VENTOLIN HFA  Inhale 2 puffs into the lungs every 6 (six) hours as needed for Wheezing or Shortness of Breath.     buPROPion 150 MG TB24 tablet  Commonly known as: WELLBUTRIN XL  Take 1 tablet (150 mg total) by mouth once daily.     docusate sodium 100 MG capsule  Commonly known as: COLACE  Take 1 capsule (100 mg total) by mouth 2 (two) times daily.     ferrous sulfate 324 mg (65 mg iron) Tbec  Take 324 mg by mouth  every 7 days.     hydrOXYzine HCL 25 MG tablet  Commonly known as: ATARAX  Take 25 mg by mouth 3 (three) times daily.     nicotine 21 mg/24 hr  Commonly known as: NICODERM CQ  Place 1 patch onto the skin once daily.     oxyCODONE-acetaminophen 5-325 mg per tablet  Commonly known as: PERCOCET  Take 1 tablet by mouth every 6 (six) hours as needed.     OZEMPIC 0.25 mg or 0.5 mg (2 mg/3 mL) pen injector  Generic drug: semaglutide  Inject 0.5 mg into the skin every 7 days.            ASK your doctor about these medications      clindamycin 1 % lotion  Commonly known as: CLEOCIN T  Apply topically 2 (two) times daily.     oxyCODONE 5 MG immediate release tablet  Commonly known as: ROXICODONE  Take 1 tablet (5 mg total) by mouth every 4 (four) hours as needed for Pain.              Indwelling Lines/Drains at time of discharge:   Lines/Drains/Airways       None                   Time spent on the discharge of patient: 35 minutes         Brenda Farris NP  Department of Hospital Medicine  [facility]

## 2024-08-29 NOTE — DISCHARGE INSTRUCTIONS
Review attached patient instructions.  Take all medications prescribed.  Keep all follow up appointments.

## 2024-08-29 NOTE — CARE UPDATE
08/28/24 1959   Patient Assessment/Suction   Level of Consciousness (AVPU) alert   Respiratory Effort Normal   Expansion/Accessory Muscles/Retractions no use of accessory muscles   All Lung Fields Breath Sounds clear   Skin Integrity   $ Wound Care Tech Time 15 min   Area Observed Bridge of nose   Skin Appearance without discoloration   PRE-TX-O2   Device (Oxygen Therapy) CPAP   Flow (L/min) (Oxygen Therapy) 2   SpO2 98 %   Pulse Oximetry Type Intermittent   $ Pulse Oximetry - Multiple Charge Pulse Oximetry - Multiple   Pulse 79   Resp 19   Aerosol Therapy   $ Aerosol Therapy Charges PRN treatment not required   Preset CPAP/BiPAP Settings   Mode Of Delivery CPAP   $ Is patient using? Yes   Size of Mask Small   Sized Appropriately? Yes   Equipment Type DeVilbiss   Airway Device Type small full face mask   CPAP (cm H2O) 12   Patient CPAP/BiPAP Settings   RR Total (Breaths/Min) 22   Tidal Volume (mL) 600   VE Minute Ventilation (L/min) 15 L/min   Peak Inspiratory Pressure (cm H2O) 10   Total Leak (L/Min) 20   Education   $ Education BiPAP;15 min   Respiratory Evaluation   $ Care Plan Tech Time 15 min

## 2024-08-30 LAB
BACTERIA BLD CULT: NORMAL
BACTERIA BLD CULT: NORMAL

## 2024-08-31 LAB
OHS QRS DURATION: 80 MS
OHS QTC CALCULATION: 394 MS

## 2024-09-25 ENCOUNTER — HOSPITAL ENCOUNTER (INPATIENT)
Facility: HOSPITAL | Age: 59
LOS: 3 days | Discharge: HOME | DRG: 872 | End: 2024-09-29
Attending: EMERGENCY MEDICINE | Admitting: STUDENT IN AN ORGANIZED HEALTH CARE EDUCATION/TRAINING PROGRAM
Payer: MEDICARE

## 2024-09-25 DIAGNOSIS — R51.9 HEADACHE: ICD-10-CM

## 2024-09-25 DIAGNOSIS — L03.90 CELLULITIS, UNSPECIFIED CELLULITIS SITE: ICD-10-CM

## 2024-09-25 DIAGNOSIS — N61.0 CELLULITIS OF RIGHT BREAST: ICD-10-CM

## 2024-09-25 DIAGNOSIS — M79.3 PANNICULITIS: Primary | ICD-10-CM

## 2024-09-25 DIAGNOSIS — R50.9 FEVER, UNSPECIFIED FEVER CAUSE: ICD-10-CM

## 2024-09-25 LAB
BASOPHILS # BLD AUTO: 0.06 K/UL (ref 0–0.2)
BASOPHILS NFR BLD: 0.5 % (ref 0–1.9)
DIFFERENTIAL METHOD BLD: ABNORMAL
EOSINOPHIL # BLD AUTO: 0.1 K/UL (ref 0–0.5)
EOSINOPHIL NFR BLD: 0.4 % (ref 0–8)
ERYTHROCYTE [DISTWIDTH] IN BLOOD BY AUTOMATED COUNT: 15.5 % (ref 11.5–14.5)
HCT VFR BLD AUTO: 40.4 % (ref 37–48.5)
HGB BLD-MCNC: 12.6 G/DL (ref 12–16)
IMM GRANULOCYTES # BLD AUTO: 0.04 K/UL (ref 0–0.04)
IMM GRANULOCYTES NFR BLD AUTO: 0.3 % (ref 0–0.5)
LDH SERPL L TO P-CCNC: 1.23 MMOL/L (ref 0.5–2.2)
LYMPHOCYTES # BLD AUTO: 1.2 K/UL (ref 1–4.8)
LYMPHOCYTES NFR BLD: 9 % (ref 18–48)
MCH RBC QN AUTO: 27.3 PG (ref 27–31)
MCHC RBC AUTO-ENTMCNC: 31.2 G/DL (ref 32–36)
MCV RBC AUTO: 88 FL (ref 82–98)
MONOCYTES # BLD AUTO: 0.7 K/UL (ref 0.3–1)
MONOCYTES NFR BLD: 5.5 % (ref 4–15)
NEUTROPHILS # BLD AUTO: 11 K/UL (ref 1.8–7.7)
NEUTROPHILS NFR BLD: 84.3 % (ref 38–73)
NRBC BLD-RTO: 0 /100 WBC
PLATELET # BLD AUTO: 259 K/UL (ref 150–450)
PMV BLD AUTO: 9 FL (ref 9.2–12.9)
RBC # BLD AUTO: 4.61 M/UL (ref 4–5.4)
SAMPLE: NORMAL
WBC # BLD AUTO: 13.08 K/UL (ref 3.9–12.7)

## 2024-09-25 PROCEDURE — 96361 HYDRATE IV INFUSION ADD-ON: CPT

## 2024-09-25 PROCEDURE — 36415 COLL VENOUS BLD VENIPUNCTURE: CPT

## 2024-09-25 PROCEDURE — 96365 THER/PROPH/DIAG IV INF INIT: CPT

## 2024-09-25 PROCEDURE — 86140 C-REACTIVE PROTEIN: CPT

## 2024-09-25 PROCEDURE — 84100 ASSAY OF PHOSPHORUS: CPT

## 2024-09-25 PROCEDURE — 80053 COMPREHEN METABOLIC PANEL: CPT

## 2024-09-25 PROCEDURE — 93010 ELECTROCARDIOGRAM REPORT: CPT | Mod: ,,, | Performed by: INTERNAL MEDICINE

## 2024-09-25 PROCEDURE — 25000003 PHARM REV CODE 250: Performed by: EMERGENCY MEDICINE

## 2024-09-25 PROCEDURE — 83735 ASSAY OF MAGNESIUM: CPT

## 2024-09-25 PROCEDURE — 87040 BLOOD CULTURE FOR BACTERIA: CPT

## 2024-09-25 PROCEDURE — 93005 ELECTROCARDIOGRAM TRACING: CPT | Performed by: INTERNAL MEDICINE

## 2024-09-25 PROCEDURE — 84145 PROCALCITONIN (PCT): CPT

## 2024-09-25 PROCEDURE — 99285 EMERGENCY DEPT VISIT HI MDM: CPT | Mod: 25

## 2024-09-25 PROCEDURE — 85025 COMPLETE CBC W/AUTO DIFF WBC: CPT

## 2024-09-25 PROCEDURE — 63600175 PHARM REV CODE 636 W HCPCS: Performed by: EMERGENCY MEDICINE

## 2024-09-25 RX ORDER — VANCOMYCIN HCL IN 5 % DEXTROSE 1G/250ML
1000 PLASTIC BAG, INJECTION (ML) INTRAVENOUS ONCE
Status: COMPLETED | OUTPATIENT
Start: 2024-09-26 | End: 2024-09-26

## 2024-09-25 RX ORDER — MORPHINE SULFATE 4 MG/ML
4 INJECTION, SOLUTION INTRAMUSCULAR; INTRAVENOUS
Status: COMPLETED | OUTPATIENT
Start: 2024-09-26 | End: 2024-09-26

## 2024-09-25 RX ORDER — ACETAMINOPHEN 500 MG
1000 TABLET ORAL
Status: COMPLETED | OUTPATIENT
Start: 2024-09-25 | End: 2024-09-25

## 2024-09-25 RX ADMIN — ACETAMINOPHEN 1000 MG: 500 TABLET ORAL at 11:09

## 2024-09-25 RX ADMIN — SODIUM CHLORIDE, POTASSIUM CHLORIDE, SODIUM LACTATE AND CALCIUM CHLORIDE 1000 ML: 600; 310; 30; 20 INJECTION, SOLUTION INTRAVENOUS at 11:09

## 2024-09-25 RX ADMIN — MEROPENEM 2 G: 1 INJECTION, POWDER, FOR SOLUTION INTRAVENOUS at 11:09

## 2024-09-26 LAB
ALBUMIN SERPL BCP-MCNC: 3.9 G/DL (ref 3.5–5.2)
ALP SERPL-CCNC: 68 U/L (ref 55–135)
ALT SERPL W/O P-5'-P-CCNC: 9 U/L (ref 10–44)
ANION GAP SERPL CALC-SCNC: 10 MMOL/L (ref 8–16)
AST SERPL-CCNC: 12 U/L (ref 10–40)
BILIRUB SERPL-MCNC: 0.4 MG/DL (ref 0.1–1)
BILIRUB UR QL STRIP: NEGATIVE
BUN SERPL-MCNC: 12 MG/DL (ref 6–20)
CALCIUM SERPL-MCNC: 8.9 MG/DL (ref 8.7–10.5)
CHLORIDE SERPL-SCNC: 103 MMOL/L (ref 95–110)
CLARITY UR: CLEAR
CO2 SERPL-SCNC: 22 MMOL/L (ref 23–29)
COLOR UR: YELLOW
CREAT SERPL-MCNC: 0.7 MG/DL (ref 0.5–1.4)
CRP SERPL-MCNC: 1.3 MG/DL
EST. GFR  (NO RACE VARIABLE): >60 ML/MIN/1.73 M^2
GLUCOSE SERPL-MCNC: 112 MG/DL (ref 70–110)
GLUCOSE UR QL STRIP: NEGATIVE
HGB UR QL STRIP: NEGATIVE
INFLUENZA A, MOLECULAR: NEGATIVE
INFLUENZA B, MOLECULAR: NEGATIVE
KETONES UR QL STRIP: NEGATIVE
LEUKOCYTE ESTERASE UR QL STRIP: NEGATIVE
MAGNESIUM SERPL-MCNC: 1.8 MG/DL (ref 1.6–2.6)
NITRITE UR QL STRIP: NEGATIVE
PH UR STRIP: >8 [PH] (ref 5–8)
PHOSPHATE SERPL-MCNC: 3.2 MG/DL (ref 2.7–4.5)
POCT GLUCOSE: 120 MG/DL (ref 70–110)
POCT GLUCOSE: 127 MG/DL (ref 70–110)
POCT GLUCOSE: 135 MG/DL (ref 70–110)
POTASSIUM SERPL-SCNC: 4.1 MMOL/L (ref 3.5–5.1)
PROCALCITONIN SERPL IA-MCNC: <0.05 NG/ML (ref 0–0.5)
PROT SERPL-MCNC: 7.6 G/DL (ref 6–8.4)
PROT UR QL STRIP: ABNORMAL
SARS-COV-2 RDRP RESP QL NAA+PROBE: NEGATIVE
SODIUM SERPL-SCNC: 135 MMOL/L (ref 136–145)
SP GR UR STRIP: 1.02 (ref 1–1.03)
SPECIMEN SOURCE: NORMAL
URN SPEC COLLECT METH UR: ABNORMAL
UROBILINOGEN UR STRIP-ACNC: ABNORMAL EU/DL

## 2024-09-26 PROCEDURE — 25000003 PHARM REV CODE 250: Performed by: INTERNAL MEDICINE

## 2024-09-26 PROCEDURE — U0002 COVID-19 LAB TEST NON-CDC: HCPCS | Performed by: STUDENT IN AN ORGANIZED HEALTH CARE EDUCATION/TRAINING PROGRAM

## 2024-09-26 PROCEDURE — 96375 TX/PRO/DX INJ NEW DRUG ADDON: CPT

## 2024-09-26 PROCEDURE — 99900035 HC TECH TIME PER 15 MIN (STAT)

## 2024-09-26 PROCEDURE — 36415 COLL VENOUS BLD VENIPUNCTURE: CPT

## 2024-09-26 PROCEDURE — 96376 TX/PRO/DX INJ SAME DRUG ADON: CPT

## 2024-09-26 PROCEDURE — 25000003 PHARM REV CODE 250: Performed by: EMERGENCY MEDICINE

## 2024-09-26 PROCEDURE — 63600175 PHARM REV CODE 636 W HCPCS: Performed by: STUDENT IN AN ORGANIZED HEALTH CARE EDUCATION/TRAINING PROGRAM

## 2024-09-26 PROCEDURE — 63600175 PHARM REV CODE 636 W HCPCS: Performed by: INTERNAL MEDICINE

## 2024-09-26 PROCEDURE — 25000003 PHARM REV CODE 250: Performed by: STUDENT IN AN ORGANIZED HEALTH CARE EDUCATION/TRAINING PROGRAM

## 2024-09-26 PROCEDURE — 87502 INFLUENZA DNA AMP PROBE: CPT | Performed by: STUDENT IN AN ORGANIZED HEALTH CARE EDUCATION/TRAINING PROGRAM

## 2024-09-26 PROCEDURE — 96366 THER/PROPH/DIAG IV INF ADDON: CPT

## 2024-09-26 PROCEDURE — 94799 UNLISTED PULMONARY SVC/PX: CPT

## 2024-09-26 PROCEDURE — 12000002 HC ACUTE/MED SURGE SEMI-PRIVATE ROOM

## 2024-09-26 PROCEDURE — 94761 N-INVAS EAR/PLS OXIMETRY MLT: CPT

## 2024-09-26 PROCEDURE — 96367 TX/PROPH/DG ADDL SEQ IV INF: CPT

## 2024-09-26 PROCEDURE — 63600175 PHARM REV CODE 636 W HCPCS: Performed by: EMERGENCY MEDICINE

## 2024-09-26 PROCEDURE — 99900031 HC PATIENT EDUCATION (STAT)

## 2024-09-26 PROCEDURE — 82962 GLUCOSE BLOOD TEST: CPT

## 2024-09-26 PROCEDURE — 99223 1ST HOSP IP/OBS HIGH 75: CPT | Mod: ,,, | Performed by: INTERNAL MEDICINE

## 2024-09-26 PROCEDURE — 94660 CPAP INITIATION&MGMT: CPT

## 2024-09-26 PROCEDURE — 81003 URINALYSIS AUTO W/O SCOPE: CPT

## 2024-09-26 PROCEDURE — 96361 HYDRATE IV INFUSION ADD-ON: CPT

## 2024-09-26 RX ORDER — MORPHINE SULFATE 2 MG/ML
2 INJECTION, SOLUTION INTRAMUSCULAR; INTRAVENOUS EVERY 6 HOURS PRN
Status: DISCONTINUED | OUTPATIENT
Start: 2024-09-26 | End: 2024-09-29 | Stop reason: HOSPADM

## 2024-09-26 RX ORDER — IBUPROFEN 200 MG
24 TABLET ORAL
Status: DISCONTINUED | OUTPATIENT
Start: 2024-09-26 | End: 2024-09-29 | Stop reason: HOSPADM

## 2024-09-26 RX ORDER — ALUMINUM HYDROXIDE, MAGNESIUM HYDROXIDE, AND SIMETHICONE 1200; 120; 1200 MG/30ML; MG/30ML; MG/30ML
30 SUSPENSION ORAL 4 TIMES DAILY PRN
Status: DISCONTINUED | OUTPATIENT
Start: 2024-09-26 | End: 2024-09-29 | Stop reason: HOSPADM

## 2024-09-26 RX ORDER — IBUPROFEN 200 MG
16 TABLET ORAL
Status: DISCONTINUED | OUTPATIENT
Start: 2024-09-26 | End: 2024-09-29 | Stop reason: HOSPADM

## 2024-09-26 RX ORDER — GLUCAGON 1 MG
1 KIT INJECTION
Status: DISCONTINUED | OUTPATIENT
Start: 2024-09-26 | End: 2024-09-29 | Stop reason: HOSPADM

## 2024-09-26 RX ORDER — SODIUM CHLORIDE, SODIUM LACTATE, POTASSIUM CHLORIDE, CALCIUM CHLORIDE 600; 310; 30; 20 MG/100ML; MG/100ML; MG/100ML; MG/100ML
INJECTION, SOLUTION INTRAVENOUS CONTINUOUS
Status: ACTIVE | OUTPATIENT
Start: 2024-09-26 | End: 2024-09-26

## 2024-09-26 RX ORDER — AMOXICILLIN 250 MG
1 CAPSULE ORAL 2 TIMES DAILY
Status: DISCONTINUED | OUTPATIENT
Start: 2024-09-26 | End: 2024-09-29 | Stop reason: HOSPADM

## 2024-09-26 RX ORDER — LANOLIN ALCOHOL/MO/W.PET/CERES
800 CREAM (GRAM) TOPICAL
Status: DISCONTINUED | OUTPATIENT
Start: 2024-09-26 | End: 2024-09-29 | Stop reason: HOSPADM

## 2024-09-26 RX ORDER — TIRZEPATIDE 2.5 MG/.5ML
2.5 INJECTION, SOLUTION SUBCUTANEOUS
COMMUNITY
Start: 2024-09-04

## 2024-09-26 RX ORDER — SODIUM,POTASSIUM PHOSPHATES 280-250MG
2 POWDER IN PACKET (EA) ORAL
Status: DISCONTINUED | OUTPATIENT
Start: 2024-09-26 | End: 2024-09-29 | Stop reason: HOSPADM

## 2024-09-26 RX ORDER — ALBUTEROL SULFATE 90 UG/1
2 INHALANT RESPIRATORY (INHALATION) EVERY 6 HOURS PRN
Status: DISCONTINUED | OUTPATIENT
Start: 2024-09-26 | End: 2024-09-26

## 2024-09-26 RX ORDER — ONDANSETRON HYDROCHLORIDE 2 MG/ML
4 INJECTION, SOLUTION INTRAVENOUS EVERY 6 HOURS PRN
Status: DISCONTINUED | OUTPATIENT
Start: 2024-09-26 | End: 2024-09-29 | Stop reason: HOSPADM

## 2024-09-26 RX ORDER — LEVOFLOXACIN 5 MG/ML
750 INJECTION, SOLUTION INTRAVENOUS
Status: DISCONTINUED | OUTPATIENT
Start: 2024-09-26 | End: 2024-09-26

## 2024-09-26 RX ORDER — ENOXAPARIN SODIUM 100 MG/ML
40 INJECTION SUBCUTANEOUS EVERY 12 HOURS
Status: DISCONTINUED | OUTPATIENT
Start: 2024-09-26 | End: 2024-09-26

## 2024-09-26 RX ORDER — ALBUTEROL SULFATE 0.83 MG/ML
2.5 SOLUTION RESPIRATORY (INHALATION) EVERY 6 HOURS PRN
Status: DISCONTINUED | OUTPATIENT
Start: 2024-09-26 | End: 2024-09-29 | Stop reason: HOSPADM

## 2024-09-26 RX ORDER — INSULIN ASPART 100 [IU]/ML
0-10 INJECTION, SOLUTION INTRAVENOUS; SUBCUTANEOUS
Status: DISCONTINUED | OUTPATIENT
Start: 2024-09-26 | End: 2024-09-29 | Stop reason: HOSPADM

## 2024-09-26 RX ORDER — ACETAMINOPHEN 325 MG/1
650 TABLET ORAL EVERY 8 HOURS PRN
Status: DISCONTINUED | OUTPATIENT
Start: 2024-09-26 | End: 2024-09-29 | Stop reason: HOSPADM

## 2024-09-26 RX ORDER — NALOXONE HCL 0.4 MG/ML
0.02 VIAL (ML) INJECTION
Status: DISCONTINUED | OUTPATIENT
Start: 2024-09-26 | End: 2024-09-29 | Stop reason: HOSPADM

## 2024-09-26 RX ORDER — LINEZOLID 600 MG/1
600 TABLET, FILM COATED ORAL EVERY 12 HOURS
Status: DISCONTINUED | OUTPATIENT
Start: 2024-09-26 | End: 2024-09-29 | Stop reason: HOSPADM

## 2024-09-26 RX ORDER — POLYETHYLENE GLYCOL 3350 17 G/17G
17 POWDER, FOR SOLUTION ORAL DAILY PRN
Status: DISCONTINUED | OUTPATIENT
Start: 2024-09-26 | End: 2024-09-29 | Stop reason: HOSPADM

## 2024-09-26 RX ORDER — SODIUM CHLORIDE 0.9 % (FLUSH) 0.9 %
10 SYRINGE (ML) INJECTION
Status: DISCONTINUED | OUTPATIENT
Start: 2024-09-26 | End: 2024-09-29 | Stop reason: HOSPADM

## 2024-09-26 RX ORDER — BUPROPION HYDROCHLORIDE 300 MG/1
300 TABLET ORAL DAILY
COMMUNITY

## 2024-09-26 RX ORDER — MICONAZOLE NITRATE 2 G/100G
POWDER TOPICAL 2 TIMES DAILY PRN
Status: DISCONTINUED | OUTPATIENT
Start: 2024-09-26 | End: 2024-09-29 | Stop reason: HOSPADM

## 2024-09-26 RX ORDER — ACETAMINOPHEN 325 MG/1
650 TABLET ORAL EVERY 4 HOURS PRN
Status: DISCONTINUED | OUTPATIENT
Start: 2024-09-26 | End: 2024-09-29 | Stop reason: HOSPADM

## 2024-09-26 RX ORDER — FAMOTIDINE 20 MG/1
20 TABLET, FILM COATED ORAL 2 TIMES DAILY
Status: DISCONTINUED | OUTPATIENT
Start: 2024-09-26 | End: 2024-09-29 | Stop reason: HOSPADM

## 2024-09-26 RX ORDER — VARENICLINE TARTRATE 0.5 (11)-1
1 KIT ORAL SEE ADMIN INSTRUCTIONS
COMMUNITY
Start: 2024-09-04 | End: 2024-12-03

## 2024-09-26 RX ORDER — ENOXAPARIN SODIUM 100 MG/ML
60 INJECTION SUBCUTANEOUS EVERY 12 HOURS
Status: DISCONTINUED | OUTPATIENT
Start: 2024-09-26 | End: 2024-09-29 | Stop reason: HOSPADM

## 2024-09-26 RX ORDER — OXYCODONE AND ACETAMINOPHEN 7.5; 325 MG/1; MG/1
1 TABLET ORAL EVERY 6 HOURS PRN
Status: DISCONTINUED | OUTPATIENT
Start: 2024-09-26 | End: 2024-09-29 | Stop reason: HOSPADM

## 2024-09-26 RX ORDER — BUPROPION HYDROCHLORIDE 150 MG/1
150 TABLET ORAL DAILY
Status: DISCONTINUED | OUTPATIENT
Start: 2024-09-26 | End: 2024-09-29 | Stop reason: HOSPADM

## 2024-09-26 RX ADMIN — MORPHINE SULFATE 2 MG: 2 INJECTION, SOLUTION INTRAMUSCULAR; INTRAVENOUS at 01:09

## 2024-09-26 RX ADMIN — LINEZOLID 600 MG: 600 TABLET, FILM COATED ORAL at 08:09

## 2024-09-26 RX ADMIN — VANCOMYCIN HYDROCHLORIDE 1000 MG: 1 INJECTION, POWDER, LYOPHILIZED, FOR SOLUTION INTRAVENOUS at 01:09

## 2024-09-26 RX ADMIN — ONDANSETRON 4 MG: 2 INJECTION INTRAMUSCULAR; INTRAVENOUS at 02:09

## 2024-09-26 RX ADMIN — SENNOSIDES AND DOCUSATE SODIUM 1 TABLET: 8.6; 5 TABLET ORAL at 08:09

## 2024-09-26 RX ADMIN — FAMOTIDINE 20 MG: 20 TABLET ORAL at 08:09

## 2024-09-26 RX ADMIN — LEVOFLOXACIN 750 MG: 5 INJECTION, SOLUTION INTRAVENOUS at 09:09

## 2024-09-26 RX ADMIN — SENNOSIDES AND DOCUSATE SODIUM 1 TABLET: 8.6; 5 TABLET ORAL at 09:09

## 2024-09-26 RX ADMIN — MORPHINE SULFATE 4 MG: 4 INJECTION, SOLUTION INTRAMUSCULAR; INTRAVENOUS at 12:09

## 2024-09-26 RX ADMIN — ENOXAPARIN SODIUM 60 MG: 60 INJECTION SUBCUTANEOUS at 08:09

## 2024-09-26 RX ADMIN — CEFTRIAXONE SODIUM 2 G: 2 INJECTION, POWDER, FOR SOLUTION INTRAMUSCULAR; INTRAVENOUS at 07:09

## 2024-09-26 RX ADMIN — SODIUM CHLORIDE, POTASSIUM CHLORIDE, SODIUM LACTATE AND CALCIUM CHLORIDE: 600; 310; 30; 20 INJECTION, SOLUTION INTRAVENOUS at 03:09

## 2024-09-26 RX ADMIN — FAMOTIDINE 20 MG: 20 TABLET ORAL at 09:09

## 2024-09-26 RX ADMIN — OXYCODONE AND ACETAMINOPHEN 1 TABLET: 7.5; 325 TABLET ORAL at 10:09

## 2024-09-26 RX ADMIN — MORPHINE SULFATE 2 MG: 2 INJECTION, SOLUTION INTRAMUSCULAR; INTRAVENOUS at 07:09

## 2024-09-26 RX ADMIN — OXYCODONE AND ACETAMINOPHEN 1 TABLET: 7.5; 325 TABLET ORAL at 02:09

## 2024-09-26 RX ADMIN — OXYCODONE AND ACETAMINOPHEN 1 TABLET: 7.5; 325 TABLET ORAL at 04:09

## 2024-09-26 RX ADMIN — ONDANSETRON 4 MG: 2 INJECTION INTRAMUSCULAR; INTRAVENOUS at 10:09

## 2024-09-26 RX ADMIN — VANCOMYCIN HYDROCHLORIDE 1000 MG: 1 INJECTION, POWDER, LYOPHILIZED, FOR SOLUTION INTRAVENOUS at 02:09

## 2024-09-26 RX ADMIN — ENOXAPARIN SODIUM 60 MG: 60 INJECTION SUBCUTANEOUS at 09:09

## 2024-09-26 NOTE — CARE UPDATE
Patient with hx of recurrent panniculitis has mass of in her right thigh which she states has become more swollen and red.  She is ill appearing and states she is feeling generally unwell.  She was treated for same problem about a month ago in this facilty.  She is a patient of Dr. Humphreys with ID.  In the ED overnight, she was treated with IV meropenem and IV vancomycin, currently on IV levaquin.  Will consult ID for further recommendations.  Wound care also consulted.

## 2024-09-26 NOTE — PROGRESS NOTES
Automatic Inhaler to Nebulizer Interchange    albuterol (Ventolin, ProAir, or Proventil)  mcg given multiple times per day changed to albuterol 2.5 mg every 6 hours prn  per [facility] Automatic Therapeutic Substitutions Protocol.    Please contact pharmacy at extension [phone] with any questions.     Thank you,   Darling Johnson

## 2024-09-26 NOTE — CARE UPDATE
09/26/24 0451   Patient Assessment/Suction   Level of Consciousness (AVPU) alert   Respiratory Effort Normal;Unlabored   Expansion/Accessory Muscles/Retractions no use of accessory muscles   Rhythm/Pattern, Respiratory depth regular;pattern regular   Cough Frequency no cough   PRE-TX-O2   Device (Oxygen Therapy) room air   SpO2 97 %   Pulse Oximetry Type Continuous   $ Pulse Oximetry - Multiple Charge Pulse Oximetry - Multiple   Pulse 97   Resp 20   Education   $ Education Other (see comment);15 min  (sat check)   Tobacco Cessation Intervention   Do you use any type of tobacco product? Yes   Are you interested in quitting use of tobacco products? Not interested   Are you interested in Nicotine Replacement for symptom relief? No   Respiratory Evaluation   $ Care Plan Tech Time 15 min   $ Respiratory Evaluation Complete   Evaluation For New Orders   Admitting Diagnosis fever; chills   Home Oxygen   Has Home Oxygen? No   Home Aerosol, MDI, DPI, and Other Treatments/Therapies   Home Respiratory Therapy Per Patient/Review of Chart Yes   MDI Home Meds/Freq albuterol (PROVENTIL/VENTOLIN HFA) 90 mcg/actuation inhaler   Oxygen Care Plan   Oxygen Care Plan Per Protocol   SPO2 Goal (%) 92% non-cardiac   Rationale SpO2 is <92% (Non-cardiac Pt.)   Bronchodilator Care Plan   Bronchodilator Care Plan MDI   MDI Meds w/ frequency   (albuterol (PROVENTIL/VENTOLIN HFA) 90 mcg/actuation inhaler)   Rationale Maintain home respiratory medicine   Atelectasis Care Plan   Rationale No Rational Found   Airway Clearance Care Plan   Rationale No rationale found

## 2024-09-26 NOTE — H&P
[facility] - Emergency Dept  Hospital Medicine  History & Physical    Patient Name: Adriana Zaragoza  MRN: [id number]  Patient Class: IP- Inpatient  Admission Date: 9/25/2024  Attending Physician: Spike Cano MD   Primary Care Provider: Melba Trivedi MD         Patient information was obtained from patient and ER records.     Subjective:     Principal Problem:<principal problem not specified>    Chief Complaint:   Chief Complaint   Patient presents with    Headache    Chills    Fever     BIBA for headache, chills, fever, back pain  Tumor growing between legs  Hx of sepsis        HPI: The patient is a 58-year-old female with past medical history of morbid obesity, diabetes mellitus type 2, COPD, recurrent panniculitis who presented to the ED with the complaints of fever and generalized weakness since yesterday.    The patient reports that she went to a doctor's office with her boyfriend yesterday to fix his baclofen pump.  She reports that she felt febrile after coming back from the office.  She reports that she has had repeated admissions to the hospital because of folliculitis and cellulitis.  She reports that she has a lymphedema growth from right thigh which gets infected a lot. She also reports that she has some folliculitis in the right breast area.  She denies any chest pain, shortness of breath, abdominal pain, nausea, vomiting, diarrhea, cough, urinary symptoms.  In the ED, the patient was found to have temperature of 100.5°, cellulitis of that lymphedema growth and some folliculitis of her right breast, the patient is being admitted to hospitalist service for further evaluation and management.        Past Medical History:   Diagnosis Date    Allergy     Anemia     Asthma     COPD (chronic obstructive pulmonary disease)     Deep vein thrombosis     Depression     Diabetes mellitus, type 2     Encounter for blood transfusion     Heart murmur     Neuromuscular disorder         Past Surgical History:   Procedure Laterality Date     SECTION      DILATION AND CURETTAGE OF UTERUS      gastric sleeve      [facility]    TONSILLECTOMY         Review of patient's allergies indicates:   Allergen Reactions    Aspirin     Nsaids (non-steroidal anti-inflammatory drug) Hives    Teflaro [ceftaroline fosamil]      Allergic reaction/ hives/itching       No current facility-administered medications on file prior to encounter.     Current Outpatient Medications on File Prior to Encounter   Medication Sig    acetaminophen (TYLENOL) 650 MG TbSR Take 650 mg by mouth every 8 (eight) hours.    albuterol (PROVENTIL/VENTOLIN HFA) 90 mcg/actuation inhaler Inhale 2 puffs into the lungs every 6 (six) hours as needed for Wheezing or Shortness of Breath.    buPROPion (WELLBUTRIN XL) 150 MG TB24 tablet Take 1 tablet (150 mg total) by mouth once daily.    clindamycin (CLEOCIN T) 1 % lotion Apply topically 2 (two) times daily. (Patient taking differently: Apply 1 g topically 2 (two) times daily.)    docusate sodium (COLACE) 100 MG capsule Take 1 capsule (100 mg total) by mouth 2 (two) times daily.    famotidine (PEPCID) 20 MG tablet Take 1 tablet (20 mg total) by mouth 2 (two) times daily.    ferrous sulfate 324 mg (65 mg iron) TbEC Take 324 mg by mouth every 7 days.    hydrOXYzine HCL (ATARAX) 25 MG tablet Take 25 mg by mouth 3 (three) times daily.    nicotine (NICODERM CQ) 21 mg/24 hr Place 1 patch onto the skin once daily.    oxyCODONE (ROXICODONE) 5 MG immediate release tablet Take 1 tablet (5 mg total) by mouth every 4 (four) hours as needed for Pain. (Patient not taking: Reported on 2024)    oxyCODONE-acetaminophen (PERCOCET) 5-325 mg per tablet Take 1 tablet by mouth every 6 (six) hours as needed.    semaglutide (OZEMPIC) 0.25 mg or 0.5 mg (2 mg/3 mL) pen injector Inject 0.5 mg into the skin every 7 days.     Family History       Problem Relation (Age of Onset)    Arthritis Mother, Maternal  Grandmother    COPD Father    Cancer Father, Maternal Grandmother, Maternal Grandfather, Paternal Grandmother    Depression Mother    Diabetes Mother, Paternal Grandmother, Paternal Grandfather    Heart disease Mother    Hyperlipidemia Paternal Grandfather    Kidney disease Paternal Grandmother          Tobacco Use    Smoking status: Every Day     Current packs/day: 1.00     Average packs/day: 1 pack/day for 15.6 years (15.6 ttl pk-yrs)     Types: Cigarettes     Start date: 2/6/1979     Last attempt to quit: 1998    Smokeless tobacco: Never    Tobacco comments:     Pt states she has smoked since her teenage years, smokes around 1pk/day. Interested and quitting. Interested in the outpatient smoking cessation program. Referral sent.    Substance and Sexual Activity    Alcohol use: No    Drug use: No    Sexual activity: Never     Review of Systems    Review of symptoms:    Constitutional:  Positive for fever, generalized weakness   HENT:  Negative for congestion, sore throat  Eyes:  Negative for redness, discharge  Respiratory:  Negative for cough and shortness of breath  Cardiovascular:  Negative for chest pain, palpitation  Gastrointestinal:  Negative for abdominal pain, nausea, vomiting, diarrhea  Genitourinary:  Negative for flank pain, difficulty urination  Musculoskeletal:  Negative for arthralgia, myalgia  Skin:  Positive for color change  Neuro:  Negative for dizziness, focal weakness  Psychiatric:  Negative for agitation, confusion    Objective:     Vital Signs (Most Recent):  Temp: (!) 100.5 °F (38.1 °C) (09/25/24 2300)  Pulse: 87 (09/26/24 0130)  Resp: 20 (09/26/24 0130)  BP: (!) 104/52 (09/26/24 0130)  SpO2: 97 % (09/26/24 0130) Vital Signs (24h Range):  Temp:  [100.5 °F (38.1 °C)] 100.5 °F (38.1 °C)  Pulse:  [] 87  Resp:  [18-23] 20  SpO2:  [97 %-99 %] 97 %  BP: (104-181)/(52-79) 104/52     Weight: (!) 167.8 kg (370 lb)  Body mass index is 63.51 kg/m².     Physical Exam      Physical  examination:    General:  Awake, alert, not in apparent distress  Head:  NC/AT  HEENT:  PERRLA, EOMI, no pallor or icterus               Oral mucosa moist without exudates or erythema  Neck:  Supple, no JVD, no lymphadenopathy  Chest:  S1-S2 normal, no M/G/R  Respiratory:  Normal vesicular breath sounds with no added sounds  Abdomen:  Soft, nondistended, nontender, no organomegaly, bowel sounds present  Extremities:  No pitting edema of bilateral lower extremities present  Neuro:  Awake, alert, oriented x3, grossly intact motor and sensory exam  Psychiatric:  Normal mood and affect  Skin:  Lymphedema growth from the right thigh area, erythematous, increased temperature, tenderness present, no fluctuation noted, no purulent discharge     Significant Labs: All pertinent labs within the past 24 hours have been reviewed.  Recent Lab Results         09/25/24 2318 09/25/24  2317        Albumin 3.9         ALP 68         ALT 9         Anion Gap 10         AST 12         Baso # 0.06         Basophil % 0.5         BILIRUBIN TOTAL 0.4  Comment: For infants and newborns, interpretation of results should be based  on gestational age, weight and in agreement with clinical  observations.    Premature Infant recommended reference ranges:  Up to 24 hours.............<8.0 mg/dL  Up to 48 hours............<12.0 mg/dL  3-5 days..................<15.0 mg/dL  6-29 days.................<15.0 mg/dL           BUN 12         Calcium 8.9         Chloride 103         CO2 22         Creatinine 0.7         Differential Method Automated         eGFR >60.0         Eos # 0.1         Eos % 0.4         Glucose 112         Gran # (ANC) 11.0         Gran % 84.3         Hematocrit 40.4         Hemoglobin 12.6         Immature Grans (Abs) 0.04  Comment: Mild elevation in immature granulocytes is non specific and   can be seen in a variety of conditions including stress response,   acute inflammation, trauma and pregnancy. Correlation with other    laboratory and clinical findings is essential.           Immature Granulocytes 0.3         Lymph # 1.2         Lymph % 9.0         Magnesium  1.8         MCH 27.3         MCHC 31.2         MCV 88         Mono # 0.7         Mono % 5.5         MPV 9.0         nRBC 0         Phosphorus Level 3.2         Platelet Count 259         POC Lactate   1.23       Potassium 4.1         PROTEIN TOTAL 7.6         RBC 4.61         RDW 15.5         Sample   VENOUS       Sodium 135         WBC 13.08                 Significant Imaging: I have reviewed all pertinent imaging results/findings within the past 24 hours.  I have reviewed and interpreted all pertinent imaging results/findings within the past 24 hours.  Assessment/Plan:       # Sepsis due to infectious panniculitis:   Patient presented with temperature of 100.5°, tachypnea, WBC 13.08   s/p 1 L LR bolus in the ED   Patient received vancomycin and meropenem in the ED, MRSA negative on 08/26/2024 and allergy to cephalosporins, will continue with Levaquin 750 mg IV daily   Follow up blood culture, CRP, procalcitonin    # Diabetes mellitus type 2:   Sliding scale of aspart    # COPD:   DuoNeb p.r.n.    # Depression:   Continue Wellbutrin 150 mg daily    # GERD: Pepcid    # obesity class 3:  Patient with morbid obesity with BMI of 63.5    # Smoker:    Allergic to nicotine patch   Counseled to quit smoking cigarettes    # Diet: Adult carbohydrate consistent diet  # DVT prophylaxis:  Lovenox 40 mg sq b.i.d.  # Code: Full code      Spike Cano MD  Department of Hospital Medicine  [facility] - Emergency Dept

## 2024-09-26 NOTE — NURSING
1730-Pt arrived to room 1204.    Nurses Note -- 4 Eyes      9/26/2024   5:44 PM      Skin assessed during: Admit      [] No Altered Skin Integrity Present    []Prevention Measures Documented      [x] Yes- Altered Skin Integrity Present or Discovered   [x] LDA Added if Not in Epic (Describe Wound)   [] New Altered Skin Integrity was Present on Admit and Documented in LDA   [x] Wound Image Taken    Wound Care Consulted? Yes    Attending Nurse:  Kwadwo Mcdermott RN/Staff Member:   Argelia Perez

## 2024-09-26 NOTE — HPI
The patient is a 58-year-old female with past medical history of morbid obesity, diabetes mellitus type 2, COPD, recurrent panniculitis who presented to the ED with the complaints of fever and generalized weakness since yesterday.    The patient reports that she went to a doctor's office with her boyfriend yesterday to fix his baclofen pump.  She reports that she felt febrile after coming back from the office.  She reports that she has had repeated admissions to the hospital because of folliculitis and cellulitis.  She reports that she has a lymphedema growth from right thigh which gets infected a lot. She also reports that she has some folliculitis in the right breast area.  She denies any chest pain, shortness of breath, abdominal pain, nausea, vomiting, diarrhea, cough, urinary symptoms.  In the ED, the patient was found to have temperature of 100.5°, cellulitis of that lymphedema growth and some folliculitis of her right breast, the patient is being admitted to hospitalist service for further evaluation and management.

## 2024-09-26 NOTE — CARE UPDATE
09/26/24 0721   Patient Assessment/Suction   Level of Consciousness (AVPU) alert   Respiratory Effort Normal;Unlabored   Expansion/Accessory Muscles/Retractions no use of accessory muscles;no retractions;expansion symmetric   Rhythm/Pattern, Respiratory unlabored;pattern regular;depth regular;chest wiggle adequate;no shortness of breath reported   Cough Frequency no cough   PRE-TX-O2   Device (Oxygen Therapy) room air   SpO2 96 %   Pulse Oximetry Type Continuous   $ Pulse Oximetry - Multiple Charge Pulse Oximetry - Multiple   Pulse 94   Resp (!) 22   Aerosol Therapy   $ Aerosol Therapy Charges PRN treatment not required   Education   $ Education Bronchodilator;15 min

## 2024-09-26 NOTE — CONSULTS
[facility] - Emergency Dept   Department of Infectious Disease  Consult Note        PATIENT NAME: Adriana Zaragoza  MRN: [id number]  TODAY'S DATE: 09/26/2024  ADMIT DATE: 9/25/2024  LOS: 0 days    CHIEF COMPLAINT: Headache, Chills, and Fever (BIBA for headache, chills, fever, back pain/Tumor growing between legs/Hx of sepsis)      PRINCIPLE PROBLEM: <principal problem not specified>    REASON FOR CONSULT:     ASSESSMENT and PLAN     Recurrent Right thigh panniculitis.  DC levofloxacin.  Start linezolid 600 mg p.o. b.i.d. and add ceftriaxone 2 g Q 24 hours for now.  Check ASO titer.    Morbid obesity.  She had bariatric surgery and is already losing weight.  Management as per her PCP hospitalist.    3. COPD.  Management as per hospitalist.    RECOMMENDATIONS:   DC levofloxacin   Start linezolid 600 mg p.o. b.i.d. and ceftriaxone 2 g Q 24 hours   Check ASO titer   She may benefit from secondary prophylaxis with oral Pen VK after current acute episode resolves.    Thank you for this consult. Please send [employer] secure chat with any questions.    Antibiotics (From admission, onward)      Start     Stop Route Frequency Ordered    09/26/24 0900  levoFLOXacin 750 mg/150 mL IVPB 750 mg         -- IV Every 24 hours (non-standard times) 09/26/24 0159          Antifungals (From admission, onward)      Start     Stop Route Frequency Ordered    09/26/24 0303  miconazole NITRATE 2 % top powder         -- Top 2 times daily PRN 09/26/24 0204           Antivirals (From admission, onward)      None            HPI      Adriana Zaragoza is a 58 y.o. female with history of morbid obesity, COPD.  She has a large right medial thigh pannus.  She is known to ID service and is cared for by my partner Dr. Humphreys.  She has had recurrent panniculitis and cellulitis at this site.  Informs me she has tried to see if the pannus can be removed/resected.  Apparently, no surgeon in the [place] area is willing to undertake the  removal.  She did get in touch with the plastic surgeon in [facility].  She agreed to LTAC take resection but with the caveats that patient will lose significant weight.  She informs me that her weight has dropped from about 460 lb to 300s.    Current episode started suddenly yesterday with generalized malaise and weakness.  Presented to the emergency room and has been admitted.  She is on antibiotics with some clinical improvement.    Antibiotic history:    Vancomycin: 09/24/2024 x1 dose   Meropenem:  09/24/2024 x1 dose   Levofloxacin: 2024-    Microbiology:    Blood culture 2024: NGTD    Social History  Marital Status: Significant Other  Alcohol History:  reports no history of alcohol use.  Tobacco History:  reports that she has been smoking cigarettes. She started smoking about 45 years ago. She has a 15.6 pack-year smoking history. She has never used smokeless tobacco.  Drug History:  reports no history of drug use.      Review of patient's allergies indicates:   Allergen Reactions    Aspirin     Nicoderm     Nsaids (non-steroidal anti-inflammatory drug) Hives    Teflaro [ceftaroline fosamil]      Allergic reaction/ hives/itching     Past Medical History:   Diagnosis Date    Allergy     Anemia     Asthma     COPD (chronic obstructive pulmonary disease)     Deep vein thrombosis     Depression     Diabetes mellitus, type 2     Encounter for blood transfusion     Heart murmur     Neuromuscular disorder      Past Surgical History:   Procedure Laterality Date     SECTION      DILATION AND CURETTAGE OF UTERUS      gastric sleeve      [facility]    TONSILLECTOMY       Family History   Problem Relation Name Age of Onset    Heart disease Mother      Diabetes Mother      Arthritis Mother      Depression Mother      Cancer Father      COPD Father      Arthritis Maternal Grandmother      Cancer Maternal Grandmother      Cancer Maternal Grandfather      Cancer Paternal Grandmother      Kidney  disease Paternal Grandmother      Diabetes Paternal Grandmother      Diabetes Paternal Grandfather      Hyperlipidemia Paternal Grandfather         SUBJECTIVE     Review of systems: 10 system review unremarkable.  As in HPI     OBJECTIVE   Temp:  [98.2 °F (36.8 °C)-100.5 °F (38.1 °C)] 98.9 °F (37.2 °C)  Pulse:  [] 94  Resp:  [18-23] 20  SpO2:  [96 %-99 %] 96 %  BP: (104-181)/(52-79) 113/54  Temp:  [98.2 °F (36.8 °C)-100.5 °F (38.1 °C)]   Temp: 98.9 °F (37.2 °C) (09/26/24 0722)  Pulse: 94 (09/26/24 0721)  Resp: 20 (09/26/24 0722)  BP: (!) 113/54 (09/26/24 0500)  SpO2: 96 % (09/26/24 0721)    Intake/Output Summary (Last 24 hours) at 9/26/2024 0954  Last data filed at 9/26/2024 0453  Gross per 24 hour   Intake 1700 ml   Output --   Net 1700 ml       Physical Exam  General:  Morbidly obese middle-aged woman lying quietly in bed in no acute distress   HEENT: No oral thrush   CVS: S1 and 2 heard, no murmurs appreciated   Respiratory: Clear to auscultation   Abdomen: Full, soft, nontender, no palpable organomegaly   Right lower extremity:  She has a large pannus from the medial thigh.  It is edematous, red and warm to touch  CNS: No gross focal deficits   Musculoskeletal system: No other joint or bony abnormalities appreciated    VAD:  PIV  ISOLATION:  None    Wounds:  Known    Significant Labs: All pertinent labs within the past 24 hours have been reviewed.    CBC LAST 7  Recent Labs   Lab 09/25/24  2318   WBC 13.08*   RBC 4.61   HGB 12.6   HCT 40.4   MCV 88   MCH 27.3   MCHC 31.2*   RDW 15.5*      MPV 9.0*   GRAN 84.3*  11.0*   LYMPH 9.0*  1.2   MONO 5.5  0.7   BASO 0.06   NRBC 0       CHEMISTRY LAST 7  Recent Labs   Lab 09/25/24  2318   *   K 4.1      CO2 22*   ANIONGAP 10   BUN 12   CREATININE 0.7   *   CALCIUM 8.9   MG 1.8   ALBUMIN 3.9   PROT 7.6   ALKPHOS 68   ALT 9*   AST 12   BILITOT 0.4       Estimated Creatinine Clearance: 136.5 mL/min (based on SCr of 0.7  "mg/dL).    INFLAMMATORY/PROCAL  LAST 7  Recent Labs   Lab 09/25/24 2318   PROCAL <0.050   CRP 1.30*     No results found for: "ESR"  CRP   Date Value Ref Range Status   09/25/2024 1.30 (H) <1.00 mg/dL Final     Comment:     CRP-Normal Application expected values:   <1.0        mg/dL   Normal Range  1.0 - 5.0  mg/dL   Indicates mild inflammation  5.0 - 10.0 mg/dL   Indicates severe inflammation  >10.0        mg/dL   Represents serious processes and   frequently         indicates the presence of a bacterial   infection.      08/28/2024 10.60 (H) <1.00 mg/dL Final     Comment:     CRP-Normal Application expected values:   <1.0        mg/dL   Normal Range  1.0 - 5.0  mg/dL   Indicates mild inflammation  5.0 - 10.0 mg/dL   Indicates severe inflammation  >10.0        mg/dL   Represents serious processes and   frequently         indicates the presence of a bacterial   infection.      08/26/2024 8.00 (H) <1.00 mg/dL Final     Comment:     CRP-Normal Application expected values:   <1.0        mg/dL   Normal Range  1.0 - 5.0  mg/dL   Indicates mild inflammation  5.0 - 10.0 mg/dL   Indicates severe inflammation  >10.0        mg/dL   Represents serious processes and   frequently         indicates the presence of a bacterial   infection.      02/14/2024 5.0 0.0 - 8.2 mg/L Final   02/06/2024 115.2 (H) 0.0 - 8.2 mg/L Final   03/07/2023 3.94 (H) <0.76 mg/dL Final   03/05/2023 11.05 (H) <0.76 mg/dL Final       PRIOR  MICROBIOLOGY:  Reviewed    Susceptibility data from last 90 days.  Collected Specimen Info Organism   08/25/24 Blood from Peripheral, Hand, Right No growth after 5 days.   08/25/24 Blood from Peripheral, Hand, Left No growth after 5 days.     LAST 7 DAYS MICROBIOLOGY   Microbiology Results (last 7 days)       Procedure Component Value Units Date/Time    Blood culture x two cultures. Draw prior to antibiotics [[id number]] Collected: 09/25/24 2315    Order Status: Completed Specimen: Blood Updated: 09/26/24 0558     " Blood Culture, Routine No Growth to date    Narrative:      Aerobic and anaerobic    Blood culture x two cultures. Draw prior to antibiotics [[id number]] Collected: 09/25/24 2321    Order Status: Completed Specimen: Blood Updated: 09/26/24 0558     Blood Culture, Routine No Growth to date    Narrative:      Aerobic and anaerobic    Influenza A & B by Molecular [[id number]]     Order Status: Canceled Specimen: Nasopharyngeal Swab           CURRENT/PREVIOUS VISIT EKG  Results for orders placed or performed during the hospital encounter of 08/25/24   EKG 12-lead    Collection Time: 08/25/24  3:25 PM   Result Value Ref Range    QRS Duration 80 ms    OHS QTC Calculation 394 ms    Narrative    Test Reason : R00.0,    Vent. Rate : 136 BPM     Atrial Rate : 136 BPM     P-R Int : 198 ms          QRS Dur : 080 ms      QT Int : 262 ms       P-R-T Axes : 038 077 041 degrees     QTc Int : 394 ms    Sinus tachycardia with Premature atrial complexes with Aberrant conduction  Low voltage QRS  Nonspecific T wave abnormality  Abnormal ECG  When compared with ECG of 11-AUG-2024 18:28,  Aberrant conduction is now Present  Vent. rate has increased BY  66 BPM  Nonspecific T wave abnormality now evident in Inferior leads  Confirmed by Kapil Moran MD ([id number]) on 8/31/2024 5:51:40 PM    Referred By: [email or web address]   SELF           Confirmed By:Kapil Moran MD     Significant Imaging: I have reviewed all relevant and available imaging results/findings within the past 24 hours.    I spent a total of 70 minutes on the day of the visit.This includes face to face time and non-face to face time preparing to see the patient (eg, review of tests), obtaining and/or reviewing separately obtained history, documenting clinical information in the electronic or other health record, independently interpreting results and communicating results to the patient/family/caregiver, or care coordinator.    Rajendra Callahan MD  Date of Service: 09/26/2024       This note was created using [employer] voice recognition software that occasionally misinterpreted phrases or words.

## 2024-09-26 NOTE — PLAN OF CARE
"[facility] - Emergency Dept  Initial Discharge Assessment       Primary Care Provider: Melba Trivedi MD    Admission Diagnosis: Fever, unspecified fever cause [R50.9]    Admission Date: 9/25/2024  Expected Discharge Date: 9/28/2024      met with Pt at bedside to complete discharge assessment. Pt AAOx4s. Demographics, PCP, and insurance verified. No home health. No dialysis. Pt reports ability to complete ADLs with assistance of rollator. Uses 2L of o[employer]geb with [employer]. Pt verbalized plan to discharge home via family transport. Pt has no other needs to be addressed at this time.    Transition of Care Barriers: None    Payor: AETNA MANAGED MEDICARE / Plan: AETNA MEDICARE PLAN PPO / Product Type: Medicare Advantage /     Extended Emergency Contact Information  Primary Emergency Contact: Bryan Graham"  Address: [address] [place] [address]  Home Phone: [phone]  Mobile Phone: [phone]  Relation: Significant other  Preferred language: English   needed? No  Secondary Emergency Contact: Lauren Parada  Relation: Relative    Discharge Plan A: Home with family  Discharge Plan B: Home with family      [facility] [address] [facility] [address] [place] [address]  Phone: [phone] Fax: [phone]    [id number] - [place], [place]  [address] [facility]  [address] [facility] [address]  Phone: [phone] Fax: [phone]      Initial Assessment (most recent)       Adult Discharge Assessment - 09/26/24 1524          Discharge Assessment    Assessment Type Discharge Planning Reassessment     Confirmed/corrected address, phone number and insurance Yes     Confirmed Demographics Correct on Facesheet     Source of Information patient     Communicated JONATHAN with patient/caregiver Date not available/Unable to determine     Reason " "For Admission Fever     People in Home significant other     Do you expect to return to your current living situation? Yes     Do you have help at home or someone to help you manage your care at home? Yes     Who are your caregiver(s) and their phone number(s)? Bryan Graham" (Significant other)  [phone]     Prior to hospitilization cognitive status: Alert/Oriented     Current cognitive status: Alert/Oriented     Walking or Climbing Stairs Difficulty yes     Walking or Climbing Stairs ambulation difficulty, requires equipment     Dressing/Bathing Difficulty no     Home Accessibility wheelchair accessible     Home Layout Able to live on 1st floor     Equipment Currently Used at Home CPAP;rollator;oxygen   2L with [employer]    Readmission within 30 days? Yes     Patient currently being followed by outpatient case management? No     Do you currently have service(s) that help you manage your care at home? No     Do you take prescription medications? Yes     Do you have prescription coverage? Yes     Coverage Payor: AETNA MANAGED MEDICARE - AETNA MEDICARE PLAN PPO -     Do you have any problems affording any of your prescribed medications? No     Is the patient taking medications as prescribed? yes     Who is going to help you get home at discharge? Bryan Graham" (Significant other)  [phone]     How do you get to doctors appointments? family or friend will provide     Are you on dialysis? No     Do you take coumadin? No     Discharge Plan A Home with family     Discharge Plan B Home with family     DME Needed Upon Discharge  none     Transition of Care Barriers None        Physical Activity    On average, how many days per week do you engage in moderate to strenuous exercise (like a brisk walk)? 0 days     On average, how many minutes do you engage in exercise at this level? 0 min        Financial Resource Strain    How hard is it for you to pay for the very basics like food, housing, medical care, " and heating? Not hard at all        Housing Stability    In the last 12 months, was there a time when you were not able to pay the mortgage or rent on time? No     At any time in the past 12 months, were you homeless or living in a shelter (including now)? No        Transportation Needs    Has the lack of transportation kept you from medical appointments, meetings, work or from getting things needed for daily living? No        Food Insecurity    Within the past 12 months, you worried that your food would run out before you got the money to buy more. Never true     Within the past 12 months, the food you bought just didn't last and you didn't have money to get more. Never true        Stress    Do you feel stress - tense, restless, nervous, or anxious, or unable to sleep at night because your mind is troubled all the time - these days? Not at all        Social Isolation    How often do you feel lonely or isolated from those around you?  Never        Alcohol Use    Q1: How often do you have a drink containing alcohol? Never     Q2: How many drinks containing alcohol do you have on a typical day when you are drinking? Patient does not drink     Q3: How often do you have six or more drinks on one occasion? Never        Utilities    In the past 12 months has the electric, gas, oil, or water company threatened to shut off services in your home? No        Health Literacy    How often do you need to have someone help you when you read instructions, pamphlets, or other written material from your doctor or pharmacy? Never

## 2024-09-26 NOTE — SUBJECTIVE & OBJECTIVE
Past Medical History:   Diagnosis Date    Allergy     Anemia     Asthma     COPD (chronic obstructive pulmonary disease)     Deep vein thrombosis     Depression     Diabetes mellitus, type 2     Encounter for blood transfusion     Heart murmur     Neuromuscular disorder        Past Surgical History:   Procedure Laterality Date     SECTION      DILATION AND CURETTAGE OF UTERUS      gastric sleeve      [facility]    TONSILLECTOMY         Review of patient's allergies indicates:   Allergen Reactions    Aspirin     Nsaids (non-steroidal anti-inflammatory drug) Hives    Teflaro [ceftaroline fosamil]      Allergic reaction/ hives/itching       No current facility-administered medications on file prior to encounter.     Current Outpatient Medications on File Prior to Encounter   Medication Sig    acetaminophen (TYLENOL) 650 MG TbSR Take 650 mg by mouth every 8 (eight) hours.    albuterol (PROVENTIL/VENTOLIN HFA) 90 mcg/actuation inhaler Inhale 2 puffs into the lungs every 6 (six) hours as needed for Wheezing or Shortness of Breath.    buPROPion (WELLBUTRIN XL) 150 MG TB24 tablet Take 1 tablet (150 mg total) by mouth once daily.    clindamycin (CLEOCIN T) 1 % lotion Apply topically 2 (two) times daily. (Patient taking differently: Apply 1 g topically 2 (two) times daily.)    docusate sodium (COLACE) 100 MG capsule Take 1 capsule (100 mg total) by mouth 2 (two) times daily.    famotidine (PEPCID) 20 MG tablet Take 1 tablet (20 mg total) by mouth 2 (two) times daily.    ferrous sulfate 324 mg (65 mg iron) TbEC Take 324 mg by mouth every 7 days.    hydrOXYzine HCL (ATARAX) 25 MG tablet Take 25 mg by mouth 3 (three) times daily.    nicotine (NICODERM CQ) 21 mg/24 hr Place 1 patch onto the skin once daily.    oxyCODONE (ROXICODONE) 5 MG immediate release tablet Take 1 tablet (5 mg total) by mouth every 4 (four) hours as needed for Pain. (Patient not taking: Reported on 2024)    oxyCODONE-acetaminophen (PERCOCET)  5-325 mg per tablet Take 1 tablet by mouth every 6 (six) hours as needed.    semaglutide (OZEMPIC) 0.25 mg or 0.5 mg (2 mg/3 mL) pen injector Inject 0.5 mg into the skin every 7 days.     Family History       Problem Relation (Age of Onset)    Arthritis Mother, Maternal Grandmother    COPD Father    Cancer Father, Maternal Grandmother, Maternal Grandfather, Paternal Grandmother    Depression Mother    Diabetes Mother, Paternal Grandmother, Paternal Grandfather    Heart disease Mother    Hyperlipidemia Paternal Grandfather    Kidney disease Paternal Grandmother          Tobacco Use    Smoking status: Every Day     Current packs/day: 1.00     Average packs/day: 1 pack/day for 15.6 years (15.6 ttl pk-yrs)     Types: Cigarettes     Start date: 2/6/1979     Last attempt to quit: 1998    Smokeless tobacco: Never    Tobacco comments:     Pt states she has smoked since her teenage years, smokes around 1pk/day. Interested and quitting. Interested in the outpatient smoking cessation program. Referral sent.    Substance and Sexual Activity    Alcohol use: No    Drug use: No    Sexual activity: Never     Review of Systems    Review of symptoms:    Constitutional:  Positive for fever, generalized weakness   HENT:  Negative for congestion, sore throat  Eyes:  Negative for redness, discharge  Respiratory:  Negative for cough and shortness of breath  Cardiovascular:  Negative for chest pain, palpitation  Gastrointestinal:  Negative for abdominal pain, nausea, vomiting, diarrhea  Genitourinary:  Negative for flank pain, difficulty urination  Musculoskeletal:  Negative for arthralgia, myalgia  Skin:  Positive for color change  Neuro:  Negative for dizziness, focal weakness  Psychiatric:  Negative for agitation, confusion    Objective:     Vital Signs (Most Recent):  Temp: (!) 100.5 °F (38.1 °C) (09/25/24 2300)  Pulse: 87 (09/26/24 0130)  Resp: 20 (09/26/24 0130)  BP: (!) 104/52 (09/26/24 0130)  SpO2: 97 % (09/26/24 0130) Vital Signs  (24h Range):  Temp:  [100.5 °F (38.1 °C)] 100.5 °F (38.1 °C)  Pulse:  [] 87  Resp:  [18-23] 20  SpO2:  [97 %-99 %] 97 %  BP: (104-181)/(52-79) 104/52     Weight: (!) 167.8 kg (370 lb)  Body mass index is 63.51 kg/m².     Physical Exam      Physical examination:    General:  Awake, alert, not in apparent distress  Head:  NC/AT  HEENT:  PERRLA, EOMI, no pallor or icterus               Oral mucosa moist without exudates or erythema  Neck:  Supple, no JVD, no lymphadenopathy  Chest:  S1-S2 normal, no M/G/R  Respiratory:  Normal vesicular breath sounds with no added sounds  Abdomen:  Soft, nondistended, nontender, no organomegaly, bowel sounds present  Extremities:  No pitting edema of bilateral lower extremities present  Neuro:  Awake, alert, oriented x3, grossly intact motor and sensory exam  Psychiatric:  Normal mood and affect  Skin:  Lymphedema growth from the right thigh area, erythematous, increased temperature, tenderness present, no fluctuation noted, no purulent discharge     Significant Labs: All pertinent labs within the past 24 hours have been reviewed.  Recent Lab Results         09/25/24  2318   09/25/24  2317        Albumin 3.9         ALP 68         ALT 9         Anion Gap 10         AST 12         Baso # 0.06         Basophil % 0.5         BILIRUBIN TOTAL 0.4  Comment: For infants and newborns, interpretation of results should be based  on gestational age, weight and in agreement with clinical  observations.    Premature Infant recommended reference ranges:  Up to 24 hours.............<8.0 mg/dL  Up to 48 hours............<12.0 mg/dL  3-5 days..................<15.0 mg/dL  6-29 days.................<15.0 mg/dL           BUN 12         Calcium 8.9         Chloride 103         CO2 22         Creatinine 0.7         Differential Method Automated         eGFR >60.0         Eos # 0.1         Eos % 0.4         Glucose 112         Gran # (ANC) 11.0         Gran % 84.3         Hematocrit 40.4          Hemoglobin 12.6         Immature Grans (Abs) 0.04  Comment: Mild elevation in immature granulocytes is non specific and   can be seen in a variety of conditions including stress response,   acute inflammation, trauma and pregnancy. Correlation with other   laboratory and clinical findings is essential.           Immature Granulocytes 0.3         Lymph # 1.2         Lymph % 9.0         Magnesium  1.8         MCH 27.3         MCHC 31.2         MCV 88         Mono # 0.7         Mono % 5.5         MPV 9.0         nRBC 0         Phosphorus Level 3.2         Platelet Count 259         POC Lactate   1.23       Potassium 4.1         PROTEIN TOTAL 7.6         RBC 4.61         RDW 15.5         Sample   VENOUS       Sodium 135         WBC 13.08                 Significant Imaging: I have reviewed all pertinent imaging results/findings within the past 24 hours.  I have reviewed and interpreted all pertinent imaging results/findings within the past 24 hours.

## 2024-09-26 NOTE — PROGRESS NOTES
Pharmacist Renal Dose Adjustment Note    Adriana Zaragoza is a 58 y.o. female being treated with Enoxaparin.     Patient Data:    Vital Signs (Most Recent):  Temp: (!) 100.5 °F (38.1 °C) (09/25/24 2300)  Pulse: 87 (09/26/24 0130)  Resp: 20 (09/26/24 0130)  BP: (!) 104/52 (09/26/24 0130)  SpO2: 97 % (09/26/24 0130) Vital Signs (72h Range):  Temp:  [100.5 °F (38.1 °C)]   Pulse:  []   Resp:  [18-23]   BP: (104-181)/(52-79)   SpO2:  [97 %-99 %]      Recent Labs   Lab 09/25/24 2318   CREATININE 0.7     Serum creatinine: 0.7 mg/dL 09/25/24 2318  Estimated creatinine clearance: 136.5 mL/min  Body mass index is 63.51 kg/m².     Enoxaparin 40 mg every 12 hours will be changed to Enoxaparin 60 mg every 12 hours due to CrCl > 30 and BMI > 50 per Renal Dose Adjustment protocol.    Pharmacist's Name: Darling Johnson  Pharmacist's Extension: [phone]

## 2024-09-26 NOTE — ED PROVIDER NOTES
Encounter Date: 2024       History     Chief Complaint   Patient presents with    Headache    Chills    Fever     BIBA for headache, chills, fever, back pain  Tumor growing between legs  Hx of sepsis     58-year-old female presented emergency department with fever and generalized malaise since yesterday.  Patient has a growth on lower extremity which is also red and painful and cellulitic and patient had similar presentations in the past.  Patient also complains of headache and body aches.  Denies chest pain or shortness of breath or dysuria or hematuria.  Patient said she had similar presentations in the past.  Patient also complains of some redness in the right breast area.      Review of patient's allergies indicates:   Allergen Reactions    Aspirin     Nsaids (non-steroidal anti-inflammatory drug) Hives    Teflaro [ceftaroline fosamil]      Allergic reaction/ hives/itching     Past Medical History:   Diagnosis Date    Allergy     Anemia     Asthma     COPD (chronic obstructive pulmonary disease)     Deep vein thrombosis     Depression     Diabetes mellitus, type 2     Encounter for blood transfusion     Heart murmur     Neuromuscular disorder      Past Surgical History:   Procedure Laterality Date     SECTION      DILATION AND CURETTAGE OF UTERUS      gastric sleeve      Ema     TONSILLECTOMY       Family History   Problem Relation Name Age of Onset    Heart disease Mother      Diabetes Mother      Arthritis Mother      Depression Mother      Cancer Father      COPD Father      Arthritis Maternal Grandmother      Cancer Maternal Grandmother      Cancer Maternal Grandfather      Cancer Paternal Grandmother      Kidney disease Paternal Grandmother      Diabetes Paternal Grandmother      Diabetes Paternal Grandfather      Hyperlipidemia Paternal Grandfather       Social History     Tobacco Use    Smoking status: Every Day     Current packs/day: 1.00     Average packs/day: 1 pack/day for 15.6 years  (15.6 ttl pk-yrs)     Types: Cigarettes     Start date: 2/6/1979     Last attempt to quit: 1998    Smokeless tobacco: Never    Tobacco comments:     Pt states she has smoked since her teenage years, smokes around 1pk/day. Interested and quitting. Interested in the outpatient smoking cessation program. Referral sent.    Substance Use Topics    Alcohol use: No    Drug use: No     Review of Systems   Constitutional:  Positive for chills, fatigue and fever.   HENT: Negative.     Eyes: Negative.    Respiratory: Negative.     Cardiovascular:  Negative for chest pain.   Gastrointestinal: Negative.    Endocrine: Negative.    Genitourinary: Negative.    Musculoskeletal:  Positive for myalgias.   Skin:  Positive for wound.   Allergic/Immunologic: Negative.    Neurological:  Positive for headaches.   Hematological: Negative.    Psychiatric/Behavioral: Negative.     All other systems reviewed and are negative.      Physical Exam     Initial Vitals [09/25/24 2300]   BP Pulse Resp Temp SpO2   (!) 181/79 100 (!) 23 (!) 100.5 °F (38.1 °C) 99 %      MAP       --         Physical Exam    Nursing note and vitals reviewed.  Constitutional: She appears well-developed and well-nourished.   Morbidly obese and uncomfortable   HENT:   Head: Normocephalic and atraumatic.   Nose: Nose normal.   Mouth/Throat: Oropharynx is clear and moist.   Eyes: Conjunctivae and EOM are normal. Pupils are equal, round, and reactive to light.   Neck: Neck supple. No thyromegaly present. No tracheal deviation present. No JVD present.   Normal range of motion.  Cardiovascular:  Normal rate, regular rhythm, normal heart sounds and intact distal pulses.           No murmur heard.  Pulmonary/Chest: Breath sounds normal. No stridor. No respiratory distress. She has no wheezes. She has no rales.   Abdominal: Abdomen is soft. Bowel sounds are normal. There is no abdominal tenderness.   Musculoskeletal:         General: Tenderness present. No edema. Normal range of  motion.      Cervical back: Normal range of motion and neck supple.      Comments: Tenderness and erythema of the lump coming out of lower extremity consistent with cellulitis.  Extremities otherwise neurovascularly intact.  Patient also has erythema in the region of right breast consistent with small areas of folliculitis.  No clinical evidence of abscess.     Neurological: She is alert and oriented to person, place, and time. GCS score is 15. GCS eye subscore is 4. GCS verbal subscore is 5. GCS motor subscore is 6.   Skin: Skin is warm. Capillary refill takes less than 2 seconds. There is erythema.   Psychiatric: She has a normal mood and affect. Thought content normal.         ED Course   Procedures  Labs Reviewed   CBC W/ AUTO DIFFERENTIAL - Abnormal       Result Value    WBC 13.08 (*)     RBC 4.61      Hemoglobin 12.6      Hematocrit 40.4      MCV 88      MCH 27.3      MCHC 31.2 (*)     RDW 15.5 (*)     Platelets 259      MPV 9.0 (*)     Immature Granulocytes 0.3      Gran # (ANC) 11.0 (*)     Immature Grans (Abs) 0.04      Lymph # 1.2      Mono # 0.7      Eos # 0.1      Baso # 0.06      nRBC 0      Gran % 84.3 (*)     Lymph % 9.0 (*)     Mono % 5.5      Eosinophil % 0.4      Basophil % 0.5      Differential Method Automated     COMPREHENSIVE METABOLIC PANEL - Abnormal    Sodium 135 (*)     Potassium 4.1      Chloride 103      CO2 22 (*)     Glucose 112 (*)     BUN 12      Creatinine 0.7      Calcium 8.9      Total Protein 7.6      Albumin 3.9      Total Bilirubin 0.4      Alkaline Phosphatase 68      AST 12      ALT 9 (*)     eGFR >60.0      Anion Gap 10     CULTURE, BLOOD   CULTURE, BLOOD   MAGNESIUM    Magnesium 1.8     PHOSPHORUS    Phosphorus 3.2     URINALYSIS, REFLEX TO URINE CULTURE   LACTIC ACID, PLASMA   ISTAT LACTATE    POC Lactate 1.23      Sample VENOUS     POCT LACTATE     EKG Readings: (Independently Interpreted)   Initial Reading: No STEMI. Rhythm: Sinus Tachycardia. Ectopy: No Ectopy.  Conduction: Normal.       Imaging Results              X-Ray Chest 1 View (Final result)  Result time 09/26/24 00:25:54      Final result by Janene Harrington MD (09/26/24 00:25:54)                   Impression:      Essentially stable chest x-ray with basilar opacities and left costophrenic angle blunting as described.      Electronically signed by: Janene Harrington  Date:    09/26/2024  Time:    00:25               Narrative:    EXAMINATION:  XR CHEST 1 VIEW    CLINICAL HISTORY:  Sepsis;    TECHNIQUE:  Single frontal view of the chest was performed.    COMPARISON:  08/25/2024 chest X ray    FINDINGS:  The cardiac silhouette is stable.  There is left costophrenic angle blunting again which has been seen on multiple previous examinations.  Left lung base and right infrahilar opacities also noted possibly atelectasis versus pneumonitis.  Degenerative changes of the spine noted.                                       Medications   vancomycin - pharmacy to dose (has no administration in time range)   vancomycin in dextrose 5 % 1 gram/250 mL IVPB 1,000 mg (has no administration in time range)     And   vancomycin in dextrose 5 % 1 gram/250 mL IVPB 1,000 mg (has no administration in time range)   meropenem 1 g in sodium chloride 0.9 % 100 mL IVPB (ready to mix system) (0 g Intravenous Stopped 9/26/24 0113)   acetaminophen tablet 1,000 mg (1,000 mg Oral Given 9/25/24 2318)   lactated ringers bolus 1,000 mL (1,000 mLs Intravenous New Bag 9/25/24 2326)   morphine injection 4 mg (4 mg Intravenous Given 9/26/24 0000)     Medical Decision Making  58-year-old female presented emergency department with fever and generalized malaise and complains of pain.  Patient is morbidly obese and has evidence of cellulitis in the right breast region and also in the lower extremity growth patient had cellulitis in similar location in the past and needed IV antibiotics.  Given patient's condition and previous history broad-spectrum  antibiotics started and Hospital Medicine consulted for evaluation for further management and treatment.  Patient currently is hemodynamically stable and pain controlled treated with antibiotic.    Amount and/or Complexity of Data Reviewed  Labs: ordered. Decision-making details documented in ED Course.  Radiology: ordered. Decision-making details documented in ED Course.  ECG/medicine tests: ordered and independent interpretation performed. Decision-making details documented in ED Course.    Risk  OTC drugs.  Prescription drug management.  Decision regarding hospitalization.                                      Clinical Impression:  Final diagnoses:  [R50.9] Fever, unspecified fever cause (Primary)  [L03.90] Cellulitis, unspecified cellulitis site  [N61.0] Cellulitis of right breast          ED Disposition Condition    Admit Riley Siddiqui MD  09/26/24 0118

## 2024-09-27 ENCOUNTER — CLINICAL SUPPORT (OUTPATIENT)
Dept: SMOKING CESSATION | Facility: CLINIC | Age: 59
End: 2024-09-27

## 2024-09-27 DIAGNOSIS — F17.200 NICOTINE DEPENDENCE: Primary | ICD-10-CM

## 2024-09-27 PROBLEM — F32.9 REACTIVE DEPRESSION: Status: ACTIVE | Noted: 2024-09-27

## 2024-09-27 LAB
ALBUMIN SERPL BCP-MCNC: 3.3 G/DL (ref 3.5–5.2)
ALP SERPL-CCNC: 54 U/L (ref 55–135)
ALT SERPL W/O P-5'-P-CCNC: 8 U/L (ref 10–44)
ANION GAP SERPL CALC-SCNC: 6 MMOL/L (ref 8–16)
AST SERPL-CCNC: 10 U/L (ref 10–40)
BASOPHILS # BLD AUTO: 0.03 K/UL (ref 0–0.2)
BASOPHILS NFR BLD: 0.4 % (ref 0–1.9)
BILIRUB SERPL-MCNC: 0.4 MG/DL (ref 0.1–1)
BUN SERPL-MCNC: 11 MG/DL (ref 6–20)
CALCIUM SERPL-MCNC: 8.4 MG/DL (ref 8.7–10.5)
CHLORIDE SERPL-SCNC: 103 MMOL/L (ref 95–110)
CO2 SERPL-SCNC: 28 MMOL/L (ref 23–29)
CREAT SERPL-MCNC: 0.6 MG/DL (ref 0.5–1.4)
DIFFERENTIAL METHOD BLD: ABNORMAL
EOSINOPHIL # BLD AUTO: 0.1 K/UL (ref 0–0.5)
EOSINOPHIL NFR BLD: 0.9 % (ref 0–8)
ERYTHROCYTE [DISTWIDTH] IN BLOOD BY AUTOMATED COUNT: 15.5 % (ref 11.5–14.5)
EST. GFR  (NO RACE VARIABLE): >60 ML/MIN/1.73 M^2
GLUCOSE SERPL-MCNC: 101 MG/DL (ref 70–110)
HCT VFR BLD AUTO: 36.2 % (ref 37–48.5)
HGB BLD-MCNC: 11.2 G/DL (ref 12–16)
IMM GRANULOCYTES # BLD AUTO: 0.03 K/UL (ref 0–0.04)
IMM GRANULOCYTES NFR BLD AUTO: 0.4 % (ref 0–0.5)
LYMPHOCYTES # BLD AUTO: 2 K/UL (ref 1–4.8)
LYMPHOCYTES NFR BLD: 25.3 % (ref 18–48)
MAGNESIUM SERPL-MCNC: 2 MG/DL (ref 1.6–2.6)
MCH RBC QN AUTO: 27.2 PG (ref 27–31)
MCHC RBC AUTO-ENTMCNC: 30.9 G/DL (ref 32–36)
MCV RBC AUTO: 88 FL (ref 82–98)
MONOCYTES # BLD AUTO: 0.9 K/UL (ref 0.3–1)
MONOCYTES NFR BLD: 11 % (ref 4–15)
NEUTROPHILS # BLD AUTO: 5 K/UL (ref 1.8–7.7)
NEUTROPHILS NFR BLD: 62 % (ref 38–73)
NRBC BLD-RTO: 0 /100 WBC
OHS QRS DURATION: 84 MS
OHS QTC CALCULATION: 404 MS
PLATELET # BLD AUTO: 229 K/UL (ref 150–450)
PMV BLD AUTO: 9.2 FL (ref 9.2–12.9)
POCT GLUCOSE: 114 MG/DL (ref 70–110)
POCT GLUCOSE: 134 MG/DL (ref 70–110)
POCT GLUCOSE: 96 MG/DL (ref 70–110)
POTASSIUM SERPL-SCNC: 4.1 MMOL/L (ref 3.5–5.1)
PROT SERPL-MCNC: 6.3 G/DL (ref 6–8.4)
RBC # BLD AUTO: 4.12 M/UL (ref 4–5.4)
SODIUM SERPL-SCNC: 137 MMOL/L (ref 136–145)
WBC # BLD AUTO: 7.98 K/UL (ref 3.9–12.7)

## 2024-09-27 PROCEDURE — 99900035 HC TECH TIME PER 15 MIN (STAT)

## 2024-09-27 PROCEDURE — 99900031 HC PATIENT EDUCATION (STAT)

## 2024-09-27 PROCEDURE — 99233 SBSQ HOSP IP/OBS HIGH 50: CPT | Mod: ,,, | Performed by: INTERNAL MEDICINE

## 2024-09-27 PROCEDURE — 94660 CPAP INITIATION&MGMT: CPT

## 2024-09-27 PROCEDURE — 83735 ASSAY OF MAGNESIUM: CPT | Performed by: STUDENT IN AN ORGANIZED HEALTH CARE EDUCATION/TRAINING PROGRAM

## 2024-09-27 PROCEDURE — 25000003 PHARM REV CODE 250: Performed by: STUDENT IN AN ORGANIZED HEALTH CARE EDUCATION/TRAINING PROGRAM

## 2024-09-27 PROCEDURE — 63600175 PHARM REV CODE 636 W HCPCS: Performed by: STUDENT IN AN ORGANIZED HEALTH CARE EDUCATION/TRAINING PROGRAM

## 2024-09-27 PROCEDURE — 86060 ANTISTREPTOLYSIN O TITER: CPT | Performed by: INTERNAL MEDICINE

## 2024-09-27 PROCEDURE — 94761 N-INVAS EAR/PLS OXIMETRY MLT: CPT

## 2024-09-27 PROCEDURE — 12000002 HC ACUTE/MED SURGE SEMI-PRIVATE ROOM

## 2024-09-27 PROCEDURE — 27100171 HC OXYGEN HIGH FLOW UP TO 24 HOURS

## 2024-09-27 PROCEDURE — 85025 COMPLETE CBC W/AUTO DIFF WBC: CPT | Performed by: STUDENT IN AN ORGANIZED HEALTH CARE EDUCATION/TRAINING PROGRAM

## 2024-09-27 PROCEDURE — 80053 COMPREHEN METABOLIC PANEL: CPT | Performed by: STUDENT IN AN ORGANIZED HEALTH CARE EDUCATION/TRAINING PROGRAM

## 2024-09-27 PROCEDURE — 99406 BEHAV CHNG SMOKING 3-10 MIN: CPT

## 2024-09-27 PROCEDURE — 63600175 PHARM REV CODE 636 W HCPCS: Performed by: INTERNAL MEDICINE

## 2024-09-27 PROCEDURE — 30000890 LABCORP MISCELLANEOUS TEST: Performed by: INTERNAL MEDICINE

## 2024-09-27 PROCEDURE — 25000003 PHARM REV CODE 250: Performed by: NURSE PRACTITIONER

## 2024-09-27 PROCEDURE — 25000003 PHARM REV CODE 250: Performed by: INTERNAL MEDICINE

## 2024-09-27 RX ORDER — CIPROFLOXACIN 750 MG/1
750 TABLET, FILM COATED ORAL EVERY 12 HOURS
Qty: 20 TABLET | Refills: 0 | Status: SHIPPED | OUTPATIENT
Start: 2024-09-27 | End: 2024-09-29

## 2024-09-27 RX ORDER — DULOXETIN HYDROCHLORIDE 30 MG/1
30 CAPSULE, DELAYED RELEASE ORAL NIGHTLY
Qty: 30 CAPSULE | Refills: 1 | Status: SHIPPED | OUTPATIENT
Start: 2024-09-27 | End: 2024-09-29

## 2024-09-27 RX ORDER — DULOXETIN HYDROCHLORIDE 30 MG/1
30 CAPSULE, DELAYED RELEASE ORAL NIGHTLY
Status: DISCONTINUED | OUTPATIENT
Start: 2024-09-27 | End: 2024-09-29 | Stop reason: HOSPADM

## 2024-09-27 RX ORDER — MICONAZOLE NITRATE 2 G/100G
POWDER TOPICAL 2 TIMES DAILY PRN
Qty: 85 G | Refills: 0 | Status: SHIPPED | OUTPATIENT
Start: 2024-09-27 | End: 2024-09-29

## 2024-09-27 RX ORDER — LINEZOLID 600 MG/1
600 TABLET, FILM COATED ORAL EVERY 12 HOURS
Qty: 28 TABLET | Refills: 0 | Status: SHIPPED | OUTPATIENT
Start: 2024-09-27 | End: 2024-09-29

## 2024-09-27 RX ADMIN — MORPHINE SULFATE 2 MG: 2 INJECTION, SOLUTION INTRAMUSCULAR; INTRAVENOUS at 08:09

## 2024-09-27 RX ADMIN — LINEZOLID 600 MG: 600 TABLET, FILM COATED ORAL at 08:09

## 2024-09-27 RX ADMIN — ENOXAPARIN SODIUM 60 MG: 60 INJECTION SUBCUTANEOUS at 08:09

## 2024-09-27 RX ADMIN — ONDANSETRON 4 MG: 2 INJECTION INTRAMUSCULAR; INTRAVENOUS at 10:09

## 2024-09-27 RX ADMIN — CEFTRIAXONE SODIUM 2 G: 2 INJECTION, POWDER, FOR SOLUTION INTRAMUSCULAR; INTRAVENOUS at 08:09

## 2024-09-27 RX ADMIN — FAMOTIDINE 20 MG: 20 TABLET ORAL at 08:09

## 2024-09-27 RX ADMIN — MORPHINE SULFATE 2 MG: 2 INJECTION, SOLUTION INTRAMUSCULAR; INTRAVENOUS at 02:09

## 2024-09-27 RX ADMIN — BUPROPION HYDROCHLORIDE 150 MG: 150 TABLET, EXTENDED RELEASE ORAL at 08:09

## 2024-09-27 RX ADMIN — SENNOSIDES AND DOCUSATE SODIUM 1 TABLET: 8.6; 5 TABLET ORAL at 08:09

## 2024-09-27 RX ADMIN — DULOXETINE HYDROCHLORIDE 30 MG: 30 CAPSULE, DELAYED RELEASE ORAL at 08:09

## 2024-09-27 RX ADMIN — ONDANSETRON 4 MG: 2 INJECTION INTRAMUSCULAR; INTRAVENOUS at 11:09

## 2024-09-27 NOTE — HOSPITAL COURSE
Adriana Zaragoza was monitored closely during her hospitalization after admission for sepsis 2/2 recurrent panniculitis.  Infectious disease was consulted and antibiotics were managed by them.  She was initially given one dose IV vancomycin and IV meropenem in the ED, then continued on IV levaquin.  ID recommended further treatment with oral linezolid and IV ceftriaxone.  Levaquin was discontinued.  Wound care was consulted.  She was febrile on presentation, but remained afebrile afterward.  She voiced increased depression secondary to increased pain and her multiple hospitalizations.  She was agreeable to try low dose cymbalta daily and follow up with her PCP.  She did not express suicidal ideations.  She began feeling much better and was appropriate for discharge.  Infectious disease recommended a 14 day course of linezolid and 10 days of high dose cipro.  9/28 patient reports overnight she experienced chills and fatigue, as well as body aches, and she was monitored overnight.  She remained afebrile and her symptoms improved.  She is discharged to home today after she ambulates in the hallway.  She endorses having assistance at home and does not need home health.  She was being referred to Plastic surgery to be evaluated for possible panniculectomy.  She will continue the oral antibiotics as per Infectious Disease recommendation.  She will follow up with her PCP and Infectious Disease.  She did not have any adverse events while here.    Physical Exam:  General- Patient alert and oriented x3 in NAD  HEENT- PERRLA, EOMI  Neck- FROM, supple  CV- Regular rate and rhythm  Resp- Lungs CTA Bilaterally, No increased WOB  GI- Non tender/non-distended, BS normoactive x4 quads  Extrem- VICENTE, no edema  Neuro- no deficits  Skin- warm and dry

## 2024-09-27 NOTE — CONSULTS
Rn assessed patient.     Rt thigh with reddened, warm, solid mass to thigh. Patient states it is chronic and she uses Hibiclens and ABX to treat area. No pain at area per patient when palpated.      Rt breast fold: blisters with no fluid, some reddened areas. No pain per patient. Some areas not crusted over, but unable to press out any drainage.     Provided patient with Hibiclens to continue regimen. Patient currently on IV ABX as well.         Rt Breast           Rt thigh

## 2024-09-27 NOTE — PROGRESS NOTES
[facility]  Progress Note    Patient Name: Adriana Zaragoza  MRN: [id number]  Patient Class: IP- Inpatient   Admission Date: 9/25/2024  Length of Stay: 1 days  Attending Physician: Agusto Valenzuela, *  Primary Care Provider: Melba Trivedi MD        Subjective:     Principal Problem:Sepsis        HPI:  The patient is a 58-year-old female with past medical history of morbid obesity, diabetes mellitus type 2, COPD, recurrent panniculitis who presented to the ED with the complaints of fever and generalized weakness since yesterday.    The patient reports that she went to a doctor's office with her boyfriend yesterday to fix his baclofen pump.  She reports that she felt febrile after coming back from the office.  She reports that she has had repeated admissions to the hospital because of folliculitis and cellulitis.  She reports that she has a lymphedema growth from right thigh which gets infected a lot. She also reports that she has some folliculitis in the right breast area.  She denies any chest pain, shortness of breath, abdominal pain, nausea, vomiting, diarrhea, cough, urinary symptoms.  In the ED, the patient was found to have temperature of 100.5°, cellulitis of that lymphedema growth and some folliculitis of her right breast, the patient is being admitted to hospitalist service for further evaluation and management.        Overview/Hospital Course:  Adriana Zaragoza was monitored closely during her hospitalization.  Infectious disease was consulted and antibiotics were managed by them.  She was initially given one dose IV vancomycin and IV meropenem in the ED, then continued on IV levaquin.  ID recommended further treatment with oral linezolid and IV ceftriaxone.  Levaquin was discontinued.  Wound care was consulted.    Interval History: no acute events overnight.  Antibiotic regimen adjusted per ID.    Review of Systems   Constitutional:  Positive  for diaphoresis and fever.   Musculoskeletal:  Positive for arthralgias and myalgias.   Skin:  Positive for color change.     Objective:     Vital Signs (Most Recent):  Temp: 98.4 °F (36.9 °C) (09/27/24 0800)  Pulse: 73 (09/27/24 0800)  Resp: 18 (09/27/24 0816)  BP: (!) 124/57 (09/27/24 0800)  SpO2: 97 % (09/27/24 0800) Vital Signs (24h Range):  Temp:  [97.8 °F (36.6 °C)-98.5 °F (36.9 °C)] 98.4 °F (36.9 °C)  Pulse:  [65-79] 73  Resp:  [17-20] 18  SpO2:  [94 %-100 %] 97 %  BP: (105-165)/(47-72) 124/57     Weight: (!) 167.8 kg (370 lb)  Body mass index is 63.51 kg/m².    Intake/Output Summary (Last 24 hours) at 9/27/2024 0851  Last data filed at 9/27/2024 0419  Gross per 24 hour   Intake 1340 ml   Output 2350 ml   Net -1010 ml         Physical Exam  Vitals and nursing note reviewed.   Constitutional:       General: She is not in acute distress.     Appearance: She is well-developed. She is obese. She is ill-appearing.   Cardiovascular:      Rate and Rhythm: Normal rate and regular rhythm.   Pulmonary:      Effort: Pulmonary effort is normal.      Breath sounds: Normal breath sounds.   Chest:      Comments: Red crusty lesions under right breast  Abdominal:      General: Bowel sounds are normal. There is no distension.      Palpations: Abdomen is soft.      Tenderness: There is no abdominal tenderness.   Musculoskeletal:         General: Swelling and tenderness (right thigh) present.      Cervical back: Normal range of motion and neck supple.   Skin:     General: Skin is warm and dry.      Findings: Erythema and lesion present.   Neurological:      General: No focal deficit present.      Mental Status: She is alert and oriented to person, place, and time.   Psychiatric:         Mood and Affect: Mood is anxious.         Behavior: Behavior normal.             Significant Labs: All pertinent labs within the past 24 hours have been reviewed.  CBC:   Recent Labs   Lab 09/25/24  2318 09/27/24  0400   WBC 13.08* 7.98   HGB  12.6 11.2*   HCT 40.4 36.2*    229     CMP:   Recent Labs   Lab 09/25/24  2318 09/27/24  0400   * 137   K 4.1 4.1    103   CO2 22* 28   * 101   BUN 12 11   CREATININE 0.7 0.6   CALCIUM 8.9 8.4*   PROT 7.6 6.3   ALBUMIN 3.9 3.3*   BILITOT 0.4 0.4   ALKPHOS 68 54*   AST 12 10   ALT 9* 8*   ANIONGAP 10 6*       Significant Imaging: I have reviewed all pertinent imaging results/findings within the past 24 hours.    Assessment/Plan:      * Sepsis  This patient does have evidence of infective focus  My overall impression is sepsis.  Source: Skin and Soft Tissue (location right thigh and right breast)  Antibiotics given-   Antibiotics (72h ago, onward)      Start     Stop Route Frequency Ordered    09/26/24 2100  linezolid tablet 600 mg         -- Oral Every 12 hours 09/26/24 1851 09/26/24 2000  cefTRIAXone (ROCEPHIN) 2 g in dextrose 5 % 100 mL IVPB (ready to mix)         -- IV Every 24 hours (non-standard times) 09/26/24 1851          Latest lactate reviewed-  Recent Labs   Lab 09/25/24  2317   POCLAC 1.23     Organ dysfunction indicated by  tachycardia, fever    Fluid challenge Not needed - patient is not hypotensive      Post- resuscitation assessment No - Post resuscitation assessment not needed       Will Not start Pressors- Levophed for MAP of 65  Source control achieved by: IV abx    COPD (chronic obstructive pulmonary disease)  Patient's COPD is controlled currently.  Patient is currently off COPD Pathway. Continue scheduled inhalers Antibiotics and Supplemental oxygen as needed and monitor respiratory status closely.     Panniculitis  Recurrent problem.  Consult ID-abx management per their team  Currently on oral linezolid and IV ceftriaxone per ID recs  PT/OT  Turn in bed q2h  Wound care consult        Mass of right thigh  Chronic lymphedema mass noted.      Morbid obesity  Body mass index is 63.51 kg/m². Morbid obesity complicates all aspects of disease management from diagnostic  modalities to treatment. Weight loss encouraged and health benefits explained to patient.     She is s/p bariatric surgery earlier this year.        VTE Risk Mitigation (From admission, onward)           Ordered     enoxaparin injection 60 mg  Every 12 hours        Note to Pharmacy: Ht:    Wt: (!) 167.8 kg (370 lb)  Estimated Creatinine Clearance: 136.5 mL/min (based on SCr of 0.7 mg/dL).  Body mass index is 63.51 kg/m².    09/26/24 0204     IP VTE HIGH RISK PATIENT  Once         09/26/24 0159     Place sequential compression device  Until discontinued         09/26/24 0159                    Discharge Planning   JONATHAN: 9/28/2024     Code Status: Full Code   Is the patient medically ready for discharge?:     Reason for patient still in hospital (select all that apply): Patient trending condition, Treatment, and Consult recommendations  Discharge Plan A: Home with family                  VIOLA Huynh  Department of Hospital Medicine   [facility]

## 2024-09-27 NOTE — PROGRESS NOTES
[facility]   Department of Infectious Disease  Progress Note        PATIENT NAME: Adriana Zaragoza  MRN: [id number]  TODAY'S DATE: 09/27/2024  ADMIT DATE: 9/25/2024  LOS: 1 days    CHIEF COMPLAINT: Headache, Chills, and Fever (BIBA for headache, chills, fever, back pain/Tumor growing between legs/Hx of sepsis)      PRINCIPLE PROBLEM: <principal problem not specified>    INTERVAL HISTORY      09/27/2024: Seen and evaluated at bedside.  No acute issues overnight.  Slowly improving.  Tolerating antibiotics okay for now.      Antibiotics (From admission, onward)      Start     Stop Route Frequency Ordered    09/26/24 2100  linezolid tablet 600 mg         -- Oral Every 12 hours 09/26/24 1851 09/26/24 2000  cefTRIAXone (ROCEPHIN) 2 g in dextrose 5 % 100 mL IVPB (ready to mix)         -- IV Every 24 hours (non-standard times) 09/26/24 1851          Antifungals (From admission, onward)      Start     Stop Route Frequency Ordered    09/26/24 0303  miconazole NITRATE 2 % top powder         -- Top 2 times daily PRN 09/26/24 0204           Antivirals (From admission, onward)      None            ASSESSMENT and PLAN      Recurrent Right thigh panniculitis.  Continue linezolid and ceftriaxone.       Morbid obesity.  She had bariatric surgery and is already losing weight.  Management as per her PCP hospitalist.     3. COPD.  Management as per hospitalist.       RECOMMENDATIONS:    Continue linezolid and ceftriaxone   Follow ASO titer   DC either today or tomorrow 09/29/2024 on oral linezolid 600 mg twice daily times 14 days + ciprofloxacin 750 mg b.i.d. times 10 days.  Maybe best if patient can fill her prescription at the hospital pharmacy prior to discharge  Follow up with ID service in 2-3 weeks.   to assist with arranging evaluation by plastic surgeon at [facility] for resection of the right thigh pannus.    Thank you for involving ID in the care of this patient.  I will follow with  you while in the hospital.  Please send Epic secure chat with any questions.      SUBJECTIVE    Adriana Zaragoza is a 58 y.o. female with history of morbid obesity, COPD.  She has a large right medial thigh pannus.  She is known to ID service and is cared for by my partner Dr. Humphreys.  She has had recurrent panniculitis and cellulitis at this site.  Informs me she has tried to see if the pannus can be removed/resected.  Apparently, no surgeon in the [place] area is willing to undertake the removal.  She did get in touch with the plastic surgeon in [facility].  She agreed to LTAC take resection but with the caveats that patient will lose significant weight.  She informs me that her weight has dropped from about 460 lb to 300s.     Current episode started suddenly yesterday with generalized malaise and weakness.  Presented to the emergency room and has been admitted.  She is on antibiotics with some clinical improvement.     Antibiotic history:    Vancomycin: 09/24/2024 x1 dose   Meropenem:  09/24/2024 x1 dose   Levofloxacin: 09/26/2024 x1 dose   Linezolid:  09/22/2024-  Ceftriaxone: 09/22/2024-     Microbiology:    Blood culture 09/24/2024: NGTD    Review of Systems  Negative except as stated above in Interval History     OBJECTIVE   Temp:  [97.8 °F (36.6 °C)-98.5 °F (36.9 °C)] 98.4 °F (36.9 °C)  Pulse:  [65-79] 73  Resp:  [17-20] 18  SpO2:  [94 %-100 %] 97 %  BP: (105-165)/(47-72) 124/57  Temp:  [97.8 °F (36.6 °C)-98.5 °F (36.9 °C)]   Temp: 98.4 °F (36.9 °C) (09/27/24 0800)  Pulse: 73 (09/27/24 0800)  Resp: 18 (09/27/24 0816)  BP: (!) 124/57 (09/27/24 0800)  SpO2: 97 % (09/27/24 0800)    Intake/Output Summary (Last 24 hours) at 9/27/2024 0829  Last data filed at 9/27/2024 0419  Gross per 24 hour   Intake 1340 ml   Output 2350 ml   Net -1010 ml       Physical Exam  General:  Morbidly obese middle-aged woman lying quietly in bed in no acute distress   HEENT: No oral thrush   CVS: S1 and 2 heard, no  "murmurs appreciated   Respiratory: Clear to auscultation   Abdomen: Full, soft, nontender, no palpable organomegaly   Right lower extremity:  She has a large pannus from the medial thigh.  Fading edema and erythema with early skin desquamation  CNS: No gross focal deficits   Musculoskeletal system: No other joint or bony abnormalities appreciated     VAD:  PIV  ISOLATION:  None     Wounds:  Known    Significant Labs: All pertinent labs within the past 24 hours have been reviewed.    CBC LAST 7 DAYS  Recent Labs   Lab 09/25/24 2318 09/27/24  0400   WBC 13.08* 7.98   RBC 4.61 4.12   HGB 12.6 11.2*   HCT 40.4 36.2*   MCV 88 88   MCH 27.3 27.2   MCHC 31.2* 30.9*   RDW 15.5* 15.5*    229   MPV 9.0* 9.2   GRAN 84.3*  11.0* 62.0  5.0   LYMPH 9.0*  1.2 25.3  2.0   MONO 5.5  0.7 11.0  0.9   BASO 0.06 0.03   NRBC 0 0       CHEMISTRY LAST 7 DAYS  Recent Labs   Lab 09/25/24 2318 09/27/24  0400   * 137   K 4.1 4.1    103   CO2 22* 28   ANIONGAP 10 6*   BUN 12 11   CREATININE 0.7 0.6   * 101   CALCIUM 8.9 8.4*   MG 1.8 2.0   ALBUMIN 3.9 3.3*   PROT 7.6 6.3   ALKPHOS 68 54*   ALT 9* 8*   AST 12 10   BILITOT 0.4 0.4       Estimated Creatinine Clearance: 159.2 mL/min (based on SCr of 0.6 mg/dL).    INFLAMMATORY/PROCAL  LAST 7 DAYS  Recent Labs   Lab 09/25/24 2318   PROCAL <0.050   CRP 1.30*     No results found for: "ESR"  CRP   Date Value Ref Range Status   09/25/2024 1.30 (H) <1.00 mg/dL Final     Comment:     CRP-Normal Application expected values:   <1.0        mg/dL   Normal Range  1.0 - 5.0  mg/dL   Indicates mild inflammation  5.0 - 10.0 mg/dL   Indicates severe inflammation  >10.0        mg/dL   Represents serious processes and   frequently         indicates the presence of a bacterial   infection.      08/28/2024 10.60 (H) <1.00 mg/dL Final     Comment:     CRP-Normal Application expected values:   <1.0        mg/dL   Normal Range  1.0 - 5.0  mg/dL   Indicates mild inflammation  5.0 - 10.0 " mg/dL   Indicates severe inflammation  >10.0        mg/dL   Represents serious processes and   frequently         indicates the presence of a bacterial   infection.      08/26/2024 8.00 (H) <1.00 mg/dL Final     Comment:     CRP-Normal Application expected values:   <1.0        mg/dL   Normal Range  1.0 - 5.0  mg/dL   Indicates mild inflammation  5.0 - 10.0 mg/dL   Indicates severe inflammation  >10.0        mg/dL   Represents serious processes and   frequently         indicates the presence of a bacterial   infection.      02/14/2024 5.0 0.0 - 8.2 mg/L Final   02/06/2024 115.2 (H) 0.0 - 8.2 mg/L Final   03/07/2023 3.94 (H) <0.76 mg/dL Final   03/05/2023 11.05 (H) <0.76 mg/dL Final       PRIOR  MICROBIOLOGY:    Susceptibility data from last 90 days.  Collected Specimen Info Organism   08/25/24 Blood from Peripheral, Hand, Right No growth after 5 days.   08/25/24 Blood from Peripheral, Hand, Left No growth after 5 days.       LAST 7 DAYS MICROBIOLOGY   Microbiology Results (last 7 days)       Procedure Component Value Units Date/Time    Blood culture x two cultures. Draw prior to antibiotics [[id number]] Collected: 09/25/24 2315    Order Status: Completed Specimen: Blood Updated: 09/27/24 0232     Blood Culture, Routine No Growth to date      No Growth to date    Narrative:      Aerobic and anaerobic    Blood culture x two cultures. Draw prior to antibiotics [[id number]] Collected: 09/25/24 2321    Order Status: Completed Specimen: Blood Updated: 09/27/24 0232     Blood Culture, Routine No Growth to date      No Growth to date    Narrative:      Aerobic and anaerobic    Influenza A & B by Molecular [[id number]]     Order Status: Canceled Specimen: Nasopharyngeal Swab           CURRENT/PREVIOUS VISIT EKG  Results for orders placed or performed during the hospital encounter of 09/25/24   EKG 12-lead    Collection Time: 09/25/24 11:08 PM   Result Value Ref Range    QRS Duration 84 ms    OHS QTC Calculation 404 ms     Narrative    Test Reason : R51.9,    Vent. Rate : 096 BPM     Atrial Rate : 096 BPM     P-R Int : 184 ms          QRS Dur : 084 ms      QT Int : 320 ms       P-R-T Axes : 036 096 067 degrees     QTc Int : 404 ms    Normal sinus rhythm  Rightward axis  Low voltage QRS  Borderline Abnormal ECG  When compared with ECG of 25-AUG-2024 15:25,  Aberrant conduction is no longer Present  Nonspecific T wave abnormality no longer evident in Inferior leads    Referred By: [email or web address]   SELF           Confirmed By:      Significant Imaging: I have reviewed all relevant and available imaging results/findings within the past 24 hours.    I spent a total of 55 minutes on the day of the visit.This includes face to face time and non-face to face time preparing to see the patient (eg, review of tests), obtaining and/or reviewing separately obtained history, documenting clinical information in the electronic or other health record, independently interpreting results and communicating results to the patient/family/caregiver, or care coordinator.    Rajendra Callahan MD  Date of Service: 09/27/2024      This note was created using [employer] voice recognition software that occasionally misinterpreted phrases or words.

## 2024-09-27 NOTE — ASSESSMENT & PLAN NOTE
Body mass index is 63.51 kg/m². Morbid obesity complicates all aspects of disease management from diagnostic modalities to treatment. Weight loss encouraged and health benefits explained to patient.     She is s/p bariatric surgery earlier this year.

## 2024-09-27 NOTE — PLAN OF CARE
Problem: Adult Inpatient Plan of Care  Goal: Plan of Care Review  Outcome: Progressing  Goal: Patient-Specific Goal (Individualized)  Outcome: Progressing  Goal: Absence of Hospital-Acquired Illness or Injury  Outcome: Progressing  Goal: Optimal Comfort and Wellbeing  Outcome: Progressing  Goal: Readiness for Transition of Care  Outcome: Progressing     Problem: Bariatric Environmental Safety  Goal: Safety Maintained with Care  Outcome: Progressing     Problem: Infection  Goal: Absence of Infection Signs and Symptoms  Outcome: Progressing     Problem: Skin Injury Risk Increased  Goal: Skin Health and Integrity  Outcome: Progressing     Problem: Wound  Goal: Optimal Coping  Outcome: Progressing  Goal: Optimal Functional Ability  Outcome: Progressing  Goal: Absence of Infection Signs and Symptoms  Outcome: Progressing  Goal: Improved Oral Intake  Outcome: Progressing  Goal: Optimal Pain Control and Function  Outcome: Progressing  Goal: Skin Health and Integrity  Outcome: Progressing  Goal: Optimal Wound Healing  Outcome: Progressing     Problem: Sepsis/Septic Shock  Goal: Optimal Coping  Outcome: Progressing  Goal: Absence of Bleeding  Outcome: Progressing  Goal: Blood Glucose Level Within Targeted Range  Outcome: Progressing  Goal: Absence of Infection Signs and Symptoms  Outcome: Progressing  Goal: Optimal Nutrition Intake  Outcome: Progressing

## 2024-09-27 NOTE — CARE UPDATE
09/27/24 0840   Patient Assessment/Suction   Level of Consciousness (AVPU) alert   Respiratory Effort Unlabored;Normal   Expansion/Accessory Muscles/Retractions no use of accessory muscles   All Lung Fields Breath Sounds Anterior:;Posterior:;Lateral:;clear   Rhythm/Pattern, Respiratory unlabored;pattern regular;depth regular   Cough Frequency no cough   Skin Integrity   $ Wound Care Tech Time 15 min   Area Observed Bridge of nose   Skin Appearance without discoloration   PRE-TX-O2   Device (Oxygen Therapy) room air   SpO2 (!) 94 %   Pulse Oximetry Type Intermittent   $ Pulse Oximetry - Multiple Charge Pulse Oximetry - Multiple   Pulse 72   Resp 16   Preset CPAP/BiPAP Settings   Mode Of Delivery CPAP;Standby   $ CPAP/BiPAP Daily Charge BiPAP/CPAP Daily   $ Is patient using? Yes  (Hs and naps)   Size of Mask Small/Medium   Sized Appropriately? Yes   Equipment Type V60   CPAP (cm H2O) 10   CPAP/BiPAP Alarms   High Pressure (cm H2O) 40   Low Pressure (cm H2O) 5   Minute Ventilation (L/Min) 2   High RR (breaths/min) 15   Low RR (breaths/min) 8   Apnea (Sec) 20   Education   $ Education BiPAP;15 min

## 2024-09-27 NOTE — PROGRESS NOTES
09/27/24 0800   Tobacco Cessation Intervention   Do you use any type of tobacco product? Yes   Are you interested in quitting use of tobacco products? Ready to quit   Are you interested in Nicotine Replacement for symptom relief? No   $ Smoking Cessation Charges Smoking Cessation - Intermediate (CTTS)

## 2024-09-27 NOTE — ASSESSMENT & PLAN NOTE
Recurrent problem.  Consult ID-abx management per their team  Currently on oral linezolid and IV ceftriaxone per ID recs  PT/OT  Turn in bed q2h  Wound care consult

## 2024-09-27 NOTE — ASSESSMENT & PLAN NOTE
This patient does have evidence of infective focus  My overall impression is sepsis.  Source: Skin and Soft Tissue (location right thigh and right breast)  Antibiotics given-   Antibiotics (72h ago, onward)      Start     Stop Route Frequency Ordered    09/26/24 2100  linezolid tablet 600 mg         -- Oral Every 12 hours 09/26/24 1851 09/26/24 2000  cefTRIAXone (ROCEPHIN) 2 g in dextrose 5 % 100 mL IVPB (ready to mix)         -- IV Every 24 hours (non-standard times) 09/26/24 1851          Latest lactate reviewed-  Recent Labs   Lab 09/25/24  2317   POCLAC 1.23     Organ dysfunction indicated by  tachycardia, fever    Fluid challenge Not needed - patient is not hypotensive      Post- resuscitation assessment No - Post resuscitation assessment not needed       Will Not start Pressors- Levophed for MAP of 65  Source control achieved by: IV abx

## 2024-09-27 NOTE — ASSESSMENT & PLAN NOTE
Patient has persistent depression which is moderate and is currently uncontrolled. Will Begin anti-depressant medications. We will not consult psychiatry at this time. Patient does not display psychosis at this time. Continue to monitor closely and adjust plan of care as needed.

## 2024-09-27 NOTE — ASSESSMENT & PLAN NOTE
Patient's COPD is controlled currently.  Patient is currently off COPD Pathway. Continue scheduled inhalers Antibiotics and Supplemental oxygen as needed and monitor respiratory status closely.

## 2024-09-28 LAB
ALBUMIN SERPL BCP-MCNC: 3.3 G/DL (ref 3.5–5.2)
ALP SERPL-CCNC: 60 U/L (ref 55–135)
ALT SERPL W/O P-5'-P-CCNC: 10 U/L (ref 10–44)
ANION GAP SERPL CALC-SCNC: 5 MMOL/L (ref 8–16)
AST SERPL-CCNC: 11 U/L (ref 10–40)
BASOPHILS # BLD AUTO: 0.03 K/UL (ref 0–0.2)
BASOPHILS NFR BLD: 0.3 % (ref 0–1.9)
BILIRUB SERPL-MCNC: 0.3 MG/DL (ref 0.1–1)
BUN SERPL-MCNC: 14 MG/DL (ref 6–20)
CALCIUM SERPL-MCNC: 8.5 MG/DL (ref 8.7–10.5)
CHLORIDE SERPL-SCNC: 103 MMOL/L (ref 95–110)
CO2 SERPL-SCNC: 29 MMOL/L (ref 23–29)
CREAT SERPL-MCNC: 0.6 MG/DL (ref 0.5–1.4)
DIFFERENTIAL METHOD BLD: ABNORMAL
EOSINOPHIL # BLD AUTO: 0.1 K/UL (ref 0–0.5)
EOSINOPHIL NFR BLD: 0.8 % (ref 0–8)
ERYTHROCYTE [DISTWIDTH] IN BLOOD BY AUTOMATED COUNT: 15.1 % (ref 11.5–14.5)
EST. GFR  (NO RACE VARIABLE): >60 ML/MIN/1.73 M^2
GLUCOSE SERPL-MCNC: 111 MG/DL (ref 70–110)
HCT VFR BLD AUTO: 35.8 % (ref 37–48.5)
HGB BLD-MCNC: 11.1 G/DL (ref 12–16)
IMM GRANULOCYTES # BLD AUTO: 0.03 K/UL (ref 0–0.04)
IMM GRANULOCYTES NFR BLD AUTO: 0.3 % (ref 0–0.5)
LABCORP MISC TEST CODE: 6031
LABCORP MISC TEST NAME: NORMAL
LABCORP MISCELLANEOUS TEST: NORMAL
LYMPHOCYTES # BLD AUTO: 1.8 K/UL (ref 1–4.8)
LYMPHOCYTES NFR BLD: 20 % (ref 18–48)
MAGNESIUM SERPL-MCNC: 2 MG/DL (ref 1.6–2.6)
MCH RBC QN AUTO: 27 PG (ref 27–31)
MCHC RBC AUTO-ENTMCNC: 31 G/DL (ref 32–36)
MCV RBC AUTO: 87 FL (ref 82–98)
MONOCYTES # BLD AUTO: 0.7 K/UL (ref 0.3–1)
MONOCYTES NFR BLD: 7.5 % (ref 4–15)
NEUTROPHILS # BLD AUTO: 6.5 K/UL (ref 1.8–7.7)
NEUTROPHILS NFR BLD: 71.1 % (ref 38–73)
NRBC BLD-RTO: 0 /100 WBC
PLATELET # BLD AUTO: 260 K/UL (ref 150–450)
PMV BLD AUTO: 9.5 FL (ref 9.2–12.9)
POCT GLUCOSE: 122 MG/DL (ref 70–110)
POCT GLUCOSE: 141 MG/DL (ref 70–110)
POCT GLUCOSE: 95 MG/DL (ref 70–110)
POTASSIUM SERPL-SCNC: 4.1 MMOL/L (ref 3.5–5.1)
PROT SERPL-MCNC: 6.5 G/DL (ref 6–8.4)
RBC # BLD AUTO: 4.11 M/UL (ref 4–5.4)
SODIUM SERPL-SCNC: 137 MMOL/L (ref 136–145)
WBC # BLD AUTO: 9.16 K/UL (ref 3.9–12.7)

## 2024-09-28 PROCEDURE — 85025 COMPLETE CBC W/AUTO DIFF WBC: CPT | Performed by: STUDENT IN AN ORGANIZED HEALTH CARE EDUCATION/TRAINING PROGRAM

## 2024-09-28 PROCEDURE — 27100171 HC OXYGEN HIGH FLOW UP TO 24 HOURS

## 2024-09-28 PROCEDURE — 80053 COMPREHEN METABOLIC PANEL: CPT | Performed by: STUDENT IN AN ORGANIZED HEALTH CARE EDUCATION/TRAINING PROGRAM

## 2024-09-28 PROCEDURE — 25000003 PHARM REV CODE 250: Performed by: STUDENT IN AN ORGANIZED HEALTH CARE EDUCATION/TRAINING PROGRAM

## 2024-09-28 PROCEDURE — 99900035 HC TECH TIME PER 15 MIN (STAT)

## 2024-09-28 PROCEDURE — 94640 AIRWAY INHALATION TREATMENT: CPT

## 2024-09-28 PROCEDURE — 99900031 HC PATIENT EDUCATION (STAT)

## 2024-09-28 PROCEDURE — 99232 SBSQ HOSP IP/OBS MODERATE 35: CPT | Mod: ,,, | Performed by: INTERNAL MEDICINE

## 2024-09-28 PROCEDURE — 12000002 HC ACUTE/MED SURGE SEMI-PRIVATE ROOM

## 2024-09-28 PROCEDURE — 63600175 PHARM REV CODE 636 W HCPCS: Performed by: INTERNAL MEDICINE

## 2024-09-28 PROCEDURE — 25000242 PHARM REV CODE 250 ALT 637 W/ HCPCS: Performed by: STUDENT IN AN ORGANIZED HEALTH CARE EDUCATION/TRAINING PROGRAM

## 2024-09-28 PROCEDURE — 25000003 PHARM REV CODE 250: Performed by: INTERNAL MEDICINE

## 2024-09-28 PROCEDURE — 94761 N-INVAS EAR/PLS OXIMETRY MLT: CPT

## 2024-09-28 PROCEDURE — 63600175 PHARM REV CODE 636 W HCPCS: Performed by: STUDENT IN AN ORGANIZED HEALTH CARE EDUCATION/TRAINING PROGRAM

## 2024-09-28 PROCEDURE — 36415 COLL VENOUS BLD VENIPUNCTURE: CPT | Performed by: STUDENT IN AN ORGANIZED HEALTH CARE EDUCATION/TRAINING PROGRAM

## 2024-09-28 PROCEDURE — 94660 CPAP INITIATION&MGMT: CPT

## 2024-09-28 PROCEDURE — 25000003 PHARM REV CODE 250: Performed by: NURSE PRACTITIONER

## 2024-09-28 PROCEDURE — 83735 ASSAY OF MAGNESIUM: CPT | Performed by: STUDENT IN AN ORGANIZED HEALTH CARE EDUCATION/TRAINING PROGRAM

## 2024-09-28 RX ADMIN — FAMOTIDINE 20 MG: 20 TABLET ORAL at 08:09

## 2024-09-28 RX ADMIN — LINEZOLID 600 MG: 600 TABLET, FILM COATED ORAL at 08:09

## 2024-09-28 RX ADMIN — OXYCODONE AND ACETAMINOPHEN 1 TABLET: 7.5; 325 TABLET ORAL at 03:09

## 2024-09-28 RX ADMIN — MORPHINE SULFATE 2 MG: 2 INJECTION, SOLUTION INTRAMUSCULAR; INTRAVENOUS at 05:09

## 2024-09-28 RX ADMIN — ENOXAPARIN SODIUM 60 MG: 60 INJECTION SUBCUTANEOUS at 08:09

## 2024-09-28 RX ADMIN — ALBUTEROL SULFATE 2.5 MG: 2.5 SOLUTION RESPIRATORY (INHALATION) at 08:09

## 2024-09-28 RX ADMIN — DULOXETINE HYDROCHLORIDE 30 MG: 30 CAPSULE, DELAYED RELEASE ORAL at 08:09

## 2024-09-28 RX ADMIN — SENNOSIDES AND DOCUSATE SODIUM 1 TABLET: 8.6; 5 TABLET ORAL at 08:09

## 2024-09-28 RX ADMIN — MORPHINE SULFATE 2 MG: 2 INJECTION, SOLUTION INTRAMUSCULAR; INTRAVENOUS at 11:09

## 2024-09-28 RX ADMIN — MORPHINE SULFATE 2 MG: 2 INJECTION, SOLUTION INTRAMUSCULAR; INTRAVENOUS at 09:09

## 2024-09-28 RX ADMIN — BUPROPION HYDROCHLORIDE 150 MG: 150 TABLET, EXTENDED RELEASE ORAL at 08:09

## 2024-09-28 RX ADMIN — MORPHINE SULFATE 2 MG: 2 INJECTION, SOLUTION INTRAMUSCULAR; INTRAVENOUS at 04:09

## 2024-09-28 RX ADMIN — OXYCODONE AND ACETAMINOPHEN 1 TABLET: 7.5; 325 TABLET ORAL at 01:09

## 2024-09-28 RX ADMIN — CEFTRIAXONE SODIUM 2 G: 2 INJECTION, POWDER, FOR SOLUTION INTRAMUSCULAR; INTRAVENOUS at 07:09

## 2024-09-28 NOTE — PROGRESS NOTES
[facility]   Department of Infectious Disease  Progress Note        PATIENT NAME: Adriana Zaragoza  MRN: [id number]  TODAY'S DATE: 09/28/2024  ADMIT DATE: 9/25/2024  LOS: 2 days    CHIEF COMPLAINT: Headache, Chills, and Fever (BIBA for headache, chills, fever, back pain/Tumor growing between legs/Hx of sepsis)      PRINCIPLE PROBLEM: Sepsis    INTERVAL HISTORY      09/27/2024: Seen and evaluated at bedside.  No acute issues overnight.  Slowly improving.  Tolerating antibiotics okay for now.      09/28/2024:  No acute issues overnight.  Seen and evaluated at bedside.  Continues to improve.  ASO titer normal at 59.  Blood cultures remain negative.    Antibiotics (From admission, onward)      Start     Stop Route Frequency Ordered    09/26/24 2100  linezolid tablet 600 mg         -- Oral Every 12 hours 09/26/24 1851 09/26/24 2000  cefTRIAXone (ROCEPHIN) 2 g in dextrose 5 % 100 mL IVPB (ready to mix)         -- IV Every 24 hours (non-standard times) 09/26/24 1851          Antifungals (From admission, onward)      Start     Stop Route Frequency Ordered    09/26/24 0303  miconazole NITRATE 2 % top powder         -- Top 2 times daily PRN 09/26/24 0204           Antivirals (From admission, onward)      None            ASSESSMENT and PLAN      Recurrent Right thigh panniculitis.  Continue linezolid and ceftriaxone.       Morbid obesity.  She had bariatric surgery and is already losing weight.  Management as per her PCP hospitalist.     3. COPD.  Management as per hospitalist.       RECOMMENDATIONS:    Continue linezolid and ceftriaxone   Follow ASO titer   DC either today or tomorrow 09/29/2024 on oral linezolid 600 mg twice daily times 14 days + ciprofloxacin 750 mg b.i.d. times 10 days.  Maybe best if patient can fill her prescription at the hospital pharmacy prior to discharge  Follow up with ID service in 2-3 weeks.   to assist with arranging evaluation by plastic surgeon at [facility] for resection of the right thigh pannus.    Thank you for involving ID in the care of this patient.  I will follow with you while in the hospital.  Please send Epic secure chat with any questions.      SUBJECTIVE    Adriana Zaragoza is a 58 y.o. female with history of morbid obesity, COPD.  She has a large right medial thigh pannus.  She is known to ID service and is cared for by my partner Dr. Humphreys.  She has had recurrent panniculitis and cellulitis at this site.  Informs me she has tried to see if the pannus can be removed/resected.  Apparently, no surgeon in the [place] area is willing to undertake the removal.  She did get in touch with the plastic surgeon in [facility].  She agreed to LTAC take resection but with the caveats that patient will lose significant weight.  She informs me that her weight has dropped from about 460 lb to 300s.     Current episode started suddenly yesterday with generalized malaise and weakness.  Presented to the emergency room and has been admitted.  She is on antibiotics with some clinical improvement.     Antibiotic history:    Vancomycin: 09/24/2024 x1 dose   Meropenem:  09/24/2024 x1 dose   Levofloxacin: 09/26/2024 x1 dose   Linezolid:  09/22/2024-  Ceftriaxone: 09/22/2024-     Microbiology:    Blood culture 09/24/2024: NGTD    Review of Systems  Negative except as stated above in Interval History     OBJECTIVE   Temp:  [97.5 °F (36.4 °C)-98.3 °F (36.8 °C)] 98 °F (36.7 °C)  Pulse:  [67-78] 78  Resp:  [15-22] 19  SpO2:  [95 %-98 %] 97 %  BP: (114-138)/(62-69) 114/62  Temp:  [97.5 °F (36.4 °C)-98.3 °F (36.8 °C)]   Temp: 98 °F (36.7 °C) (09/28/24 0722)  Pulse: 78 (09/28/24 0819)  Resp: 19 (09/28/24 0954)  BP: 114/62 (09/28/24 0722)  SpO2: 97 % (09/28/24 0819)    Intake/Output Summary (Last 24 hours) at 9/28/2024 1115  Last data filed at 9/28/2024 0958  Gross per 24 hour   Intake 480 ml   Output 600 ml   Net -120 ml       Physical Exam  General:  Morbidly  "obese middle-aged woman lying quietly in bed in no acute distress   HEENT: No oral thrush   CVS: S1 and 2 heard, no murmurs appreciated   Respiratory: Clear to auscultation   Abdomen: Full, soft, nontender, no palpable organomegaly   Right lower extremity:  She has a large pannus from the medial thigh.  Fading edema and erythema with early skin desquamation  CNS: No gross focal deficits   Musculoskeletal system: No other joint or bony abnormalities appreciated     VAD:  PIV  ISOLATION:  None     Wounds:  Known    Significant Labs: All pertinent labs within the past 24 hours have been reviewed.    CBC LAST 7 DAYS  Recent Labs   Lab 09/25/24 2318 09/27/24  0400 09/28/24  0407   WBC 13.08* 7.98 9.16   RBC 4.61 4.12 4.11   HGB 12.6 11.2* 11.1*   HCT 40.4 36.2* 35.8*   MCV 88 88 87   MCH 27.3 27.2 27.0   MCHC 31.2* 30.9* 31.0*   RDW 15.5* 15.5* 15.1*    229 260   MPV 9.0* 9.2 9.5   GRAN 84.3*  11.0* 62.0  5.0 71.1  6.5   LYMPH 9.0*  1.2 25.3  2.0 20.0  1.8   MONO 5.5  0.7 11.0  0.9 7.5  0.7   BASO 0.06 0.03 0.03   NRBC 0 0 0       CHEMISTRY LAST 7 DAYS  Recent Labs   Lab 09/25/24 2318 09/27/24 0400 09/28/24  0407   * 137 137   K 4.1 4.1 4.1    103 103   CO2 22* 28 29   ANIONGAP 10 6* 5*   BUN 12 11 14   CREATININE 0.7 0.6 0.6   * 101 111*   CALCIUM 8.9 8.4* 8.5*   MG 1.8 2.0 2.0   ALBUMIN 3.9 3.3* 3.3*   PROT 7.6 6.3 6.5   ALKPHOS 68 54* 60   ALT 9* 8* 10   AST 12 10 11   BILITOT 0.4 0.4 0.3       Estimated Creatinine Clearance: 159.2 mL/min (based on SCr of 0.6 mg/dL).    INFLAMMATORY/PROCAL  LAST 7 DAYS  Recent Labs   Lab 09/25/24  2318   PROCAL <0.050   CRP 1.30*     No results found for: "ESR"  CRP   Date Value Ref Range Status   09/25/2024 1.30 (H) <1.00 mg/dL Final     Comment:     CRP-Normal Application expected values:   <1.0        mg/dL   Normal Range  1.0 - 5.0  mg/dL   Indicates mild inflammation  5.0 - 10.0 mg/dL   Indicates severe inflammation  >10.0        mg/dL   " Represents serious processes and   frequently         indicates the presence of a bacterial   infection.      08/28/2024 10.60 (H) <1.00 mg/dL Final     Comment:     CRP-Normal Application expected values:   <1.0        mg/dL   Normal Range  1.0 - 5.0  mg/dL   Indicates mild inflammation  5.0 - 10.0 mg/dL   Indicates severe inflammation  >10.0        mg/dL   Represents serious processes and   frequently         indicates the presence of a bacterial   infection.      08/26/2024 8.00 (H) <1.00 mg/dL Final     Comment:     CRP-Normal Application expected values:   <1.0        mg/dL   Normal Range  1.0 - 5.0  mg/dL   Indicates mild inflammation  5.0 - 10.0 mg/dL   Indicates severe inflammation  >10.0        mg/dL   Represents serious processes and   frequently         indicates the presence of a bacterial   infection.      02/14/2024 5.0 0.0 - 8.2 mg/L Final   02/06/2024 115.2 (H) 0.0 - 8.2 mg/L Final   03/07/2023 3.94 (H) <0.76 mg/dL Final   03/05/2023 11.05 (H) <0.76 mg/dL Final       PRIOR  MICROBIOLOGY:    Susceptibility data from last 90 days.  Collected Specimen Info Organism   08/25/24 Blood from Peripheral, Hand, Right No growth after 5 days.   08/25/24 Blood from Peripheral, Hand, Left No growth after 5 days.       LAST 7 DAYS MICROBIOLOGY   Microbiology Results (last 7 days)       Procedure Component Value Units Date/Time    Blood culture x two cultures. Draw prior to antibiotics [[id number]] Collected: 09/25/24 2321    Order Status: Completed Specimen: Blood Updated: 09/28/24 0232     Blood Culture, Routine No Growth to date      No Growth to date      No Growth to date    Narrative:      Aerobic and anaerobic    Blood culture x two cultures. Draw prior to antibiotics [[id number]] Collected: 09/25/24 2315    Order Status: Completed Specimen: Blood Updated: 09/28/24 0232     Blood Culture, Routine No Growth to date      No Growth to date      No Growth to date    Narrative:      Aerobic and anaerobic     Influenza A & B by Molecular [[id number]]     Order Status: Canceled Specimen: Nasopharyngeal Swab           CURRENT/PREVIOUS VISIT EKG  Results for orders placed or performed during the hospital encounter of 09/25/24   EKG 12-lead    Collection Time: 09/25/24 11:08 PM   Result Value Ref Range    QRS Duration 84 ms    OHS QTC Calculation 404 ms    Narrative    Test Reason : R51.9,    Vent. Rate : 096 BPM     Atrial Rate : 096 BPM     P-R Int : 184 ms          QRS Dur : 084 ms      QT Int : 320 ms       P-R-T Axes : 036 096 067 degrees     QTc Int : 404 ms    Normal sinus rhythm  Rightward axis  Low voltage QRS  Borderline Abnormal ECG  Confirmed by Marcie Sierra MD ([id number]) on 9/27/2024 3:58:49 PM    Referred By: GERDA   SELF           Confirmed By:Marcie Sierra MD     Significant Imaging: I have reviewed all relevant and available imaging results/findings within the past 24 hours.    I spent a total of 45 minutes on the day of the visit.This includes face to face time and non-face to face time preparing to see the patient (eg, review of tests), obtaining and/or reviewing separately obtained history, documenting clinical information in the electronic or other health record, independently interpreting results and communicating results to the patient/family/caregiver, or care coordinator.    Rajendra Callahan MD  Date of Service: 09/28/2024      This note was created using [employer] voice recognition software that occasionally misinterpreted phrases or words.

## 2024-09-28 NOTE — CARE UPDATE
09/28/24 0819   Patient Assessment/Suction   Level of Consciousness (AVPU) alert   Respiratory Effort Normal;Unlabored   Expansion/Accessory Muscles/Retractions no use of accessory muscles;no retractions;expansion symmetric   All Lung Fields Breath Sounds Anterior:   DOTTY Breath Sounds clear   RUL Breath Sounds wheezes, inspiratory   Rhythm/Pattern, Respiratory unlabored   Cough Frequency no cough   Skin Integrity   $ Wound Care Tech Time 15 min   Area Observed Cheek   Skin Appearance redness blanchable  (sore spot. nurse notified.)   PRE-TX-O2   Device (Oxygen Therapy) room air   SpO2 97 %   Pulse Oximetry Type Intermittent   $ Pulse Oximetry - Multiple Charge Pulse Oximetry - Multiple   Pulse 78   Resp 18   Aerosol Therapy   $ Aerosol Therapy Charges Aerosol Treatment  (prn)   Daily Review of Necessity (SVN) completed   Respiratory Treatment Status (SVN) given   Treatment Route (SVN) oxygen;mouthpiece utilized   Patient Position HOB elevated   Post Treatment Assessment (SVN) patient reports breathing improved   Signs of Intolerance (SVN) none   Breath Sounds Post-Respiratory Treatment   Throughout All Fields Post-Treatment All Fields   Throughout All Fields Post-Treatment aeration increased   Post-treatment Heart Rate (beats/min) 78   Post-treatment Resp Rate (breaths/min) 18   Equipment Change   $ RT Equipment Aerosol treatment mask   Education   $ Education 15 min;Bronchodilator;Oxygen

## 2024-09-28 NOTE — SUBJECTIVE & OBJECTIVE
Interval History:  See hospital course    Review of Systems   Constitutional:  Positive for chills, diaphoresis and fatigue. Negative for fever.   Musculoskeletal:  Positive for arthralgias and myalgias.   Skin:  Positive for color change.     Objective:     Vital Signs (Most Recent):  Temp: 98.3 °F (36.8 °C) (09/28/24 1136)  Pulse: 77 (09/28/24 1136)  Resp: 18 (09/28/24 1136)  BP: 127/64 (09/28/24 1136)  SpO2: 97 % (09/28/24 1136) Vital Signs (24h Range):  Temp:  [97.5 °F (36.4 °C)-98.3 °F (36.8 °C)] 98.3 °F (36.8 °C)  Pulse:  [67-78] 77  Resp:  [15-22] 18  SpO2:  [95 %-98 %] 97 %  BP: (114-135)/(62-69) 127/64     Weight: (!) 167.8 kg (370 lb)  Body mass index is 63.51 kg/m².    Intake/Output Summary (Last 24 hours) at 9/28/2024 1302  Last data filed at 9/28/2024 0958  Gross per 24 hour   Intake 480 ml   Output 600 ml   Net -120 ml         Physical Exam  Vitals and nursing note reviewed.   Constitutional:       General: She is not in acute distress.     Appearance: She is well-developed. She is obese. She is ill-appearing.   Cardiovascular:      Rate and Rhythm: Normal rate and regular rhythm.   Pulmonary:      Effort: Pulmonary effort is normal.      Breath sounds: Normal breath sounds.   Chest:      Comments: Red crusty lesions under right breast  Abdominal:      General: Bowel sounds are normal. There is no distension.      Palpations: Abdomen is soft.      Tenderness: There is no abdominal tenderness.   Musculoskeletal:         General: Swelling and tenderness (right thigh) present.      Cervical back: Normal range of motion and neck supple.   Skin:     General: Skin is warm and dry.      Findings: Erythema (improving) and lesion present.   Neurological:      General: No focal deficit present.      Mental Status: She is alert and oriented to person, place, and time.   Psychiatric:         Mood and Affect: Mood and affect normal.         Behavior: Behavior normal.             Significant Labs: All pertinent labs  within the past 24 hours have been reviewed.    Significant Imaging: I have reviewed all pertinent imaging results/findings within the past 24 hours.

## 2024-09-28 NOTE — PROGRESS NOTES
[facility] Medicine  Progress Note    Patient Name: Adriana Zaragoza  MRN: [id number]  Patient Class: IP- Inpatient   Admission Date: 9/25/2024  Length of Stay: 2 days  Attending Physician: Sara Thompson DO  Primary Care Provider: Melba Trivedi MD        Subjective:     Principal Problem:Sepsis        HPI:  The patient is a 58-year-old female with past medical history of morbid obesity, diabetes mellitus type 2, COPD, recurrent panniculitis who presented to the ED with the complaints of fever and generalized weakness since yesterday.    The patient reports that she went to a doctor's office with her boyfriend yesterday to fix his baclofen pump.  She reports that she felt febrile after coming back from the office.  She reports that she has had repeated admissions to the hospital because of folliculitis and cellulitis.  She reports that she has a lymphedema growth from right thigh which gets infected a lot. She also reports that she has some folliculitis in the right breast area.  She denies any chest pain, shortness of breath, abdominal pain, nausea, vomiting, diarrhea, cough, urinary symptoms.  In the ED, the patient was found to have temperature of 100.5°, cellulitis of that lymphedema growth and some folliculitis of her right breast, the patient is being admitted to hospitalist service for further evaluation and management.        Overview/Hospital Course:  Adriana Zaragoza was monitored closely during her hospitalization after admission for sepsis 2/2 recurrent panniculitis.  Infectious disease was consulted and antibiotics were managed by them.  She was initially given one dose IV vancomycin and IV meropenem in the ED, then continued on IV levaquin.  ID recommended further treatment with oral linezolid and IV ceftriaxone.  Levaquin was discontinued.  Wound care was consulted.  She was febrile on presentation, but remained afebrile afterward.  She voiced  increased depression secondary to increased pain and her multiple hospitalizations.  She was agreeable to try low dose cymbalta daily and follow up with her PCP.  She did not express suicidal ideations.  She began feeling much better and was appropriate for discharge.  Infectious disease recommended a 14 day course of linezolid and 10 days of high dose cipro.  9/28 patient reports overnight she experienced chills and fatigue, as well as body aches.  No recent fevers.  Discussed monitoring for additional day.    Interval History:  See hospital course    Review of Systems   Constitutional:  Positive for chills, diaphoresis and fatigue. Negative for fever.   Musculoskeletal:  Positive for arthralgias and myalgias.   Skin:  Positive for color change.     Objective:     Vital Signs (Most Recent):  Temp: 98.3 °F (36.8 °C) (09/28/24 1136)  Pulse: 77 (09/28/24 1136)  Resp: 18 (09/28/24 1136)  BP: 127/64 (09/28/24 1136)  SpO2: 97 % (09/28/24 1136) Vital Signs (24h Range):  Temp:  [97.5 °F (36.4 °C)-98.3 °F (36.8 °C)] 98.3 °F (36.8 °C)  Pulse:  [67-78] 77  Resp:  [15-22] 18  SpO2:  [95 %-98 %] 97 %  BP: (114-135)/(62-69) 127/64     Weight: (!) 167.8 kg (370 lb)  Body mass index is 63.51 kg/m².    Intake/Output Summary (Last 24 hours) at 9/28/2024 1302  Last data filed at 9/28/2024 0958  Gross per 24 hour   Intake 480 ml   Output 600 ml   Net -120 ml         Physical Exam  Vitals and nursing note reviewed.   Constitutional:       General: She is not in acute distress.     Appearance: She is well-developed. She is obese. She is ill-appearing.   Cardiovascular:      Rate and Rhythm: Normal rate and regular rhythm.   Pulmonary:      Effort: Pulmonary effort is normal.      Breath sounds: Normal breath sounds.   Chest:      Comments: Red crusty lesions under right breast  Abdominal:      General: Bowel sounds are normal. There is no distension.      Palpations: Abdomen is soft.      Tenderness: There is no abdominal tenderness.    Musculoskeletal:         General: Swelling and tenderness (right thigh) present.      Cervical back: Normal range of motion and neck supple.   Skin:     General: Skin is warm and dry.      Findings: Erythema (improving) and lesion present.   Neurological:      General: No focal deficit present.      Mental Status: She is alert and oriented to person, place, and time.   Psychiatric:         Mood and Affect: Mood and affect normal.         Behavior: Behavior normal.             Significant Labs: All pertinent labs within the past 24 hours have been reviewed.    Significant Imaging: I have reviewed all pertinent imaging results/findings within the past 24 hours.    Assessment/Plan:      * Sepsis  This patient does have evidence of infective focus  My overall impression is sepsis.  Source: Skin and Soft Tissue (location right thigh and right breast)  Antibiotics given-   Antibiotics (72h ago, onward)      Start     Stop Route Frequency Ordered    09/26/24 2100  linezolid tablet 600 mg         -- Oral Every 12 hours 09/26/24 1851 09/26/24 2000  cefTRIAXone (ROCEPHIN) 2 g in dextrose 5 % 100 mL IVPB (ready to mix)         -- IV Every 24 hours (non-standard times) 09/26/24 1851          Latest lactate reviewed-  Recent Labs   Lab 09/25/24  2317   POCLAC 1.23     Organ dysfunction indicated by  tachycardia, fever    Fluid challenge Not needed - patient is not hypotensive      Post- resuscitation assessment No - Post resuscitation assessment not needed       Will Not start Pressors- Levophed for MAP of 65  Source control achieved by: IV abx    Reactive depression  Patient has persistent depression which is moderate and is currently uncontrolled. Will Begin anti-depressant medications. We will not consult psychiatry at this time. Patient does not display psychosis at this time. Continue to monitor closely and adjust plan of care as needed.        COPD (chronic obstructive pulmonary disease)  Patient's COPD is controlled  currently.  Patient is currently off COPD Pathway. Continue scheduled inhalers Antibiotics and Supplemental oxygen as needed and monitor respiratory status closely.     Panniculitis  Recurrent problem.  Consult ID-abx management per their team  Currently on oral linezolid and IV ceftriaxone per ID recs  PT/OT  Turn in bed q2h  Wound care consult        Mass of right thigh  Chronic lymphedema mass noted.      Morbid obesity  Body mass index is 63.51 kg/m². Morbid obesity complicates all aspects of disease management from diagnostic modalities to treatment. Weight loss encouraged and health benefits explained to patient.     She is s/p bariatric surgery earlier this year.        VTE Risk Mitigation (From admission, onward)           Ordered     enoxaparin injection 60 mg  Every 12 hours        Note to Pharmacy: Ht:    Wt: (!) 167.8 kg (370 lb)  Estimated Creatinine Clearance: 136.5 mL/min (based on SCr of 0.7 mg/dL).  Body mass index is 63.51 kg/m².    09/26/24 0204     IP VTE HIGH RISK PATIENT  Once         09/26/24 0159     Place sequential compression device  Until discontinued         09/26/24 0159                    Discharge Planning   JONATHAN: 9/30/2024     Code Status: Full Code   Is the patient medically ready for discharge?:     Reason for patient still in hospital (select all that apply): Patient trending condition  Discharge Plan A: Home with family                  Rafi Herrera NP  Department of Hospital Medicine   [facility]

## 2024-09-29 VITALS
RESPIRATION RATE: 17 BRPM | DIASTOLIC BLOOD PRESSURE: 68 MMHG | HEART RATE: 70 BPM | BODY MASS INDEX: 50.02 KG/M2 | TEMPERATURE: 99 F | WEIGHT: 293 LBS | OXYGEN SATURATION: 96 % | SYSTOLIC BLOOD PRESSURE: 108 MMHG | HEIGHT: 64 IN

## 2024-09-29 LAB
ALBUMIN SERPL BCP-MCNC: 3.4 G/DL (ref 3.5–5.2)
ALP SERPL-CCNC: 60 U/L (ref 55–135)
ALT SERPL W/O P-5'-P-CCNC: 12 U/L (ref 10–44)
ANION GAP SERPL CALC-SCNC: 6 MMOL/L (ref 8–16)
AST SERPL-CCNC: 13 U/L (ref 10–40)
BASOPHILS # BLD AUTO: 0.03 K/UL (ref 0–0.2)
BASOPHILS NFR BLD: 0.4 % (ref 0–1.9)
BILIRUB SERPL-MCNC: 0.3 MG/DL (ref 0.1–1)
BUN SERPL-MCNC: 15 MG/DL (ref 6–20)
CALCIUM SERPL-MCNC: 8.5 MG/DL (ref 8.7–10.5)
CHLORIDE SERPL-SCNC: 103 MMOL/L (ref 95–110)
CO2 SERPL-SCNC: 29 MMOL/L (ref 23–29)
CREAT SERPL-MCNC: 0.6 MG/DL (ref 0.5–1.4)
DIFFERENTIAL METHOD BLD: ABNORMAL
EOSINOPHIL # BLD AUTO: 0.1 K/UL (ref 0–0.5)
EOSINOPHIL NFR BLD: 1.5 % (ref 0–8)
ERYTHROCYTE [DISTWIDTH] IN BLOOD BY AUTOMATED COUNT: 15 % (ref 11.5–14.5)
EST. GFR  (NO RACE VARIABLE): >60 ML/MIN/1.73 M^2
GLUCOSE SERPL-MCNC: 99 MG/DL (ref 70–110)
HCT VFR BLD AUTO: 35.9 % (ref 37–48.5)
HGB BLD-MCNC: 10.7 G/DL (ref 12–16)
IMM GRANULOCYTES # BLD AUTO: 0.04 K/UL (ref 0–0.04)
IMM GRANULOCYTES NFR BLD AUTO: 0.5 % (ref 0–0.5)
LYMPHOCYTES # BLD AUTO: 2 K/UL (ref 1–4.8)
LYMPHOCYTES NFR BLD: 24.1 % (ref 18–48)
MAGNESIUM SERPL-MCNC: 2 MG/DL (ref 1.6–2.6)
MCH RBC QN AUTO: 26 PG (ref 27–31)
MCHC RBC AUTO-ENTMCNC: 29.8 G/DL (ref 32–36)
MCV RBC AUTO: 87 FL (ref 82–98)
MONOCYTES # BLD AUTO: 0.6 K/UL (ref 0.3–1)
MONOCYTES NFR BLD: 7.5 % (ref 4–15)
NEUTROPHILS # BLD AUTO: 5.3 K/UL (ref 1.8–7.7)
NEUTROPHILS NFR BLD: 66 % (ref 38–73)
NRBC BLD-RTO: 0 /100 WBC
PLATELET # BLD AUTO: 286 K/UL (ref 150–450)
PMV BLD AUTO: 9 FL (ref 9.2–12.9)
POCT GLUCOSE: 121 MG/DL (ref 70–110)
POCT GLUCOSE: 93 MG/DL (ref 70–110)
POTASSIUM SERPL-SCNC: 4.2 MMOL/L (ref 3.5–5.1)
PROT SERPL-MCNC: 6.5 G/DL (ref 6–8.4)
RBC # BLD AUTO: 4.12 M/UL (ref 4–5.4)
SODIUM SERPL-SCNC: 138 MMOL/L (ref 136–145)
WBC # BLD AUTO: 8.08 K/UL (ref 3.9–12.7)

## 2024-09-29 PROCEDURE — 80053 COMPREHEN METABOLIC PANEL: CPT | Performed by: STUDENT IN AN ORGANIZED HEALTH CARE EDUCATION/TRAINING PROGRAM

## 2024-09-29 PROCEDURE — 25000003 PHARM REV CODE 250: Performed by: STUDENT IN AN ORGANIZED HEALTH CARE EDUCATION/TRAINING PROGRAM

## 2024-09-29 PROCEDURE — 25000003 PHARM REV CODE 250: Performed by: INTERNAL MEDICINE

## 2024-09-29 PROCEDURE — 63600175 PHARM REV CODE 636 W HCPCS: Performed by: STUDENT IN AN ORGANIZED HEALTH CARE EDUCATION/TRAINING PROGRAM

## 2024-09-29 PROCEDURE — 36415 COLL VENOUS BLD VENIPUNCTURE: CPT | Performed by: STUDENT IN AN ORGANIZED HEALTH CARE EDUCATION/TRAINING PROGRAM

## 2024-09-29 PROCEDURE — 94761 N-INVAS EAR/PLS OXIMETRY MLT: CPT

## 2024-09-29 PROCEDURE — 94660 CPAP INITIATION&MGMT: CPT

## 2024-09-29 PROCEDURE — 99900035 HC TECH TIME PER 15 MIN (STAT)

## 2024-09-29 PROCEDURE — 99232 SBSQ HOSP IP/OBS MODERATE 35: CPT | Mod: ,,, | Performed by: INTERNAL MEDICINE

## 2024-09-29 PROCEDURE — 83735 ASSAY OF MAGNESIUM: CPT | Performed by: STUDENT IN AN ORGANIZED HEALTH CARE EDUCATION/TRAINING PROGRAM

## 2024-09-29 PROCEDURE — 85025 COMPLETE CBC W/AUTO DIFF WBC: CPT | Performed by: STUDENT IN AN ORGANIZED HEALTH CARE EDUCATION/TRAINING PROGRAM

## 2024-09-29 RX ORDER — LINEZOLID 600 MG/1
600 TABLET, FILM COATED ORAL EVERY 12 HOURS
Qty: 28 TABLET | Refills: 0 | Status: SHIPPED | OUTPATIENT
Start: 2024-09-29 | End: 2024-10-13

## 2024-09-29 RX ORDER — CIPROFLOXACIN 750 MG/1
750 TABLET, FILM COATED ORAL EVERY 12 HOURS
Qty: 20 TABLET | Refills: 0 | Status: SHIPPED | OUTPATIENT
Start: 2024-09-29 | End: 2024-10-09

## 2024-09-29 RX ORDER — DULOXETIN HYDROCHLORIDE 30 MG/1
30 CAPSULE, DELAYED RELEASE ORAL NIGHTLY
Qty: 30 CAPSULE | Refills: 1 | Status: SHIPPED | OUTPATIENT
Start: 2024-09-29

## 2024-09-29 RX ORDER — MICONAZOLE NITRATE 2 G/100G
POWDER TOPICAL 2 TIMES DAILY PRN
Qty: 85 G | Refills: 0 | Status: SHIPPED | OUTPATIENT
Start: 2024-09-29

## 2024-09-29 RX ADMIN — MORPHINE SULFATE 2 MG: 2 INJECTION, SOLUTION INTRAMUSCULAR; INTRAVENOUS at 06:09

## 2024-09-29 RX ADMIN — SENNOSIDES AND DOCUSATE SODIUM 1 TABLET: 8.6; 5 TABLET ORAL at 10:09

## 2024-09-29 RX ADMIN — LINEZOLID 600 MG: 600 TABLET, FILM COATED ORAL at 10:09

## 2024-09-29 RX ADMIN — FAMOTIDINE 20 MG: 20 TABLET ORAL at 10:09

## 2024-09-29 RX ADMIN — ACETAMINOPHEN 650 MG: 325 TABLET ORAL at 10:09

## 2024-09-29 RX ADMIN — ONDANSETRON 4 MG: 2 INJECTION INTRAMUSCULAR; INTRAVENOUS at 01:09

## 2024-09-29 RX ADMIN — ENOXAPARIN SODIUM 60 MG: 60 INJECTION SUBCUTANEOUS at 10:09

## 2024-09-29 RX ADMIN — OXYCODONE AND ACETAMINOPHEN 1 TABLET: 7.5; 325 TABLET ORAL at 12:09

## 2024-09-29 RX ADMIN — BUPROPION HYDROCHLORIDE 150 MG: 150 TABLET, EXTENDED RELEASE ORAL at 10:09

## 2024-09-29 NOTE — PLAN OF CARE
09/29/24 1648   TRAN Message   Medicare Outpatient and Observation Notification regarding financial responsibility Given to patient/caregiver;Explained to patient/caregiver;Signed/date by patient/caregiver   Date TRAN was signed 09/29/24   Time TRAN was signed 1233

## 2024-09-29 NOTE — DISCHARGE SUMMARY
[facility] Medicine  Discharge Summary      Patient Name: Adriana Zaragoza  MRN: [id number]  [facility]: [id number]  Patient Class: IP- Inpatient  Admission Date: 9/25/2024  Hospital Length of Stay: 3 days  Discharge Date and Time:  09/29/2024 11:06 AM  Attending Physician: Sara Thompson DO   Discharging Provider: Genet Menezes NP  Primary Care Provider: Melba Trivedi MD    Primary Care Team: Networked reference to record PCT     HPI:   The patient is a 58-year-old female with past medical history of morbid obesity, diabetes mellitus type 2, COPD, recurrent panniculitis who presented to the ED with the complaints of fever and generalized weakness since yesterday.    The patient reports that she went to a doctor's office with her boyfriend yesterday to fix his baclofen pump.  She reports that she felt febrile after coming back from the office.  She reports that she has had repeated admissions to the hospital because of folliculitis and cellulitis.  She reports that she has a lymphedema growth from right thigh which gets infected a lot. She also reports that she has some folliculitis in the right breast area.  She denies any chest pain, shortness of breath, abdominal pain, nausea, vomiting, diarrhea, cough, urinary symptoms.  In the ED, the patient was found to have temperature of 100.5°, cellulitis of that lymphedema growth and some folliculitis of her right breast, the patient is being admitted to hospitalist service for further evaluation and management.        * No surgery found *      Hospital Course:   Adriana Zaragoza was monitored closely during her hospitalization after admission for sepsis 2/2 recurrent panniculitis.  Infectious disease was consulted and antibiotics were managed by them.  She was initially given one dose IV vancomycin and IV meropenem in the ED, then continued on IV levaquin.  ID recommended further treatment with oral linezolid and IV  ceftriaxone.  Levaquin was discontinued.  Wound care was consulted.  She was febrile on presentation, but remained afebrile afterward.  She voiced increased depression secondary to increased pain and her multiple hospitalizations.  She was agreeable to try low dose cymbalta daily and follow up with her PCP.  She did not express suicidal ideations.  She began feeling much better and was appropriate for discharge.  Infectious disease recommended a 14 day course of linezolid and 10 days of high dose cipro.  9/28 patient reports overnight she experienced chills and fatigue, as well as body aches, and she was monitored overnight.  She remained afebrile and her symptoms improved.  She is discharged to home today after she ambulates in the hallway.  She endorses having assistance at home and does not need home health.  She was being referred to Plastic surgery to be evaluated for possible panniculectomy.  She will continue the oral antibiotics as per Infectious Disease recommendation.  She will follow up with her PCP.  She did not have any adverse events while here.    Physical Exam:  General- Patient alert and oriented x3 in NAD  HEENT- PERRLA, EOMI  Neck- FROM, supple  CV- Regular rate and rhythm  Resp- Lungs CTA Bilaterally, No increased WOB  GI- Non tender/non-distended, BS normoactive x4 quads  Extrem- VICENTE, no edema  Neuro- no deficits  Skin- warm and dry       Goals of Care Treatment Preferences:  Code Status: Full Code      SDOH Screening:  The patient was screened for utility difficulties, food insecurity, transport difficulties, housing insecurity, and interpersonal safety and there were no concerns identified this admission.     Consults:   Consults (From admission, onward)          Status Ordering Provider     Inpatient consult to Infectious Diseases  Once        Provider:  Rajendra Callahan MD    Completed HECTOR JONAS            No new Assessment & Plan notes have been filed under this hospital  service since the last note was generated.  Service: Hospital Medicine    Final Active Diagnoses:    Diagnosis Date Noted POA    PRINCIPAL PROBLEM:  Sepsis [A41.9] 08/25/2024 Yes    Reactive depression [F32.9] 09/27/2024 Yes    Panniculitis [M79.3] 10/22/2022 Yes    Mass of right thigh [R22.41] 08/26/2021 Yes    Morbid obesity [E66.01] 01/31/2018 Yes    COPD (chronic obstructive pulmonary disease) [J44.9] 01/05/2016 Yes      Problems Resolved During this Admission:       Discharged Condition: stable    Disposition: Home or Self Care    Follow Up:   Follow-up Information       Rajendra Callahan MD Follow up.    Specialty: Infectious Diseases  Why: In 2-3 weeks  Contact information:  [address]  [place] [address]  [phone]               Melba Trivedi MD Follow up in 1 week(s).    Specialties: Hospitalist, Internal Medicine  Contact information:  [address]  [address]  [place] [address]  [phone]                           Patient Instructions:      Ambulatory referral/consult to Plastic Surgery   Standing Status: Future   Referral Priority: Routine Referral Type: Consultation   Referral Reason: Specialty Services Required   Requested Specialty: Plastic Surgery   Number of Visits Requested: 1     Ambulatory referral/consult to Smoking Cessation Program   Standing Status: Future   Referral Priority: Routine Referral Type: Consultation   Referral Reason: Specialty Services Required   Requested Specialty: CTTS   Number of Visits Requested: 1     Diet diabetic     Notify your health care provider if you experience any of the following:  temperature >100.4     Notify your health care provider if you experience any of the following:  persistent nausea and vomiting or diarrhea     Notify your health care provider if you experience any of the following:  severe uncontrolled pain     Notify your health care provider if you experience any of the following:  redness, tenderness, or signs of infection  (pain, swelling, redness, odor or green/yellow discharge around incision site)     Notify your health care provider if you experience any of the following:  difficulty breathing or increased cough     No dressing needed   Scheduling Instructions: Clean affected areas with Hibiclens daily     Reason for not Prescribing Nicotine Replacement     Order Specific Question Answer Comments   Reason for not Prescribing: Patient refused Allergic     Activity as tolerated       Significant Diagnostic Studies: Labs: CMP   Recent Labs   Lab 09/28/24  0407 09/29/24  0550    138   K 4.1 4.2    103   CO2 29 29   * 99   BUN 14 15   CREATININE 0.6 0.6   CALCIUM 8.5* 8.5*   PROT 6.5 6.5   ALBUMIN 3.3* 3.4*   BILITOT 0.3 0.3   ALKPHOS 60 60   AST 11 13   ALT 10 12   ANIONGAP 5* 6*   Corrected calcium= 9.0  , CBC   Recent Labs   Lab 09/28/24  0407 09/29/24  0550   WBC 9.16 8.08   HGB 11.1* 10.7*   HCT 35.8* 35.9*    286   , A1C:   Recent Labs   Lab 08/26/24  0409   HGBA1C 5.7   , and All labs within the past 24 hours have been reviewed  Microbiology:   Microbiology Results (last 7 days)       Procedure Component Value Units Date/Time    Blood culture x two cultures. Draw prior to antibiotics [[id number]] Collected: 09/25/24 2315    Order Status: Completed Specimen: Blood Updated: 09/29/24 0232     Blood Culture, Routine No Growth to date      No Growth to date      No Growth to date      No Growth to date    Narrative:      Aerobic and anaerobic    Blood culture x two cultures. Draw prior to antibiotics [[id number]] Collected: 09/25/24 2321    Order Status: Completed Specimen: Blood Updated: 09/29/24 0232     Blood Culture, Routine No Growth to date      No Growth to date      No Growth to date      No Growth to date    Narrative:      Aerobic and anaerobic    Influenza A & B by Molecular [[id number]]     Order Status: Canceled Specimen: Nasopharyngeal Swab             Radiology: X-Ray Chest 1 View  Narrative:  EXAMINATION:  XR CHEST 1 VIEW    CLINICAL HISTORY:  Sepsis;    TECHNIQUE:  Single frontal view of the chest was performed.    COMPARISON:  08/25/2024 chest X ray    FINDINGS:  The cardiac silhouette is stable.  There is left costophrenic angle blunting again which has been seen on multiple previous examinations.  Left lung base and right infrahilar opacities also noted possibly atelectasis versus pneumonitis.  Degenerative changes of the spine noted.  Impression: Essentially stable chest x-ray with basilar opacities and left costophrenic angle blunting as described.    Electronically signed by: Janene Harrington  Date:    09/26/2024  Time:    00:25       Pending Diagnostic Studies:       None           Medications:  Reconciled Home Medications:      Medication List        START taking these medications      ciprofloxacin HCl 750 MG tablet  Commonly known as: CIPRO  Take 1 tablet (750 mg total) by mouth every 12 (twelve) hours. for 10 days     DULoxetine 30 MG capsule  Commonly known as: CYMBALTA  Take 1 capsule (30 mg total) by mouth every evening.     linezolid 600 mg Tab  Commonly known as: ZYVOX  Take 1 tablet (600 mg total) by mouth every 12 (twelve) hours. for 14 days     miconazole NITRATE 2 % 2 % top powder  Commonly known as: MICOTIN  Apply topically 2 (two) times daily as needed (Rash).            CONTINUE taking these medications      acetaminophen 650 MG Tbsr  Commonly known as: TYLENOL  Take 650 mg by mouth every 8 (eight) hours.     albuterol 90 mcg/actuation inhaler  Commonly known as: PROVENTIL/VENTOLIN HFA  Inhale 2 puffs into the lungs every 6 (six) hours as needed for Wheezing or Shortness of Breath.     buPROPion 300 MG 24 hr tablet  Commonly known as: WELLBUTRIN XL  Take 300 mg by mouth once daily. NEW Rx - NOT YET STARTED     ferrous sulfate 324 mg (65 mg iron) Tbec  Take 324 mg by mouth every 7 days.     hydrOXYzine HCL 25 MG tablet  Commonly known as: ATARAX  Take 25 mg by mouth 3 (three) times  daily.     oxyCODONE-acetaminophen 5-325 mg per tablet  Commonly known as: PERCOCET  Take 1 tablet by mouth every 6 (six) hours as needed for Pain.     varenicline 0.5 mg (11)- 1 mg (42) tablet  Commonly known as: CHANTIX SUNSHINE  Take 1 tablet by mouth As instructed for Smoking cessation. NEW Rx - NOT YET STARTEDuse as directed     ZEPBOUND 2.5 mg/0.5 mL Pnij  Generic drug: tirzepatide (weight loss)  Inject 2.5 mg into the skin every 7 days. NEW Rx - NOT YET STARTED            ASK your doctor about these medications      famotidine 20 MG tablet  Commonly known as: PEPCID  Take 1 tablet (20 mg total) by mouth 2 (two) times daily.              Indwelling Lines/Drains at time of discharge:   Lines/Drains/Airways       None                   Time spent on the discharge of patient: 36 minutes         Genet Menezes NP  Department of Hospital Medicine  [facility]

## 2024-09-29 NOTE — CARE UPDATE
09/29/24 0821   Patient Assessment/Suction   Level of Consciousness (AVPU) alert   Respiratory Effort Normal;Unlabored   Expansion/Accessory Muscles/Retractions no use of accessory muscles;no retractions;expansion symmetric   All Lung Fields Breath Sounds clear   Rhythm/Pattern, Respiratory unlabored   Cough Frequency no cough   Skin Integrity   $ Wound Care Tech Time 15 min   Area Observed Bridge of nose   Skin Appearance redness blanchable   PRE-TX-O2   Device (Oxygen Therapy) room air   SpO2 (!) 94 %   Pulse Oximetry Type Intermittent   $ Pulse Oximetry - Multiple Charge Pulse Oximetry - Multiple   Pulse 78   Resp 18   Aerosol Therapy   $ Aerosol Therapy Charges PRN treatment not required   Preset CPAP/BiPAP Settings   Mode Of Delivery BiPAP;Standby   $ CPAP/BiPAP Daily Charge BiPAP/CPAP Daily   $ Initial CPAP/BiPAP Setup? No   Equipment Type V60

## 2024-09-29 NOTE — PLAN OF CARE
Patient cleared for discharge from case management standpoint.    Follow up appointments scheduled and added to AVS.    Chart and discharge orders reviewed.  Patient discharged home with no further case management needs.       09/29/24 1121   Final Note   Assessment Type Final Discharge Note   Anticipated Discharge Disposition Home   What phone number can be called within the next 1-3 days to see how you are doing after discharge? [phone]   Post-Acute Status   Discharge Delays None known at this time

## 2024-09-29 NOTE — PLAN OF CARE
Problem: Adult Inpatient Plan of Care  Goal: Plan of Care Review  Outcome: Met  Goal: Patient-Specific Goal (Individualized)  Outcome: Met  Goal: Absence of Hospital-Acquired Illness or Injury  Outcome: Met  Goal: Optimal Comfort and Wellbeing  Outcome: Met  Goal: Readiness for Transition of Care  Outcome: Met     Problem: Bariatric Environmental Safety  Goal: Safety Maintained with Care  Outcome: Met     Problem: Infection  Goal: Absence of Infection Signs and Symptoms  Outcome: Met     Problem: Skin Injury Risk Increased  Goal: Skin Health and Integrity  Outcome: Met     Problem: Wound  Goal: Optimal Coping  Outcome: Met  Goal: Optimal Functional Ability  Outcome: Met  Goal: Absence of Infection Signs and Symptoms  Outcome: Met  Goal: Improved Oral Intake  Outcome: Met  Goal: Optimal Pain Control and Function  Outcome: Met  Goal: Skin Health and Integrity  Outcome: Met  Goal: Optimal Wound Healing  Outcome: Met     Problem: Sepsis/Septic Shock  Goal: Optimal Coping  Outcome: Met  Goal: Absence of Bleeding  Outcome: Met  Goal: Blood Glucose Level Within Targeted Range  Outcome: Met  Goal: Absence of Infection Signs and Symptoms  Outcome: Met  Goal: Optimal Nutrition Intake  Outcome: Met

## 2024-09-29 NOTE — PROGRESS NOTES
[facility]   Department of Infectious Disease  Progress Note        PATIENT NAME: Adriana Zaragoza  MRN: [id number]  TODAY'S DATE: 09/29/2024  ADMIT DATE: 9/25/2024  LOS: 3 days    CHIEF COMPLAINT: Headache, Chills, and Fever (BIBA for headache, chills, fever, back pain/Tumor growing between legs/Hx of sepsis)      PRINCIPLE PROBLEM: Sepsis    INTERVAL HISTORY      09/27/2024: Seen and evaluated at bedside.  No acute issues overnight.  Slowly improving.  Tolerating antibiotics okay for now.      09/28/2024:  No acute issues overnight.  Seen and evaluated at bedside.  Continues to improve.  ASO titer normal at 59.  Blood cultures remain negative.    09/29/2024:  She was in the shower when I 1st stop by to see her today.  She was discharged by the time I stop by a 2nd time.    Antibiotics (From admission, onward)      Start     Stop Route Frequency Ordered    09/26/24 2100  linezolid tablet 600 mg         -- Oral Every 12 hours 09/26/24 1851 09/26/24 2000  cefTRIAXone (ROCEPHIN) 2 g in dextrose 5 % 100 mL IVPB (ready to mix)         -- IV Every 24 hours (non-standard times) 09/26/24 1851          Antifungals (From admission, onward)      Start     Stop Route Frequency Ordered    09/26/24 0303  miconazole NITRATE 2 % top powder         -- Top 2 times daily PRN 09/26/24 0204           Antivirals (From admission, onward)      None            ASSESSMENT and PLAN      Recurrent Right thigh panniculitis.  Continue linezolid and ceftriaxone.       Morbid obesity.  She had bariatric surgery and is already losing weight.  Management as per her PCP hospitalist.     3. COPD.  Management as per hospitalist.       RECOMMENDATIONS:    Continue linezolid and ceftriaxone   Follow ASO titer   DC either today or tomorrow 09/29/2024 on oral linezolid 600 mg twice daily times 14 days + ciprofloxacin 750 mg b.i.d. times 10 days.  Maybe best if patient can fill her prescription at the hospital pharmacy prior to  discharge  Follow up with ID service in 2-3 weeks.   to assist with arranging evaluation by plastic surgeon at [facility] for resection of the right thigh pannus.    Thank you for involving ID in the care of this patient.  I will follow with you while in the hospital.  Please send Epic secure chat with any questions.      SUBJECTIVE    Adriana Zaragoza is a 58 y.o. female with history of morbid obesity, COPD.  She has a large right medial thigh pannus.  She is known to ID service and is cared for by my partner Dr. Humphreys.  She has had recurrent panniculitis and cellulitis at this site.  Informs me she has tried to see if the pannus can be removed/resected.  Apparently, no surgeon in the [place] area is willing to undertake the removal.  She did get in touch with the plastic surgeon in [facility].  She agreed to LTAC take resection but with the caveats that patient will lose significant weight.  She informs me that her weight has dropped from about 460 lb to 300s.     Current episode started suddenly yesterday with generalized malaise and weakness.  Presented to the emergency room and has been admitted.  She is on antibiotics with some clinical improvement.     Antibiotic history:    Vancomycin: 09/24/2024 x1 dose   Meropenem:  09/24/2024 x1 dose   Levofloxacin: 09/26/2024 x1 dose   Linezolid:  09/22/2024-  Ceftriaxone: 09/22/2024-     Microbiology:    Blood culture 09/24/2024: NGTD    Review of Systems  Negative except as stated above in Interval History     OBJECTIVE   Temp:  [98.2 °F (36.8 °C)-98.7 °F (37.1 °C)] 98.7 °F (37.1 °C)  Pulse:  [69-78] 70  Resp:  [17-20] 18  SpO2:  [94 %-100 %] 96 %  BP: (108-141)/(59-68) 108/68  Temp:  [98.2 °F (36.8 °C)-98.7 °F (37.1 °C)]   Temp: 98.7 °F (37.1 °C) (09/29/24 1149)  Pulse: 70 (09/29/24 1149)  Resp: 18 (09/29/24 1149)  BP: 108/68 (09/29/24 1149)  SpO2: 96 % (09/29/24 1149)    Intake/Output Summary (Last 24 hours) at 9/29/2024  "1215  Last data filed at 9/29/2024 0952  Gross per 24 hour   Intake 560 ml   Output 400 ml   Net 160 ml       Physical Exam: Not done today.  Patient was discharged by the time I got to her room 2nd time      VAD:  PIV  ISOLATION:  None     Wounds:  Known    Significant Labs: All pertinent labs within the past 24 hours have been reviewed.    CBC LAST 7 DAYS  Recent Labs   Lab 09/25/24 2318 09/27/24 0400 09/28/24 0407 09/29/24  0550   WBC 13.08* 7.98 9.16 8.08   RBC 4.61 4.12 4.11 4.12   HGB 12.6 11.2* 11.1* 10.7*   HCT 40.4 36.2* 35.8* 35.9*   MCV 88 88 87 87   MCH 27.3 27.2 27.0 26.0*   MCHC 31.2* 30.9* 31.0* 29.8*   RDW 15.5* 15.5* 15.1* 15.0*    229 260 286   MPV 9.0* 9.2 9.5 9.0*   GRAN 84.3*  11.0* 62.0  5.0 71.1  6.5 66.0  5.3   LYMPH 9.0*  1.2 25.3  2.0 20.0  1.8 24.1  2.0   MONO 5.5  0.7 11.0  0.9 7.5  0.7 7.5  0.6   BASO 0.06 0.03 0.03 0.03   NRBC 0 0 0 0       CHEMISTRY LAST 7 DAYS  Recent Labs   Lab 09/25/24 2318 09/27/24 0400 09/28/24 0407 09/29/24  0550   * 137 137 138   K 4.1 4.1 4.1 4.2    103 103 103   CO2 22* 28 29 29   ANIONGAP 10 6* 5* 6*   BUN 12 11 14 15   CREATININE 0.7 0.6 0.6 0.6   * 101 111* 99   CALCIUM 8.9 8.4* 8.5* 8.5*   MG 1.8 2.0 2.0 2.0   ALBUMIN 3.9 3.3* 3.3* 3.4*   PROT 7.6 6.3 6.5 6.5   ALKPHOS 68 54* 60 60   ALT 9* 8* 10 12   AST 12 10 11 13   BILITOT 0.4 0.4 0.3 0.3       Estimated Creatinine Clearance: 159.2 mL/min (based on SCr of 0.6 mg/dL).    INFLAMMATORY/PROCAL  LAST 7 DAYS  Recent Labs   Lab 09/25/24  2318   PROCAL <0.050   CRP 1.30*     No results found for: "ESR"  CRP   Date Value Ref Range Status   09/25/2024 1.30 (H) <1.00 mg/dL Final     Comment:     CRP-Normal Application expected values:   <1.0        mg/dL   Normal Range  1.0 - 5.0  mg/dL   Indicates mild inflammation  5.0 - 10.0 mg/dL   Indicates severe inflammation  >10.0        mg/dL   Represents serious processes and   frequently         indicates the presence of a " bacterial   infection.      08/28/2024 10.60 (H) <1.00 mg/dL Final     Comment:     CRP-Normal Application expected values:   <1.0        mg/dL   Normal Range  1.0 - 5.0  mg/dL   Indicates mild inflammation  5.0 - 10.0 mg/dL   Indicates severe inflammation  >10.0        mg/dL   Represents serious processes and   frequently         indicates the presence of a bacterial   infection.      08/26/2024 8.00 (H) <1.00 mg/dL Final     Comment:     CRP-Normal Application expected values:   <1.0        mg/dL   Normal Range  1.0 - 5.0  mg/dL   Indicates mild inflammation  5.0 - 10.0 mg/dL   Indicates severe inflammation  >10.0        mg/dL   Represents serious processes and   frequently         indicates the presence of a bacterial   infection.      02/14/2024 5.0 0.0 - 8.2 mg/L Final   02/06/2024 115.2 (H) 0.0 - 8.2 mg/L Final   03/07/2023 3.94 (H) <0.76 mg/dL Final   03/05/2023 11.05 (H) <0.76 mg/dL Final       PRIOR  MICROBIOLOGY:    Susceptibility data from last 90 days.  Collected Specimen Info Organism   08/25/24 Blood from Peripheral, Hand, Right No growth after 5 days.   08/25/24 Blood from Peripheral, Hand, Left No growth after 5 days.       LAST 7 DAYS MICROBIOLOGY   Microbiology Results (last 7 days)       Procedure Component Value Units Date/Time    Blood culture x two cultures. Draw prior to antibiotics [[id number]] Collected: 09/25/24 2315    Order Status: Completed Specimen: Blood Updated: 09/29/24 0232     Blood Culture, Routine No Growth to date      No Growth to date      No Growth to date      No Growth to date    Narrative:      Aerobic and anaerobic    Blood culture x two cultures. Draw prior to antibiotics [[id number]] Collected: 09/25/24 2321    Order Status: Completed Specimen: Blood Updated: 09/29/24 0232     Blood Culture, Routine No Growth to date      No Growth to date      No Growth to date      No Growth to date    Narrative:      Aerobic and anaerobic    Influenza A & B by Molecular [[id number]]      Order Status: Canceled Specimen: Nasopharyngeal Swab           CURRENT/PREVIOUS VISIT EKG  Results for orders placed or performed during the hospital encounter of 09/25/24   EKG 12-lead    Collection Time: 09/25/24 11:08 PM   Result Value Ref Range    QRS Duration 84 ms    [facility] QTC Calculation 404 ms    Narrative    Test Reason : R51.9,    Vent. Rate : 096 BPM     Atrial Rate : 096 BPM     P-R Int : 184 ms          QRS Dur : 084 ms      QT Int : 320 ms       P-R-T Axes : 036 096 067 degrees     QTc Int : 404 ms    Normal sinus rhythm  Rightward axis  Low voltage QRS  Borderline Abnormal ECG  Confirmed by Mariela JOSHI, Marcie Mandel ([id number]) on 9/27/2024 3:58:49 PM    Referred By: GERDA   SELF           Confirmed By:Marcie Sierra MD     Significant Imaging: I have reviewed all relevant and available imaging results/findings within the past 24 hours.    I spent a total of 30 minutes on the day of the visit.This includes face to face time and non-face to face time preparing to see the patient (eg, review of tests), obtaining and/or reviewing separately obtained history, documenting clinical information in the electronic or other health record, independently interpreting results and communicating results to the patient/family/caregiver, or care coordinator.    Rajendra Callahan MD  Date of Service: 09/29/2024      This note was created using [employer] voice recognition software that occasionally misinterpreted phrases or words.

## 2024-10-01 LAB
BACTERIA BLD CULT: NORMAL
BACTERIA BLD CULT: NORMAL

## 2024-10-22 ENCOUNTER — TELEPHONE (OUTPATIENT)
Dept: SMOKING CESSATION | Facility: CLINIC | Age: 59
End: 2024-10-22
Payer: MEDICARE

## 2024-12-04 ENCOUNTER — PATIENT MESSAGE (OUTPATIENT)
Dept: PSYCHIATRY | Facility: CLINIC | Age: 59
End: 2024-12-04
Payer: MEDICARE

## 2024-12-26 ENCOUNTER — HOSPITAL ENCOUNTER (INPATIENT)
Facility: HOSPITAL | Age: 59
LOS: 8 days | Discharge: HOME HEALTH | DRG: 871 | End: 2025-01-03
Attending: EMERGENCY MEDICINE | Admitting: HOSPITALIST
Payer: MEDICARE

## 2024-12-26 DIAGNOSIS — E66.01 MORBID OBESITY: ICD-10-CM

## 2024-12-26 DIAGNOSIS — A41.9 SEVERE SEPSIS: Primary | ICD-10-CM

## 2024-12-26 DIAGNOSIS — R07.9 CHEST PAIN: ICD-10-CM

## 2024-12-26 DIAGNOSIS — Z13.6 SCREENING FOR CARDIOVASCULAR CONDITION: ICD-10-CM

## 2024-12-26 DIAGNOSIS — Z97.8 ENDOTRACHEALLY INTUBATED: ICD-10-CM

## 2024-12-26 DIAGNOSIS — R65.20 SEVERE SEPSIS: Primary | ICD-10-CM

## 2024-12-26 DIAGNOSIS — R50.9 FEBRILE ILLNESS, ACUTE: ICD-10-CM

## 2024-12-26 PROBLEM — E87.20 LACTIC ACIDOSIS: Status: ACTIVE | Noted: 2024-12-26

## 2024-12-26 PROBLEM — J96.01 ACUTE HYPOXIC RESPIRATORY FAILURE: Status: ACTIVE | Noted: 2024-12-26

## 2024-12-26 LAB
ADENOVIRUS: NOT DETECTED
ALBUMIN SERPL BCP-MCNC: 3.7 G/DL (ref 3.5–5.2)
ALLENS TEST: ABNORMAL
ALP SERPL-CCNC: 60 U/L (ref 55–135)
ALT SERPL W/O P-5'-P-CCNC: 12 U/L (ref 10–44)
ANION GAP SERPL CALC-SCNC: 7 MMOL/L (ref 8–16)
AST SERPL-CCNC: 11 U/L (ref 10–40)
BACTERIA #/AREA URNS HPF: NORMAL /HPF
BASOPHILS # BLD AUTO: 0.04 K/UL (ref 0–0.2)
BASOPHILS NFR BLD: 0.2 % (ref 0–1.9)
BILIRUB SERPL-MCNC: 0.4 MG/DL (ref 0.1–1)
BILIRUB UR QL STRIP: NEGATIVE
BNP SERPL-MCNC: 38 PG/ML (ref 0–99)
BORDETELLA PARAPERTUSSIS (IS1001): NOT DETECTED
BORDETELLA PERTUSSIS (PTXP): NOT DETECTED
BUN SERPL-MCNC: 13 MG/DL (ref 6–20)
CALCIUM SERPL-MCNC: 8.7 MG/DL (ref 8.7–10.5)
CHLAMYDIA PNEUMONIAE: NOT DETECTED
CHLORIDE SERPL-SCNC: 103 MMOL/L (ref 95–110)
CLARITY UR: CLEAR
CO2 SERPL-SCNC: 27 MMOL/L (ref 23–29)
COLOR UR: YELLOW
CORONAVIRUS 229E, COMMON COLD VIRUS: NOT DETECTED
CORONAVIRUS HKU1, COMMON COLD VIRUS: NOT DETECTED
CORONAVIRUS NL63, COMMON COLD VIRUS: NOT DETECTED
CORONAVIRUS OC43, COMMON COLD VIRUS: NOT DETECTED
CREAT SERPL-MCNC: 0.6 MG/DL (ref 0.5–1.4)
DELSYS: ABNORMAL
DIFFERENTIAL METHOD BLD: ABNORMAL
EOSINOPHIL # BLD AUTO: 0 K/UL (ref 0–0.5)
EOSINOPHIL NFR BLD: 0.2 % (ref 0–8)
EP: 6
ERYTHROCYTE [DISTWIDTH] IN BLOOD BY AUTOMATED COUNT: 15.2 % (ref 11.5–14.5)
ERYTHROCYTE [SEDIMENTATION RATE] IN BLOOD BY WESTERGREN METHOD: 18 MM/H
ERYTHROCYTE [SEDIMENTATION RATE] IN BLOOD BY WESTERGREN METHOD: 22 MM/H
ERYTHROCYTE [SEDIMENTATION RATE] IN BLOOD BY WESTERGREN METHOD: 24 MM/H
EST. GFR  (NO RACE VARIABLE): >60 ML/MIN/1.73 M^2
FIO2: 30
FIO2: 40
FIO2: 40
FIO2: 50
FLUBV RNA NPH QL NAA+NON-PROBE: NOT DETECTED
GLUCOSE SERPL-MCNC: 126 MG/DL (ref 70–110)
GLUCOSE SERPL-MCNC: 152 MG/DL (ref 70–110)
GLUCOSE SERPL-MCNC: 189 MG/DL (ref 70–110)
GLUCOSE UR QL STRIP: NEGATIVE
HCO3 UR-SCNC: 22.9 MMOL/L (ref 24–28)
HCO3 UR-SCNC: 23 MMOL/L (ref 24–28)
HCO3 UR-SCNC: 24.1 MMOL/L (ref 24–28)
HCO3 UR-SCNC: 25.3 MMOL/L (ref 24–28)
HCT VFR BLD AUTO: 37.6 % (ref 37–48.5)
HCT VFR BLD CALC: 35 %PCV (ref 36–54)
HCT VFR BLD CALC: 39 %PCV (ref 36–54)
HGB BLD-MCNC: 11.6 G/DL (ref 12–16)
HGB UR QL STRIP: ABNORMAL
HPIV1 RNA NPH QL NAA+NON-PROBE: NOT DETECTED
HPIV2 RNA NPH QL NAA+NON-PROBE: NOT DETECTED
HPIV3 RNA NPH QL NAA+NON-PROBE: NOT DETECTED
HPIV4 RNA NPH QL NAA+NON-PROBE: NOT DETECTED
HUMAN METAPNEUMOVIRUS: NOT DETECTED
HYALINE CASTS #/AREA URNS LPF: 0 /LPF
IMM GRANULOCYTES # BLD AUTO: 0.09 K/UL (ref 0–0.04)
IMM GRANULOCYTES NFR BLD AUTO: 0.5 % (ref 0–0.5)
INFLUENZA A (SUBTYPES H1,H1-2009,H3): NOT DETECTED
IP: 16
KETONES UR QL STRIP: NEGATIVE
LACTATE SERPL-SCNC: 0.7 MMOL/L (ref 0.5–1.9)
LDH SERPL L TO P-CCNC: 1.07 MMOL/L (ref 0.5–2.2)
LDH SERPL L TO P-CCNC: 3.17 MMOL/L (ref 0.5–2.2)
LEUKOCYTE ESTERASE UR QL STRIP: NEGATIVE
LYMPHOCYTES # BLD AUTO: 0.9 K/UL (ref 1–4.8)
LYMPHOCYTES NFR BLD: 4.8 % (ref 18–48)
MAGNESIUM SERPL-MCNC: 1.9 MG/DL (ref 1.6–2.6)
MCH RBC QN AUTO: 27 PG (ref 27–31)
MCHC RBC AUTO-ENTMCNC: 30.9 G/DL (ref 32–36)
MCV RBC AUTO: 87 FL (ref 82–98)
MICROSCOPIC COMMENT: NORMAL
MODE: ABNORMAL
MONOCYTES # BLD AUTO: 0.8 K/UL (ref 0.3–1)
MONOCYTES NFR BLD: 4.3 % (ref 4–15)
MYCOPLASMA PNEUMONIAE: NOT DETECTED
NEUTROPHILS # BLD AUTO: 16.6 K/UL (ref 1.8–7.7)
NEUTROPHILS NFR BLD: 90 % (ref 38–73)
NITRITE UR QL STRIP: NEGATIVE
NRBC BLD-RTO: 0 /100 WBC
PCO2 BLDA: 43.4 MMHG (ref 35–45)
PCO2 BLDA: 46.1 MMHG (ref 35–45)
PCO2 BLDA: 47.7 MMHG (ref 35–45)
PCO2 BLDA: 55.4 MMHG (ref 35–45)
PEEP: 5
PH SMN: 7.27 [PH] (ref 7.35–7.45)
PH SMN: 7.3 [PH] (ref 7.35–7.45)
PH SMN: 7.31 [PH] (ref 7.35–7.45)
PH SMN: 7.33 [PH] (ref 7.35–7.45)
PH UR STRIP: 6 [PH] (ref 5–8)
PLATELET # BLD AUTO: 279 K/UL (ref 150–450)
PMV BLD AUTO: 9.4 FL (ref 9.2–12.9)
PO2 BLDA: 106 MMHG (ref 80–100)
PO2 BLDA: 134 MMHG (ref 80–100)
PO2 BLDA: 89 MMHG (ref 80–100)
PO2 BLDA: 91 MMHG (ref 80–100)
POC BE: -2 MMOL/L
POC BE: -2 MMOL/L
POC BE: -3 MMOL/L
POC BE: -3 MMOL/L
POC IONIZED CALCIUM: 1.14 MMOL/L (ref 1.06–1.42)
POC IONIZED CALCIUM: 1.15 MMOL/L (ref 1.06–1.42)
POC SATURATED O2: 96 % (ref 95–100)
POC SATURATED O2: 96 % (ref 95–100)
POC SATURATED O2: 97 % (ref 95–100)
POC SATURATED O2: 99 % (ref 95–100)
POC TCO2: 24 MMOL/L (ref 23–27)
POC TCO2: 24 MMOL/L (ref 23–27)
POC TCO2: 25 MMOL/L (ref 23–27)
POC TCO2: 27 MMOL/L (ref 23–27)
POCT GLUCOSE: 194 MG/DL (ref 70–110)
POTASSIUM BLD-SCNC: 3.9 MMOL/L (ref 3.5–5.1)
POTASSIUM BLD-SCNC: 4.1 MMOL/L (ref 3.5–5.1)
POTASSIUM SERPL-SCNC: 4 MMOL/L (ref 3.5–5.1)
PROCALCITONIN SERPL IA-MCNC: <0.05 NG/ML (ref 0–0.5)
PROT SERPL-MCNC: 7.2 G/DL (ref 6–8.4)
PROT UR QL STRIP: ABNORMAL
RBC # BLD AUTO: 4.3 M/UL (ref 4–5.4)
RBC #/AREA URNS HPF: 3 /HPF (ref 0–4)
RESPIRATORY INFECTION PANEL SOURCE: NORMAL
RSV RNA NPH QL NAA+NON-PROBE: NOT DETECTED
RV+EV RNA NPH QL NAA+NON-PROBE: NOT DETECTED
SAMPLE: ABNORMAL
SAMPLE: NORMAL
SARS-COV-2 RNA RESP QL NAA+PROBE: NOT DETECTED
SITE: ABNORMAL
SODIUM BLD-SCNC: 136 MMOL/L (ref 136–145)
SODIUM BLD-SCNC: 136 MMOL/L (ref 136–145)
SODIUM SERPL-SCNC: 137 MMOL/L (ref 136–145)
SP GR UR STRIP: 1.03 (ref 1–1.03)
SQUAMOUS #/AREA URNS HPF: 1 /HPF
TROPONIN I SERPL HS-MCNC: 2.4 PG/ML (ref 0–14.9)
URN SPEC COLLECT METH UR: ABNORMAL
UROBILINOGEN UR STRIP-ACNC: NEGATIVE EU/DL
VT: 400
VT: 420
VT: 450
WBC # BLD AUTO: 18.43 K/UL (ref 3.9–12.7)
WBC #/AREA URNS HPF: 0 /HPF (ref 0–5)

## 2024-12-26 PROCEDURE — 31500 INSERT EMERGENCY AIRWAY: CPT

## 2024-12-26 PROCEDURE — 87040 BLOOD CULTURE FOR BACTERIA: CPT | Mod: 59 | Performed by: EMERGENCY MEDICINE

## 2024-12-26 PROCEDURE — 25500020 PHARM REV CODE 255: Performed by: EMERGENCY MEDICINE

## 2024-12-26 PROCEDURE — C1751 CATH, INF, PER/CENT/MIDLINE: HCPCS

## 2024-12-26 PROCEDURE — 85025 COMPLETE CBC W/AUTO DIFF WBC: CPT | Performed by: EMERGENCY MEDICINE

## 2024-12-26 PROCEDURE — 96365 THER/PROPH/DIAG IV INF INIT: CPT

## 2024-12-26 PROCEDURE — 84145 PROCALCITONIN (PCT): CPT | Performed by: EMERGENCY MEDICINE

## 2024-12-26 PROCEDURE — 36410 VNPNXR 3YR/> PHY/QHP DX/THER: CPT

## 2024-12-26 PROCEDURE — 99291 CRITICAL CARE FIRST HOUR: CPT

## 2024-12-26 PROCEDURE — 99900035 HC TECH TIME PER 15 MIN (STAT)

## 2024-12-26 PROCEDURE — 83735 ASSAY OF MAGNESIUM: CPT | Performed by: EMERGENCY MEDICINE

## 2024-12-26 PROCEDURE — 25000003 PHARM REV CODE 250: Performed by: HOSPITALIST

## 2024-12-26 PROCEDURE — 99900026 HC AIRWAY MAINTENANCE (STAT)

## 2024-12-26 PROCEDURE — 82803 BLOOD GASES ANY COMBINATION: CPT

## 2024-12-26 PROCEDURE — 25000003 PHARM REV CODE 250: Performed by: EMERGENCY MEDICINE

## 2024-12-26 PROCEDURE — 36600 WITHDRAWAL OF ARTERIAL BLOOD: CPT

## 2024-12-26 PROCEDURE — 96374 THER/PROPH/DIAG INJ IV PUSH: CPT | Mod: 59

## 2024-12-26 PROCEDURE — 94761 N-INVAS EAR/PLS OXIMETRY MLT: CPT | Mod: XB

## 2024-12-26 PROCEDURE — 63600175 PHARM REV CODE 636 W HCPCS: Performed by: HOSPITALIST

## 2024-12-26 PROCEDURE — 63600175 PHARM REV CODE 636 W HCPCS: Performed by: EMERGENCY MEDICINE

## 2024-12-26 PROCEDURE — 84484 ASSAY OF TROPONIN QUANT: CPT | Performed by: EMERGENCY MEDICINE

## 2024-12-26 PROCEDURE — 96367 TX/PROPH/DG ADDL SEQ IV INF: CPT

## 2024-12-26 PROCEDURE — 94002 VENT MGMT INPAT INIT DAY: CPT

## 2024-12-26 PROCEDURE — 83605 ASSAY OF LACTIC ACID: CPT | Performed by: HOSPITALIST

## 2024-12-26 PROCEDURE — 93005 ELECTROCARDIOGRAM TRACING: CPT | Performed by: INTERNAL MEDICINE

## 2024-12-26 PROCEDURE — 81001 URINALYSIS AUTO W/SCOPE: CPT | Performed by: EMERGENCY MEDICINE

## 2024-12-26 PROCEDURE — 87798 DETECT AGENT NOS DNA AMP: CPT | Performed by: HOSPITALIST

## 2024-12-26 PROCEDURE — 76937 US GUIDE VASCULAR ACCESS: CPT

## 2024-12-26 PROCEDURE — 0BH17EZ INSERTION OF ENDOTRACHEAL AIRWAY INTO TRACHEA, VIA NATURAL OR ARTIFICIAL OPENING: ICD-10-PCS

## 2024-12-26 PROCEDURE — 80053 COMPREHEN METABOLIC PANEL: CPT | Performed by: EMERGENCY MEDICINE

## 2024-12-26 PROCEDURE — 83880 ASSAY OF NATRIURETIC PEPTIDE: CPT | Performed by: EMERGENCY MEDICINE

## 2024-12-26 PROCEDURE — 36415 COLL VENOUS BLD VENIPUNCTURE: CPT | Performed by: EMERGENCY MEDICINE

## 2024-12-26 PROCEDURE — 96361 HYDRATE IV INFUSION ADD-ON: CPT

## 2024-12-26 PROCEDURE — 96375 TX/PRO/DX INJ NEW DRUG ADDON: CPT

## 2024-12-26 PROCEDURE — 5A1945Z RESPIRATORY VENTILATION, 24-96 CONSECUTIVE HOURS: ICD-10-PCS

## 2024-12-26 PROCEDURE — 93010 ELECTROCARDIOGRAM REPORT: CPT | Mod: ,,, | Performed by: INTERNAL MEDICINE

## 2024-12-26 PROCEDURE — P9612 CATHETERIZE FOR URINE SPEC: HCPCS

## 2024-12-26 PROCEDURE — 27100171 HC OXYGEN HIGH FLOW UP TO 24 HOURS

## 2024-12-26 PROCEDURE — 20000000 HC ICU ROOM

## 2024-12-26 RX ORDER — DIPHENHYDRAMINE HYDROCHLORIDE 50 MG/ML
50 INJECTION INTRAMUSCULAR; INTRAVENOUS
Status: COMPLETED | OUTPATIENT
Start: 2024-12-26 | End: 2024-12-26

## 2024-12-26 RX ORDER — ACETAMINOPHEN 325 MG/1
650 TABLET ORAL EVERY 8 HOURS PRN
Status: DISCONTINUED | OUTPATIENT
Start: 2024-12-26 | End: 2024-12-26

## 2024-12-26 RX ORDER — LANOLIN ALCOHOL/MO/W.PET/CERES
800 CREAM (GRAM) TOPICAL
Status: DISCONTINUED | OUTPATIENT
Start: 2024-12-26 | End: 2025-01-03 | Stop reason: HOSPADM

## 2024-12-26 RX ORDER — AZTREONAM 2 G/1
2000 INJECTION, POWDER, LYOPHILIZED, FOR SOLUTION INTRAMUSCULAR; INTRAVENOUS
Status: COMPLETED | OUTPATIENT
Start: 2024-12-26 | End: 2024-12-26

## 2024-12-26 RX ORDER — SODIUM CHLORIDE 9 MG/ML
INJECTION, SOLUTION INTRAVENOUS CONTINUOUS
Status: DISCONTINUED | OUTPATIENT
Start: 2024-12-26 | End: 2024-12-26

## 2024-12-26 RX ORDER — NALOXONE HCL 0.4 MG/ML
0.02 VIAL (ML) INJECTION
Status: DISCONTINUED | OUTPATIENT
Start: 2024-12-26 | End: 2025-01-03 | Stop reason: HOSPADM

## 2024-12-26 RX ORDER — MIDAZOLAM HYDROCHLORIDE 2 MG/2ML
2 INJECTION, SOLUTION INTRAMUSCULAR; INTRAVENOUS
Status: COMPLETED | OUTPATIENT
Start: 2024-12-26 | End: 2024-12-26

## 2024-12-26 RX ORDER — SODIUM CHLORIDE 9 MG/ML
INJECTION, SOLUTION INTRAVENOUS CONTINUOUS
Status: ACTIVE | OUTPATIENT
Start: 2024-12-26 | End: 2024-12-27

## 2024-12-26 RX ORDER — ETOMIDATE 2 MG/ML
30 INJECTION INTRAVENOUS
Status: COMPLETED | OUTPATIENT
Start: 2024-12-26 | End: 2024-12-26

## 2024-12-26 RX ORDER — LORAZEPAM 2 MG/ML
1 INJECTION INTRAMUSCULAR
Status: COMPLETED | OUTPATIENT
Start: 2024-12-26 | End: 2024-12-26

## 2024-12-26 RX ORDER — SUCCINYLCHOLINE CHLORIDE 20 MG/ML INJECTION SOLUTION
150 SOLUTION
Status: COMPLETED | OUTPATIENT
Start: 2024-12-26 | End: 2024-12-26

## 2024-12-26 RX ORDER — FAMOTIDINE 10 MG/ML
40 INJECTION INTRAVENOUS
Status: COMPLETED | OUTPATIENT
Start: 2024-12-26 | End: 2024-12-26

## 2024-12-26 RX ORDER — TALC
6 POWDER (GRAM) TOPICAL NIGHTLY PRN
Status: DISCONTINUED | OUTPATIENT
Start: 2024-12-26 | End: 2025-01-03 | Stop reason: HOSPADM

## 2024-12-26 RX ORDER — METHYLPREDNISOLONE SOD SUCC 125 MG
125 VIAL (EA) INJECTION
Status: COMPLETED | OUTPATIENT
Start: 2024-12-26 | End: 2024-12-26

## 2024-12-26 RX ORDER — ACETAMINOPHEN 325 MG/1
650 TABLET ORAL EVERY 4 HOURS PRN
Status: DISCONTINUED | OUTPATIENT
Start: 2024-12-26 | End: 2024-12-26

## 2024-12-26 RX ORDER — MEROPENEM 1 G/1
1 INJECTION, POWDER, FOR SOLUTION INTRAVENOUS
Status: DISCONTINUED | OUTPATIENT
Start: 2024-12-26 | End: 2024-12-28

## 2024-12-26 RX ORDER — MUPIROCIN 20 MG/G
OINTMENT TOPICAL 2 TIMES DAILY
Status: COMPLETED | OUTPATIENT
Start: 2024-12-26 | End: 2024-12-31

## 2024-12-26 RX ORDER — ACETAMINOPHEN 10 MG/ML
1000 INJECTION, SOLUTION INTRAVENOUS
Status: COMPLETED | OUTPATIENT
Start: 2024-12-26 | End: 2024-12-26

## 2024-12-26 RX ORDER — HYDROCODONE BITARTRATE AND ACETAMINOPHEN 5; 325 MG/1; MG/1
1 TABLET ORAL EVERY 6 HOURS PRN
Status: DISCONTINUED | OUTPATIENT
Start: 2024-12-26 | End: 2024-12-28

## 2024-12-26 RX ORDER — AMOXICILLIN 250 MG
1 CAPSULE ORAL 2 TIMES DAILY PRN
Status: DISCONTINUED | OUTPATIENT
Start: 2024-12-26 | End: 2025-01-03 | Stop reason: HOSPADM

## 2024-12-26 RX ORDER — PROPOFOL 10 MG/ML
0-50 INJECTION, EMULSION INTRAVENOUS CONTINUOUS
Status: DISCONTINUED | OUTPATIENT
Start: 2024-12-26 | End: 2024-12-28

## 2024-12-26 RX ORDER — MORPHINE SULFATE 2 MG/ML
2 INJECTION, SOLUTION INTRAMUSCULAR; INTRAVENOUS
Status: COMPLETED | OUTPATIENT
Start: 2024-12-26 | End: 2024-12-26

## 2024-12-26 RX ORDER — ACETAMINOPHEN 500 MG
1000 TABLET ORAL EVERY 8 HOURS PRN
Status: DISCONTINUED | OUTPATIENT
Start: 2024-12-26 | End: 2025-01-03 | Stop reason: HOSPADM

## 2024-12-26 RX ORDER — SODIUM,POTASSIUM PHOSPHATES 280-250MG
2 POWDER IN PACKET (EA) ORAL
Status: DISCONTINUED | OUTPATIENT
Start: 2024-12-26 | End: 2025-01-03 | Stop reason: HOSPADM

## 2024-12-26 RX ORDER — GLUCAGON 1 MG
1 KIT INJECTION
Status: DISCONTINUED | OUTPATIENT
Start: 2024-12-26 | End: 2024-12-31

## 2024-12-26 RX ORDER — ALUMINUM HYDROXIDE, MAGNESIUM HYDROXIDE, AND SIMETHICONE 1200; 120; 1200 MG/30ML; MG/30ML; MG/30ML
30 SUSPENSION ORAL 4 TIMES DAILY PRN
Status: DISCONTINUED | OUTPATIENT
Start: 2024-12-26 | End: 2025-01-03 | Stop reason: HOSPADM

## 2024-12-26 RX ORDER — ONDANSETRON HYDROCHLORIDE 2 MG/ML
4 INJECTION, SOLUTION INTRAVENOUS
Status: COMPLETED | OUTPATIENT
Start: 2024-12-26 | End: 2024-12-26

## 2024-12-26 RX ORDER — ONDANSETRON HYDROCHLORIDE 2 MG/ML
4 INJECTION, SOLUTION INTRAVENOUS EVERY 6 HOURS PRN
Status: DISCONTINUED | OUTPATIENT
Start: 2024-12-26 | End: 2025-01-03 | Stop reason: HOSPADM

## 2024-12-26 RX ORDER — METRONIDAZOLE 500 MG/100ML
500 INJECTION, SOLUTION INTRAVENOUS
Status: COMPLETED | OUTPATIENT
Start: 2024-12-26 | End: 2024-12-26

## 2024-12-26 RX ORDER — SODIUM CHLORIDE 0.9 % (FLUSH) 0.9 %
2 SYRINGE (ML) INJECTION EVERY 12 HOURS PRN
Status: DISCONTINUED | OUTPATIENT
Start: 2024-12-26 | End: 2025-01-03 | Stop reason: HOSPADM

## 2024-12-26 RX ORDER — ACETAMINOPHEN 500 MG
1000 TABLET ORAL
Status: COMPLETED | OUTPATIENT
Start: 2024-12-26 | End: 2024-12-26

## 2024-12-26 RX ORDER — FENTANYL CITRATE 50 UG/ML
100 INJECTION, SOLUTION INTRAMUSCULAR; INTRAVENOUS
Status: ACTIVE | OUTPATIENT
Start: 2024-12-26 | End: 2024-12-27

## 2024-12-26 RX ORDER — IBUPROFEN 200 MG
24 TABLET ORAL
Status: DISCONTINUED | OUTPATIENT
Start: 2024-12-26 | End: 2024-12-31

## 2024-12-26 RX ORDER — IBUPROFEN 200 MG
16 TABLET ORAL
Status: DISCONTINUED | OUTPATIENT
Start: 2024-12-26 | End: 2024-12-31

## 2024-12-26 RX ORDER — FENTANYL CITRATE 50 UG/ML
50 INJECTION, SOLUTION INTRAMUSCULAR; INTRAVENOUS
Status: COMPLETED | OUTPATIENT
Start: 2024-12-26 | End: 2024-12-26

## 2024-12-26 RX ORDER — ENOXAPARIN SODIUM 100 MG/ML
60 INJECTION SUBCUTANEOUS EVERY 12 HOURS
Status: DISCONTINUED | OUTPATIENT
Start: 2024-12-26 | End: 2025-01-03 | Stop reason: HOSPADM

## 2024-12-26 RX ADMIN — ACETAMINOPHEN 1000 MG: 500 TABLET, FILM COATED ORAL at 12:12

## 2024-12-26 RX ADMIN — PROPOFOL 35 MCG/KG/MIN: 10 INJECTION, EMULSION INTRAVENOUS at 08:12

## 2024-12-26 RX ADMIN — Medication 150 MG: at 03:12

## 2024-12-26 RX ADMIN — MUPIROCIN 1 G: 20 OINTMENT TOPICAL at 09:12

## 2024-12-26 RX ADMIN — FENTANYL CITRATE 50 MCG: 0.05 INJECTION, SOLUTION INTRAMUSCULAR; INTRAVENOUS at 12:12

## 2024-12-26 RX ADMIN — MIDAZOLAM HYDROCHLORIDE 2 MG: 1 INJECTION, SOLUTION INTRAMUSCULAR; INTRAVENOUS at 04:12

## 2024-12-26 RX ADMIN — MEROPENEM 1 G: 1 INJECTION INTRAVENOUS at 09:12

## 2024-12-26 RX ADMIN — ETOMIDATE 30 MG: 2 INJECTION, SOLUTION INTRAVENOUS at 03:12

## 2024-12-26 RX ADMIN — AZTREONAM 2000 MG: 2 INJECTION, POWDER, LYOPHILIZED, FOR SOLUTION INTRAMUSCULAR; INTRAVENOUS at 01:12

## 2024-12-26 RX ADMIN — METRONIDAZOLE 500 MG: 500 INJECTION, SOLUTION INTRAVENOUS at 12:12

## 2024-12-26 RX ADMIN — ONDANSETRON 4 MG: 2 INJECTION INTRAMUSCULAR; INTRAVENOUS at 01:12

## 2024-12-26 RX ADMIN — IOHEXOL 100 ML: 350 INJECTION, SOLUTION INTRAVENOUS at 08:12

## 2024-12-26 RX ADMIN — PROPOFOL 10 MCG/KG/MIN: 10 INJECTION, EMULSION INTRAVENOUS at 03:12

## 2024-12-26 RX ADMIN — SODIUM CHLORIDE 1641 ML: 9 INJECTION, SOLUTION INTRAVENOUS at 11:12

## 2024-12-26 RX ADMIN — LORAZEPAM 1 MG: 2 INJECTION, SOLUTION INTRAMUSCULAR; INTRAVENOUS at 03:12

## 2024-12-26 RX ADMIN — MORPHINE SULFATE 2 MG: 2 INJECTION, SOLUTION INTRAMUSCULAR; INTRAVENOUS at 03:12

## 2024-12-26 RX ADMIN — DIPHENHYDRAMINE HYDROCHLORIDE 50 MG: 50 INJECTION INTRAMUSCULAR; INTRAVENOUS at 03:12

## 2024-12-26 RX ADMIN — VANCOMYCIN HYDROCHLORIDE 2000 MG: 500 INJECTION, POWDER, LYOPHILIZED, FOR SOLUTION INTRAVENOUS at 01:12

## 2024-12-26 RX ADMIN — METHYLPREDNISOLONE SODIUM SUCCINATE 125 MG: 125 INJECTION, POWDER, FOR SOLUTION INTRAMUSCULAR; INTRAVENOUS at 03:12

## 2024-12-26 RX ADMIN — FAMOTIDINE 40 MG: 10 INJECTION, SOLUTION INTRAVENOUS at 03:12

## 2024-12-26 RX ADMIN — ENOXAPARIN SODIUM 60 MG: 60 INJECTION SUBCUTANEOUS at 09:12

## 2024-12-26 RX ADMIN — ACETAMINOPHEN 1000 MG: 10 INJECTION INTRAVENOUS at 05:12

## 2024-12-26 RX ADMIN — SODIUM CHLORIDE: 9 INJECTION, SOLUTION INTRAVENOUS at 08:12

## 2024-12-26 NOTE — ED NOTES
"Patient events as followed:     1442: Patient screaming in room for help. Nurse responded and found patient to be rocking back in forth in bed stating "something is wrong. I must be having a heart attack." Patient placed on cardiac monitor. Pulse ox replaced on patient. 02 reading of 95%. Patient hyperventilating. 2 L NC in place on patient.   1445: Consulted with Dr. Quick regarding patient.   1448: Reassessed patients temp oral temp reads 98.1. Notified Dr. Quick. Dr. Quick to patient bedside for reassessment of patient. Reassessed patients temp & and reads a rectal temp as documented in flowsheets. Patients skin mottled. Non-re breather placed on patient at 15 L.   1450: Vancomycin stopped per verbal order from Dr. Quick.   1455: Multiple Ice packs placed on patient at this time   1502: Meds given as documented per MAR by AYAD Rivera.   1518: Repeat lactic drawn and done at this time.   1520: 500 ml bolus started. Verbal order from Dr. Quick.       "

## 2024-12-26 NOTE — HPI
Please note that patient is intubated and sedated at the time of my evaluation, so all information was obtained from the ER provider.      The patient is a 59-year-old female with severe morbid obesity who was brought to the ER because of fever, and chills.  On arrival to the ER, patient was alert and oriented and able to give information. She has a fever of 101.9, and labs reveal leukocytosis of 18.  Sepsis workup was started, and patient got empiric antibiotics.  However few hours while being in the ER, she started to become confused, agitated, had rigors, and oxygen dropped to the 80s. ER provider was concern that pt's symptoms are due to allergic reaction to abx, so steroid, benadryl and pepcid iv were given. She was then intubated for airway protection.  After intubation, repeat temperature was 104.4°.  Currently septic workup is negative including chest x-ray, UA, and pro-calcitonin.  As a result decision was made to order a CT chest abdomen pelvis to further evaluate her sepsis.  However, given pt's severe morbid obesity, she could not fit in our CT scanner.  Patient will be sent to [facility] to get the CT scans ordered by the ER provider, then she will be admitted to our ICU for further management.

## 2024-12-26 NOTE — PROCEDURES
Intubation    Date/Time: 12/26/2024 3:49 PM  Location procedure was performed: [facility] EMERGENCY DEPARTMENT    Performed by: Zhao Denise MD  Authorized by: Nathaniel Quick MD  Consent Done: Emergent Situation  Indications: airway protection  Intubation method: video-assisted  Preoxygenation: mask and bag valve mask (CPAP)  Sedatives: etomidate  Paralytic: succinylcholine  Laryngoscope size: Glide 4  Tube size: 7.5 mm  Tube type: cuffed  Number of attempts: 1  Cords visualized: yes  Post-procedure assessment: chest rise and CO2 detector  Breath sounds: clear and equal  Cuff inflated: yes  ETT to teeth: 24 cm  Tube secured with: ETT osorio  Complications: No

## 2024-12-26 NOTE — CONSULTS
Single lumen 18G, 10CM midline placed in the right basilic vein. Needle advanced into the vessel under real time ultrasound guidance. Image recorded and saved.    Max dwell date 1/24/25.  Lot number: [id number]

## 2024-12-26 NOTE — SUBJECTIVE & OBJECTIVE
Past Medical History:   Diagnosis Date    Allergy     Anemia     Asthma     COPD (chronic obstructive pulmonary disease)     Deep vein thrombosis     Depression     Diabetes mellitus, type 2     Encounter for blood transfusion     Heart murmur     Neuromuscular disorder        Past Surgical History:   Procedure Laterality Date     SECTION      DILATION AND CURETTAGE OF UTERUS      gastric sleeve      [facility]    TONSILLECTOMY         Review of patient's allergies indicates:   Allergen Reactions    Aspirin     Nicoderm     Nsaids (non-steroidal anti-inflammatory drug) Hives    Teflaro [ceftaroline fosamil]      Allergic reaction/ hives/itching       No current facility-administered medications on file prior to encounter.     Current Outpatient Medications on File Prior to Encounter   Medication Sig    acetaminophen (TYLENOL) 650 MG TbSR Take 650 mg by mouth every 8 (eight) hours.    ferrous sulfate 324 mg (65 mg iron) TbEC Take 324 mg by mouth every 7 days.    hydrOXYzine HCL (ATARAX) 25 MG tablet Take 25 mg by mouth 3 (three) times daily as needed for Anxiety.    oxyCODONE-acetaminophen (PERCOCET) 5-325 mg per tablet Take 1 tablet by mouth every 6 (six) hours as needed for Pain.    DULoxetine (CYMBALTA) 30 MG capsule Take 1 capsule (30 mg total) by mouth every evening. (Patient not taking: Reported on 2024)    famotidine (PEPCID) 20 MG tablet Take 1 tablet (20 mg total) by mouth 2 (two) times daily. (Patient not taking: Reported on 2024)    miconazole NITRATE 2 % (MICOTIN) 2 % top powder Apply topically 2 (two) times daily as needed (Rash). (Patient not taking: Reported on 2024)    [DISCONTINUED] albuterol (PROVENTIL/VENTOLIN HFA) 90 mcg/actuation inhaler Inhale 2 puffs into the lungs every 6 (six) hours as needed for Wheezing or Shortness of Breath.    [DISCONTINUED] buPROPion (WELLBUTRIN XL) 300 MG 24 hr tablet Take 300 mg by mouth once daily. NEW Rx - NOT YET STARTED    [DISCONTINUED]  tirzepatide, weight loss, (ZEPBOUND) 2.5 mg/0.5 mL PnIj Inject 2.5 mg into the skin every 7 days. NEW Rx - NOT YET STARTED     Family History       Problem Relation (Age of Onset)    Arthritis Mother, Maternal Grandmother    COPD Father    Cancer Father, Maternal Grandmother, Maternal Grandfather, Paternal Grandmother    Depression Mother    Diabetes Mother, Paternal Grandmother, Paternal Grandfather    Heart disease Mother    Hyperlipidemia Paternal Grandfather    Kidney disease Paternal Grandmother          Tobacco Use    Smoking status: Every Day     Current packs/day: 1.00     Average packs/day: 1 pack/day for 42.0 years (42.0 ttl pk-yrs)     Types: Cigarettes     Start date: 1983    Smokeless tobacco: Never    Tobacco comments:     Pt states she has smoked for 6 years now, smokes around 1pk/day. Interested and quitting. Patient has tried nicotine patches in the past, made her arm swell up.    Substance and Sexual Activity    Alcohol use: No    Drug use: No    Sexual activity: Never     Review of Systems   Unable to perform ROS: Intubated     Objective:     Vital Signs (Most Recent):  Temp: (!) 104.4 °F (40.2 °C) (12/26/24 1705)  Pulse: 103 (12/26/24 1714)  Resp: (!) 24 (12/26/24 1714)  BP: (!) 149/67 (12/26/24 1714)  SpO2: 95 % (12/26/24 1714) Vital Signs (24h Range):  Temp:  [98.1 °F (36.7 °C)-104.4 °F (40.2 °C)] 104.4 °F (40.2 °C)  Pulse:  [] 103  Resp:  [16-35] 24  SpO2:  [90 %-100 %] 95 %  BP: (122-177)/(53-93) 149/67     Weight: (!) 154.2 kg (340 lb)  Body mass index is 58.36 kg/m².     Physical Exam  Vitals reviewed.   Constitutional:       Comments: Intubated and sedated.    HENT:      Head: Normocephalic and atraumatic.   Cardiovascular:      Rate and Rhythm: Regular rhythm. Tachycardia present.   Pulmonary:      Effort: Pulmonary effort is normal. No respiratory distress.      Breath sounds: Normal breath sounds. No wheezing.   Abdominal:      General: Abdomen is flat. Bowel sounds are normal.  There is no distension.      Palpations: Abdomen is soft.   Musculoskeletal:      Cervical back: Neck supple.      Comments: Severe swelling of the proximal aspect of the right lower extremity, with skin changes, and redness.     Neurological:      Comments: Intubated and sedated.                Significant Labs: All pertinent labs within the past 24 hours have been reviewed.  Recent Lab Results  (Last 5 results in the past 24 hours)        12/26/24  1714   12/26/24  1632   12/26/24  1522   12/26/24  1514   12/26/24  1211        Procalcitonin         <0.050  Comment: The Procalcitonin test is intended to aid in the risk assessment of   critically ill patients on their first day of ICU admission for   progression   to severe sepsis and septic shock.    A result of <0.50 ng/mL is associated with a low risk of severe   sepsis   and/or septic shock.  This does not exclude localized infection.    A result between 0.50 ng/mL and 2.0 ng/mL should be interpreted with   consideration of the patient's history and is recommended to retest   within 6   to 24 hours.        A result of >2.0 ng/mL is associated with a high risk of severe   sepsis   and/or septic shock.    Procalcitonin may not be accurate among patients with   localized  infection, recent trauma or major surgery, immunosuppressed   state,  invasive fungal infection, renal dysfunction. Decisions   regarding  initiation or continuation of antibiotic therapy should not be   based  solely on procalcitonin levels.         Albumin         3.7       ALP         60       Allens Test N/A     N/A           ALT         12       Anion Gap         7       Appearance, UA   Clear             AST         11       Bacteria, UA   None             Baso #         0.04       Basophil %         0.2       Bilirubin (UA)   Negative             BILIRUBIN TOTAL         0.4  Comment: For infants and newborns, interpretation of results should be based  on gestational age, weight and in  agreement with clinical  observations.    Premature Infant recommended reference ranges:  Up to 24 hours.............<8.0 mg/dL  Up to 48 hours............<12.0 mg/dL  3-5 days..................<15.0 mg/dL  6-29 days.................<15.0 mg/dL         BNP         38  Comment: Values of less than 100 pg/ml are consistent with non-CHF populations.       Site RR     RR           BUN         13       Calcium         8.7       Chloride         103       CO2         27       Color, UA   Yellow             Creatinine         0.6       DelSys Adult Vent     CPAP/BiPAP           Differential Method         Automated       eGFR         >60.0       Eos #         0.0       Eos %         0.2       EP     6           FiO2 40     30           Glucose         126       Glucose, UA   Negative             Gran # (ANC)         16.6       Gran %         90.0       Hematocrit         37.6       Hemoglobin         11.6       Hyaline Casts, UA   0             Immature Grans (Abs)         0.09  Comment: Mild elevation in immature granulocytes is non specific and   can be seen in a variety of conditions including stress response,   acute inflammation, trauma and pregnancy. Correlation with other   laboratory and clinical findings is essential.         Immature Granulocytes         0.5       IP     16           Ketones, UA   Negative             Leukocyte Esterase, UA   Negative             Lymph #         0.9       Lymph %         4.8       Magnesium          1.9       MCH         27.0       MCHC         30.9       MCV         87       Microscopic Comment   SEE COMMENT  Comment: Other formed elements not mentioned in the report are not   present in the microscopic examination.                Mode AC/PRVC     BiPAP           Mono #         0.8       Mono %         4.3       MPV         9.4       NITRITE UA   Negative             nRBC         0       Blood, UA   TRACE             PEEP 5               pH, UA   6.0             Platelet Count          279       POC BE -2     -3           POC Glucose 189     152           POC HCO3 24.1     22.9           POC Hematocrit 35     39           POC Ionized Calcium 1.15     1.14           POC Lactate       3.17         POC PCO2 47.7     46.1           POC PH 7.310     7.304           POC PO2 89     106           POC Potassium 3.9     4.1           POC SATURATED O2 96     97           POC Sodium 136     136           POC TCO2 25     24           Potassium         4.0       PROTEIN TOTAL         7.2       Protein, UA   1+  Comment: Recommend a 24 hour urine protein or a urine   protein/creatinine ratio if globulin induced proteinuria is  clinically suspected.               Rate 22               RBC         4.30       RBC, UA   3             RDW         15.2       Sample ARTERIAL     ARTERIAL   VENOUS         Sodium         137       Spec Grav UA   1.030             Specimen UA   Urine, Clean Catch             Squam Epithel, UA   1             Troponin I High Sensitivity         2.4  Comment: Troponin results differ between methods. Do not use   results between Troponin methods interchangeably.    Alkaline Phospatase levels above 400 U/L may   cause false positive results.    Access hsTnI should not be used for patients taking   Asfotase yasmine (Strensiq).         UROBILINOGEN UA   Negative             Vt 400               WBC, UA   0             WBC         18.43                              Significant Imaging: I have reviewed all pertinent imaging results/findings within the past 24 hours.

## 2024-12-26 NOTE — ED PROVIDER NOTES
Encounter Date: 2024       History     Chief Complaint   Patient presents with    Fatigue     Patient with generalized weakness beginning about 0300.  Lower back pain and chills.  Body trembling.  Patient also reports dark urine.     Ms. Angulo is 59-year-old female with a history of obesity, lymphedema, sepsis in the past, frequent UTIs, is here with severe chills that began at 3:00 a.m. with fatigue.  She reports generalized weakness.  EMS reports they know her from episodes of sepsis in the past.  Patient states she feels generally weak, she reports her urine has been smelling strong.  She reports some new lower back pain.  She states she feels this is probably an infection and she may be getting sepsis.  She has a known lymphedema with a large mass to the right medial thigh.  She states she has lost some weight and has been told by plastic surgeon that they can remove the mass at some point but it has a vascular supply so it will be hard to remove.  She states that area hurts and has gotten infected in the past.    The history is provided by the patient and the EMS personnel.     Review of patient's allergies indicates:   Allergen Reactions    Aspirin     Nicoderm     Nsaids (non-steroidal anti-inflammatory drug) Hives    Teflaro [ceftaroline fosamil]      Allergic reaction/ hives/itching     Past Medical History:   Diagnosis Date    Allergy     Anemia     Asthma     COPD (chronic obstructive pulmonary disease)     Deep vein thrombosis     Depression     Diabetes mellitus, type 2     Encounter for blood transfusion     Heart murmur     Neuromuscular disorder      Past Surgical History:   Procedure Laterality Date     SECTION      DILATION AND CURETTAGE OF UTERUS      gastric sleeve      Ema     TONSILLECTOMY       Family History   Problem Relation Name Age of Onset    Heart disease Mother      Diabetes Mother      Arthritis Mother      Depression Mother      Cancer Father      COPD Father       Arthritis Maternal Grandmother      Cancer Maternal Grandmother      Cancer Maternal Grandfather      Cancer Paternal Grandmother      Kidney disease Paternal Grandmother      Diabetes Paternal Grandmother      Diabetes Paternal Grandfather      Hyperlipidemia Paternal Grandfather       Social History     Tobacco Use    Smoking status: Every Day     Current packs/day: 1.00     Average packs/day: 1 pack/day for 42.0 years (42.0 ttl pk-yrs)     Types: Cigarettes     Start date: 1983    Smokeless tobacco: Never    Tobacco comments:     Pt states she has smoked for 6 years now, smokes around 1pk/day. Interested and quitting. Patient has tried nicotine patches in the past, made her arm swell up.    Substance Use Topics    Alcohol use: No    Drug use: No     Review of Systems   Constitutional:  Positive for chills and fatigue.   Respiratory: Negative.     Cardiovascular: Negative.    Genitourinary:         Strong smelling urine, increased urinary frequency.   Musculoskeletal:         Chronic stable bilateral leg swelling.  Pain to large chronic lymphedematous mass to right medial thigh   Neurological:         Generalized weakness.   All other systems reviewed and are negative.      Physical Exam     Initial Vitals [12/26/24 1050]   BP Pulse Resp Temp SpO2   (!) 138/59 96 18 (!) 101.9 °F (38.8 °C) (!) 94 %      MAP       --         Physical Exam    Nursing note and vitals reviewed.  Constitutional: She is not diaphoretic.   Obese.  GCS 15.  Fully lucid and linear.  Polite, calm.  No acute distress.  Overweight.   HENT:   Head: Normocephalic and atraumatic.   Eyes: Conjunctivae and EOM are normal.   Cardiovascular:  Normal rate, regular rhythm and normal heart sounds.           No murmur heard.  Pulmonary/Chest: Breath sounds normal. No respiratory distress. She has no wheezes. She has no rales.   Abdominal: Abdomen is soft. She exhibits no distension. There is no abdominal tenderness.   Musculoskeletal:         General:  Edema present.      Comments: Bilateral lower extremity lymphedema noted.  Mass noted to medial right thigh, suspected lymphedematous mass.  Area is warm, but no overt cellulitis.  No purulence, no abscess, no drainage noted.     Neurological: She is alert and oriented to person, place, and time. GCS score is 15. GCS eye subscore is 4. GCS verbal subscore is 5. GCS motor subscore is 6.   Skin: Skin is warm. Capillary refill takes less than 2 seconds. No rash noted.   Psychiatric: She has a normal mood and affect. Thought content normal.         ED Course   Intubation    Date/Time: 12/26/2024 7:17 PM  Location procedure was performed: [facility] EMERGENCY DEPARTMENT    Performed by: Nathaniel Quick MD  Authorized by: Ita Brewer MD  Assisting provider: Riley Brar MD  Consent Done: Emergent Situation  Indications: airway protection  Intubation method: video-assisted  Patient status: paralyzed (RSI)  Preoxygenation: nonrebreather mask  Sedatives: etomidate  Paralytic: succinylcholine  Laryngoscope size: Mac 4  Tube size: 7.5 mm  Number of attempts: 1  Ventilation between attempts: BVM  Cricoid pressure: yes  Cords visualized: yes  Post-procedure assessment: ETCO2 monitor and CO2 detector  Breath sounds: clear  Tube secured with: ETT osorio  Chest x-ray interpreted by other physician.  Patient tolerance: Patient tolerated the procedure well with no immediate complications  Comments: Procedure performed by Dr. Denise and Dr. Brar. I am documenting procedure on their behalf      Critical Care    Date/Time: 12/26/2024 8:07 PM    Performed by: Nathaniel Quick MD  Authorized by: Ita Brewer MD  Direct patient critical care time: 15 minutes  Additional history critical care time: 5 minutes  Ordering / reviewing critical care time: 5 minutes  Documentation critical care time: 5 minutes  Consulting other physicians critical care time: 5 minutes  Total critical care time (exclusive of procedural time) : 35  minutes        Labs Reviewed   CBC W/ AUTO DIFFERENTIAL - Abnormal       Result Value    WBC 18.43 (*)     RBC 4.30      Hemoglobin 11.6 (*)     Hematocrit 37.6      MCV 87      MCH 27.0      MCHC 30.9 (*)     RDW 15.2 (*)     Platelets 279      MPV 9.4      Immature Granulocytes 0.5      Gran # (ANC) 16.6 (*)     Immature Grans (Abs) 0.09 (*)     Lymph # 0.9 (*)     Mono # 0.8      Eos # 0.0      Baso # 0.04      nRBC 0      Gran % 90.0 (*)     Lymph % 4.8 (*)     Mono % 4.3      Eosinophil % 0.2      Basophil % 0.2      Differential Method Automated     COMPREHENSIVE METABOLIC PANEL - Abnormal    Sodium 137      Potassium 4.0      Chloride 103      CO2 27      Glucose 126 (*)     BUN 13      Creatinine 0.6      Calcium 8.7      Total Protein 7.2      Albumin 3.7      Total Bilirubin 0.4      Alkaline Phosphatase 60      AST 11      ALT 12      eGFR >60.0      Anion Gap 7 (*)    URINALYSIS, REFLEX TO URINE CULTURE - Abnormal    Specimen UA Urine, Clean Catch      Color, UA Yellow      Appearance, UA Clear      pH, UA 6.0      Specific Gravity, UA 1.030      Protein, UA 1+ (*)     Glucose, UA Negative      Ketones, UA Negative      Bilirubin (UA) Negative      Occult Blood UA TRACE      Nitrite, UA Negative      Urobilinogen, UA Negative      Leukocytes, UA Negative      Narrative:     Specimen Source->Urine   ISTAT LACTATE - Abnormal    POC Lactate 3.17 (*)     Sample VENOUS     ISTAT PROCEDURE - Abnormal    POC PH 7.304 (*)     POC PCO2 46.1 (*)     POC PO2 106 (*)     POC HCO3 22.9 (*)     POC BE -3 (*)     POC SATURATED O2 97      POC Glucose 152 (*)     POC Sodium 136      POC Potassium 4.1      POC TCO2 24      POC Ionized Calcium 1.14      POC Hematocrit 39      Sample ARTERIAL      Site RR      Allens Test N/A      DelSys CPAP/BiPAP      Mode BiPAP      FiO2 30      IP 16      EP 6     ISTAT PROCEDURE - Abnormal    POC PH 7.310 (*)     POC PCO2 47.7 (*)     POC PO2 89      POC HCO3 24.1      POC BE -2       POC SATURATED O2 96      POC Glucose 189 (*)     POC Sodium 136      POC Potassium 3.9      POC TCO2 25      POC Ionized Calcium 1.15      POC Hematocrit 35 (*)     Rate 22      Sample ARTERIAL      Site RR      Allens Test N/A      DelSys Adult Vent      Mode AC/PRVC      Vt 400      PEEP 5      FiO2 40     CULTURE, BLOOD    Blood Culture, Routine No Growth to date      Narrative:     Aerobic and anaerobic   CULTURE, BLOOD    Blood Culture, Routine No Growth to date      Narrative:     Aerobic and anaerobic   MAGNESIUM    Magnesium 1.9     B-TYPE NATRIURETIC PEPTIDE    BNP 38     TROPONIN I HIGH SENSITIVITY    Troponin I High Sensitivity 2.4     PROCALCITONIN    Procalcitonin <0.050     URINALYSIS MICROSCOPIC    RBC, UA 3      WBC, UA 0      Bacteria None      Squam Epithel, UA 1      Hyaline Casts, UA 0      Microscopic Comment SEE COMMENT      Narrative:     Specimen Source->Urine   ISTAT LACTATE    POC Lactate 1.07      Sample VENOUS     POCT LACTATE   POCT LACTATE     EKG Readings: (Independently Interpreted)   Initial Reading: No STEMI. Previous EKG: Compared with most recent EKG Rhythm: Normal Sinus Rhythm. Ectopy: No Ectopy. Conduction: Normal.     ECG Results              EKG 12-lead (In process)        Collection Time Result Time QRS Duration OHS QTC Calculation    12/26/24 11:55:05 12/26/24 13:19:06 86 425                     In process by Interface, Lab In [facility] (12/26/24 13:19:10)                   Narrative:    Test Reason : Z13.6,    Vent. Rate :  88 BPM     Atrial Rate :  88 BPM     P-R Int : 178 ms          QRS Dur :  86 ms      QT Int : 352 ms       P-R-T Axes :  37  66  48 degrees    QTcB Int : 425 ms    Normal sinus rhythm  Low voltage QRS  Borderline Abnormal ECG  When compared with ECG of 25-Sep-2024 23:08,  No significant change was found    Referred By: AAAREFERRAL SELF           Confirmed By:                                   Imaging Results              CT Thigh With Contrast Right (Final  result)  Result time 12/26/24 20:33:14      Final result by Miguel Agee,  (12/26/24 20:33:14)                   Impression:      1. Large area of soft tissue thickening and edema with overlying skin thickening arising from the proximal medial right thigh/inguinal soft tissues, extending inferiorly, below the level of the knees.  Associated enlarged right inguinal lymph nodes with adjacent fat stranding.  Findings are suspicious for lymphedema, with overlying infection/cellulitis not excluded.  2. Ventral abdominal wall hernia containing loops of small and large bowel.  No bowel obstruction.  3. Enlargement of the main pulmonary artery, which can be seen with pulmonary hypertension.  4. Indeterminate hypodensity within the right kidney superior pole measuring 2.4 x 2.7 cm, stable in size from prior and likely a benign hemorrhagic or proteinaceous cyst.  5. Nonobstructing right-sided nephrolithiasis.  No hydronephrosis.      Electronically signed by: Miguel Agee  Date:    12/26/2024  Time:    20:33               Narrative:    EXAMINATION:  CT CHEST ABDOMEN PELVIS WITH IV CONTRAST (XPD); CT THIGH WITH CONTRAST RIGHT    CLINICAL HISTORY:  Sepsis;; Soft tissue mass, thigh, deep;    TECHNIQUE:  Low dose axial images were obtained of the chest, abdomen, and pelvis from the thoracic inlet to the pubic symphysis following the administration of 100 mL of Omnipaque 350 intravenous contrast.  Sagittal and coronal reformats were provided.    Additional postcontrast low-dose axial images were obtained of the right thigh, from the femoroacetabular joints to the knees, with sagittal and coronal reformats.    COMPARISON:  CT of the abdomen and pelvis from 08/25/2024, 10/21/2022.  CTA chest from 10/21/2022.    FINDINGS:  There is artifact related to the patient's arms which are within the field of view, resulting in streak artifact.    CT chest: There is an endotracheal tube in place, with the tip approximately 1.5 cm  proximal to the kyle.  The large airways are otherwise patent.  There is a 7 mm right lower lobe pulmonary nodule abutting the fissure (series 6, image 254), stable in size dating back to 10/21/2022, and compatible with a benign etiology, no further follow-up is needed.  There is scattered subsegmental atelectasis in the bilateral lungs.  The lungs are otherwise clear, without focal consolidation or ground-glass opacity.  The pleural spaces are clear.  There is global cardiomegaly.  There is no pericardial effusion.  There is no mediastinal or hilar lymphadenopathy.  There is enlargement of the main pulmonary artery, which measures up to 4.1 cm, which can be seen with pulmonary hypertension.  There is mild atherosclerosis of the thoracic aorta.  The esophagus demonstrates no evidence of wall thickening.  There is a nasogastric tube.  The lower neck soft tissues and the chest wall are unremarkable.  There are multilevel degenerative changes of the spine.  There is an enchondroma in the right proximal humerus.  Osseous structures are intact.    CT abdomen and pelvis: The liver is partially limited by streak artifact.  The liver is enlarged.  No focal hepatic lesions.  There is no intra or extrahepatic biliary ductal dilation.  The gallbladder is unremarkable without radiodense gallstone, wall thickening, or pericholecystic fluid.  There is fatty atrophy of the pancreas.  No focal pancreatic lesion.  No pancreatic ductal dilation.  The spleen is enlarged.  No focal lesions.  Bilateral adrenal glands are unremarkable.  There is an indeterminate hypodensity within the right kidney superior pole measuring 2.4 x 2.7 cm, stable in size from prior and likely related to a benign hemorrhagic or proteinaceous cyst.  There are several punctate nonobstructing right renal calculi.  There is no hydronephrosis or obstructing ureteral calculus. There is a nasogastric tube with the tip in the stomach.  There are postop changes of  sleeve gastrectomy.  There is a small hiatal hernia.  There is no evidence of bowel wall thickening or bowel obstruction.  The appendix is normal.  No ascites or free intraperitoneal air.  No abdominal fluid collection.  No evidence of mesenteric or retroperitoneal lymphadenopathy.  There is mild atherosclerosis.  There is no aneurysm. There is a Lyons catheter in place in the urinary bladder.  There is air in the urinary bladder.  There is no bladder wall thickening.  The uterus and adnexae are unremarkable.  There is a bowel containing ventral abdominal wall hernia.  There is no bowel obstruction.  There is no acute fracture or dislocation.  There are degenerative changes.    CT right thigh: There is right inguinal lymphadenopathy, index node measures 1.8 cm (series 3, image 235), with adjacent soft tissue stranding in the right inguinal soft tissues.  There is a large area of focal soft tissue thickening and edema with overlying skin thickening, arising from the proximal medial right thigh/inguinal soft tissues, and extending inferiorly, below the level of the field of view, extending a significant distance below the knees.  There is no evidence of an acute fracture or dislocation of the bilateral femurs.  The remaining soft tissues are unremarkable.  Muscle bulk is grossly maintained.  There is no soft tissue gas.  There is no evidence of a focal rim enhancing fluid collection.  There is no evidence of a radiopaque foreign body.                                       CT Chest Abdomen Pelvis With IV Contrast (XPD) Routine Oral Contrast (Final result)  Result time 12/26/24 20:33:14      Final result by Miguel Agee DO (12/26/24 20:33:14)                   Impression:      1. Large area of soft tissue thickening and edema with overlying skin thickening arising from the proximal medial right thigh/inguinal soft tissues, extending inferiorly, below the level of the knees.  Associated enlarged right inguinal  lymph nodes with adjacent fat stranding.  Findings are suspicious for lymphedema, with overlying infection/cellulitis not excluded.  2. Ventral abdominal wall hernia containing loops of small and large bowel.  No bowel obstruction.  3. Enlargement of the main pulmonary artery, which can be seen with pulmonary hypertension.  4. Indeterminate hypodensity within the right kidney superior pole measuring 2.4 x 2.7 cm, stable in size from prior and likely a benign hemorrhagic or proteinaceous cyst.  5. Nonobstructing right-sided nephrolithiasis.  No hydronephrosis.      Electronically signed by: Miguel Agee  Date:    12/26/2024  Time:    20:33               Narrative:    EXAMINATION:  CT CHEST ABDOMEN PELVIS WITH IV CONTRAST (XPD); CT THIGH WITH CONTRAST RIGHT    CLINICAL HISTORY:  Sepsis;; Soft tissue mass, thigh, deep;    TECHNIQUE:  Low dose axial images were obtained of the chest, abdomen, and pelvis from the thoracic inlet to the pubic symphysis following the administration of 100 mL of Omnipaque 350 intravenous contrast.  Sagittal and coronal reformats were provided.    Additional postcontrast low-dose axial images were obtained of the right thigh, from the femoroacetabular joints to the knees, with sagittal and coronal reformats.    COMPARISON:  CT of the abdomen and pelvis from 08/25/2024, 10/21/2022.  CTA chest from 10/21/2022.    FINDINGS:  There is artifact related to the patient's arms which are within the field of view, resulting in streak artifact.    CT chest: There is an endotracheal tube in place, with the tip approximately 1.5 cm proximal to the kyle.  The large airways are otherwise patent.  There is a 7 mm right lower lobe pulmonary nodule abutting the fissure (series 6, image 254), stable in size dating back to 10/21/2022, and compatible with a benign etiology, no further follow-up is needed.  There is scattered subsegmental atelectasis in the bilateral lungs.  The lungs are otherwise clear,  without focal consolidation or ground-glass opacity.  The pleural spaces are clear.  There is global cardiomegaly.  There is no pericardial effusion.  There is no mediastinal or hilar lymphadenopathy.  There is enlargement of the main pulmonary artery, which measures up to 4.1 cm, which can be seen with pulmonary hypertension.  There is mild atherosclerosis of the thoracic aorta.  The esophagus demonstrates no evidence of wall thickening.  There is a nasogastric tube.  The lower neck soft tissues and the chest wall are unremarkable.  There are multilevel degenerative changes of the spine.  There is an enchondroma in the right proximal humerus.  Osseous structures are intact.    CT abdomen and pelvis: The liver is partially limited by streak artifact.  The liver is enlarged.  No focal hepatic lesions.  There is no intra or extrahepatic biliary ductal dilation.  The gallbladder is unremarkable without radiodense gallstone, wall thickening, or pericholecystic fluid.  There is fatty atrophy of the pancreas.  No focal pancreatic lesion.  No pancreatic ductal dilation.  The spleen is enlarged.  No focal lesions.  Bilateral adrenal glands are unremarkable.  There is an indeterminate hypodensity within the right kidney superior pole measuring 2.4 x 2.7 cm, stable in size from prior and likely related to a benign hemorrhagic or proteinaceous cyst.  There are several punctate nonobstructing right renal calculi.  There is no hydronephrosis or obstructing ureteral calculus. There is a nasogastric tube with the tip in the stomach.  There are postop changes of sleeve gastrectomy.  There is a small hiatal hernia.  There is no evidence of bowel wall thickening or bowel obstruction.  The appendix is normal.  No ascites or free intraperitoneal air.  No abdominal fluid collection.  No evidence of mesenteric or retroperitoneal lymphadenopathy.  There is mild atherosclerosis.  There is no aneurysm. There is a Lyons catheter in place in  the urinary bladder.  There is air in the urinary bladder.  There is no bladder wall thickening.  The uterus and adnexae are unremarkable.  There is a bowel containing ventral abdominal wall hernia.  There is no bowel obstruction.  There is no acute fracture or dislocation.  There are degenerative changes.    CT right thigh: There is right inguinal lymphadenopathy, index node measures 1.8 cm (series 3, image 235), with adjacent soft tissue stranding in the right inguinal soft tissues.  There is a large area of focal soft tissue thickening and edema with overlying skin thickening, arising from the proximal medial right thigh/inguinal soft tissues, and extending inferiorly, below the level of the field of view, extending a significant distance below the knees.  There is no evidence of an acute fracture or dislocation of the bilateral femurs.  The remaining soft tissues are unremarkable.  Muscle bulk is grossly maintained.  There is no soft tissue gas.  There is no evidence of a focal rim enhancing fluid collection.  There is no evidence of a radiopaque foreign body.                                       CT Head Without Contrast (Final result)  Result time 12/26/24 20:03:17      Final result by Miguel Agee DO (12/26/24 20:03:17)                   Impression:      No acute intracranial abnormality.      Electronically signed by: Miguel Agee  Date:    12/26/2024  Time:    20:03               Narrative:    EXAMINATION:  CT HEAD WITHOUT CONTRAST    CLINICAL HISTORY:  Mental status change, unknown cause;sepsis;    TECHNIQUE:  Low dose axial CT images obtained throughout the head without intravenous contrast. Sagittal and coronal reconstructions were performed.    COMPARISON:  None available.    FINDINGS:  Ventricles and sulci are normal in size for age without evidence of hydrocephalus. No extra-axial blood or fluid collections.  Mild hypoattenuation in the supratentorial white matter, compatible with chronic  microvascular ischemic changes.  Brain parenchyma is otherwise unremarkable.  No parenchymal mass, hemorrhage, edema or major vascular distribution infarct.    No calvarial fracture.  The scalp is unremarkable.  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  There is mucosal thickening of the bilateral ethmoid sinuses, mild.  The mastoid air cells are clear.  Endotracheal and orogastric tubes noted                                       X-Ray Chest AP Portable (Final result)  Result time 12/26/24 16:17:00      Final result by Arnold Swift IV, MD (12/26/24 16:17:00)                   Impression:      Interval positioning of endotracheal and nasogastric tubes.    Progression of parahilar/mid lung zone airspace opacities.    Blunting of the left costophrenic angle suggesting small component of pleural fluid.      Electronically signed by: Arnold Swift  Date:    12/26/2024  Time:    16:17               Narrative:    EXAMINATION:  XR CHEST AP PORTABLE    CLINICAL HISTORY:  Presence of other specified devices    COMPARISON:  Earlier examination.    FINDINGS:  In the interval, there has been positioning of an endotracheal tube the tip of which lies above the level of the kyle.  Nasogastric tube extends into the left upper abdomen.  The heart size is stable.  There is prominence of the central pulmonary vasculature.    There has been development of patchy airspace opacities and bilateral parahilar/mid lung zone distribution.  A blunted configuration of the left costophrenic angle is suggestive of small left-sided effusion.  No extrapulmonary air identified.                                       X-Ray Chest AP Portable (Final result)  Result time 12/26/24 11:22:59      Final result by Geo Diehl MD (12/26/24 11:22:59)                   Impression:      No evidence of acute cardiopulmonary disease.      Electronically signed by: Geo Diehl  Date:    12/26/2024  Time:    11:22               Narrative:     EXAMINATION:  XR CHEST AP PORTABLE    CLINICAL HISTORY:  Sepsis;    FINDINGS:  Portable chest radiograph at 11:05 hours compared to prior exams shows the cardiomediastinal silhouette and pulmonary vasculature are within normal limits.    The lungs are normally expanded, with no consolidation, large pleural effusion, or evidence of pulmonary edema.  Blunting of the left costophrenic angle is unchanged.  No pneumothorax or acute fractures.                                       Medications   vancomycin - pharmacy to dose (has no administration in time range)   propofol (DIPRIVAN) 10 mg/mL infusion (30 mcg/kg/min × 154.2 kg Intravenous Rate/Dose Change 12/26/24 2116)   fentaNYL 50 mcg/mL injection 100 mcg (has no administration in time range)   sodium chloride 0.9% flush 2 mL (has no administration in time range)   melatonin tablet 6 mg (has no administration in time range)   ondansetron injection 4 mg (has no administration in time range)   senna-docusate 8.6-50 mg per tablet 1 tablet (has no administration in time range)   aluminum-magnesium hydroxide-simethicone 200-200-20 mg/5 mL suspension 30 mL (has no administration in time range)   acetaminophen tablet 650 mg (has no administration in time range)   HYDROcodone-acetaminophen 5-325 mg per tablet 1 tablet (has no administration in time range)   naloxone 0.4 mg/mL injection 0.02 mg (has no administration in time range)   potassium bicarbonate disintegrating tablet 50 mEq (has no administration in time range)   potassium bicarbonate disintegrating tablet 35 mEq (has no administration in time range)   potassium bicarbonate disintegrating tablet 60 mEq (has no administration in time range)   magnesium oxide tablet 800 mg (has no administration in time range)   magnesium oxide tablet 800 mg (has no administration in time range)   potassium, sodium phosphates 280-160-250 mg packet 2 packet (has no administration in time range)   potassium, sodium phosphates 280-160-250  mg packet 2 packet (has no administration in time range)   potassium, sodium phosphates 280-160-250 mg packet 2 packet (has no administration in time range)   glucose chewable tablet 16 g (has no administration in time range)   glucose chewable tablet 24 g (has no administration in time range)   dextrose 50% injection 12.5 g (has no administration in time range)   dextrose 50% injection 25 g (has no administration in time range)   glucagon (human recombinant) injection 1 mg (has no administration in time range)   enoxaparin injection 60 mg (60 mg Subcutaneous Given 12/26/24 2127)   0.9% NaCl infusion ( Intravenous Verify Only 12/26/24 2100)   meropenem injection 1 g (1 g Intravenous Given 12/26/24 2102)   mupirocin 2 % ointment (1 g Nasal Given 12/26/24 2102)   vancomycin (VANCOCIN) 2,000 mg in 0.9% NaCl 500 mL IVPB (2,000 mg Intravenous Trough Due As Scheduled Before Dose 12/28/24 0100)   sodium chloride 0.9% bolus 1,641 mL 1,641 mL (0 mLs Intravenous Stopped 12/26/24 1245)   aztreonam injection 2,000 mg (2,000 mg Intravenous Given 12/26/24 1350)   metronidazole IVPB 500 mg (0 mg Intravenous Stopped 12/26/24 1326)   fentaNYL 50 mcg/mL injection 50 mcg (50 mcg Intravenous Given 12/26/24 1254)   acetaminophen tablet 1,000 mg (1,000 mg Oral Given 12/26/24 1253)   ondansetron injection 4 mg (4 mg Intravenous Given 12/26/24 1350)   iohexoL (OMNIPAQUE 350) injection 100 mL (100 mLs Intravenous Given 12/26/24 2007)   morphine injection 2 mg (2 mg Intravenous Given 12/26/24 1526)   diphenhydrAMINE injection 50 mg (50 mg Intravenous Given 12/26/24 1502)   methylPREDNISolone sodium succinate injection 125 mg (125 mg Intravenous Given 12/26/24 1503)   famotidine (PF) injection 40 mg (40 mg Intravenous Given 12/26/24 1502)   LORazepam injection 1 mg (1 mg Intravenous Given 12/26/24 1521)   etomidate injection 30 mg (30 mg Intravenous Given by Other 12/26/24 1541)   succinylcholine chloride 200 mg/10 mL (20 mg/mL) IV Syringe  150 mg (150 mg Intravenous Given by Other 12/26/24 1542)   midazolam (PF) (VERSED) 1 mg/mL injection 2 mg (2 mg Intravenous Given 12/26/24 1626)   acetaminophen 1,000 mg/100 mL (10 mg/mL) injection 1,000 mg (0 mg Intravenous Stopped 12/26/24 1720)     Medical Decision Making  Given patient's reported history, septic workup initiated.  Sepsis order set use.  Patient has nonspecific cephalosporin allergy.  Patient unsure if she is allergic to penicillin.  Aztreonam, Flagyl and vancomycin ordered.  Fluids ordered based on ideal body weight.  Labs noted including CBC, CE.  White count 18.  Urine is pending.  CT abd/pelvis and R leg to look at mass ordered. See picture taken by me on chart.     I was called to room and noted patient is anxious, reports shortness of breath, room air sats in the mid 80s.  Placed on non-rebreather, patient is pulling on her oxygen, difficult to redirect, oriented to person, confused to other questions.  Oral attempt had just been taken, was reportedly normal.  Rectal temp taken, I was at the bedside, 101.6.  I clinically suspect patient has a rise temperature with possible rigors.  Patient's skin is mottled. Ice packs ordered and placed on axilla and groin. Pt w allergies to nsaids, confirmed when pt was lucid.  Given sudden change with normal temp however, differential diagnosis also includes acute allergic reaction to wanted antibiotics.  Patient was given Benadryl, Solu-Medrol and Pepcid.  Had received aztreonam and Flagyl.  Vancomycin infusion was paused.  Patient placed on BiPAP but was still moving a lot, unable to be calmed, concern for ability to perfuse.  Decision made to emergently intubate.  Patient removed to trauma 2.  Patient intubated in controlled setting.   Discussed plan and clinical picture with pulm/crit physician.   UA resulted. Unable to perform CT at this campus, pt will need to transfer to [facility] then back to [facility].   Discussed with  physician Dr. Kirk  who will be admitting pt to ICU for further acute care.       Amount and/or Complexity of Data Reviewed  Labs: ordered.  Radiology: ordered.    Risk  OTC drugs.  Prescription drug management.  Decision regarding hospitalization.                                      Clinical Impression:  Final diagnoses:  [Z13.6] Screening for cardiovascular condition  [Z97.8] Endotracheally intubated  [A41.9, R65.20] Severe sepsis (Primary)  [E66.01] Morbid obesity  [R50.9] Febrile illness, acute          ED Disposition Condition    Admit Critical                Nathaniel Quick MD  12/26/24 2008       Nathaniel Quick MD  12/26/24 4186

## 2024-12-27 PROBLEM — L03.115 CELLULITIS OF RIGHT LEG: Status: ACTIVE | Noted: 2024-12-27

## 2024-12-27 LAB
ALBUMIN SERPL BCP-MCNC: 3.4 G/DL (ref 3.5–5.2)
ALLENS TEST: ABNORMAL
ALLENS TEST: ABNORMAL
ALP SERPL-CCNC: 52 U/L (ref 55–135)
ALT SERPL W/O P-5'-P-CCNC: 11 U/L (ref 10–44)
ANION GAP SERPL CALC-SCNC: 5 MMOL/L (ref 8–16)
AST SERPL-CCNC: 18 U/L (ref 10–40)
BASOPHILS # BLD AUTO: 0.01 K/UL (ref 0–0.2)
BASOPHILS NFR BLD: 0.1 % (ref 0–1.9)
BILIRUB SERPL-MCNC: 0.4 MG/DL (ref 0.1–1)
BUN SERPL-MCNC: 11 MG/DL (ref 6–20)
CALCIUM SERPL-MCNC: 8.2 MG/DL (ref 8.7–10.5)
CHLORIDE SERPL-SCNC: 108 MMOL/L (ref 95–110)
CO2 SERPL-SCNC: 25 MMOL/L (ref 23–29)
CREAT SERPL-MCNC: 0.6 MG/DL (ref 0.5–1.4)
DELSYS: ABNORMAL
DELSYS: ABNORMAL
DIFFERENTIAL METHOD BLD: ABNORMAL
EOSINOPHIL # BLD AUTO: 0 K/UL (ref 0–0.5)
EOSINOPHIL NFR BLD: 0 % (ref 0–8)
ERYTHROCYTE [DISTWIDTH] IN BLOOD BY AUTOMATED COUNT: 15.5 % (ref 11.5–14.5)
ERYTHROCYTE [SEDIMENTATION RATE] IN BLOOD BY WESTERGREN METHOD: 20 MM/H
ERYTHROCYTE [SEDIMENTATION RATE] IN BLOOD BY WESTERGREN METHOD: 22 MM/H
EST. GFR  (NO RACE VARIABLE): >60 ML/MIN/1.73 M^2
FIO2: 30
FIO2: 40
GLUCOSE SERPL-MCNC: 152 MG/DL (ref 70–110)
GLUCOSE SERPL-MCNC: 192 MG/DL (ref 70–110)
HCO3 UR-SCNC: 24.5 MMOL/L (ref 24–28)
HCO3 UR-SCNC: 26.5 MMOL/L (ref 24–28)
HCT VFR BLD AUTO: 33.7 % (ref 37–48.5)
HCT VFR BLD CALC: 32 %PCV (ref 36–54)
HGB BLD-MCNC: 10.2 G/DL (ref 12–16)
IMM GRANULOCYTES # BLD AUTO: 0.1 K/UL (ref 0–0.04)
IMM GRANULOCYTES NFR BLD AUTO: 0.7 % (ref 0–0.5)
LYMPHOCYTES # BLD AUTO: 0.8 K/UL (ref 1–4.8)
LYMPHOCYTES NFR BLD: 5.5 % (ref 18–48)
MAGNESIUM SERPL-MCNC: 1.9 MG/DL (ref 1.6–2.6)
MCH RBC QN AUTO: 26.5 PG (ref 27–31)
MCHC RBC AUTO-ENTMCNC: 30.3 G/DL (ref 32–36)
MCV RBC AUTO: 88 FL (ref 82–98)
MODE: ABNORMAL
MODE: ABNORMAL
MONOCYTES # BLD AUTO: 0.7 K/UL (ref 0.3–1)
MONOCYTES NFR BLD: 4.8 % (ref 4–15)
NEUTROPHILS # BLD AUTO: 12.5 K/UL (ref 1.8–7.7)
NEUTROPHILS NFR BLD: 88.9 % (ref 38–73)
NRBC BLD-RTO: 0 /100 WBC
PCO2 BLDA: 47.5 MMHG (ref 35–45)
PCO2 BLDA: 49.7 MMHG (ref 35–45)
PEEP: 5
PEEP: 5
PH SMN: 7.32 [PH] (ref 7.35–7.45)
PH SMN: 7.33 [PH] (ref 7.35–7.45)
PLATELET # BLD AUTO: 257 K/UL (ref 150–450)
PMV BLD AUTO: 9.7 FL (ref 9.2–12.9)
PO2 BLDA: 104 MMHG (ref 80–100)
PO2 BLDA: 83 MMHG (ref 80–100)
POC BE: -2 MMOL/L
POC BE: 1 MMOL/L
POC IONIZED CALCIUM: 1.18 MMOL/L (ref 1.06–1.42)
POC SATURATED O2: 95 % (ref 95–100)
POC SATURATED O2: 97 % (ref 95–100)
POC TCO2: 26 MMOL/L (ref 23–27)
POC TCO2: 28 MMOL/L (ref 23–27)
POCT GLUCOSE: 149 MG/DL (ref 70–110)
POTASSIUM BLD-SCNC: 4 MMOL/L (ref 3.5–5.1)
POTASSIUM SERPL-SCNC: 3.9 MMOL/L (ref 3.5–5.1)
PROT SERPL-MCNC: 6.5 G/DL (ref 6–8.4)
RBC # BLD AUTO: 3.85 M/UL (ref 4–5.4)
SAMPLE: ABNORMAL
SAMPLE: ABNORMAL
SITE: ABNORMAL
SITE: ABNORMAL
SODIUM BLD-SCNC: 139 MMOL/L (ref 136–145)
SODIUM SERPL-SCNC: 138 MMOL/L (ref 136–145)
VT: 450
VT: 450
WBC # BLD AUTO: 14.04 K/UL (ref 3.9–12.7)

## 2024-12-27 PROCEDURE — 25000003 PHARM REV CODE 250: Performed by: INTERNAL MEDICINE

## 2024-12-27 PROCEDURE — 25000003 PHARM REV CODE 250: Performed by: HOSPITALIST

## 2024-12-27 PROCEDURE — 99291 CRITICAL CARE FIRST HOUR: CPT | Mod: ,,, | Performed by: INTERNAL MEDICINE

## 2024-12-27 PROCEDURE — 63600175 PHARM REV CODE 636 W HCPCS: Performed by: EMERGENCY MEDICINE

## 2024-12-27 PROCEDURE — 82803 BLOOD GASES ANY COMBINATION: CPT

## 2024-12-27 PROCEDURE — 36600 WITHDRAWAL OF ARTERIAL BLOOD: CPT

## 2024-12-27 PROCEDURE — 99900035 HC TECH TIME PER 15 MIN (STAT)

## 2024-12-27 PROCEDURE — 20000000 HC ICU ROOM

## 2024-12-27 PROCEDURE — 63600175 PHARM REV CODE 636 W HCPCS: Performed by: HOSPITALIST

## 2024-12-27 PROCEDURE — 99900026 HC AIRWAY MAINTENANCE (STAT)

## 2024-12-27 PROCEDURE — 85025 COMPLETE CBC W/AUTO DIFF WBC: CPT | Performed by: HOSPITALIST

## 2024-12-27 PROCEDURE — 99223 1ST HOSP IP/OBS HIGH 75: CPT | Mod: ,,, | Performed by: INTERNAL MEDICINE

## 2024-12-27 PROCEDURE — 99900031 HC PATIENT EDUCATION (STAT)

## 2024-12-27 PROCEDURE — 94799 UNLISTED PULMONARY SVC/PX: CPT

## 2024-12-27 PROCEDURE — 82962 GLUCOSE BLOOD TEST: CPT

## 2024-12-27 PROCEDURE — 83735 ASSAY OF MAGNESIUM: CPT | Performed by: HOSPITALIST

## 2024-12-27 PROCEDURE — 94761 N-INVAS EAR/PLS OXIMETRY MLT: CPT

## 2024-12-27 PROCEDURE — 80053 COMPREHEN METABOLIC PANEL: CPT | Performed by: HOSPITALIST

## 2024-12-27 PROCEDURE — 25000003 PHARM REV CODE 250: Performed by: STUDENT IN AN ORGANIZED HEALTH CARE EDUCATION/TRAINING PROGRAM

## 2024-12-27 PROCEDURE — 94640 AIRWAY INHALATION TREATMENT: CPT

## 2024-12-27 PROCEDURE — 94003 VENT MGMT INPAT SUBQ DAY: CPT

## 2024-12-27 PROCEDURE — 25000242 PHARM REV CODE 250 ALT 637 W/ HCPCS: Performed by: HOSPITALIST

## 2024-12-27 PROCEDURE — 27100171 HC OXYGEN HIGH FLOW UP TO 24 HOURS

## 2024-12-27 RX ORDER — FAMOTIDINE 10 MG/ML
20 INJECTION INTRAVENOUS 2 TIMES DAILY
Status: DISCONTINUED | OUTPATIENT
Start: 2024-12-27 | End: 2024-12-28

## 2024-12-27 RX ORDER — NOREPINEPHRINE BITARTRATE/D5W 4MG/250ML
0-3 PLASTIC BAG, INJECTION (ML) INTRAVENOUS CONTINUOUS
Status: DISCONTINUED | OUTPATIENT
Start: 2024-12-27 | End: 2024-12-28

## 2024-12-27 RX ORDER — ALBUTEROL SULFATE 0.83 MG/ML
2.5 SOLUTION RESPIRATORY (INHALATION) EVERY 4 HOURS PRN
Status: DISCONTINUED | OUTPATIENT
Start: 2024-12-27 | End: 2025-01-03 | Stop reason: HOSPADM

## 2024-12-27 RX ORDER — FENTANYL CITRATE-0.9 % NACL/PF 10 MCG/ML
0-250 PLASTIC BAG, INJECTION (ML) INTRAVENOUS CONTINUOUS
Status: DISCONTINUED | OUTPATIENT
Start: 2024-12-27 | End: 2024-12-28

## 2024-12-27 RX ADMIN — PROPOFOL 35 MCG/KG/MIN: 10 INJECTION, EMULSION INTRAVENOUS at 04:12

## 2024-12-27 RX ADMIN — FAMOTIDINE 20 MG: 10 INJECTION, SOLUTION INTRAVENOUS at 08:12

## 2024-12-27 RX ADMIN — NOREPINEPHRINE BITARTRATE 0.02 MCG/KG/MIN: 4 INJECTION, SOLUTION INTRAVENOUS at 11:12

## 2024-12-27 RX ADMIN — MUPIROCIN 1 G: 20 OINTMENT TOPICAL at 08:12

## 2024-12-27 RX ADMIN — ALBUTEROL SULFATE 2.5 MG: 2.5 SOLUTION RESPIRATORY (INHALATION) at 11:12

## 2024-12-27 RX ADMIN — PROPOFOL 30 MCG/KG/MIN: 10 INJECTION, EMULSION INTRAVENOUS at 12:12

## 2024-12-27 RX ADMIN — PROPOFOL 50 MCG/KG/MIN: 10 INJECTION, EMULSION INTRAVENOUS at 10:12

## 2024-12-27 RX ADMIN — FAMOTIDINE 20 MG: 10 INJECTION, SOLUTION INTRAVENOUS at 11:12

## 2024-12-27 RX ADMIN — PROPOFOL 50 MCG/KG/MIN: 10 INJECTION, EMULSION INTRAVENOUS at 05:12

## 2024-12-27 RX ADMIN — MEROPENEM 1 G: 1 INJECTION INTRAVENOUS at 05:12

## 2024-12-27 RX ADMIN — MUPIROCIN 1 G: 20 OINTMENT TOPICAL at 10:12

## 2024-12-27 RX ADMIN — PROPOFOL 35 MCG/KG/MIN: 10 INJECTION, EMULSION INTRAVENOUS at 07:12

## 2024-12-27 RX ADMIN — Medication 25 MCG/HR: at 01:12

## 2024-12-27 RX ADMIN — ALBUTEROL SULFATE 2.5 MG: 2.5 SOLUTION RESPIRATORY (INHALATION) at 03:12

## 2024-12-27 RX ADMIN — ENOXAPARIN SODIUM 60 MG: 60 INJECTION SUBCUTANEOUS at 07:12

## 2024-12-27 RX ADMIN — MEROPENEM 1 G: 1 INJECTION INTRAVENOUS at 08:12

## 2024-12-27 RX ADMIN — PROPOFOL 50 MCG/KG/MIN: 10 INJECTION, EMULSION INTRAVENOUS at 03:12

## 2024-12-27 RX ADMIN — ENOXAPARIN SODIUM 60 MG: 60 INJECTION SUBCUTANEOUS at 08:12

## 2024-12-27 RX ADMIN — Medication 175 MCG/HR: at 08:12

## 2024-12-27 RX ADMIN — PROPOFOL 50 MCG/KG/MIN: 10 INJECTION, EMULSION INTRAVENOUS at 12:12

## 2024-12-27 RX ADMIN — VANCOMYCIN HYDROCHLORIDE 2000 MG: 500 INJECTION, POWDER, LYOPHILIZED, FOR SOLUTION INTRAVENOUS at 02:12

## 2024-12-27 RX ADMIN — PROPOFOL 50 MCG/KG/MIN: 10 INJECTION, EMULSION INTRAVENOUS at 08:12

## 2024-12-27 RX ADMIN — MEROPENEM 1 G: 1 INJECTION INTRAVENOUS at 01:12

## 2024-12-27 NOTE — PROGRESS NOTES
Report received. SBAR discussed. Awaiting pt's arrival to ICU Bed 5848.       12/26/24 6722   Handoff Report   Received From AYAD Rutherford  (Emergency room nurse)

## 2024-12-27 NOTE — PLAN OF CARE
Problem: Oral Intake Inadequate  Goal: Improved Oral Intake  Outcome: Progressing   1. If pt remains intubated, recommend enteral nutrition. Glucerna 1.5 @ 20 ml/hr (720 kcal, 40 gm protein, 364 ml H20). TF + propofol = 114% EEN, 29% EPN.   2. Monitor blood glucose and consider low correction dose SSI.    3. Once extubated recommend advance to 2000 DM diet as feasible.   4. RD following.

## 2024-12-27 NOTE — PROGRESS NOTES
VANCOMYCIN PHARMACOKINETIC NOTE:  Vancomycin Day # 1    Objective/Assessment:    Diagnosis/Indication for Vancomycin:Sepsis      59 y.o., female; Actual Body Weight = (!) 173.1 kg (381 lb 9.9 oz).    The patient has the following labs:  12/26/2024 Estimated Creatinine Clearance: 160.7 mL/min (based on SCr of 0.6 mg/dL). Lab Results   Component Value Date    BUN 13 12/26/2024     Lab Results   Component Value Date    WBC 18.43 (H) 12/26/2024          Plan:  Adjust vancomycin dose and/or frequency based on the patient's actual weight and renal function:  Initiate Vancomycin 2000 mg IV every 12 hours.  Orders have been entered into patient's chart.        Vancomycin trough level has been ordered for 0100 on 12/28.    Pharmacy will manage vancomycin therapy, monitor serum vancomycin levels, monitor renal function and adjust regimen as necessary.    Thank you for allowing us to participate in this patient's care.     Darling Johnson 12/26/2024   Department of Pharmacy  Ext [phone]

## 2024-12-27 NOTE — CONSULTS
2024      Admit Date: 2024  Adriana Zaragoza  New Patient Consult    Chief Complaint   Patient presents with    Fatigue     Patient with generalized weakness beginning about 0300.  Lower back pain and chills.  Body trembling.  Patient also reports dark urine.       History of Present Illness:  Pt is a 58 yo female with morbid obesity, lymphedema, mass of right thigh who presented to the ED with sepsis, UTI. Pt required intubation in ED due to respiratory distress, failed bipap. Pulmonary is consulted for vent management. Pt anxious, awake, on propofol and fentanyl. Gas exchange suboptimal on today's ABG.      PFSH:  Past Medical History:   Diagnosis Date    Allergy     Anemia     Asthma     COPD (chronic obstructive pulmonary disease)     Deep vein thrombosis     Depression     Diabetes mellitus, type 2     Encounter for blood transfusion     Heart murmur     Neuromuscular disorder      Past Surgical History:   Procedure Laterality Date     SECTION      DILATION AND CURETTAGE OF UTERUS      gastric sleeve      [facility]    TONSILLECTOMY       Social History     Tobacco Use    Smoking status: Every Day     Current packs/day: 1.00     Average packs/day: 1 pack/day for 42.0 years (42.0 ttl pk-yrs)     Types: Cigarettes     Start date:     Smokeless tobacco: Never    Tobacco comments:     Pt states she has smoked for 6 years now, smokes around 1pk/day. Interested and quitting. Patient has tried nicotine patches in the past, made her arm swell up.    Substance Use Topics    Alcohol use: No    Drug use: No     Family History   Problem Relation Name Age of Onset    Heart disease Mother      Diabetes Mother      Arthritis Mother      Depression Mother      Cancer Father      COPD Father      Arthritis Maternal Grandmother      Cancer Maternal Grandmother      Cancer Maternal Grandfather      Cancer Paternal Grandmother      Kidney disease Paternal Grandmother      Diabetes Paternal  "Grandmother      Diabetes Paternal Grandfather      Hyperlipidemia Paternal Grandfather       Review of patient's allergies indicates:   Allergen Reactions    Aspirin     Nicoderm     Nsaids (non-steroidal anti-inflammatory drug) Hives    Teflaro [ceftaroline fosamil]      Allergic reaction/ hives/itching       Performance Status:Performance Status:The patient's activity level is was not done.    Review of Systems:  due to neurologic status/impairments a Review of Systems could not be obtained       Exam:Comprehensive exam done. BP (!) 104/52   Pulse 63   Temp 98 °F (36.7 °C)   Resp (!) 22   Ht 5' 4" (1.626 m)   Wt (!) 173.1 kg (381 lb 9.9 oz)   SpO2 98%   BMI 65.50 kg/m²   Exam included Vitals as listed, and patient's appearance and affect and alertness and mood, oral exam for yeast and hygiene and pharynx lesions and Mallapatti (M) score, neck with inspection for jvd and masses and thyroid abnormalities and lymph nodes (supraclavicular and infraclavicular nodes also examined and noted if abn), chest exam included symmetry and effort and fremitus and percussion and auscultation, cardiac exam included rhythm and gallops and murmur and rubs and jvd and edema, abdominal exam for mass and hepatosplenomegaly and tenderness and hernias and bowel sounds, Musculoskeletal exam with muscle tone and posture and mobility/gait and  strenght, and skin for rashes and cyanosis and pallor and turgor, extremity for clubbing.  Findings were normal except as listed below:  Morbid obese, awake, gesturing and anxious  Intubated  HR regular  Breath sounds diminished bilaterally  Abd soft nontender  Mass pubic area/R thigh erythematous and tender with pustule  Bilat lower ext lymphedema    Radiographs reviewed: view by direct vision   Imaging Results              CT Thigh With Contrast Right (Final result)  Result time 12/26/24 20:33:14      Final result by Miguel Agee DO (12/26/24 20:33:14)                   Impression: "      1. Large area of soft tissue thickening and edema with overlying skin thickening arising from the proximal medial right thigh/inguinal soft tissues, extending inferiorly, below the level of the knees.  Associated enlarged right inguinal lymph nodes with adjacent fat stranding.  Findings are suspicious for lymphedema, with overlying infection/cellulitis not excluded.  2. Ventral abdominal wall hernia containing loops of small and large bowel.  No bowel obstruction.  3. Enlargement of the main pulmonary artery, which can be seen with pulmonary hypertension.  4. Indeterminate hypodensity within the right kidney superior pole measuring 2.4 x 2.7 cm, stable in size from prior and likely a benign hemorrhagic or proteinaceous cyst.  5. Nonobstructing right-sided nephrolithiasis.  No hydronephrosis.      Electronically signed by: Miguel Agee  Date:    12/26/2024  Time:    20:33               Narrative:    EXAMINATION:  CT CHEST ABDOMEN PELVIS WITH IV CONTRAST (XPD); CT THIGH WITH CONTRAST RIGHT    CLINICAL HISTORY:  Sepsis;; Soft tissue mass, thigh, deep;    TECHNIQUE:  Low dose axial images were obtained of the chest, abdomen, and pelvis from the thoracic inlet to the pubic symphysis following the administration of 100 mL of Omnipaque 350 intravenous contrast.  Sagittal and coronal reformats were provided.    Additional postcontrast low-dose axial images were obtained of the right thigh, from the femoroacetabular joints to the knees, with sagittal and coronal reformats.    COMPARISON:  CT of the abdomen and pelvis from 08/25/2024, 10/21/2022.  CTA chest from 10/21/2022.    FINDINGS:  There is artifact related to the patient's arms which are within the field of view, resulting in streak artifact.    CT chest: There is an endotracheal tube in place, with the tip approximately 1.5 cm proximal to the kyle.  The large airways are otherwise patent.  There is a 7 mm right lower lobe pulmonary nodule abutting the fissure  (series 6, image 254), stable in size dating back to 10/21/2022, and compatible with a benign etiology, no further follow-up is needed.  There is scattered subsegmental atelectasis in the bilateral lungs.  The lungs are otherwise clear, without focal consolidation or ground-glass opacity.  The pleural spaces are clear.  There is global cardiomegaly.  There is no pericardial effusion.  There is no mediastinal or hilar lymphadenopathy.  There is enlargement of the main pulmonary artery, which measures up to 4.1 cm, which can be seen with pulmonary hypertension.  There is mild atherosclerosis of the thoracic aorta.  The esophagus demonstrates no evidence of wall thickening.  There is a nasogastric tube.  The lower neck soft tissues and the chest wall are unremarkable.  There are multilevel degenerative changes of the spine.  There is an enchondroma in the right proximal humerus.  Osseous structures are intact.    CT abdomen and pelvis: The liver is partially limited by streak artifact.  The liver is enlarged.  No focal hepatic lesions.  There is no intra or extrahepatic biliary ductal dilation.  The gallbladder is unremarkable without radiodense gallstone, wall thickening, or pericholecystic fluid.  There is fatty atrophy of the pancreas.  No focal pancreatic lesion.  No pancreatic ductal dilation.  The spleen is enlarged.  No focal lesions.  Bilateral adrenal glands are unremarkable.  There is an indeterminate hypodensity within the right kidney superior pole measuring 2.4 x 2.7 cm, stable in size from prior and likely related to a benign hemorrhagic or proteinaceous cyst.  There are several punctate nonobstructing right renal calculi.  There is no hydronephrosis or obstructing ureteral calculus. There is a nasogastric tube with the tip in the stomach.  There are postop changes of sleeve gastrectomy.  There is a small hiatal hernia.  There is no evidence of bowel wall thickening or bowel obstruction.  The appendix is  normal.  No ascites or free intraperitoneal air.  No abdominal fluid collection.  No evidence of mesenteric or retroperitoneal lymphadenopathy.  There is mild atherosclerosis.  There is no aneurysm. There is a Lyons catheter in place in the urinary bladder.  There is air in the urinary bladder.  There is no bladder wall thickening.  The uterus and adnexae are unremarkable.  There is a bowel containing ventral abdominal wall hernia.  There is no bowel obstruction.  There is no acute fracture or dislocation.  There are degenerative changes.    CT right thigh: There is right inguinal lymphadenopathy, index node measures 1.8 cm (series 3, image 235), with adjacent soft tissue stranding in the right inguinal soft tissues.  There is a large area of focal soft tissue thickening and edema with overlying skin thickening, arising from the proximal medial right thigh/inguinal soft tissues, and extending inferiorly, below the level of the field of view, extending a significant distance below the knees.  There is no evidence of an acute fracture or dislocation of the bilateral femurs.  The remaining soft tissues are unremarkable.  Muscle bulk is grossly maintained.  There is no soft tissue gas.  There is no evidence of a focal rim enhancing fluid collection.  There is no evidence of a radiopaque foreign body.                                       CT Chest Abdomen Pelvis With IV Contrast (XPD) Routine Oral Contrast (Final result)  Result time 12/26/24 20:33:14      Final result by Miguel Agee DO (12/26/24 20:33:14)                   Impression:      1. Large area of soft tissue thickening and edema with overlying skin thickening arising from the proximal medial right thigh/inguinal soft tissues, extending inferiorly, below the level of the knees.  Associated enlarged right inguinal lymph nodes with adjacent fat stranding.  Findings are suspicious for lymphedema, with overlying infection/cellulitis not excluded.  2. Ventral  abdominal wall hernia containing loops of small and large bowel.  No bowel obstruction.  3. Enlargement of the main pulmonary artery, which can be seen with pulmonary hypertension.  4. Indeterminate hypodensity within the right kidney superior pole measuring 2.4 x 2.7 cm, stable in size from prior and likely a benign hemorrhagic or proteinaceous cyst.  5. Nonobstructing right-sided nephrolithiasis.  No hydronephrosis.      Electronically signed by: Miguel Agee  Date:    12/26/2024  Time:    20:33               Narrative:    EXAMINATION:  CT CHEST ABDOMEN PELVIS WITH IV CONTRAST (XPD); CT THIGH WITH CONTRAST RIGHT    CLINICAL HISTORY:  Sepsis;; Soft tissue mass, thigh, deep;    TECHNIQUE:  Low dose axial images were obtained of the chest, abdomen, and pelvis from the thoracic inlet to the pubic symphysis following the administration of 100 mL of Omnipaque 350 intravenous contrast.  Sagittal and coronal reformats were provided.    Additional postcontrast low-dose axial images were obtained of the right thigh, from the femoroacetabular joints to the knees, with sagittal and coronal reformats.    COMPARISON:  CT of the abdomen and pelvis from 08/25/2024, 10/21/2022.  CTA chest from 10/21/2022.    FINDINGS:  There is artifact related to the patient's arms which are within the field of view, resulting in streak artifact.    CT chest: There is an endotracheal tube in place, with the tip approximately 1.5 cm proximal to the kyle.  The large airways are otherwise patent.  There is a 7 mm right lower lobe pulmonary nodule abutting the fissure (series 6, image 254), stable in size dating back to 10/21/2022, and compatible with a benign etiology, no further follow-up is needed.  There is scattered subsegmental atelectasis in the bilateral lungs.  The lungs are otherwise clear, without focal consolidation or ground-glass opacity.  The pleural spaces are clear.  There is global cardiomegaly.  There is no pericardial effusion.   There is no mediastinal or hilar lymphadenopathy.  There is enlargement of the main pulmonary artery, which measures up to 4.1 cm, which can be seen with pulmonary hypertension.  There is mild atherosclerosis of the thoracic aorta.  The esophagus demonstrates no evidence of wall thickening.  There is a nasogastric tube.  The lower neck soft tissues and the chest wall are unremarkable.  There are multilevel degenerative changes of the spine.  There is an enchondroma in the right proximal humerus.  Osseous structures are intact.    CT abdomen and pelvis: The liver is partially limited by streak artifact.  The liver is enlarged.  No focal hepatic lesions.  There is no intra or extrahepatic biliary ductal dilation.  The gallbladder is unremarkable without radiodense gallstone, wall thickening, or pericholecystic fluid.  There is fatty atrophy of the pancreas.  No focal pancreatic lesion.  No pancreatic ductal dilation.  The spleen is enlarged.  No focal lesions.  Bilateral adrenal glands are unremarkable.  There is an indeterminate hypodensity within the right kidney superior pole measuring 2.4 x 2.7 cm, stable in size from prior and likely related to a benign hemorrhagic or proteinaceous cyst.  There are several punctate nonobstructing right renal calculi.  There is no hydronephrosis or obstructing ureteral calculus. There is a nasogastric tube with the tip in the stomach.  There are postop changes of sleeve gastrectomy.  There is a small hiatal hernia.  There is no evidence of bowel wall thickening or bowel obstruction.  The appendix is normal.  No ascites or free intraperitoneal air.  No abdominal fluid collection.  No evidence of mesenteric or retroperitoneal lymphadenopathy.  There is mild atherosclerosis.  There is no aneurysm. There is a Lyons catheter in place in the urinary bladder.  There is air in the urinary bladder.  There is no bladder wall thickening.  The uterus and adnexae are unremarkable.  There is a  bowel containing ventral abdominal wall hernia.  There is no bowel obstruction.  There is no acute fracture or dislocation.  There are degenerative changes.    CT right thigh: There is right inguinal lymphadenopathy, index node measures 1.8 cm (series 3, image 235), with adjacent soft tissue stranding in the right inguinal soft tissues.  There is a large area of focal soft tissue thickening and edema with overlying skin thickening, arising from the proximal medial right thigh/inguinal soft tissues, and extending inferiorly, below the level of the field of view, extending a significant distance below the knees.  There is no evidence of an acute fracture or dislocation of the bilateral femurs.  The remaining soft tissues are unremarkable.  Muscle bulk is grossly maintained.  There is no soft tissue gas.  There is no evidence of a focal rim enhancing fluid collection.  There is no evidence of a radiopaque foreign body.                                       CT Head Without Contrast (Final result)  Result time 12/26/24 20:03:17      Final result by Miguel Agee DO (12/26/24 20:03:17)                   Impression:      No acute intracranial abnormality.      Electronically signed by: Miguel Agee  Date:    12/26/2024  Time:    20:03               Narrative:    EXAMINATION:  CT HEAD WITHOUT CONTRAST    CLINICAL HISTORY:  Mental status change, unknown cause;sepsis;    TECHNIQUE:  Low dose axial CT images obtained throughout the head without intravenous contrast. Sagittal and coronal reconstructions were performed.    COMPARISON:  None available.    FINDINGS:  Ventricles and sulci are normal in size for age without evidence of hydrocephalus. No extra-axial blood or fluid collections.  Mild hypoattenuation in the supratentorial white matter, compatible with chronic microvascular ischemic changes.  Brain parenchyma is otherwise unremarkable.  No parenchymal mass, hemorrhage, edema or major vascular distribution  infarct.    No calvarial fracture.  The scalp is unremarkable.  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  There is mucosal thickening of the bilateral ethmoid sinuses, mild.  The mastoid air cells are clear.  Endotracheal and orogastric tubes noted                                       X-Ray Chest AP Portable (Final result)  Result time 12/26/24 16:17:00      Final result by Arnold Swift IV, MD (12/26/24 16:17:00)                   Impression:      Interval positioning of endotracheal and nasogastric tubes.    Progression of parahilar/mid lung zone airspace opacities.    Blunting of the left costophrenic angle suggesting small component of pleural fluid.      Electronically signed by: Arnold Swift  Date:    12/26/2024  Time:    16:17               Narrative:    EXAMINATION:  XR CHEST AP PORTABLE    CLINICAL HISTORY:  Presence of other specified devices    COMPARISON:  Earlier examination.    FINDINGS:  In the interval, there has been positioning of an endotracheal tube the tip of which lies above the level of the kyle.  Nasogastric tube extends into the left upper abdomen.  The heart size is stable.  There is prominence of the central pulmonary vasculature.    There has been development of patchy airspace opacities and bilateral parahilar/mid lung zone distribution.  A blunted configuration of the left costophrenic angle is suggestive of small left-sided effusion.  No extrapulmonary air identified.                                       X-Ray Chest AP Portable (Final result)  Result time 12/26/24 11:22:59      Final result by Geo Diehl MD (12/26/24 11:22:59)                   Impression:      No evidence of acute cardiopulmonary disease.      Electronically signed by: Geo Diehl  Date:    12/26/2024  Time:    11:22               Narrative:    EXAMINATION:  XR CHEST AP PORTABLE    CLINICAL HISTORY:  Sepsis;    FINDINGS:  Portable chest radiograph at 11:05 hours compared to prior exams shows the  cardiomediastinal silhouette and pulmonary vasculature are within normal limits.    The lungs are normally expanded, with no consolidation, large pleural effusion, or evidence of pulmonary edema.  Blunting of the left costophrenic angle is unchanged.  No pneumothorax or acute fractures.                                          Labs     Recent Labs   Lab 12/27/24  0441   WBC 14.04*   HGB 10.2*   HCT 33.7*        Recent Labs   Lab 12/26/24  1211 12/26/24  2107 12/27/24  0441     --  138   K 4.0  --  3.9     --  108   CO2 27  --  25   BUN 13  --  11   CREATININE 0.6  --  0.6   *  --  192*   CALCIUM 8.7  --  8.2*   MG 1.9  --  1.9   AST 11  --  18   ALT 12  --  11   ALKPHOS 60  --  52*   BILITOT 0.4  --  0.4   PROT 7.2  --  6.5   ALBUMIN 3.7  --  3.4*   PROCAL <0.050  --   --    LACTATE  --  0.7  --    BNP 38  --   --      Recent Labs   Lab 12/27/24  0321   PH 7.321*   PCO2 47.5*   PO2 104*   HCO3 24.5     Microbiology Results (last 7 days)       Procedure Component Value Units Date/Time    Respiratory Infection Panel (PCR), Nasopharyngeal [[id number]] Collected: 12/26/24 1904    Order Status: Completed Specimen: Nasopharyngeal Swab Updated: 12/26/24 2044     Respiratory Infection Panel Source NP swab     Adenovirus Not Detected     Coronavirus 229E, Common Cold Virus Not Detected     Coronavirus HKU1, Common Cold Virus Not Detected     Coronavirus NL63, Common Cold Virus Not Detected     Coronavirus OC43, Common Cold Virus Not Detected     Comment: Coronavirus strains 229E, HKU1, NL63, and OC43 can cause the common   cold   and are not associated with the respiratory disease outbreak caused   by  the COVID-19 (SARS-CoV-2 novel Coronavirus) strain.           SARS-CoV2 (COVID-19) Qualitative PCR Not Detected     Human Metapneumovirus Not Detected     Human Rhinovirus/Enterovirus Not Detected     Influenza A (subtypes H1, H1-2009,H3) Not Detected     Influenza B Not Detected     Parainfluenza  Virus 1 Not Detected     Parainfluenza Virus 2 Not Detected     Parainfluenza Virus 3 Not Detected     Parainfluenza Virus 4 Not Detected     Respiratory Syncytial Virus Not Detected     Bordetella Parapertussis (RL1566) Not Detected     Bordetella pertussis (ptxP) Not Detected     Chlamydia pneumoniae Not Detected     Mycoplasma pneumoniae Not Detected     Comment: Respiratory Infection Panel testing performed by Multiplex PCR.       Narrative:      Respiratory Infection Panel source->NP Swab    Respiratory Infection Panel (PCR), Nasopharyngeal [[id number]]     Order Status: No result Specimen: Nasopharyngeal Swab     Blood culture x two cultures. Draw prior to antibiotics. [[id number]] Collected: 12/26/24 1125    Order Status: Completed Specimen: Blood from Peripheral, Hand, Right Updated: 12/26/24 1758     Blood Culture, Routine No Growth to date    Narrative:      Aerobic and anaerobic    Blood culture x two cultures. Draw prior to antibiotics. [[id number]] Collected: 12/26/24 1131    Order Status: Completed Specimen: Blood from Peripheral, Hand, Left Updated: 12/26/24 1758     Blood Culture, Routine No Growth to date    Narrative:      Aerobic and anaerobic            Impression:  Active Hospital Problems    Diagnosis  POA    *Severe sepsis [A41.9, R65.20]  Yes    Acute hypoxic respiratory failure [J96.01]  Yes    Lactic acidosis [E87.20]  Yes      Resolved Hospital Problems   No resolved problems to display.               Plan:     - continue vent support, SAT/SBT daily  - repeat ABG and adjusted vent  - increase sedation today to help with pain/anxiety  - continue antibiotics  - wound care  - watch H/H  - lovenox for dvt prophylaxis  - pepcid for gi prophylaxis  - will start tube feeds tomorrow if unable to extubate    The following were evaluated and adjusted as needed: mechanical ventilator settings and weaning status, intravenous fluids and nutritional status, sedation and neurologic status, antibiotics,  and acid base balance and oxygenation needs       Critical Care  - THE PATIENT HAS A HIGH PROBABILITY OF IMMINENT OR LIFE THREATENING DETERIORATION.  Over 50%time of encounter was in direct care at bedside.  Time was 30 to 74 minutes required for patient care.  Time needed for all of the above totaled 35 minutes.     No

## 2024-12-27 NOTE — NURSING
Pt. Seen for Wound Consult.    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 There are currently no wounds, it is red, no blistering.  Elevated on towels for protection.  The Sacrum, Buttocks and Heels are also without any skin breakdown.        Used Triad to the Sacrum, Michael. Buttocks and then covered with an Adhesive Bordered Foam dressing for Protection. Photo didn't save. 3 other nurses at bedside for assist with skin assessment.

## 2024-12-27 NOTE — PROGRESS NOTES
"[facility]  Adult Nutrition   Progress Note (Initial Assessment)     SUMMARY     Recommendations  1. If pt remains intubated, recommend enteral nutrition. Glucerna 1.5 @ 20 ml/hr (720 kcal, 40 gm protein, 364 ml H20). TF + propofol = 114% EEN, 29% EPN.   2. Monitor blood glucose and consider low correction dose SSI.    3. Once extubated recommend advance to 2000 DM diet as feasible.   4. RD following.      Nutrition Goals: 1. Initiate nutrition support as medically feasible by RD follow up.   2. Advance diet as tolerated by RD follow up Meal consumption of >50% by RD follow up.   Nutrition Goal Status: progressing towards goal  Nutrition Interventions: Carbohydrate modified diet, Enteral Nutrition Management, and Collaboration and Referral of Nutrition Care    Nutrition Diagnosis PES Statement:  Inadequate energy intake related to insufficient means to consume energy as evidenced by NPO, mechanically ventilated.       Nutrition Diagnosis Status:   New    Dietitian Rounds Brief  Pt admitted with severe sepsis. She was brought to ER with fever and chills. She is currently on vent and sedated (propofol @ 32.4 ml/hr =855 kcal - 62% of estimated energy needs). Hyperglycemia.     Nutrition Related Social Determinants of Health: SDOH: Unable to assess at this time.     Malnutrition Assessment  Pt unable to consent for NFPE.      Diet order:   Current Diet Order: NPO        Evaluation of Received Nutrient/Fluid Intake  Energy Calories Required: not meeting needs  Protein Required: not meeting needs  Fluid Required: not meeting needs      % Intake of Estimated Energy Needs: 0%  % Meal Intake: NPO      Intake/Output Summary (Last 24 hours) at 12/27/2024 1207  Last data filed at 12/27/2024 1100  Gross per 24 hour   Intake 1608.11 ml   Output 1915 ml   Net -306.89 ml        Anthropometrics  Temp: 98.1 °F (36.7 °C)  Height Method: Estimated  Height: 5' 4" (162.6 cm)  Height (inches): 64 in  Weight Method: Bed " Scale  Weight: (!) 173.1 kg (381 lb 9.9 oz)  Weight (lb): (!) 381.62 lb  Ideal Body Weight (IBW), Female: 120 lb  % Ideal Body Weight, Female (lb): 318.02 %  BMI (Calculated): 65.5  BMI Grade: greater than 40 - morbid obesity       Estimated/Assessed Needs  Weight Used For Calorie Calculations: 55 kg (121 lb 4.1 oz)  Energy Calorie Requirements (kcal): 5336-4534 kcal (22-25 kcal/ kg IBW)  Energy Need Method: Kcal/kg  Protein Requirements: 137 gm (2.5 + gm/ IBW)  Weight Used For Protein Calculations: 55 kg (121 lb 4.1 oz)     Estimated Fluid Requirement Method: RDA Method  RDA Method (mL): 1200  CHO Requirement: 200 gm (50% of 1375 kcal)    Reason for Assessment  Reason For Assessment: identified at risk by screening criteria  Nutrition Discharge Plannin DM diet    Final diagnoses:  [Z13.6] Screening for cardiovascular condition  [Z97.8] Endotracheally intubated  [A41.9, R65.20] Severe sepsis (Primary)  [E66.01] Morbid obesity  [R50.9] Febrile illness, acute     Past Medical History:   Diagnosis Date    Allergy     Anemia     Asthma     COPD (chronic obstructive pulmonary disease)     Deep vein thrombosis     Depression     Diabetes mellitus, type 2     Encounter for blood transfusion     Heart murmur     Neuromuscular disorder         Nutrition/Diet History  Spiritual, Cultural Beliefs, Religion Practices, Values that Affect Care: no  Food Allergies: NKFA  Factors Affecting Nutritional Intake: NPO, on mechanical ventilation    Nutrition Risk Screen   -  Lab/Procedures/Meds: Pertinent Labs/Meds Reviewed    Medications:Pertinent Medications Reviewed  Scheduled Meds:   enoxparin  60 mg Subcutaneous Q12H (treatment, non-standard time)    famotidine (PF)  20 mg Intravenous BID    meropenem IV (PEDS and ADULTS)  1 g Intravenous Q8H    mupirocin   Nasal BID    vancomycin (VANCOCIN) IV (PEDS and ADULTS)  2,000 mg Intravenous Q12H     Continuous Infusions:   fentanyl  0-250 mcg/hr Intravenous Continuous 2.5 mL/hr at  12/27/24 0700 25 mcg/hr at 12/27/24 0700    NORepinephrine bitartrate-D5W  0-3 mcg/kg/min Intravenous Continuous 13 mL/hr at 12/27/24 1120 0.02 mcg/kg/min at 12/27/24 1120    propofoL  0-50 mcg/kg/min Intravenous Continuous 46.3 mL/hr at 12/27/24 1027 50 mcg/kg/min at 12/27/24 1027       Labs: Pertinent Labs Reviewed  Clinical Chemistry:  Recent Labs   Lab 12/26/24  1211 12/27/24  0441    138   K 4.0 3.9    108   CO2 27 25   * 192*   BUN 13 11   CREATININE 0.6 0.6   CALCIUM 8.7 8.2*   PROT 7.2 6.5   ALBUMIN 3.7 3.4*   BILITOT 0.4 0.4   ALKPHOS 60 52*   AST 11 18   ALT 12 11   ANIONGAP 7* 5*   MG 1.9 1.9     Monitor and Evaluation  Food and Nutrient Intake: energy intake, food and beverage intake, enteral nutrition intake  Food and Nutrient Adminstration: diet order, enteral and parenteral nutrition administration  Knowledge/Beliefs/Attitudes: food and nutrition knowledge/skill, beliefs and attitudes  Physical Activity and Function: factors affecting access to physical activity, nutrition-related ADLs and IADLs  Anthropometric Measurements: weight, weight change  Biochemical Data, Medical Tests and Procedures: electrolyte and renal panel, inflammatory profile, gastrointestinal profile, lipid profile, glucose/endocrine profile  Nutrition-Focused Physical Findings: overall appearance     Nutrition Risk  Level of Risk/Frequency of Follow-up:  (high 2 x week)     Nutrition Follow-Up  RD Follow-up?: Yes  Daisy Kraus RD 12/27/2024 12:08 PM

## 2024-12-27 NOTE — CONSULTS
"[facility]   Department of Infectious Disease  Consult Note        PATIENT NAME: Adriana Zaragoza  MRN: [id number]  TODAY'S DATE: 12/27/2024  ADMIT DATE: 12/26/2024  LOS: 1 days    CHIEF COMPLAINT: Fatigue (Patient with generalized weakness beginning about 0300.  Lower back pain and chills.  Body trembling.  Patient also reports dark urine.)      PRINCIPLE PROBLEM: Severe sepsis    REASON FOR CONSULT:     ASSESSMENT and PLAN   Severe sepsis.  Focus of infection appears to be from right thigh panniculitis.  Anticipate deescalate antibiotics by tomorrow with culture results     2. Respiratory failure.  No pneumoniae.  Management as per pulmonologist.      3. Extreme obesity.       RECOMMENDATIONS:   Follow up blood cultures   Deescalate antibiotics with culture results    Thank you for this consult. Please send [employer] secure chat with any questions.    Antibiotics (From admission, onward)      Start     Stop Route Frequency Ordered    12/27/24 0200  vancomycin (VANCOCIN) 2,000 mg in 0.9% NaCl 500 mL IVPB        Note to Pharmacy: Ht: 5' 4" (1.626 m)  Wt: (!) 173.1 kg (381 lb 9.9 oz)  Estimated Creatinine Clearance: 160.7 mL/min (based on SCr of 0.6 mg/dL).  Body mass index is 65.5 kg/m².    -- IV Every 12 hours (non-standard times) 12/26/24 2152 12/26/24 2145  mupirocin 2 % ointment         12/31/24 2059 Nasl 2 times daily 12/26/24 2035 12/26/24 2130  meropenem injection 1 g         -- IV Every 8 hours (non-standard times) 12/26/24 2024 12/26/24 1155  vancomycin - pharmacy to dose  (vancomycin IVPB (PEDS and ADULTS))        Placed in "And" Linked Group    -- IV pharmacy to manage frequency 12/26/24 1055          Antifungals (From admission, onward)      None           Antivirals (From admission, onward)      None            HPI      Adriana Zaragoza is a 59 y.o. female with extreme obesity, COPD and depression.  Known to me from previous encounter during hospitalization for " panniculitis.  Presents to the emergency room 2024 generalized weakness of acute onset.  BP in the /59, pulse 96, respiratory 18, temperature 101.9°, oxygen saturation 94%.  She quickly decompensated and was intubated in the ER.      WBC 18, hematocrit 38, MCV 87, platelet 279, sodium 137, creatinine 0.6, LFTs normal.  UA unremarkable.  CT right showed known medial panniculus with findings concerning for cellulitis.  CT chest abdomen and pelvis with no pneumoniae or other acute findings.  CT head with no acute abnormality.  She was admitted to the ICU on antibiotics.    Antibiotic history:   Vancomycin:  2024-  Metronidazole: 02/20/2024 x1 dose   Ertapenem:  2024-  Aztreonam: 12/26/2024 x1 dose    Microbiology:    Blood culture 2024: In progress    Social History  Marital Status: Significant Other  Alcohol History:  reports no history of alcohol use.  Tobacco History:  reports that she has been smoking cigarettes. She started smoking about 42 years ago. She has a 42 pack-year smoking history. She has never used smokeless tobacco.  Drug History:  reports no history of drug use.      Review of patient's allergies indicates:   Allergen Reactions    Aspirin     Nicoderm     Nsaids (non-steroidal anti-inflammatory drug) Hives    Teflaro [ceftaroline fosamil]      Allergic reaction/ hives/itching     Past Medical History:   Diagnosis Date    Allergy     Anemia     Asthma     COPD (chronic obstructive pulmonary disease)     Deep vein thrombosis     Depression     Diabetes mellitus, type 2     Encounter for blood transfusion     Heart murmur     Neuromuscular disorder      Past Surgical History:   Procedure Laterality Date     SECTION      DILATION AND CURETTAGE OF UTERUS      gastric sleeve      Ema     TONSILLECTOMY       Family History   Problem Relation Name Age of Onset    Heart disease Mother      Diabetes Mother      Arthritis Mother      Depression Mother      Cancer Father       COPD Father      Arthritis Maternal Grandmother      Cancer Maternal Grandmother      Cancer Maternal Grandfather      Cancer Paternal Grandmother      Kidney disease Paternal Grandmother      Diabetes Paternal Grandmother      Diabetes Paternal Grandfather      Hyperlipidemia Paternal Grandfather         SUBJECTIVE     Review of systems: Unable to obtain.  Intubated     OBJECTIVE   Temp:  [97.5 °F (36.4 °C)-104.4 °F (40.2 °C)] 98 °F (36.7 °C)  Pulse:  [] 62  Resp:  [15-35] 22  SpO2:  [90 %-100 %] 98 %  BP: ()/(49-93) 104/52  Temp:  [97.5 °F (36.4 °C)-104.4 °F (40.2 °C)]   Temp: 98 °F (36.7 °C) (12/27/24 0717)  Pulse: 62 (12/27/24 0917)  Resp: (!) 22 (12/27/24 0917)  BP: (!) 104/52 (12/27/24 0900)  SpO2: 98 % (12/27/24 0917)    Intake/Output Summary (Last 24 hours) at 12/27/2024 1036  Last data filed at 12/27/2024 0701  Gross per 24 hour   Intake 1608.11 ml   Output 1445 ml   Net 163.11 ml       Physical Exam  General:  Intubated on mechanical ventilator.  Sedated.  On very low-dose Levophed 0.02 microgram/kilogram per minute.    Respiratory:  No mechanical ventilator.  FiO2 30%, peep 5, .  Clear to auscultation anteriorly   CVS: S1 and 2 heard, no murmurs appreciated   Abdomen:  Protuberant, soft, nontender, no palpable organomegaly   Lower extremity:  Large medial thigh panniculus with erythema and inflammation that is more dependent.  Warm to touch  Musculoskeletal: No joint effusions appreciated    VAD:  PIV  ISOLATION:  None    Wounds:     Significant Labs: All pertinent labs within the past 24 hours have been reviewed.    CBC LAST 7  Recent Labs   Lab 12/26/24  1211 12/26/24  1522 12/26/24  1714 12/27/24  0441   WBC 18.43*  --   --  14.04*   RBC 4.30  --   --  3.85*   HGB 11.6*  --   --  10.2*   HCT 37.6 39 35* 33.7*   MCV 87  --   --  88   MCH 27.0  --   --  26.5*   MCHC 30.9*  --   --  30.3*   RDW 15.2*  --   --  15.5*     --   --  257   MPV 9.4  --   --  9.7   GRAN 90.0*   "16.6*  --   --  88.9*  12.5*   LYMPH 4.8*  0.9*  --   --  5.5*  0.8*   MONO 4.3  0.8  --   --  4.8  0.7   BASO 0.04  --   --  0.01   NRBC 0  --   --  0       CHEMISTRY LAST 7  Recent Labs   Lab 12/26/24  1211 12/26/24  1522 12/26/24  1714 12/26/24  2044 12/26/24  2228 12/27/24  0321 12/27/24  0441     --   --   --   --   --  138   K 4.0  --   --   --   --   --  3.9     --   --   --   --   --  108   CO2 27  --   --   --   --   --  25   ANIONGAP 7*  --   --   --   --   --  5*   BUN 13  --   --   --   --   --  11   CREATININE 0.6  --   --   --   --   --  0.6   *  --   --   --   --   --  192*   CALCIUM 8.7  --   --   --   --   --  8.2*   PH  --  7.304* 7.310* 7.267* 7.333* 7.321*  --    MG 1.9  --   --   --   --   --  1.9   ALBUMIN 3.7  --   --   --   --   --  3.4*   PROT 7.2  --   --   --   --   --  6.5   ALKPHOS 60  --   --   --   --   --  52*   ALT 12  --   --   --   --   --  11   AST 11  --   --   --   --   --  18   BILITOT 0.4  --   --   --   --   --  0.4       Estimated Creatinine Clearance: 160.7 mL/min (based on SCr of 0.6 mg/dL).    INFLAMMATORY/PROCAL  LAST 7  Recent Labs   Lab 12/26/24  1211   PROCAL <0.050     No results found for: "ESR"  CRP   Date Value Ref Range Status   09/25/2024 1.30 (H) <1.00 mg/dL Final     Comment:     CRP-Normal Application expected values:   <1.0        mg/dL   Normal Range  1.0 - 5.0  mg/dL   Indicates mild inflammation  5.0 - 10.0 mg/dL   Indicates severe inflammation  >10.0        mg/dL   Represents serious processes and   frequently         indicates the presence of a bacterial   infection.      08/28/2024 10.60 (H) <1.00 mg/dL Final     Comment:     CRP-Normal Application expected values:   <1.0        mg/dL   Normal Range  1.0 - 5.0  mg/dL   Indicates mild inflammation  5.0 - 10.0 mg/dL   Indicates severe inflammation  >10.0        mg/dL   Represents serious processes and   frequently         indicates the presence of a bacterial   infection.    "   08/26/2024 8.00 (H) <1.00 mg/dL Final     Comment:     CRP-Normal Application expected values:   <1.0        mg/dL   Normal Range  1.0 - 5.0  mg/dL   Indicates mild inflammation  5.0 - 10.0 mg/dL   Indicates severe inflammation  >10.0        mg/dL   Represents serious processes and   frequently         indicates the presence of a bacterial   infection.      02/14/2024 5.0 0.0 - 8.2 mg/L Final   02/06/2024 115.2 (H) 0.0 - 8.2 mg/L Final   03/07/2023 3.94 (H) <0.76 mg/dL Final   03/05/2023 11.05 (H) <0.76 mg/dL Final       PRIOR  MICROBIOLOGY:  Reviewed    No results found for the last 90 days.    LAST 7 DAYS MICROBIOLOGY   Microbiology Results (last 7 days)       Procedure Component Value Units Date/Time    Respiratory Infection Panel (PCR), Nasopharyngeal [[id number]] Collected: 12/26/24 1904    Order Status: Completed Specimen: Nasopharyngeal Swab Updated: 12/26/24 2044     Respiratory Infection Panel Source NP swab     Adenovirus Not Detected     Coronavirus 229E, Common Cold Virus Not Detected     Coronavirus HKU1, Common Cold Virus Not Detected     Coronavirus NL63, Common Cold Virus Not Detected     Coronavirus OC43, Common Cold Virus Not Detected     Comment: Coronavirus strains 229E, HKU1, NL63, and OC43 can cause the common   cold   and are not associated with the respiratory disease outbreak caused   by  the COVID-19 (SARS-CoV-2 novel Coronavirus) strain.           SARS-CoV2 (COVID-19) Qualitative PCR Not Detected     Human Metapneumovirus Not Detected     Human Rhinovirus/Enterovirus Not Detected     Influenza A (subtypes H1, H1-2009,H3) Not Detected     Influenza B Not Detected     Parainfluenza Virus 1 Not Detected     Parainfluenza Virus 2 Not Detected     Parainfluenza Virus 3 Not Detected     Parainfluenza Virus 4 Not Detected     Respiratory Syncytial Virus Not Detected     Bordetella Parapertussis ([id number]) Not Detected     Bordetella pertussis (ptxP) Not Detected     Chlamydia pneumoniae Not  Detected     Mycoplasma pneumoniae Not Detected     Comment: Respiratory Infection Panel testing performed by Multiplex PCR.       Narrative:      Respiratory Infection Panel source->NP Swab    Respiratory Infection Panel (PCR), Nasopharyngeal [[id number]]     Order Status: No result Specimen: Nasopharyngeal Swab     Blood culture x two cultures. Draw prior to antibiotics. [[id number]] Collected: 12/26/24 1125    Order Status: Completed Specimen: Blood from Peripheral, Hand, Right Updated: 12/26/24 1758     Blood Culture, Routine No Growth to date    Narrative:      Aerobic and anaerobic    Blood culture x two cultures. Draw prior to antibiotics. [[id number]] Collected: 12/26/24 1131    Order Status: Completed Specimen: Blood from Peripheral, Hand, Left Updated: 12/26/24 1758     Blood Culture, Routine No Growth to date    Narrative:      Aerobic and anaerobic              CURRENT/PREVIOUS VISIT EKG  Results for orders placed or performed during the hospital encounter of 12/26/24   EKG 12-lead    Collection Time: 12/26/24 11:55 AM   Result Value Ref Range    QRS Duration 86 ms    OHS QTC Calculation 425 ms    Narrative    Test Reason : Z13.6,    Vent. Rate :  88 BPM     Atrial Rate :  88 BPM     P-R Int : 178 ms          QRS Dur :  86 ms      QT Int : 352 ms       P-R-T Axes :  37  66  48 degrees    QTcB Int : 425 ms    Normal sinus rhythm  Low voltage QRS  Borderline Abnormal ECG  When compared with ECG of 25-Sep-2024 23:08,  No significant change was found    Referred By: AAAREFERRAL SELF           Confirmed By:        Significant Imaging: I have reviewed all relevant and available imaging results/findings within the past 24 hours.    I spent a total of 60 minutes on the day of the visit.This includes face to face time and non-face to face time preparing to see the patient (eg, review of tests), obtaining and/or reviewing separately obtained history, documenting clinical information in the electronic or other  health record, independently interpreting results and communicating results to the patient/family/caregiver, or care coordinator.    Rajendra Callahan MD  Date of Service: 12/27/2024      This note was created using [employer] voice recognition software that occasionally misinterpreted phrases or words.

## 2024-12-27 NOTE — PLAN OF CARE
Problem: Adult Inpatient Plan of Care  Goal: Plan of Care Review  Outcome: Progressing  Flowsheets (Taken 12/27/2024 0545)  Plan of Care Reviewed With: patient  Goal: Patient-Specific Goal (Individualized)  Outcome: Progressing  Flowsheets (Taken 12/27/2024 0545)  Individualized Care Needs: BETSEY  Anxieties, Fears or Concerns:   BETSEY   pt intubated/sedated  Patient/Family-Specific Goals (Include Timeframe): BETSEY     Problem: Bariatric Environmental Safety  Goal: Safety Maintained with Care  Outcome: Progressing  Intervention: Promote Safety and Comfort  Flowsheets (Taken 12/27/2024 0545)  Bariatric Safety: specialty bed utilized     Problem: Infection  Goal: Absence of Infection Signs and Symptoms  Outcome: Progressing  Intervention: Prevent or Manage Infection  Flowsheets (Taken 12/27/2024 0545)  Fever Reduction/Comfort Measures:   lightweight bedding   lightweight clothing  Infection Management: aseptic technique maintained     Problem: Sepsis/Septic Shock  Goal: Absence of Infection Signs and Symptoms  Outcome: Progressing  Intervention: Initiate Sepsis Management  Flowsheets (Taken 12/26/2024 2020)  Stabilization Measures:   respiratory support increased   fluid resuscitation initiated  Intervention: Promote Stabilization  Flowsheets (Taken 12/26/2024 2020)  Fluid/Electrolyte Management: intravenous fluid replacement initiated     Problem: Wound  Goal: Skin Health and Integrity  Outcome: Progressing

## 2024-12-27 NOTE — ASSESSMENT & PLAN NOTE
Suspect secondary to altered mental status, and severe morbid obesity.  Continue vent support, pulmonary has been consulted for vent management.  ABG was reviewed showing pH is 7.3, pCO2 47, PO2 of 89.    Supplemental oxygen was provided and noted- Vent Mode: A/C  Oxygen Concentration (%):  [40] 40  Resp Rate Total:  [25 br/min-27 br/min] 25 br/min  Vt Set:  [400 mL] 400 mL  PEEP/CPAP:  [4.6 cmH20-5 cmH20] 5 cmH20  Mean Airway Pressure:  [13 cmH20] 13 cmH20

## 2024-12-27 NOTE — PROGRESS NOTES
Pharmacist Renal Dose Adjustment Note    Adriana Zaragoza is a 59 y.o. female being treated with the medication enoxaparin.    Patient Data:    Vital Signs (Most Recent):  Temp: (!) 102.6 °F (39.2 °C) (12/26/24 1836)  Pulse: 103 (12/26/24 1714)  Resp: (!) 24 (12/26/24 1714)  BP: (!) 149/67 (12/26/24 1714)  SpO2: 95 % (12/26/24 1714) Vital Signs (72h Range):  Temp:  [98.1 °F (36.7 °C)-104.4 °F (40.2 °C)]   Pulse:  []   Resp:  [16-35]   BP: (122-177)/(53-93)   SpO2:  [90 %-100 %]      Recent Labs   Lab 12/26/24  1211   CREATININE 0.6     Serum creatinine: 0.6 mg/dL 12/26/24 1211  Estimated creatinine clearance: 148.8 mL/min    Enoxaparin 40 mg Q24H will be changed to Enoxaparin 60 mg Q12H due to BMI > 50.    Pharmacist's Name: Edouard Sandoval  Pharmacist's Extension: [phone]

## 2024-12-27 NOTE — CONSULTS
Wound care assessment completed by Dana Fagan this am. Wound care team will follow up as needed throughout hospital stay.

## 2024-12-27 NOTE — PLAN OF CARE
Problem: Adult Inpatient Plan of Care  Goal: Plan of Care Review  Outcome: Progressing  Goal: Patient-Specific Goal (Individualized)  Outcome: Progressing  Goal: Absence of Hospital-Acquired Illness or Injury  Outcome: Progressing  Goal: Optimal Comfort and Wellbeing  Outcome: Progressing  Goal: Readiness for Transition of Care  Outcome: Progressing     Problem: Bariatric Environmental Safety  Goal: Safety Maintained with Care  Outcome: Progressing     Problem: Infection  Goal: Absence of Infection Signs and Symptoms  Outcome: Progressing     Problem: Sepsis/Septic Shock  Goal: Optimal Coping  Outcome: Progressing  Goal: Absence of Bleeding  Outcome: Progressing  Goal: Blood Glucose Level Within Targeted Range  Outcome: Progressing  Goal: Absence of Infection Signs and Symptoms  Outcome: Progressing  Goal: Optimal Nutrition Intake  Outcome: Progressing     Problem: Wound  Goal: Optimal Coping  Outcome: Progressing  Goal: Optimal Functional Ability  Outcome: Progressing  Goal: Absence of Infection Signs and Symptoms  Outcome: Progressing  Goal: Improved Oral Intake  Outcome: Progressing  Goal: Optimal Pain Control and Function  Outcome: Progressing  Goal: Skin Health and Integrity  Outcome: Progressing  Goal: Optimal Wound Healing  Outcome: Progressing     Problem: Skin Injury Risk Increased  Goal: Skin Health and Integrity  Outcome: Progressing     Problem: Fall Injury Risk  Goal: Absence of Fall and Fall-Related Injury  Outcome: Progressing     Problem: Restraint, Nonviolent  Goal: Absence of Harm or Injury  Outcome: Progressing     Problem: Oral Intake Inadequate  Goal: Improved Oral Intake  Outcome: Progressing

## 2024-12-27 NOTE — H&P
[facility] - Emergency Dept  Hospital Medicine  History & Physical    Patient Name: Adriana Zaragoza  MRN: [id number]  Patient Class: Emergency  Admission Date: 12/26/2024  Attending Physician: Ita Brewer MD  Primary Care Provider: Melba Trivedi MD         Patient information was obtained from ER records.     Subjective:     Principal Problem:Severe sepsis    Chief Complaint:   Chief Complaint   Patient presents with    Fatigue     Patient with generalized weakness beginning about 0300.  Lower back pain and chills.  Body trembling.  Patient also reports dark urine.        HPI: Please note that patient is intubated and sedated at the time of my evaluation, so all information was obtained from the ER provider.      The patient is a 59-year-old female with severe morbid obesity who was brought to the ER because of fever, and chills.  On arrival to the ER, patient was alert and oriented and able to give information. She has a fever of 101.9, and labs reveal leukocytosis of 18.  Sepsis workup was started, and patient got empiric antibiotics.  However few hours while being in the ER, she started to become confused, agitated, had rigors, and oxygen dropped to the 80s. ER provider was concern that pt's symptoms are due to allergic reaction to abx, so steroid, benadryl and pepcid iv were given. She was then intubated for airway protection.  After intubation, repeat temperature was 104.4°.  Currently septic workup is negative including chest x-ray, UA, and pro-calcitonin.  As a result decision was made to order a CT chest abdomen pelvis to further evaluate her sepsis.  However, given pt's severe morbid obesity, she could not fit in our CT scanner.  Patient will be sent to [facility] to get the CT scans ordered by the ER provider, then she will be admitted to our ICU for further management.    Past Medical History:   Diagnosis Date    Allergy     Anemia     Asthma     COPD (chronic  obstructive pulmonary disease)     Deep vein thrombosis     Depression     Diabetes mellitus, type 2     Encounter for blood transfusion     Heart murmur     Neuromuscular disorder        Past Surgical History:   Procedure Laterality Date     SECTION      DILATION AND CURETTAGE OF UTERUS      gastric sleeve      [facility]    TONSILLECTOMY         Review of patient's allergies indicates:   Allergen Reactions    Aspirin     Nicoderm     Nsaids (non-steroidal anti-inflammatory drug) Hives    Teflaro [ceftaroline fosamil]      Allergic reaction/ hives/itching       No current facility-administered medications on file prior to encounter.     Current Outpatient Medications on File Prior to Encounter   Medication Sig    acetaminophen (TYLENOL) 650 MG TbSR Take 650 mg by mouth every 8 (eight) hours.    ferrous sulfate 324 mg (65 mg iron) TbEC Take 324 mg by mouth every 7 days.    hydrOXYzine HCL (ATARAX) 25 MG tablet Take 25 mg by mouth 3 (three) times daily as needed for Anxiety.    oxyCODONE-acetaminophen (PERCOCET) 5-325 mg per tablet Take 1 tablet by mouth every 6 (six) hours as needed for Pain.    DULoxetine (CYMBALTA) 30 MG capsule Take 1 capsule (30 mg total) by mouth every evening. (Patient not taking: Reported on 2024)    famotidine (PEPCID) 20 MG tablet Take 1 tablet (20 mg total) by mouth 2 (two) times daily. (Patient not taking: Reported on 2024)    miconazole NITRATE 2 % (MICOTIN) 2 % top powder Apply topically 2 (two) times daily as needed (Rash). (Patient not taking: Reported on 2024)    [DISCONTINUED] albuterol (PROVENTIL/VENTOLIN HFA) 90 mcg/actuation inhaler Inhale 2 puffs into the lungs every 6 (six) hours as needed for Wheezing or Shortness of Breath.    [DISCONTINUED] buPROPion (WELLBUTRIN XL) 300 MG 24 hr tablet Take 300 mg by mouth once daily. NEW Rx - NOT YET STARTED    [DISCONTINUED] tirzepatide, weight loss, (ZEPBOUND) 2.5 mg/0.5 mL PnIj Inject 2.5 mg into the skin every 7  days. NEW Rx - NOT YET STARTED     Family History       Problem Relation (Age of Onset)    Arthritis Mother, Maternal Grandmother    COPD Father    Cancer Father, Maternal Grandmother, Maternal Grandfather, Paternal Grandmother    Depression Mother    Diabetes Mother, Paternal Grandmother, Paternal Grandfather    Heart disease Mother    Hyperlipidemia Paternal Grandfather    Kidney disease Paternal Grandmother          Tobacco Use    Smoking status: Every Day     Current packs/day: 1.00     Average packs/day: 1 pack/day for 42.0 years (42.0 ttl pk-yrs)     Types: Cigarettes     Start date: 1983    Smokeless tobacco: Never    Tobacco comments:     Pt states she has smoked for 6 years now, smokes around 1pk/day. Interested and quitting. Patient has tried nicotine patches in the past, made her arm swell up.    Substance and Sexual Activity    Alcohol use: No    Drug use: No    Sexual activity: Never     Review of Systems   Unable to perform ROS: Intubated     Objective:     Vital Signs (Most Recent):  Temp: (!) 104.4 °F (40.2 °C) (12/26/24 1705)  Pulse: 103 (12/26/24 1714)  Resp: (!) 24 (12/26/24 1714)  BP: (!) 149/67 (12/26/24 1714)  SpO2: 95 % (12/26/24 1714) Vital Signs (24h Range):  Temp:  [98.1 °F (36.7 °C)-104.4 °F (40.2 °C)] 104.4 °F (40.2 °C)  Pulse:  [] 103  Resp:  [16-35] 24  SpO2:  [90 %-100 %] 95 %  BP: (122-177)/(53-93) 149/67     Weight: (!) 154.2 kg (340 lb)  Body mass index is 58.36 kg/m².     Physical Exam  Vitals reviewed.   Constitutional:       Comments: Intubated and sedated.    HENT:      Head: Normocephalic and atraumatic.   Cardiovascular:      Rate and Rhythm: Regular rhythm. Tachycardia present.   Pulmonary:      Effort: Pulmonary effort is normal. No respiratory distress.      Breath sounds: Normal breath sounds. No wheezing.   Abdominal:      General: Abdomen is flat. Bowel sounds are normal. There is no distension.      Palpations: Abdomen is soft.   Musculoskeletal:      Cervical  back: Neck supple.      Comments: Severe swelling of the proximal aspect of the right lower extremity, with skin changes, and redness.     Neurological:      Comments: Intubated and sedated.                Significant Labs: All pertinent labs within the past 24 hours have been reviewed.  Recent Lab Results  (Last 5 results in the past 24 hours)        12/26/24  1714   12/26/24  1632   12/26/24  1522   12/26/24  1514   12/26/24  1211        Procalcitonin         <0.050  Comment: The Procalcitonin test is intended to aid in the risk assessment of   critically ill patients on their first day of ICU admission for   progression   to severe sepsis and septic shock.    A result of <0.50 ng/mL is associated with a low risk of severe   sepsis   and/or septic shock.  This does not exclude localized infection.    A result between 0.50 ng/mL and 2.0 ng/mL should be interpreted with   consideration of the patient's history and is recommended to retest   within 6   to 24 hours.        A result of >2.0 ng/mL is associated with a high risk of severe   sepsis   and/or septic shock.    Procalcitonin may not be accurate among patients with   localized  infection, recent trauma or major surgery, immunosuppressed   state,  invasive fungal infection, renal dysfunction. Decisions   regarding  initiation or continuation of antibiotic therapy should not be   based  solely on procalcitonin levels.         Albumin         3.7       ALP         60       Allens Test N/A     N/A           ALT         12       Anion Gap         7       Appearance, UA   Clear             AST         11       Bacteria, UA   None             Baso #         0.04       Basophil %         0.2       Bilirubin (UA)   Negative             BILIRUBIN TOTAL         0.4  Comment: For infants and newborns, interpretation of results should be based  on gestational age, weight and in agreement with clinical  observations.    Premature Infant recommended reference ranges:  Up to  24 hours.............<8.0 mg/dL  Up to 48 hours............<12.0 mg/dL  3-5 days..................<15.0 mg/dL  6-29 days.................<15.0 mg/dL         BNP         38  Comment: Values of less than 100 pg/ml are consistent with non-CHF populations.       Site RR     RR           BUN         13       Calcium         8.7       Chloride         103       CO2         27       Color, UA   Yellow             Creatinine         0.6       DelSys Adult Vent     CPAP/BiPAP           Differential Method         Automated       eGFR         >60.0       Eos #         0.0       Eos %         0.2       EP     6           FiO2 40     30           Glucose         126       Glucose, UA   Negative             Gran # (ANC)         16.6       Gran %         90.0       Hematocrit         37.6       Hemoglobin         11.6       Hyaline Casts, UA   0             Immature Grans (Abs)         0.09  Comment: Mild elevation in immature granulocytes is non specific and   can be seen in a variety of conditions including stress response,   acute inflammation, trauma and pregnancy. Correlation with other   laboratory and clinical findings is essential.         Immature Granulocytes         0.5       IP     16           Ketones, UA   Negative             Leukocyte Esterase, UA   Negative             Lymph #         0.9       Lymph %         4.8       Magnesium          1.9       MCH         27.0       MCHC         30.9       MCV         87       Microscopic Comment   SEE COMMENT  Comment: Other formed elements not mentioned in the report are not   present in the microscopic examination.                Mode AC/PRVC     BiPAP           Mono #         0.8       Mono %         4.3       MPV         9.4       NITRITE UA   Negative             nRBC         0       Blood, UA   TRACE             PEEP 5               pH, UA   6.0             Platelet Count         279       POC BE -2     -3           POC Glucose 189     152           POC HCO3 24.1      "22.9           POC Hematocrit 35     39           POC Ionized Calcium 1.15     1.14           POC Lactate       3.17         POC PCO2 47.7     46.1           POC PH 7.310     7.304           POC PO2 89     106           POC Potassium 3.9     4.1           POC SATURATED O2 96     97           POC Sodium 136     136           POC TCO2 25     24           Potassium         4.0       PROTEIN TOTAL         7.2       Protein, UA   1+  Comment: Recommend a 24 hour urine protein or a urine   protein/creatinine ratio if globulin induced proteinuria is  clinically suspected.               Rate 22               RBC         4.30       RBC, UA   3             RDW         15.2       Sample ARTERIAL     ARTERIAL   VENOUS         Sodium         137       Spec Grav UA   1.030             Specimen UA   Urine, Clean Catch             Squam Epithel, UA   1             Troponin I High Sensitivity         2.4  Comment: Troponin results differ between methods. Do not use   results between Troponin methods interchangeably.    Alkaline Phospatase levels above 400 U/L may   cause false positive results.    Access hsTnI should not be used for patients taking   Asfotase yasmine (Strensiq).         UROBILINOGEN UA   Negative             Vt 400               WBC, UA   0             WBC         18.43                              Significant Imaging: I have reviewed all pertinent imaging results/findings within the past 24 hours.  Assessment/Plan:     * Severe sepsis  This patient does have evidence of infective focus  My overall impression is sepsis.  Source: Unknown  Antibiotics given-   Antibiotics (72h ago, onward)      Start     Stop Route Frequency Ordered    12/26/24 2030  meropenem injection 1 g         -- IV Every 8 hours (non-standard times) 12/26/24 2024 12/26/24 1155  vancomycin - pharmacy to dose  (vancomycin IVPB (PEDS and ADULTS))        Placed in "And" Linked Group    -- IV pharmacy to manage frequency 12/26/24 1055          Latest " lactate reviewed-  Recent Labs   Lab 12/26/24  1514   POCLAC 3.17*       - White count of 18.  Lactic acid of 3.  Temp of 104.4. Currently tachycardic with a heart rate of the 100.  Hypoxic, intubated.  - UA is negative.  Chest x-ray did not show pneumonia.  - I will order comprehensive viral panel.  - CT chest abdomen pelvis has been ordered, [facility]  - Continue empiric antibiotics.  Patient was started on Azretonem, Flagyl and vanco per the ER provider.  - Switch antibiotics to vanco and meropenem.  - If respiratory viral panel is negative, will consult ID for further guidance of her antibiotics.  - Lactic acid is currently 3.1.  Patient has received a total of 2.5 L of 0.9 normal saline bolus per the ER provider.  Continue maintenance fluids of 0.9  cc/hour.  Repeat lactic acid at 9:00 p.m..  Pt received tylenol for fever. Unclear allergy to NSAIDS. Will give toradol given her high fever and monitor for allergic reaction.     Acute hypoxic respiratory failure  Suspect secondary to altered mental status, and severe morbid obesity.  Continue vent support, pulmonary has been consulted for vent management.  ABG was reviewed showing pH is 7.3, pCO2 47, PO2 of 89.    Supplemental oxygen was provided and noted- Vent Mode: A/C  Oxygen Concentration (%):  [40] 40  Resp Rate Total:  [25 br/min-27 br/min] 25 br/min  Vt Set:  [400 mL] 400 mL  PEEP/CPAP:  [4.6 cmH20-5 cmH20] 5 cmH20  Mean Airway Pressure:  [13 cmH20] 13 cmH20        Lactic acidosis  Likely 2/2 her severe sepsis.   Repeat level after fluid.         VTE Risk Mitigation (From admission, onward)      None                            Ita Brewer MD  Department of Hospital Medicine  [facility] - Emergency Dept

## 2024-12-27 NOTE — ASSESSMENT & PLAN NOTE
"This patient does have evidence of infective focus  My overall impression is sepsis.  Source: Unknown  Antibiotics given-   Antibiotics (72h ago, onward)      Start     Stop Route Frequency Ordered    12/26/24 1230  vancomycin (VANCOCIN) 2,000 mg in 0.9% NaCl 500 mL IVPB         -- IV Once 12/26/24 1238    12/26/24 1155  vancomycin - pharmacy to dose  (vancomycin IVPB (PEDS and ADULTS))        Placed in "And" Linked Group    -- IV pharmacy to manage frequency 12/26/24 1055          Latest lactate reviewed-  Recent Labs   Lab 12/26/24  1514   POCLAC 3.17*     - White count of 18.  Lactic acid of 3.  Temp of 104.4. Currently tachycardic with a heart rate of the 100.  Hypoxic, intubated.  - UA is negative.  Chest x-ray did not show pneumonia.  - I will order comprehensive viral panel.  - CT chest abdomen pelvis has been ordered, patient will go to [facility] to get images done.  - Continue empiric antibiotics.  Patient was started on Azretonem, Flagyl and vanco per the ER provider.  - Switch antibiotics to vanco and meropenem.  - If respiratory viral panel is negative, will consult ID for further guidance of her antibiotics.  - Lactic acid is currently 3.1.  Patient has received a total of 2.5 L of 0.9 normal saline bolus per the ER provider.  Continue maintenance fluids of 0 point 100 cc/hour.  Repeat lactic acid at 9:00 p.m..  Pt received tylenol for fever. Unclear allergy to NSAIDS. Will give toradol given her high fever and monitor for allergic reaction.   "

## 2024-12-27 NOTE — CARE UPDATE
12/27/24 0024   Patient Assessment/Suction   Level of Consciousness (AVPU) responds to pain   Respiratory Effort Normal;Unlabored   Expansion/Accessory Muscles/Retractions no use of accessory muscles;no retractions;expansion symmetric   All Lung Fields Breath Sounds diminished;clear   Rhythm/Pattern, Respiratory unlabored;pattern regular   Cough Frequency no cough   Suction Method oral;tracheal   Secretions Amount small   Secretions Color clear   Secretions Characteristics thin   Aspirate Toleration LEAH (no adverse reactions)   PRE-TX-O2   Device (Oxygen Therapy) ventilator   Oxygen Concentration (%) 40   SpO2 98 %   Pulse Oximetry Type Continuous   $ Pulse Oximetry - Multiple Charge Pulse Oximetry - Multiple   Pulse 69   Resp (!) 24   Airway Safety   Is Ambu Bag and Mask with Patient? Yes, Adult Ambu Bag and Mask   Vent Select   Conventional Vent Y   Charged w/in last 24h YES   Preset Conventional Ventilator Settings   Vent ID [id number]   Vent Type    Ventilation Type VC   Vent Mode A/C   Humidity HME   Set Rate 24 BPM   Vt Set 450 mL   PEEP/CPAP 5 cmH20   Peak Flow 60 L/min   Peak End Inspiratory Pressure 24 cmH20   Plateau Set/Insp. Hold (sec) 0   I-Trigger Type  V-TRIG   Trigger Sensitivity Flow/I-Trigger 3 L/min   Patient Ventilator Parameters   Resp Rate Total 24 br/min   Peak Airway Pressure 33 cmH20   Mean Airway Pressure 13 cmH20   Plateau Pressure 0 cmH20   Exhaled Vt 455 mL   Total Ve 10.9 L/m   I:E Ratio Measured 1:2   Auto PEEP 0 cmH20   Conventional Ventilator Alarms   Alarms On Y   Apnea Rate 10   Apnea Volume (mL) 900 mL   Apnea Oxygen Concentration  100   Apnea Flow Rate (L/min) 131   T Apnea 20 sec(s)   Ready to Wean/Extubation Screen   FIO2<=50 (chart decimal) 0.4   MV<16L (chart vol.) 10.9   PEEP <=8 (chart #) 5   Ready to Wean Parameters   F/VT Ratio<105 (RSBI) (!) 52.75   Vital Capacity   Vital Capacity (mL) 0

## 2024-12-27 NOTE — ASSESSMENT & PLAN NOTE
"This patient does have evidence of infective focus  My overall impression is sepsis.  Source: Skin and Soft Tissue (location right thigh)  Antibiotics given-   Antibiotics (72h ago, onward)      Start     Stop Route Frequency Ordered    12/26/24 2030  meropenem injection 1 g         -- IV Every 8 hours (non-standard times) 12/26/24 2024 12/26/24 1155  vancomycin - pharmacy to dose  (vancomycin IVPB (PEDS and ADULTS))        Placed in "And" Linked Group    -- IV pharmacy to manage frequency 12/26/24 1055          Latest lactate reviewed-  Recent Labs   Lab 12/26/24  1514   POCLAC 3.17*       "

## 2024-12-28 LAB
ALBUMIN SERPL BCP-MCNC: 3.2 G/DL (ref 3.5–5.2)
ALLENS TEST: ABNORMAL
ALLENS TEST: ABNORMAL
ALP SERPL-CCNC: 48 U/L (ref 55–135)
ALT SERPL W/O P-5'-P-CCNC: 11 U/L (ref 10–44)
ANION GAP SERPL CALC-SCNC: 5 MMOL/L (ref 8–16)
AST SERPL-CCNC: 15 U/L (ref 10–40)
BASOPHILS # BLD AUTO: 0.04 K/UL (ref 0–0.2)
BASOPHILS NFR BLD: 0.3 % (ref 0–1.9)
BILIRUB SERPL-MCNC: 0.3 MG/DL (ref 0.1–1)
BUN SERPL-MCNC: 14 MG/DL (ref 6–20)
CALCIUM SERPL-MCNC: 8.1 MG/DL (ref 8.7–10.5)
CHLORIDE SERPL-SCNC: 108 MMOL/L (ref 95–110)
CO2 SERPL-SCNC: 25 MMOL/L (ref 23–29)
CREAT SERPL-MCNC: 0.6 MG/DL (ref 0.5–1.4)
DELSYS: ABNORMAL
DELSYS: ABNORMAL
DIFFERENTIAL METHOD BLD: ABNORMAL
EOSINOPHIL # BLD AUTO: 0 K/UL (ref 0–0.5)
EOSINOPHIL NFR BLD: 0.2 % (ref 0–8)
ERYTHROCYTE [DISTWIDTH] IN BLOOD BY AUTOMATED COUNT: 15.7 % (ref 11.5–14.5)
ERYTHROCYTE [SEDIMENTATION RATE] IN BLOOD BY WESTERGREN METHOD: 28 MM/H
EST. GFR  (NO RACE VARIABLE): >60 ML/MIN/1.73 M^2
FIO2: 30
FIO2: 30
GLUCOSE SERPL-MCNC: 109 MG/DL (ref 70–110)
GLUCOSE SERPL-MCNC: 110 MG/DL (ref 70–110)
GLUCOSE SERPL-MCNC: 110 MG/DL (ref 70–110)
HCO3 UR-SCNC: 24.5 MMOL/L (ref 24–28)
HCO3 UR-SCNC: 25.7 MMOL/L (ref 24–28)
HCT VFR BLD AUTO: 33.4 % (ref 37–48.5)
HCT VFR BLD CALC: 29 %PCV (ref 36–54)
HCT VFR BLD CALC: 33 %PCV (ref 36–54)
HGB BLD-MCNC: 10.1 G/DL (ref 12–16)
IMM GRANULOCYTES # BLD AUTO: 0.07 K/UL (ref 0–0.04)
IMM GRANULOCYTES NFR BLD AUTO: 0.6 % (ref 0–0.5)
LYMPHOCYTES # BLD AUTO: 2.4 K/UL (ref 1–4.8)
LYMPHOCYTES NFR BLD: 19.1 % (ref 18–48)
MAGNESIUM SERPL-MCNC: 2.1 MG/DL (ref 1.6–2.6)
MCH RBC QN AUTO: 26.1 PG (ref 27–31)
MCHC RBC AUTO-ENTMCNC: 30.2 G/DL (ref 32–36)
MCV RBC AUTO: 86 FL (ref 82–98)
MIN VOL: 11.8
MIN VOL: 12.8
MODE: ABNORMAL
MODE: ABNORMAL
MONOCYTES # BLD AUTO: 0.9 K/UL (ref 0.3–1)
MONOCYTES NFR BLD: 7.4 % (ref 4–15)
NEUTROPHILS # BLD AUTO: 9 K/UL (ref 1.8–7.7)
NEUTROPHILS NFR BLD: 72.4 % (ref 38–73)
NRBC BLD-RTO: 0 /100 WBC
PCO2 BLDA: 40 MMHG (ref 35–45)
PCO2 BLDA: 44.1 MMHG (ref 35–45)
PEEP: 5
PEEP: 5
PH SMN: 7.37 [PH] (ref 7.35–7.45)
PH SMN: 7.39 [PH] (ref 7.35–7.45)
PIP: 33
PLATELET # BLD AUTO: 251 K/UL (ref 150–450)
PMV BLD AUTO: 9.4 FL (ref 9.2–12.9)
PO2 BLDA: 71 MMHG (ref 80–100)
PO2 BLDA: 80 MMHG (ref 80–100)
POC BE: 0 MMOL/L
POC BE: 0 MMOL/L
POC IONIZED CALCIUM: 1.17 MMOL/L (ref 1.06–1.42)
POC IONIZED CALCIUM: 1.18 MMOL/L (ref 1.06–1.42)
POC SATURATED O2: 94 % (ref 95–100)
POC SATURATED O2: 96 % (ref 95–100)
POC TCO2: 26 MMOL/L (ref 23–27)
POC TCO2: 27 MMOL/L (ref 23–27)
POTASSIUM BLD-SCNC: 3.8 MMOL/L (ref 3.5–5.1)
POTASSIUM BLD-SCNC: 3.9 MMOL/L (ref 3.5–5.1)
POTASSIUM SERPL-SCNC: 3.8 MMOL/L (ref 3.5–5.1)
POTASSIUM SERPL-SCNC: 4 MMOL/L (ref 3.5–5.1)
PROT SERPL-MCNC: 6.5 G/DL (ref 6–8.4)
PS: 10
RBC # BLD AUTO: 3.87 M/UL (ref 4–5.4)
SAMPLE: ABNORMAL
SAMPLE: ABNORMAL
SITE: ABNORMAL
SITE: ABNORMAL
SODIUM BLD-SCNC: 139 MMOL/L (ref 136–145)
SODIUM BLD-SCNC: 141 MMOL/L (ref 136–145)
SODIUM SERPL-SCNC: 138 MMOL/L (ref 136–145)
SP02: 96
SP02: 98
SPONT RATE: 20
VANCOMYCIN TROUGH SERPL-MCNC: 13.7 UG/ML
VT: 450
WBC # BLD AUTO: 12.39 K/UL (ref 3.9–12.7)

## 2024-12-28 PROCEDURE — 99900017 HC EXTUBATION W/PARAMETERS (STAT)

## 2024-12-28 PROCEDURE — 27100171 HC OXYGEN HIGH FLOW UP TO 24 HOURS

## 2024-12-28 PROCEDURE — 85025 COMPLETE CBC W/AUTO DIFF WBC: CPT | Performed by: HOSPITALIST

## 2024-12-28 PROCEDURE — 5A09457 ASSISTANCE WITH RESPIRATORY VENTILATION, 24-96 CONSECUTIVE HOURS, CONTINUOUS POSITIVE AIRWAY PRESSURE: ICD-10-PCS

## 2024-12-28 PROCEDURE — 82803 BLOOD GASES ANY COMBINATION: CPT

## 2024-12-28 PROCEDURE — 94003 VENT MGMT INPAT SUBQ DAY: CPT

## 2024-12-28 PROCEDURE — 80053 COMPREHEN METABOLIC PANEL: CPT | Performed by: HOSPITALIST

## 2024-12-28 PROCEDURE — 63600175 PHARM REV CODE 636 W HCPCS: Performed by: HOSPITALIST

## 2024-12-28 PROCEDURE — 83735 ASSAY OF MAGNESIUM: CPT | Performed by: HOSPITALIST

## 2024-12-28 PROCEDURE — 63600175 PHARM REV CODE 636 W HCPCS: Performed by: EMERGENCY MEDICINE

## 2024-12-28 PROCEDURE — 82330 ASSAY OF CALCIUM: CPT

## 2024-12-28 PROCEDURE — 85014 HEMATOCRIT: CPT

## 2024-12-28 PROCEDURE — 36415 COLL VENOUS BLD VENIPUNCTURE: CPT | Performed by: HOSPITALIST

## 2024-12-28 PROCEDURE — 80202 ASSAY OF VANCOMYCIN: CPT | Performed by: HOSPITALIST

## 2024-12-28 PROCEDURE — 84132 ASSAY OF SERUM POTASSIUM: CPT

## 2024-12-28 PROCEDURE — 63600175 PHARM REV CODE 636 W HCPCS: Performed by: INTERNAL MEDICINE

## 2024-12-28 PROCEDURE — 25000003 PHARM REV CODE 250: Performed by: HOSPITALIST

## 2024-12-28 PROCEDURE — 84132 ASSAY OF SERUM POTASSIUM: CPT | Performed by: HOSPITALIST

## 2024-12-28 PROCEDURE — 84295 ASSAY OF SERUM SODIUM: CPT

## 2024-12-28 PROCEDURE — 25000003 PHARM REV CODE 250: Performed by: STUDENT IN AN ORGANIZED HEALTH CARE EDUCATION/TRAINING PROGRAM

## 2024-12-28 PROCEDURE — 99291 CRITICAL CARE FIRST HOUR: CPT | Mod: ,,, | Performed by: INTERNAL MEDICINE

## 2024-12-28 PROCEDURE — 12000002 HC ACUTE/MED SURGE SEMI-PRIVATE ROOM

## 2024-12-28 PROCEDURE — 99900026 HC AIRWAY MAINTENANCE (STAT)

## 2024-12-28 PROCEDURE — 99900035 HC TECH TIME PER 15 MIN (STAT)

## 2024-12-28 PROCEDURE — 99233 SBSQ HOSP IP/OBS HIGH 50: CPT | Mod: ,,, | Performed by: INTERNAL MEDICINE

## 2024-12-28 PROCEDURE — 36600 WITHDRAWAL OF ARTERIAL BLOOD: CPT

## 2024-12-28 PROCEDURE — 94660 CPAP INITIATION&MGMT: CPT | Mod: XB

## 2024-12-28 PROCEDURE — 94761 N-INVAS EAR/PLS OXIMETRY MLT: CPT

## 2024-12-28 RX ORDER — HYDROCODONE BITARTRATE AND ACETAMINOPHEN 5; 325 MG/1; MG/1
2 TABLET ORAL EVERY 4 HOURS PRN
Status: DISCONTINUED | OUTPATIENT
Start: 2024-12-28 | End: 2024-12-29

## 2024-12-28 RX ORDER — CEFTRIAXONE 2 G/1
2 INJECTION, POWDER, FOR SOLUTION INTRAMUSCULAR; INTRAVENOUS
Status: DISCONTINUED | OUTPATIENT
Start: 2024-12-28 | End: 2025-01-03 | Stop reason: HOSPADM

## 2024-12-28 RX ORDER — LINEZOLID 2 MG/ML
600 INJECTION, SOLUTION INTRAVENOUS
Status: DISCONTINUED | OUTPATIENT
Start: 2024-12-28 | End: 2024-12-31

## 2024-12-28 RX ORDER — HYDROXYZINE HYDROCHLORIDE 25 MG/1
25 TABLET, FILM COATED ORAL 3 TIMES DAILY PRN
Status: DISCONTINUED | OUTPATIENT
Start: 2024-12-28 | End: 2025-01-03 | Stop reason: HOSPADM

## 2024-12-28 RX ORDER — DULOXETIN HYDROCHLORIDE 30 MG/1
30 CAPSULE, DELAYED RELEASE ORAL DAILY
Status: DISCONTINUED | OUTPATIENT
Start: 2024-12-28 | End: 2025-01-03 | Stop reason: HOSPADM

## 2024-12-28 RX ADMIN — HYDROXYZINE HYDROCHLORIDE 25 MG: 25 TABLET ORAL at 08:12

## 2024-12-28 RX ADMIN — LINEZOLID 600 MG: 600 INJECTION, SOLUTION INTRAVENOUS at 05:12

## 2024-12-28 RX ADMIN — MEROPENEM 1 G: 1 INJECTION INTRAVENOUS at 04:12

## 2024-12-28 RX ADMIN — HYDROCODONE BITARTRATE AND ACETAMINOPHEN 2 TABLET: 5; 325 TABLET ORAL at 10:12

## 2024-12-28 RX ADMIN — PROPOFOL 50 MCG/KG/MIN: 10 INJECTION, EMULSION INTRAVENOUS at 03:12

## 2024-12-28 RX ADMIN — MUPIROCIN 1 G: 20 OINTMENT TOPICAL at 08:12

## 2024-12-28 RX ADMIN — PROPOFOL 50 MCG/KG/MIN: 10 INJECTION, EMULSION INTRAVENOUS at 01:12

## 2024-12-28 RX ADMIN — ENOXAPARIN SODIUM 60 MG: 60 INJECTION SUBCUTANEOUS at 08:12

## 2024-12-28 RX ADMIN — VANCOMYCIN HYDROCHLORIDE 2000 MG: 500 INJECTION, POWDER, LYOPHILIZED, FOR SOLUTION INTRAVENOUS at 02:12

## 2024-12-28 RX ADMIN — Medication 175 MCG/HR: at 12:12

## 2024-12-28 RX ADMIN — Medication 6 MG: at 08:12

## 2024-12-28 RX ADMIN — HYDROCODONE BITARTRATE AND ACETAMINOPHEN 2 TABLET: 5; 325 TABLET ORAL at 04:12

## 2024-12-28 RX ADMIN — MEROPENEM 1 G: 1 INJECTION INTRAVENOUS at 01:12

## 2024-12-28 RX ADMIN — POTASSIUM BICARBONATE 50 MEQ: 978 TABLET, EFFERVESCENT ORAL at 10:12

## 2024-12-28 RX ADMIN — PROPOFOL 50 MCG/KG/MIN: 10 INJECTION, EMULSION INTRAVENOUS at 09:12

## 2024-12-28 RX ADMIN — DULOXETINE HYDROCHLORIDE 30 MG: 30 CAPSULE, DELAYED RELEASE ORAL at 10:12

## 2024-12-28 RX ADMIN — FAMOTIDINE 20 MG: 10 INJECTION, SOLUTION INTRAVENOUS at 08:12

## 2024-12-28 RX ADMIN — HYDROCODONE BITARTRATE AND ACETAMINOPHEN 2 TABLET: 5; 325 TABLET ORAL at 08:12

## 2024-12-28 RX ADMIN — CEFTRIAXONE 2 G: 2 INJECTION, POWDER, FOR SOLUTION INTRAMUSCULAR; INTRAVENOUS at 05:12

## 2024-12-28 NOTE — PROGRESS NOTES
"[facility]   Department of Infectious Disease  Progress Note        PATIENT NAME: Adriana Zaragoza  MRN: [id number]  TODAY'S DATE: 12/28/2024  ADMIT DATE: 12/26/2024  LOS: 2 days    CHIEF COMPLAINT: Fatigue (Patient with generalized weakness beginning about 0300.  Lower back pain and chills.  Body trembling.  Patient also reports dark urine.)      PRINCIPLE PROBLEM: Severe sepsis    INTERVAL HISTORY    12/28/2024:  Better today.  Doing okay.  Repeat blood cultures so far negative.  Leukocytosis resolving.  Cymbalta added to her regimen today.      Antibiotics (From admission, onward)      Start     Stop Route Frequency Ordered    12/27/24 0200  vancomycin (VANCOCIN) 2,000 mg in 0.9% NaCl 500 mL IVPB        Note to Pharmacy: Ht: 5' 4" (1.626 m)  Wt: (!) 173.1 kg (381 lb 9.9 oz)  Estimated Creatinine Clearance: 160.7 mL/min (based on SCr of 0.6 mg/dL).  Body mass index is 65.5 kg/m².    -- IV Every 12 hours (non-standard times) 12/26/24 2152 12/26/24 2145  mupirocin 2 % ointment         12/31/24 2059 Nasl 2 times daily 12/26/24 2035 12/26/24 2130  meropenem injection 1 g         -- IV Every 8 hours (non-standard times) 12/26/24 2024 12/26/24 1155  vancomycin - pharmacy to dose  (vancomycin IVPB (PEDS and ADULTS))        Placed in "And" Linked Group    -- IV pharmacy to manage frequency 12/26/24 1055          Antifungals (From admission, onward)      None           Antivirals (From admission, onward)      None            ASSESSMENT and PLAN      Severe sepsis.  Focus of infection appears to be from right thigh panniculitis.  DC Zosyn and meropenem.  Start linezolid IV/p.o. and ceftriaxone 2 g Q 24 hours.  ASO titer in a.m.      2. Respiratory failure.  Secondary to above.  Extubated.  Resolved.       3. Extreme obesity.    4. Large right medial thigh pannus.    RECOMMENDATIONS:    Discontinue vancomycin and Meropenem   Start linezolid IV/p.o. and ceftriaxone 2 g Q 24 hours   Check ASO titer " in a.m.    Please send Epic secure chat with any questions.      SUBJECTIVE        Adriana Zaragoza is a 59 y.o. female with extreme obesity, COPD and depression.  Known to me from previous encounter during hospitalization for panniculitis.  Presents to the emergency room 12/20/2024 generalized weakness of acute onset.  BP in the /59, pulse 96, respiratory 18, temperature 101.9°, oxygen saturation 94%.  She quickly decompensated and was intubated in the ER.       WBC 18, hematocrit 38, MCV 87, platelet 279, sodium 137, creatinine 0.6, LFTs normal.  UA unremarkable.  CT right showed known medial panniculus with findings concerning for cellulitis.  CT chest abdomen and pelvis with no pneumoniae or other acute findings.  CT head with no acute abnormality.  She was admitted to the ICU on antibiotics.     Antibiotic history:   Vancomycin:  12/26/2024-  Metronidazole: 02/20/2024 x1 dose   Ertapenem:  12/22/2024-  Aztreonam: 12/26/2024 x1 dose     Microbiology:    Blood culture 12/20/2024:  NGTD    Review of Systems  Negative except as stated above in Interval History     OBJECTIVE   Temp:  [98.5 °F (36.9 °C)-99.4 °F (37.4 °C)] 99.4 °F (37.4 °C)  Pulse:  [] 81  Resp:  [16-30] 25  SpO2:  [92 %-99 %] 95 %  BP: ()/(51-78) 124/66  Temp:  [98.5 °F (36.9 °C)-99.4 °F (37.4 °C)]   Temp: 99.4 °F (37.4 °C) (12/28/24 1105)  Pulse: 81 (12/28/24 1400)  Resp: (!) 25 (12/28/24 1626)  BP: 124/66 (12/28/24 1400)  SpO2: 95 % (12/28/24 1400)    Intake/Output Summary (Last 24 hours) at 12/28/2024 1634  Last data filed at 12/28/2024 1100  Gross per 24 hour   Intake 2368.93 ml   Output 1435 ml   Net 933.93 ml       Physical Exam  General:  I middle-aged man lying quietly in bed.  Awake, alert   Respiratory:  No mechanical ventilator.  FiO2 30%, peep 5, .  Clear to auscultation anteriorly   CVS: S1 and 2 heard, no murmurs appreciated   Abdomen:  Protuberant, soft, nontender, no palpable organomegaly   Lower  extremity:  Large medial thigh panniculus with erythema and inflammation that is more dependent.  Warm to touch  Musculoskeletal: No joint effusions appreciated     VAD:  PIV  ISOLATION:  None     Wounds:  None    Significant Labs: All pertinent labs within the past 24 hours have been reviewed.    CBC LAST 7 DAYS  Recent Labs   Lab 12/26/24  1211 12/26/24  1522 12/26/24 1714 12/27/24 0441 12/27/24 1329 12/28/24  0518 12/28/24  0648   WBC 18.43*  --   --  14.04*  --   --  12.39   RBC 4.30  --   --  3.85*  --   --  3.87*   HGB 11.6*  --   --  10.2*  --   --  10.1*   HCT 37.6 39 35* 33.7* 32* 29* 33.4*   MCV 87  --   --  88  --   --  86   MCH 27.0  --   --  26.5*  --   --  26.1*   MCHC 30.9*  --   --  30.3*  --   --  30.2*   RDW 15.2*  --   --  15.5*  --   --  15.7*     --   --  257  --   --  251   MPV 9.4  --   --  9.7  --   --  9.4   GRAN 90.0*  16.6*  --   --  88.9*  12.5*  --   --  72.4  9.0*   LYMPH 4.8*  0.9*  --   --  5.5*  0.8*  --   --  19.1  2.4   MONO 4.3  0.8  --   --  4.8  0.7  --   --  7.4  0.9   BASO 0.04  --   --  0.01  --   --  0.04   NRBC 0  --   --  0  --   --  0       CHEMISTRY LAST 7 DAYS  Recent Labs   Lab 12/26/24  1211 12/26/24  1522 12/26/24 1714 12/26/24 2044 12/26/24 2228 12/27/24 0321 12/27/24 0441 12/27/24  1329 12/28/24  0518 12/28/24  0648 12/28/24  1555     --   --   --   --   --  138  --   --  138  --    K 4.0  --   --   --   --   --  3.9  --   --  3.8 4.0     --   --   --   --   --  108  --   --  108  --    CO2 27  --   --   --   --   --  25  --   --  25  --    ANIONGAP 7*  --   --   --   --   --  5*  --   --  5*  --    BUN 13  --   --   --   --   --  11  --   --  14  --    CREATININE 0.6  --   --   --   --   --  0.6  --   --  0.6  --    *  --   --   --   --   --  192*  --   --  109  --    CALCIUM 8.7  --   --   --   --   --  8.2*  --   --  8.1*  --    PH  --  7.304* 7.310* 7.267* 7.333* 7.321*  --  7.334* 7.394  --   --    MG 1.9  --   --    "--   --   --  1.9  --   --  2.1  --    ALBUMIN 3.7  --   --   --   --   --  3.4*  --   --  3.2*  --    PROT 7.2  --   --   --   --   --  6.5  --   --  6.5  --    ALKPHOS 60  --   --   --   --   --  52*  --   --  48*  --    ALT 12  --   --   --   --   --  11  --   --  11  --    AST 11  --   --   --   --   --  18  --   --  15  --    BILITOT 0.4  --   --   --   --   --  0.4  --   --  0.3  --        Estimated Creatinine Clearance: 160.7 mL/min (based on SCr of 0.6 mg/dL).    INFLAMMATORY/PROCAL  LAST 7 DAYS  Recent Labs   Lab 12/26/24  1211   PROCAL <0.050     No results found for: "ESR"  CRP   Date Value Ref Range Status   09/25/2024 1.30 (H) <1.00 mg/dL Final     Comment:     CRP-Normal Application expected values:   <1.0        mg/dL   Normal Range  1.0 - 5.0  mg/dL   Indicates mild inflammation  5.0 - 10.0 mg/dL   Indicates severe inflammation  >10.0        mg/dL   Represents serious processes and   frequently         indicates the presence of a bacterial   infection.      08/28/2024 10.60 (H) <1.00 mg/dL Final     Comment:     CRP-Normal Application expected values:   <1.0        mg/dL   Normal Range  1.0 - 5.0  mg/dL   Indicates mild inflammation  5.0 - 10.0 mg/dL   Indicates severe inflammation  >10.0        mg/dL   Represents serious processes and   frequently         indicates the presence of a bacterial   infection.      08/26/2024 8.00 (H) <1.00 mg/dL Final     Comment:     CRP-Normal Application expected values:   <1.0        mg/dL   Normal Range  1.0 - 5.0  mg/dL   Indicates mild inflammation  5.0 - 10.0 mg/dL   Indicates severe inflammation  >10.0        mg/dL   Represents serious processes and   frequently         indicates the presence of a bacterial   infection.      02/14/2024 5.0 0.0 - 8.2 mg/L Final   02/06/2024 115.2 (H) 0.0 - 8.2 mg/L Final   03/07/2023 3.94 (H) <0.76 mg/dL Final   03/05/2023 11.05 (H) <0.76 mg/dL Final       PRIOR  MICROBIOLOGY:    No results found for the last 90 days.      LAST " 7 DAYS MICROBIOLOGY   Microbiology Results (last 7 days)       Procedure Component Value Units Date/Time    Blood culture x two cultures. Draw prior to antibiotics. [[id number]] Collected: 12/26/24 1131    Order Status: Completed Specimen: Blood from Peripheral, Hand, Left Updated: 12/28/24 1232     Blood Culture, Routine No Growth to date      No Growth to date      No Growth to date    Narrative:      Aerobic and anaerobic    Blood culture x two cultures. Draw prior to antibiotics. [[id number]] Collected: 12/26/24 1125    Order Status: Completed Specimen: Blood from Peripheral, Hand, Right Updated: 12/28/24 1232     Blood Culture, Routine No Growth to date      No Growth to date      No Growth to date    Narrative:      Aerobic and anaerobic    Respiratory Infection Panel (PCR), Nasopharyngeal [[id number]] Collected: 12/26/24 1904    Order Status: Completed Specimen: Nasopharyngeal Swab Updated: 12/26/24 2044     Respiratory Infection Panel Source NP swab     Adenovirus Not Detected     Coronavirus 229E, Common Cold Virus Not Detected     Coronavirus HKU1, Common Cold Virus Not Detected     Coronavirus NL63, Common Cold Virus Not Detected     Coronavirus OC43, Common Cold Virus Not Detected     Comment: Coronavirus strains 229E, HKU1, NL63, and OC43 can cause the common   cold   and are not associated with the respiratory disease outbreak caused   by  the COVID-19 (SARS-CoV-2 novel Coronavirus) strain.           SARS-CoV2 (COVID-19) Qualitative PCR Not Detected     Human Metapneumovirus Not Detected     Human Rhinovirus/Enterovirus Not Detected     Influenza A (subtypes H1, H1-2009,H3) Not Detected     Influenza B Not Detected     Parainfluenza Virus 1 Not Detected     Parainfluenza Virus 2 Not Detected     Parainfluenza Virus 3 Not Detected     Parainfluenza Virus 4 Not Detected     Respiratory Syncytial Virus Not Detected     Bordetella Parapertussis ([id number]) Not Detected     Bordetella pertussis (ptxP) Not  Detected     Chlamydia pneumoniae Not Detected     Mycoplasma pneumoniae Not Detected     Comment: Respiratory Infection Panel testing performed by Multiplex PCR.       Narrative:      Respiratory Infection Panel source->NP Swab    Respiratory Infection Panel (PCR), Nasopharyngeal [[id number]]     Order Status: No result Specimen: Nasopharyngeal Swab             CURRENT/PREVIOUS VISIT EKG  Results for orders placed or performed during the hospital encounter of 12/26/24   EKG 12-lead    Collection Time: 12/26/24 11:55 AM   Result Value Ref Range    QRS Duration 86 ms    [facility] QTC Calculation 425 ms    Narrative    Test Reason : Z13.6,    Vent. Rate :  88 BPM     Atrial Rate :  88 BPM     P-R Int : 178 ms          QRS Dur :  86 ms      QT Int : 352 ms       P-R-T Axes :  37  66  48 degrees    QTcB Int : 425 ms    Normal sinus rhythm  Low voltage QRS  Borderline Abnormal ECG  When compared with ECG of 25-Sep-2024 23:08,  No significant change was found    Referred By: AAAREFERRAL SELF           Confirmed By:      Significant Imaging: I have reviewed all relevant and available imaging results/findings within the past 24 hours.    I spent a total of 50 minutes on the day of the visit.This includes face to face time and non-face to face time preparing to see the patient (eg, review of tests), obtaining and/or reviewing separately obtained history, documenting clinical information in the electronic or other health record, independently interpreting results and communicating results to the patient/family/caregiver, or care coordinator.    Rajendra Callahan MD  Date of Service: 12/28/2024      This note was created using [employer] voice recognition software that occasionally misinterpreted phrases or words.

## 2024-12-28 NOTE — ASSESSMENT & PLAN NOTE
Suspect secondary to altered mental status, and severe morbid obesity.  Extubated 12/28/24    Vent support being given.   Vent Mode: Spont  Oxygen Concentration (%):  [30-40] 30  Resp Rate Total:  [11 br/min-32 br/min] 18 br/min  Vt Set:  [450 mL] 450 mL  PEEP/CPAP:  [5 cmH20] 5 cmH20  Pressure Support:  [10 cmH20] 10 cmH20  Mean Airway Pressure:  [8.4 [id number] cmH20] 8.8 cmH20    2 liters NC oxygen  Avoid over sedating meds  Cpap at night and PRN

## 2024-12-28 NOTE — ASSESSMENT & PLAN NOTE
Chronic problem for several years. No surgeon willing to operate due to high vascular complications.  Also still smoking. Morbid obesity. Insurance not willing to cover weight loss injection.   Patient wants to go to [facility] [place] main [facility] for plastic surgical evaluation.   Wound care  IV abx

## 2024-12-28 NOTE — PROGRESS NOTES
Progress Note  PULMONARY    Admit Date: 12/26/2024 12/28/2024    History of Present Illness:  Pt is a 60 yo female with morbid obesity, lymphedema, mass of right thigh who presented to the ED with sepsis, UTI. Pt required intubation in ED due to respiratory distress, failed bipap. Pulmonary is consulted for vent management. Pt anxious, awake, on propofol and fentanyl. Gas exchange suboptimal on today's ABG.     SUBJECTIVE:     12/28- pt improved, passed weaning trial and will extubate today. After extubated pt feels increased inflammation of her right thigh compared to baseline, also thinks she had adverse reaction to fentanyl given in the Ed with muscles twitching      OBJECTIVE:     Vitals (Most recent):  Vitals:    12/28/24 0818   BP:    Pulse: 87   Resp: (!) 21   Temp:        Vitals (24 hour range):  Temp:  [98.1 °F (36.7 °C)-99.3 °F (37.4 °C)]   Pulse:  []   Resp:  [0-30]   BP: ()/(50-75)   SpO2:  [94 %-100 %]       Intake/Output Summary (Last 24 hours) at 12/28/2024 0921  Last data filed at 12/28/2024 0701  Gross per 24 hour   Intake 2368.93 ml   Output 1490 ml   Net 878.93 ml          Physical Exam:  The patient's neuro status (alertness,orientation,cognitive function,motor skills,), pharyngeal exam (oral lesions, hygiene, abn dentition,), Neck (jvd,mass,thyroid,nodes in neck and above/below clavicle),RESPIRATORY(symmetry,effort,fremitus,percussion,auscultation),  Cor(rhythm,heart tones including gallops,perfusion,edema)ABD(distention,hepatic&splenomegaly,tenderness,masses), Skin(rash,cyanosis),Psyc(affect,judgement,).  Exam negative except for these pertinent findings:    Morbid obese, awake, no distress  HR regular  Breath sounds diminished bilaterally  Abd soft nontender  Mass pubic area/R thigh erythematous and tender with pustule  Bilat lower ext lymphedema    Radiographs reviewed: view by direct vision   Imaging Results              CT Thigh With Contrast Right (Final result)  Result time  12/26/24 20:33:14      Final result by Miguel Agee, DO (12/26/24 20:33:14)                   Impression:      1. Large area of soft tissue thickening and edema with overlying skin thickening arising from the proximal medial right thigh/inguinal soft tissues, extending inferiorly, below the level of the knees.  Associated enlarged right inguinal lymph nodes with adjacent fat stranding.  Findings are suspicious for lymphedema, with overlying infection/cellulitis not excluded.  2. Ventral abdominal wall hernia containing loops of small and large bowel.  No bowel obstruction.  3. Enlargement of the main pulmonary artery, which can be seen with pulmonary hypertension.  4. Indeterminate hypodensity within the right kidney superior pole measuring 2.4 x 2.7 cm, stable in size from prior and likely a benign hemorrhagic or proteinaceous cyst.  5. Nonobstructing right-sided nephrolithiasis.  No hydronephrosis.      Electronically signed by: Miguel Agee  Date:    12/26/2024  Time:    20:33               Narrative:    EXAMINATION:  CT CHEST ABDOMEN PELVIS WITH IV CONTRAST (XPD); CT THIGH WITH CONTRAST RIGHT    CLINICAL HISTORY:  Sepsis;; Soft tissue mass, thigh, deep;    TECHNIQUE:  Low dose axial images were obtained of the chest, abdomen, and pelvis from the thoracic inlet to the pubic symphysis following the administration of 100 mL of Omnipaque 350 intravenous contrast.  Sagittal and coronal reformats were provided.    Additional postcontrast low-dose axial images were obtained of the right thigh, from the femoroacetabular joints to the knees, with sagittal and coronal reformats.    COMPARISON:  CT of the abdomen and pelvis from 08/25/2024, 10/21/2022.  CTA chest from 10/21/2022.    FINDINGS:  There is artifact related to the patient's arms which are within the field of view, resulting in streak artifact.    CT chest: There is an endotracheal tube in place, with the tip approximately 1.5 cm proximal to the kyle.   The large airways are otherwise patent.  There is a 7 mm right lower lobe pulmonary nodule abutting the fissure (series 6, image 254), stable in size dating back to 10/21/2022, and compatible with a benign etiology, no further follow-up is needed.  There is scattered subsegmental atelectasis in the bilateral lungs.  The lungs are otherwise clear, without focal consolidation or ground-glass opacity.  The pleural spaces are clear.  There is global cardiomegaly.  There is no pericardial effusion.  There is no mediastinal or hilar lymphadenopathy.  There is enlargement of the main pulmonary artery, which measures up to 4.1 cm, which can be seen with pulmonary hypertension.  There is mild atherosclerosis of the thoracic aorta.  The esophagus demonstrates no evidence of wall thickening.  There is a nasogastric tube.  The lower neck soft tissues and the chest wall are unremarkable.  There are multilevel degenerative changes of the spine.  There is an enchondroma in the right proximal humerus.  Osseous structures are intact.    CT abdomen and pelvis: The liver is partially limited by streak artifact.  The liver is enlarged.  No focal hepatic lesions.  There is no intra or extrahepatic biliary ductal dilation.  The gallbladder is unremarkable without radiodense gallstone, wall thickening, or pericholecystic fluid.  There is fatty atrophy of the pancreas.  No focal pancreatic lesion.  No pancreatic ductal dilation.  The spleen is enlarged.  No focal lesions.  Bilateral adrenal glands are unremarkable.  There is an indeterminate hypodensity within the right kidney superior pole measuring 2.4 x 2.7 cm, stable in size from prior and likely related to a benign hemorrhagic or proteinaceous cyst.  There are several punctate nonobstructing right renal calculi.  There is no hydronephrosis or obstructing ureteral calculus. There is a nasogastric tube with the tip in the stomach.  There are postop changes of sleeve gastrectomy.  There  is a small hiatal hernia.  There is no evidence of bowel wall thickening or bowel obstruction.  The appendix is normal.  No ascites or free intraperitoneal air.  No abdominal fluid collection.  No evidence of mesenteric or retroperitoneal lymphadenopathy.  There is mild atherosclerosis.  There is no aneurysm. There is a Lyons catheter in place in the urinary bladder.  There is air in the urinary bladder.  There is no bladder wall thickening.  The uterus and adnexae are unremarkable.  There is a bowel containing ventral abdominal wall hernia.  There is no bowel obstruction.  There is no acute fracture or dislocation.  There are degenerative changes.    CT right thigh: There is right inguinal lymphadenopathy, index node measures 1.8 cm (series 3, image 235), with adjacent soft tissue stranding in the right inguinal soft tissues.  There is a large area of focal soft tissue thickening and edema with overlying skin thickening, arising from the proximal medial right thigh/inguinal soft tissues, and extending inferiorly, below the level of the field of view, extending a significant distance below the knees.  There is no evidence of an acute fracture or dislocation of the bilateral femurs.  The remaining soft tissues are unremarkable.  Muscle bulk is grossly maintained.  There is no soft tissue gas.  There is no evidence of a focal rim enhancing fluid collection.  There is no evidence of a radiopaque foreign body.                                       CT Chest Abdomen Pelvis With IV Contrast (XPD) Routine Oral Contrast (Final result)  Result time 12/26/24 20:33:14      Final result by Miguel Agee DO (12/26/24 20:33:14)                   Impression:      1. Large area of soft tissue thickening and edema with overlying skin thickening arising from the proximal medial right thigh/inguinal soft tissues, extending inferiorly, below the level of the knees.  Associated enlarged right inguinal lymph nodes with adjacent fat  stranding.  Findings are suspicious for lymphedema, with overlying infection/cellulitis not excluded.  2. Ventral abdominal wall hernia containing loops of small and large bowel.  No bowel obstruction.  3. Enlargement of the main pulmonary artery, which can be seen with pulmonary hypertension.  4. Indeterminate hypodensity within the right kidney superior pole measuring 2.4 x 2.7 cm, stable in size from prior and likely a benign hemorrhagic or proteinaceous cyst.  5. Nonobstructing right-sided nephrolithiasis.  No hydronephrosis.      Electronically signed by: Miguel Agee  Date:    12/26/2024  Time:    20:33               Narrative:    EXAMINATION:  CT CHEST ABDOMEN PELVIS WITH IV CONTRAST (XPD); CT THIGH WITH CONTRAST RIGHT    CLINICAL HISTORY:  Sepsis;; Soft tissue mass, thigh, deep;    TECHNIQUE:  Low dose axial images were obtained of the chest, abdomen, and pelvis from the thoracic inlet to the pubic symphysis following the administration of 100 mL of Omnipaque 350 intravenous contrast.  Sagittal and coronal reformats were provided.    Additional postcontrast low-dose axial images were obtained of the right thigh, from the femoroacetabular joints to the knees, with sagittal and coronal reformats.    COMPARISON:  CT of the abdomen and pelvis from 08/25/2024, 10/21/2022.  CTA chest from 10/21/2022.    FINDINGS:  There is artifact related to the patient's arms which are within the field of view, resulting in streak artifact.    CT chest: There is an endotracheal tube in place, with the tip approximately 1.5 cm proximal to the kyle.  The large airways are otherwise patent.  There is a 7 mm right lower lobe pulmonary nodule abutting the fissure (series 6, image 254), stable in size dating back to 10/21/2022, and compatible with a benign etiology, no further follow-up is needed.  There is scattered subsegmental atelectasis in the bilateral lungs.  The lungs are otherwise clear, without focal consolidation or  ground-glass opacity.  The pleural spaces are clear.  There is global cardiomegaly.  There is no pericardial effusion.  There is no mediastinal or hilar lymphadenopathy.  There is enlargement of the main pulmonary artery, which measures up to 4.1 cm, which can be seen with pulmonary hypertension.  There is mild atherosclerosis of the thoracic aorta.  The esophagus demonstrates no evidence of wall thickening.  There is a nasogastric tube.  The lower neck soft tissues and the chest wall are unremarkable.  There are multilevel degenerative changes of the spine.  There is an enchondroma in the right proximal humerus.  Osseous structures are intact.    CT abdomen and pelvis: The liver is partially limited by streak artifact.  The liver is enlarged.  No focal hepatic lesions.  There is no intra or extrahepatic biliary ductal dilation.  The gallbladder is unremarkable without radiodense gallstone, wall thickening, or pericholecystic fluid.  There is fatty atrophy of the pancreas.  No focal pancreatic lesion.  No pancreatic ductal dilation.  The spleen is enlarged.  No focal lesions.  Bilateral adrenal glands are unremarkable.  There is an indeterminate hypodensity within the right kidney superior pole measuring 2.4 x 2.7 cm, stable in size from prior and likely related to a benign hemorrhagic or proteinaceous cyst.  There are several punctate nonobstructing right renal calculi.  There is no hydronephrosis or obstructing ureteral calculus. There is a nasogastric tube with the tip in the stomach.  There are postop changes of sleeve gastrectomy.  There is a small hiatal hernia.  There is no evidence of bowel wall thickening or bowel obstruction.  The appendix is normal.  No ascites or free intraperitoneal air.  No abdominal fluid collection.  No evidence of mesenteric or retroperitoneal lymphadenopathy.  There is mild atherosclerosis.  There is no aneurysm. There is a Lyons catheter in place in the urinary bladder.  There is  air in the urinary bladder.  There is no bladder wall thickening.  The uterus and adnexae are unremarkable.  There is a bowel containing ventral abdominal wall hernia.  There is no bowel obstruction.  There is no acute fracture or dislocation.  There are degenerative changes.    CT right thigh: There is right inguinal lymphadenopathy, index node measures 1.8 cm (series 3, image 235), with adjacent soft tissue stranding in the right inguinal soft tissues.  There is a large area of focal soft tissue thickening and edema with overlying skin thickening, arising from the proximal medial right thigh/inguinal soft tissues, and extending inferiorly, below the level of the field of view, extending a significant distance below the knees.  There is no evidence of an acute fracture or dislocation of the bilateral femurs.  The remaining soft tissues are unremarkable.  Muscle bulk is grossly maintained.  There is no soft tissue gas.  There is no evidence of a focal rim enhancing fluid collection.  There is no evidence of a radiopaque foreign body.                                       CT Head Without Contrast (Final result)  Result time 12/26/24 20:03:17      Final result by Miguel Agee DO (12/26/24 20:03:17)                   Impression:      No acute intracranial abnormality.      Electronically signed by: Miguel Agee  Date:    12/26/2024  Time:    20:03               Narrative:    EXAMINATION:  CT HEAD WITHOUT CONTRAST    CLINICAL HISTORY:  Mental status change, unknown cause;sepsis;    TECHNIQUE:  Low dose axial CT images obtained throughout the head without intravenous contrast. Sagittal and coronal reconstructions were performed.    COMPARISON:  None available.    FINDINGS:  Ventricles and sulci are normal in size for age without evidence of hydrocephalus. No extra-axial blood or fluid collections.  Mild hypoattenuation in the supratentorial white matter, compatible with chronic microvascular ischemic changes.   Brain parenchyma is otherwise unremarkable.  No parenchymal mass, hemorrhage, edema or major vascular distribution infarct.    No calvarial fracture.  The scalp is unremarkable.  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  There is mucosal thickening of the bilateral ethmoid sinuses, mild.  The mastoid air cells are clear.  Endotracheal and orogastric tubes noted                                       X-Ray Chest AP Portable (Final result)  Result time 12/26/24 16:17:00      Final result by Arnold Swift IV, MD (12/26/24 16:17:00)                   Impression:      Interval positioning of endotracheal and nasogastric tubes.    Progression of parahilar/mid lung zone airspace opacities.    Blunting of the left costophrenic angle suggesting small component of pleural fluid.      Electronically signed by: Arnold Swift  Date:    12/26/2024  Time:    16:17               Narrative:    EXAMINATION:  XR CHEST AP PORTABLE    CLINICAL HISTORY:  Presence of other specified devices    COMPARISON:  Earlier examination.    FINDINGS:  In the interval, there has been positioning of an endotracheal tube the tip of which lies above the level of the kyle.  Nasogastric tube extends into the left upper abdomen.  The heart size is stable.  There is prominence of the central pulmonary vasculature.    There has been development of patchy airspace opacities and bilateral parahilar/mid lung zone distribution.  A blunted configuration of the left costophrenic angle is suggestive of small left-sided effusion.  No extrapulmonary air identified.                                       X-Ray Chest AP Portable (Final result)  Result time 12/26/24 11:22:59      Final result by Geo Diehl MD (12/26/24 11:22:59)                   Impression:      No evidence of acute cardiopulmonary disease.      Electronically signed by: Geo Diehl  Date:    12/26/2024  Time:    11:22               Narrative:    EXAMINATION:  XR CHEST AP  PORTABLE    CLINICAL HISTORY:  Sepsis;    FINDINGS:  Portable chest radiograph at 11:05 hours compared to prior exams shows the cardiomediastinal silhouette and pulmonary vasculature are within normal limits.    The lungs are normally expanded, with no consolidation, large pleural effusion, or evidence of pulmonary edema.  Blunting of the left costophrenic angle is unchanged.  No pneumothorax or acute fractures.                                        Labs     Recent Labs   Lab 12/28/24  0648   WBC 12.39   HGB 10.1*   HCT 33.4*        Recent Labs   Lab 12/28/24  0648      K 3.8      CO2 25   BUN 14   CREATININE 0.6      CALCIUM 8.1*   MG 2.1   AST 15   ALT 11   ALKPHOS 48*   BILITOT 0.3   PROT 6.5   ALBUMIN 3.2*     Recent Labs   Lab 12/28/24  0518   PH 7.394   PCO2 40.0   PO2 80   HCO3 24.5     Microbiology Results (last 7 days)       Procedure Component Value Units Date/Time    Blood culture x two cultures. Draw prior to antibiotics. [[id number]] Collected: 12/26/24 1125    Order Status: Completed Specimen: Blood from Peripheral, Hand, Right Updated: 12/27/24 1232     Blood Culture, Routine No Growth to date      No Growth to date    Narrative:      Aerobic and anaerobic    Blood culture x two cultures. Draw prior to antibiotics. [[id number]] Collected: 12/26/24 1131    Order Status: Completed Specimen: Blood from Peripheral, Hand, Left Updated: 12/27/24 1232     Blood Culture, Routine No Growth to date      No Growth to date    Narrative:      Aerobic and anaerobic    Respiratory Infection Panel (PCR), Nasopharyngeal [[id number]] Collected: 12/26/24 1904    Order Status: Completed Specimen: Nasopharyngeal Swab Updated: 12/26/24 2044     Respiratory Infection Panel Source NP swab     Adenovirus Not Detected     Coronavirus 229E, Common Cold Virus Not Detected     Coronavirus HKU1, Common Cold Virus Not Detected     Coronavirus NL63, Common Cold Virus Not Detected     Coronavirus OC43,  Common Cold Virus Not Detected     Comment: Coronavirus strains 229E, HKU1, NL63, and OC43 can cause the common   cold   and are not associated with the respiratory disease outbreak caused   by  the COVID-19 (SARS-CoV-2 novel Coronavirus) strain.           SARS-CoV2 (COVID-19) Qualitative PCR Not Detected     Human Metapneumovirus Not Detected     Human Rhinovirus/Enterovirus Not Detected     Influenza A (subtypes H1, H1-2009,H3) Not Detected     Influenza B Not Detected     Parainfluenza Virus 1 Not Detected     Parainfluenza Virus 2 Not Detected     Parainfluenza Virus 3 Not Detected     Parainfluenza Virus 4 Not Detected     Respiratory Syncytial Virus Not Detected     Bordetella Parapertussis (GF3256) Not Detected     Bordetella pertussis (ptxP) Not Detected     Chlamydia pneumoniae Not Detected     Mycoplasma pneumoniae Not Detected     Comment: Respiratory Infection Panel testing performed by Multiplex PCR.       Narrative:      Respiratory Infection Panel source->NP Swab    Respiratory Infection Panel (PCR), Nasopharyngeal [[id number]]     Order Status: No result Specimen: Nasopharyngeal Swab             Impression:  Active Hospital Problems    Diagnosis  POA    *Severe sepsis [A41.9, R65.20]  Yes    Cellulitis of right leg [L03.115]  Yes    Acute hypoxic respiratory failure [J96.01]  Yes    Morbid obesity [E66.01]  Yes      Resolved Hospital Problems   No resolved problems to display.               Plan:       - extubated; resp status improved  - pain control per hospitalist- would avoid further fentanyl as it may have precipitated resp failure  - continue antibiotics  - wound care  - watch H/H  - lovenox for dvt prophylaxis  - dc pepcid  - advance diet  - would watch in ICU today then transfer out tomorrow if stable  - pulmonary will sign off; please call if further questions arise      The following were evaluated and adjusted as needed: mechanical ventilator settings and weaning status, antibiotics, and  acid base balance and oxygenation needs       Critical Care  - THE PATIENT HAS A HIGH PROBABILITY OF IMMINENT OR LIFE THREATENING DETERIORATION.  Over 50%time of encounter was in direct care at bedside.  Time was 30 to 74 minutes required for patient care.  Time needed for all of the above totaled 36 minutes.    Argelia Abernathy MD  Pulmonary & Critical Care Medicine

## 2024-12-28 NOTE — PROGRESS NOTES
Pharmacokinetic Assessment Follow Up: IV Vancomycin    Patient brief summary:  Adriana Zaragoza is a 59 y.o. female initiated on antimicrobial therapy with IV Vancomycin for treatment of Sepsis    The patient's current regimen is Vancomycin 2000 mg IV every 12 hours.       Actual Body Weight = (!) 173.1 kg (381 lb 9.9 oz).    Renal Function:   Estimated Creatinine Clearance: 160.7 mL/min (based on SCr of 0.6 mg/dL).      Vancomycin serum concentration assessment(s):    The trough level was drawn correctly and can be used to guide therapy at this time. The measurement is below the desired definitive target range of 15 to 20 mcg/mL.    Vancomycin Regimen Plan:    Continue regimen to Vancomycin 2000 mg IV every 12 hours with next serum trough concentration measured at 1300  on 12/29    Drug levels (last 3 results):  Recent Labs   Lab Result Units 12/28/24  0107   Vancomycin-Trough ug/mL 13.7         Pharmacy will continue to follow and monitor vancomycin.    Please contact pharmacy at extension [phone] for questions regarding this assessment.    Thank you for the consult,   Darling Johnson

## 2024-12-28 NOTE — CARE UPDATE
12/28/24 0647   Patient Assessment/Suction   Level of Consciousness (AVPU) alert   Respiratory Effort Normal;Unlabored   All Lung Fields Breath Sounds rhonchi   Rhythm/Pattern, Respiratory assisted mechanically   PRE-TX-O2   Device (Oxygen Therapy) ventilator   $ Is the patient on High Flow Oxygen? Yes   Oxygen Concentration (%) 30   SpO2 97 %   Pulse Oximetry Type Continuous   $ Pulse Oximetry - Multiple Charge Pulse Oximetry - Multiple   Pulse 92   Resp 16   Aerosol Therapy   $ Aerosol Therapy Charges PRN treatment not required        Airway - Non-Surgical 12/26/24 1545 Endotracheal Tube   Placement Date/Time: 12/26/24 1545   Method of Intubation: Glidescope  Inserted by: MD  Airway Device: Endotracheal Tube  Mask Ventilation: Easy  Blade: Glidescope #4  Airway Device Size: 7.5  Style: Cuffed  Cuffed: Cuffed  Placement Verified By: Ausc...   Secured at 25 cm   Measured At Lips   Secured Location Center   Secured by Commercial tube osorio   Bite Block secure and patent   Cuff Pressure   (mlt)   Airway Safety   Is Ambu Bag and Mask with Patient? Yes, Adult Ambu Bag and Mask   Vent Select   Conventional Vent Y   $ Ventilator Subsequent 1   Charged w/in last 24h YES   Preset Conventional Ventilator Settings   Vent ID 21   Vent Type    Ventilation Type VC   Vent Mode Spont   Humidity HME   PEEP/CPAP 5 cmH20   Pressure Support 10 cmH20   Peak End Inspiratory Pressure 16 cmH20   Insp Rise Time  70 %   I-Trigger Type  V-TRIG   Trigger Sensitivity Flow/I-Trigger 3 L/min   Patient Ventilator Parameters   Resp Rate Total 19 br/min   Peak Airway Pressure 16 cmH20   Mean Airway Pressure 8.9 cmH20   Plateau Pressure 23 cmH20   Exhaled Vt 736 mL   Total Ve 13.1 L/m   Spont Ve 13.1 L   I:E Ratio Measured 1:1.8   Auto PEEP 0 cmH20   Tubing ID (mm) 8 mm   Tube Type ET   Inspired Tidal Volume (VTI) 1 mL   Conventional Ventilator Alarms   Alarms On Y   Ve High Alarm 20 L/min   Ve Low Alarm 5 L/min   Vt High Alarm 1200 mL    Vt Low Alarm 200 mL   Resp Rate High Alarm 40 br/min   Apnea Rate 22   Apnea Volume (mL) 450 mL   Apnea Oxygen Concentration  100   Apnea Flow Rate (L/min) 60   T Apnea 20 sec(s)   IHI Ventilator Associated Pneumonia Bundle (Required)   Head of Bed Elevated  HOB 45   Pt remains on SBT, awake, alert.  Anticipating extubation when order when Dr Abernathy return call from night shift RT, GRACIELA Cid

## 2024-12-28 NOTE — PLAN OF CARE
Problem: Adult Inpatient Plan of Care  Goal: Plan of Care Review  Outcome: Progressing  Goal: Patient-Specific Goal (Individualized)  Outcome: Progressing  Goal: Absence of Hospital-Acquired Illness or Injury  Outcome: Progressing  Goal: Optimal Comfort and Wellbeing  Outcome: Progressing  Goal: Readiness for Transition of Care  Outcome: Progressing     Problem: Bariatric Environmental Safety  Goal: Safety Maintained with Care  Outcome: Progressing     Problem: Infection  Goal: Absence of Infection Signs and Symptoms  Outcome: Progressing     Problem: Sepsis/Septic Shock  Goal: Optimal Coping  Outcome: Progressing  Goal: Absence of Bleeding  Outcome: Progressing  Goal: Blood Glucose Level Within Targeted Range  Outcome: Progressing  Goal: Absence of Infection Signs and Symptoms  Outcome: Progressing  Goal: Optimal Nutrition Intake  Outcome: Progressing     Problem: Wound  Goal: Optimal Coping  Outcome: Progressing  Goal: Optimal Functional Ability  Outcome: Progressing  Goal: Absence of Infection Signs and Symptoms  Outcome: Progressing  Goal: Improved Oral Intake  Outcome: Progressing  Goal: Optimal Pain Control and Function  Outcome: Progressing  Goal: Skin Health and Integrity  Outcome: Progressing  Goal: Optimal Wound Healing  Outcome: Progressing     Problem: Skin Injury Risk Increased  Goal: Skin Health and Integrity  Outcome: Progressing     Problem: Fall Injury Risk  Goal: Absence of Fall and Fall-Related Injury  Outcome: Progressing     Problem: Oral Intake Inadequate  Goal: Improved Oral Intake  Outcome: Progressing

## 2024-12-28 NOTE — ASSESSMENT & PLAN NOTE
Continue current antibiotics and monitor area for improvement.  Consulting Infectious Disease.  Meropenem and Vanc   Blood culture NTD  Wound care   Pain control - increased oral med to avoid over sedation with IV meds

## 2024-12-28 NOTE — SUBJECTIVE & OBJECTIVE
Interval History: seen and examined.  on antibiotics for cellulitis of right thigh.  Extubated to 2 liters.  No fevers recorded since 12/26/24.  Tearful and anxious.    Review of Systems   Constitutional:  Positive for fatigue.   Respiratory:  Positive for cough and shortness of breath.    Cardiovascular:  Positive for leg swelling.   Gastrointestinal: Negative.    Genitourinary: Negative.    Musculoskeletal:  Positive for arthralgias, gait problem, joint swelling and myalgias.   Skin:  Positive for wound.   Neurological:  Negative for dizziness.   Psychiatric/Behavioral:  Positive for dysphoric mood. The patient is nervous/anxious.      Objective:     Vital Signs (Most Recent):  Temp: 99.3 °F (37.4 °C) (12/28/24 0701)  Pulse: 100 (12/28/24 0949)  Resp: (!) 21 (12/28/24 0949)  BP: (!) 155/78 (12/28/24 0900)  SpO2: 97 % (12/28/24 0949) Vital Signs (24h Range):  Temp:  [98.1 °F (36.7 °C)-99.3 °F (37.4 °C)] 99.3 °F (37.4 °C)  Pulse:  [] 100  Resp:  [0-30] 21  SpO2:  [92 %-100 %] 97 %  BP: ()/(50-78) 155/78     Weight: (!) 173.1 kg (381 lb 9.9 oz)  Body mass index is 65.5 kg/m².    Intake/Output Summary (Last 24 hours) at 12/28/2024 1053  Last data filed at 12/28/2024 0701  Gross per 24 hour   Intake 2368.93 ml   Output 1470 ml   Net 898.93 ml         Physical Exam  Constitutional:       Appearance: She is obese. She is ill-appearing.      Interventions: She is sedated and intubated.   HENT:      Head: Normocephalic and atraumatic.      Mouth/Throat:      Mouth: Mucous membranes are dry.      Pharynx: Oropharynx is clear.   Eyes:      Extraocular Movements: Extraocular movements intact.      Conjunctiva/sclera:      Right eye: No exudate.     Left eye: No exudate.     Comments: Pupils equal   Neck:      Vascular: No JVD.   Cardiovascular:      Rate and Rhythm: Regular rhythm. Tachycardia present.      Pulses: Normal pulses.   Pulmonary:      Effort: No respiratory distress. She is intubated.      Breath  sounds: Decreased air movement present. Rhonchi present. No wheezing.   Abdominal:      General: Abdomen is protuberant. Bowel sounds are normal.      Palpations: Abdomen is soft.      Tenderness: There is no abdominal tenderness.   Musculoskeletal:      Cervical back: Normal range of motion and neck supple.      Right lower leg: Edema present.      Left lower leg: Edema present.      Comments: Severe lymphedema of both legs   Skin:     Capillary Refill: Capillary refill takes less than 2 seconds.      Findings: Erythema (right upper thigh, medial aspect) present.   Neurological:      General: No focal deficit present.      Mental Status: She is alert and oriented to person, place, and time.   Psychiatric:      Comments: Tearful and anxious              Significant Labs: All pertinent labs within the past 24 hours have been reviewed.    Significant Imaging:  No new imaging since last night

## 2024-12-28 NOTE — ASSESSMENT & PLAN NOTE
Dangers of cigarette smoking were reviewed with patient in detail. Patient was Counseled for 10 minutes or greater. Nicotine replacement options were discussed. Nicotine replacement was discussed- not prescribed per patient's request. Patient had bad reaction to nicoderm and now is afraid to take Chantix as well.

## 2024-12-28 NOTE — CARE UPDATE
12/27/24 1940   Patient Assessment/Suction   Level of Consciousness (AVPU) responds to voice   Respiratory Effort Normal;Unlabored   Expansion/Accessory Muscles/Retractions no use of accessory muscles   All Lung Fields Breath Sounds equal bilaterally;diminished;rhonchi   Rhythm/Pattern, Respiratory assisted mechanically   Cough Frequency with stimulation   Cough Type assisted   Suction Method tracheal   $ Suction Charges Inline Suction Procedure Stat Charge   Secretions Amount small   Secretions Color cloudy;white   Secretions Characteristics thick   PRE-TX-O2   Device (Oxygen Therapy) ventilator   Oxygen Concentration (%) 30   SpO2 98 %   Pulse Oximetry Type Continuous   $ Pulse Oximetry - Multiple Charge Pulse Oximetry - Multiple   Pulse 60   Resp (!) 28        Airway - Non-Surgical 12/26/24 1545 Endotracheal Tube   Placement Date/Time: 12/26/24 1545   Method of Intubation: Glidescope  Inserted by: MD  Airway Device: Endotracheal Tube  Mask Ventilation: Easy  Blade: Glidescope #4  Airway Device Size: 7.5  Style: Cuffed  Cuffed: Cuffed  Placement Verified By: Ausc...   Secured at 25 cm   Measured At Lips   Secured Location Center   Secured by Commercial tube osorio   Position of ETT in xRay In good position   Bite Block center;secure and patent   Site Condition Cool;Dry   Status Intact;Secured;Patent   Site Assessment Clean;Dry   Cuff Volume   (MLT)   Airway Safety   Is Ambu Bag and Mask with Patient? Yes, Adult Ambu Bag and Mask   Suction set is at the bedside? Yes   Respiratory Interventions   Airway/Ventilation Management airway patency maintained   Vent Select   Conventional Vent Y   Charged w/in last 24h YES   Preset Conventional Ventilator Settings   Vent ID [id number]   Vent Type    Ventilation Type VC   Vent Mode A/C   Humidity HME   Set Rate 28 BPM   Vt Set 450 mL   PEEP/CPAP 5 cmH20   Peak Flow 65 L/min   Peak End Inspiratory Pressure 24 cmH20   Plateau Set/Insp. Hold (sec) 0   I-Trigger Type  V-TRIG    Trigger Sensitivity Flow/I-Trigger 3 L/min   Patient Ventilator Parameters   Resp Rate Total 28 br/min   Peak Airway Pressure 30 cmH20   Mean Airway Pressure 14 cmH20   Plateau Pressure 23 cmH20   Exhaled Vt 458 mL   Total Ve 12.8 L/m   I:E Ratio Measured 1:1.8   Auto PEEP 0 cmH20   Conventional Ventilator Alarms   Alarms On Y   Resp Rate High Alarm 40 br/min   Apnea Rate 22   Apnea Volume (mL) 450 mL   Apnea Oxygen Concentration  100   Apnea Flow Rate (L/min) 60   T Apnea 20 sec(s)   IHI Ventilator Associated Pneumonia Bundle (Required)   Oral Care Mouth suctioned   Ready to Wean/Extubation Screen   FIO2<=50 (chart decimal) 0.3   MV<16L (chart vol.) 12.8   PEEP <=8 (chart #) 5   Ready to Wean Parameters   F/VT Ratio<105 (RSBI) (!) 61.14   Vital Capacity   Vital Capacity (mL) 0

## 2024-12-28 NOTE — ASSESSMENT & PLAN NOTE
Suspect secondary to altered mental status, and severe morbid obesity.  Continue vent support, pulmonary has been consulted for vent management.    Vent support being given.   Vent Mode: A/C  Oxygen Concentration (%):  [30-60] 30  Resp Rate Total:  [18 br/min-40 br/min] 28 br/min  Vt Set:  [450 mL] 450 mL  PEEP/CPAP:  [5 cmH20] 5 cmH20  Mean Airway Pressure:  [11 olN16-65 cmH20] 14 cmH20

## 2024-12-28 NOTE — PROGRESS NOTES
Therapy with Vancomycin order discontinued by Dr. Callahan on 12/28/24 @ 16:45   Pharmacy will sign off, please re-consult as needed.  Thank you for allowing us to participate in this patient's care.  Felisha Diaz 12/28/2024 4:46 PM  Dept of Pharmacy  Ext [phone]

## 2024-12-28 NOTE — PROGRESS NOTES
[facility] Medicine  Progress Note    Patient Name: Adriana Zaragoza  MRN: [id number]  Patient Class: IP- Inpatient   Admission Date: 12/26/2024  Length of Stay: 2 days  Attending Physician: Darleen Forde MD  Primary Care Provider: Melba Trivedi MD        Subjective     Principal Problem:Severe sepsis        HPI:  Please note that patient is intubated and sedated at the time of my evaluation, so all information was obtained from the ER provider.      The patient is a 59-year-old female with severe morbid obesity who was brought to the ER because of fever, and chills.  On arrival to the ER, patient was alert and oriented and able to give information. She has a fever of 101.9, and labs reveal leukocytosis of 18.  Sepsis workup was started, and patient got empiric antibiotics.  However few hours while being in the ER, she started to become confused, agitated, had rigors, and oxygen dropped to the 80s. ER provider was concern that pt's symptoms are due to allergic reaction to abx, so steroid, benadryl and pepcid iv were given. She was then intubated for airway protection.  After intubation, repeat temperature was 104.4°.  Currently septic workup is negative including chest x-ray, UA, and pro-calcitonin.  As a result decision was made to order a CT chest abdomen pelvis to further evaluate her sepsis.  However, given pt's severe morbid obesity, she could not fit in our CT scanner.  Patient will be sent to [facility] to get the CT scans ordered by the ER provider, then she will be admitted to our ICU for further management.    Overview/Hospital Course:  Patient was monitored in ICU on ventilator.  Was put on IVAB for severe sepsis, which we determined to be secondary to cellulitis of the right upper thigh.  We consulted with ID.    Fevers resolved. On meropenem and Vancomycin. Blood culture NTD. Wound care consulted. Patient extubated to 2liter oxygen. She is tearful  and highly anxious. Resumed home meds, cymbalta and hydroxyzine. Pain un controlled, increased oxycodone 10mg Q4hr PRN. PT/OT consulted. Uses rollator at home. Patient is stable for transfer to [facility].     Interval History: seen and examined.  on antibiotics for cellulitis of right thigh.  Extubated to 2 liters.  No fevers recorded since 12/26/24.  Tearful and anxious.    Review of Systems   Constitutional:  Positive for fatigue.   Respiratory:  Positive for cough and shortness of breath.    Cardiovascular:  Positive for leg swelling.   Gastrointestinal: Negative.    Genitourinary: Negative.    Musculoskeletal:  Positive for arthralgias, gait problem, joint swelling and myalgias.   Skin:  Positive for wound.   Neurological:  Negative for dizziness.   Psychiatric/Behavioral:  Positive for dysphoric mood. The patient is nervous/anxious.      Objective:     Vital Signs (Most Recent):  Temp: 99.3 °F (37.4 °C) (12/28/24 0701)  Pulse: 100 (12/28/24 0949)  Resp: (!) 21 (12/28/24 0949)  BP: (!) 155/78 (12/28/24 0900)  SpO2: 97 % (12/28/24 0949) Vital Signs (24h Range):  Temp:  [98.1 °F (36.7 °C)-99.3 °F (37.4 °C)] 99.3 °F (37.4 °C)  Pulse:  [] 100  Resp:  [0-30] 21  SpO2:  [92 %-100 %] 97 %  BP: ()/(50-78) 155/78     Weight: (!) 173.1 kg (381 lb 9.9 oz)  Body mass index is 65.5 kg/m².    Intake/Output Summary (Last 24 hours) at 12/28/2024 1053  Last data filed at 12/28/2024 0701  Gross per 24 hour   Intake 2368.93 ml   Output 1470 ml   Net 898.93 ml         Physical Exam  Constitutional:       Appearance: She is obese. She is ill-appearing.      Interventions: She is sedated and intubated.   HENT:      Head: Normocephalic and atraumatic.      Mouth/Throat:      Mouth: Mucous membranes are dry.      Pharynx: Oropharynx is clear.   Eyes:      Extraocular Movements: Extraocular movements intact.      Conjunctiva/sclera:      Right eye: No exudate.     Left eye: No exudate.     Comments: Pupils equal   Neck:       "Vascular: No JVD.   Cardiovascular:      Rate and Rhythm: Regular rhythm. Tachycardia present.      Pulses: Normal pulses.   Pulmonary:      Effort: No respiratory distress. She is intubated.      Breath sounds: Decreased air movement present. Rhonchi present. No wheezing.   Abdominal:      General: Abdomen is protuberant. Bowel sounds are normal.      Palpations: Abdomen is soft.      Tenderness: There is no abdominal tenderness.   Musculoskeletal:      Cervical back: Normal range of motion and neck supple.      Right lower leg: Edema present.      Left lower leg: Edema present.      Comments: Severe lymphedema of both legs   Skin:     Capillary Refill: Capillary refill takes less than 2 seconds.      Findings: Erythema (right upper thigh, medial aspect) present.   Neurological:      General: No focal deficit present.      Mental Status: She is alert and oriented to person, place, and time.   Psychiatric:      Comments: Tearful and anxious              Significant Labs: All pertinent labs within the past 24 hours have been reviewed.    Significant Imaging:  No new imaging since last night    Assessment and Plan     * Severe sepsis  This patient does have evidence of infective focus  My overall impression is sepsis.  Source: Skin and Soft Tissue (location right thigh)  Antibiotics given-   Antibiotics (72h ago, onward)      Start     Stop Route Frequency Ordered    12/26/24 2030  meropenem injection 1 g         -- IV Every 8 hours (non-standard times) 12/26/24 2024 12/26/24 1155  vancomycin - pharmacy to dose  (vancomycin IVPB (PEDS and ADULTS))        Placed in "And" Linked Group    -- IV pharmacy to manage frequency 12/26/24 1055          Latest lactate reviewed-  Recent Labs   Lab 12/26/24  1514   POCLAC 3.17*         Cellulitis of right leg  Continue current antibiotics and monitor area for improvement.  Consulting Infectious Disease.  Meropenem and Vanc   Blood culture NTD  Wound care   Pain control - " increased oral med to avoid over sedation with IV meds       Acute hypoxic respiratory failure  Suspect secondary to altered mental status, and severe morbid obesity.  Extubated 12/28/24    Vent support being given.   Vent Mode: Spont  Oxygen Concentration (%):  [30-40] 30  Resp Rate Total:  [11 br/min-32 br/min] 18 br/min  Vt Set:  [450 mL] 450 mL  PEEP/CPAP:  [5 cmH20] 5 cmH20  Pressure Support:  [10 cmH20] 10 cmH20  Mean Airway Pressure:  [8.4 bnV99-58 cmH20] 8.8 cmH20    2 liters NC oxygen  Avoid over sedating meds  Cpap at night and PRN     Dependence on nicotine from cigarettes  Dangers of cigarette smoking were reviewed with patient in detail. Patient was Counseled for 10 minutes or greater. Nicotine replacement options were discussed. Nicotine replacement was discussed- not prescribed per patient's request. Patient had bad reaction to nicoderm and now is afraid to take Chantix as well.     Abdominal panniculus  Chronic problem for several years. No surgeon willing to operate due to high vascular complications.  Also still smoking. Morbid obesity. Insurance not willing to cover weight loss injection.   Patient wants to go to [facility] for plastic surgical evaluation.   Wound care  IV abx      KEESHA (obstructive sleep apnea)    Cpap qhs and PRN     Morbid obesity  Body mass index is 65.5 kg/m². Morbid obesity complicates all aspects of disease management from diagnostic modalities to treatment. When patient awake, will explain weight loss strategies and health benefits to patient.           VTE Risk Mitigation (From admission, onward)           Ordered     enoxaparin injection 60 mg  Every 12 hours         12/26/24 1826     IP VTE HIGH RISK PATIENT  Once         12/26/24 1825     Place sequential compression device  Until discontinued         12/26/24 1825                    Discharge Planning   JONATHAN:      Code Status: Full Code   Medical Readiness for Discharge Date:                Critical care time spent  on the evaluation and treatment of severe organ dysfunction, review of pertinent labs and imaging studies, discussions with consulting providers and discussions with patient/family: 30 minutes.            Darleen Forde MD  Department of Hospital Medicine   [facility]

## 2024-12-28 NOTE — SUBJECTIVE & OBJECTIVE
Interval History:  on antibiotics for cellulitis of right thigh.  Remains on ventilator.  No fevers recorded.    Review of Systems   Unable to perform ROS: Intubated     Objective:     Vital Signs (Most Recent):  Temp: 98.1 °F (36.7 °C) (12/27/24 1507)  Pulse: (!) 56 (12/27/24 1930)  Resp: (!) 28 (12/27/24 1930)  BP: (!) 101/51 (12/27/24 1930)  SpO2: 97 % (12/27/24 1930) Vital Signs (24h Range):  Temp:  [97.5 °F (36.4 °C)-98.9 °F (37.2 °C)] 98.1 °F (36.7 °C)  Pulse:  [55-84] 56  Resp:  [0-30] 28  SpO2:  [96 %-100 %] 97 %  BP: ()/(49-59) 101/51     Weight: (!) 173.1 kg (381 lb 9.9 oz)  Body mass index is 65.5 kg/m².    Intake/Output Summary (Last 24 hours) at 12/27/2024 1943  Last data filed at 12/27/2024 1701  Gross per 24 hour   Intake 1716.03 ml   Output 2325 ml   Net -608.97 ml         Physical Exam  Constitutional:       Appearance: She is morbidly obese.      Interventions: She is sedated and intubated.   Eyes:      Conjunctiva/sclera:      Right eye: No exudate.     Left eye: No exudate.     Comments: Pupils equal   Neck:      Vascular: No JVD.   Cardiovascular:      Rate and Rhythm: Normal rate and regular rhythm.   Pulmonary:      Effort: She is intubated.      Breath sounds: Normal breath sounds. Decreased air movement present.   Abdominal:      General: Abdomen is protuberant. Bowel sounds are normal.      Palpations: Abdomen is soft.   Musculoskeletal:      Comments: Severe lymphedema of both legs   Skin:     Findings: Erythema (right upper thigh, medial aspect) present.             Significant Labs: All pertinent labs within the past 24 hours have been reviewed.    Significant Imaging:  No new imaging since last night

## 2024-12-28 NOTE — PLAN OF CARE
"[facility]  Initial Discharge Assessment       Primary Care Provider: Melba Trivedi MD    Admission Diagnosis: Severe sepsis [A41.9, R65.20]    Admission Date: 12/26/2024  Expected Discharge Date: 12/30/2024  Pt is a 59 -year-old female who arrived from home with Severe Sepsis problem. Information verified as correct on facesheet. The assessment was completed at the patient's bedside.  Pt lives with Bryan Graham - significant other - [phone] . Pt does not have a Living Will or Advance Directive. The Pt is oriented to person, places, and times at this time. PCP last seen a few months ago.  Pt denies Coumadin, dialysis, HH, and not sure if she was readmitted into the hospital in the last thirty days.  Pt is capable of performing ADLs with assistance of significant other. Pt drives to and from medical appointments. Pt reports she takes medication as prescribed; pharmacy used [facility].  Pt verbalized plan to discharge home via family transport. Pt has no other needs to be addressed at this time. CM reviewed the chart and will continue to monitor.  Patient states she usually goes home with Home Health services.  Per her doctor she will possibly go home with home health.  The Patient states she uses [facility] and want to use them if she is discharged with home health.  CM got a patient choice signed just incase home health is ordered.       Payor: [employer] MANAGED MEDICARE / Plan: [employer] MEDICARE PLAN PPO / Product Type: Medicare Advantage /     Extended Emergency Contact Information  Primary Emergency Contact: Bryan Graham "Les"  Address: [address] [place] [address] [id number] [place] [address] of [facility]  Home Phone: [phone]  Mobile Phone: [phone]  Relation: Significant other  Preferred language: English   needed? No  Secondary Emergency Contact: Estephanie Lopes  Home Phone: [phone]  Mobile Phone: [phone]  Relation: Friend  Preferred language: " English   needed? No    Discharge Plan A: Home with family  Discharge Plan B: Home with family      [facility] - [place] - [address]  [address] [facility] [facility]  [place] [address]  Phone: [phone] Fax: [phone]    [id number] - [place] - [address]  [address]  [facility] [address]  Phone: [phone] Fax: [phone]      Initial Assessment (most recent)       Adult Discharge Assessment - 12/28/24 1128          Discharge Assessment    Assessment Type Discharge Planning Assessment     Confirmed/corrected address, phone number and insurance Yes     When was your last doctors appointment? --   A few months ago    Reason For Admission Severe Sepsis     People in Home significant other     Do you expect to return to your current living situation? Yes     Do you have help at home or someone to help you manage your care at home? --   Bryan Santos - significant other - [phone]    Prior to hospitilization cognitive status: Alert/Oriented     Current cognitive status: Alert/Oriented     Walking or Climbing Stairs Difficulty yes     Walking or Climbing Stairs ambulation difficulty, requires equipment     Mobility Management Rollator- wheel chair     Dressing/Bathing Difficulty yes     Dressing/Bathing bathing difficulty, assistance 1 person     Dressing/Bathing Management shower chair     Home Accessibility wheelchair accessible     Home Layout Able to live on 1st floor     Equipment Currently Used at Home shower chair;rollator;CPAP;oxygen     Readmission within 30 days? --   unknown per patient    Patient currently being followed by outpatient case management? No     Do you currently have service(s) that help you manage your care at home? No     Do you take prescription medications? Yes     Do you have prescription coverage? Yes     Coverage [employer] Managed Medicare     Do you have any problems affording any  of your prescribed medications? No     Is the patient taking medications as prescribed? yes     Who is going to help you get home at discharge? Bryan Graham - significant other - [phone]     How do you get to doctors appointments? car, drives self     Are you on dialysis? No     Do you take coumadin? No     Discharge Plan A Home with family     Discharge Plan B Home with family     DME Needed Upon Discharge  none     Discharge Plan discussed with: Patient        OTHER    Name(s) of People in Home Bryan Graham - significant other - [phone]

## 2024-12-28 NOTE — HOSPITAL COURSE
Adriana Zaragoza is a 59 year old female with a past medical history of obesity, tobacco dependence, KEESHA, and anemia who presented with severe sepsis secondary to a RLE cellulitis. She developed acute respiratory failure and was intubated and sedated in the ICU. ID was consulted and changed the antibiotics from vancomycin and meropenem to linezolid and Rocephin. Wound Care was consulted. She was extubated and is stable on 2 L NC with CPAP QHS. PT/OT was consulted and recommended high intensity therapy. CM has been consulted for IPR/SNF placement. The patient refused placement. A home O2 evaluation was ordered upon discharge 1/3. She will take Cipro and linezolid for two weeks.  PT/OT was ordered. Referrals to Bariatric and Plastic Surgery were placed.

## 2024-12-28 NOTE — ASSESSMENT & PLAN NOTE
Body mass index is 65.5 kg/m². Morbid obesity complicates all aspects of disease management from diagnostic modalities to treatment. When patient awake, will explain weight loss strategies and health benefits to patient.

## 2024-12-28 NOTE — CARE UPDATE
12/28/24 0949   PRE-TX-O2   Device (Oxygen Therapy) nasal cannula   $ Is the patient on Low Flow Oxygen? Yes   Flow (L/min) (Oxygen Therapy) 2   SpO2 97 %   Pulse 100   Resp (!) 21   Ready to Wean/Extubation Screen   Extubation Screen Passed? Passed   Ready to Wean Parameters   $ Extubation Tech Time Extubation w/ Parameters;Tech Time 15 min   Extubated? Yes

## 2024-12-28 NOTE — PROGRESS NOTES
[facility]  Progress Note    Patient Name: Adriana Zaragoza  MRN: [id number]  Patient Class: IP- Inpatient   Admission Date: 12/26/2024  Length of Stay: 1 days  Attending Physician: Yoni Go MD  Primary Care Provider: Melba Trivedi MD        Subjective     Principal Problem:Severe sepsis        HPI:  Please note that patient is intubated and sedated at the time of my evaluation, so all information was obtained from the ER provider.      The patient is a 59-year-old female with severe morbid obesity who was brought to the ER because of fever, and chills.  On arrival to the ER, patient was alert and oriented and able to give information. She has a fever of 101.9, and labs reveal leukocytosis of 18.  Sepsis workup was started, and patient got empiric antibiotics.  However few hours while being in the ER, she started to become confused, agitated, had rigors, and oxygen dropped to the 80s. ER provider was concern that pt's symptoms are due to allergic reaction to abx, so steroid, benadryl and pepcid iv were given. She was then intubated for airway protection.  After intubation, repeat temperature was 104.4°.  Currently septic workup is negative including chest x-ray, UA, and pro-calcitonin.  As a result decision was made to order a CT chest abdomen pelvis to further evaluate her sepsis.  However, given pt's severe morbid obesity, she could not fit in our CT scanner.  Patient will be sent to [facility] to get the CT scans ordered by the ER provider, then she will be admitted to our ICU for further management.    Overview/Hospital Course:  Patient was monitored in ICU on ventilator.  Was put on IVAB for severe sepsis, which we determined to be secondary to cellulitis of the right upper thigh.  We consulted with ID.    Interval History:  on antibiotics for cellulitis of right thigh.  Remains on ventilator.  No fevers recorded.    Review of Systems    Unable to perform ROS: Intubated     Objective:     Vital Signs (Most Recent):  Temp: 98.1 °F (36.7 °C) (12/27/24 1507)  Pulse: (!) 56 (12/27/24 1930)  Resp: (!) 28 (12/27/24 1930)  BP: (!) 101/51 (12/27/24 1930)  SpO2: 97 % (12/27/24 1930) Vital Signs (24h Range):  Temp:  [97.5 °F (36.4 °C)-98.9 °F (37.2 °C)] 98.1 °F (36.7 °C)  Pulse:  [55-84] 56  Resp:  [0-30] 28  SpO2:  [96 %-100 %] 97 %  BP: ()/(49-59) 101/51     Weight: (!) 173.1 kg (381 lb 9.9 oz)  Body mass index is 65.5 kg/m².    Intake/Output Summary (Last 24 hours) at 12/27/2024 1943  Last data filed at 12/27/2024 1701  Gross per 24 hour   Intake 1716.03 ml   Output 2325 ml   Net -608.97 ml         Physical Exam  Constitutional:       Appearance: She is morbidly obese.      Interventions: She is sedated and intubated.   Eyes:      Conjunctiva/sclera:      Right eye: No exudate.     Left eye: No exudate.     Comments: Pupils equal   Neck:      Vascular: No JVD.   Cardiovascular:      Rate and Rhythm: Normal rate and regular rhythm.   Pulmonary:      Effort: She is intubated.      Breath sounds: Normal breath sounds. Decreased air movement present.   Abdominal:      General: Abdomen is protuberant. Bowel sounds are normal.      Palpations: Abdomen is soft.   Musculoskeletal:      Comments: Severe lymphedema of both legs   Skin:     Findings: Erythema (right upper thigh, medial aspect) present.             Significant Labs: All pertinent labs within the past 24 hours have been reviewed.    Significant Imaging:  No new imaging since last night    Assessment and Plan     * Severe sepsis  This patient does have evidence of infective focus  My overall impression is sepsis.  Source: Skin and Soft Tissue (location right thigh)  Antibiotics given-   Antibiotics (72h ago, onward)      Start     Stop Route Frequency Ordered    12/26/24 2030  meropenem injection 1 g         -- IV Every 8 hours (non-standard times) 12/26/24 2024 12/26/24 1155  vancomycin -  "pharmacy to dose  (vancomycin IVPB (PEDS and ADULTS))        Placed in "And" Linked Group    -- IV pharmacy to manage frequency 12/26/24 1055          Latest lactate reviewed-  Recent Labs   Lab 12/26/24  1514   POCLAC 3.17*         Cellulitis of right leg  Continue current antibiotics and monitor area for improvement.  Consulting Infectious Disease.      Acute hypoxic respiratory failure  Suspect secondary to altered mental status, and severe morbid obesity.  Continue vent support, pulmonary has been consulted for vent management.    Vent support being given.   Vent Mode: A/C  Oxygen Concentration (%):  [30-60] 30  Resp Rate Total:  [18 br/min-40 br/min] 28 br/min  Vt Set:  [450 mL] 450 mL  PEEP/CPAP:  [5 cmH20] 5 cmH20  Mean Airway Pressure:  [11 jxV04-10 cmH20] 14 cmH20        Morbid obesity  Body mass index is 65.5 kg/m². Morbid obesity complicates all aspects of disease management from diagnostic modalities to treatment. When patient awake, will explain weight loss strategies and health benefits to patient.           VTE Risk Mitigation (From admission, onward)           Ordered     enoxaparin injection 60 mg  Every 12 hours         12/26/24 1826     IP VTE HIGH RISK PATIENT  Once         12/26/24 1825     Place sequential compression device  Until discontinued         12/26/24 1825                    Discharge Planning   JONATHAN:      Code Status: Full Code   Medical Readiness for Discharge Date:                Yoni Go MD  Department of Hospital Medicine   [facility]    "

## 2024-12-29 LAB
ALBUMIN SERPL BCP-MCNC: 3.3 G/DL (ref 3.5–5.2)
ALP SERPL-CCNC: 52 U/L (ref 55–135)
ALT SERPL W/O P-5'-P-CCNC: 12 U/L (ref 10–44)
ANION GAP SERPL CALC-SCNC: 4 MMOL/L (ref 8–16)
AST SERPL-CCNC: 18 U/L (ref 10–40)
BASOPHILS # BLD AUTO: 0.05 K/UL (ref 0–0.2)
BASOPHILS NFR BLD: 0.5 % (ref 0–1.9)
BILIRUB SERPL-MCNC: 0.4 MG/DL (ref 0.1–1)
BUN SERPL-MCNC: 13 MG/DL (ref 6–20)
CALCIUM SERPL-MCNC: 8.3 MG/DL (ref 8.7–10.5)
CHLORIDE SERPL-SCNC: 106 MMOL/L (ref 95–110)
CO2 SERPL-SCNC: 27 MMOL/L (ref 23–29)
CREAT SERPL-MCNC: 0.5 MG/DL (ref 0.5–1.4)
DIFFERENTIAL METHOD BLD: ABNORMAL
EOSINOPHIL # BLD AUTO: 0.1 K/UL (ref 0–0.5)
EOSINOPHIL NFR BLD: 0.6 % (ref 0–8)
ERYTHROCYTE [DISTWIDTH] IN BLOOD BY AUTOMATED COUNT: 15.4 % (ref 11.5–14.5)
EST. GFR  (NO RACE VARIABLE): >60 ML/MIN/1.73 M^2
GLUCOSE SERPL-MCNC: 103 MG/DL (ref 70–110)
HCT VFR BLD AUTO: 32.3 % (ref 37–48.5)
HGB BLD-MCNC: 9.9 G/DL (ref 12–16)
IMM GRANULOCYTES # BLD AUTO: 0.04 K/UL (ref 0–0.04)
IMM GRANULOCYTES NFR BLD AUTO: 0.4 % (ref 0–0.5)
LYMPHOCYTES # BLD AUTO: 2.2 K/UL (ref 1–4.8)
LYMPHOCYTES NFR BLD: 20.1 % (ref 18–48)
MAGNESIUM SERPL-MCNC: 2 MG/DL (ref 1.6–2.6)
MCH RBC QN AUTO: 27 PG (ref 27–31)
MCHC RBC AUTO-ENTMCNC: 30.7 G/DL (ref 32–36)
MCV RBC AUTO: 88 FL (ref 82–98)
MONOCYTES # BLD AUTO: 0.9 K/UL (ref 0.3–1)
MONOCYTES NFR BLD: 7.8 % (ref 4–15)
NEUTROPHILS # BLD AUTO: 7.7 K/UL (ref 1.8–7.7)
NEUTROPHILS NFR BLD: 70.6 % (ref 38–73)
NRBC BLD-RTO: 0 /100 WBC
PLATELET # BLD AUTO: 244 K/UL (ref 150–450)
PMV BLD AUTO: 9.5 FL (ref 9.2–12.9)
POCT GLUCOSE: 116 MG/DL (ref 70–110)
POCT GLUCOSE: 96 MG/DL (ref 70–110)
POTASSIUM SERPL-SCNC: 4.1 MMOL/L (ref 3.5–5.1)
PROT SERPL-MCNC: 6.5 G/DL (ref 6–8.4)
RBC # BLD AUTO: 3.66 M/UL (ref 4–5.4)
SODIUM SERPL-SCNC: 137 MMOL/L (ref 136–145)
WBC # BLD AUTO: 10.96 K/UL (ref 3.9–12.7)

## 2024-12-29 PROCEDURE — 82962 GLUCOSE BLOOD TEST: CPT

## 2024-12-29 PROCEDURE — 25000003 PHARM REV CODE 250: Performed by: HOSPITALIST

## 2024-12-29 PROCEDURE — 94640 AIRWAY INHALATION TREATMENT: CPT

## 2024-12-29 PROCEDURE — 99900035 HC TECH TIME PER 15 MIN (STAT)

## 2024-12-29 PROCEDURE — 27100171 HC OXYGEN HIGH FLOW UP TO 24 HOURS

## 2024-12-29 PROCEDURE — 94660 CPAP INITIATION&MGMT: CPT

## 2024-12-29 PROCEDURE — 94761 N-INVAS EAR/PLS OXIMETRY MLT: CPT

## 2024-12-29 PROCEDURE — 99900031 HC PATIENT EDUCATION (STAT)

## 2024-12-29 PROCEDURE — 83735 ASSAY OF MAGNESIUM: CPT | Performed by: HOSPITALIST

## 2024-12-29 PROCEDURE — 86060 ANTISTREPTOLYSIN O TITER: CPT | Performed by: INTERNAL MEDICINE

## 2024-12-29 PROCEDURE — 80053 COMPREHEN METABOLIC PANEL: CPT | Performed by: HOSPITALIST

## 2024-12-29 PROCEDURE — 63600175 PHARM REV CODE 636 W HCPCS: Performed by: INTERNAL MEDICINE

## 2024-12-29 PROCEDURE — 63600175 PHARM REV CODE 636 W HCPCS: Performed by: HOSPITALIST

## 2024-12-29 PROCEDURE — 12000002 HC ACUTE/MED SURGE SEMI-PRIVATE ROOM

## 2024-12-29 PROCEDURE — 85025 COMPLETE CBC W/AUTO DIFF WBC: CPT | Performed by: HOSPITALIST

## 2024-12-29 PROCEDURE — 36415 COLL VENOUS BLD VENIPUNCTURE: CPT | Performed by: INTERNAL MEDICINE

## 2024-12-29 PROCEDURE — 99233 SBSQ HOSP IP/OBS HIGH 50: CPT | Mod: ,,, | Performed by: INTERNAL MEDICINE

## 2024-12-29 PROCEDURE — 25000242 PHARM REV CODE 250 ALT 637 W/ HCPCS: Performed by: HOSPITALIST

## 2024-12-29 RX ORDER — HYDROCODONE BITARTRATE AND ACETAMINOPHEN 10; 325 MG/1; MG/1
1 TABLET ORAL EVERY 4 HOURS PRN
Status: DISCONTINUED | OUTPATIENT
Start: 2024-12-29 | End: 2025-01-03 | Stop reason: HOSPADM

## 2024-12-29 RX ADMIN — CEFTRIAXONE 2 G: 2 INJECTION, POWDER, FOR SOLUTION INTRAMUSCULAR; INTRAVENOUS at 05:12

## 2024-12-29 RX ADMIN — ALBUTEROL SULFATE 2.5 MG: 2.5 SOLUTION RESPIRATORY (INHALATION) at 01:12

## 2024-12-29 RX ADMIN — DULOXETINE HYDROCHLORIDE 30 MG: 30 CAPSULE, DELAYED RELEASE ORAL at 09:12

## 2024-12-29 RX ADMIN — LINEZOLID 600 MG: 600 INJECTION, SOLUTION INTRAVENOUS at 05:12

## 2024-12-29 RX ADMIN — HYDROCODONE BITARTRATE AND ACETAMINOPHEN 1 TABLET: 10; 325 TABLET ORAL at 11:12

## 2024-12-29 RX ADMIN — HYDROCODONE BITARTRATE AND ACETAMINOPHEN 2 TABLET: 5; 325 TABLET ORAL at 01:12

## 2024-12-29 RX ADMIN — ENOXAPARIN SODIUM 60 MG: 60 INJECTION SUBCUTANEOUS at 08:12

## 2024-12-29 RX ADMIN — ENOXAPARIN SODIUM 60 MG: 60 INJECTION SUBCUTANEOUS at 09:12

## 2024-12-29 RX ADMIN — HYDROCODONE BITARTRATE AND ACETAMINOPHEN 2 TABLET: 5; 325 TABLET ORAL at 05:12

## 2024-12-29 RX ADMIN — MUPIROCIN 1 G: 20 OINTMENT TOPICAL at 09:12

## 2024-12-29 RX ADMIN — HYDROCODONE BITARTRATE AND ACETAMINOPHEN 1 TABLET: 10; 325 TABLET ORAL at 03:12

## 2024-12-29 RX ADMIN — MUPIROCIN 1 G: 20 OINTMENT TOPICAL at 08:12

## 2024-12-29 RX ADMIN — HYDROCODONE BITARTRATE AND ACETAMINOPHEN 1 TABLET: 10; 325 TABLET ORAL at 08:12

## 2024-12-29 NOTE — CARE UPDATE
12/28/24 2201   Patient Assessment/Suction   Level of Consciousness (AVPU) alert   Respiratory Effort Normal;Unlabored   Expansion/Accessory Muscles/Retractions no use of accessory muscles   All Lung Fields Breath Sounds equal bilaterally;diminished;rhonchi   Rhythm/Pattern, Respiratory assisted mechanically   Cough Frequency infrequent   PRE-TX-O2   Device (Oxygen Therapy) CPAP   Oxygen Concentration (%) 30   SpO2 99 %   Pulse Oximetry Type Continuous   $ Pulse Oximetry - Multiple Charge Pulse Oximetry - Multiple   Pulse 70   Resp (!) 24   Aerosol Therapy   $ Aerosol Therapy Charges PRN treatment not required   Ready to Wean/Extubation Screen   FIO2<=50 (chart decimal) 0.3   Preset CPAP/BiPAP Settings   Mode Of Delivery CPAP   $ CPAP/BiPAP Daily Charge 1   CPAP/BIPAP charged w/in last 24 h YES   $ Initial CPAP/BiPAP Setup? Yes   $ Is patient using? Yes   Size of Mask Small   Sized Appropriately? Yes   Equipment Type V60   Airway Device Type small full face mask   CPAP (cm H2O) 12   Patient CPAP/BiPAP Settings   CPAP/BIPAP ID 18   RR Total (Breaths/Min) 15   Tidal Volume (mL) 672   VE Minute Ventilation (L/min) 10.1 L/min   Peak Inspiratory Pressure (cm H2O) 13   TiTOT (%) 19   Total Leak (L/Min) 0   Patient Trigger - ST Mode Only (%) 100

## 2024-12-29 NOTE — CARE UPDATE
12/29/24 0838   Patient Assessment/Suction   Level of Consciousness (AVPU) alert   Respiratory Effort Normal;Unlabored   All Lung Fields Breath Sounds diminished;rhonchi   Rhythm/Pattern, Respiratory unlabored   PRE-TX-O2   Device (Oxygen Therapy) room air   SpO2 99 %   Pulse Oximetry Type Continuous   $ Pulse Oximetry - Multiple Charge Pulse Oximetry - Multiple   Pulse 70   Aerosol Therapy   $ Aerosol Therapy Charges PRN treatment not required   Preset CPAP/BiPAP Settings   Mode Of Delivery standby   $ CPAP/BiPAP Daily Charge 1   CPAP/BIPAP charged w/in last 24 h YES   $ Is patient using? Yes

## 2024-12-29 NOTE — PLAN OF CARE
Problem: Adult Inpatient Plan of Care  Goal: Plan of Care Review  Outcome: Progressing  Flowsheets (Taken 12/29/2024 1751)  Plan of Care Reviewed With: patient  Goal: Patient-Specific Goal (Individualized)  Outcome: Progressing  Flowsheets (Taken 12/29/2024 1751)  Individualized Care Needs: pain management, turning q2, daily wound care, vs monitoring  Anxieties, Fears or Concerns: c/o pain  Patient/Family-Specific Goals (Include Timeframe): Pt to get up in chair for 3 hours by 12/31/2024 1100  Goal: Absence of Hospital-Acquired Illness or Injury  Outcome: Progressing  Intervention: Identify and Manage Fall Risk  Flowsheets (Taken 12/29/2024 1751)  Safety Promotion/Fall Prevention:   assistive device/personal item within reach   bed alarm set   Fall Risk signage in place   Fall Risk reviewed with patient/family   instructed to call staff for mobility   medications reviewed   lighting adjusted   side rails raised x 2   pulse ox  Intervention: Prevent Skin Injury  Flowsheets (Taken 12/29/2024 1751)  Skin Protection: transparent dressing maintained  Device Skin Pressure Protection:   pressure points protected   skin-to-device areas padded   skin-to-skin areas padded   tubing/devices free from skin contact  Intervention: Prevent and Manage VTE (Venous Thromboembolism) Risk  Flowsheets (Taken 12/29/2024 1751)  VTE Prevention/Management: ambulation promoted  Intervention: Prevent Infection  Flowsheets (Taken 12/29/2024 1751)  Infection Prevention: rest/sleep promoted  Goal: Optimal Comfort and Wellbeing  Outcome: Progressing  Intervention: Monitor Pain and Promote Comfort  Flowsheets (Taken 12/29/2024 1751)  Pain Management Interventions: pain management plan reviewed with patient/caregiver  Intervention: Provide Person-Centered Care  Flowsheets (Taken 12/29/2024 1751)  Trust Relationship/Rapport:   care explained   questions encouraged   choices provided   reassurance provided   emotional support provided    thoughts/feelings acknowledged   empathic listening provided   questions answered  Goal: Readiness for Transition of Care  Outcome: Progressing     Problem: Bariatric Environmental Safety  Goal: Safety Maintained with Care  Outcome: Progressing     Problem: Infection  Goal: Absence of Infection Signs and Symptoms  Outcome: Progressing  Intervention: Prevent or Manage Infection  Flowsheets (Taken 12/29/2024 1751)  Infection Management: aseptic technique maintained     Problem: Wound  Goal: Optimal Coping  Outcome: Progressing  Intervention: Support Patient and Family Response  Flowsheets (Taken 12/29/2024 1751)  Supportive Measures: relaxation techniques promoted  Goal: Optimal Functional Ability  Outcome: Progressing  Intervention: Optimize Functional Ability  Flowsheets (Taken 12/29/2024 1751)  Activity Management: Rolling - L1  Goal: Absence of Infection Signs and Symptoms  Outcome: Progressing  Intervention: Prevent or Manage Infection  Flowsheets (Taken 12/29/2024 1751)  Infection Management: aseptic technique maintained  Goal: Improved Oral Intake  Outcome: Progressing  Intervention: Promote and Optimize Oral Intake  Flowsheets (Taken 12/29/2024 1751)  Oral Nutrition Promotion: adaptive equipment use encouraged  Goal: Optimal Pain Control and Function  Outcome: Progressing  Intervention: Prevent or Manage Pain  Flowsheets (Taken 12/29/2024 1751)  Sleep/Rest Enhancement: relaxation techniques promoted  Pain Management Interventions: pain management plan reviewed with patient/caregiver  Goal: Skin Health and Integrity  Outcome: Progressing  Intervention: Optimize Skin Protection  Flowsheets (Taken 12/29/2024 1751)  Skin Protection: transparent dressing maintained  Activity Management: Rolling - L1  Head of Bed (HOB) Positioning: HOB at 30-45 degrees  Goal: Optimal Wound Healing  Outcome: Progressing  Intervention: Promote Wound Healing  Flowsheets (Taken 12/29/2024 1751)  Sleep/Rest Enhancement: relaxation  techniques promoted     Problem: Skin Injury Risk Increased  Goal: Skin Health and Integrity  Outcome: Progressing  Intervention: Optimize Skin Protection  Flowsheets (Taken 12/29/2024 1751)  Skin Protection: transparent dressing maintained  Activity Management: Rolling - L1  Head of Bed (HOB) Positioning: HOB at 30-45 degrees  Intervention: Promote and Optimize Oral Intake  Flowsheets (Taken 12/29/2024 1751)  Oral Nutrition Promotion: adaptive equipment use encouraged     Problem: Fall Injury Risk  Goal: Absence of Fall and Fall-Related Injury  Outcome: Progressing  Intervention: Identify and Manage Contributors  Flowsheets (Taken 12/29/2024 1751)  Self-Care Promotion: independence encouraged  Medication Review/Management: medications reviewed  Intervention: Promote Injury-Free Environment  Flowsheets (Taken 12/29/2024 1751)  Safety Promotion/Fall Prevention:   assistive device/personal item within reach   bed alarm set   Fall Risk signage in place   Fall Risk reviewed with patient/family   instructed to call staff for mobility   medications reviewed   lighting adjusted   side rails raised x 2   pulse ox     Problem: Oral Intake Inadequate  Goal: Improved Oral Intake  Outcome: Progressing  Intervention: Promote and Optimize Oral Intake  Flowsheets (Taken 12/29/2024 1751)  Oral Nutrition Promotion: adaptive equipment use encouraged

## 2024-12-29 NOTE — PROGRESS NOTES
Critical access hospital   Department of Infectious Disease  Progress Note        PATIENT NAME: Adriana Zaragoza  MRN: [id number]  TODAY'S DATE: 12/29/2024  ADMIT DATE: 12/26/2024  LOS: 3 days    CHIEF COMPLAINT: Fatigue (Patient with generalized weakness beginning about 0300.  Lower back pain and chills.  Body trembling.  Patient also reports dark urine.)      PRINCIPLE PROBLEM: Severe sepsis    INTERVAL HISTORY    12/28/2024:  Better today.  Doing okay.  Repeat blood cultures so far negative.  Leukocytosis resolving.  Cymbalta added to her regimen today.      12/29/2024:  States improving.  No acute issues overnight.  On CPAP.  Blood cultures remain negative.  Leukocytosis has resolved.    Antibiotics (From admission, onward)      Start     Stop Route Frequency Ordered    12/28/24 1800  linezolid 600 mg/300 mL IVPB 600 mg         -- IV Every 12 hours (non-standard times) 12/28/24 1639    12/28/24 1730  cefTRIAXone injection 2 g         -- IV Every 24 hours (non-standard times) 12/28/24 1639    12/26/24 2145  mupirocin 2 % ointment         12/31/24 2059 Nasl 2 times daily 12/26/24 2035          Antifungals (From admission, onward)      None           Antivirals (From admission, onward)      None            ASSESSMENT and PLAN      Severe sepsis.  Focus of infection appears to be from right thigh panniculitis.  Continue linezolid and ceftriaxone.  Can switch linezolid to p.o..    2. Respiratory failure.  Result.  Extubated.  Back on CPAP which she uses at home     3. Extreme obesity.    4. Large right medial thigh pannus.    RECOMMENDATIONS:    Follow ASO titer   Switch linezolid to p.o. and continue ceftriaxone  Anticipate use oral antibiotics at discharge to complete therapy    Please send Epic secure chat with any questions.      SUBJECTIVE        Adriana Zaragoza is a 59 y.o. female with extreme obesity, COPD and depression.  Known to me from previous encounter during hospitalization for  panniculitis.  Presents to the emergency room 12/20/2024 generalized weakness of acute onset.  BP in the /59, pulse 96, respiratory 18, temperature 101.9°, oxygen saturation 94%.  She quickly decompensated and was intubated in the ER.       WBC 18, hematocrit 38, MCV 87, platelet 279, sodium 137, creatinine 0.6, LFTs normal.  UA unremarkable.  CT right showed known medial panniculus with findings concerning for cellulitis.  CT chest abdomen and pelvis with no pneumoniae or other acute findings.  CT head with no acute abnormality.  She was admitted to the ICU on antibiotics.     Antibiotic history:   Vancomycin:  12/26/2024-  Metronidazole: 02/20/2024 x1 dose   Ertapenem:  12/22/2024-  Aztreonam: 12/26/2024 x1 dose     Microbiology:    Blood culture 12/20/2024:  NGTD    Review of Systems  Negative except as stated above in Interval History     OBJECTIVE   Temp:  [97.8 °F (36.6 °C)-98.5 °F (36.9 °C)] 97.8 °F (36.6 °C)  Pulse:  [67-88] 67  Resp:  [16-40] 18  SpO2:  [91 %-99 %] 99 %  BP: (129-179)/(57-73) 140/65  Temp:  [97.8 °F (36.6 °C)-98.5 °F (36.9 °C)]   Temp: 97.8 °F (36.6 °C) (12/29/24 1130)  Pulse: 67 (12/29/24 1400)  Resp: 18 (12/29/24 1400)  BP: (!) 140/65 (12/29/24 1400)  SpO2: 99 % (12/29/24 1400)    Intake/Output Summary (Last 24 hours) at 12/29/2024 1429  Last data filed at 12/29/2024 1400  Gross per 24 hour   Intake 1440 ml   Output 1715 ml   Net -275 ml       Physical Exam  General:  I middle-aged man lying quietly in bed.  Awake, alert   Respiratory:  No mechanical ventilator.  FiO2 30%, peep 5, .  Clear to auscultation anteriorly   CVS: S1 and 2 heard, no murmurs appreciated   Abdomen:  Protuberant, soft, nontender, no palpable organomegaly   Lower extremity:  Large medial thigh panniculus with erythema and inflammation that is more dependent.  Warm to touch  Musculoskeletal: No joint effusions appreciated     VAD:  PIV  ISOLATION:  None     Wounds:  None    Significant Labs: All pertinent  labs within the past 24 hours have been reviewed.    CBC LAST 7 DAYS  Recent Labs   Lab 12/26/24  1211 12/26/24  1522 12/26/24  1714 12/27/24  0441 12/27/24  1329 12/28/24  0518 12/28/24  0607 12/28/24  0648 12/29/24  0329   WBC 18.43*  --   --  14.04*  --   --   --  12.39 10.96   RBC 4.30  --   --  3.85*  --   --   --  3.87* 3.66*   HGB 11.6*  --   --  10.2*  --   --   --  10.1* 9.9*   HCT 37.6   < > 35* 33.7* 32* 29* 33* 33.4* 32.3*   MCV 87  --   --  88  --   --   --  86 88   MCH 27.0  --   --  26.5*  --   --   --  26.1* 27.0   MCHC 30.9*  --   --  30.3*  --   --   --  30.2* 30.7*   RDW 15.2*  --   --  15.5*  --   --   --  15.7* 15.4*     --   --  257  --   --   --  251 244   MPV 9.4  --   --  9.7  --   --   --  9.4 9.5   GRAN 90.0*  16.6*  --   --  88.9*  12.5*  --   --   --  72.4  9.0* 70.6  7.7   LYMPH 4.8*  0.9*  --   --  5.5*  0.8*  --   --   --  19.1  2.4 20.1  2.2   MONO 4.3  0.8  --   --  4.8  0.7  --   --   --  7.4  0.9 7.8  0.9   BASO 0.04  --   --  0.01  --   --   --  0.04 0.05   NRBC 0  --   --  0  --   --   --  0 0    < > = values in this interval not displayed.       CHEMISTRY LAST 7 DAYS  Recent Labs   Lab 12/26/24  1211 12/26/24  1522 12/26/24  1714 12/26/24  2044 12/26/24  2228 12/27/24  0321 12/27/24  0441 12/27/24  1329 12/28/24  0518 12/28/24  0607 12/28/24  0648 12/28/24  1555 12/29/24  0329     --   --   --   --   --  138  --   --   --  138  --  137   K 4.0  --   --   --   --   --  3.9  --   --   --  3.8 4.0 4.1     --   --   --   --   --  108  --   --   --  108  --  106   CO2 27  --   --   --   --   --  25  --   --   --  25  --  27   ANIONGAP 7*  --   --   --   --   --  5*  --   --   --  5*  --  4*   BUN 13  --   --   --   --   --  11  --   --   --  14  --  13   CREATININE 0.6  --   --   --   --   --  0.6  --   --   --  0.6  --  0.5   *  --   --   --   --   --  192*  --   --   --  109  --  103   CALCIUM 8.7  --   --   --   --   --  8.2*  --   --   --   "8.1*  --  8.3*   PH  --    < > 7.310* 7.267* 7.333* 7.321*  --  7.334* 7.394 7.373  --   --   --    MG 1.9  --   --   --   --   --  1.9  --   --   --  2.1  --  2.0   ALBUMIN 3.7  --   --   --   --   --  3.4*  --   --   --  3.2*  --  3.3*   PROT 7.2  --   --   --   --   --  6.5  --   --   --  6.5  --  6.5   ALKPHOS 60  --   --   --   --   --  52*  --   --   --  48*  --  52*   ALT 12  --   --   --   --   --  11  --   --   --  11  --  12   AST 11  --   --   --   --   --  18  --   --   --  15  --  18   BILITOT 0.4  --   --   --   --   --  0.4  --   --   --  0.3  --  0.4    < > = values in this interval not displayed.       Estimated Creatinine Clearance: 192.9 mL/min (based on SCr of 0.5 mg/dL).    INFLAMMATORY/PROCAL  LAST 7 DAYS  Recent Labs   Lab 12/26/24  1211   PROCAL <0.050     No results found for: "ESR"  CRP   Date Value Ref Range Status   09/25/2024 1.30 (H) <1.00 mg/dL Final     Comment:     CRP-Normal Application expected values:   <1.0        mg/dL   Normal Range  1.0 - 5.0  mg/dL   Indicates mild inflammation  5.0 - 10.0 mg/dL   Indicates severe inflammation  >10.0        mg/dL   Represents serious processes and   frequently         indicates the presence of a bacterial   infection.      08/28/2024 10.60 (H) <1.00 mg/dL Final     Comment:     CRP-Normal Application expected values:   <1.0        mg/dL   Normal Range  1.0 - 5.0  mg/dL   Indicates mild inflammation  5.0 - 10.0 mg/dL   Indicates severe inflammation  >10.0        mg/dL   Represents serious processes and   frequently         indicates the presence of a bacterial   infection.      08/26/2024 8.00 (H) <1.00 mg/dL Final     Comment:     CRP-Normal Application expected values:   <1.0        mg/dL   Normal Range  1.0 - 5.0  mg/dL   Indicates mild inflammation  5.0 - 10.0 mg/dL   Indicates severe inflammation  >10.0        mg/dL   Represents serious processes and   frequently         indicates the presence of a bacterial   infection.      02/14/2024 " 5.0 0.0 - 8.2 mg/L Final   02/06/2024 115.2 (H) 0.0 - 8.2 mg/L Final   03/07/2023 3.94 (H) <0.76 mg/dL Final   03/05/2023 11.05 (H) <0.76 mg/dL Final       PRIOR  MICROBIOLOGY:    No results found for the last 90 days.      LAST 7 DAYS MICROBIOLOGY   Microbiology Results (last 7 days)       Procedure Component Value Units Date/Time    Blood culture x two cultures. Draw prior to antibiotics. [[id number]] Collected: 12/26/24 1131    Order Status: Completed Specimen: Blood from Peripheral, Hand, Left Updated: 12/29/24 1232     Blood Culture, Routine No Growth to date      No Growth to date      No Growth to date      No Growth to date    Narrative:      Aerobic and anaerobic    Blood culture x two cultures. Draw prior to antibiotics. [[id number]] Collected: 12/26/24 1125    Order Status: Completed Specimen: Blood from Peripheral, Hand, Right Updated: 12/29/24 1232     Blood Culture, Routine No Growth to date      No Growth to date      No Growth to date      No Growth to date    Narrative:      Aerobic and anaerobic    Respiratory Infection Panel (PCR), Nasopharyngeal [[id number]] Collected: 12/26/24 1904    Order Status: Completed Specimen: Nasopharyngeal Swab Updated: 12/26/24 2044     Respiratory Infection Panel Source NP swab     Adenovirus Not Detected     Coronavirus 229E, Common Cold Virus Not Detected     Coronavirus HKU1, Common Cold Virus Not Detected     Coronavirus NL63, Common Cold Virus Not Detected     Coronavirus OC43, Common Cold Virus Not Detected     Comment: Coronavirus strains 229E, HKU1, NL63, and OC43 can cause the common   cold   and are not associated with the respiratory disease outbreak caused   by  the COVID-19 (SARS-CoV-2 novel Coronavirus) strain.           SARS-CoV2 (COVID-19) Qualitative PCR Not Detected     Human Metapneumovirus Not Detected     Human Rhinovirus/Enterovirus Not Detected     Influenza A (subtypes H1, H1-2009,H3) Not Detected     Influenza B Not Detected     Parainfluenza  Virus 1 Not Detected     Parainfluenza Virus 2 Not Detected     Parainfluenza Virus 3 Not Detected     Parainfluenza Virus 4 Not Detected     Respiratory Syncytial Virus Not Detected     Bordetella Parapertussis (IT6366) Not Detected     Bordetella pertussis (ptxP) Not Detected     Chlamydia pneumoniae Not Detected     Mycoplasma pneumoniae Not Detected     Comment: Respiratory Infection Panel testing performed by Multiplex PCR.       Narrative:      Respiratory Infection Panel source->NP Swab    Respiratory Infection Panel (PCR), Nasopharyngeal [[id number]]     Order Status: No result Specimen: Nasopharyngeal Swab             CURRENT/PREVIOUS VISIT EKG  Results for orders placed or performed during the hospital encounter of 12/26/24   EKG 12-lead    Collection Time: 12/26/24 11:55 AM   Result Value Ref Range    QRS Duration 86 ms    [facility] QTC Calculation 425 ms    Narrative    Test Reason : Z13.6,    Vent. Rate :  88 BPM     Atrial Rate :  88 BPM     P-R Int : 178 ms          QRS Dur :  86 ms      QT Int : 352 ms       P-R-T Axes :  37  66  48 degrees    QTcB Int : 425 ms    Normal sinus rhythm  Low voltage QRS  Borderline Abnormal ECG  When compared with ECG of 25-Sep-2024 23:08,  No significant change was found    Referred By: AAAREFERRAL SELF           Confirmed By:      Significant Imaging: I have reviewed all relevant and available imaging results/findings within the past 24 hours.    I spent a total of 45 minutes on the day of the visit.This includes face to face time and non-face to face time preparing to see the patient (eg, review of tests), obtaining and/or reviewing separately obtained history, documenting clinical information in the electronic or other health record, independently interpreting results and communicating results to the patient/family/caregiver, or care coordinator.    Rajendra Callahan MD  Date of Service: 12/29/2024      This note was created using [employer] voice recognition software that  occasionally misinterpreted phrases or words.

## 2024-12-30 LAB
ALBUMIN SERPL BCP-MCNC: 3.1 G/DL (ref 3.5–5.2)
ALP SERPL-CCNC: 53 U/L (ref 55–135)
ALT SERPL W/O P-5'-P-CCNC: 13 U/L (ref 10–44)
ANION GAP SERPL CALC-SCNC: 4 MMOL/L (ref 8–16)
AST SERPL-CCNC: 16 U/L (ref 10–40)
BASOPHILS # BLD AUTO: 0.03 K/UL (ref 0–0.2)
BASOPHILS NFR BLD: 0.3 % (ref 0–1.9)
BILIRUB SERPL-MCNC: 0.3 MG/DL (ref 0.1–1)
BUN SERPL-MCNC: 11 MG/DL (ref 6–20)
CALCIUM SERPL-MCNC: 8.4 MG/DL (ref 8.7–10.5)
CHLORIDE SERPL-SCNC: 101 MMOL/L (ref 95–110)
CO2 SERPL-SCNC: 30 MMOL/L (ref 23–29)
CREAT SERPL-MCNC: 0.5 MG/DL (ref 0.5–1.4)
DIFFERENTIAL METHOD BLD: ABNORMAL
EOSINOPHIL # BLD AUTO: 0.1 K/UL (ref 0–0.5)
EOSINOPHIL NFR BLD: 1.2 % (ref 0–8)
ERYTHROCYTE [DISTWIDTH] IN BLOOD BY AUTOMATED COUNT: 14.8 % (ref 11.5–14.5)
EST. GFR  (NO RACE VARIABLE): >60 ML/MIN/1.73 M^2
GLUCOSE SERPL-MCNC: 107 MG/DL (ref 70–110)
HCT VFR BLD AUTO: 31.8 % (ref 37–48.5)
HGB BLD-MCNC: 9.8 G/DL (ref 12–16)
IMM GRANULOCYTES # BLD AUTO: 0.04 K/UL (ref 0–0.04)
IMM GRANULOCYTES NFR BLD AUTO: 0.4 % (ref 0–0.5)
LABCORP MISC TEST CODE: 6031
LABCORP MISC TEST NAME: NORMAL
LABCORP MISCELLANEOUS TEST: NORMAL
LYMPHOCYTES # BLD AUTO: 1.8 K/UL (ref 1–4.8)
LYMPHOCYTES NFR BLD: 20 % (ref 18–48)
MAGNESIUM SERPL-MCNC: 1.9 MG/DL (ref 1.6–2.6)
MCH RBC QN AUTO: 26.8 PG (ref 27–31)
MCHC RBC AUTO-ENTMCNC: 30.8 G/DL (ref 32–36)
MCV RBC AUTO: 87 FL (ref 82–98)
MONOCYTES # BLD AUTO: 0.8 K/UL (ref 0.3–1)
MONOCYTES NFR BLD: 8.5 % (ref 4–15)
NEUTROPHILS # BLD AUTO: 6.4 K/UL (ref 1.8–7.7)
NEUTROPHILS NFR BLD: 69.6 % (ref 38–73)
NRBC BLD-RTO: 0 /100 WBC
PLATELET # BLD AUTO: 253 K/UL (ref 150–450)
PMV BLD AUTO: 9.2 FL (ref 9.2–12.9)
POTASSIUM SERPL-SCNC: 4.1 MMOL/L (ref 3.5–5.1)
PROT SERPL-MCNC: 6.4 G/DL (ref 6–8.4)
RBC # BLD AUTO: 3.66 M/UL (ref 4–5.4)
SODIUM SERPL-SCNC: 135 MMOL/L (ref 136–145)
WBC # BLD AUTO: 9.14 K/UL (ref 3.9–12.7)

## 2024-12-30 PROCEDURE — 12000002 HC ACUTE/MED SURGE SEMI-PRIVATE ROOM

## 2024-12-30 PROCEDURE — 25000003 PHARM REV CODE 250: Performed by: HOSPITALIST

## 2024-12-30 PROCEDURE — 27100171 HC OXYGEN HIGH FLOW UP TO 24 HOURS

## 2024-12-30 PROCEDURE — 63600175 PHARM REV CODE 636 W HCPCS: Performed by: INTERNAL MEDICINE

## 2024-12-30 PROCEDURE — 97530 THERAPEUTIC ACTIVITIES: CPT

## 2024-12-30 PROCEDURE — 85025 COMPLETE CBC W/AUTO DIFF WBC: CPT | Performed by: HOSPITALIST

## 2024-12-30 PROCEDURE — 63600175 PHARM REV CODE 636 W HCPCS: Performed by: HOSPITALIST

## 2024-12-30 PROCEDURE — 94761 N-INVAS EAR/PLS OXIMETRY MLT: CPT

## 2024-12-30 PROCEDURE — 97162 PT EVAL MOD COMPLEX 30 MIN: CPT

## 2024-12-30 PROCEDURE — 97166 OT EVAL MOD COMPLEX 45 MIN: CPT

## 2024-12-30 PROCEDURE — 80053 COMPREHEN METABOLIC PANEL: CPT | Performed by: HOSPITALIST

## 2024-12-30 PROCEDURE — 94660 CPAP INITIATION&MGMT: CPT

## 2024-12-30 PROCEDURE — 36415 COLL VENOUS BLD VENIPUNCTURE: CPT | Performed by: HOSPITALIST

## 2024-12-30 PROCEDURE — 99900035 HC TECH TIME PER 15 MIN (STAT)

## 2024-12-30 PROCEDURE — 99233 SBSQ HOSP IP/OBS HIGH 50: CPT | Mod: ,,, | Performed by: INTERNAL MEDICINE

## 2024-12-30 PROCEDURE — 83735 ASSAY OF MAGNESIUM: CPT | Performed by: HOSPITALIST

## 2024-12-30 PROCEDURE — 99221 1ST HOSP IP/OBS SF/LOW 40: CPT | Mod: ,,, | Performed by: FAMILY MEDICINE

## 2024-12-30 PROCEDURE — 99900031 HC PATIENT EDUCATION (STAT)

## 2024-12-30 RX ADMIN — MUPIROCIN 1 G: 20 OINTMENT TOPICAL at 08:12

## 2024-12-30 RX ADMIN — ENOXAPARIN SODIUM 60 MG: 60 INJECTION SUBCUTANEOUS at 08:12

## 2024-12-30 RX ADMIN — ONDANSETRON 4 MG: 2 INJECTION INTRAMUSCULAR; INTRAVENOUS at 07:12

## 2024-12-30 RX ADMIN — LINEZOLID 600 MG: 600 INJECTION, SOLUTION INTRAVENOUS at 06:12

## 2024-12-30 RX ADMIN — HYDROCODONE BITARTRATE AND ACETAMINOPHEN 1 TABLET: 10; 325 TABLET ORAL at 08:12

## 2024-12-30 RX ADMIN — HYDROCODONE BITARTRATE AND ACETAMINOPHEN 1 TABLET: 10; 325 TABLET ORAL at 01:12

## 2024-12-30 RX ADMIN — LINEZOLID 600 MG: 600 INJECTION, SOLUTION INTRAVENOUS at 05:12

## 2024-12-30 RX ADMIN — HYDROCODONE BITARTRATE AND ACETAMINOPHEN 1 TABLET: 10; 325 TABLET ORAL at 10:12

## 2024-12-30 RX ADMIN — CEFTRIAXONE 2 G: 2 INJECTION, POWDER, FOR SOLUTION INTRAMUSCULAR; INTRAVENOUS at 05:12

## 2024-12-30 RX ADMIN — DULOXETINE HYDROCHLORIDE 30 MG: 30 CAPSULE, DELAYED RELEASE ORAL at 08:12

## 2024-12-30 RX ADMIN — ONDANSETRON 4 MG: 2 INJECTION INTRAMUSCULAR; INTRAVENOUS at 08:12

## 2024-12-30 RX ADMIN — HYDROCODONE BITARTRATE AND ACETAMINOPHEN 1 TABLET: 10; 325 TABLET ORAL at 05:12

## 2024-12-30 NOTE — PLAN OF CARE
Goals to be met by: 25     Patient will increase functional independence with mobility by performin. Supine to sit with Minimal Assistance  2. Sit to stand transfer with Supervision using Bariatric Rolling Walker  3. Bed to chair transfer with Supervision using Bariatric Rolling Walker  4. Gait  x 75 feet with Supervision using Bariatric Rolling Walker.

## 2024-12-30 NOTE — PROGRESS NOTES
[facility] Medicine  Progress Note    Patient Name: Adriana Zaragoza  MRN: [id number]  Patient Class: IP- Inpatient   Admission Date: 12/26/2024  Length of Stay: 3 days  Attending Physician: Darleen Forde MD  Primary Care Provider: Melba Trivedi MD        Subjective     Principal Problem:Severe sepsis        HPI:  Please note that patient is intubated and sedated at the time of my evaluation, so all information was obtained from the ER provider.      The patient is a 59-year-old female with severe morbid obesity who was brought to the ER because of fever, and chills.  On arrival to the ER, patient was alert and oriented and able to give information. She has a fever of 101.9, and labs reveal leukocytosis of 18.  Sepsis workup was started, and patient got empiric antibiotics.  However few hours while being in the ER, she started to become confused, agitated, had rigors, and oxygen dropped to the 80s. ER provider was concern that pt's symptoms are due to allergic reaction to abx, so steroid, benadryl and pepcid iv were given. She was then intubated for airway protection.  After intubation, repeat temperature was 104.4°.  Currently septic workup is negative including chest x-ray, UA, and pro-calcitonin.  As a result decision was made to order a CT chest abdomen pelvis to further evaluate her sepsis.  However, given pt's severe morbid obesity, she could not fit in our CT scanner.  Patient will be sent to [facility] to get the CT scans ordered by the ER provider, then she will be admitted to our ICU for further management.    Overview/Hospital Course:  Patient was monitored in ICU on ventilator.  Was put on IVAB for severe sepsis, which we determined to be secondary to cellulitis of the right upper thigh.  We consulted with ID.    Fevers resolved. On meropenem and Vancomycin. Blood culture NTD. Wound care consulted. ID consulted. Patient extubated to 2liter oxygen.  She is tearful and highly anxious. Resumed home meds, cymbalta and hydroxyzine. Pain un controlled, increased oxycodone 10mg Q4hr PRN. PT/OT consulted. Uses rollator at home. Patient is stable for transfer to [facility].     Interval History: seen and examined.  on antibiotics for cellulitis of right thigh/pannus  Extubated to 2 liters.  No fevers recorded since 12/26/24.  Tearful and anxious.    Review of Systems   Constitutional:  Positive for fatigue.   Respiratory:  Positive for cough and shortness of breath.    Cardiovascular:  Positive for leg swelling.   Gastrointestinal: Negative.    Genitourinary: Negative.    Musculoskeletal:  Positive for arthralgias, gait problem, joint swelling and myalgias.   Skin:  Positive for wound.   Neurological:  Negative for dizziness.   Psychiatric/Behavioral:  Positive for dysphoric mood. The patient is nervous/anxious.      Objective:     Vital Signs (Most Recent):  Temp: 97.8 °F (36.6 °C) (12/29/24 1501)  Pulse: 68 (12/29/24 1901)  Resp: 18 (12/29/24 1800)  BP: 128/64 (12/29/24 1600)  SpO2: 98 % (12/29/24 1800) Vital Signs (24h Range):  Temp:  [97.8 °F (36.6 °C)-98.4 °F (36.9 °C)] 97.8 °F (36.6 °C)  Pulse:  [67-88] 68  Resp:  [16-34] 18  SpO2:  [91 %-99 %] 98 %  BP: (128-165)/(57-71) 128/64     Weight: (!) 173.1 kg (381 lb 9.9 oz)  Body mass index is 65.5 kg/m².    Intake/Output Summary (Last 24 hours) at 12/29/2024 2017  Last data filed at 12/29/2024 1800  Gross per 24 hour   Intake 2382.95 ml   Output 1755 ml   Net 627.95 ml         Physical Exam  Constitutional:       Appearance: She is obese. She is ill-appearing.      Interventions: She is sedated and intubated.   HENT:      Head: Normocephalic and atraumatic.      Mouth/Throat:      Mouth: Mucous membranes are dry.      Pharynx: Oropharynx is clear.   Eyes:      Extraocular Movements: Extraocular movements intact.      Conjunctiva/sclera:      Right eye: No exudate.     Left eye: No exudate.     Comments: Pupils equal  "  Neck:      Vascular: No JVD.   Cardiovascular:      Rate and Rhythm: Regular rhythm. Tachycardia present.      Pulses: Normal pulses.   Pulmonary:      Effort: No respiratory distress. She is intubated.      Breath sounds: Decreased air movement present. Rhonchi present. No wheezing.   Abdominal:      General: Abdomen is protuberant. Bowel sounds are normal.      Palpations: Abdomen is soft.      Tenderness: There is no abdominal tenderness.   Musculoskeletal:      Cervical back: Normal range of motion and neck supple.      Right lower leg: Edema present.      Left lower leg: Edema present.      Comments: Severe lymphedema of both legs   Skin:     Capillary Refill: Capillary refill takes less than 2 seconds.      Findings: Erythema (right upper thigh, medial aspect) present.   Neurological:      General: No focal deficit present.      Mental Status: She is alert and oriented to person, place, and time.   Psychiatric:      Comments: Calm              Significant Labs: All pertinent labs within the past 24 hours have been reviewed.    Significant Imaging:  No new imaging since last night    Assessment and Plan     * Severe sepsis  This patient does have evidence of infective focus  My overall impression is sepsis.  Source: Skin and Soft Tissue (location right thigh)  Antibiotics given-   Antibiotics (72h ago, onward)      Start     Stop Route Frequency Ordered    12/26/24 2030  meropenem injection 1 g         -- IV Every 8 hours (non-standard times) 12/26/24 2024 12/26/24 1155  vancomycin - pharmacy to dose  (vancomycin IVPB (PEDS and ADULTS))        Placed in "And" Linked Group    -- IV pharmacy to manage frequency 12/26/24 1055          Latest lactate reviewed-  Recent Labs   Lab 12/26/24  1514   POCLAC 3.17*         Cellulitis of right leg  Continue current antibiotics and monitor area for improvement.  Consulting Infectious Disease.  Meropenem and Vanc   Blood culture NTD  Wound care   Pain control - " increased oral med to avoid over sedation with IV meds       Acute hypoxic respiratory failure  Suspect secondary to altered mental status, and severe morbid obesity.  Extubated 12/28/24    Vent support being given.   Vent Mode: Spont  Oxygen Concentration (%):  [30-40] 30  Resp Rate Total:  [11 br/min-32 br/min] 18 br/min  Vt Set:  [450 mL] 450 mL  PEEP/CPAP:  [5 cmH20] 5 cmH20  Pressure Support:  [10 cmH20] 10 cmH20  Mean Airway Pressure:  [8.4 oaR50-09 cmH20] 8.8 cmH20    2 liters NC oxygen  Avoid over sedating meds  Cpap at night and PRN     Dependence on nicotine from cigarettes  Dangers of cigarette smoking were reviewed with patient in detail. Patient was Counseled for 10 minutes or greater. Nicotine replacement options were discussed. Nicotine replacement was discussed- not prescribed per patient's request. Patient had bad reaction to nicoderm and now is afraid to take Chantix as well.     Abdominal panniculus  Chronic problem for several years. No surgeon willing to operate due to high vascular complications.  Also still smoking. Morbid obesity. Insurance not willing to cover weight loss injection.   Patient wants to go to [facility] for plastic surgical evaluation.   Wound care  IV abx      KEESHA (obstructive sleep apnea)    Cpap qhs and PRN     Morbid obesity  Body mass index is 65.5 kg/m². Morbid obesity complicates all aspects of disease management from diagnostic modalities to treatment. When patient awake, will explain weight loss strategies and health benefits to patient.           VTE Risk Mitigation (From admission, onward)           Ordered     enoxaparin injection 60 mg  Every 12 hours         12/26/24 1826     IP VTE HIGH RISK PATIENT  Once         12/26/24 1825     Place sequential compression device  Until discontinued         12/26/24 1825                    Discharge Planning   JONATHAN: 12/30/2024     Code Status: Full Code   Medical Readiness for Discharge Date:   Discharge Plan A: Home with  family            Critical care time spent on the evaluation and treatment of severe organ dysfunction, review of pertinent labs and imaging studies, discussions with consulting providers and discussions with patient/family: 30 minutes.    Please place Justification for DME        Darleen Forde MD  Department of Hospital Medicine   [facility]

## 2024-12-30 NOTE — PROGRESS NOTES
Formerly [facility]   Department of Infectious Disease  Progress Note        PATIENT NAME: Adriana Zaragoza  MRN: [id number]  TODAY'S DATE: 12/30/2024  ADMIT DATE: 12/26/2024  LOS: 4 days    CHIEF COMPLAINT: Fatigue (Patient with generalized weakness beginning about 0300.  Lower back pain and chills.  Body trembling.  Patient also reports dark urine.)      PRINCIPLE PROBLEM: Severe sepsis    INTERVAL HISTORY    12/28/2024:  Better today.  Doing okay.  Repeat blood cultures so far negative.  Leukocytosis resolving.  Cymbalta added to her regimen today.      12/29/2024:  States improving.  No acute issues overnight.  On CPAP.  Blood cultures remain negative.  Leukocytosis has resolved.    12/30/2024:  Improving.  No acute issues overnight.    Antibiotics (From admission, onward)      Start     Stop Route Frequency Ordered    12/28/24 1800  linezolid 600 mg/300 mL IVPB 600 mg         -- IV Every 12 hours (non-standard times) 12/28/24 1639    12/28/24 1730  cefTRIAXone injection 2 g         -- IV Every 24 hours (non-standard times) 12/28/24 1639    12/26/24 2145  mupirocin 2 % ointment         12/31/24 2059 Nasl 2 times daily 12/26/24 2035          Antifungals (From admission, onward)      None           Antivirals (From admission, onward)      None            ASSESSMENT and PLAN      Severe sepsis.  Focus of infection appears to be from right thigh panniculitis.  Continue linezolid and ceftriaxone.  Can switch linezolid to p.o..    2. Respiratory failure.  Result.  Extubated.  Back on CPAP which she uses at home     3. Extreme obesity.    4. Large right medial thigh pannus.    RECOMMENDATIONS:    Follow ASO titer   Switch linezolid to p.o. and continue ceftriaxone  Anticipate use oral antibiotics at discharge to complete therapy.  She will need about 14 day course of therapy.  We will consider chronic suppressive oral antibiotics after resolution of current episode    Please send Epic secure chat with any  questions.      SUBJECTIVE        Adriana Kevin Zaragoza is a 59 y.o. female with extreme obesity, COPD and depression.  Known to me from previous encounter during hospitalization for panniculitis.  Presents to the emergency room 12/20/2024 generalized weakness of acute onset.  BP in the /59, pulse 96, respiratory 18, temperature 101.9°, oxygen saturation 94%.  She quickly decompensated and was intubated in the ER.       WBC 18, hematocrit 38, MCV 87, platelet 279, sodium 137, creatinine 0.6, LFTs normal.  UA unremarkable.  CT right showed known medial panniculus with findings concerning for cellulitis.  CT chest abdomen and pelvis with no pneumoniae or other acute findings.  CT head with no acute abnormality.  She was admitted to the ICU on antibiotics.     Antibiotic history:   Vancomycin:  12/26/2024-  Metronidazole: 02/20/2024 x1 dose   Ertapenem:  12/22/2024-  Aztreonam: 12/26/2024 x1 dose     Microbiology:    Blood culture 12/20/2024:  NGTD    Review of Systems  Negative except as stated above in Interval History     OBJECTIVE   Temp:  [97.8 °F (36.6 °C)] 97.8 °F (36.6 °C)  Pulse:  [67-88] 71  Resp:  [17-24] 22  SpO2:  [93 %-99 %] 99 %  BP: (128-146)/(62-71) 128/64  Temp:  [97.8 °F (36.6 °C)]   Temp: 97.8 °F (36.6 °C) (12/29/24 1501)  Pulse: 71 (12/30/24 0649)  Resp: (!) 22 (12/30/24 0855)  BP: 128/64 (12/29/24 1600)  SpO2: 99 % (12/30/24 0649)    Intake/Output Summary (Last 24 hours) at 12/30/2024 1126  Last data filed at 12/29/2024 1800  Gross per 24 hour   Intake 1542.95 ml   Output 615 ml   Net 927.95 ml       Physical Exam  General:  I middle-aged man lying quietly in bed.  Awake, alert   Respiratory:  No mechanical ventilator.  FiO2 30%, peep 5, .  Clear to auscultation anteriorly   CVS: S1 and 2 heard, no murmurs appreciated   Abdomen:  Protuberant, soft, nontender, no palpable organomegaly   Lower extremity:  Large medial thigh panniculus with erythema and inflammation that is more  dependent.  Warm to touch  Musculoskeletal: No joint effusions appreciated     VAD:  PIV  ISOLATION:  None     Wounds:  None    Significant Labs: All pertinent labs within the past 24 hours have been reviewed.    CBC LAST 7 DAYS  Recent Labs   Lab 12/26/24  1211 12/26/24  1522 12/27/24  0441 12/27/24  1329 12/28/24  0518 12/28/24  0607 12/28/24  0648 12/29/24  0329 12/30/24  0320   WBC 18.43*  --  14.04*  --   --   --  12.39 10.96 9.14   RBC 4.30  --  3.85*  --   --   --  3.87* 3.66* 3.66*   HGB 11.6*  --  10.2*  --   --   --  10.1* 9.9* 9.8*   HCT 37.6   < > 33.7* 32* 29* 33* 33.4* 32.3* 31.8*   MCV 87  --  88  --   --   --  86 88 87   MCH 27.0  --  26.5*  --   --   --  26.1* 27.0 26.8*   MCHC 30.9*  --  30.3*  --   --   --  30.2* 30.7* 30.8*   RDW 15.2*  --  15.5*  --   --   --  15.7* 15.4* 14.8*     --  257  --   --   --  251 244 253   MPV 9.4  --  9.7  --   --   --  9.4 9.5 9.2   GRAN 90.0*  16.6*  --  88.9*  12.5*  --   --   --  72.4  9.0* 70.6  7.7 69.6  6.4   LYMPH 4.8*  0.9*  --  5.5*  0.8*  --   --   --  19.1  2.4 20.1  2.2 20.0  1.8   MONO 4.3  0.8  --  4.8  0.7  --   --   --  7.4  0.9 7.8  0.9 8.5  0.8   BASO 0.04  --  0.01  --   --   --  0.04 0.05 0.03   NRBC 0  --  0  --   --   --  0 0 0    < > = values in this interval not displayed.       CHEMISTRY LAST 7 DAYS  Recent Labs   Lab 12/26/24  1211 12/26/24  1522 12/26/24  1714 12/26/24  2044 12/26/24  2228 12/27/24  0321 12/27/24  0441 12/27/24  1329 12/28/24  0518 12/28/24  0607 12/28/24  0648 12/28/24  1555 12/29/24  0329 12/30/24  0320     --   --   --   --   --  138  --   --   --  138  --  137 135*   K 4.0  --   --   --   --   --  3.9  --   --   --  3.8 4.0 4.1 4.1     --   --   --   --   --  108  --   --   --  108  --  106 101   CO2 27  --   --   --   --   --  25  --   --   --  25  --  27 30*   ANIONGAP 7*  --   --   --   --   --  5*  --   --   --  5*  --  4* 4*   BUN 13  --   --   --   --   --  11  --   --   --  14   "--  13 11   CREATININE 0.6  --   --   --   --   --  0.6  --   --   --  0.6  --  0.5 0.5   *  --   --   --   --   --  192*  --   --   --  109  --  103 107   CALCIUM 8.7  --   --   --   --   --  8.2*  --   --   --  8.1*  --  8.3* 8.4*   PH  --    < > 7.310* 7.267* 7.333* 7.321*  --  7.334* 7.394 7.373  --   --   --   --    MG 1.9  --   --   --   --   --  1.9  --   --   --  2.1  --  2.0 1.9   ALBUMIN 3.7  --   --   --   --   --  3.4*  --   --   --  3.2*  --  3.3* 3.1*   PROT 7.2  --   --   --   --   --  6.5  --   --   --  6.5  --  6.5 6.4   ALKPHOS 60  --   --   --   --   --  52*  --   --   --  48*  --  52* 53*   ALT 12  --   --   --   --   --  11  --   --   --  11  --  12 13   AST 11  --   --   --   --   --  18  --   --   --  15  --  18 16   BILITOT 0.4  --   --   --   --   --  0.4  --   --   --  0.3  --  0.4 0.3    < > = values in this interval not displayed.       Estimated Creatinine Clearance: 192.9 mL/min (based on SCr of 0.5 mg/dL).    INFLAMMATORY/PROCAL  LAST 7 DAYS  Recent Labs   Lab 12/26/24  1211   PROCAL <0.050     No results found for: "ESR"  CRP   Date Value Ref Range Status   09/25/2024 1.30 (H) <1.00 mg/dL Final     Comment:     CRP-Normal Application expected values:   <1.0        mg/dL   Normal Range  1.0 - 5.0  mg/dL   Indicates mild inflammation  5.0 - 10.0 mg/dL   Indicates severe inflammation  >10.0        mg/dL   Represents serious processes and   frequently         indicates the presence of a bacterial   infection.      08/28/2024 10.60 (H) <1.00 mg/dL Final     Comment:     CRP-Normal Application expected values:   <1.0        mg/dL   Normal Range  1.0 - 5.0  mg/dL   Indicates mild inflammation  5.0 - 10.0 mg/dL   Indicates severe inflammation  >10.0        mg/dL   Represents serious processes and   frequently         indicates the presence of a bacterial   infection.      08/26/2024 8.00 (H) <1.00 mg/dL Final     Comment:     CRP-Normal Application expected values:   <1.0        mg/dL  "  Normal Range  1.0 - 5.0  mg/dL   Indicates mild inflammation  5.0 - 10.0 mg/dL   Indicates severe inflammation  >10.0        mg/dL   Represents serious processes and   frequently         indicates the presence of a bacterial   infection.      02/14/2024 5.0 0.0 - 8.2 mg/L Final   02/06/2024 115.2 (H) 0.0 - 8.2 mg/L Final   03/07/2023 3.94 (H) <0.76 mg/dL Final   03/05/2023 11.05 (H) <0.76 mg/dL Final       PRIOR  MICROBIOLOGY:    No results found for the last 90 days.      LAST 7 DAYS MICROBIOLOGY   Microbiology Results (last 7 days)       Procedure Component Value Units Date/Time    Blood culture x two cultures. Draw prior to antibiotics. [[id number]] Collected: 12/26/24 1131    Order Status: Completed Specimen: Blood from Peripheral, Hand, Left Updated: 12/29/24 1232     Blood Culture, Routine No Growth to date      No Growth to date      No Growth to date      No Growth to date    Narrative:      Aerobic and anaerobic    Blood culture x two cultures. Draw prior to antibiotics. [[id number]] Collected: 12/26/24 1125    Order Status: Completed Specimen: Blood from Peripheral, Hand, Right Updated: 12/29/24 1232     Blood Culture, Routine No Growth to date      No Growth to date      No Growth to date      No Growth to date    Narrative:      Aerobic and anaerobic    Respiratory Infection Panel (PCR), Nasopharyngeal [[id number]] Collected: 12/26/24 1904    Order Status: Completed Specimen: Nasopharyngeal Swab Updated: 12/26/24 2044     Respiratory Infection Panel Source NP swab     Adenovirus Not Detected     Coronavirus 229E, Common Cold Virus Not Detected     Coronavirus HKU1, Common Cold Virus Not Detected     Coronavirus NL63, Common Cold Virus Not Detected     Coronavirus OC43, Common Cold Virus Not Detected     Comment: Coronavirus strains 229E, HKU1, NL63, and OC43 can cause the common   cold   and are not associated with the respiratory disease outbreak caused   by  the COVID-19 (SARS-CoV-2 novel Coronavirus)  strain.           SARS-CoV2 (COVID-19) Qualitative PCR Not Detected     Human Metapneumovirus Not Detected     Human Rhinovirus/Enterovirus Not Detected     Influenza A (subtypes H1, H1-2009,H3) Not Detected     Influenza B Not Detected     Parainfluenza Virus 1 Not Detected     Parainfluenza Virus 2 Not Detected     Parainfluenza Virus 3 Not Detected     Parainfluenza Virus 4 Not Detected     Respiratory Syncytial Virus Not Detected     Bordetella Parapertussis (RW4085) Not Detected     Bordetella pertussis (ptxP) Not Detected     Chlamydia pneumoniae Not Detected     Mycoplasma pneumoniae Not Detected     Comment: Respiratory Infection Panel testing performed by Multiplex PCR.       Narrative:      Respiratory Infection Panel source->NP Swab    Respiratory Infection Panel (PCR), Nasopharyngeal [[id number]]     Order Status: No result Specimen: Nasopharyngeal Swab             CURRENT/PREVIOUS VISIT EKG  Results for orders placed or performed during the hospital encounter of 12/26/24   EKG 12-lead    Collection Time: 12/26/24 11:55 AM   Result Value Ref Range    QRS Duration 86 ms    [facility] QTC Calculation 425 ms    Narrative    Test Reason : Z13.6,    Vent. Rate :  88 BPM     Atrial Rate :  88 BPM     P-R Int : 178 ms          QRS Dur :  86 ms      QT Int : 352 ms       P-R-T Axes :  37  66  48 degrees    QTcB Int : 425 ms    Normal sinus rhythm  Low voltage QRS  Borderline Abnormal ECG  When compared with ECG of 25-Sep-2024 23:08,  No significant change was found    Referred By: AAAREFERRAL SELF           Confirmed By:      Significant Imaging: I have reviewed all relevant and available imaging results/findings within the past 24 hours.    I spent a total of 40 minutes on the day of the visit.This includes face to face time and non-face to face time preparing to see the patient (eg, review of tests), obtaining and/or reviewing separately obtained history, documenting clinical information in the electronic or other  health record, independently interpreting results and communicating results to the patient/family/caregiver, or care coordinator.    Rajendra Callahan MD  Date of Service: 12/30/2024      This note was created using [employer] voice recognition software that occasionally misinterpreted phrases or words.

## 2024-12-30 NOTE — PT/OT/SLP EVAL
Physical Therapy Evaluation    Patient Name:  Adriana Zaragoza   MRN:  [id number]    Recommendations:     Discharge Recommendations: High Intensity Therapy (vs Low Int w/ 24/7 supervision, pending progress)   Discharge Equipment Recommendations: none   Barriers to discharge: Decreased caregiver support and pt currently requiring assist of 2-3 persons for transfers oob/into bed and standing/gait.    Assessment:     Adriana Zaragoza is a 59 y.o. female admitted with a medical diagnosis of Severe sepsis.  She presents with the following impairments/functional limitations: weakness, impaired endurance, impaired functional mobility, impaired self care skills, gait instability, impaired balance, decreased lower extremity function, pain, impaired skin, edema, impaired cardiopulmonary response to activity.  Pt agreeable to PT session and tolerated session well. Pt is very motivated to return to prior level, but did report more weakness in LEs than anticipated (likely from having to be intubated). PT feels pt may need a short stay in High Intensity Therapy facility prior to discharge home, but if able to progress well could possibly be able to return home with  PT and assist from her SO.      Rehab Prognosis: Good and Fair; patient would benefit from acute skilled PT services to address these deficits and reach maximum level of function.    Recent Surgery: * No surgery found *      Plan:     During this hospitalization, patient to be seen 6 x/week to address the identified rehab impairments via gait training, therapeutic activities, therapeutic exercises, neuromuscular re-education and progress toward the following goals:    Plan of Care Expires:  01/27/25    Subjective     Chief Complaint: needs assist with moving pannus attached to R LE for transfers oob/back into bed.  Patient/Family Comments/goals: return to prior level of function  Pain/Comfort:  Pain Rating 1:  (not rated)  Location - Side 1:  "Right  Location 1: leg (pannus)  Pain Addressed 1: Reposition, Distraction, Cessation of Activity    Patients cultural, spiritual, Restorationism conflicts given the current situation:      Living Environment:  Pt lives with her SO in a H  Prior to admission, patients level of function was 1 person assist with use of rollator for longer distance ambulation.  Equipment used at home: shower chair, rollator, CPAP, oxygen.  DME owned (not currently used): none.  Upon discharge, patient will have assistance from facility staff and SO.    Objective:     Communicated with AYAD Sandoval prior to session.  Patient found HOB elevated with blood pressure cuff, CPAP, anne catheter, oxygen, peripheral IV, pulse ox (continuous), telemetry  upon PT entry to room.    General Precautions: Standard, fall, respiratory  Orthopedic Precautions:N/A   Braces: N/A  Respiratory Status:  CPap, which pt removed and donned nc at 2Lpm for session.    Exams:  Cognitive Exam:  Patient is oriented to Person, Place, Time, and Situation  RLE ROM: WFL, but limited by body habitus  RLE Strength: NT  LLE ROM: WFL, but limited by body habitus  LLE Strength: NT    Functional Mobility:  Bed Mobility:     Scooting: minimum assistance, of 2 persons, and assist with R LE pannus  Supine to Sit: minimum assistance, moderate assistance, and of 2-3 persons  Sit to Supine: minimum assistance and moderate assistance, and of 2-3 persons  Transfers:     Sit to Stand:  minimum assistance and of 2 persons with bariatric rolling walker  Gait: 2 steps forward/backward and 4 side steps toward HOB with RW and minimum assistance of 2 persons with pt reporting her LEs feel weak and L ankle "feels weak" also.      AM-PAC 6 CLICK MOBILITY  Total Score:13       Treatment & Education:  Pt was educated on the following: call light use, importance of OOB activity and functional mobility to negate the negative effects of prolonged bed rest during this hospitalization, safe " transfers/ambulation and discharge planning recommendations/options.      Patient left HOB elevated with all lines intact, call button in reach, and RN notified.    GOALS:   Multidisciplinary Problems       Physical Therapy Goals          Problem: Physical Therapy    Goal Priority Disciplines Outcome Interventions   Physical Therapy Goal     PT, PT/OT     Description: Goals to be met by: 25     Patient will increase functional independence with mobility by performin. Supine to sit with Minimal Assistance  2. Sit to stand transfer with Supervision using Bariatric Rolling Walker  3. Bed to chair transfer with Supervision using Bariatric Rolling Walker  4. Gait  x 75 feet with Supervision using Bariatric Rolling Walker.                              History:     Past Medical History:   Diagnosis Date    Allergy     Anemia     Asthma     COPD (chronic obstructive pulmonary disease)     Deep vein thrombosis     Depression     Diabetes mellitus, type 2     Encounter for blood transfusion     Heart murmur     Neuromuscular disorder        Past Surgical History:   Procedure Laterality Date     SECTION      DILATION AND CURETTAGE OF UTERUS      gastric sleeve      [facility]    TONSILLECTOMY         Time Tracking:     PT Received On: 24  PT Start Time: 1057     PT Stop Time: 1135  PT Total Time (min): 38 min     Billable Minutes: Evaluation 10 and Therapeutic Activity 28      2024

## 2024-12-30 NOTE — CARE UPDATE
12/29/24 2035   Patient Assessment/Suction   Level of Consciousness (AVPU) alert   Respiratory Effort Normal;Unlabored   Expansion/Accessory Muscles/Retractions no use of accessory muscles   All Lung Fields Breath Sounds equal bilaterally;diminished   Rhythm/Pattern, Respiratory assisted mechanically   Cough Frequency no cough   Skin Integrity   $ Wound Care Tech Time 15 min   Area Observed Bridge of nose   Skin Appearance redness blanchable   Barrier used? Liquid Filled Cushion   PRE-TX-O2   Device (Oxygen Therapy) CPAP   Oxygen Concentration (%) 30   SpO2 99 %   Pulse Oximetry Type Continuous   $ Pulse Oximetry - Multiple Charge Pulse Oximetry - Multiple   Pulse 77   Ready to Wean/Extubation Screen   FIO2<=50 (chart decimal) 0.3   Preset CPAP/BiPAP Settings   Mode Of Delivery CPAP   CPAP/BIPAP charged w/in last 24 h YES   $ Is patient using? Yes   Size of Mask Small   Sized Appropriately? Yes   Equipment Type V60   Airway Device Type small full face mask   CPAP (cm H2O) 12   Patient CPAP/BiPAP Settings   CPAP/BIPAP ID 18   RR Total (Breaths/Min) 16   Tidal Volume (mL) 605   VE Minute Ventilation (L/min) 9.7 L/min   Peak Inspiratory Pressure (cm H2O) 13   TiTOT (%) 31   Total Leak (L/Min) 0   Patient Trigger - ST Mode Only (%) 100   Education   $ Education Bronchodilator;BiPAP;Oxygen;15 min

## 2024-12-30 NOTE — CONSULTS
Chief complaint:  Fatigue (Patient with generalized weakness beginning about 0300.  Lower back pain and chills.  Body trembling.  Patient also reports dark urine.)      HPI:  Adriana Zaragoza is a 59 y.o. female presenting with an open wound of the bridge of the nose. Wound was HA from CPAP. Wound is abrasion from pressure from the mask. Pt known to me. No other complaints today    PMH:  As per HPI and below:  Past Medical History:   Diagnosis Date    Allergy     Anemia     Asthma     COPD (chronic obstructive pulmonary disease)     Deep vein thrombosis     Depression     Diabetes mellitus, type 2     Encounter for blood transfusion     Heart murmur     Neuromuscular disorder        Social History     Socioeconomic History    Marital status: Significant Other   Tobacco Use    Smoking status: Every Day     Current packs/day: 1.00     Average packs/day: 1 pack/day for 42.0 years (42.0 ttl pk-yrs)     Types: Cigarettes     Start date: 1983    Smokeless tobacco: Never    Tobacco comments:     Pt states she has smoked for 6 years now, smokes around 1pk/day. Interested and quitting. Patient has tried nicotine patches in the past, made her arm swell up.    Substance and Sexual Activity    Alcohol use: No    Drug use: No    Sexual activity: Never     Social Drivers of Health     Financial Resource Strain: Patient Unable To Answer (12/27/2024)    Overall Financial Resource Strain (CARDIA)     Difficulty of Paying Living Expenses: Patient unable to answer   Food Insecurity: Patient Unable To Answer (12/27/2024)    Hunger Vital Sign     Worried About Running Out of Food in the Last Year: Patient unable to answer     Ran Out of Food in the Last Year: Patient unable to answer   Transportation Needs: Patient Unable To Answer (12/27/2024)    TRANSPORTATION NEEDS     Transportation : Patient unable to answer   Physical Activity: Inactive (9/26/2024)    Exercise Vital Sign     Days of Exercise per Week: 0 days     Minutes of  Exercise per Session: 0 min   Stress: Patient Unable To Answer (2024)    [employer] - Occupational Stress Questionnaire     Feeling of Stress : Patient unable to answer   Housing Stability: Patient Unable To Answer (2024)    Housing Stability Vital Sign     Unable to Pay for Housing in the Last Year: Patient unable to answer     Homeless in the Last Year: Patient unable to answer       Past Surgical History:   Procedure Laterality Date     SECTION      DILATION AND CURETTAGE OF UTERUS      gastric sleeve      [place]     TONSILLECTOMY         Family History   Problem Relation Name Age of Onset    Heart disease Mother      Diabetes Mother      Arthritis Mother      Depression Mother      Cancer Father      COPD Father      Arthritis Maternal Grandmother      Cancer Maternal Grandmother      Cancer Maternal Grandfather      Cancer Paternal Grandmother      Kidney disease Paternal Grandmother      Diabetes Paternal Grandmother      Diabetes Paternal Grandfather      Hyperlipidemia Paternal Grandfather         Review of patient's allergies indicates:   Allergen Reactions    Aspirin     Nicoderm     Nsaids (non-steroidal anti-inflammatory drug) Hives    Teflaro [ceftaroline fosamil]      Allergic reaction/ hives/itching       No current facility-administered medications on file prior to encounter.     Current Outpatient Medications on File Prior to Encounter   Medication Sig Dispense Refill    acetaminophen (TYLENOL) 650 MG TbSR Take 650 mg by mouth every 8 (eight) hours.      ferrous sulfate 324 mg (65 mg iron) TbEC Take 324 mg by mouth every 7 days.      hydrOXYzine HCL (ATARAX) 25 MG tablet Take 25 mg by mouth 3 (three) times daily as needed for Anxiety.      oxyCODONE-acetaminophen (PERCOCET) 5-325 mg per tablet Take 1 tablet by mouth every 6 (six) hours as needed for Pain.      DULoxetine (CYMBALTA) 30 MG capsule Take 1 capsule (30 mg total) by mouth every evening.  "(Patient not taking: Reported on 2024) 30 capsule 1    famotidine (PEPCID) 20 MG tablet Take 1 tablet (20 mg total) by mouth 2 (two) times daily. (Patient not taking: Reported on 2024) 60 tablet 11    miconazole NITRATE 2 % (MICOTIN) 2 % top powder Apply topically 2 (two) times daily as needed (Rash). (Patient not taking: Reported on 2024) 85 g 0       ROS: As per HPI and below:  Pertinent items are noted in HPI.      Physical Exam:     Vitals:    24 0152 24 0440 24 0649 24 0855   BP:       Pulse:  68 71    Resp: 20 17  (!) 22   Temp:       TempSrc:       SpO2:  96% 99%    Weight:       Height:           BP  Min: 90/53  Max: 179/73  Temp  Av.4 °F (37.4 °C)  Min: 97.5 °F (36.4 °C)  Max: 104.4 °F (40.2 °C)  Pulse  Av.2  Min: 55  Max: 164  Resp  Av.9  Min: 0  Max: 40  SpO2  Av.6 %  Min: 90 %  Max: 100 %  Height  Av' 4" (162.6 cm)  Min: 5' 4" (162.6 cm)  Max: 5' 4" (162.6 cm)  Weight  Av.7 kg (360 lb 12.9 oz)  Min: 154.2 kg (340 lb)  Max: 173.1 kg (381 lb 9.9 oz)    Body mass index is 65.5 kg/m².          General:             Well developed, well nourished, no apparent distress  HEENT:              NCAT, no JVD, mucous membranes moist, EOM intact  Cardiovascular:  Regular rate and rhythm, normal S1, normal S2, No murmurs, rubs, or gallops  Respiratory:        Normal breath sounds, no wheezes, no rales, no rhonchi  Abdomen:           Bowel sounds present, non tender, non distended, no masses, no hepatojugular reflux  Extremities:        No clubbing, no cyanosis, no edema  Vascular:            2+ b/l radial.  Peripheral pulses intact.  No carotid bruits.  Neurological:      No focal deficits  Skin:                   No obvious rashes or erythema, open wound of the bridge of the nose from CPAP      Lab Results   Component Value Date    WBC 9.14 2024    HGB 9.8 (L) 2024    HCT 31.8 (L) 2024    MCV 87 2024     2024 "     Lab Results   Component Value Date    CHOL 197 11/11/2020    CHOL 203 (H) 10/22/2019     Lab Results   Component Value Date    HDL 40 11/11/2020    HDL 38 (L) 10/22/2019     Lab Results   Component Value Date    LDLCALC 123.2 11/11/2020    LDLCALC 135.2 10/22/2019     Lab Results   Component Value Date    TRIG 169 (H) 11/11/2020    TRIG 149 10/22/2019     Lab Results   Component Value Date    CHOLHDL 20.3 11/11/2020    CHOLHDL 18.7 (L) 10/22/2019     CMP  Recent Labs   Lab 12/30/24  0320      CALCIUM 8.4*   ALBUMIN 3.1*   PROT 6.4   *   K 4.1   CO2 30*      BUN 11   CREATININE 0.5   ALKPHOS 53*   ALT 13   AST 16   BILITOT 0.3      Lab Results   Component Value Date    TSH 1.638 04/04/2024             Assessment and Recommendations       Diagnoses:    Open wound of the bridge of the nose    Plan:  Duoderm to the nose bridge wound daily    Complexity:    moderate

## 2024-12-30 NOTE — SUBJECTIVE & OBJECTIVE
Interval History: seen and examined.  on antibiotics for cellulitis of right thigh/pannus  Extubated to 2 liters.  No fevers recorded since 12/26/24.  Tearful and anxious.    Review of Systems   Constitutional:  Positive for fatigue.   Respiratory:  Positive for cough and shortness of breath.    Cardiovascular:  Positive for leg swelling.   Gastrointestinal: Negative.    Genitourinary: Negative.    Musculoskeletal:  Positive for arthralgias, gait problem, joint swelling and myalgias.   Skin:  Positive for wound.   Neurological:  Negative for dizziness.   Psychiatric/Behavioral:  Positive for dysphoric mood. The patient is nervous/anxious.      Objective:     Vital Signs (Most Recent):  Temp: 97.8 °F (36.6 °C) (12/29/24 1501)  Pulse: 68 (12/29/24 1901)  Resp: 18 (12/29/24 1800)  BP: 128/64 (12/29/24 1600)  SpO2: 98 % (12/29/24 1800) Vital Signs (24h Range):  Temp:  [97.8 °F (36.6 °C)-98.4 °F (36.9 °C)] 97.8 °F (36.6 °C)  Pulse:  [67-88] 68  Resp:  [16-34] 18  SpO2:  [91 %-99 %] 98 %  BP: (128-165)/(57-71) 128/64     Weight: (!) 173.1 kg (381 lb 9.9 oz)  Body mass index is 65.5 kg/m².    Intake/Output Summary (Last 24 hours) at 12/29/2024 2017  Last data filed at 12/29/2024 1800  Gross per 24 hour   Intake 2382.95 ml   Output 1755 ml   Net 627.95 ml         Physical Exam  Constitutional:       Appearance: She is obese. She is ill-appearing.      Interventions: She is sedated and intubated.   HENT:      Head: Normocephalic and atraumatic.      Mouth/Throat:      Mouth: Mucous membranes are dry.      Pharynx: Oropharynx is clear.   Eyes:      Extraocular Movements: Extraocular movements intact.      Conjunctiva/sclera:      Right eye: No exudate.     Left eye: No exudate.     Comments: Pupils equal   Neck:      Vascular: No JVD.   Cardiovascular:      Rate and Rhythm: Regular rhythm. Tachycardia present.      Pulses: Normal pulses.   Pulmonary:      Effort: No respiratory distress. She is intubated.      Breath sounds:  Decreased air movement present. Rhonchi present. No wheezing.   Abdominal:      General: Abdomen is protuberant. Bowel sounds are normal.      Palpations: Abdomen is soft.      Tenderness: There is no abdominal tenderness.   Musculoskeletal:      Cervical back: Normal range of motion and neck supple.      Right lower leg: Edema present.      Left lower leg: Edema present.      Comments: Severe lymphedema of both legs   Skin:     Capillary Refill: Capillary refill takes less than 2 seconds.      Findings: Erythema (right upper thigh, medial aspect) present.   Neurological:      General: No focal deficit present.      Mental Status: She is alert and oriented to person, place, and time.   Psychiatric:      Comments: Calm              Significant Labs: All pertinent labs within the past 24 hours have been reviewed.    Significant Imaging:  No new imaging since last night

## 2024-12-31 PROBLEM — L03.90 CELLULITIS: Status: RESOLVED | Noted: 2024-02-05 | Resolved: 2024-12-31

## 2024-12-31 PROBLEM — E87.6 HYPOKALEMIA: Status: RESOLVED | Noted: 2024-02-05 | Resolved: 2024-12-31

## 2024-12-31 PROBLEM — J96.01 ACUTE HYPOXIC RESPIRATORY FAILURE: Status: RESOLVED | Noted: 2024-12-26 | Resolved: 2024-12-31

## 2024-12-31 PROBLEM — A41.9 SEPSIS: Status: RESOLVED | Noted: 2024-08-25 | Resolved: 2024-12-31

## 2024-12-31 PROBLEM — R65.20 SEVERE SEPSIS: Status: RESOLVED | Noted: 2024-12-26 | Resolved: 2024-12-31

## 2024-12-31 PROBLEM — L03.91 ACUTE LYMPHANGITIS: Status: RESOLVED | Noted: 2020-03-05 | Resolved: 2024-12-31

## 2024-12-31 PROBLEM — R53.81 DEBILITY: Status: ACTIVE | Noted: 2024-12-31

## 2024-12-31 PROBLEM — E66.01 SEVERE OBESITY (BMI >= 40): Status: ACTIVE | Noted: 2023-06-14

## 2024-12-31 PROBLEM — B37.2 CANDIDAL INTERTRIGO: Status: RESOLVED | Noted: 2018-01-31 | Resolved: 2024-12-31

## 2024-12-31 PROBLEM — D64.9 ANEMIA: Status: ACTIVE | Noted: 2024-12-31

## 2024-12-31 PROBLEM — A41.9 SEVERE SEPSIS: Status: RESOLVED | Noted: 2024-12-26 | Resolved: 2024-12-31

## 2024-12-31 LAB
ALBUMIN SERPL BCP-MCNC: 3.2 G/DL (ref 3.5–5.2)
ALP SERPL-CCNC: 59 U/L (ref 55–135)
ALT SERPL W/O P-5'-P-CCNC: 15 U/L (ref 10–44)
ANION GAP SERPL CALC-SCNC: 6 MMOL/L (ref 8–16)
AST SERPL-CCNC: 15 U/L (ref 10–40)
BACTERIA BLD CULT: NORMAL
BACTERIA BLD CULT: NORMAL
BASOPHILS # BLD AUTO: 0.03 K/UL (ref 0–0.2)
BASOPHILS NFR BLD: 0.3 % (ref 0–1.9)
BILIRUB SERPL-MCNC: 0.4 MG/DL (ref 0.1–1)
BUN SERPL-MCNC: 12 MG/DL (ref 6–20)
CALCIUM SERPL-MCNC: 8.4 MG/DL (ref 8.7–10.5)
CHLORIDE SERPL-SCNC: 99 MMOL/L (ref 95–110)
CO2 SERPL-SCNC: 32 MMOL/L (ref 23–29)
CREAT SERPL-MCNC: 0.5 MG/DL (ref 0.5–1.4)
DIFFERENTIAL METHOD BLD: ABNORMAL
EOSINOPHIL # BLD AUTO: 0.1 K/UL (ref 0–0.5)
EOSINOPHIL NFR BLD: 0.7 % (ref 0–8)
ERYTHROCYTE [DISTWIDTH] IN BLOOD BY AUTOMATED COUNT: 14.6 % (ref 11.5–14.5)
EST. GFR  (NO RACE VARIABLE): >60 ML/MIN/1.73 M^2
GLUCOSE SERPL-MCNC: 121 MG/DL (ref 70–110)
HCT VFR BLD AUTO: 34.1 % (ref 37–48.5)
HGB BLD-MCNC: 10.5 G/DL (ref 12–16)
IMM GRANULOCYTES # BLD AUTO: 0.06 K/UL (ref 0–0.04)
IMM GRANULOCYTES NFR BLD AUTO: 0.6 % (ref 0–0.5)
LYMPHOCYTES # BLD AUTO: 1.6 K/UL (ref 1–4.8)
LYMPHOCYTES NFR BLD: 15.9 % (ref 18–48)
MAGNESIUM SERPL-MCNC: 1.9 MG/DL (ref 1.6–2.6)
MCH RBC QN AUTO: 26.4 PG (ref 27–31)
MCHC RBC AUTO-ENTMCNC: 30.8 G/DL (ref 32–36)
MCV RBC AUTO: 86 FL (ref 82–98)
MONOCYTES # BLD AUTO: 0.9 K/UL (ref 0.3–1)
MONOCYTES NFR BLD: 8.7 % (ref 4–15)
NEUTROPHILS # BLD AUTO: 7.3 K/UL (ref 1.8–7.7)
NEUTROPHILS NFR BLD: 73.8 % (ref 38–73)
NRBC BLD-RTO: 0 /100 WBC
OHS QRS DURATION: 86 MS
OHS QTC CALCULATION: 425 MS
PLATELET # BLD AUTO: 291 K/UL (ref 150–450)
PMV BLD AUTO: 8.9 FL (ref 9.2–12.9)
POTASSIUM SERPL-SCNC: 3.9 MMOL/L (ref 3.5–5.1)
PROT SERPL-MCNC: 6.7 G/DL (ref 6–8.4)
RBC # BLD AUTO: 3.97 M/UL (ref 4–5.4)
SODIUM SERPL-SCNC: 137 MMOL/L (ref 136–145)
WBC # BLD AUTO: 9.88 K/UL (ref 3.9–12.7)

## 2024-12-31 PROCEDURE — 36415 COLL VENOUS BLD VENIPUNCTURE: CPT | Performed by: HOSPITALIST

## 2024-12-31 PROCEDURE — 63600175 PHARM REV CODE 636 W HCPCS: Performed by: HOSPITALIST

## 2024-12-31 PROCEDURE — 63600175 PHARM REV CODE 636 W HCPCS: Performed by: INTERNAL MEDICINE

## 2024-12-31 PROCEDURE — 99900035 HC TECH TIME PER 15 MIN (STAT)

## 2024-12-31 PROCEDURE — 83735 ASSAY OF MAGNESIUM: CPT | Performed by: HOSPITALIST

## 2024-12-31 PROCEDURE — 94660 CPAP INITIATION&MGMT: CPT

## 2024-12-31 PROCEDURE — 99900031 HC PATIENT EDUCATION (STAT)

## 2024-12-31 PROCEDURE — 97116 GAIT TRAINING THERAPY: CPT | Mod: CQ

## 2024-12-31 PROCEDURE — 12000002 HC ACUTE/MED SURGE SEMI-PRIVATE ROOM

## 2024-12-31 PROCEDURE — 25000003 PHARM REV CODE 250: Performed by: HOSPITALIST

## 2024-12-31 PROCEDURE — 85025 COMPLETE CBC W/AUTO DIFF WBC: CPT | Performed by: HOSPITALIST

## 2024-12-31 PROCEDURE — 97530 THERAPEUTIC ACTIVITIES: CPT | Mod: CQ

## 2024-12-31 PROCEDURE — 99233 SBSQ HOSP IP/OBS HIGH 50: CPT | Mod: ,,, | Performed by: INTERNAL MEDICINE

## 2024-12-31 PROCEDURE — 80053 COMPREHEN METABOLIC PANEL: CPT | Performed by: HOSPITALIST

## 2024-12-31 PROCEDURE — 25000003 PHARM REV CODE 250: Performed by: STUDENT IN AN ORGANIZED HEALTH CARE EDUCATION/TRAINING PROGRAM

## 2024-12-31 PROCEDURE — 27100171 HC OXYGEN HIGH FLOW UP TO 24 HOURS

## 2024-12-31 PROCEDURE — 94761 N-INVAS EAR/PLS OXIMETRY MLT: CPT

## 2024-12-31 RX ORDER — LINEZOLID 600 MG/1
600 TABLET, FILM COATED ORAL
Status: DISCONTINUED | OUTPATIENT
Start: 2024-12-31 | End: 2025-01-03 | Stop reason: HOSPADM

## 2024-12-31 RX ADMIN — DULOXETINE HYDROCHLORIDE 30 MG: 30 CAPSULE, DELAYED RELEASE ORAL at 07:12

## 2024-12-31 RX ADMIN — CEFTRIAXONE 2 G: 2 INJECTION, POWDER, FOR SOLUTION INTRAMUSCULAR; INTRAVENOUS at 06:12

## 2024-12-31 RX ADMIN — ENOXAPARIN SODIUM 60 MG: 60 INJECTION SUBCUTANEOUS at 07:12

## 2024-12-31 RX ADMIN — LINEZOLID 600 MG: 600 INJECTION, SOLUTION INTRAVENOUS at 05:12

## 2024-12-31 RX ADMIN — HYDROCODONE BITARTRATE AND ACETAMINOPHEN 1 TABLET: 10; 325 TABLET ORAL at 07:12

## 2024-12-31 RX ADMIN — HYDROCODONE BITARTRATE AND ACETAMINOPHEN 1 TABLET: 10; 325 TABLET ORAL at 10:12

## 2024-12-31 RX ADMIN — HYDROCODONE BITARTRATE AND ACETAMINOPHEN 1 TABLET: 10; 325 TABLET ORAL at 04:12

## 2024-12-31 RX ADMIN — HYDROCODONE BITARTRATE AND ACETAMINOPHEN 1 TABLET: 10; 325 TABLET ORAL at 06:12

## 2024-12-31 RX ADMIN — MUPIROCIN 1 G: 20 OINTMENT TOPICAL at 07:12

## 2024-12-31 RX ADMIN — ENOXAPARIN SODIUM 60 MG: 60 INJECTION SUBCUTANEOUS at 08:12

## 2024-12-31 RX ADMIN — ONDANSETRON 4 MG: 2 INJECTION INTRAMUSCULAR; INTRAVENOUS at 06:12

## 2024-12-31 RX ADMIN — LINEZOLID 600 MG: 600 TABLET, FILM COATED ORAL at 06:12

## 2024-12-31 RX ADMIN — ONDANSETRON 4 MG: 2 INJECTION INTRAMUSCULAR; INTRAVENOUS at 04:12

## 2024-12-31 NOTE — PLAN OF CARE
Problem: Occupational Therapy  Goal: Occupational Therapy Goal  Description: Goals to be met by: 1/30/2025     Patient will increase functional independence with ADLs by performing:    UE Dressing with Modified [place].  LE Dressing with Modified [place] and Assistive Devices as needed.  Grooming while seated with Modified [place].  Toileting from bedside commode with Modified [place] for hygiene and clothing management.   Supine to sit with Modified [place].  Toilet transfer to bedside commode with Modified [place].    Outcome: Progressing

## 2024-12-31 NOTE — ASSESSMENT & PLAN NOTE
Patient with Acute on chronic debility due to functional quadraplegia. The patient's latest AMPAC (Activity Measure for Post Acute Care) Score is listed below.    AM-PAC Score - How much help does the patient need for each activity listed  Basic Mobility Total Score: 13  Turning over in bed (including adjusting bedclothes, sheets and blankets)?: A little  Sitting down on and standing up from a chair with arms (e.g., wheelchair, bedside commode, etc.): A little  Moving from lying on back to sitting on the side of the bed?: A lot  Moving to and from a bed to a chair (including a wheelchair)?: A lot  Need to walk in hospital room?: A lot  Climbing 3-5 steps with a railing?: Unable    Plan  - Progressive mobility protocol initated  - PT/OT consulted  - Fall precautions in place

## 2024-12-31 NOTE — PT/OT/SLP PROGRESS
Physical Therapy Treatment    Patient Name:  Adriana Zaragoza   MRN:  [id number]    Recommendations:     Discharge Recommendations: High Intensity Therapy (vs Low Int w/ 24/7 supervision, pending progress)  Discharge Equipment Recommendations: none  Barriers to discharge:  Increased caregiver burden    Assessment:     Adriana Zaragoza is a 59 y.o. female admitted with a medical diagnosis of Cellulitis of right leg.  She presents with the following impairments/functional limitations: weakness, impaired endurance, impaired self care skills, impaired functional mobility, gait instability, impaired balance, decreased safety awareness, impaired skin, impaired cardiopulmonary response to activity . Pt seen with HOB elevated and agreeable to PT. Pt required min a from supine<>sit with LE and pannus management. Two gait trials completed, ambulating 20 feet x2 min a with w/c follow for safety.     Rehab Prognosis: Fair; patient would benefit from acute skilled PT services to address these deficits and reach maximum level of function.    Recent Surgery: * No surgery found *      Plan:     During this hospitalization, patient to be seen 6 x/week to address the identified rehab impairments via gait training, therapeutic activities, therapeutic exercises, neuromuscular re-education and progress toward the following goals:    Plan of Care Expires:  01/27/25    Subjective     Chief Complaint: Fatigue with mobility   Patient/Family Comments/goals: Pt agreeable to PT.   Pain/Comfort:  Pain Rating 1: 0/10  Pain Rating Post-Intervention 1: 0/10      Objective:     Communicated with RN prior to session.  Patient found HOB elevated with telemetry, peripheral IV, anne catheter, PICC line upon PT entry to room.     General Precautions: Standard, fall, respiratory  Orthopedic Precautions: N/A  Braces: N/A  Respiratory Status: Room air     Functional Mobility:  Bed Mobility:     Supine to Sit: minimum assistance  Sit to Supine:  minimum assistance  Transfers:     Sit to Stand:  minimum assistance with rolling walker  Gait: 20' x2 min a with rw      AM-PAC 6 CLICK MOBILITY          Treatment & Education:  Pt was educated on the following: call light use, importance of OOB activity and functional mobility to negate the negative effects of prolonged bed rest during this hospitalization, safe transfers/ambulation and discharge planning recommendations/options.     Patient left HOB elevated with all lines intact, call button in reach, and bed alarm on..    GOALS:   Multidisciplinary Problems       Physical Therapy Goals          Problem: Physical Therapy    Goal Priority Disciplines Outcome Interventions   Physical Therapy Goal     PT, PT/OT Progressing    Description: Goals to be met by: 25     Patient will increase functional independence with mobility by performin. Supine to sit with Minimal Assistance  2. Sit to stand transfer with Supervision using Bariatric Rolling Walker  3. Bed to chair transfer with Supervision using Bariatric Rolling Walker  4. Gait  x 75 feet with Supervision using Bariatric Rolling Walker.                              Time Tracking:     PT Received On: 24  PT Start Time: 1019     PT Stop Time: 1042  PT Total Time (min): 23 min     Billable Minutes: Gait Training 13 and Therapeutic Activity 10    Treatment Type: Treatment  PT/PTA: PTA     Number of PTA visits since last PT visit: 2024

## 2024-12-31 NOTE — PLAN OF CARE
Problem: Adult Inpatient Plan of Care  Goal: Plan of Care Review  Outcome: Progressing  Goal: Patient-Specific Goal (Individualized)  Outcome: Progressing  Goal: Absence of Hospital-Acquired Illness or Injury  Outcome: Progressing  Goal: Optimal Comfort and Wellbeing  Outcome: Progressing  Goal: Readiness for Transition of Care  Outcome: Progressing     Problem: Bariatric Environmental Safety  Goal: Safety Maintained with Care  Outcome: Progressing     Problem: Infection  Goal: Absence of Infection Signs and Symptoms  Outcome: Progressing     Problem: Wound  Goal: Optimal Coping  Outcome: Progressing  Goal: Optimal Functional Ability  Outcome: Progressing  Goal: Absence of Infection Signs and Symptoms  Outcome: Progressing  Goal: Improved Oral Intake  Outcome: Progressing  Goal: Optimal Pain Control and Function  Outcome: Progressing  Goal: Skin Health and Integrity  Outcome: Progressing  Goal: Optimal Wound Healing  Outcome: Progressing     Problem: Skin Injury Risk Increased  Goal: Skin Health and Integrity  Outcome: Progressing     Problem: Fall Injury Risk  Goal: Absence of Fall and Fall-Related Injury  Outcome: Progressing     Problem: Oral Intake Inadequate  Goal: Improved Oral Intake  Outcome: Progressing

## 2024-12-31 NOTE — PT/OT/SLP EVAL
Occupational Therapy   Evaluation    Name: Adriana Zaragoza  MRN: [id number]  Admitting Diagnosis: Severe sepsis  Recent Surgery: * No surgery found *    The primary encounter diagnosis was Severe sepsis. Diagnoses of Screening for cardiovascular condition, Endotracheally intubated, Morbid obesity, Febrile illness, acute, and Chest pain were also pertinent to this visit.    Recommendations:     Discharge Recommendations: High Intensity Therapy  Discharge Equipment Recommendations:  to be determined by next level of care  Barriers to discharge:   (increased level of assist w/ ADLS and mobility)    Assessment:     Adriana Zaragoza is a 59 y.o. female with a medical diagnosis of Severe sepsis.  She presents with  R inner thigh tumor-painful and inflamed. Performance deficits affecting function: weakness, impaired endurance, impaired self care skills, impaired functional mobility, gait instability, impaired balance, decreased lower extremity function, decreased safety awareness, pain, impaired skin, edema, impaired cardiopulmonary response to activity.      Rehab Prognosis: Fair; patient would benefit from acute skilled OT services to address these deficits and reach maximum level of function.       Plan:     Patient to be seen 5 x/week to address the above listed problems via self-care/home management, therapeutic exercises, therapeutic activities  Plan of Care Expires: 01/31/25  Plan of Care Reviewed with: patient    Subjective     Chief Complaint: headache and R inner thigh.  Patient/Family Comments/goals: It took 3 people to sit me up. I don't want to try it again today.    Occupational Profile:  Living Environment: pt lives wt boyfriend and roommate. Pt has a t/s combo w/ tub mounted GB and shower chair.   Previous level of function: Indep-Mod I self care act. except gets assist for bathing for cleaning inner thigh tumor. Pt ambulated Mod I w/ rollator in home; wc used for long distance. RM cooks and  cleans; BF drives; pt can drive however BF does mostly. Pt can't stand for long period of tome for  back issues. Pt sleeps in a recliner chair.   Roles and Routines: pt on disability  Equipment Used at Home: grab bar, rollator, shower chair, wheelchair, CPAP, oxygen  Assistance upon Discharge: BF and RM    Pain/Comfort:  Pain Rating 1: 10/10  Location - Orientation 1: generalized  Location 1: head  Pain Addressed 1: Pre-medicate for activity, Distraction  Pain Rating Post-Intervention 1: 10/10  Pain Rating 2:  (r inner thigh tumor; pain not rated -pt did want to be moved as  a result)  Location - Side 2: Right  Location - Orientation 2: medial  Location 2: thigh  Pain Addressed 2: Pre-medicate for activity, Distraction, Cessation of Activity    Patients cultural, spiritual, Confucianism conflicts given the current situation: no    Objective:     Communicated with: nurse prior to session.  Patient found HOB elevated with blood pressure cuff, telemetry, oxygen, peripheral IV, PICC line, pulse ox (continuous), anne catheter upon OT entry to room.    General Precautions: Standard, fall, respiratory  Orthopedic Precautions: N/A  Braces: N/A  Respiratory Status: Nasal cannula, flow 2 L/min    Occupational Performance:    Activities of Daily Living:  Feeding:  independence .  Grooming: setup to wash face, brush teeth w/ HOB elevated -pt reported already completed. Declined to do her hair due to being matted and she wanted to wait until BF brought in conditioner for hair. OT offered shampoo cap, pt declined this date.     Cognitive/Visual Perceptual:  Cognitive/Psychosocial Skills:     -       Oriented to: Person, Place, Time, and Situation   -       Follows Commands/attention:Follows multistep  commands  -       Communication: clear/fluent  -       Memory: No Deficits noted  -       Safety awareness/insight to disability: impaired   -       Mood/Affect/Coping skills/emotional control: Cooperative  Visual/Perceptual:       -Intact .    Physical Exam:  Balance:    -       NT  Postural examination/scapula alignment:    -       No postural abnormalities identified  Skin integrity: large tumor R inner thigh-red, edematous, painful to touch   Edema:  Severe R inner thigh   Dominant hand:    -       right  Upper Extremity Range of Motion:     -       Right Upper Extremity: WFL  -       Left Upper Extremity: WFL  Upper Extremity Strength:    -       Right Upper Extremity: WFL  -       Left Upper Extremity: WFL   Strength:    -       Right Upper Extremity: WFL  -       Left Upper Extremity: WFL    AMPAC 6 Click ADL:  AMPAC Total Score: 16    Treatment & Education:  Purpose of OT and POC  All questions/concerns addressed within scope.      Patient left HOB elevated with all lines intact and call button in reach    GOALS:   Multidisciplinary Problems       Occupational Therapy Goals          Problem: Occupational Therapy    Goal Priority Disciplines Outcome Interventions   Occupational Therapy Goal     OT, PT/OT Progressing    Description: Goals to be met by: 2025     Patient will increase functional independence with ADLs by performing:    UE Dressing with Modified [place].  LE Dressing with Modified [place] and Assistive Devices as needed.  Grooming while seated with Modified [place].  Toileting from bedside commode with Modified [place] for hygiene and clothing management.   Supine to sit with Modified [place].  Toilet transfer to bedside commode with Modified [place].                         History:     Past Medical History:   Diagnosis Date    Allergy     Anemia     Asthma     COPD (chronic obstructive pulmonary disease)     Deep vein thrombosis     Depression     Diabetes mellitus, type 2     Encounter for blood transfusion     Heart murmur     Neuromuscular disorder          Past Surgical History:   Procedure Laterality Date     SECTION      DILATION AND CURETTAGE OF UTERUS      gastric  luke [facility]oo     TONSILLECTOMY         Time Tracking:     OT Date of Treatment: 12/30/24  OT Start Time: 1404  OT Stop Time: 1417  OT Total Time (min): 13 min    Billable Minutes:Evaluation 13  Total Time 13    12/31/2024

## 2024-12-31 NOTE — ASSESSMENT & PLAN NOTE
Body mass index is 63.73 kg/m². Morbid obesity complicates all aspects of disease management from diagnostic modalities to treatment. Weight loss encouraged and health benefits explained to patient.

## 2024-12-31 NOTE — SUBJECTIVE & OBJECTIVE
Interval History: seen and examined.  on antibiotics for cellulitis of right thigh/pannus  Extubated to 2 liters.  No fevers recorded since 12/26/24.  On cpap at night. Rocephin and linezolid.     Review of Systems   Constitutional:  Positive for fatigue.   Respiratory:  Positive for cough and shortness of breath.    Cardiovascular:  Positive for leg swelling.   Gastrointestinal: Negative.    Genitourinary: Negative.    Musculoskeletal:  Positive for arthralgias, gait problem, joint swelling and myalgias.   Skin:  Positive for wound.   Neurological:  Negative for dizziness.   Psychiatric/Behavioral:  Positive for dysphoric mood. The patient is nervous/anxious.      Objective:     Vital Signs (Most Recent):  Temp: 97.8 °F (36.6 °C) (12/29/24 1501)  Pulse: 80 (12/30/24 1305)  Resp: (!) 23 (12/30/24 1704)  BP: 128/64 (12/29/24 1600)  SpO2: 96 % (12/30/24 1305) Vital Signs (24h Range):  Pulse:  [68-80] 80  Resp:  [17-24] 23  SpO2:  [96 %-99 %] 96 %     Weight: (!) 173.1 kg (381 lb 9.9 oz)  Body mass index is 65.5 kg/m².    Intake/Output Summary (Last 24 hours) at 12/30/2024 1828  Last data filed at 12/30/2024 1714  Gross per 24 hour   Intake --   Output 450 ml   Net -450 ml         Physical Exam  Constitutional:       Appearance: She is obese. She is ill-appearing.      Interventions: She is sedated and intubated.   HENT:      Head: Normocephalic and atraumatic.      Mouth/Throat:      Mouth: Mucous membranes are dry.      Pharynx: Oropharynx is clear.   Eyes:      Extraocular Movements: Extraocular movements intact.      Conjunctiva/sclera:      Right eye: No exudate.     Left eye: No exudate.     Comments: Pupils equal   Neck:      Vascular: No JVD.   Cardiovascular:      Rate and Rhythm: Regular rhythm. Tachycardia present.      Pulses: Normal pulses.   Pulmonary:      Effort: No respiratory distress. She is intubated.      Breath sounds: Decreased air movement present. Rhonchi present. No wheezing.   Abdominal:       General: Abdomen is protuberant. Bowel sounds are normal.      Palpations: Abdomen is soft.      Tenderness: There is no abdominal tenderness.   Musculoskeletal:      Cervical back: Normal range of motion and neck supple.      Right lower leg: Edema present.      Left lower leg: Edema present.      Comments: Severe lymphedema of both legs   Skin:     Capillary Refill: Capillary refill takes less than 2 seconds.      Findings: Erythema (right upper thigh, medial aspect) present.   Neurological:      General: No focal deficit present.      Mental Status: She is alert and oriented to person, place, and time.   Psychiatric:      Comments: Calm              Significant Labs: All pertinent labs within the past 24 hours have been reviewed.    Significant Imaging:  No new imaging since last night

## 2024-12-31 NOTE — PLAN OF CARE
SW met with patient to discuss IPR and SNF as options for her when she is discharged. SW explained to patient that PT is recommending a high intensity plan of care. SW explained to patient that her insurance may not cover IPR and that, if this is the case, that patient will need to discharge to SNF. Patient verbalized understanding. Patient stated that either option is fine with her.

## 2024-12-31 NOTE — SUBJECTIVE & OBJECTIVE
"Interval History: see "Hospital Course"    Review of Systems   Constitutional:  Positive for activity change and fatigue. Negative for chills and fever.   Cardiovascular:  Positive for leg swelling.   Musculoskeletal:  Positive for arthralgias and myalgias.   Skin:  Positive for wound.     Objective:     Vital Signs (Most Recent):  Temp: 97.8 °F (36.6 °C) (12/31/24 0719)  Pulse: 68 (12/31/24 0719)  Resp: 20 (12/31/24 0750)  BP: 131/66 (12/31/24 0719)  SpO2: 96 % (12/31/24 0719) Vital Signs (24h Range):  Temp:  [97.6 °F (36.4 °C)-98.7 °F (37.1 °C)] 97.8 °F (36.6 °C)  Pulse:  [66-92] 68  Resp:  [17-23] 20  SpO2:  [89 %-99 %] 96 %  BP: (128-147)/(60-74) 131/66     Weight: (!) 168.4 kg (371 lb 4.1 oz)  Body mass index is 63.73 kg/m².    Intake/Output Summary (Last 24 hours) at 12/31/2024 0838  Last data filed at 12/31/2024 0506  Gross per 24 hour   Intake --   Output 2000 ml   Net -2000 ml         Physical Exam  Vitals and nursing note reviewed.   Constitutional:       General: She is not in acute distress.     Appearance: She is obese.   HENT:      Head: Normocephalic and atraumatic.      Right Ear: External ear normal.      Left Ear: External ear normal.      Nose: Nose normal.      Mouth/Throat:      Mouth: Mucous membranes are moist.      Pharynx: Oropharynx is clear.   Eyes:      Extraocular Movements: Extraocular movements intact.      Conjunctiva/sclera: Conjunctivae normal.   Cardiovascular:      Rate and Rhythm: Normal rate and regular rhythm.      Pulses: Normal pulses.      Heart sounds: Normal heart sounds.   Pulmonary:      Effort: Pulmonary effort is normal.      Breath sounds: Normal breath sounds.   Abdominal:      General: Bowel sounds are normal. There is no distension.      Palpations: Abdomen is soft.      Tenderness: There is no abdominal tenderness.   Genitourinary:     Comments: Lyons.  Musculoskeletal:         General: Normal range of motion.      Cervical back: Normal range of motion and neck " supple.      Right lower leg: Edema present.      Left lower leg: Edema present.   Skin:     Findings: Erythema and rash present.   Neurological:      Mental Status: She is alert.             Significant Labs: All pertinent labs within the past 24 hours have been reviewed.    Significant Imaging: I have reviewed all pertinent imaging results/findings within the past 24 hours.

## 2024-12-31 NOTE — ASSESSMENT & PLAN NOTE
Body mass index is 63.73 kg/m². Morbid obesity complicates all aspects of disease management from diagnostic modalities to treatment. When patient awake, will explain weight loss strategies and health benefits to patient.

## 2024-12-31 NOTE — PLAN OF CARE
Met with patient at bedside during rounds, pt agreeable to rehab placement. SW to follow up for patient choice.       12/31/24 1107   Discharge Reassessment   Assessment Type Discharge Planning Reassessment   Discharge Plan discussed with: Patient   Communicated JONATHAN with patient/caregiver Yes   Discharge Plan A Rehab   Discharge Plan B Skilled Nursing Facility

## 2024-12-31 NOTE — PLAN OF CARE
Problem: Physical Therapy  Goal: Physical Therapy Goal  Description: Goals to be met by: 25     Patient will increase functional independence with mobility by performin. Supine to sit with Minimal Assistance  2. Sit to stand transfer with Supervision using Bariatric Rolling Walker  3. Bed to chair transfer with Supervision using Bariatric Rolling Walker  4. Gait  x 75 feet with Supervision using Bariatric Rolling Walker.         Outcome: Progressing

## 2024-12-31 NOTE — PLAN OF CARE
12/31/24 1050   Patient Assessment/Suction   Level of Consciousness (AVPU) alert   Respiratory Effort Unlabored;Normal   Expansion/Accessory Muscles/Retractions no retractions;no use of accessory muscles   All Lung Fields Breath Sounds diminished   PRE-TX-O2   Device (Oxygen Therapy) CPAP   $ Is the patient on High Flow Oxygen? Yes   SpO2 (!) 90 %  (patient is about to place cpap on to take a nap)   Pulse Oximetry Type Continuous   $ Pulse Oximetry - Multiple Charge Pulse Oximetry - Multiple   Aerosol Therapy   $ Aerosol Therapy Charges PRN treatment not required   Preset CPAP/BiPAP Settings   Mode Of Delivery CPAP   $ CPAP/BiPAP Daily Charge 1   CPAP/BIPAP charged w/in last 24 h YES   Education   $ Education BiPAP;Bronchodilator;15 min

## 2024-12-31 NOTE — PLAN OF CARE
Problem: Wound  Goal: Improved Oral Intake  Outcome: Progressing  Intervention: Promote and Optimize Oral Intake  Flowsheets (Taken 12/31/2024 1307)  Nutrition Interventions:   diet liberalized   diet advanced

## 2024-12-31 NOTE — PROGRESS NOTES
[facility]  Progress Note    Patient Name: Adriana Zaragoza  MRN: [id number]  Patient Class: IP- Inpatient   Admission Date: 12/26/2024  Length of Stay: 5 days  Attending Physician: Felix Sorensen MD  Primary Care Provider: Melba Trivedi MD        Subjective     Principal Problem:Cellulitis of right leg        HPI:  Please note that patient is intubated and sedated at the time of my evaluation, so all information was obtained from the ER provider.      The patient is a 59-year-old female with severe morbid obesity who was brought to the ER because of fever, and chills.  On arrival to the ER, patient was alert and oriented and able to give information. She has a fever of 101.9, and labs reveal leukocytosis of 18.  Sepsis workup was started, and patient got empiric antibiotics.  However few hours while being in the ER, she started to become confused, agitated, had rigors, and oxygen dropped to the 80s. ER provider was concern that pt's symptoms are due to allergic reaction to abx, so steroid, benadryl and pepcid iv were given. She was then intubated for airway protection.  After intubation, repeat temperature was 104.4°.  Currently septic workup is negative including chest x-ray, UA, and pro-calcitonin.  As a result decision was made to order a CT chest abdomen pelvis to further evaluate her sepsis.  However, given pt's severe morbid obesity, she could not fit in our CT scanner.  Patient will be sent to [facility] to get the CT scans ordered by the ER provider, then she will be admitted to our ICU for further management.    Overview/Hospital Course:  Adriana Zaragoza is a 59 year old female with a past medical history of obesity, tobacco dependence, KEESHA, and anemia who presented with severe sepsis secondary to a RLE cellulitis. She developed acute respiratory failure and was intubated and sedated in the ICU. ID was consulted and changed the antibiotics  "from vancomycin and meropenem to linezolid and Rocephin. Wound Care has been consulted. She was extubated and is stable on 2 L NC with CPAP QHS. PT/OT has been consulted and recommended high intensity therapy. CM has been consulted for IPR placement.    Interval History: see "Hospital Course"    Review of Systems   Constitutional:  Positive for activity change and fatigue. Negative for chills and fever.   Cardiovascular:  Positive for leg swelling.   Musculoskeletal:  Positive for arthralgias and myalgias.   Skin:  Positive for wound.     Objective:     Vital Signs (Most Recent):  Temp: 97.8 °F (36.6 °C) (12/31/24 0719)  Pulse: 68 (12/31/24 0719)  Resp: 20 (12/31/24 0750)  BP: 131/66 (12/31/24 0719)  SpO2: 96 % (12/31/24 0719) Vital Signs (24h Range):  Temp:  [97.6 °F (36.4 °C)-98.7 °F (37.1 °C)] 97.8 °F (36.6 °C)  Pulse:  [66-92] 68  Resp:  [17-23] 20  SpO2:  [89 %-99 %] 96 %  BP: (128-147)/(60-74) 131/66     Weight: (!) 168.4 kg (371 lb 4.1 oz)  Body mass index is 63.73 kg/m².    Intake/Output Summary (Last 24 hours) at 12/31/2024 0838  Last data filed at 12/31/2024 0506  Gross per 24 hour   Intake --   Output 2000 ml   Net -2000 ml         Physical Exam  Vitals and nursing note reviewed.   Constitutional:       General: She is not in acute distress.     Appearance: She is obese.   HENT:      Head: Normocephalic and atraumatic.      Right Ear: External ear normal.      Left Ear: External ear normal.      Nose: Nose normal.      Mouth/Throat:      Mouth: Mucous membranes are moist.      Pharynx: Oropharynx is clear.   Eyes:      Extraocular Movements: Extraocular movements intact.      Conjunctiva/sclera: Conjunctivae normal.   Cardiovascular:      Rate and Rhythm: Normal rate and regular rhythm.      Pulses: Normal pulses.      Heart sounds: Normal heart sounds.   Pulmonary:      Effort: Pulmonary effort is normal.      Breath sounds: Normal breath sounds.   Abdominal:      General: Bowel sounds are normal. There " is no distension.      Palpations: Abdomen is soft.      Tenderness: There is no abdominal tenderness.   Genitourinary:     Comments: Lyons.  Musculoskeletal:         General: Normal range of motion.      Cervical back: Normal range of motion and neck supple.      Right lower leg: Edema present.      Left lower leg: Edema present.   Skin:     Findings: Erythema and rash present.   Neurological:      Mental Status: She is alert.             Significant Labs: All pertinent labs within the past 24 hours have been reviewed.    Significant Imaging: I have reviewed all pertinent imaging results/findings within the past 24 hours.    Assessment and Plan     * Cellulitis of right leg  -Continue linezolid and Rocephin  -ID following  -Wound Care  -PRN analgesics      Debility  Patient with Acute on chronic debility due to functional quadraplegia. The patient's latest AMPAC (Activity Measure for Post Acute Care) Score is listed below.    AM-PAC Score - How much help does the patient need for each activity listed  Basic Mobility Total Score: 13  Turning over in bed (including adjusting bedclothes, sheets and blankets)?: A little  Sitting down on and standing up from a chair with arms (e.g., wheelchair, bedside commode, etc.): A little  Moving from lying on back to sitting on the side of the bed?: A lot  Moving to and from a bed to a chair (including a wheelchair)?: A lot  Need to walk in hospital room?: A lot  Climbing 3-5 steps with a railing?: Unable    Plan  - Progressive mobility protocol initated  - PT/OT consulted  - Fall precautions in place            Anemia  In setting of infection.  -Trend Hgb with CBC    Dependence on nicotine from cigarettes  -Patient counseled on cessation    Severe obesity (BMI >= 40)  Body mass index is 63.73 kg/m². Morbid obesity complicates all aspects of disease management from diagnostic modalities to treatment. Weight loss encouraged and health benefits explained to patient.     KEESHA  (obstructive sleep apnea)  -CPAP QHS      VTE Risk Mitigation (From admission, onward)           Ordered     enoxaparin injection 60 mg  Every 12 hours         12/26/24 1826     IP VTE HIGH RISK PATIENT  Once         12/26/24 1825     Place sequential compression device  Until discontinued         12/26/24 1825                    Discharge Planning   JONATHAN: 12/31/2024     Code Status: Full Code   Medical Readiness for Discharge Date:   Discharge Plan A: Home with family                Please place Justification for DME        Felix Sorensen MD  Department of Hospital Medicine   [facility]

## 2024-12-31 NOTE — PROGRESS NOTES
[facility]   Department of Infectious Disease  Progress Note        PATIENT NAME: Adriana Zaragoza  MRN: [id number]  TODAY'S DATE: 12/31/2024  ADMIT DATE: 12/26/2024  LOS: 5 days    CHIEF COMPLAINT: Fatigue (Patient with generalized weakness beginning about 0300.  Lower back pain and chills.  Body trembling.  Patient also reports dark urine.)      PRINCIPLE PROBLEM: Cellulitis of right leg    INTERVAL HISTORY    12/28/2024:  Better today.  Doing okay.  Repeat blood cultures so far negative.  Leukocytosis resolving.  Cymbalta added to her regimen today.      12/29/2024:  States improving.  No acute issues overnight.  On CPAP.  Blood cultures remain negative.  Leukocytosis has resolved.    12/30/2024:  Improving.  No acute issues overnight.    12/31/2024:  Continues to improve.  Transferred out of ICU to medical floor.  No acute issues overnight.    Antibiotics (From admission, onward)      Start     Stop Route Frequency Ordered    12/31/24 1800  linezolid tablet 600 mg         -- Oral Every 12 hours (non-standard times) 12/31/24 1111    12/28/24 1730  cefTRIAXone injection 2 g         -- IV Every 24 hours (non-standard times) 12/28/24 1639          Antifungals (From admission, onward)      None           Antivirals (From admission, onward)      None            ASSESSMENT and PLAN      Severe sepsis.  Focus of infection appears to be from right thigh panniculitis.  Continue linezolid and ceftriaxone in the hospital.  At discharge use oral linezolid + p.o. ciprofloxacin 750 mg b.i.d. to complete 14 day course    2. Respiratory failure.  Resolved.     3. Extreme obesity.    4. Large right medial thigh pannus.    RECOMMENDATIONS:    Follow ASO titer   Continue linezolid and ceftriaxone hospital   Use linezolid +p.o. ciprofloxacin 750 mg p.o. b.i.d. to complete 14 day course at discharge  We will consider chronic suppressive oral antibiotics with penicillin or Keflex after resolution of current  episode  Follow up with ID post discharge    Thank you for involving ID in the care of this patient.  ID service will drop off today and see in the office for follow up.  Please send Epic secure chat with any questions.      SUBJECTIVE        Adriana Zaragoza is a 59 y.o. female with extreme obesity, COPD and depression.  Known to me from previous encounter during hospitalization for panniculitis.  Presents to the emergency room 12/20/2024 generalized weakness of acute onset.  BP in the /59, pulse 96, respiratory 18, temperature 101.9°, oxygen saturation 94%.  She quickly decompensated and was intubated in the ER.       WBC 18, hematocrit 38, MCV 87, platelet 279, sodium 137, creatinine 0.6, LFTs normal.  UA unremarkable.  CT right showed known medial panniculus with findings concerning for cellulitis.  CT chest abdomen and pelvis with no pneumoniae or other acute findings.  CT head with no acute abnormality.  She was admitted to the ICU on antibiotics.     Antibiotic history:   Vancomycin:  12/26/2024-  Metronidazole: 02/20/2024 x1 dose   Ertapenem:  12/22/2024-  Aztreonam: 12/26/2024 x1 dose     Microbiology:    Blood culture 12/20/2024:  NGTD    Review of Systems  Negative except as stated above in Interval History     OBJECTIVE   Temp:  [97.8 °F (36.6 °C)-98.7 °F (37.1 °C)] 98.1 °F (36.7 °C)  Pulse:  [68-92] 75  Resp:  [17-23] 17  SpO2:  [89 %-99 %] 91 %  BP: (128-145)/(63-74) 130/71  Temp:  [97.8 °F (36.6 °C)-98.7 °F (37.1 °C)]   Temp: 98.1 °F (36.7 °C) (12/31/24 1111)  Pulse: 75 (12/31/24 1111)  Resp: 17 (12/31/24 1111)  BP: 130/71 (12/31/24 1111)  SpO2: (!) 91 % (12/31/24 1111)    Intake/Output Summary (Last 24 hours) at 12/31/2024 1309  Last data filed at 12/31/2024 0506  Gross per 24 hour   Intake --   Output 2000 ml   Net -2000 ml       Physical Exam  General:  I middle-aged man lying quietly in bed.  Awake, alert   Respiratory:  No mechanical ventilator.  FiO2 30%, peep 5, .  Clear to  auscultation anteriorly   CVS: S1 and 2 heard, no murmurs appreciated   Abdomen:  Protuberant, soft, nontender, no palpable organomegaly   Lower extremity:  Large medial thigh panniculus with fading erythema and resolving edema   Musculoskeletal: No joint effusions appreciated     VAD:  PIV  ISOLATION:  None     Wounds:  None    Significant Labs: All pertinent labs within the past 24 hours have been reviewed.    CBC LAST 7 DAYS  Recent Labs   Lab 12/26/24  1211 12/26/24  1522 12/27/24  0441 12/27/24  1329 12/28/24  0518 12/28/24  0607 12/28/24  0648 12/29/24  0329 12/30/24  0320 12/31/24  0534   WBC 18.43*  --  14.04*  --   --   --  12.39 10.96 9.14 9.88   RBC 4.30  --  3.85*  --   --   --  3.87* 3.66* 3.66* 3.97*   HGB 11.6*  --  10.2*  --   --   --  10.1* 9.9* 9.8* 10.5*   HCT 37.6   < > 33.7* 32* 29* 33* 33.4* 32.3* 31.8* 34.1*   MCV 87  --  88  --   --   --  86 88 87 86   MCH 27.0  --  26.5*  --   --   --  26.1* 27.0 26.8* 26.4*   MCHC 30.9*  --  30.3*  --   --   --  30.2* 30.7* 30.8* 30.8*   RDW 15.2*  --  15.5*  --   --   --  15.7* 15.4* 14.8* 14.6*     --  257  --   --   --  251 244 253 291   MPV 9.4  --  9.7  --   --   --  9.4 9.5 9.2 8.9*   GRAN 90.0*  16.6*  --  88.9*  12.5*  --   --   --  72.4  9.0* 70.6  7.7 69.6  6.4 73.8*  7.3   LYMPH 4.8*  0.9*  --  5.5*  0.8*  --   --   --  19.1  2.4 20.1  2.2 20.0  1.8 15.9*  1.6   MONO 4.3  0.8  --  4.8  0.7  --   --   --  7.4  0.9 7.8  0.9 8.5  0.8 8.7  0.9   BASO 0.04  --  0.01  --   --   --  0.04 0.05 0.03 0.03   NRBC 0  --  0  --   --   --  0 0 0 0    < > = values in this interval not displayed.       CHEMISTRY LAST 7 DAYS  Recent Labs   Lab 12/26/24  1211 12/26/24  1522 12/26/24  1714 12/26/24  2044 12/26/24  2228 12/27/24  0321 12/27/24  0441 12/27/24  1329 12/28/24  0518 12/28/24  0607 12/28/24  0648 12/28/24  1555 12/29/24  0329 12/30/24  0320 12/31/24  0534     --   --   --   --   --  138  --   --   --  138  --  137 135* 137  "  K 4.0  --   --   --   --   --  3.9  --   --   --  3.8 4.0 4.1 4.1 3.9     --   --   --   --   --  108  --   --   --  108  --  106 101 99   CO2 27  --   --   --   --   --  25  --   --   --  25  --  27 30* 32*   ANIONGAP 7*  --   --   --   --   --  5*  --   --   --  5*  --  4* 4* 6*   BUN 13  --   --   --   --   --  11  --   --   --  14  --  13 11 12   CREATININE 0.6  --   --   --   --   --  0.6  --   --   --  0.6  --  0.5 0.5 0.5   *  --   --   --   --   --  192*  --   --   --  109  --  103 107 121*   CALCIUM 8.7  --   --   --   --   --  8.2*  --   --   --  8.1*  --  8.3* 8.4* 8.4*   PH  --    < > 7.310* 7.267* 7.333* 7.321*  --  7.334* 7.394 7.373  --   --   --   --   --    MG 1.9  --   --   --   --   --  1.9  --   --   --  2.1  --  2.0 1.9 1.9   ALBUMIN 3.7  --   --   --   --   --  3.4*  --   --   --  3.2*  --  3.3* 3.1* 3.2*   PROT 7.2  --   --   --   --   --  6.5  --   --   --  6.5  --  6.5 6.4 6.7   ALKPHOS 60  --   --   --   --   --  52*  --   --   --  48*  --  52* 53* 59   ALT 12  --   --   --   --   --  11  --   --   --  11  --  12 13 15   AST 11  --   --   --   --   --  18  --   --   --  15  --  18 16 15   BILITOT 0.4  --   --   --   --   --  0.4  --   --   --  0.3  --  0.4 0.3 0.4    < > = values in this interval not displayed.       Estimated Creatinine Clearance: 189.3 mL/min (based on SCr of 0.5 mg/dL).    INFLAMMATORY/PROCAL  LAST 7 DAYS  Recent Labs   Lab 12/26/24  1211   PROCAL <0.050     No results found for: "ESR"  CRP   Date Value Ref Range Status   09/25/2024 1.30 (H) <1.00 mg/dL Final     Comment:     CRP-Normal Application expected values:   <1.0        mg/dL   Normal Range  1.0 - 5.0  mg/dL   Indicates mild inflammation  5.0 - 10.0 mg/dL   Indicates severe inflammation  >10.0        mg/dL   Represents serious processes and   frequently         indicates the presence of a bacterial   infection.      08/28/2024 10.60 (H) <1.00 mg/dL Final     Comment:     CRP-Normal Application " expected values:   <1.0        mg/dL   Normal Range  1.0 - 5.0  mg/dL   Indicates mild inflammation  5.0 - 10.0 mg/dL   Indicates severe inflammation  >10.0        mg/dL   Represents serious processes and   frequently         indicates the presence of a bacterial   infection.      08/26/2024 8.00 (H) <1.00 mg/dL Final     Comment:     CRP-Normal Application expected values:   <1.0        mg/dL   Normal Range  1.0 - 5.0  mg/dL   Indicates mild inflammation  5.0 - 10.0 mg/dL   Indicates severe inflammation  >10.0        mg/dL   Represents serious processes and   frequently         indicates the presence of a bacterial   infection.      02/14/2024 5.0 0.0 - 8.2 mg/L Final   02/06/2024 115.2 (H) 0.0 - 8.2 mg/L Final   03/07/2023 3.94 (H) <0.76 mg/dL Final   03/05/2023 11.05 (H) <0.76 mg/dL Final       PRIOR  MICROBIOLOGY:    Susceptibility data from last 90 days.  Collected Specimen Info Organism   12/26/24 Blood from Peripheral, Hand, Left No growth after 5 days.   12/26/24 Blood from Peripheral, Hand, Right No growth after 5 days.       LAST 7 DAYS MICROBIOLOGY   Microbiology Results (last 7 days)       Procedure Component Value Units Date/Time    Blood culture x two cultures. Draw prior to antibiotics. [[id number]] Collected: 12/26/24 1131    Order Status: Completed Specimen: Blood from Peripheral, Hand, Left Updated: 12/31/24 1232     Blood Culture, Routine No growth after 5 days.    Narrative:      Aerobic and anaerobic    Blood culture x two cultures. Draw prior to antibiotics. [[id number]] Collected: 12/26/24 1125    Order Status: Completed Specimen: Blood from Peripheral, Hand, Right Updated: 12/31/24 1232     Blood Culture, Routine No growth after 5 days.    Narrative:      Aerobic and anaerobic    Respiratory Infection Panel (PCR), Nasopharyngeal [[id number]] Collected: 12/26/24 1904    Order Status: Completed Specimen: Nasopharyngeal Swab Updated: 12/26/24 2044     Respiratory Infection Panel Source NP swab      Adenovirus Not Detected     Coronavirus 229E, Common Cold Virus Not Detected     Coronavirus HKU1, Common Cold Virus Not Detected     Coronavirus NL63, Common Cold Virus Not Detected     Coronavirus OC43, Common Cold Virus Not Detected     Comment: Coronavirus strains 229E, HKU1, NL63, and OC43 can cause the common   cold   and are not associated with the respiratory disease outbreak caused   by  the COVID-19 (SARS-CoV-2 novel Coronavirus) strain.           SARS-CoV2 (COVID-19) Qualitative PCR Not Detected     Human Metapneumovirus Not Detected     Human Rhinovirus/Enterovirus Not Detected     Influenza A (subtypes H1, H1-2009,H3) Not Detected     Influenza B Not Detected     Parainfluenza Virus 1 Not Detected     Parainfluenza Virus 2 Not Detected     Parainfluenza Virus 3 Not Detected     Parainfluenza Virus 4 Not Detected     Respiratory Syncytial Virus Not Detected     Bordetella Parapertussis (JP4914) Not Detected     Bordetella pertussis (ptxP) Not Detected     Chlamydia pneumoniae Not Detected     Mycoplasma pneumoniae Not Detected     Comment: Respiratory Infection Panel testing performed by Multiplex PCR.       Narrative:      Respiratory Infection Panel source->NP Swab    Respiratory Infection Panel (PCR), Nasopharyngeal [[id number]]     Order Status: No result Specimen: Nasopharyngeal Swab             CURRENT/PREVIOUS VISIT EKG  Results for orders placed or performed during the hospital encounter of 12/26/24   EKG 12-lead    Collection Time: 12/26/24 11:55 AM   Result Value Ref Range    QRS Duration 86 ms    [facility]HS QTC Calculation 425 ms    Narrative    Test Reason : Z13.6,    Vent. Rate :  88 BPM     Atrial Rate :  88 BPM     P-R Int : 178 ms          QRS Dur :  86 ms      QT Int : 352 ms       P-R-T Axes :  37  66  48 degrees    QTcB Int : 425 ms    Normal sinus rhythm  Low voltage QRS  Borderline Abnormal ECG  Confirmed by Marcie Sierra ([id number]) on 12/31/2024 12:36:14 PM    Referred By:  AAAREFERRAL SELF           Confirmed By: Marcie Sierra     Significant Imaging: I have reviewed all relevant and available imaging results/findings within the past 24 hours.    I spent a total of 40 minutes on the day of the visit.This includes face to face time and non-face to face time preparing to see the patient (eg, review of tests), obtaining and/or reviewing separately obtained history, documenting clinical information in the electronic or other health record, independently interpreting results and communicating results to the patient/family/caregiver, or care coordinator.    Rajendra Callahan MD  Date of Service: 12/31/2024      This note was created using [employer] voice recognition software that occasionally misinterpreted phrases or words.

## 2024-12-31 NOTE — PROGRESS NOTES
[facility] Medicine  Progress Note    Patient Name: Adriana Zaragoza  MRN: [id number]  Patient Class: IP- Inpatient   Admission Date: 12/26/2024  Length of Stay: 4 days  Attending Physician: Darleen Forde MD  Primary Care Provider: Melba Trivedi MD        Subjective     Principal Problem:Severe sepsis        HPI:  Please note that patient is intubated and sedated at the time of my evaluation, so all information was obtained from the ER provider.      The patient is a 59-year-old female with severe morbid obesity who was brought to the ER because of fever, and chills.  On arrival to the ER, patient was alert and oriented and able to give information. She has a fever of 101.9, and labs reveal leukocytosis of 18.  Sepsis workup was started, and patient got empiric antibiotics.  However few hours while being in the ER, she started to become confused, agitated, had rigors, and oxygen dropped to the 80s. ER provider was concern that pt's symptoms are due to allergic reaction to abx, so steroid, benadryl and pepcid iv were given. She was then intubated for airway protection.  After intubation, repeat temperature was 104.4°.  Currently septic workup is negative including chest x-ray, UA, and pro-calcitonin.  As a result decision was made to order a CT chest abdomen pelvis to further evaluate her sepsis.  However, given pt's severe morbid obesity, she could not fit in our CT scanner.  Patient will be sent to [facility] to get the CT scans ordered by the ER provider, then she will be admitted to our ICU for further management.    Overview/Hospital Course:  Patient was monitored in ICU on ventilator.  Was put on IVAB for severe sepsis, which we determined to be secondary to cellulitis of the right upper thigh.  We consulted with ID.    Fevers resolved. On meropenem and Vancomycin. Now on Rocephin and linezolid. Blood culture NTD. Wound care consulted. ID consulted.  Patient extubated to 2liter oxygen. She is tearful and highly anxious. Resumed home meds, cymbalta and hydroxyzine. Pain un controlled, increased oxycodone 10mg Q4hr PRN. PT/OT consulted. Uses rollator at home. Patient is stable for transfer to [facility].   Patient can go without telemetry.  I spoke to ICU charge nurse regarding transfer to [facility] without tele-box.     Interval History: seen and examined.  on antibiotics for cellulitis of right thigh/pannus  Extubated to 2 liters.  No fevers recorded since 12/26/24.  On cpap at night. Rocephin and linezolid.     Review of Systems   Constitutional:  Positive for fatigue.   Respiratory:  Positive for cough and shortness of breath.    Cardiovascular:  Positive for leg swelling.   Gastrointestinal: Negative.    Genitourinary: Negative.    Musculoskeletal:  Positive for arthralgias, gait problem, joint swelling and myalgias.   Skin:  Positive for wound.   Neurological:  Negative for dizziness.   Psychiatric/Behavioral:  Positive for dysphoric mood. The patient is nervous/anxious.      Objective:     Vital Signs (Most Recent):  Temp: 97.8 °F (36.6 °C) (12/29/24 1501)  Pulse: 80 (12/30/24 1305)  Resp: (!) 23 (12/30/24 1704)  BP: 128/64 (12/29/24 1600)  SpO2: 96 % (12/30/24 1305) Vital Signs (24h Range):  Pulse:  [68-80] 80  Resp:  [17-24] 23  SpO2:  [96 %-99 %] 96 %     Weight: (!) 173.1 kg (381 lb 9.9 oz)  Body mass index is 65.5 kg/m².    Intake/Output Summary (Last 24 hours) at 12/30/2024 1828  Last data filed at 12/30/2024 1714  Gross per 24 hour   Intake --   Output 450 ml   Net -450 ml         Physical Exam  Constitutional:       Appearance: She is obese. She is ill-appearing.      Interventions: She is sedated and intubated.   HENT:      Head: Normocephalic and atraumatic.      Mouth/Throat:      Mouth: Mucous membranes are dry.      Pharynx: Oropharynx is clear.   Eyes:      Extraocular Movements: Extraocular movements intact.      Conjunctiva/sclera:      Right  "eye: No exudate.     Left eye: No exudate.     Comments: Pupils equal   Neck:      Vascular: No JVD.   Cardiovascular:      Rate and Rhythm: Regular rhythm. Tachycardia present.      Pulses: Normal pulses.   Pulmonary:      Effort: No respiratory distress. She is intubated.      Breath sounds: Decreased air movement present. Rhonchi present. No wheezing.   Abdominal:      General: Abdomen is protuberant. Bowel sounds are normal.      Palpations: Abdomen is soft.      Tenderness: There is no abdominal tenderness.   Musculoskeletal:      Cervical back: Normal range of motion and neck supple.      Right lower leg: Edema present.      Left lower leg: Edema present.      Comments: Severe lymphedema of both legs   Skin:     Capillary Refill: Capillary refill takes less than 2 seconds.      Findings: Erythema (right upper thigh, medial aspect) present.   Neurological:      General: No focal deficit present.      Mental Status: She is alert and oriented to person, place, and time.   Psychiatric:      Comments: Calm              Significant Labs: All pertinent labs within the past 24 hours have been reviewed.    Significant Imaging:  No new imaging since last night    Assessment and Plan     * Severe sepsis  This patient does have evidence of infective focus  My overall impression is sepsis.  Source: Skin and Soft Tissue (location right thigh)  Antibiotics given-   Antibiotics (72h ago, onward)      Start     Stop Route Frequency Ordered    12/26/24 2030  meropenem injection 1 g         -- IV Every 8 hours (non-standard times) 12/26/24 2024 12/26/24 1155  vancomycin - pharmacy to dose  (vancomycin IVPB (PEDS and ADULTS))        Placed in "And" Linked Group    -- IV pharmacy to manage frequency 12/26/24 1055          Latest lactate reviewed-  Recent Labs   Lab 12/26/24  1514   POCLAC 3.17*         Cellulitis of right leg  Continue current antibiotics and monitor area for improvement.  Consulting Infectious " Disease.  Linezolid and rocephin   Blood culture NTD  Wound care   Pain control - increased oral med to avoid over sedation with IV meds       Acute hypoxic respiratory failure  Suspect secondary to altered mental status, and severe morbid obesity.  Extubated 12/28/24    Vent support being given.   Vent Mode: Spont  Oxygen Concentration (%):  [30-40] 30  Resp Rate Total:  [11 br/min-32 br/min] 18 br/min  Vt Set:  [450 mL] 450 mL  PEEP/CPAP:  [5 cmH20] 5 cmH20  Pressure Support:  [10 cmH20] 10 cmH20  Mean Airway Pressure:  [8.4 qoS25-41 cmH20] 8.8 cmH20    2 liters NC oxygen  Avoid over sedating meds  Cpap at night and PRN     Dependence on nicotine from cigarettes  Dangers of cigarette smoking were reviewed with patient in detail. Patient was Counseled for 10 minutes or greater. Nicotine replacement options were discussed. Nicotine replacement was discussed- not prescribed per patient's request. Patient had bad reaction to nicoderm and now is afraid to take Chantix as well.     Abdominal panniculus  Chronic problem for several years. No surgeon willing to operate due to high vascular complications.  Also still smoking. Morbid obesity. Insurance not willing to cover weight loss injection.   Patient wants to go to [facility] for plastic surgical evaluation.   Wound care  IV abx      KEESHA (obstructive sleep apnea)    Cpap qhs and PRN     Morbid obesity  Body mass index is 65.5 kg/m². Morbid obesity complicates all aspects of disease management from diagnostic modalities to treatment. When patient awake, will explain weight loss strategies and health benefits to patient.           VTE Risk Mitigation (From admission, onward)           Ordered     enoxaparin injection 60 mg  Every 12 hours         12/26/24 1826     IP VTE HIGH RISK PATIENT  Once         12/26/24 1825     Place sequential compression device  Until discontinued         12/26/24 1825                    Discharge Planning   JONATHAN: 12/31/2024     Code  Status: Full Code   Medical Readiness for Discharge Date:   Discharge Plan A: Home with family                Please place Justification for DME        Darleen Forde MD  Department of Hospital Medicine   [facility]

## 2024-12-31 NOTE — ASSESSMENT & PLAN NOTE
Continue current antibiotics and monitor area for improvement.  Consulting Infectious Disease.  Linezolid and rocephin   Blood culture NTD  Wound care   Pain control - increased oral med to avoid over sedation with IV meds

## 2025-01-01 LAB
ANION GAP SERPL CALC-SCNC: 6 MMOL/L (ref 8–16)
BASOPHILS # BLD AUTO: 0.04 K/UL (ref 0–0.2)
BASOPHILS NFR BLD: 0.4 % (ref 0–1.9)
BUN SERPL-MCNC: 12 MG/DL (ref 6–20)
CALCIUM SERPL-MCNC: 8.7 MG/DL (ref 8.7–10.5)
CHLORIDE SERPL-SCNC: 98 MMOL/L (ref 95–110)
CO2 SERPL-SCNC: 31 MMOL/L (ref 23–29)
CREAT SERPL-MCNC: 0.5 MG/DL (ref 0.5–1.4)
DIFFERENTIAL METHOD BLD: ABNORMAL
EOSINOPHIL # BLD AUTO: 0.2 K/UL (ref 0–0.5)
EOSINOPHIL NFR BLD: 1.7 % (ref 0–8)
ERYTHROCYTE [DISTWIDTH] IN BLOOD BY AUTOMATED COUNT: 14.6 % (ref 11.5–14.5)
EST. GFR  (NO RACE VARIABLE): >60 ML/MIN/1.73 M^2
GLUCOSE SERPL-MCNC: 93 MG/DL (ref 70–110)
HCT VFR BLD AUTO: 33.8 % (ref 37–48.5)
HGB BLD-MCNC: 10.2 G/DL (ref 12–16)
IMM GRANULOCYTES # BLD AUTO: 0.06 K/UL (ref 0–0.04)
IMM GRANULOCYTES NFR BLD AUTO: 0.6 % (ref 0–0.5)
LYMPHOCYTES # BLD AUTO: 1.9 K/UL (ref 1–4.8)
LYMPHOCYTES NFR BLD: 17.9 % (ref 18–48)
MCH RBC QN AUTO: 26 PG (ref 27–31)
MCHC RBC AUTO-ENTMCNC: 30.2 G/DL (ref 32–36)
MCV RBC AUTO: 86 FL (ref 82–98)
MONOCYTES # BLD AUTO: 0.9 K/UL (ref 0.3–1)
MONOCYTES NFR BLD: 8.5 % (ref 4–15)
NEUTROPHILS # BLD AUTO: 7.5 K/UL (ref 1.8–7.7)
NEUTROPHILS NFR BLD: 70.9 % (ref 38–73)
NRBC BLD-RTO: 0 /100 WBC
PLATELET # BLD AUTO: 325 K/UL (ref 150–450)
PMV BLD AUTO: 9.1 FL (ref 9.2–12.9)
POTASSIUM SERPL-SCNC: 3.8 MMOL/L (ref 3.5–5.1)
RBC # BLD AUTO: 3.92 M/UL (ref 4–5.4)
SODIUM SERPL-SCNC: 135 MMOL/L (ref 136–145)
WBC # BLD AUTO: 10.52 K/UL (ref 3.9–12.7)

## 2025-01-01 PROCEDURE — 80048 BASIC METABOLIC PNL TOTAL CA: CPT | Performed by: STUDENT IN AN ORGANIZED HEALTH CARE EDUCATION/TRAINING PROGRAM

## 2025-01-01 PROCEDURE — 12000002 HC ACUTE/MED SURGE SEMI-PRIVATE ROOM

## 2025-01-01 PROCEDURE — 99900031 HC PATIENT EDUCATION (STAT)

## 2025-01-01 PROCEDURE — 25000003 PHARM REV CODE 250: Performed by: STUDENT IN AN ORGANIZED HEALTH CARE EDUCATION/TRAINING PROGRAM

## 2025-01-01 PROCEDURE — 94761 N-INVAS EAR/PLS OXIMETRY MLT: CPT

## 2025-01-01 PROCEDURE — 99900035 HC TECH TIME PER 15 MIN (STAT)

## 2025-01-01 PROCEDURE — 36415 COLL VENOUS BLD VENIPUNCTURE: CPT | Performed by: STUDENT IN AN ORGANIZED HEALTH CARE EDUCATION/TRAINING PROGRAM

## 2025-01-01 PROCEDURE — 63600175 PHARM REV CODE 636 W HCPCS: Performed by: INTERNAL MEDICINE

## 2025-01-01 PROCEDURE — 27100171 HC OXYGEN HIGH FLOW UP TO 24 HOURS

## 2025-01-01 PROCEDURE — 94660 CPAP INITIATION&MGMT: CPT

## 2025-01-01 PROCEDURE — 63600175 PHARM REV CODE 636 W HCPCS: Performed by: HOSPITALIST

## 2025-01-01 PROCEDURE — 85025 COMPLETE CBC W/AUTO DIFF WBC: CPT | Performed by: HOSPITALIST

## 2025-01-01 PROCEDURE — 25000003 PHARM REV CODE 250: Performed by: HOSPITALIST

## 2025-01-01 RX ADMIN — LINEZOLID 600 MG: 600 TABLET, FILM COATED ORAL at 05:01

## 2025-01-01 RX ADMIN — HYDROCODONE BITARTRATE AND ACETAMINOPHEN 1 TABLET: 10; 325 TABLET ORAL at 04:01

## 2025-01-01 RX ADMIN — ONDANSETRON 4 MG: 2 INJECTION INTRAMUSCULAR; INTRAVENOUS at 12:01

## 2025-01-01 RX ADMIN — HYDROCODONE BITARTRATE AND ACETAMINOPHEN 1 TABLET: 10; 325 TABLET ORAL at 08:01

## 2025-01-01 RX ADMIN — ONDANSETRON 4 MG: 2 INJECTION INTRAMUSCULAR; INTRAVENOUS at 05:01

## 2025-01-01 RX ADMIN — DULOXETINE HYDROCHLORIDE 30 MG: 30 CAPSULE, DELAYED RELEASE ORAL at 08:01

## 2025-01-01 RX ADMIN — HYDROCODONE BITARTRATE AND ACETAMINOPHEN 1 TABLET: 10; 325 TABLET ORAL at 10:01

## 2025-01-01 RX ADMIN — ONDANSETRON 4 MG: 2 INJECTION INTRAMUSCULAR; INTRAVENOUS at 08:01

## 2025-01-01 RX ADMIN — CEFTRIAXONE 2 G: 2 INJECTION, POWDER, FOR SOLUTION INTRAMUSCULAR; INTRAVENOUS at 05:01

## 2025-01-01 RX ADMIN — ENOXAPARIN SODIUM 60 MG: 60 INJECTION SUBCUTANEOUS at 08:01

## 2025-01-01 NOTE — PLAN OF CARE
Problem: Adult Inpatient Plan of Care  Goal: Plan of Care Review  Outcome: Progressing     Problem: Bariatric Environmental Safety  Goal: Safety Maintained with Care  Outcome: Progressing

## 2025-01-01 NOTE — PHYSICIAN QUERY
Please specify the diagnosis associated with the clinical findings from the query:  General debility/deconditioning

## 2025-01-01 NOTE — ASSESSMENT & PLAN NOTE
-Continue linezolid and Rocephin  -ID consulted  -Wound Care  -PRN analgesics  -Will need referral to Plastic or General Surgery

## 2025-01-01 NOTE — CARE UPDATE
01/01/25 0837   Patient Assessment/Suction   Level of Consciousness (AVPU) alert   Respiratory Effort Unlabored   All Lung Fields Breath Sounds diminished   Skin Integrity   $ Wound Care Tech Time 15 min   Area Observed Bridge of nose   Skin Appearance redness blanchable   Barrier used? Liquid Filled Cushion   PRE-TX-O2   Device (Oxygen Therapy) nasal cannula   $ Is the patient on Low Flow Oxygen? Yes   $ Is the patient on High Flow Oxygen? Yes  (uses cpap through day)   Flow (L/min) (Oxygen Therapy) 2   Pulse Oximetry Type Intermittent   $ Pulse Oximetry - Multiple Charge Pulse Oximetry - Multiple   Pulse 81   Resp 16   Aerosol Therapy   $ Aerosol Therapy Charges PRN treatment not required   Daily Review of Necessity (SVN) completed   Preset CPAP/BiPAP Settings   Mode Of Delivery standby;CPAP   $ CPAP/BiPAP Daily Charge 1   CPAP/BIPAP charged w/in last 24 h YES   $ Is patient using? Yes   Equipment Type V60   CPAP (cm H2O) 12

## 2025-01-01 NOTE — SUBJECTIVE & OBJECTIVE
"Interval History: see "Hospital Course"    Review of Systems   Constitutional:  Positive for activity change and fatigue. Negative for chills and fever.   Cardiovascular:  Positive for leg swelling.   Musculoskeletal:  Positive for arthralgias and myalgias.   Skin:  Positive for color change, rash and wound.     Objective:     Vital Signs (Most Recent):  Temp: 97.5 °F (36.4 °C) (01/01/25 0811)  Pulse: 81 (01/01/25 0837)  Resp: 16 (01/01/25 0837)  BP: (!) 149/50 (01/01/25 0811)  SpO2: 97 % (01/01/25 0811) Vital Signs (24h Range):  Temp:  [97.4 °F (36.3 °C)-98.3 °F (36.8 °C)] 97.5 °F (36.4 °C)  Pulse:  [67-81] 81  Resp:  [16-20] 16  SpO2:  [90 %-98 %] 97 %  BP: (121-149)/(50-72) 149/50     Weight: (!) 168.4 kg (371 lb 4.1 oz)  Body mass index is 63.73 kg/m².    Intake/Output Summary (Last 24 hours) at 1/1/2025 0903  Last data filed at 1/1/2025 0458  Gross per 24 hour   Intake 580 ml   Output --   Net 580 ml         Physical Exam  Vitals and nursing note reviewed.   Constitutional:       General: She is not in acute distress.     Appearance: She is obese.   HENT:      Head: Normocephalic and atraumatic.      Right Ear: External ear normal.      Left Ear: External ear normal.      Nose: Nose normal.      Mouth/Throat:      Mouth: Mucous membranes are moist.      Pharynx: Oropharynx is clear.   Eyes:      Extraocular Movements: Extraocular movements intact.      Conjunctiva/sclera: Conjunctivae normal.   Cardiovascular:      Rate and Rhythm: Normal rate and regular rhythm.      Pulses: Normal pulses.      Heart sounds: Normal heart sounds.   Pulmonary:      Effort: Pulmonary effort is normal.      Breath sounds: Normal breath sounds.   Abdominal:      General: Bowel sounds are normal. There is no distension.      Palpations: Abdomen is soft.      Tenderness: There is no abdominal tenderness.   Musculoskeletal:         General: Normal range of motion.      Cervical back: Normal range of motion and neck supple.      Right " lower leg: Edema present.      Left lower leg: Edema present.   Skin:     Findings: Erythema and rash present.   Neurological:      Mental Status: She is alert.             Significant Labs: All pertinent labs within the past 24 hours have been reviewed.    Significant Imaging: I have reviewed all pertinent imaging results/findings within the past 24 hours.

## 2025-01-01 NOTE — PT/OT/SLP PROGRESS
Physical Therapy      Patient Name:  Adriana Zaragoza   MRN:  [id number]    Patient not seen today secondary to Politely declined due to just returned to bed after up in chair all day. No second therapy attempt made due to holiday schedule. Will follow-up 1/2/25.

## 2025-01-01 NOTE — PROGRESS NOTES
[facility] Medicine  Progress Note    Patient Name: Adriana Zaragoza  MRN: [id number]  Patient Class: IP- Inpatient   Admission Date: 12/26/2024  Length of Stay: 6 days  Attending Physician: Felix Sorensen MD  Primary Care Provider: Melba Trivedi MD        Subjective     Principal Problem:Cellulitis of right leg        HPI:  Please note that patient is intubated and sedated at the time of my evaluation, so all information was obtained from the ER provider.      The patient is a 59-year-old female with severe morbid obesity who was brought to the ER because of fever, and chills.  On arrival to the ER, patient was alert and oriented and able to give information. She has a fever of 101.9, and labs reveal leukocytosis of 18.  Sepsis workup was started, and patient got empiric antibiotics.  However few hours while being in the ER, she started to become confused, agitated, had rigors, and oxygen dropped to the 80s. ER provider was concern that pt's symptoms are due to allergic reaction to abx, so steroid, benadryl and pepcid iv were given. She was then intubated for airway protection.  After intubation, repeat temperature was 104.4°.  Currently septic workup is negative including chest x-ray, UA, and pro-calcitonin.  As a result decision was made to order a CT chest abdomen pelvis to further evaluate her sepsis.  However, given pt's severe morbid obesity, she could not fit in our CT scanner.  Patient will be sent to [facility] to get the CT scans ordered by the ER provider, then she will be admitted to our ICU for further management.    Overview/Hospital Course:  Adriana Zaragoza is a 59 year old female with a past medical history of obesity, tobacco dependence, KEESHA, and anemia who presented with severe sepsis secondary to a RLE cellulitis. She developed acute respiratory failure and was intubated and sedated in the ICU. ID was consulted and changed the antibiotics  "from vancomycin and meropenem to linezolid and Rocephin. Wound Care has been consulted. She was extubated and is stable on 2 L NC with CPAP QHS. PT/OT has been consulted and recommended high intensity therapy. CM has been consulted for IPR/SNF placement.    Interval History: see "Hospital Course"    Review of Systems   Constitutional:  Positive for activity change and fatigue. Negative for chills and fever.   Cardiovascular:  Positive for leg swelling.   Musculoskeletal:  Positive for arthralgias and myalgias.   Skin:  Positive for color change, rash and wound.     Objective:     Vital Signs (Most Recent):  Temp: 97.5 °F (36.4 °C) (01/01/25 0811)  Pulse: 81 (01/01/25 0837)  Resp: 16 (01/01/25 0837)  BP: (!) 149/50 (01/01/25 0811)  SpO2: 97 % (01/01/25 0811) Vital Signs (24h Range):  Temp:  [97.4 °F (36.3 °C)-98.3 °F (36.8 °C)] 97.5 °F (36.4 °C)  Pulse:  [67-81] 81  Resp:  [16-20] 16  SpO2:  [90 %-98 %] 97 %  BP: (121-149)/(50-72) 149/50     Weight: (!) 168.4 kg (371 lb 4.1 oz)  Body mass index is 63.73 kg/m².    Intake/Output Summary (Last 24 hours) at 1/1/2025 0903  Last data filed at 1/1/2025 0458  Gross per 24 hour   Intake 580 ml   Output --   Net 580 ml         Physical Exam  Vitals and nursing note reviewed.   Constitutional:       General: She is not in acute distress.     Appearance: She is obese.   HENT:      Head: Normocephalic and atraumatic.      Right Ear: External ear normal.      Left Ear: External ear normal.      Nose: Nose normal.      Mouth/Throat:      Mouth: Mucous membranes are moist.      Pharynx: Oropharynx is clear.   Eyes:      Extraocular Movements: Extraocular movements intact.      Conjunctiva/sclera: Conjunctivae normal.   Cardiovascular:      Rate and Rhythm: Normal rate and regular rhythm.      Pulses: Normal pulses.      Heart sounds: Normal heart sounds.   Pulmonary:      Effort: Pulmonary effort is normal.      Breath sounds: Normal breath sounds.   Abdominal:      General: Bowel " sounds are normal. There is no distension.      Palpations: Abdomen is soft.      Tenderness: There is no abdominal tenderness.   Musculoskeletal:         General: Normal range of motion.      Cervical back: Normal range of motion and neck supple.      Right lower leg: Edema present.      Left lower leg: Edema present.   Skin:     Findings: Erythema and rash present.   Neurological:      Mental Status: She is alert.             Significant Labs: All pertinent labs within the past 24 hours have been reviewed.    Significant Imaging: I have reviewed all pertinent imaging results/findings within the past 24 hours.    Assessment and Plan     * Cellulitis of right leg  -Continue linezolid and Rocephin  -ID consulted  -Wound Care  -PRN analgesics  -Will need referral to Plastic or General Surgery    Debility  Patient with Acute on chronic debility due to functional quadraplegia. The patient's latest AMPAC (Activity Measure for Post Acute Care) Score is listed below.    AM-PAC Score - How much help does the patient need for each activity listed  Basic Mobility Total Score: 13  Turning over in bed (including adjusting bedclothes, sheets and blankets)?: A little  Sitting down on and standing up from a chair with arms (e.g., wheelchair, bedside commode, etc.): A little  Moving from lying on back to sitting on the side of the bed?: A lot  Moving to and from a bed to a chair (including a wheelchair)?: A lot  Need to walk in hospital room?: A lot  Climbing 3-5 steps with a railing?: Unable    Plan  - Progressive mobility protocol initated  - PT/OT consulted  - Fall precautions in place            Anemia  In setting of infection.  -Trend Hgb with CBC    Dependence on nicotine from cigarettes  -Patient counseled on cessation    Severe obesity (BMI >= 40)  Body mass index is 63.73 kg/m². Morbid obesity complicates all aspects of disease management from diagnostic modalities to treatment. Weight loss encouraged and health benefits  explained to patient.     KEESHA (obstructive sleep apnea)  -CPAP QHS      VTE Risk Mitigation (From admission, onward)           Ordered     enoxaparin injection 60 mg  Every 12 hours         12/26/24 1826     IP VTE HIGH RISK PATIENT  Once         12/26/24 1825     Place sequential compression device  Until discontinued         12/26/24 1825                    Discharge Planning   JONATHAN: 1/4/2025     Code Status: Full Code   Medical Readiness for Discharge Date:   Discharge Plan A: Rehab                Please place Justification for DME        Felix Sorensen MD  Department of Hospital Medicine   [facility]

## 2025-01-02 ENCOUNTER — CLINICAL SUPPORT (OUTPATIENT)
Dept: SMOKING CESSATION | Facility: CLINIC | Age: 60
End: 2025-01-02

## 2025-01-02 DIAGNOSIS — F17.200 NICOTINE DEPENDENCE: Primary | ICD-10-CM

## 2025-01-02 LAB
ANION GAP SERPL CALC-SCNC: 4 MMOL/L (ref 8–16)
BASOPHILS # BLD AUTO: 0.06 K/UL (ref 0–0.2)
BASOPHILS NFR BLD: 0.6 % (ref 0–1.9)
BUN SERPL-MCNC: 13 MG/DL (ref 6–20)
CALCIUM SERPL-MCNC: 8.9 MG/DL (ref 8.7–10.5)
CHLORIDE SERPL-SCNC: 103 MMOL/L (ref 95–110)
CO2 SERPL-SCNC: 31 MMOL/L (ref 23–29)
CREAT SERPL-MCNC: 0.5 MG/DL (ref 0.5–1.4)
DIFFERENTIAL METHOD BLD: ABNORMAL
EOSINOPHIL # BLD AUTO: 0.2 K/UL (ref 0–0.5)
EOSINOPHIL NFR BLD: 2.4 % (ref 0–8)
ERYTHROCYTE [DISTWIDTH] IN BLOOD BY AUTOMATED COUNT: 14.5 % (ref 11.5–14.5)
EST. GFR  (NO RACE VARIABLE): >60 ML/MIN/1.73 M^2
GLUCOSE SERPL-MCNC: 110 MG/DL (ref 70–110)
HCT VFR BLD AUTO: 34.8 % (ref 37–48.5)
HGB BLD-MCNC: 10.4 G/DL (ref 12–16)
IMM GRANULOCYTES # BLD AUTO: 0.08 K/UL (ref 0–0.04)
IMM GRANULOCYTES NFR BLD AUTO: 0.8 % (ref 0–0.5)
LYMPHOCYTES # BLD AUTO: 2.3 K/UL (ref 1–4.8)
LYMPHOCYTES NFR BLD: 23.1 % (ref 18–48)
MCH RBC QN AUTO: 25.9 PG (ref 27–31)
MCHC RBC AUTO-ENTMCNC: 29.9 G/DL (ref 32–36)
MCV RBC AUTO: 87 FL (ref 82–98)
MONOCYTES # BLD AUTO: 0.7 K/UL (ref 0.3–1)
MONOCYTES NFR BLD: 7 % (ref 4–15)
NEUTROPHILS # BLD AUTO: 6.4 K/UL (ref 1.8–7.7)
NEUTROPHILS NFR BLD: 66.1 % (ref 38–73)
NRBC BLD-RTO: 0 /100 WBC
PLATELET # BLD AUTO: 357 K/UL (ref 150–450)
PMV BLD AUTO: 9 FL (ref 9.2–12.9)
POTASSIUM SERPL-SCNC: 4 MMOL/L (ref 3.5–5.1)
RBC # BLD AUTO: 4.02 M/UL (ref 4–5.4)
SODIUM SERPL-SCNC: 138 MMOL/L (ref 136–145)
WBC # BLD AUTO: 9.74 K/UL (ref 3.9–12.7)

## 2025-01-02 PROCEDURE — 99900035 HC TECH TIME PER 15 MIN (STAT)

## 2025-01-02 PROCEDURE — 94660 CPAP INITIATION&MGMT: CPT

## 2025-01-02 PROCEDURE — 85025 COMPLETE CBC W/AUTO DIFF WBC: CPT | Performed by: HOSPITALIST

## 2025-01-02 PROCEDURE — 94761 N-INVAS EAR/PLS OXIMETRY MLT: CPT

## 2025-01-02 PROCEDURE — 12000002 HC ACUTE/MED SURGE SEMI-PRIVATE ROOM

## 2025-01-02 PROCEDURE — 80048 BASIC METABOLIC PNL TOTAL CA: CPT | Performed by: STUDENT IN AN ORGANIZED HEALTH CARE EDUCATION/TRAINING PROGRAM

## 2025-01-02 PROCEDURE — 25000003 PHARM REV CODE 250: Performed by: STUDENT IN AN ORGANIZED HEALTH CARE EDUCATION/TRAINING PROGRAM

## 2025-01-02 PROCEDURE — 25000003 PHARM REV CODE 250: Performed by: HOSPITALIST

## 2025-01-02 PROCEDURE — 27100171 HC OXYGEN HIGH FLOW UP TO 24 HOURS

## 2025-01-02 PROCEDURE — 63600175 PHARM REV CODE 636 W HCPCS: Performed by: INTERNAL MEDICINE

## 2025-01-02 PROCEDURE — 63600175 PHARM REV CODE 636 W HCPCS: Performed by: HOSPITALIST

## 2025-01-02 PROCEDURE — 36415 COLL VENOUS BLD VENIPUNCTURE: CPT | Performed by: STUDENT IN AN ORGANIZED HEALTH CARE EDUCATION/TRAINING PROGRAM

## 2025-01-02 PROCEDURE — 97116 GAIT TRAINING THERAPY: CPT | Mod: CQ

## 2025-01-02 PROCEDURE — 99406 BEHAV CHNG SMOKING 3-10 MIN: CPT | Performed by: CLINIC/CENTER

## 2025-01-02 RX ORDER — ONDANSETRON 4 MG/1
4 TABLET, ORALLY DISINTEGRATING ORAL EVERY 6 HOURS PRN
Status: DISCONTINUED | OUTPATIENT
Start: 2025-01-02 | End: 2025-01-03 | Stop reason: HOSPADM

## 2025-01-02 RX ADMIN — HYDROCODONE BITARTRATE AND ACETAMINOPHEN 1 TABLET: 10; 325 TABLET ORAL at 09:01

## 2025-01-02 RX ADMIN — HYDROCODONE BITARTRATE AND ACETAMINOPHEN 1 TABLET: 10; 325 TABLET ORAL at 02:01

## 2025-01-02 RX ADMIN — ONDANSETRON 4 MG: 2 INJECTION INTRAMUSCULAR; INTRAVENOUS at 12:01

## 2025-01-02 RX ADMIN — ENOXAPARIN SODIUM 60 MG: 60 INJECTION SUBCUTANEOUS at 08:01

## 2025-01-02 RX ADMIN — LINEZOLID 600 MG: 600 TABLET, FILM COATED ORAL at 05:01

## 2025-01-02 RX ADMIN — HYDROCODONE BITARTRATE AND ACETAMINOPHEN 1 TABLET: 10; 325 TABLET ORAL at 11:01

## 2025-01-02 RX ADMIN — ONDANSETRON 4 MG: 4 TABLET, ORALLY DISINTEGRATING ORAL at 08:01

## 2025-01-02 RX ADMIN — HYDROCODONE BITARTRATE AND ACETAMINOPHEN 1 TABLET: 10; 325 TABLET ORAL at 06:01

## 2025-01-02 RX ADMIN — ENOXAPARIN SODIUM 60 MG: 60 INJECTION SUBCUTANEOUS at 09:01

## 2025-01-02 RX ADMIN — HYDROCODONE BITARTRATE AND ACETAMINOPHEN 1 TABLET: 10; 325 TABLET ORAL at 01:01

## 2025-01-02 RX ADMIN — CEFTRIAXONE 2 G: 2 INJECTION, POWDER, FOR SOLUTION INTRAMUSCULAR; INTRAVENOUS at 05:01

## 2025-01-02 RX ADMIN — DULOXETINE HYDROCHLORIDE 30 MG: 30 CAPSULE, DELAYED RELEASE ORAL at 09:01

## 2025-01-02 NOTE — PROGRESS NOTES
[facility] Medicine  Progress Note    Patient Name: Adriana Zaragoza  MRN: [id number]  Patient Class: IP- Inpatient   Admission Date: 12/26/2024  Length of Stay: 7 days  Attending Physician: Felix Sorensen MD  Primary Care Provider: Melba Trivedi MD        Subjective     Principal Problem:Cellulitis of right leg        HPI:  Please note that patient is intubated and sedated at the time of my evaluation, so all information was obtained from the ER provider.      The patient is a 59-year-old female with severe morbid obesity who was brought to the ER because of fever, and chills.  On arrival to the ER, patient was alert and oriented and able to give information. She has a fever of 101.9, and labs reveal leukocytosis of 18.  Sepsis workup was started, and patient got empiric antibiotics.  However few hours while being in the ER, she started to become confused, agitated, had rigors, and oxygen dropped to the 80s. ER provider was concern that pt's symptoms are due to allergic reaction to abx, so steroid, benadryl and pepcid iv were given. She was then intubated for airway protection.  After intubation, repeat temperature was 104.4°.  Currently septic workup is negative including chest x-ray, UA, and pro-calcitonin.  As a result decision was made to order a CT chest abdomen pelvis to further evaluate her sepsis.  However, given pt's severe morbid obesity, she could not fit in our CT scanner.  Patient will be sent to [facility] to get the CT scans ordered by the ER provider, then she will be admitted to our ICU for further management.    Overview/Hospital Course:  Adriana Zaragoza is a 59 year old female with a past medical history of obesity, tobacco dependence, KEESHA, and anemia who presented with severe sepsis secondary to a RLE cellulitis. She developed acute respiratory failure and was intubated and sedated in the ICU. ID was consulted and changed the antibiotics  "from vancomycin and meropenem to linezolid and Rocephin. Wound Care has been consulted. She was extubated and is stable on 2 L NC with CPAP QHS. PT/OT has been consulted and recommended high intensity therapy. CM has been consulted for IPR/SNF placement.    Interval History: see "Hospital Course"    Review of Systems   Constitutional:  Positive for activity change and fatigue. Negative for chills and fever.   Cardiovascular:  Positive for leg swelling.   Musculoskeletal:  Positive for arthralgias and myalgias.   Skin:  Positive for color change, rash and wound.     Objective:     Vital Signs (Most Recent):  Temp: 98.1 °F (36.7 °C) (01/02/25 0801)  Pulse: 63 (01/02/25 0801)  Resp: 18 (01/02/25 0915)  BP: (!) 155/73 (01/02/25 0801)  SpO2: 97 % (01/02/25 0801) Vital Signs (24h Range):  Temp:  [97.7 °F (36.5 °C)-98.4 °F (36.9 °C)] 98.1 °F (36.7 °C)  Pulse:  [61-83] 63  Resp:  [17-20] 18  SpO2:  [93 %-98 %] 97 %  BP: (125-155)/(57-73) 155/73     Weight: (!) 168.4 kg (371 lb 4.1 oz)  Body mass index is 63.73 kg/m².    Intake/Output Summary (Last 24 hours) at 1/2/2025 1023  Last data filed at 1/1/2025 1836  Gross per 24 hour   Intake 500 ml   Output 600 ml   Net -100 ml         Physical Exam  Vitals and nursing note reviewed.   Constitutional:       General: She is not in acute distress.     Appearance: She is obese.   HENT:      Head: Normocephalic and atraumatic.      Right Ear: External ear normal.      Left Ear: External ear normal.      Nose: Nose normal.      Mouth/Throat:      Mouth: Mucous membranes are moist.      Pharynx: Oropharynx is clear.   Eyes:      Extraocular Movements: Extraocular movements intact.      Conjunctiva/sclera: Conjunctivae normal.   Cardiovascular:      Rate and Rhythm: Normal rate and regular rhythm.      Pulses: Normal pulses.      Heart sounds: Normal heart sounds.   Pulmonary:      Effort: Pulmonary effort is normal.      Breath sounds: Normal breath sounds.   Abdominal:      General: " Bowel sounds are normal. There is no distension.      Palpations: Abdomen is soft.      Tenderness: There is no abdominal tenderness.   Musculoskeletal:         General: Normal range of motion.      Cervical back: Normal range of motion and neck supple.      Right lower leg: Edema present.      Left lower leg: Edema present.   Skin:     Findings: Erythema and rash present.   Neurological:      Mental Status: She is alert.             Significant Labs: All pertinent labs within the past 24 hours have been reviewed.    Significant Imaging: I have reviewed all pertinent imaging results/findings within the past 24 hours.    Assessment and Plan     * Cellulitis of right leg  Improving.  -Continue linezolid and Rocephin  -ID consulted  -Wound Care  -PRN analgesics  -Will need referral to Plastic or General Surgery    Debility  Patient with Acute on chronic debility due to functional quadraplegia. The patient's latest AMPAC (Activity Measure for Post Acute Care) Score is listed below.    AM-PAC Score - How much help does the patient need for each activity listed  Basic Mobility Total Score: 13  Turning over in bed (including adjusting bedclothes, sheets and blankets)?: A little  Sitting down on and standing up from a chair with arms (e.g., wheelchair, bedside commode, etc.): A little  Moving from lying on back to sitting on the side of the bed?: A lot  Moving to and from a bed to a chair (including a wheelchair)?: A lot  Need to walk in hospital room?: A lot  Climbing 3-5 steps with a railing?: Unable    Plan  - Progressive mobility protocol initated  - PT/OT consulted  - Fall precautions in place            Anemia  In setting of infection.  -Trend Hgb with CBC    Dependence on nicotine from cigarettes  -Patient counseled on cessation    Severe obesity (BMI >= 40)  Body mass index is 63.73 kg/m². Morbid obesity complicates all aspects of disease management from diagnostic modalities to treatment. Weight loss encouraged and  health benefits explained to patient.     KEESHA (obstructive sleep apnea)  -CPAP QHS      VTE Risk Mitigation (From admission, onward)           Ordered     enoxaparin injection 60 mg  Every 12 hours         12/26/24 1826     IP VTE HIGH RISK PATIENT  Once         12/26/24 1825     Place sequential compression device  Until discontinued         12/26/24 1825                    Discharge Planning   JONATHAN: 1/4/2025     Code Status: Full Code   Medical Readiness for Discharge Date:   Discharge Plan A: Rehab                Please place Justification for DME        Felix Sorensen MD  Department of Hospital Medicine   [facility]

## 2025-01-02 NOTE — RESPIRATORY THERAPY
01/02/25 1310   Patient Assessment/Suction   Level of Consciousness (AVPU) alert   Respiratory Effort Normal;Unlabored   Expansion/Accessory Muscles/Retractions no use of accessory muscles;no retractions;expansion symmetric   All Lung Fields Breath Sounds clear   Rhythm/Pattern, Respiratory unlabored;pattern regular;depth regular;no shortness of breath reported   Cough Frequency no cough   PRE-TX-O2   Device (Oxygen Therapy) CPAP   $ Is the patient on High Flow Oxygen? Yes   SpO2 95 %   Pulse Oximetry Type Intermittent   $ Pulse Oximetry - Multiple Charge Pulse Oximetry - Multiple   Aerosol Therapy   $ Aerosol Therapy Charges PRN treatment not required   Preset CPAP/BiPAP Settings   Mode Of Delivery CPAP   $ CPAP/BiPAP Daily Charge 1   CPAP/BIPAP charged w/in last 24 h YES   $ Is patient using? Yes   Size of Mask Small/Medium   Sized Appropriately? Yes   Equipment Type V60   Airway Device Type small full face mask   Humidifier not applicable   CPAP (cm H2O) 12   Patient CPAP/BiPAP Settings   CPAP/BIPAP ID 18   RR Total (Breaths/Min) 20   Tidal Volume (mL) 400   VE Minute Ventilation (L/min) 6.5 L/min   Peak Inspiratory Pressure (cm H2O) 14   TiTOT (%) 25   Total Leak (L/Min) 0   Patient Trigger - ST Mode Only (%) 100   CPAP/BiPAP Alarms   High Pressure (cm H2O) 40   Low Pressure (cm H2O) 5   Minute Ventilation (L/Min) 3   High RR (breaths/min) 40   Low RR (breaths/min) 8   Apnea (Sec) 20

## 2025-01-02 NOTE — ASSESSMENT & PLAN NOTE
Improving.  -Continue linezolid and Rocephin  -ID consulted  -Wound Care  -PRN analgesics  -Will need referral to Plastic or General Surgery

## 2025-01-02 NOTE — PROGRESS NOTES
01/02/25 1230   Tobacco Cessation Intervention   Do you use any type of tobacco product? Yes   Are you interested in quitting use of tobacco products? Ready to quit   Are you interested in Nicotine Replacement for symptom relief? No   $ Smoking Cessation Charges Smoking Cessation - Intermediate (CTTS)

## 2025-01-02 NOTE — SUBJECTIVE & OBJECTIVE
"Interval History: see "Hospital Course"    Review of Systems   Constitutional:  Positive for activity change and fatigue. Negative for chills and fever.   Cardiovascular:  Positive for leg swelling.   Musculoskeletal:  Positive for arthralgias and myalgias.   Skin:  Positive for color change, rash and wound.     Objective:     Vital Signs (Most Recent):  Temp: 98.1 °F (36.7 °C) (01/02/25 0801)  Pulse: 63 (01/02/25 0801)  Resp: 18 (01/02/25 0915)  BP: (!) 155/73 (01/02/25 0801)  SpO2: 97 % (01/02/25 0801) Vital Signs (24h Range):  Temp:  [97.7 °F (36.5 °C)-98.4 °F (36.9 °C)] 98.1 °F (36.7 °C)  Pulse:  [61-83] 63  Resp:  [17-20] 18  SpO2:  [93 %-98 %] 97 %  BP: (125-155)/(57-73) 155/73     Weight: (!) 168.4 kg (371 lb 4.1 oz)  Body mass index is 63.73 kg/m².    Intake/Output Summary (Last 24 hours) at 1/2/2025 1023  Last data filed at 1/1/2025 1836  Gross per 24 hour   Intake 500 ml   Output 600 ml   Net -100 ml         Physical Exam  Vitals and nursing note reviewed.   Constitutional:       General: She is not in acute distress.     Appearance: She is obese.   HENT:      Head: Normocephalic and atraumatic.      Right Ear: External ear normal.      Left Ear: External ear normal.      Nose: Nose normal.      Mouth/Throat:      Mouth: Mucous membranes are moist.      Pharynx: Oropharynx is clear.   Eyes:      Extraocular Movements: Extraocular movements intact.      Conjunctiva/sclera: Conjunctivae normal.   Cardiovascular:      Rate and Rhythm: Normal rate and regular rhythm.      Pulses: Normal pulses.      Heart sounds: Normal heart sounds.   Pulmonary:      Effort: Pulmonary effort is normal.      Breath sounds: Normal breath sounds.   Abdominal:      General: Bowel sounds are normal. There is no distension.      Palpations: Abdomen is soft.      Tenderness: There is no abdominal tenderness.   Musculoskeletal:         General: Normal range of motion.      Cervical back: Normal range of motion and neck supple.      " Right lower leg: Edema present.      Left lower leg: Edema present.   Skin:     Findings: Erythema and rash present.   Neurological:      Mental Status: She is alert.             Significant Labs: All pertinent labs within the past 24 hours have been reviewed.    Significant Imaging: I have reviewed all pertinent imaging results/findings within the past 24 hours.

## 2025-01-02 NOTE — ASSESSMENT & PLAN NOTE
"This patient does have evidence of infective focus  My overall impression is sepsis.  Source: Skin and Soft Tissue (location right thigh)  Antibiotics given-   Antibiotics (72h ago, onward)    Start     Stop Route Frequency Ordered    12/31/24 1800  linezolid tablet 600 mg         -- Oral Every 12 hours (non-standard times) 12/31/24 1111    12/28/24 1730  cefTRIAXone injection 2 g         -- IV Every 24 hours (non-standard times) 12/28/24 1639        Latest lactate reviewed-  No results for input(s): "LACTATE", "POCLAC" in the last 72 hours.    "

## 2025-01-02 NOTE — ASSESSMENT & PLAN NOTE
Suspect secondary to altered mental status, and severe morbid obesity.  Extubated 12/28/24    Vent support being given.   Oxygen Concentration (%):  [30] 30    2 liters NC oxygen  Avoid over sedating meds  Cpap at night and PRN

## 2025-01-02 NOTE — CONSULTS
Dr. Alvarez completed consult. Wound care team continues to follow this patient throughout hospital stay.

## 2025-01-02 NOTE — PT/OT/SLP PROGRESS
Physical Therapy Treatment    Patient Name:  Adriana Zaragoza   MRN:  [id number]    Recommendations:     Discharge Recommendations: High Intensity Therapy (vs Low Int w/ 24/7 supervision, pending progress)  Discharge Equipment Recommendations: none  Barriers to discharge: None    Assessment:     Adriana Zaragoza is a 59 y.o. female admitted with a medical diagnosis of Cellulitis of right leg.  She presents with the following impairments/functional limitations: weakness, impaired endurance, impaired self care skills, impaired functional mobility, gait instability, impaired cardiopulmonary response to activity . Pt seen up in bed side chair, reporting that she feels much better today. Three gait trials completed, ambulating 60, 90, and 90 feet SBA/CGA with bariatric RW. O2 monitored throughout tx, pt desat to 88, but recovered quickly with seated rest break.     Rehab Prognosis: Fair; patient would benefit from acute skilled PT services to address these deficits and reach maximum level of function.    Recent Surgery: * No surgery found *      Plan:     During this hospitalization, patient to be seen 6 x/week to address the identified rehab impairments via gait training, therapeutic activities, therapeutic exercises, neuromuscular re-education and progress toward the following goals:    Plan of Care Expires:  01/27/25    Subjective     Chief Complaint: None stated  Patient/Family Comments/goals: Pt agreeable to PT.   Pain/Comfort:  Pain Rating 1: 0/10  Pain Rating Post-Intervention 1: 0/10      Objective:     Communicated with RN prior to session.  Patient found up in chair with telemetry, peripheral IV upon PT entry to room.     General Precautions: Standard, fall, respiratory  Orthopedic Precautions: N/A  Braces: N/A  Respiratory Status: Room air     Functional Mobility:  Transfers:     Sit to Stand:  contact guard assistance with rolling walker  Gait: 60, 90, and 90 feet CGA/SBA  with bariatric  mike      AM-PAC 6 CLICK MOBILITY          Treatment & Education:  Pt was educated on the following: call light use, importance of OOB activity and functional mobility to negate the negative effects of prolonged bed rest during this hospitalization, safe transfers/ambulation and discharge planning recommendations/options.     Patient left up in chair with all lines intact and call button in reach..    GOALS:   Multidisciplinary Problems       Physical Therapy Goals          Problem: Physical Therapy    Goal Priority Disciplines Outcome Interventions   Physical Therapy Goal     PT, PT/OT Progressing    Description: Goals to be met by: 25     Patient will increase functional independence with mobility by performin. Supine to sit with Minimal Assistance  2. Sit to stand transfer with Supervision using Bariatric Rolling Walker  3. Bed to chair transfer with Supervision using Bariatric Rolling Walker  4. Gait  x 75 feet with Supervision using Bariatric Rolling Walker.                              Time Tracking:     PT Received On: 25  PT Start Time: 923     PT Stop Time: 946  PT Total Time (min): 23 min     Billable Minutes: Gait Training 23    Treatment Type: Treatment  PT/PTA: PTA     Number of PTA visits since last PT visit: 2     2025

## 2025-01-02 NOTE — RESPIRATORY THERAPY
01/01/25 2023   Patient Assessment/Suction   Level of Consciousness (AVPU) alert   Respiratory Effort Unlabored;Normal   Expansion/Accessory Muscles/Retractions no use of accessory muscles   All Lung Fields Breath Sounds Anterior:;diminished   Rhythm/Pattern, Respiratory unlabored;pattern regular;depth regular;no shortness of breath reported   Skin Integrity   $ Wound Care Tech Time 15 min   Area Observed Left;Right;Cheek;Bridge of nose;Upper lip;Forehead;Chin   Skin Appearance without discoloration   PRE-TX-O2   Device (Oxygen Therapy) CPAP   Oxygen Concentration (%) 30   SpO2 98 %   Pulse Oximetry Type Intermittent   $ Pulse Oximetry - Multiple Charge Pulse Oximetry - Multiple   Pulse 83   Resp 20   Aerosol Therapy   $ Aerosol Therapy Charges PRN treatment not required   Daily Review of Necessity (SVN) completed   Respiratory Treatment Status (SVN) PRN treatment not required   Ready to Wean/Extubation Screen   FIO2<=50 (chart decimal) 0.3   Preset CPAP/BiPAP Settings   Mode Of Delivery CPAP   CPAP/BIPAP charged w/in last 24 h YES   $ Is patient using? Yes   Size of Mask Small/Medium   Sized Appropriately? Yes   Equipment Type V60   Airway Device Type small full face mask   Humidifier not applicable   CPAP (cm H2O) 12   Patient CPAP/BiPAP Settings   CPAP/BIPAP ID 18   RR Total (Breaths/Min) 22   Tidal Volume (mL) 345   VE Minute Ventilation (L/min) 7.5 L/min   Peak Inspiratory Pressure (cm H2O) 13   TiTOT (%) 25   Total Leak (L/Min) 0   Patient Trigger - ST Mode Only (%) 100   CPAP/BiPAP Alarms   High Pressure (cm H2O) 40   Low Pressure (cm H2O) 5   High RR (breaths/min) 40   Low RR (breaths/min) 8   Apnea (Sec) 20   Education   $ Education 15 min;Oxygen;BiPAP;Bronchodilator

## 2025-01-02 NOTE — PLAN OF CARE
PT/OT recommendation of high intensity therapy Pt okay with this   Rehab referral sent to [facility] and [facility]     12:36pm- Pt requesting to go home with HH states she is doing better. Referral  canceled    01/02/25 1136   Post-Acute Status   Post-Acute Authorization Placement   Post-Acute Placement Status Referrals Sent   Discharge Plan   Discharge Plan A Rehab   Discharge Plan B Skilled Nursing Facility

## 2025-01-02 NOTE — PT/OT/SLP PROGRESS
Occupational Therapy      Patient Name:  Adriana Zaragoza   MRN:  [id number]    Patient not seen today secondary to Other (Comment) (therapist unavailable). Will follow-up next available date.    1/2/2025

## 2025-01-03 VITALS
WEIGHT: 293 LBS | RESPIRATION RATE: 18 BRPM | BODY MASS INDEX: 50.02 KG/M2 | SYSTOLIC BLOOD PRESSURE: 143 MMHG | HEIGHT: 64 IN | HEART RATE: 69 BPM | DIASTOLIC BLOOD PRESSURE: 71 MMHG | TEMPERATURE: 98 F | OXYGEN SATURATION: 97 %

## 2025-01-03 LAB
ANION GAP SERPL CALC-SCNC: 3 MMOL/L (ref 8–16)
BASOPHILS # BLD AUTO: 0.06 K/UL (ref 0–0.2)
BASOPHILS NFR BLD: 0.8 % (ref 0–1.9)
BUN SERPL-MCNC: 14 MG/DL (ref 6–20)
CALCIUM SERPL-MCNC: 8.7 MG/DL (ref 8.7–10.5)
CHLORIDE SERPL-SCNC: 104 MMOL/L (ref 95–110)
CO2 SERPL-SCNC: 31 MMOL/L (ref 23–29)
CREAT SERPL-MCNC: 0.6 MG/DL (ref 0.5–1.4)
DIFFERENTIAL METHOD BLD: ABNORMAL
EOSINOPHIL # BLD AUTO: 0.3 K/UL (ref 0–0.5)
EOSINOPHIL NFR BLD: 3.2 % (ref 0–8)
ERYTHROCYTE [DISTWIDTH] IN BLOOD BY AUTOMATED COUNT: 14.6 % (ref 11.5–14.5)
EST. GFR  (NO RACE VARIABLE): >60 ML/MIN/1.73 M^2
GLUCOSE SERPL-MCNC: 117 MG/DL (ref 70–110)
HCT VFR BLD AUTO: 34.5 % (ref 37–48.5)
HGB BLD-MCNC: 10.6 G/DL (ref 12–16)
IMM GRANULOCYTES # BLD AUTO: 0.06 K/UL (ref 0–0.04)
IMM GRANULOCYTES NFR BLD AUTO: 0.8 % (ref 0–0.5)
LYMPHOCYTES # BLD AUTO: 2.3 K/UL (ref 1–4.8)
LYMPHOCYTES NFR BLD: 29.8 % (ref 18–48)
MCH RBC QN AUTO: 27 PG (ref 27–31)
MCHC RBC AUTO-ENTMCNC: 30.7 G/DL (ref 32–36)
MCV RBC AUTO: 88 FL (ref 82–98)
MONOCYTES # BLD AUTO: 0.7 K/UL (ref 0.3–1)
MONOCYTES NFR BLD: 9.2 % (ref 4–15)
NEUTROPHILS # BLD AUTO: 4.3 K/UL (ref 1.8–7.7)
NEUTROPHILS NFR BLD: 56.2 % (ref 38–73)
NRBC BLD-RTO: 0 /100 WBC
PLATELET # BLD AUTO: 342 K/UL (ref 150–450)
PMV BLD AUTO: 9 FL (ref 9.2–12.9)
POTASSIUM SERPL-SCNC: 4.1 MMOL/L (ref 3.5–5.1)
RBC # BLD AUTO: 3.93 M/UL (ref 4–5.4)
SODIUM SERPL-SCNC: 138 MMOL/L (ref 136–145)
WBC # BLD AUTO: 7.71 K/UL (ref 3.9–12.7)

## 2025-01-03 PROCEDURE — 94660 CPAP INITIATION&MGMT: CPT

## 2025-01-03 PROCEDURE — 36415 COLL VENOUS BLD VENIPUNCTURE: CPT | Performed by: STUDENT IN AN ORGANIZED HEALTH CARE EDUCATION/TRAINING PROGRAM

## 2025-01-03 PROCEDURE — 25000003 PHARM REV CODE 250: Performed by: HOSPITALIST

## 2025-01-03 PROCEDURE — 63600175 PHARM REV CODE 636 W HCPCS: Performed by: HOSPITALIST

## 2025-01-03 PROCEDURE — 85025 COMPLETE CBC W/AUTO DIFF WBC: CPT | Performed by: HOSPITALIST

## 2025-01-03 PROCEDURE — 94761 N-INVAS EAR/PLS OXIMETRY MLT: CPT

## 2025-01-03 PROCEDURE — 80048 BASIC METABOLIC PNL TOTAL CA: CPT | Performed by: STUDENT IN AN ORGANIZED HEALTH CARE EDUCATION/TRAINING PROGRAM

## 2025-01-03 PROCEDURE — 27000221 HC OXYGEN, UP TO 24 HOURS

## 2025-01-03 PROCEDURE — 99900035 HC TECH TIME PER 15 MIN (STAT)

## 2025-01-03 PROCEDURE — 27100171 HC OXYGEN HIGH FLOW UP TO 24 HOURS

## 2025-01-03 PROCEDURE — 25000003 PHARM REV CODE 250: Performed by: STUDENT IN AN ORGANIZED HEALTH CARE EDUCATION/TRAINING PROGRAM

## 2025-01-03 RX ORDER — CIPROFLOXACIN 750 MG/1
750 TABLET, FILM COATED ORAL EVERY 12 HOURS
Qty: 28 TABLET | Refills: 0 | Status: SHIPPED | OUTPATIENT
Start: 2025-01-03 | End: 2025-01-03

## 2025-01-03 RX ORDER — LINEZOLID 600 MG/1
600 TABLET, FILM COATED ORAL EVERY 12 HOURS
Qty: 28 TABLET | Refills: 0 | Status: SHIPPED | OUTPATIENT
Start: 2025-01-03 | End: 2025-01-17

## 2025-01-03 RX ORDER — LINEZOLID 600 MG/1
600 TABLET, FILM COATED ORAL EVERY 12 HOURS
Qty: 28 TABLET | Refills: 0 | Status: SHIPPED | OUTPATIENT
Start: 2025-01-03 | End: 2025-01-03

## 2025-01-03 RX ORDER — CIPROFLOXACIN 750 MG/1
750 TABLET, FILM COATED ORAL EVERY 12 HOURS
Qty: 28 TABLET | Refills: 0 | Status: SHIPPED | OUTPATIENT
Start: 2025-01-03 | End: 2025-01-17

## 2025-01-03 RX ADMIN — HYDROCODONE BITARTRATE AND ACETAMINOPHEN 1 TABLET: 10; 325 TABLET ORAL at 02:01

## 2025-01-03 RX ADMIN — HYDROCODONE BITARTRATE AND ACETAMINOPHEN 1 TABLET: 10; 325 TABLET ORAL at 04:01

## 2025-01-03 RX ADMIN — LINEZOLID 600 MG: 600 TABLET, FILM COATED ORAL at 06:01

## 2025-01-03 RX ADMIN — HYDROCODONE BITARTRATE AND ACETAMINOPHEN 1 TABLET: 10; 325 TABLET ORAL at 08:01

## 2025-01-03 RX ADMIN — ENOXAPARIN SODIUM 60 MG: 60 INJECTION SUBCUTANEOUS at 08:01

## 2025-01-03 RX ADMIN — DULOXETINE HYDROCHLORIDE 30 MG: 30 CAPSULE, DELAYED RELEASE ORAL at 08:01

## 2025-01-03 NOTE — PT/OT/SLP PROGRESS
Physical Therapy      Patient Name:  Adriana Zaragoza   MRN:  [id number]    Patient not seen today secondary to prepping for d/c. Will follow-up if d/c changes.

## 2025-01-03 NOTE — ASSESSMENT & PLAN NOTE
Patient with Acute on chronic debility due to functional quadraplegia. The patient's latest AMPAC (Activity Measure for Post Acute Care) Score is listed below.    AM-PAC Score - How much help does the patient need for each activity listed  Basic Mobility Total Score: 13  Turning over in bed (including adjusting bedclothes, sheets and blankets)?: A little  Sitting down on and standing up from a chair with arms (e.g., wheelchair, bedside commode, etc.): A little  Moving from lying on back to sitting on the side of the bed?: A lot  Moving to and from a bed to a chair (including a wheelchair)?: A lot  Need to walk in hospital room?: A lot  Climbing 3-5 steps with a railing?: Unable    Plan  - PT/OT

## 2025-01-03 NOTE — PT/OT/SLP PROGRESS
Occupational Therapy      Patient Name:  Adriana Zaragoza   MRN:  [id number]    Patient not seen today secondary to Other (Comment) (pt with D/C order prior being seen). Will follow-up if d/c orders change.    1/3/2025

## 2025-01-03 NOTE — PLAN OF CARE
01/03/25 1110   Post-Acute Status   Post-Acute Authorization Home Health   Home Health Status Referrals Sent

## 2025-01-03 NOTE — CARE UPDATE
01/02/25 1948   Patient Assessment/Suction   Level of Consciousness (AVPU) alert   Respiratory Effort Normal;Unlabored   Expansion/Accessory Muscles/Retractions no use of accessory muscles;no retractions   All Lung Fields Breath Sounds clear;diminished   Rhythm/Pattern, Respiratory unlabored;pattern regular;no shortness of breath reported   Cough Frequency no cough   PRE-TX-O2   Device (Oxygen Therapy) nasal cannula   Flow (L/min) (Oxygen Therapy) 2   SpO2 98 %   Pulse Oximetry Type Intermittent   Pulse 71   Resp 16   Aerosol Therapy   $ Aerosol Therapy Charges PRN treatment not required   Daily Review of Necessity (SVN) completed   Respiratory Treatment Status (SVN) PRN treatment not required

## 2025-01-03 NOTE — PLAN OF CARE
New order received for home health, patient would like to use [facility]. Referral sent to [facility] via epic.       01/03/25 0939   Post-Acute Status   Post-Acute Authorization Home Health   Home Health Status Referrals Sent

## 2025-01-03 NOTE — RESPIRATORY THERAPY
01/03/25 0823   Patient Assessment/Suction   Level of Consciousness (AVPU) alert   Respiratory Effort Normal;Unlabored   Expansion/Accessory Muscles/Retractions no use of accessory muscles   All Lung Fields Breath Sounds clear;diminished   Rhythm/Pattern, Respiratory unlabored;pattern regular;depth regular;no shortness of breath reported   Cough Frequency no cough   PRE-TX-O2   Device (Oxygen Therapy) CPAP   $ Is the patient on Low Flow Oxygen? Yes   $ Is the patient on High Flow Oxygen? Yes   Flow (L/min) (Oxygen Therapy) 2   Oxygen Concentration (%) 30   SpO2 98 %   Pulse Oximetry Type Intermittent   $ Pulse Oximetry - Multiple Charge Pulse Oximetry - Multiple   Pulse 72   Resp 18   Aerosol Therapy   $ Aerosol Therapy Charges PRN treatment not required   Daily Review of Necessity (SVN) completed   Respiratory Treatment Status (SVN) PRN treatment not required   Preset CPAP/BiPAP Settings   Mode Of Delivery CPAP   $ CPAP/BiPAP Daily Charge 1   CPAP/BIPAP charged w/in last 24 h YES   $ Initial CPAP/BiPAP Setup? No   $ Is patient using? Yes   Size of Mask Small   Sized Appropriately? Yes   Equipment Type V60   Airway Device Type small full face mask   Humidifier other (see comments)   CPAP (cm H2O) 12   Patient CPAP/BiPAP Settings   CPAP/BIPAP ID 18   RR Total (Breaths/Min) 18   Tidal Volume (mL) 440   VE Minute Ventilation (L/min) 7.5 L/min   Peak Inspiratory Pressure (cm H2O) 13   TiTOT (%) 35   Total Leak (L/Min) 10   Patient Trigger - ST Mode Only (%) 100   CPAP/BiPAP Alarms   High Pressure (cm H2O) 40   Low Pressure (cm H2O) 5   Minute Ventilation (L/Min) 3   High RR (breaths/min) 40   Low RR (breaths/min) 8   Apnea (Sec) 20

## 2025-01-03 NOTE — PLAN OF CARE
01/03/25 0824   Discharge Reassessment   Assessment Type Discharge Planning Reassessment   Did the patient's condition or plan change since previous assessment? No   Discharge Plan discussed with: Patient   Communicated JONATHAN with patient/caregiver Yes   Discharge Plan A Home Health   Discharge Plan B Home Health

## 2025-01-03 NOTE — ASSESSMENT & PLAN NOTE
Improving.  -Continue linezolid and Cipro for two weeks  -ID consulted; follow up in clinic  -Wound Care  -PRN analgesics  -Will need referral to Plastic or General Surgery

## 2025-01-03 NOTE — PLAN OF CARE
Patient cleared for discharge by case management to home with home health once prescriptions are delivered to bedside.         01/03/25 1326   Final Note   Assessment Type Final Discharge Note   Anticipated Discharge Disposition Home-Health   Hospital Resources/Appts/Education Provided Appointments scheduled and added to AVS

## 2025-01-03 NOTE — RESPIRATORY THERAPY
01/03/25 1428   Home Oxygen Qualification   $ Home O2 Qualification Tech time 15 minutes   Room Air SpO2 At Rest 95 %   Room Air SpO2 During Ambulation (!) 87 %   SpO2 During Ambulation on O2 93 %   Heart Rate on O2 78 bpm   Ambulation O2 LPM 2 LPM   SpO2 Post Ambulation 94 %   Post Ambulation Heart Rate 78 bpm   Post Ambulation O2 LPM 2 LPM   Home O2 Eval Comments   (pt can use her same lpm as she does now. No need to elevate her 2 lpm)

## 2025-01-03 NOTE — DISCHARGE SUMMARY
[facility]  Discharge Summary      Patient Name: Adriana Zaragoza  MRN: [id number]  MINI: [id number]  Patient Class: IP- Inpatient  Admission Date: 12/26/2024  Hospital Length of Stay: 8 days  Discharge Date and Time: No discharge date for patient encounter.  Attending Physician: Felix Sorensen MD   Discharging Provider: Felix Sorensen MD  Primary Care Provider: Melba Trivedi MD    Primary Care Team: Networked reference to record PCT     HPI:   Please note that patient is intubated and sedated at the time of my evaluation, so all information was obtained from the ER provider.      The patient is a 59-year-old female with severe morbid obesity who was brought to the ER because of fever, and chills.  On arrival to the ER, patient was alert and oriented and able to give information. She has a fever of 101.9, and labs reveal leukocytosis of 18.  Sepsis workup was started, and patient got empiric antibiotics.  However few hours while being in the ER, she started to become confused, agitated, had rigors, and oxygen dropped to the 80s. ER provider was concern that pt's symptoms are due to allergic reaction to abx, so steroid, benadryl and pepcid iv were given. She was then intubated for airway protection.  After intubation, repeat temperature was 104.4°.  Currently septic workup is negative including chest x-ray, UA, and pro-calcitonin.  As a result decision was made to order a CT chest abdomen pelvis to further evaluate her sepsis.  However, given pt's severe morbid obesity, she could not fit in our CT scanner.  Patient will be sent to [facility] to get the CT scans ordered by the ER provider, then she will be admitted to our ICU for further management.    * No surgery found *      Hospital Course:   Adriana Zaragoza is a 59 year old female with a past medical history of obesity, tobacco dependence, KEESHA, and anemia who presented with severe sepsis secondary to  a RLE cellulitis. She developed acute respiratory failure and was intubated and sedated in the ICU. ID was consulted and changed the antibiotics from vancomycin and meropenem to linezolid and Rocephin. Wound Care was consulted. She was extubated and is stable on 2 L NC with CPAP QHS. PT/OT was consulted and recommended high intensity therapy. CM has been consulted for IPR/SNF placement. The patient refused placement. A home O2 evaluation was ordered upon discharge 1/3. She will take Cipro and linezolid for two weeks.  PT/OT was ordered. Referrals to Bariatric and Plastic Surgery were placed.     Goals of Care Treatment Preferences:  Code Status: Full Code      SDOH Screening:  The patient was unable to be screened for utility difficulties, food insecurity, transport difficulties, housing insecurity, and interpersonal safety, so no concerns could be identified this admission.     Consults:   Consults (From admission, onward)          Status Ordering Provider     Inpatient consult to Social Work/Case Management  Once        Provider:  (Not yet assigned)    Ordered MIRIAM ARENAS     Inpatient consult to Midline team  Once        Provider:  (Not yet assigned)    Completed VANESSA FORD     Inpatient consult to Infectious Diseases  Once        Provider:  Rajendra Callahan MD    Completed WIN HAHN     Inpatient consult to Midline team  Once        Provider:  (Not yet assigned)    Completed GT CHE     Inpatient consult to Pulmonology  Once        Provider:  Gilberto Phillips MD    Completed [facility]     Inpatient consult to Midline team  Once        Provider:  (Not yet assigned)    Completed [facility]            * Cellulitis of right leg  Improving.  -Continue linezolid and Cipro for two weeks  -ID consulted; follow up in clinic  -Wound Care  -PRN analgesics  -Will need referral to Plastic or General Surgery    Debility  Patient with Acute on chronic debility due to functional  quadraplegia. The patient's latest AMPAC (Activity Measure for Post Acute Care) Score is listed below.    AM-PAC Score - How much help does the patient need for each activity listed  Basic Mobility Total Score: 13  Turning over in bed (including adjusting bedclothes, sheets and blankets)?: A little  Sitting down on and standing up from a chair with arms (e.g., wheelchair, bedside commode, etc.): A little  Moving from lying on back to sitting on the side of the bed?: A lot  Moving to and from a bed to a chair (including a wheelchair)?: A lot  Need to walk in hospital room?: A lot  Climbing 3-5 steps with a railing?: Unable    Plan  - PT/OT            Anemia  In setting of infection.  -Trend Hgb with CBC    Dependence on nicotine from cigarettes  -Patient counseled on cessation    Severe obesity (BMI >= 40)  Body mass index is 63.73 kg/m². Morbid obesity complicates all aspects of disease management from diagnostic modalities to treatment. Weight loss encouraged and health benefits explained to patient.     KEESHA (obstructive sleep apnea)  -CPAP [facility]      Final Active Diagnoses:    Diagnosis Date Noted POA    PRINCIPAL PROBLEM:  Cellulitis of right leg [L03.115] 12/27/2024 Yes    Debility [R53.81] 12/31/2024 Yes    Anemia [D64.9] 12/31/2024 Yes    Dependence on nicotine from cigarettes [F17.210] 04/02/2024 Yes    Severe obesity (BMI >= 40) [E66.01] 06/14/2023 Yes    KEESHA (obstructive sleep apnea) [G47.33] 06/14/2023 Yes      Problems Resolved During this Admission:    Diagnosis Date Noted Date Resolved POA    Severe sepsis [A41.9, R65.20] 12/26/2024 12/31/2024 Yes    Acute hypoxic respiratory failure [J96.01] 12/26/2024 12/31/2024 Yes       Discharged Condition: stable    [place]: [place] [facility]    Follow Up:   Follow-up Information       Rajendra Callahan MD Follow up on 1/23/2025.    Specialty: Infectious Diseases  Contact information:  [address] [place] [id number] [phone]                            Patient Instructions:      Ambulatory referral/consult to Plastic Surgery   Standing Status: Future   Referral Priority: Routine Referral Type: Consultation   Referral Reason: Specialty Services Required   Requested Specialty: Plastic Surgery   Number of Visits Requested: 1     Ambulatory referral/consult to Bariatric Surgery   Standing Status: Future   Referral Priority: Routine Referral Type: Consultation   Referral Reason: Specialty Services Required   Requested Specialty: Bariatrics   Number of Visits Requested: 1     Ambulatory referral/consult to Home Health   Standing Status: Future   Referral Priority: Routine Referral Type: Home Health   Referral Reason: Specialty Services Required   Requested Specialty: Home Health Services   Number of Visits Requested: 1     Diet Adult Regular     Notify your health care provider if you experience any of the following:  increased confusion or weakness     Notify your health care provider if you experience any of the following:  persistent dizziness, light-headedness, or visual disturbances     Notify your health care provider if you experience any of the following:  severe persistent headache     Notify your health care provider if you experience any of the following:  difficulty breathing or increased cough     Notify your health care provider if you experience any of the following:  severe uncontrolled pain     Notify your health care provider if you experience any of the following:  persistent nausea and vomiting or diarrhea     Notify your health care provider if you experience any of the following:  temperature >100.4     Reason for not Ordering Smoking Cessation Referral     Order Specific Question Answer Comments   Reason for not ordering: Not medically appropriate at this time      Reason for not Prescribing Nicotine Replacement     Order Specific Question Answer Comments   Reason for not Prescribing: Not medically appropriate at this time      Activity as  tolerated       Significant Diagnostic Studies: Labs: All labs within the past 24 hours have been reviewed    Pending Diagnostic Studies:       None           Medications:  Reconciled Home Medications:      Medication List        START taking these medications      ciprofloxacin HCl 750 MG tablet  Commonly known as: CIPRO  Take 1 tablet (750 mg total) by mouth every 12 (twelve) hours. for 14 days     linezolid 600 mg Tab  Commonly known as: ZYVOX  Take 1 tablet (600 mg total) by mouth every 12 (twelve) hours. for 14 days            CONTINUE taking these medications      acetaminophen 650 MG Tbsr  Commonly known as: TYLENOL  Take 650 mg by mouth every 8 (eight) hours.     ferrous sulfate 324 mg (65 mg iron) Tbec  Take 324 mg by mouth every 7 days.     hydrOXYzine HCL 25 MG tablet  Commonly known as: ATARAX  Take 25 mg by mouth 3 (three) times daily as needed for Anxiety.     oxyCODONE-acetaminophen 5-325 mg per tablet  Commonly known as: PERCOCET  Take 1 tablet by mouth every 6 (six) hours as needed for Pain.            STOP taking these medications      DULoxetine 30 MG capsule  Commonly known as: CYMBALTA     famotidine 20 MG tablet  Commonly known as: PEPCID     miconazole NITRATE 2 % 2 % top powder  Commonly known as: MICOTIN              Indwelling Lines/Drains at time of discharge:   Lines/Drains/Airways       None                   Time spent on the discharge of patient: 33 minutes         Felix Sorensen MD  Department of Hospital Medicine  [facility]

## 2025-01-06 ENCOUNTER — TELEPHONE (OUTPATIENT)
Dept: HOME HEALTH SERVICES | Facility: CLINIC | Age: 60
End: 2025-01-06
Payer: MEDICARE

## 2025-01-06 DIAGNOSIS — L03.115 CELLULITIS OF RIGHT LEG: Primary | ICD-10-CM

## 2025-01-07 ENCOUNTER — TELEPHONE (OUTPATIENT)
Dept: HOME HEALTH SERVICES | Facility: CLINIC | Age: 60
End: 2025-01-07
Payer: MEDICARE

## 2025-01-08 ENCOUNTER — PATIENT MESSAGE (OUTPATIENT)
Dept: HOME HEALTH SERVICES | Facility: CLINIC | Age: 60
End: 2025-01-08
Payer: MEDICARE

## 2025-01-08 ENCOUNTER — TELEPHONE (OUTPATIENT)
Dept: HOME HEALTH SERVICES | Facility: CLINIC | Age: 60
End: 2025-01-08
Payer: MEDICARE

## 2025-01-09 ENCOUNTER — PATIENT MESSAGE (OUTPATIENT)
Facility: CLINIC | Age: 60
End: 2025-01-09
Payer: MEDICARE

## 2025-01-09 ENCOUNTER — PATIENT MESSAGE (OUTPATIENT)
Dept: CASE MANAGEMENT | Facility: HOSPITAL | Age: 60
End: 2025-01-09

## 2025-01-09 ENCOUNTER — TELEPHONE (OUTPATIENT)
Facility: CLINIC | Age: 60
End: 2025-01-09
Payer: MEDICARE

## 2025-01-09 NOTE — TELEPHONE ENCOUNTER
Pt called RN Navigator in response to a pt portal msg. to confirm tomorrow's 4:00 p.m. hospital follow up appt at [facility]. Pt explained that she is concerned about leaving the house because she is having frequent loose stools with all of the antibiotics she is on lately, and we agreed that I would speak with VIOLA Hernandez to see if we can change her in clinic visit to a virtual visit tomorrow. Please advise.

## 2025-01-09 NOTE — TELEPHONE ENCOUNTER
Spoke with pt to relay message from VIOLA Barnes, and to confirm that her clinic visit tomorrow at 3:00 p.m., will be changed to a virtual visit. Pt denies any worsening symptoms such as fever, increased redness or swelling to leg. She also agrees to send a picture of the right leg as well.

## 2025-01-10 ENCOUNTER — PATIENT MESSAGE (OUTPATIENT)
Facility: CLINIC | Age: 60
End: 2025-01-10

## 2025-01-10 ENCOUNTER — PATIENT OUTREACH (OUTPATIENT)
Dept: ADMINISTRATIVE | Facility: OTHER | Age: 60
End: 2025-01-10
Payer: MEDICARE

## 2025-01-10 ENCOUNTER — OFFICE VISIT (OUTPATIENT)
Facility: CLINIC | Age: 60
End: 2025-01-10
Payer: MEDICARE

## 2025-01-10 DIAGNOSIS — L03.115 CELLULITIS OF RIGHT LEG: Primary | ICD-10-CM

## 2025-01-10 PROCEDURE — 98006 SYNCH AUDIO-VIDEO EST MOD 30: CPT | Mod: 95,,,

## 2025-01-10 NOTE — PROGRESS NOTES
Subjective:  RLE cellulitis      History of Present Illness    Transitional Care Note    Family and/or Caretaker present at visit?  No.  Diagnostic tests reviewed/disposition: No diagnosic tests pending after this hospitalization.  Disease/illness education: Continue Linezolid and Cipro antibiotics until course complete. Follow-up with ID as scheduled.  Home health/community services discussion/referrals: Patient has home health established at [facility].  Establishment or re-establishment of referral orders for community resources: No other necessary community resources.   Discussion with other health care providers: No discussion with other health care providers necessary.     This 59 y.o. presents today for complaint of hospital follow-up for RLE cellulitis. Patient reports that her right lower extremity cellulitis is improving. She reports that the redness and swelling to her leg have gone down tremendously. She reports she is currently taking Linezolid and Ciprofloxacin abx course.She has an apt with ID on 1/23/25.  She denies fever, chills, CP, SOB, palpitations, diaphoresis, AMS.  Patient reports that she is concerned that she is allergic to fentanyl. She reports she was given fentanyl in ED and if concerned that it may have precipitated her respiratory failure. Fentanyl listed as an allergy.    The patient location is: [place]  The chief complaint leading to consultation is: Cellulitis    Visit type: audiovisual    Face to Face time with patient: 15  30 minutes of total time spent on the encounter, which includes face to face time and non-face to face time preparing to see the patient (eg, review of tests), Obtaining and/or reviewing separately obtained history, Documenting clinical information in the electronic or other health record, Independently interpreting results (not separately reported) and communicating results to the patient/family/caregiver, or Care coordination (not separately reported).          Each patient to whom he or she provides medical services by telemedicine is:  (1) informed of the relationship between the physician and patient and the respective role of any other health care provider with respect to management of the patient; and (2) notified that he or she may decline to receive medical services by telemedicine and may withdraw from such care at any time.    Notes:        Review of Systems   Constitutional:  Negative for chills, diaphoresis, fever and malaise/fatigue.   HENT:  Negative for hearing loss.    Eyes:  Negative for discharge.   Respiratory:  Negative for wheezing.    Cardiovascular:  Negative for chest pain and palpitations.   Gastrointestinal:  Negative for blood in stool, constipation, diarrhea and vomiting.   Genitourinary:  Negative for dysuria and hematuria.   Musculoskeletal:  Negative for neck pain.   Neurological:  Negative for weakness and headaches.   Endo/Heme/Allergies:  Negative for polydipsia.       (D/C Summary)  Admission Date: 12/26/2024  Hospital Length of Stay: 8 days  Discharge Date and Time: No discharge date for patient encounter.  Attending Physician: Felix Sorensen MD   Discharging Provider: Felix Sorensen MD  Primary Care Provider: Melba Trivedi MD   HPI:   Please note that patient is intubated and sedated at the time of my evaluation, so all information was obtained from the ER provider.       The patient is a 59-year-old female with severe morbid obesity who was brought to the ER because of fever, and chills.  On arrival to the ER, patient was alert and oriented and able to give information. She has a fever of 101.9, and labs reveal leukocytosis of 18.  Sepsis workup was started, and patient got empiric antibiotics.  However few hours while being in the ER, she started to become confused, agitated, had rigors, and oxygen dropped to the 80s. ER provider was concern that pt's symptoms are due to allergic reaction to abx, so steroid,  benadryl and pepcid iv were given. She was then intubated for airway protection.  After intubation, repeat temperature was 104.4°.  Currently septic workup is negative including chest x-ray, UA, and pro-calcitonin.  As a result decision was made to order a CT chest abdomen pelvis to further evaluate her sepsis.  However, given pt's severe morbid obesity, she could not fit in our CT scanner.  Patient will be sent to [facility] to get the CT scans ordered by the ER provider, then she will be admitted to our ICU for further management.     * No surgery found *       Hospital Course:   Adriana Zaragoza is a 59 year old female with a past medical history of obesity, tobacco dependence, KEESHA, and anemia who presented with severe sepsis secondary to a RLE cellulitis. She developed acute respiratory failure and was intubated and sedated in the ICU. ID was consulted and changed the antibiotics from vancomycin and meropenem to linezolid and Rocephin. Wound Care was consulted. She was extubated and is stable on 2 L NC with CPAP QHS. PT/OT was consulted and recommended high intensity therapy. CM has been consulted for IPR/SNF placement. The patient refused placement. A home O2 evaluation was ordered upon discharge 1/3. She will take Cipro and linezolid for two weeks.  PT/OT was ordered. Referrals to Bariatric and Plastic Surgery were placed.        Objective:    There were no vitals taken for this visit. There is no height or weight on file to calculate BMI.    BP Readings from Last 3 Encounters:   01/03/25 (!) 143/71   09/29/24 108/68   08/29/24 (!) 110/59       Wt Readings from Last 3 Encounters:   12/31/24 (!) 168.4 kg (371 lb 4.1 oz)   09/26/24 (!) 167.8 kg (370 lb)   08/29/24 (!) 167.4 kg (369 lb 0.8 oz)     Images received from patient taken 1/10/25:              Physical Exam  Constitutional:       Appearance: Normal appearance.   Pulmonary:      Effort: Pulmonary effort is normal.   Neurological:       Mental Status: She is alert and oriented to person, place, and time.   Psychiatric:         Mood and Affect: Mood normal.         Behavior: Behavior normal.         Thought Content: Thought content normal.         Judgment: Judgment normal.       Assessment/Plan:   1. Cellulitis of right leg  Assessment & Plan:  Improving.  Continue Linezolid and Cipro course until complete.  Follow up with ID as scheduled.   Continue HH with wound care  Follow-up with general surgery as discussed. (referral placed by hospital physician).         Fentanyl listed as an allergy per patient's concerns.    Follow up if symptoms worsen or fail to improve.

## 2025-01-10 NOTE — PROGRESS NOTES
CHW - Outreach Attempt    Community Health Worker left a voicemail message for 1st attempt to contact patient regarding: SDOH  Community Health Worker to attempt to contact patient on: [phone]

## 2025-01-10 NOTE — ASSESSMENT & PLAN NOTE
Improving.  Continue Linezolid and Cipro course until complete.  Follow up with ID as scheduled.   Continue HH with wound care  Follow-up with general surgery as discussed. (referral placed by hospital physician).

## 2025-01-13 ENCOUNTER — PATIENT OUTREACH (OUTPATIENT)
Dept: ADMINISTRATIVE | Facility: OTHER | Age: 60
End: 2025-01-13
Payer: MEDICARE

## 2025-01-13 ENCOUNTER — OFFICE VISIT (OUTPATIENT)
Dept: HOME HEALTH SERVICES | Facility: CLINIC | Age: 60
End: 2025-01-13
Payer: MEDICARE

## 2025-01-13 DIAGNOSIS — I89.0 LYMPHEDEMA: ICD-10-CM

## 2025-01-13 DIAGNOSIS — Z90.3 H/O GASTRIC SLEEVE: ICD-10-CM

## 2025-01-13 DIAGNOSIS — F41.1 GENERALIZED ANXIETY DISORDER: Primary | ICD-10-CM

## 2025-01-13 DIAGNOSIS — R65.20 SEVERE SEPSIS: ICD-10-CM

## 2025-01-13 DIAGNOSIS — E66.01 SEVERE OBESITY (BMI >= 40): ICD-10-CM

## 2025-01-13 DIAGNOSIS — L03.115 CELLULITIS OF RIGHT LEG: ICD-10-CM

## 2025-01-13 DIAGNOSIS — A41.9 SEVERE SEPSIS: ICD-10-CM

## 2025-01-13 PROCEDURE — 3075F SYST BP GE 130 - 139MM HG: CPT | Mod: CPTII,S$GLB,, | Performed by: NURSE PRACTITIONER

## 2025-01-13 PROCEDURE — 3078F DIAST BP <80 MM HG: CPT | Mod: CPTII,S$GLB,, | Performed by: NURSE PRACTITIONER

## 2025-01-13 PROCEDURE — 1111F DSCHRG MED/CURRENT MED MERGE: CPT | Mod: CPTII,S$GLB,, | Performed by: NURSE PRACTITIONER

## 2025-01-13 PROCEDURE — 1159F MED LIST DOCD IN RCRD: CPT | Mod: CPTII,S$GLB,, | Performed by: NURSE PRACTITIONER

## 2025-01-13 PROCEDURE — 99349 HOME/RES VST EST MOD MDM 40: CPT | Mod: S$GLB,,, | Performed by: NURSE PRACTITIONER

## 2025-01-13 PROCEDURE — 1160F RVW MEDS BY RX/DR IN RCRD: CPT | Mod: CPTII,S$GLB,, | Performed by: NURSE PRACTITIONER

## 2025-01-13 NOTE — PROGRESS NOTES
Returned patient call. She is out and about and will call me once she gets home to review OPCM referral.

## 2025-01-14 ENCOUNTER — PATIENT OUTREACH (OUTPATIENT)
Dept: ADMINISTRATIVE | Facility: OTHER | Age: 60
End: 2025-01-14
Payer: MEDICARE

## 2025-01-14 NOTE — PROGRESS NOTES
CHW - Outreach Attempt    Community Health Worker left a voicemail message for 2nd attempt to contact patient regarding: sdoh  Community Health Worker to attempt to contact patient on: [phone]

## 2025-01-15 ENCOUNTER — HOSPITAL ENCOUNTER (EMERGENCY)
Facility: HOSPITAL | Age: 60
Discharge: HOME | End: 2025-01-16
Attending: EMERGENCY MEDICINE
Payer: MEDICARE

## 2025-01-15 VITALS
SYSTOLIC BLOOD PRESSURE: 130 MMHG | OXYGEN SATURATION: 96 % | TEMPERATURE: 99 F | RESPIRATION RATE: 20 BRPM | HEART RATE: 70 BPM | DIASTOLIC BLOOD PRESSURE: 70 MMHG

## 2025-01-15 DIAGNOSIS — J10.1 INFLUENZA A: Primary | ICD-10-CM

## 2025-01-15 DIAGNOSIS — R05.9 COUGH IN ADULT PATIENT: ICD-10-CM

## 2025-01-15 DIAGNOSIS — R09.81 NASAL CONGESTION: ICD-10-CM

## 2025-01-15 DIAGNOSIS — R53.83 FATIGUE, UNSPECIFIED TYPE: ICD-10-CM

## 2025-01-15 DIAGNOSIS — R06.02 SOB (SHORTNESS OF BREATH): ICD-10-CM

## 2025-01-15 DIAGNOSIS — R52 GENERALIZED BODY ACHES: ICD-10-CM

## 2025-01-15 LAB
ALBUMIN SERPL BCP-MCNC: 4.1 G/DL (ref 3.5–5.2)
ALP SERPL-CCNC: 60 U/L (ref 55–135)
ALT SERPL W/O P-5'-P-CCNC: 17 U/L (ref 10–44)
ANION GAP SERPL CALC-SCNC: 8 MMOL/L (ref 8–16)
AST SERPL-CCNC: 16 U/L (ref 10–40)
BASOPHILS # BLD AUTO: 0.03 K/UL (ref 0–0.2)
BASOPHILS NFR BLD: 0.5 % (ref 0–1.9)
BILIRUB SERPL-MCNC: 0.5 MG/DL (ref 0.1–1)
BNP SERPL-MCNC: 36 PG/ML (ref 0–99)
BUN SERPL-MCNC: 9 MG/DL (ref 6–20)
CALCIUM SERPL-MCNC: 9 MG/DL (ref 8.7–10.5)
CHLORIDE SERPL-SCNC: 103 MMOL/L (ref 95–110)
CO2 SERPL-SCNC: 24 MMOL/L (ref 23–29)
CREAT SERPL-MCNC: 0.8 MG/DL (ref 0.5–1.4)
DIFFERENTIAL METHOD BLD: ABNORMAL
EOSINOPHIL # BLD AUTO: 0 K/UL (ref 0–0.5)
EOSINOPHIL NFR BLD: 0.3 % (ref 0–8)
ERYTHROCYTE [DISTWIDTH] IN BLOOD BY AUTOMATED COUNT: 16 % (ref 11.5–14.5)
EST. GFR  (NO RACE VARIABLE): >60 ML/MIN/1.73 M^2
GLUCOSE SERPL-MCNC: 120 MG/DL (ref 70–110)
HCT VFR BLD AUTO: 34.4 % (ref 37–48.5)
HGB BLD-MCNC: 10.6 G/DL (ref 12–16)
IMM GRANULOCYTES # BLD AUTO: 0.02 K/UL (ref 0–0.04)
IMM GRANULOCYTES NFR BLD AUTO: 0.3 % (ref 0–0.5)
INFLUENZA A, MOLECULAR: POSITIVE
INFLUENZA B, MOLECULAR: NEGATIVE
LDH SERPL L TO P-CCNC: 0.45 MMOL/L (ref 0.5–2.2)
LDH SERPL L TO P-CCNC: 0.86 MMOL/L (ref 0.5–2.2)
LYMPHOCYTES # BLD AUTO: 0.8 K/UL (ref 1–4.8)
LYMPHOCYTES NFR BLD: 13 % (ref 18–48)
MCH RBC QN AUTO: 26.4 PG (ref 27–31)
MCHC RBC AUTO-ENTMCNC: 30.8 G/DL (ref 32–36)
MCV RBC AUTO: 86 FL (ref 82–98)
MONOCYTES # BLD AUTO: 0.9 K/UL (ref 0.3–1)
MONOCYTES NFR BLD: 13.6 % (ref 4–15)
NEUTROPHILS # BLD AUTO: 4.7 K/UL (ref 1.8–7.7)
NEUTROPHILS NFR BLD: 72.3 % (ref 38–73)
NRBC BLD-RTO: 0 /100 WBC
PLATELET # BLD AUTO: 282 K/UL (ref 150–450)
PMV BLD AUTO: 8.1 FL (ref 9.2–12.9)
POTASSIUM SERPL-SCNC: 3.8 MMOL/L (ref 3.5–5.1)
PROT SERPL-MCNC: 8 G/DL (ref 6–8.4)
RBC # BLD AUTO: 4.01 M/UL (ref 4–5.4)
SAMPLE: ABNORMAL
SAMPLE: NORMAL
SARS-COV-2 RDRP RESP QL NAA+PROBE: NEGATIVE
SODIUM SERPL-SCNC: 135 MMOL/L (ref 136–145)
SPECIMEN SOURCE: ABNORMAL
TROPONIN I SERPL HS-MCNC: 9.4 PG/ML (ref 0–14.9)
WBC # BLD AUTO: 6.47 K/UL (ref 3.9–12.7)

## 2025-01-15 PROCEDURE — 87389 HIV-1 AG W/HIV-1&-2 AB AG IA: CPT | Performed by: EMERGENCY MEDICINE

## 2025-01-15 PROCEDURE — 25000003 PHARM REV CODE 250

## 2025-01-15 PROCEDURE — 87635 SARS-COV-2 COVID-19 AMP PRB: CPT | Performed by: PHYSICIAN ASSISTANT

## 2025-01-15 PROCEDURE — 86803 HEPATITIS C AB TEST: CPT | Performed by: EMERGENCY MEDICINE

## 2025-01-15 PROCEDURE — 99285 EMERGENCY DEPT VISIT HI MDM: CPT | Mod: 25

## 2025-01-15 PROCEDURE — 84484 ASSAY OF TROPONIN QUANT: CPT | Performed by: PHYSICIAN ASSISTANT

## 2025-01-15 PROCEDURE — 96375 TX/PRO/DX INJ NEW DRUG ADDON: CPT

## 2025-01-15 PROCEDURE — 93010 ELECTROCARDIOGRAM REPORT: CPT | Mod: ,,, | Performed by: GENERAL PRACTICE

## 2025-01-15 PROCEDURE — 87040 BLOOD CULTURE FOR BACTERIA: CPT | Performed by: PHYSICIAN ASSISTANT

## 2025-01-15 PROCEDURE — 80053 COMPREHEN METABOLIC PANEL: CPT | Performed by: PHYSICIAN ASSISTANT

## 2025-01-15 PROCEDURE — 93005 ELECTROCARDIOGRAM TRACING: CPT | Performed by: GENERAL PRACTICE

## 2025-01-15 PROCEDURE — 85025 COMPLETE CBC W/AUTO DIFF WBC: CPT | Performed by: PHYSICIAN ASSISTANT

## 2025-01-15 PROCEDURE — 63600175 PHARM REV CODE 636 W HCPCS

## 2025-01-15 PROCEDURE — 36415 COLL VENOUS BLD VENIPUNCTURE: CPT | Performed by: EMERGENCY MEDICINE

## 2025-01-15 PROCEDURE — 87502 INFLUENZA DNA AMP PROBE: CPT | Performed by: PHYSICIAN ASSISTANT

## 2025-01-15 PROCEDURE — 96374 THER/PROPH/DIAG INJ IV PUSH: CPT

## 2025-01-15 PROCEDURE — 36415 COLL VENOUS BLD VENIPUNCTURE: CPT | Performed by: PHYSICIAN ASSISTANT

## 2025-01-15 PROCEDURE — 25000003 PHARM REV CODE 250: Performed by: EMERGENCY MEDICINE

## 2025-01-15 PROCEDURE — 83880 ASSAY OF NATRIURETIC PEPTIDE: CPT | Performed by: EMERGENCY MEDICINE

## 2025-01-15 RX ORDER — IPRATROPIUM BROMIDE AND ALBUTEROL SULFATE 2.5; .5 MG/3ML; MG/3ML
3 SOLUTION RESPIRATORY (INHALATION)
Status: DISCONTINUED | OUTPATIENT
Start: 2025-01-15 | End: 2025-01-15

## 2025-01-15 RX ORDER — OSELTAMIVIR PHOSPHATE 75 MG/1
75 CAPSULE ORAL 2 TIMES DAILY
Qty: 10 CAPSULE | Refills: 0 | Status: SHIPPED | OUTPATIENT
Start: 2025-01-15 | End: 2025-01-20

## 2025-01-15 RX ORDER — FLUTICASONE PROPIONATE 50 MCG
1 SPRAY, SUSPENSION (ML) NASAL 2 TIMES DAILY PRN
Qty: 15 G | Refills: 0 | Status: SHIPPED | OUTPATIENT
Start: 2025-01-15

## 2025-01-15 RX ORDER — LINEZOLID 600 MG/1
600 TABLET, FILM COATED ORAL ONCE
Status: COMPLETED | OUTPATIENT
Start: 2025-01-15 | End: 2025-01-15

## 2025-01-15 RX ORDER — MORPHINE SULFATE 4 MG/ML
4 INJECTION, SOLUTION INTRAMUSCULAR; INTRAVENOUS
Status: COMPLETED | OUTPATIENT
Start: 2025-01-15 | End: 2025-01-15

## 2025-01-15 RX ORDER — PROMETHAZINE HYDROCHLORIDE AND DEXTROMETHORPHAN HYDROBROMIDE 6.25; 15 MG/5ML; MG/5ML
5 SYRUP ORAL EVERY 4 HOURS PRN
Qty: 118 ML | Refills: 0 | Status: SHIPPED | OUTPATIENT
Start: 2025-01-15

## 2025-01-15 RX ORDER — CETIRIZINE HYDROCHLORIDE 10 MG/1
10 TABLET ORAL DAILY PRN
Qty: 30 TABLET | Refills: 0 | Status: SHIPPED | OUTPATIENT
Start: 2025-01-15

## 2025-01-15 RX ORDER — BENZONATATE 100 MG/1
100 CAPSULE ORAL 3 TIMES DAILY PRN
Qty: 30 CAPSULE | Refills: 0 | Status: SHIPPED | OUTPATIENT
Start: 2025-01-15

## 2025-01-15 RX ORDER — ONDANSETRON HYDROCHLORIDE 2 MG/ML
4 INJECTION, SOLUTION INTRAVENOUS
Status: COMPLETED | OUTPATIENT
Start: 2025-01-15 | End: 2025-01-15

## 2025-01-15 RX ORDER — ACETAMINOPHEN 500 MG
1000 TABLET ORAL
Status: COMPLETED | OUTPATIENT
Start: 2025-01-15 | End: 2025-01-15

## 2025-01-15 RX ADMIN — ACETAMINOPHEN 1000 MG: 500 TABLET, FILM COATED ORAL at 07:01

## 2025-01-15 RX ADMIN — LINEZOLID 600 MG: 600 TABLET, FILM COATED ORAL at 09:01

## 2025-01-15 RX ADMIN — ONDANSETRON 4 MG: 2 INJECTION INTRAMUSCULAR; INTRAVENOUS at 09:01

## 2025-01-15 RX ADMIN — MORPHINE SULFATE 4 MG: 4 INJECTION, SOLUTION INTRAMUSCULAR; INTRAVENOUS at 09:01

## 2025-01-15 RX ADMIN — CIPROFOLXACIN 750 MG: 250 TABLET ORAL at 09:01

## 2025-01-15 NOTE — FIRST PROVIDER EVALUATION
Emergency Department TeleTriage Encounter Note      CHIEF COMPLAINT    Chief Complaint   Patient presents with    Fatigue    Nausea     C/o fatigue, fever, flu symptoms.       VITAL SIGNS   Initial Vitals [01/15/25 1604]   BP Pulse Resp Temp SpO2   (!) 158/77 94 (!) 22 (!) 100.7 °F (38.2 °C) 95 %      MAP       --            ALLERGIES    Review of patient's allergies indicates:   Allergen Reactions    Fentanyl Other (See Comments)     Hypoxemia  Muscle twitching    Aspirin     Nicoderm     Nsaids (non-steroidal anti-inflammatory drug) Hives    Teflaro [ceftaroline fosamil]      Allergic reaction/ hives/itching       PROVIDER TRIAGE NOTE  This is a teletriage evaluation of a 59 y.o. female presenting to the ED complaining of fever. Patient reports fever, congestion, and cough. She was recently discharged (1/3) after diagnosed with sepsis and patient was intubated.    Patient is alert and oriented. She speaks in complete sentences. She is sitting upright in the chair in no distress.     On-site providers were messaged about this patient.    Initial orders will be placed and care will be transferred to an alternate provider when patient is roomed for a full evaluation. Any additional orders and the final disposition will be determined by that provider.         ORDERS  Labs Reviewed   CULTURE, BLOOD   CULTURE, BLOOD   HEPATITIS C ANTIBODY   HIV 1 / 2 ANTIBODY   CBC W/ AUTO DIFFERENTIAL   COMPREHENSIVE METABOLIC PANEL   LACTIC ACID, PLASMA   URINALYSIS, REFLEX TO URINE CULTURE   SARS-COV-2 RNA AMPLIFICATION, QUAL   INFLUENZA A AND B ANTIGEN   POCT LACTATE       ED Orders (720h ago, onward)      Start Ordered     Status Ordering Provider    01/15/25 2039 01/15/25 1639  Lactic acid, plasma #2  In 4 hours         Ordered CHRIST PATEL    01/15/25 1640 01/15/25 1639  COVID-19 Rapid Screening  STAT         Ordered CHRIST PATEL    01/15/25 1640 01/15/25 1639  Airborne and Contact and Droplet Isolation Status  Continuous          Ordered AMANDA, CHRIST G.    01/15/25 1640 01/15/25 1639  Influenza antigen Nasopharyngeal Swab  STAT         Ordered AMANDA, CHRIST G.    01/15/25 1639 01/15/25 1639  ED Preference List Used to Initiate Sepsis Orders  Until discontinued         Ordered AMANDA, CHRIST G.    01/15/25 1639 01/15/25 1639  Blood culture x two cultures. Draw prior to antibiotics.  Every 15 min      Comments: Aerobic and anaerobic     Order ID Start Status Ordering Provider   [id number] 01/15/25 1639 Pending Collection AMANDA, CHRIST G.   [id number] 01/15/25 1654 Pending Collection AMANDA, CHRIST G.       Ordered AMANDA, CHRIST G.    01/15/25 1639 01/15/25 1639  CBC auto differential  STAT         Ordered AMANDA, CHRIST G.    01/15/25 1639 01/15/25 1639  Comprehensive metabolic panel  STAT         Ordered AMANDA, CHRIST G.    01/15/25 1639 01/15/25 1639  POCT Lactate #1  Once         Ordered AMANDA, CHRIST G.    01/15/25 1639 01/15/25 1639  Vital signs  Every 15 min        Comments: Every 15 minutes until SBP greater than 90 or MAP greater than 65, then every 30 minutes times one hour, then every one hour.    Ordered AMANDA, CHRIST G.    01/15/25 1639 01/15/25 1639  Bed rest  Until discontinued         Ordered AMANDA, CHRIST G.    01/15/25 1639 01/15/25 1639  Cardiac Monitoring - Adult  Continuous        Comments: Notify Physician If:    Ordered AMANDA, CHRIST G.    01/15/25 1639 01/15/25 1639  Pulse Oximetry Continuous  Continuous         Ordered AMANDA, CHRIST G.    01/15/25 1639 01/15/25 1639  Strict intake and output  Until discontinued         Ordered AMANDA, CHRIST G.    01/15/25 1639 01/15/25 1639  Urinalysis, Reflex to Urine Culture Urine, Clean Catch  STAT         Ordered AMANDA, CHRIST G.    01/15/25 1639 01/15/25 1639  Saline lock IV  Once         Ordered AMANDA, CHRIST G.    01/15/25 1639 01/15/25 1639  EKG 12-lead  Once         Ordered AMANDA, CHRIST G.    01/15/25 1639 01/15/25 1639  X-Ray Chest AP Portable  1 time imaging         Ordered CHRIST PATEL  G.    01/15/25 1607 01/15/25 1606  Hepatitis C Antibody  STAT         Ordered ROSS MCDOWELL    01/15/25 1607 01/15/25 1606  HIV 1/2 Ag/Ab (4th Gen)  STAT         Ordered ROSS MCDOWELL              Virtual Visit Note: The provider triage portion of this emergency department evaluation and documentation was performed via [employer], a HIPAA-compliant telemedicine application, in concert with a tele-presenter in the room. A face to face patient evaluation with one of my colleagues will occur once the patient is placed in an emergency department room.      DISCLAIMER: This note was prepared with [employer] voice recognition transcription software. Garbled syntax, mangled pronouns, and other bizarre constructions may be attributed to that software system.

## 2025-01-16 VITALS
HEART RATE: 75 BPM | TEMPERATURE: 99 F | SYSTOLIC BLOOD PRESSURE: 147 MMHG | OXYGEN SATURATION: 96 % | WEIGHT: 293 LBS | DIASTOLIC BLOOD PRESSURE: 63 MMHG | BODY MASS INDEX: 50.02 KG/M2 | RESPIRATION RATE: 18 BRPM | HEIGHT: 64 IN

## 2025-01-16 LAB
HCV AB SERPL QL IA: NEGATIVE
HIV 1+2 AB+HIV1 P24 AG SERPL QL IA: NEGATIVE

## 2025-01-16 NOTE — ASSESSMENT & PLAN NOTE
Chronic, BLE, worse in right upper thigh.  Previous evaluations encouraged pt to f/u with plastic surgery at [facility] for surgical removal of massive localized lymphedema of RLE.  Plastic Surgery consult was sent upon hospital discharge. Patient waiting to hear back.

## 2025-01-16 NOTE — ASSESSMENT & PLAN NOTE
Chronic issue.  No current medications and does not want any.  Has no Psychiatrist and declines referral.  Monitor.

## 2025-01-16 NOTE — PROGRESS NOTES
[facility]  Transitional Care Management (TCM) Home Visit    Encounter Provider: Gregoria Sandoval   PCP: Melba Trivedi MD  Consult Requested By: Dr. Felix Sorensen  Admit Date: 12/26/24   IP Discharge Date: 1/3/25  Hospital Length of Stay:RRHLOS@ 8 days  Days since discharge (from IP or SNF): 10 days   [facility] Contact Note: 1/10/25  Hospital Diagnosis: Severe sepsis [A41.9, R65.20]     HISTORY OF PRESENT ILLNESS      Patient ID: Adriana Zaragoza is a 59 y.o. female was recently admitted to the hospital, this is their TCM encounter.    Hospital Course Synopsis:  Admit: 12/26/24  Discharge: 1/3/25  HPI:   Please note that patient is intubated and sedated at the time of my evaluation, so all information was obtained from the ER provider.       The patient is a 59-year-old female with severe morbid obesity who was brought to the ER because of fever, and chills.  On arrival to the ER, patient was alert and oriented and able to give information. She has a fever of 101.9, and labs reveal leukocytosis of 18.  Sepsis workup was started, and patient got empiric antibiotics.  However few hours while being in the ER, she started to become confused, agitated, had rigors, and oxygen dropped to the 80s. ER provider was concern that pt's symptoms are due to allergic reaction to abx, so steroid, benadryl and pepcid iv were given. She was then intubated for airway protection.  After intubation, repeat temperature was 104.4°.  Currently septic workup is negative including chest x-ray, UA, and pro-calcitonin.  As a result decision was made to order a CT chest abdomen pelvis to further evaluate her sepsis.  However, given pt's severe morbid obesity, she could not fit in our CT scanner.  Patient will be sent to [facility] to get the CT scans ordered by the ER provider, then she will be admitted to our ICU for further management.     * No surgery found *       Hospital Course:   Adriana Zaragoza  "is a 59 year old female with a past medical history of obesity, tobacco dependence, KEESHA, and anemia who presented with severe sepsis secondary to a RLE cellulitis. She developed acute respiratory failure and was intubated and sedated in the ICU. ID was consulted and changed the antibiotics from vancomycin and meropenem to linezolid and Rocephin. Wound Care was consulted. She was extubated and is stable on 2 L NC with CPAP QHS. PT/OT was consulted and recommended high intensity therapy. CM has been consulted for IPR/SNF placement. The patient refused placement. A home O2 evaluation was ordered upon discharge 1/3. She will take Cipro and linezolid for two weeks. HH PT/OT was ordered. Referrals to Bariatric and Plastic Surgery were placed.     With this [facility] hospital follow up visit patient states she is doing overall ok, still has "PTSD" from being intubated she states. Reports taking oral antibiotics prescribed on hospital discharge. States no open sores. Still waiting to hear from Bariatric and Plastic Surgery referrals that were placed on hospital discharge. Has upcoming appointment with ID outpatient. Denies any fever/chills. Patient is very anxious and tearful at times worried about getting sepsis again-reports has had several times and "it comes on so fast". Right upper, inner leg is area that becomes infected she states-she needs the skin removed. She is s/p gastric sleeve.    No distress noted. Program contact information provided to patient and left in home.      DECISION MAKING TODAY       Assessment & Plan:  1. Generalized anxiety disorder  Assessment & Plan:  Chronic issue.  No current medications and does not want any.  Has no Psychiatrist and declines referral.  Monitor.      2. Severe sepsis  Assessment & Plan:  Currently resolved.  Sent home from hospitalization Linezolid and Cipro course-continue until complete.  Follow up with ID as scheduled.       Orders:  -     Ambulatory " "referral/consult to [facility]    3. Cellulitis of right leg  Assessment & Plan:  Improving.  Continue Linezolid and Cipro course until complete.  Follow up with ID as scheduled.   Continue HH, no open wound present at this time.  Monitor for breakdown.      4. Severe obesity (BMI >= 40)  Assessment & Plan:  Follow up with PCP for consideration of other affordable medications to help with weight loss.  BMI 62.65.  Morbid obesity compounds patient co-morbidities and complicates treatment course. Counseling given regarding diet modification and exercise recommendations.      5. H/O gastric sleeve  Overview:  Reports gastric sleeve possibly 8 years ago-  Lost 100 pounds, current BMI 62.65.  Was taking Ozempic but "insurance stopped covering it and I can't afford it"  Needs to lose more weight  May need revision of bariatric surgery    Assessment & Plan:  Follow up with PCP for consideration of other affordable medications to help with weight loss.  Morbid obesity compounds patient co-morbidities and complicates treatment course. Counseling given regarding diet modification and exercise recommendations.      6. Lymphedema  Assessment & Plan:  Chronic, BLE, worse in right upper thigh.  Previous evaluations encouraged pt to f/u with plastic surgery at [facility] for surgical removal of massive localized lymphedema of RLE.  Plastic Surgery consult was sent upon hospital discharge. Patient waiting to hear back.           Medication List on Discharge:     Medication List            Accurate as of January 13, 2025 11:59 PM. If you have any questions, ask your nurse or doctor.                CONTINUE taking these medications      acetaminophen 650 MG Tbsr  Commonly known as: TYLENOL  Take 650 mg by mouth every 8 (eight) hours.     ciprofloxacin HCl 750 MG tablet  Commonly known as: CIPRO  Take 1 tablet (750 mg total) by mouth every 12 (twelve) hours. for 14 days     ferrous sulfate 324 mg (65 mg iron) Tbec  Take " 324 mg by mouth every 7 days.     hydrOXYzine HCL 25 MG tablet  Commonly known as: ATARAX  Take 25 mg by mouth 3 (three) times daily as needed for Anxiety.     linezolid 600 mg Tab  Commonly known as: ZYVOX  Take 1 tablet (600 mg total) by mouth every 12 (twelve) hours. for 14 days     oxyCODONE-acetaminophen 5-325 mg per tablet  Commonly known as: PERCOCET  Take 1 tablet by mouth every 6 (six) hours as needed for Pain.              Medication Reconciliation:  Were medications changed on discharge? Yes  Were medications in the home? Yes  Is the patient taking the medications as directed? Yes  Does the patient understand the medications and changes? Yes  Does updated med list accurately reflects meds patient is currently taking? Yes    ENVIRONMENT OF CARE      Family and/or Caregiver present at visit?  Roomates present  Name of Caregiver: .  History provided by: patient    Advance Care Planning   Advanced Care Planning Status:  Patient has had an ACP conversation  Living Will: No  Power of : No  [place]: No    Does Caregiver have HCPoA: No  Changes today: none  Is patient hospice appropriate: No  (If needed, use PPS <30 or FAST score >7)  Was referral to hospice placed: No       Impression upon entering the home:  Physical Dwelling: single family home   Appearance of home environment: cleaniness: clean, walking pathways: clear, lighting: adequate, and home structure: sound structure  Functional Status: independent  Mobility: ambulatory  Nutritional access: adequate intake and access  [facility],  [facility]    DME/Supplies: rolling walker     Diagnostic tests reviewed/disposition: No diagnosic tests pending after this hospitalization.  Disease/illness education:  sepsis, cipro/linezolid use, right leg cellulitis  Establishment or re-establishment of referral orders for community resources: No other necessary community resources.   Discussion with other health care providers: No discussion  with other health care providers necessary.   Does patient have a PCP at [facility]? Yes   Repatriation plan with PCP? [facility] reason: transportation   Does patient have an ostomy (ileostomy, colostomy, suprapubic catheter, nephrostomy tube, tracheostomy, PEG tube, pleurex catheter, cholecystostomy, etc)? No  Were BPAs reviewed? Yes    Social History     Socioeconomic History    Marital status: Significant Other   Tobacco Use    Smoking status: Every Day     Current packs/day: 1.00     Average packs/day: 1 pack/day for 6.0 years (6.0 ttl pk-yrs)     Types: Cigarettes     Start date: 2019    Smokeless tobacco: Never   Substance and Sexual Activity    Alcohol use: No    Drug use: No    Sexual activity: Never     Social Drivers of Health     Financial Resource Strain: High Risk (1/9/2025)    Overall Financial Resource Strain (CARDIA)     Difficulty of Paying Living Expenses: Hard   Food Insecurity: Food Insecurity Present (1/9/2025)    Hunger Vital Sign     Worried About Running Out of Food in the Last Year: Sometimes true     Ran Out of Food in the Last Year: Sometimes true   Transportation Needs: Patient Unable To Answer (12/27/2024)    TRANSPORTATION NEEDS     Transportation : Patient unable to answer   Physical Activity: Inactive (1/9/2025)    Exercise Vital Sign     Days of Exercise per Week: 0 days     Minutes of Exercise per Session: 0 min   Stress: Stress Concern Present (1/9/2025)    [employer] [place] [employer] - Occupational Stress Questionnaire     Feeling of Stress : To some extent   Housing Stability: Unknown (1/9/2025)    Housing Stability Vital Sign     Unable to Pay for Housing in the Last Year: No     Homeless in the Last Year: Patient unable to answer       OBJECTIVE:     Vital Signs:  Vitals:    01/13/25 1200   BP: 130/70   Pulse: 70   Resp: 20   Temp: 98.6 °F (37 °C)       Review of Systems   Constitutional: Negative.    HENT: Negative.     Eyes: Negative.    Respiratory: Negative.      Cardiovascular: Negative.    Gastrointestinal: Negative.    Endocrine: Negative.    Genitourinary: Negative.    Musculoskeletal: Negative.    Skin: Negative.    Neurological: Negative.    Hematological: Negative.    Psychiatric/Behavioral:  The patient is nervous/anxious.        Physical Exam:  Physical Exam  Constitutional:       General: She is not in acute distress.     Appearance: She is obese. She is not ill-appearing.   HENT:      Head: Normocephalic and atraumatic.      Right Ear: External ear normal.      Left Ear: External ear normal.      Nose: Nose normal.      Mouth/Throat:      Mouth: Mucous membranes are dry.      Pharynx: Oropharynx is clear.   Eyes:      Extraocular Movements: Extraocular movements intact.      Conjunctiva/sclera: Conjunctivae normal.      Pupils: Pupils are equal, round, and reactive to light.   Cardiovascular:      Rate and Rhythm: Normal rate and regular rhythm.      Pulses: Normal pulses.      Heart sounds: Normal heart sounds.   Pulmonary:      Effort: Pulmonary effort is normal.      Breath sounds: Normal breath sounds.   Abdominal:      General: Bowel sounds are normal. There is no distension.      Palpations: Abdomen is soft.      Tenderness: There is no abdominal tenderness.   Musculoskeletal:      Cervical back: Normal range of motion and neck supple.      Comments: BLE lymphedema   Skin:     General: Skin is warm and dry.      Capillary Refill: Capillary refill takes 2 to 3 seconds.      Comments: intact   Neurological:      Mental Status: She is alert and oriented to person, place, and time.      Motor: No weakness.      Gait: Gait abnormal.   Psychiatric:         Mood and Affect: Mood normal.         Behavior: Behavior normal.         Thought Content: Thought content normal.         Judgment: Judgment normal.       Right upper, inner thigh back side      Right upper inner thigh      INSTRUCTIONS FOR PATIENT:     Scheduled Follow-up, Appts Reviewed with Modifications if  Needed: Yes  Future Appointments   Date Time Provider Department Center   1/23/2025  1:20 PM [facility]     I spent a total of 30 minutes on the day of the visit.This includes face to face time and non-face to face time preparing to see the patient (eg, review of tests), obtaining and/or reviewing separately obtained history, documenting clinical information in the electronic or other health record, independently interpreting results and communicating results to the patient/family/caregiver, or care coordinator.        Signature: Gregoria Sandoval NP    Transition of Care Visit:  I have reviewed and updated the history and problem list.  I have reconciled the medication list.  I have discussed the hospitalization and current medical issues, prognosis and plans with the patient/family.

## 2025-01-16 NOTE — ED PROVIDER NOTES
Encounter Date: 1/15/2025       History     Chief Complaint   Patient presents with    Fatigue    Nausea     C/o fatigue, fever, flu symptoms.     59-year-old female with a past medical history COPD, DVT, diabetes, anemia, heart murmur, neuromuscular disorder, and sepsis presents to the ED for cough.  Patient states this started this morning.  No medications prior to arrival.  Her roommate is sick with similar symptoms.  Patient admits to subjective fever, congestion, runny nose, dry cough, pleuritic chest pain, shortness breath, nausea, diarrhea, body aches, and headache.  Patient denies chest pain, sore throat, vomiting, constipation, urinary symptoms, dizziness, and lightheadedness.  Patient states she was admitted for sepsis on  and she has been compliant with her 2 weeks of antibiotics.  Patient states she has 2 more doses of her clindamycin and linezolid.  Patient states she is following up about her right thigh mass with Plastic surgery.  Patient states it has been doing well.      Review of patient's allergies indicates:   Allergen Reactions    Fentanyl Other (See Comments)     Hypoxemia  Muscle twitching    Aspirin     Nicoderm     Nsaids (non-steroidal anti-inflammatory drug) Hives    Teflaro [ceftaroline fosamil]      Allergic reaction/ hives/itching     Past Medical History:   Diagnosis Date    Allergy     Anemia     Asthma     COPD (chronic obstructive pulmonary disease)     Deep vein thrombosis     Depression     Diabetes mellitus, type 2     Encounter for blood transfusion     Heart murmur     Neuromuscular disorder      Past Surgical History:   Procedure Laterality Date     SECTION      DILATION AND CURETTAGE OF UTERUS      gastric sleeve      [facility]    TONSILLECTOMY       Family History   Problem Relation Name Age of Onset    Heart disease Mother      Diabetes Mother      Arthritis Mother      Depression Mother      Cancer Father      COPD Father      Arthritis Maternal  Grandmother      Cancer Maternal Grandmother      Cancer Maternal Grandfather      Cancer Paternal Grandmother      Kidney disease Paternal Grandmother      Diabetes Paternal Grandmother      Diabetes Paternal Grandfather      Hyperlipidemia Paternal Grandfather       Social History     Tobacco Use    Smoking status: Every Day     Current packs/day: 1.00     Average packs/day: 1 pack/day for 6.0 years (6.0 ttl pk-yrs)     Types: Cigarettes     Start date: 2019    Smokeless tobacco: Never   Substance Use Topics    Alcohol use: No    Drug use: No     Review of Systems   Constitutional:  Positive for fatigue and fever (subjective). Negative for chills and diaphoresis.   HENT:  Positive for congestion and rhinorrhea. Negative for sore throat.    Respiratory:  Positive for cough and shortness of breath.         (+) pleuritic chest pain   Cardiovascular:  Negative for chest pain.   Gastrointestinal:  Positive for diarrhea and nausea. Negative for abdominal pain, constipation and vomiting.   Genitourinary:  Negative for decreased urine volume, difficulty urinating, dysuria, frequency, hematuria and urgency.   Musculoskeletal:  Positive for myalgias (Generalized body aches).   Skin:  Negative for rash.   Neurological:  Positive for headaches. Negative for dizziness, weakness and light-headedness.       Physical Exam     Initial Vitals [01/15/25 1604]   BP Pulse Resp Temp SpO2   (!) 158/77 94 (!) 22 (!) 100.7 °F (38.2 °C) 95 %      MAP       --         Physical Exam    Nursing note and vitals reviewed.  Constitutional: She appears well-developed and well-nourished. She is not diaphoretic. She is active. She does not appear ill. No distress.   HENT:   Head: Normocephalic and atraumatic.   Right Ear: External ear normal.   Left Ear: External ear normal.   Nose: Nose normal. Mouth/Throat: Uvula is midline, oropharynx is clear and moist and mucous membranes are normal.   Eyes: Conjunctivae, EOM and lids are normal. Pupils are  equal, round, and reactive to light. Right eye exhibits no discharge. Left eye exhibits no discharge.   Neck: Phonation normal. Neck supple.   Normal range of motion.   Full passive range of motion without pain.     Cardiovascular:  Normal rate and regular rhythm.           Pulmonary/Chest: Effort normal and breath sounds normal. No respiratory distress.   Speaking in full sentences   Abdominal: Abdomen is soft. She exhibits no distension. There is no abdominal tenderness.   Musculoskeletal:         General: Normal range of motion.      Cervical back: Full passive range of motion without pain, normal range of motion and neck supple.     Neurological: She is alert and oriented to person, place, and time. She has normal strength. No sensory deficit. GCS eye subscore is 4. GCS verbal subscore is 5. GCS motor subscore is 6.   Skin: Skin is dry. Capillary refill takes less than 2 seconds.   Chronic right thigh mass with no warmth or erythema.         ED Course   Procedures  Labs Reviewed   CBC W/ AUTO DIFFERENTIAL - Abnormal       Result Value    WBC 6.47      RBC 4.01      Hemoglobin 10.6 (*)     Hematocrit 34.4 (*)     MCV 86      MCH 26.4 (*)     MCHC 30.8 (*)     RDW 16.0 (*)     Platelets 282      MPV 8.1 (*)     Immature Granulocytes 0.3      Gran # (ANC) 4.7      Immature Grans (Abs) 0.02      Lymph # 0.8 (*)     Mono # 0.9      Eos # 0.0      Baso # 0.03      nRBC 0      Gran % 72.3      Lymph % 13.0 (*)     Mono % 13.6      Eosinophil % 0.3      Basophil % 0.5      Differential Method Automated     COMPREHENSIVE METABOLIC PANEL - Abnormal    Sodium 135 (*)     Potassium 3.8      Chloride 103      CO2 24      Glucose 120 (*)     BUN 9      Creatinine 0.8      Calcium 9.0      Total Protein 8.0      Albumin 4.1      Total Bilirubin 0.5      Alkaline Phosphatase 60      AST 16      ALT 17      eGFR >60.0      Anion Gap 8     INFLUENZA A AND B ANTIGEN - Abnormal    Influenza A, Molecular Positive (*)     Influenza  B, Molecular Negative      Flu A & B Source Nasal swab      Narrative:     Specimen Source->Nasopharyngeal Swab     corrected result(s) called and verbal readback obtained from    Raza Roman RN-ED by CD3 01/15/2025 19:17     critical result(s) called and verbal readback obtained from Raza Roman RN-ED by CD3 01/15/2025 19:07   ISTAT LACTATE - Abnormal    POC Lactate 0.45 (*)     Sample VENOUS     CULTURE, BLOOD   CULTURE, BLOOD   SARS-COV-2 RNA AMPLIFICATION, QUAL    SARS-CoV-2 RNA, Amplification, Qual Negative     B-TYPE NATRIURETIC PEPTIDE   TROPONIN I HIGH SENSITIVITY   B-TYPE NATRIURETIC PEPTIDE    BNP 36     TROPONIN I HIGH SENSITIVITY    Troponin I High Sensitivity 9.4     HEPATITIS C ANTIBODY   HIV 1 / 2 ANTIBODY   URINALYSIS, REFLEX TO URINE CULTURE   ISTAT LACTATE    POC Lactate 0.86      Sample VENOUS     POCT LACTATE   POCT LACTATE     EKG Readings: (Independently Interpreted)     EKG showed normal sinus rhythm with 86 bpm. NY interval 166 ms. QRS 84 ms.  Low voltage QRS.   ms. No stemi noted. Signed by Dr. Raymundo.       Imaging Results              X-Ray Chest AP Portable (Final result)  Result time 01/15/25 18:33:58      Final result by Flash Burrell MD (01/15/25 18:33:58)                   Impression:      As above      Electronically signed by: Flash Burrell  Date:    01/15/2025  Time:    18:33               Narrative:    EXAMINATION:  XR CHEST AP PORTABLE    CLINICAL HISTORY:  cough;    TECHNIQUE:  Single frontal view of the chest was performed.    COMPARISON:  12/26/2024    FINDINGS:  Mild bilateral multifocal airspace disease and atelectasis.  Moderate enlargement of the cardiac silhouette.  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                                       Medications   acetaminophen tablet 1,000 mg (1,000 mg Oral Given 1/15/25 1940)   ondansetron injection 4 mg (4 mg Intravenous Given 1/15/25 2135)   morphine injection 4 mg (4 mg Intravenous  Given 1/15/25 2134)   ciprofloxacin HCl tablet 750 mg (750 mg Oral Given 1/15/25 2137)   linezolid tablet 600 mg (600 mg Oral Given 1/15/25 2137)     Medical Decision Making  59-year-old female with a past medical history COPD, DVT, diabetes, anemia, heart murmur, neuromuscular disorder, and sepsis presents to the ED for cough.  Patient's chart and medical history reviewed.    Ddx:  COVID  Flu  Strep throat  Viral URI  Pneumonia  CHF  MI  Sepsis    Patient's vitals reviewed.  She is febrile, no respiratory distress, and nontoxic-appearing in the ED. Patient had chronic right thigh mass with no warmth or erythema; otherwise overall unremarkable exam.  Patient is speaking in full sentences and not tachypneic by the time patient was roomed.  Patient is on home O2 of 2 L.  Patient was given Tylenol from triage for her fever and sepsis orders were initiated.  CBC showed normal red blood cell count with an H&H of 10.6 and 34.4 respectively, no need for transfusion at this time.  No leukocytosis.  CMP overall unremarkable.  Lactic acid in normal range. COVID negative. Patient is influenza A positive. Patient's O2 sat is 95% on room air with no respiratory distress and bilateral normal breath sounds. Discussed with patient she will need to quarantine until afebrile for 24 hours without taking any medications that would lower temperature or any new symptoms developing. Discussed with patient she can use Tylenol and ibuprofen as needed for fevers and headaches, as well as staying hydrated and resting until this clears from her system.  When patient was roomed she is complaining of shortness of breath and pleuritic chest pain.  We will get EKG, chest x-ray, and add BNP and troponin.  Patient also given her home dose of antibiotics, Zofran, and morphine for pain.  Repeat lactic acid is decreased and still in normal range.  EKG showed normal sinus rhythm with 86 bpm. ID interval 166 ms. QRS 84 ms.  Low voltage QRS.   ms.  No stemi noted. Signed by Dr. Raymundo. Chest x-ray was unremarkable per my personal interpretation. Official x-ray interpretation showed Mild bilateral multifocal airspace disease and atelectasis.  Moderate enlargement of the cardiac silhouette.  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  BNP in normal range, CHF unlikely.  Troponin normal range, MI unlikely.  Discussed all results with patient including overall no acute findings besides influenza a, she verbalized understanding.  Discussed with patient's stay hydrated and well rested, she verbalized understanding.  Patient will be sent home with Tamiflu, Zyrtec, Flonase, Tessalon Perles, and promethazine DM cough syrup for symptomatic control.  Patient will follow-up with her PCP. Patient agrees with this plan. Discussed with her strict return precautions, she verbalized understanding. Patient is stable for discharge.     Amount and/or Complexity of Data Reviewed  External Data Reviewed: labs, radiology, ECG and notes.  Labs: ordered.  Radiology: ordered.  ECG/medicine tests: ordered.    Risk  OTC drugs.  Prescription drug management.                                      Clinical Impression:  Final diagnoses:  [R53.83] Fatigue, unspecified type  [J10.1] Influenza A (Primary)  [R09.81] Nasal congestion  [R52] Generalized body aches  [R06.02] SOB (shortness of breath)  [R05.9] Cough in adult patient          ED Disposition Condition    Discharge Stable          ED Prescriptions       Medication Sig Dispense Start Date End Date Auth. Provider    oseltamivir (TAMIFLU) 75 MG capsule Take 1 capsule (75 mg total) by mouth 2 (two) times daily. for 5 days 10 capsule 1/15/2025 1/20/2025 Holdsworth, Alayna, PA-C    cetirizine (ZYRTEC) 10 MG tablet Take 1 tablet (10 mg total) by mouth daily as needed for Allergies or Rhinitis. 30 tablet 1/15/2025 -- Holdsworth, Alayna, PA-C    fluticasone propionate (FLONASE) 50 mcg/actuation nasal spray 1 spray (50 mcg total) by Each  Nostril route 2 (two) times daily as needed for Rhinitis or Allergies. 15 g 1/15/2025 -- Holdsworth, Alayna, PA-C    benzonatate (TESSALON) 100 MG capsule Take 1 capsule (100 mg total) by mouth 3 (three) times daily as needed for Cough. 30 capsule 1/15/2025 -- Holdsworth, Alayna, PA-C    promethazine-dextromethorphan (PROMETHAZINE-DM) 6.25-15 mg/5 mL Syrp Take 5 mLs by mouth every 4 (four) hours as needed (cough/congestion). 118 mL 1/15/2025 -- Holdsworth, Alayna, PA-C          Follow-up Information       Follow up With Specialties Details Why Contact Info    Melba Trivedi MD Hospitalist, Internal Medicine Call   [address] [facility]   [address]  [facility] [address]  [phone]               Holdsworth, Alayna, PA-C  01/15/25 7512

## 2025-01-16 NOTE — ASSESSMENT & PLAN NOTE
Improving.  Continue Linezolid and Cipro course until complete.  Follow up with ID as scheduled.   Continue HH, no open wound present at this time.  Monitor for breakdown.

## 2025-01-16 NOTE — ASSESSMENT & PLAN NOTE
Follow up with PCP for consideration of other affordable medications to help with weight loss.  Morbid obesity compounds patient co-morbidities and complicates treatment course. Counseling given regarding diet modification and exercise recommendations.

## 2025-01-16 NOTE — ASSESSMENT & PLAN NOTE
Currently resolved.  Sent home from hospitalization Linezolid and Cipro course-continue until complete.  Follow up with ID as scheduled.

## 2025-01-16 NOTE — DISCHARGE INSTRUCTIONS

## 2025-01-16 NOTE — ASSESSMENT & PLAN NOTE
Follow up with PCP for consideration of other affordable medications to help with weight loss.  BMI 62.65.  Morbid obesity compounds patient co-morbidities and complicates treatment course. Counseling given regarding diet modification and exercise recommendations.

## 2025-01-17 ENCOUNTER — PATIENT OUTREACH (OUTPATIENT)
Dept: ADMINISTRATIVE | Facility: OTHER | Age: 60
End: 2025-01-17
Payer: MEDICARE

## 2025-01-20 LAB
BACTERIA BLD CULT: NORMAL
BACTERIA BLD CULT: NORMAL
OHS QRS DURATION: 84 MS
OHS QTC CALCULATION: 411 MS

## 2025-01-23 ENCOUNTER — OFFICE VISIT (OUTPATIENT)
Dept: INFECTIOUS DISEASES | Facility: CLINIC | Age: 60
End: 2025-01-23
Payer: MEDICARE

## 2025-01-23 ENCOUNTER — PATIENT MESSAGE (OUTPATIENT)
Dept: INFECTIOUS DISEASES | Facility: CLINIC | Age: 60
End: 2025-01-23
Payer: MEDICARE

## 2025-01-23 DIAGNOSIS — I89.0 LYMPHEDEMA: ICD-10-CM

## 2025-01-23 DIAGNOSIS — G47.33 OSA (OBSTRUCTIVE SLEEP APNEA): ICD-10-CM

## 2025-01-23 DIAGNOSIS — R22.41 MASS OF RIGHT THIGH: ICD-10-CM

## 2025-01-23 DIAGNOSIS — M79.3 PANNICULITIS: Primary | ICD-10-CM

## 2025-01-23 NOTE — PROGRESS NOTES
Subjective:        The chief complaint leading to consultation is:  Hospital follow up  The patient location is:  Home  Visit type: Virtual visit with synchronous audio/video or audio only  This was a video visit in lieu of in-person visit due to the coronavirus emergency. Patient acknowledged and consented to the video visit encounter.     HPI  She has a history of extreme obesity, COPD and depression.  Known to me from previous encounter during hospitalization for panniculitis.  Presents to the emergency room 12/20/2024 generalized weakness of acute onset.  BP in the /59, pulse 96, respiratory 18, temperature 101.9°, oxygen saturation 94%.  She quickly decompensated and was intubated in the ER.       WBC 18, hematocrit 38, MCV 87, platelet 279, sodium 137, creatinine 0.6, LFTs normal.  UA unremarkable.  CT right showed known medial panniculus with findings concerning for cellulitis.  CT chest abdomen and pelvis with no pneumoniae or other acute findings.  CT head with no acute abnormality.  She was admitted to the ICU on antibiotics.     Antibiotic history:   Vancomycin:  12/26/2024-  Metronidazole: 02/20/2024 x1 dose   Ertapenem:  12/22/2024-  Aztreonam: 12/26/2024 x1 dose     Microbiology:    Blood culture 12/20/2024:  NGTD    01/23/2025:  here for follow up.  Recently hospitalized with recurrent cellulitis of right thigh pannus.  Improved and was discharged home.  States she developed flu and was back in ER again and treated with Tamiflu she is getting better.  States the right thigh swelling significantly reduced because she has been laying down from the flu.  Her home health has sent in referral for her to be reassessed by her plastic surgeon Dr. Trina Costello.    Outdoor activities:  She does not smoke.  ADL independent.  Travel:  No recent travel  Implants:  None  Antibiotic history:  As above    Past Medical History:   Diagnosis Date    Allergy     Anemia     Asthma     COPD (chronic  obstructive pulmonary disease)     Deep vein thrombosis     Depression     Diabetes mellitus, type 2     Encounter for blood transfusion     Heart murmur     Neuromuscular disorder        Past Surgical History:   Procedure Laterality Date     SECTION      DILATION AND CURETTAGE OF UTERUS      gastric sleeve      [facility]    TONSILLECTOMY         Family History   Problem Relation Name Age of Onset    Heart disease Mother      Diabetes Mother      Arthritis Mother      Depression Mother      Cancer Father      COPD Father      Arthritis Maternal Grandmother      Cancer Maternal Grandmother      Cancer Maternal Grandfather      Cancer Paternal Grandmother      Kidney disease Paternal Grandmother      Diabetes Paternal Grandmother      Diabetes Paternal Grandfather      Hyperlipidemia Paternal Grandfather         Social History     Socioeconomic History    Marital status: Significant Other   Tobacco Use    Smoking status: Every Day     Current packs/day: 1.00     Average packs/day: 1 pack/day for 6.1 years (6.1 ttl pk-yrs)     Types: Cigarettes     Start date:     Smokeless tobacco: Never   Substance and Sexual Activity    Alcohol use: No    Drug use: No    Sexual activity: Never     Social Drivers of Health     Financial Resource Strain: High Risk (2025)    Overall Financial Resource Strain (CARDIA)     Difficulty of Paying Living Expenses: Hard   Food Insecurity: Food Insecurity Present (2025)    Hunger Vital Sign     Worried About Running Out of Food in the Last Year: Sometimes true     Ran Out of Food in the Last Year: Sometimes true   Transportation Needs: Patient Unable To Answer (2024)    TRANSPORTATION NEEDS     Transportation : Patient unable to answer   Physical Activity: Inactive (2025)    Exercise Vital Sign     Days of Exercise per Week: 0 days     Minutes of Exercise per Session: 0 min   Stress: Stress Concern Present (2025)    [employer] [place] [employer] -  Occupational Stress Questionnaire     Feeling of Stress : To some extent   Housing Stability: Unknown (1/9/2025)    Housing Stability Vital Sign     Unable to Pay for Housing in the Last Year: No     Homeless in the Last Year: Patient unable to answer       Review of patient's allergies indicates:   Allergen Reactions    Fentanyl Other (See Comments)     Hypoxemia  Muscle twitching    Aspirin     Nicoderm     Nsaids (non-steroidal anti-inflammatory drug) Hives    Teflaro [ceftaroline fosamil]      Allergic reaction/ hives/itching       Current Outpatient Medications   Medication Instructions    benzonatate (TESSALON) 100 mg, Oral, 3 times daily PRN    cetirizine (ZYRTEC) 10 mg, Oral, Daily PRN    fluticasone propionate (FLONASE) 50 mcg, Each Nostril, 2 times daily PRN    hydrOXYzine HCL (ATARAX) 25 mg, 3 times daily PRN    oxyCODONE-acetaminophen (PERCOCET) 5-325 mg per tablet 1 tablet, Every 6 hours PRN    promethazine-dextromethorphan (PROMETHAZINE-DM) 6.25-15 mg/5 mL Syrp 5 mLs, Oral, Every 4 hours PRN         Review of Systems   10 system review unremarkable.  As in HPI.    OBJECTIVE          Physical Exam  limited exam because this was a virtual visit.    General:   Obese, looks well.  In no acute distress   Respiratory: Breathing comfortably  Psych: Good mood, normal affect.     Wounds:  Not applicable.  She will upload pictures of her right thigh pannus  VAD:  None  ISOLATION:  Not applicable    PRIOR  MICROBIOLOGY:   reviewed    Susceptibility data from last 90 days.  Collected Specimen Info Organism   01/15/25 Blood from Peripheral, Antecubital, Right No growth after 5 days.   01/15/25 Blood from Peripheral, Forearm, Left No growth after 5 days.   12/26/24 Blood from Peripheral, Hand, Left No growth after 5 days.   12/26/24 Blood from Peripheral, Hand, Right No growth after 5 days.       LAST 7 DAYS MICROBIOLOGY   Microbiology Results (last 7 days)       ** No results found for the last 168 hours. **             CURRENT/PREVIOUS VISIT EKG  Results for orders placed or performed during the hospital encounter of 01/15/25   EKG 12-lead    Collection Time: 01/15/25  8:00 PM   Result Value Ref Range    QRS Duration 84 ms    OHS QTC Calculation 411 ms    Narrative    Test Reason : Z13.6,    Vent. Rate :  86 BPM     Atrial Rate :  86 BPM     P-R Int : 166 ms          QRS Dur :  84 ms      QT Int : 344 ms       P-R-T Axes :  31  56  15 degrees    QTcB Int : 411 ms    Normal sinus rhythm  Low voltage QRS  Cannot rule out Anterior infarct ,age undetermined  Abnormal ECG  When compared with ECG of 26-Dec-2024 11:55,  Minimal criteria for Anterior infarct are now Present  T wave inversion now evident in Anterior leads  Confirmed by Monroe Lucio ([id number]) on 1/20/2025 11:35:51 AM    Referred By: AAAREFERRAL SELF           Confirmed By: Monroe Lucio       Significant Labs: All pertinent labs within the past 24 hours have been reviewed.     Significant Imaging: I have reviewed all relevant and available imaging results/findings within the past 24 hours.     Right thigh panniculitis.  Completed antibiotics.  Clinically resolved by history.  She will upload pictures of her pannus into a PICC today.  This infection is recurrent.  Trial of secondary prophylactic penicillin  mg b.i.d..      Extreme obesity.     3. Large right medial thigh pannus.  She is in the process of getting reconnected with her plastic surgeon to evaluate for surgical excision.    I will see again in 6 weeks for follow up.    Rajendra Callahan MD  Date of Service: 01/23/2025  1:34 PM    Total time spent with patient: 30    Each patient to whom he or she provides medical services by telemedicine is:  (1) informed of the relationship between the physician and patient and the respective role of any other health care provider with respect to management of the patient; and (2) notified that he or she may decline to receive medical services by telemedicine and may  withdraw from such care at any time.

## 2025-01-23 NOTE — PATIENT INSTRUCTIONS
I will prescribe penicillin  mg twice daily as discussed   I will plan to see you again in 6 weeks

## 2025-01-26 ENCOUNTER — HOSPITAL ENCOUNTER (INPATIENT)
Facility: HOSPITAL | Age: 60
LOS: 4 days | Discharge: HOME HEALTH | DRG: 872 | End: 2025-01-30
Attending: EMERGENCY MEDICINE | Admitting: STUDENT IN AN ORGANIZED HEALTH CARE EDUCATION/TRAINING PROGRAM
Payer: MEDICARE

## 2025-01-26 DIAGNOSIS — R07.9 CHEST PAIN: ICD-10-CM

## 2025-01-26 DIAGNOSIS — A41.9 SEPSIS, DUE TO UNSPECIFIED ORGANISM, UNSPECIFIED WHETHER ACUTE ORGAN DYSFUNCTION PRESENT: Primary | ICD-10-CM

## 2025-01-26 DIAGNOSIS — G47.33 OSA (OBSTRUCTIVE SLEEP APNEA): ICD-10-CM

## 2025-01-26 DIAGNOSIS — Z13.6 SCREENING FOR CARDIOVASCULAR CONDITION: ICD-10-CM

## 2025-01-26 DIAGNOSIS — M79.3 PANNICULITIS: ICD-10-CM

## 2025-01-26 DIAGNOSIS — B37.31 VAGINAL CANDIDIASIS: ICD-10-CM

## 2025-01-26 PROBLEM — F17.210 DEPENDENCE ON NICOTINE FROM CIGARETTES: Status: RESOLVED | Noted: 2024-04-02 | Resolved: 2025-01-26

## 2025-01-26 LAB
ALBUMIN SERPL BCP-MCNC: 3.9 G/DL (ref 3.5–5.2)
ALP SERPL-CCNC: 64 U/L (ref 55–135)
ALT SERPL W/O P-5'-P-CCNC: 13 U/L (ref 10–44)
ANION GAP SERPL CALC-SCNC: 9 MMOL/L (ref 8–16)
AST SERPL-CCNC: 12 U/L (ref 10–40)
BASOPHILS # BLD AUTO: 0.02 K/UL (ref 0–0.2)
BASOPHILS NFR BLD: 0.1 % (ref 0–1.9)
BILIRUB SERPL-MCNC: 0.7 MG/DL (ref 0.1–1)
BILIRUB UR QL STRIP: NEGATIVE
BUN SERPL-MCNC: 12 MG/DL (ref 6–20)
CALCIUM SERPL-MCNC: 9.3 MG/DL (ref 8.7–10.5)
CHLORIDE SERPL-SCNC: 101 MMOL/L (ref 95–110)
CLARITY UR: CLEAR
CO2 SERPL-SCNC: 27 MMOL/L (ref 23–29)
COLOR UR: YELLOW
CREAT SERPL-MCNC: 0.6 MG/DL (ref 0.5–1.4)
DIFFERENTIAL METHOD BLD: ABNORMAL
EOSINOPHIL # BLD AUTO: 0.1 K/UL (ref 0–0.5)
EOSINOPHIL NFR BLD: 0.4 % (ref 0–8)
ERYTHROCYTE [DISTWIDTH] IN BLOOD BY AUTOMATED COUNT: 16.5 % (ref 11.5–14.5)
EST. GFR  (NO RACE VARIABLE): >60 ML/MIN/1.73 M^2
GLUCOSE SERPL-MCNC: 122 MG/DL (ref 70–110)
GLUCOSE UR QL STRIP: NEGATIVE
HCT VFR BLD AUTO: 38.1 % (ref 37–48.5)
HGB BLD-MCNC: 12 G/DL (ref 12–16)
HGB UR QL STRIP: NEGATIVE
IMM GRANULOCYTES # BLD AUTO: 0.05 K/UL (ref 0–0.04)
IMM GRANULOCYTES NFR BLD AUTO: 0.3 % (ref 0–0.5)
KETONES UR QL STRIP: NEGATIVE
LDH SERPL L TO P-CCNC: 1.24 MMOL/L (ref 0.5–2.2)
LEUKOCYTE ESTERASE UR QL STRIP: NEGATIVE
LYMPHOCYTES # BLD AUTO: 1.4 K/UL (ref 1–4.8)
LYMPHOCYTES NFR BLD: 8.5 % (ref 18–48)
MCH RBC QN AUTO: 26.6 PG (ref 27–31)
MCHC RBC AUTO-ENTMCNC: 31.5 G/DL (ref 32–36)
MCV RBC AUTO: 85 FL (ref 82–98)
MONOCYTES # BLD AUTO: 0.7 K/UL (ref 0.3–1)
MONOCYTES NFR BLD: 4.4 % (ref 4–15)
NEUTROPHILS # BLD AUTO: 13.8 K/UL (ref 1.8–7.7)
NEUTROPHILS NFR BLD: 86.3 % (ref 38–73)
NITRITE UR QL STRIP: NEGATIVE
NRBC BLD-RTO: 0 /100 WBC
PH UR STRIP: 8 [PH] (ref 5–8)
PLATELET # BLD AUTO: 302 K/UL (ref 150–450)
PMV BLD AUTO: 9 FL (ref 9.2–12.9)
POTASSIUM SERPL-SCNC: 3.9 MMOL/L (ref 3.5–5.1)
PROT SERPL-MCNC: 7.3 G/DL (ref 6–8.4)
PROT UR QL STRIP: ABNORMAL
RBC # BLD AUTO: 4.51 M/UL (ref 4–5.4)
SAMPLE: NORMAL
SODIUM SERPL-SCNC: 137 MMOL/L (ref 136–145)
SP GR UR STRIP: 1.03 (ref 1–1.03)
URN SPEC COLLECT METH UR: ABNORMAL
UROBILINOGEN UR STRIP-ACNC: ABNORMAL EU/DL
WBC # BLD AUTO: 15.98 K/UL (ref 3.9–12.7)

## 2025-01-26 PROCEDURE — 25000003 PHARM REV CODE 250: Performed by: EMERGENCY MEDICINE

## 2025-01-26 PROCEDURE — 36415 COLL VENOUS BLD VENIPUNCTURE: CPT | Performed by: EMERGENCY MEDICINE

## 2025-01-26 PROCEDURE — 85025 COMPLETE CBC W/AUTO DIFF WBC: CPT | Performed by: EMERGENCY MEDICINE

## 2025-01-26 PROCEDURE — 99285 EMERGENCY DEPT VISIT HI MDM: CPT | Mod: 25

## 2025-01-26 PROCEDURE — 63600175 PHARM REV CODE 636 W HCPCS: Performed by: STUDENT IN AN ORGANIZED HEALTH CARE EDUCATION/TRAINING PROGRAM

## 2025-01-26 PROCEDURE — 99900031 HC PATIENT EDUCATION (STAT)

## 2025-01-26 PROCEDURE — 27100171 HC OXYGEN HIGH FLOW UP TO 24 HOURS

## 2025-01-26 PROCEDURE — 27000221 HC OXYGEN, UP TO 24 HOURS

## 2025-01-26 PROCEDURE — 12000002 HC ACUTE/MED SURGE SEMI-PRIVATE ROOM

## 2025-01-26 PROCEDURE — 96376 TX/PRO/DX INJ SAME DRUG ADON: CPT

## 2025-01-26 PROCEDURE — 93010 ELECTROCARDIOGRAM REPORT: CPT | Mod: ,,, | Performed by: INTERNAL MEDICINE

## 2025-01-26 PROCEDURE — 63600175 PHARM REV CODE 636 W HCPCS: Performed by: EMERGENCY MEDICINE

## 2025-01-26 PROCEDURE — 87040 BLOOD CULTURE FOR BACTERIA: CPT | Performed by: EMERGENCY MEDICINE

## 2025-01-26 PROCEDURE — 99406 BEHAV CHNG SMOKING 3-10 MIN: CPT

## 2025-01-26 PROCEDURE — 96375 TX/PRO/DX INJ NEW DRUG ADDON: CPT

## 2025-01-26 PROCEDURE — 96374 THER/PROPH/DIAG INJ IV PUSH: CPT

## 2025-01-26 PROCEDURE — 80053 COMPREHEN METABOLIC PANEL: CPT | Performed by: EMERGENCY MEDICINE

## 2025-01-26 PROCEDURE — 99900035 HC TECH TIME PER 15 MIN (STAT)

## 2025-01-26 PROCEDURE — 94761 N-INVAS EAR/PLS OXIMETRY MLT: CPT

## 2025-01-26 PROCEDURE — 94660 CPAP INITIATION&MGMT: CPT

## 2025-01-26 PROCEDURE — 81003 URINALYSIS AUTO W/O SCOPE: CPT | Performed by: EMERGENCY MEDICINE

## 2025-01-26 PROCEDURE — 94799 UNLISTED PULMONARY SVC/PX: CPT

## 2025-01-26 PROCEDURE — 93005 ELECTROCARDIOGRAM TRACING: CPT | Performed by: INTERNAL MEDICINE

## 2025-01-26 PROCEDURE — 5A09357 ASSISTANCE WITH RESPIRATORY VENTILATION, LESS THAN 24 CONSECUTIVE HOURS, CONTINUOUS POSITIVE AIRWAY PRESSURE: ICD-10-PCS | Performed by: EMERGENCY MEDICINE

## 2025-01-26 RX ORDER — ONDANSETRON HYDROCHLORIDE 2 MG/ML
4 INJECTION, SOLUTION INTRAVENOUS EVERY 8 HOURS PRN
Status: DISCONTINUED | OUTPATIENT
Start: 2025-01-26 | End: 2025-01-30 | Stop reason: HOSPADM

## 2025-01-26 RX ORDER — SODIUM,POTASSIUM PHOSPHATES 280-250MG
2 POWDER IN PACKET (EA) ORAL
Status: DISCONTINUED | OUTPATIENT
Start: 2025-01-26 | End: 2025-01-30 | Stop reason: HOSPADM

## 2025-01-26 RX ORDER — ACETAMINOPHEN 500 MG
1000 TABLET ORAL EVERY 6 HOURS PRN
COMMUNITY

## 2025-01-26 RX ORDER — IBUPROFEN 200 MG
24 TABLET ORAL
Status: DISCONTINUED | OUTPATIENT
Start: 2025-01-26 | End: 2025-01-30 | Stop reason: HOSPADM

## 2025-01-26 RX ORDER — IBUPROFEN 200 MG
16 TABLET ORAL
Status: DISCONTINUED | OUTPATIENT
Start: 2025-01-26 | End: 2025-01-30 | Stop reason: HOSPADM

## 2025-01-26 RX ORDER — ONDANSETRON HYDROCHLORIDE 2 MG/ML
4 INJECTION, SOLUTION INTRAVENOUS
Status: COMPLETED | OUTPATIENT
Start: 2025-01-26 | End: 2025-01-26

## 2025-01-26 RX ORDER — LANOLIN ALCOHOL/MO/W.PET/CERES
800 CREAM (GRAM) TOPICAL
Status: DISCONTINUED | OUTPATIENT
Start: 2025-01-26 | End: 2025-01-30 | Stop reason: HOSPADM

## 2025-01-26 RX ORDER — AMOXICILLIN 250 MG
1 CAPSULE ORAL 2 TIMES DAILY PRN
Status: DISCONTINUED | OUTPATIENT
Start: 2025-01-26 | End: 2025-01-30 | Stop reason: HOSPADM

## 2025-01-26 RX ORDER — TALC
9 POWDER (GRAM) TOPICAL NIGHTLY PRN
Status: DISCONTINUED | OUTPATIENT
Start: 2025-01-26 | End: 2025-01-30 | Stop reason: HOSPADM

## 2025-01-26 RX ORDER — ACETAMINOPHEN 500 MG
1000 TABLET ORAL
Status: COMPLETED | OUTPATIENT
Start: 2025-01-26 | End: 2025-01-26

## 2025-01-26 RX ORDER — MORPHINE SULFATE 4 MG/ML
4 INJECTION, SOLUTION INTRAMUSCULAR; INTRAVENOUS
Status: COMPLETED | OUTPATIENT
Start: 2025-01-26 | End: 2025-01-26

## 2025-01-26 RX ORDER — CEFTRIAXONE 2 G/1
2 INJECTION, POWDER, FOR SOLUTION INTRAMUSCULAR; INTRAVENOUS ONCE
Status: COMPLETED | OUTPATIENT
Start: 2025-01-26 | End: 2025-01-26

## 2025-01-26 RX ORDER — ACETAMINOPHEN 325 MG/1
650 TABLET ORAL EVERY 4 HOURS PRN
Status: DISCONTINUED | OUTPATIENT
Start: 2025-01-26 | End: 2025-01-30 | Stop reason: HOSPADM

## 2025-01-26 RX ORDER — INSULIN ASPART 100 [IU]/ML
0-5 INJECTION, SOLUTION INTRAVENOUS; SUBCUTANEOUS
Status: DISCONTINUED | OUTPATIENT
Start: 2025-01-26 | End: 2025-01-30 | Stop reason: HOSPADM

## 2025-01-26 RX ORDER — SODIUM CHLORIDE 0.9 % (FLUSH) 0.9 %
10 SYRINGE (ML) INJECTION EVERY 8 HOURS PRN
Status: DISCONTINUED | OUTPATIENT
Start: 2025-01-26 | End: 2025-01-30 | Stop reason: HOSPADM

## 2025-01-26 RX ORDER — MORPHINE SULFATE 2 MG/ML
2 INJECTION, SOLUTION INTRAMUSCULAR; INTRAVENOUS EVERY 4 HOURS PRN
Status: DISCONTINUED | OUTPATIENT
Start: 2025-01-26 | End: 2025-01-29

## 2025-01-26 RX ORDER — NALOXONE HCL 0.4 MG/ML
0.02 VIAL (ML) INJECTION
Status: DISCONTINUED | OUTPATIENT
Start: 2025-01-26 | End: 2025-01-30 | Stop reason: HOSPADM

## 2025-01-26 RX ORDER — GLUCAGON 1 MG
1 KIT INJECTION
Status: DISCONTINUED | OUTPATIENT
Start: 2025-01-26 | End: 2025-01-30 | Stop reason: HOSPADM

## 2025-01-26 RX ORDER — ENOXAPARIN SODIUM 100 MG/ML
60 INJECTION SUBCUTANEOUS EVERY 12 HOURS
Status: DISCONTINUED | OUTPATIENT
Start: 2025-01-26 | End: 2025-01-30 | Stop reason: HOSPADM

## 2025-01-26 RX ORDER — LINEZOLID 2 MG/ML
600 INJECTION, SOLUTION INTRAVENOUS
Status: COMPLETED | OUTPATIENT
Start: 2025-01-26 | End: 2025-01-26

## 2025-01-26 RX ORDER — LINEZOLID 2 MG/ML
600 INJECTION, SOLUTION INTRAVENOUS
Status: DISCONTINUED | OUTPATIENT
Start: 2025-01-26 | End: 2025-01-27

## 2025-01-26 RX ORDER — CEFTRIAXONE 2 G/1
2 INJECTION, POWDER, FOR SOLUTION INTRAMUSCULAR; INTRAVENOUS
Status: DISCONTINUED | OUTPATIENT
Start: 2025-01-27 | End: 2025-01-27

## 2025-01-26 RX ORDER — IPRATROPIUM BROMIDE AND ALBUTEROL SULFATE 2.5; .5 MG/3ML; MG/3ML
3 SOLUTION RESPIRATORY (INHALATION) EVERY 6 HOURS PRN
Status: DISCONTINUED | OUTPATIENT
Start: 2025-01-26 | End: 2025-01-30 | Stop reason: HOSPADM

## 2025-01-26 RX ORDER — ENOXAPARIN SODIUM 100 MG/ML
40 INJECTION SUBCUTANEOUS EVERY 24 HOURS
Status: DISCONTINUED | OUTPATIENT
Start: 2025-01-26 | End: 2025-01-26

## 2025-01-26 RX ADMIN — ONDANSETRON 4 MG: 2 INJECTION INTRAMUSCULAR; INTRAVENOUS at 10:01

## 2025-01-26 RX ADMIN — ACETAMINOPHEN 1000 MG: 500 TABLET, FILM COATED ORAL at 08:01

## 2025-01-26 RX ADMIN — LINEZOLID 600 MG: 600 INJECTION, SOLUTION INTRAVENOUS at 09:01

## 2025-01-26 RX ADMIN — MORPHINE SULFATE 4 MG: 4 INJECTION, SOLUTION INTRAMUSCULAR; INTRAVENOUS at 07:01

## 2025-01-26 RX ADMIN — CEFTRIAXONE 2 G: 2 INJECTION, POWDER, FOR SOLUTION INTRAMUSCULAR; INTRAVENOUS at 07:01

## 2025-01-26 RX ADMIN — MORPHINE SULFATE 4 MG: 4 INJECTION, SOLUTION INTRAMUSCULAR; INTRAVENOUS at 09:01

## 2025-01-26 RX ADMIN — ONDANSETRON 4 MG: 2 INJECTION INTRAMUSCULAR; INTRAVENOUS at 07:01

## 2025-01-27 PROBLEM — F17.200 TOBACCO DEPENDENCY: Status: ACTIVE | Noted: 2025-01-27

## 2025-01-27 LAB
ANION GAP SERPL CALC-SCNC: 7 MMOL/L (ref 8–16)
BASOPHILS # BLD AUTO: 0.04 K/UL (ref 0–0.2)
BASOPHILS NFR BLD: 0.3 % (ref 0–1.9)
BUN SERPL-MCNC: 18 MG/DL (ref 6–20)
CALCIUM SERPL-MCNC: 9 MG/DL (ref 8.7–10.5)
CHLORIDE SERPL-SCNC: 100 MMOL/L (ref 95–110)
CK SERPL-CCNC: 37 U/L (ref 20–180)
CO2 SERPL-SCNC: 27 MMOL/L (ref 23–29)
CREAT SERPL-MCNC: 0.8 MG/DL (ref 0.5–1.4)
CRP SERPL-MCNC: 10.4 MG/DL
DIFFERENTIAL METHOD BLD: ABNORMAL
EOSINOPHIL # BLD AUTO: 0 K/UL (ref 0–0.5)
EOSINOPHIL NFR BLD: 0.2 % (ref 0–8)
ERYTHROCYTE [DISTWIDTH] IN BLOOD BY AUTOMATED COUNT: 16.8 % (ref 11.5–14.5)
EST. GFR  (NO RACE VARIABLE): >60 ML/MIN/1.73 M^2
GLUCOSE SERPL-MCNC: 128 MG/DL (ref 70–110)
HCT VFR BLD AUTO: 36.8 % (ref 37–48.5)
HGB BLD-MCNC: 11.4 G/DL (ref 12–16)
IMM GRANULOCYTES # BLD AUTO: 0.06 K/UL (ref 0–0.04)
IMM GRANULOCYTES NFR BLD AUTO: 0.4 % (ref 0–0.5)
LACTATE SERPL-SCNC: 0.7 MMOL/L (ref 0.5–1.9)
LYMPHOCYTES # BLD AUTO: 2.1 K/UL (ref 1–4.8)
LYMPHOCYTES NFR BLD: 14 % (ref 18–48)
MAGNESIUM SERPL-MCNC: 1.9 MG/DL (ref 1.6–2.6)
MCH RBC QN AUTO: 26.5 PG (ref 27–31)
MCHC RBC AUTO-ENTMCNC: 31 G/DL (ref 32–36)
MCV RBC AUTO: 86 FL (ref 82–98)
MONOCYTES # BLD AUTO: 1.3 K/UL (ref 0.3–1)
MONOCYTES NFR BLD: 8.8 % (ref 4–15)
NEUTROPHILS # BLD AUTO: 11.5 K/UL (ref 1.8–7.7)
NEUTROPHILS NFR BLD: 76.3 % (ref 38–73)
NRBC BLD-RTO: 0 /100 WBC
PHOSPHATE SERPL-MCNC: 5.1 MG/DL (ref 2.7–4.5)
PLATELET # BLD AUTO: 282 K/UL (ref 150–450)
PMV BLD AUTO: 8.9 FL (ref 9.2–12.9)
POCT GLUCOSE: 112 MG/DL (ref 70–110)
POCT GLUCOSE: 122 MG/DL (ref 70–110)
POCT GLUCOSE: 132 MG/DL (ref 70–110)
POCT GLUCOSE: 136 MG/DL (ref 70–110)
POCT GLUCOSE: 97 MG/DL (ref 70–110)
POTASSIUM SERPL-SCNC: 3.9 MMOL/L (ref 3.5–5.1)
PROCALCITONIN SERPL IA-MCNC: 0.22 NG/ML (ref 0–0.5)
RBC # BLD AUTO: 4.3 M/UL (ref 4–5.4)
SODIUM SERPL-SCNC: 134 MMOL/L (ref 136–145)
WBC # BLD AUTO: 15.01 K/UL (ref 3.9–12.7)

## 2025-01-27 PROCEDURE — 63600175 PHARM REV CODE 636 W HCPCS: Performed by: STUDENT IN AN ORGANIZED HEALTH CARE EDUCATION/TRAINING PROGRAM

## 2025-01-27 PROCEDURE — 83605 ASSAY OF LACTIC ACID: CPT | Performed by: STUDENT IN AN ORGANIZED HEALTH CARE EDUCATION/TRAINING PROGRAM

## 2025-01-27 PROCEDURE — 86140 C-REACTIVE PROTEIN: CPT | Performed by: STUDENT IN AN ORGANIZED HEALTH CARE EDUCATION/TRAINING PROGRAM

## 2025-01-27 PROCEDURE — 63600175 PHARM REV CODE 636 W HCPCS: Performed by: EMERGENCY MEDICINE

## 2025-01-27 PROCEDURE — 27100171 HC OXYGEN HIGH FLOW UP TO 24 HOURS

## 2025-01-27 PROCEDURE — 12000002 HC ACUTE/MED SURGE SEMI-PRIVATE ROOM

## 2025-01-27 PROCEDURE — 85025 COMPLETE CBC W/AUTO DIFF WBC: CPT | Performed by: STUDENT IN AN ORGANIZED HEALTH CARE EDUCATION/TRAINING PROGRAM

## 2025-01-27 PROCEDURE — 99900035 HC TECH TIME PER 15 MIN (STAT)

## 2025-01-27 PROCEDURE — 82550 ASSAY OF CK (CPK): CPT | Performed by: STUDENT IN AN ORGANIZED HEALTH CARE EDUCATION/TRAINING PROGRAM

## 2025-01-27 PROCEDURE — 25000003 PHARM REV CODE 250: Performed by: STUDENT IN AN ORGANIZED HEALTH CARE EDUCATION/TRAINING PROGRAM

## 2025-01-27 PROCEDURE — 83735 ASSAY OF MAGNESIUM: CPT | Performed by: STUDENT IN AN ORGANIZED HEALTH CARE EDUCATION/TRAINING PROGRAM

## 2025-01-27 PROCEDURE — 94660 CPAP INITIATION&MGMT: CPT

## 2025-01-27 PROCEDURE — 80048 BASIC METABOLIC PNL TOTAL CA: CPT | Performed by: STUDENT IN AN ORGANIZED HEALTH CARE EDUCATION/TRAINING PROGRAM

## 2025-01-27 PROCEDURE — 84100 ASSAY OF PHOSPHORUS: CPT | Performed by: STUDENT IN AN ORGANIZED HEALTH CARE EDUCATION/TRAINING PROGRAM

## 2025-01-27 PROCEDURE — 36415 COLL VENOUS BLD VENIPUNCTURE: CPT | Performed by: STUDENT IN AN ORGANIZED HEALTH CARE EDUCATION/TRAINING PROGRAM

## 2025-01-27 PROCEDURE — 94761 N-INVAS EAR/PLS OXIMETRY MLT: CPT

## 2025-01-27 PROCEDURE — 84145 PROCALCITONIN (PCT): CPT | Performed by: INTERNAL MEDICINE

## 2025-01-27 PROCEDURE — 99900031 HC PATIENT EDUCATION (STAT)

## 2025-01-27 RX ORDER — BENZONATATE 100 MG/1
100 CAPSULE ORAL 3 TIMES DAILY PRN
Status: DISCONTINUED | OUTPATIENT
Start: 2025-01-27 | End: 2025-01-30 | Stop reason: HOSPADM

## 2025-01-27 RX ORDER — HYDROXYZINE HYDROCHLORIDE 25 MG/1
25 TABLET, FILM COATED ORAL 3 TIMES DAILY PRN
Status: DISCONTINUED | OUTPATIENT
Start: 2025-01-27 | End: 2025-01-30 | Stop reason: HOSPADM

## 2025-01-27 RX ORDER — FLUCONAZOLE 100 MG/1
200 TABLET ORAL DAILY
Status: DISCONTINUED | OUTPATIENT
Start: 2025-01-28 | End: 2025-01-30 | Stop reason: HOSPADM

## 2025-01-27 RX ORDER — FLUTICASONE PROPIONATE 50 MCG
1 SPRAY, SUSPENSION (ML) NASAL 2 TIMES DAILY PRN
Status: DISCONTINUED | OUTPATIENT
Start: 2025-01-27 | End: 2025-01-30 | Stop reason: HOSPADM

## 2025-01-27 RX ADMIN — ONDANSETRON 4 MG: 2 INJECTION INTRAMUSCULAR; INTRAVENOUS at 10:01

## 2025-01-27 RX ADMIN — ACETAMINOPHEN 650 MG: 325 TABLET ORAL at 02:01

## 2025-01-27 RX ADMIN — ENOXAPARIN SODIUM 60 MG: 60 INJECTION SUBCUTANEOUS at 10:01

## 2025-01-27 RX ADMIN — ONDANSETRON 4 MG: 2 INJECTION INTRAMUSCULAR; INTRAVENOUS at 03:01

## 2025-01-27 RX ADMIN — MORPHINE SULFATE 2 MG: 2 INJECTION, SOLUTION INTRAMUSCULAR; INTRAVENOUS at 10:01

## 2025-01-27 RX ADMIN — DAPTOMYCIN 795 MG: 500 INJECTION, POWDER, LYOPHILIZED, FOR SOLUTION INTRAVENOUS at 06:01

## 2025-01-27 RX ADMIN — MORPHINE SULFATE 2 MG: 2 INJECTION, SOLUTION INTRAMUSCULAR; INTRAVENOUS at 12:01

## 2025-01-27 RX ADMIN — MORPHINE SULFATE 2 MG: 2 INJECTION, SOLUTION INTRAMUSCULAR; INTRAVENOUS at 05:01

## 2025-01-27 RX ADMIN — LINEZOLID 600 MG: 600 INJECTION, SOLUTION INTRAVENOUS at 10:01

## 2025-01-27 RX ADMIN — MORPHINE SULFATE 2 MG: 2 INJECTION, SOLUTION INTRAMUSCULAR; INTRAVENOUS at 06:01

## 2025-01-27 RX ADMIN — MORPHINE SULFATE 2 MG: 2 INJECTION, SOLUTION INTRAMUSCULAR; INTRAVENOUS at 02:01

## 2025-01-27 NOTE — CARE UPDATE
01/27/25 0723   Patient Assessment/Suction   Level of Consciousness (AVPU) alert   Respiratory Effort Normal;Unlabored   Expansion/Accessory Muscles/Retractions no use of accessory muscles   All Lung Fields Breath Sounds clear;diminished   Rhythm/Pattern, Respiratory unlabored   Cough Frequency no cough   Skin Integrity   $ Wound Care Tech Time 15 min   Area Observed Bridge of nose   Skin Appearance redness nonblanchable  (nurse notified)   Barrier used? Gel Cushion   Was wound care notified? 01/27/25  (nurse notified)   PRE-TX-O2   Device (Oxygen Therapy) CPAP   $ Is the patient on High Flow Oxygen? Yes   Oxygen Concentration (%) 35   Pulse Oximetry Type Continuous   $ Pulse Oximetry - Multiple Charge Pulse Oximetry - Multiple   Aerosol Therapy   $ Aerosol Therapy Charges PRN treatment not required   Ready to Wean/Extubation Screen   FIO2<=50 (chart decimal) 0.35   Preset CPAP/BiPAP Settings   Mode Of Delivery CPAP   CPAP/BIPAP charged w/in last 24 h YES   $ Initial CPAP/BiPAP Setup? No   $ Is patient using? Yes   Size of Mask Small   Sized Appropriately? Yes   Equipment Type V60   Airway Device Type small full face mask   CPAP (cm H2O) 20   Patient CPAP/BiPAP Settings   RR Total (Breaths/Min) 20   Tidal Volume (mL) 525   VE Minute Ventilation (L/min) 8.9 L/min   Peak Inspiratory Pressure (cm H2O) 0   TiTOT (%) 212   Total Leak (L/Min) 0   Patient Trigger - ST Mode Only (%) 89   Education   $ Education BiPAP;15 min

## 2025-01-27 NOTE — HPI
59 year old female with comorbid conditions of KEESHA on CPAP, COPD on 2lpm home oxygen, h/o DVT, recurrent panniculitis presents due to suspected right leg panniculitis.  Patient has area of right leg that has been infected repeatedly.  Reports pain in same area of leg for 1 day duration.  Associated with fatigue, nausea, cough, headache.  Denies fevers, chills.    Last admission for panniculitis patient had to be intubated due to sepsis. Was treated with rocephin/vanco while inpatient and was discharged on 2 weeks of PO ciprofloxacin and linezolid on 1/3/25. Subsequently presented to [facility] due to influenza on 1/15 - completed tamiflu.  Patient is following with plastic surgeon about removing affected area once infection resolves.   Labs revealed WBC of 15.98.  Patient is febrile to 102 in ED.Was given acetaminophen, dose of ceftriaxone and linezolid, zofran and morphine.

## 2025-01-27 NOTE — ASSESSMENT & PLAN NOTE
Patient presents with suspected recurrence of panniculitis of right thigh.    Continue IV ceftriaxone, linezolid  Obtain ID consult   PRN pain medications  Follow cultures

## 2025-01-27 NOTE — PROGRESS NOTES
[facility] Medicine  Progress Note    Patient Name: Adriana Zaragoza  MRN: [id number]  Patient Class: IP- Inpatient   Admission Date: 1/26/2025  Length of Stay: 1 days  Attending Physician: Lotus Andrew MD  Primary Care Provider: Melba Trivedi MD        Subjective     Principal Problem:Panniculitis        HPI:  59 year old female with comorbid conditions of KEESHA on CPAP, COPD on 2lpm home oxygen, h/o DVT, recurrent panniculitis presents due to suspected right leg panniculitis.  Patient has area of right leg that has been infected repeatedly.  Reports pain in same area of leg for 1 day duration.  Associated with fatigue, nausea, cough, headache.  Denies fevers, chills.    Last admission for panniculitis patient had to be intubated due to sepsis. Was treated with rocephin/vanco while inpatient and was discharged on 2 weeks of PO ciprofloxacin and linezolid on 1/3/25. Subsequently presented to [facility] due to influenza on 1/15 - completed tamiflu.  Patient is following with plastic surgeon about removing affected area once infection resolves.   Labs revealed WBC of 15.98.  Patient is febrile to 102 in ED.Was given acetaminophen, dose of ceftriaxone and linezolid, zofran and morphine.     Overview/Hospital Course:  No notes on file    Interval History:   Patient continues to have pain site of the infection reports her current pain regimen adequately addresses it .  Shortness of breath not worsen than baseline.  Tolerating oral intake.  No chest pain or lightheadedness.      Review of Systems  Objective:     Vital Signs (Most Recent):  Temp: 99 °F (37.2 °C) (01/27/25 1607)  Pulse: 84 (01/27/25 1607)  Resp: 18 (01/27/25 1607)  BP: 112/67 (01/27/25 1607)  SpO2: 98 % (01/27/25 1607) Vital Signs (24h Range):  Temp:  [98.1 °F (36.7 °C)-102.2 °F (39 °C)] 99 °F (37.2 °C)  Pulse:  [] 84  Resp:  [17-27] 18  SpO2:  [95 %-99 %] 98 %  BP: ()/() 112/67     Weight: (!) 166 kg  (365 lb 15.4 oz)  Body mass index is 62.82 kg/m².    Intake/Output Summary (Last 24 hours) at 1/27/2025 1706  Last data filed at 1/27/2025 1202  Gross per 24 hour   Intake 829.34 ml   Output --   Net 829.34 ml         Physical Exam      NAD,AO3   RRR  CTAB   Soft nontender nondistended, bowel sounds present    Large firm erythematous area on right medial thigh, tender to palpation   Significant Labs: All pertinent labs within the past 24 hours have been reviewed.  CBC:   Recent Labs   Lab 01/26/25 1942 01/27/25  0546   WBC 15.98* 15.01*   HGB 12.0 11.4*   HCT 38.1 36.8*    282     CMP:   Recent Labs   Lab 01/26/25 1942 01/27/25  0546    134*   K 3.9 3.9    100   CO2 27 27   * 128*   BUN 12 18   CREATININE 0.6 0.8   CALCIUM 9.3 9.0   PROT 7.3  --    ALBUMIN 3.9  --    BILITOT 0.7  --    ALKPHOS 64  --    AST 12  --    ALT 13  --    ANIONGAP 9 7*     Magnesium:   Recent Labs   Lab 01/27/25  0546   MG 1.9       Significant Imaging: I have reviewed all pertinent imaging results/findings within the past 24 hours.    Assessment and Plan     * Panniculitis  Patient presents with suspected recurrence of panniculitis of right thigh.    Continue Datpomycin  ID consulted   PRN pain medications  Follow cultures        COPD (chronic obstructive pulmonary disease)  Patient's COPD is controlled currently.  Patient is currently off COPD Pathway. Continue scheduled inhalers Supplemental oxygen and monitor respiratory status closely.  Duoneb PRN    KEESHA (obstructive sleep apnea)  Continue NIPPV at night      VTE Risk Mitigation (From admission, onward)           Ordered     enoxaparin injection 60 mg  Every 12 hours         01/26/25 2124     IP VTE HIGH RISK PATIENT  Once         01/26/25 2113     Place sequential compression device  Until discontinued         01/26/25 2113                    Discharge Planning   JONATHAN: 1/29/2025     Code Status: Full Code   Medical Readiness for Discharge Date:   Discharge  Plan A: Home Health                        Lotus Andrew MD  Department of Hospital Medicine   [facility]

## 2025-01-27 NOTE — H&P
[facility] - Emergency Dept  Hospital Medicine  History & Physical    Patient Name: Adriana Zaragoza  MRN: [id number]  Patient Class: IP- Inpatient  Admission Date: 2025  Attending Physician: Jacek Olson MD   Primary Care Provider: Melba Trivedi MD         Patient information was obtained from patient and ER records.     Subjective:     Principal Problem:Panniculitis    Chief Complaint:   Chief Complaint   Patient presents with    Leg Pain     She states that her growth on her leg may be getting infected.        HPI: 59 year old female with comorbid conditions of KEESHA on CPAP, COPD on 2lpm home oxygen, h/o DVT, recurrent panniculitis presents due to suspected right leg panniculitis.  Patient has area of right leg that has been infected repeatedly.  Reports pain in same area of leg for 1 day duration.  Associated with fatigue, nausea, cough, headache.  Denies fevers, chills.    Last admission for panniculitis patient had to be intubated due to sepsis. Was treated with rocephin/vanco while inpatient and was discharged on 2 weeks of PO ciprofloxacin and linezolid on 1/3/25. Subsequently presented to [facility] due to influenza on 1/15 - completed tamiflu.  Patient is following with plastic surgeon about removing affected area once infection resolves.   Labs revealed WBC of 15.98.  Patient is febrile to 102 in ED.Was given acetaminophen, dose of ceftriaxone and linezolid, zofran and morphine.     Past Medical History:   Diagnosis Date    Allergy     Anemia     Asthma     COPD (chronic obstructive pulmonary disease)     Deep vein thrombosis     Depression     Diabetes mellitus, type 2     Encounter for blood transfusion     Heart murmur     Neuromuscular disorder        Past Surgical History:   Procedure Laterality Date     SECTION      DILATION AND CURETTAGE OF UTERUS      gastric sleeve      [facility]    TONSILLECTOMY         Review of patient's allergies indicates:    Allergen Reactions    Fentanyl Other (See Comments)     Hypoxemia  Muscle twitching    Aspirin     Nicoderm     Nsaids (non-steroidal anti-inflammatory drug) Hives    Teflaro [ceftaroline fosamil]      Allergic reaction/ hives/itching       No current facility-administered medications on file prior to encounter.     Current Outpatient Medications on File Prior to Encounter   Medication Sig    acetaminophen (TYLENOL EXTRA STRENGTH) 500 MG tablet Take 1,000 mg by mouth every 6 (six) hours as needed for Pain.    benzonatate (TESSALON) 100 MG capsule Take 1 capsule (100 mg total) by mouth 3 (three) times daily as needed for Cough. (Patient taking differently: Take 100 mg by mouth 3 (three) times daily as needed for Cough. NEW Rx - NOT YET STARTED)    cetirizine (ZYRTEC) 10 MG tablet Take 1 tablet (10 mg total) by mouth daily as needed for Allergies or Rhinitis. (Patient taking differently: Take 10 mg by mouth daily as needed for Allergies or Rhinitis. NEW Rx - NOT YET STARTED)    fluticasone propionate (FLONASE) 50 mcg/actuation nasal spray 1 spray (50 mcg total) by Each Nostril route 2 (two) times daily as needed for Rhinitis or Allergies. (Patient taking differently: 1 spray by Each Nostril route 2 (two) times daily as needed for Rhinitis or Allergies. NEW Rx - NOT YET STARTED)    hydrOXYzine HCL (ATARAX) 25 MG tablet Take 25 mg by mouth 3 (three) times daily as needed for Anxiety.    oxyCODONE-acetaminophen (PERCOCET) 5-325 mg per tablet Take 1 tablet by mouth every 6 (six) hours as needed for Pain.    promethazine-dextromethorphan (PROMETHAZINE-DM) 6.25-15 mg/5 mL Syrp Take 5 mLs by mouth every 4 (four) hours as needed (cough/congestion).     Family History       Problem Relation (Age of Onset)    Arthritis Mother, Maternal Grandmother    COPD Father    Cancer Father, Maternal Grandmother, Maternal Grandfather, Paternal Grandmother    Depression Mother    Diabetes Mother, Paternal Grandmother, Paternal Grandfather     Heart disease Mother    Hyperlipidemia Paternal Grandfather    Kidney disease Paternal Grandmother          Tobacco Use    Smoking status: Every Day     Current packs/day: 1.00     Average packs/day: 1 pack/day for 6.1 years (6.1 ttl pk-yrs)     Types: Cigarettes     Start date: 2019    Smokeless tobacco: Never   Substance and Sexual Activity    Alcohol use: No    Drug use: No    Sexual activity: Never     Review of Systems   Constitutional:  Positive for fatigue and fever. Negative for chills.   HENT:  Negative for drooling.    Eyes:  Negative for visual disturbance.   Respiratory:  Positive for cough. Negative for choking, chest tightness, shortness of breath, wheezing and stridor.    Cardiovascular:  Negative for chest pain, palpitations and leg swelling.   Gastrointestinal:  Negative for abdominal pain, constipation, nausea and vomiting.   Endocrine: Negative for polyuria.   Genitourinary:  Negative for dysuria and hematuria.   Skin:  Positive for color change.        Tender erythematous area on right medial thigh    Neurological:  Negative for syncope, weakness and numbness.   Psychiatric/Behavioral:  Negative for confusion and self-injury.      Objective:     Vital Signs (Most Recent):  Temp: (!) 102.2 °F (39 °C) (01/26/25 2033)  Pulse: 105 (01/26/25 2025)  Resp: 20 (01/26/25 2111)  BP: (!) 145/64 (01/26/25 2000)  SpO2: 95 % (01/26/25 2025) Vital Signs (24h Range):  Temp:  [99 °F (37.2 °C)-102.2 °F (39 °C)] 102.2 °F (39 °C)  Pulse:  [] 105  Resp:  [20-23] 20  SpO2:  [95 %-96 %] 95 %  BP: (145-160)/(64-73) 145/64     Weight: (!) 166 kg (365 lb 15.4 oz)  Body mass index is 62.82 kg/m².     Physical Exam  Constitutional:       General: She is not in acute distress.     Appearance: Normal appearance. She is obese. She is not toxic-appearing.   HENT:      Head: Normocephalic and atraumatic.      Nose: No congestion or rhinorrhea.      Mouth/Throat:      Mouth: Mucous membranes are moist.      Pharynx: No  "oropharyngeal exudate or posterior oropharyngeal erythema.   Eyes:      General: No scleral icterus.     Extraocular Movements: Extraocular movements intact.      Pupils: Pupils are equal, round, and reactive to light.   Cardiovascular:      Rate and Rhythm: Normal rate and regular rhythm.      Pulses: Normal pulses.      Heart sounds: Normal heart sounds. No murmur heard.  Pulmonary:      Effort: No respiratory distress.      Breath sounds: Normal breath sounds. No wheezing or rales.   Abdominal:      General: There is no distension.      Tenderness: There is no abdominal tenderness.   Musculoskeletal:         General: Tenderness present.      Right lower leg: No edema.      Left lower leg: No edema.      Comments: Large firm erythematous area on right medial thigh, tender to palpation   Skin:     General: Skin is warm.      Coloration: Skin is not jaundiced.      Findings: Erythema present.   Neurological:      General: No focal deficit present.      Mental Status: She is alert and oriented to person, place, and time.   Psychiatric:         Mood and Affect: Mood normal.         Behavior: Behavior normal.              CRANIAL NERVES     CN III, IV, VI   Pupils are equal, round, and reactive to light.       Significant Labs: All pertinent labs within the past 24 hours have been reviewed.  Bilirubin:   Recent Labs   Lab 12/29/24  0329 12/30/24  0320 12/31/24  0534 01/15/25  1812 01/26/25 1942   BILITOT 0.4 0.3 0.4 0.5 0.7     Blood Culture: No results for input(s): "LABBLOO" in the last 48 hours.  BMP:   Recent Labs   Lab 01/26/25 1942   *      K 3.9      CO2 27   BUN 12   CREATININE 0.6   CALCIUM 9.3     CBC:   Recent Labs   Lab 01/26/25 1942   WBC 15.98*   HGB 12.0   HCT 38.1        CMP:   Recent Labs   Lab 01/26/25 1942      K 3.9      CO2 27   *   BUN 12   CREATININE 0.6   CALCIUM 9.3   PROT 7.3   ALBUMIN 3.9   BILITOT 0.7   ALKPHOS 64   AST 12   ALT 13   ANIONGAP " "9     Cardiac Markers: No results for input(s): "CKMB", "MYOGLOBIN", "BNP", "TROPISTAT" in the last 48 hours.  Coagulation: No results for input(s): "PT", "INR", "APTT" in the last 48 hours.  Lipase: No results for input(s): "LIPASE" in the last 48 hours.  Magnesium: No results for input(s): "MG" in the last 48 hours.  Troponin: No results for input(s): "TROPONINI", "TROPONINIHS" in the last 48 hours.  Urine Studies:   Recent Labs   Lab 01/26/25 2026   COLORU Yellow   APPEARANCEUA Clear   PHUR 8.0   SPECGRAV 1.030   PROTEINUA Trace*   GLUCUA Negative   KETONESU Negative   BILIRUBINUA Negative   OCCULTUA Negative   NITRITE Negative   UROBILINOGEN 2.0-3.0*   LEUKOCYTESUR Negative       Significant Imaging: I have reviewed all pertinent imaging results/findings within the past 24 hours.  Assessment/Plan:     * Panniculitis  Patient presents with suspected recurrence of panniculitis of right thigh.    Continue IV ceftriaxone, linezolid  Obtain ID consult   PRN pain medications  Follow cultures        COPD (chronic obstructive pulmonary disease)  Patient's COPD is controlled currently.  Patient is currently off COPD Pathway. Continue scheduled inhalers Supplemental oxygen and monitor respiratory status closely.  Duoneb PRN    KEESHA (obstructive sleep apnea)  Continue NIPPV at night      VTE Risk Mitigation (From admission, onward)           Ordered     enoxaparin injection 60 mg  Every 12 hours         01/26/25 2124     IP VTE HIGH RISK PATIENT  Once         01/26/25 2113     Place sequential compression device  Until discontinued         01/26/25 2113                               Pharmacist Renal Dose Adjustment Note    Adriana Zaragoza is a 59 y.o. female being treated with the medication Enoxaparin    Patient Data:    Vital Signs (Most Recent):  Temp: (!) 102.2 °F (39 °C) (01/26/25 2033)  Pulse: 105 (01/26/25 2025)  Resp: 20 (01/26/25 2111)  BP: (!) 145/64 (01/26/25 2000)  SpO2: 95 % (01/26/25 2025) Vital Signs " (72h Range):  Temp:  [99 °F (37.2 °C)-102.2 °F (39 °C)]   Pulse:  []   Resp:  [20-23]   BP: (145-160)/(64-73)   SpO2:  [95 %-96 %]      Recent Labs   Lab 01/26/25 1942   CREATININE 0.6     Serum creatinine: 0.6 mg/dL 01/26/25 1942  Estimated creatinine clearance: 156.1 mL/min  BMI 62.8    Medication:Enoxaparin dose: 40 mg frequency daily will be changed to medication:Enoxaparin dose:60 mg frequency:Q12H    Pharmacist's Name: Winsome Matt  Pharmacist's Extension: [phone]      Gareth Kelly MD  Department of Hospital Medicine  [facility] - Emergency Dept

## 2025-01-27 NOTE — SUBJECTIVE & OBJECTIVE
Interval History:   Patient continues to have pain site of the infection reports her current pain regimen adequately addresses it .  Shortness of breath not worsen than baseline.  Tolerating oral intake.  No chest pain or lightheadedness.      Review of Systems  Objective:     Vital Signs (Most Recent):  Temp: 99 °F (37.2 °C) (01/27/25 1607)  Pulse: 84 (01/27/25 1607)  Resp: 18 (01/27/25 1607)  BP: 112/67 (01/27/25 1607)  SpO2: 98 % (01/27/25 1607) Vital Signs (24h Range):  Temp:  [98.1 °F (36.7 °C)-102.2 °F (39 °C)] 99 °F (37.2 °C)  Pulse:  [] 84  Resp:  [17-27] 18  SpO2:  [95 %-99 %] 98 %  BP: ()/() 112/67     Weight: (!) 166 kg (365 lb 15.4 oz)  Body mass index is 62.82 kg/m².    Intake/Output Summary (Last 24 hours) at 1/27/2025 1706  Last data filed at 1/27/2025 1202  Gross per 24 hour   Intake 829.34 ml   Output --   Net 829.34 ml         Physical Exam      NAD,AO3   RRR  CTAB   Soft nontender nondistended, bowel sounds present    Large firm erythematous area on right medial thigh, tender to palpation   Significant Labs: All pertinent labs within the past 24 hours have been reviewed.  CBC:   Recent Labs   Lab 01/26/25 1942 01/27/25  0546   WBC 15.98* 15.01*   HGB 12.0 11.4*   HCT 38.1 36.8*    282     CMP:   Recent Labs   Lab 01/26/25 1942 01/27/25  0546    134*   K 3.9 3.9    100   CO2 27 27   * 128*   BUN 12 18   CREATININE 0.6 0.8   CALCIUM 9.3 9.0   PROT 7.3  --    ALBUMIN 3.9  --    BILITOT 0.7  --    ALKPHOS 64  --    AST 12  --    ALT 13  --    ANIONGAP 9 7*     Magnesium:   Recent Labs   Lab 01/27/25  0546   MG 1.9       Significant Imaging: I have reviewed all pertinent imaging results/findings within the past 24 hours.

## 2025-01-27 NOTE — ASSESSMENT & PLAN NOTE
Patient's COPD is controlled currently.  Patient is currently off COPD Pathway. Continue scheduled inhalers Supplemental oxygen and monitor respiratory status closely.  Duoneb PRN

## 2025-01-27 NOTE — ASSESSMENT & PLAN NOTE
Patient presents with suspected recurrence of panniculitis of right thigh.    Continue Datpomycin  ID consulted   PRN pain medications  Follow cultures

## 2025-01-27 NOTE — CONSULTS
[facility]   Department of Infectious Disease  Consult Note        PATIENT NAME: Adriana Zaragoza  MRN: [id number]  TODAY'S DATE: 01/27/2025  ADMIT DATE: 1/26/2025  LENGTH OF STAY: 1 DAYS      CHIEF COMPLAINT: Leg Pain (She states that her growth on her leg may be getting infected.)    PRINCIPLE PROBLEM: Panniculitis    REASON FOR CONSULT: recurrent panniculititis     ASSESSMENT and PLAN     1.  Sepsis with recurrent  right thigh panniculitis/cellulitis  with chronic lymphedema  -Leukocytosis WBC 15.98-->15.01,k + left shift  -CRP 10.40, Procalcitonin 0.216    2. Morbidly obese with BMI 62.82      3.  Asthma/KEESHA      RECOMMENDATIONS:  Continue ceftriaxone 2 g IV every 24 hours   Continue linezolid 600 mg IV every 12 hours  Consult wound care      D/W Dr Humphreys    Thank you for this consult. Please send [employer] secure chat with any questions.    Antibiotics (From admission, onward)      Start     Stop Route Frequency Ordered    01/27/25 2100  cefTRIAXone injection 2 g         -- IV Every 24 hours (non-standard times) 01/26/25 2154    01/26/25 0900  linezolid 600 mg/300 mL IVPB 600 mg         -- IV Every 12 hours (non-standard times) 01/26/25 2154          Antifungals (From admission, onward)      None           Antivirals (From admission, onward)      None            HPI      Adriana Zaragoza is a 59 y.o. female with chronic medical problems include severe obesity, asthma/COPD on BiPAP at home, diabetes, depression and a history of recurrent cellulitis who presented to the emergency room on 01/26 complaining of right thigh redness.  She was recently hospitalized from 12/26 through 1/3 with fever and hypoxia and was intubated in in ICU.  She was on linezolid and ceftriaxone for right thigh cellulitis.  Oxygenation improved and she was discharged on ciprofloxacin and linezolid for 2 weeks.  She said that the redness resolved and the large lump on her thigh actually got soft and smaller.  She said  she has seen a plastic surgeon who is willing to remove the area on her thigh that gets the recurrent cellulitis.  She also came to the emergency room on January 15th with influenza.  On presentation to the emergency room she had a fever of 102 but she denied having fevers and chills to us at home.  Chest x-ray with bilateral interstitial and patchy airspace opacities improved from previous chest x-ray on the 15th.  Leukocytosis with WBC 15.01, platelets 282, left shift 76% neutrophils, H&H 11.4/36.8, creatinine 0.8, CRP 10.4, total CK 37, procalcitonin 0.216, urinalysis with no signs of infection.  She was started on linezolid and ceftriaxone.  At time of exam she is sitting up in bed resting on BiPAP and takes it off to talk with us.  She denies any current fevers or chills, denies significant cough.  States she has been eating and drinking fairly well.      Outdoor activities: Lives at home with her boyfriend  Travel:  none  Implants:   none  Antibiotic history:   see HPI    Social History  Marital Status: Significant Other  Alcohol History:  reports no history of alcohol use.  Tobacco History:  reports that she has been smoking cigarettes. She started smoking about 6 years ago. She has a 6.1 pack-year smoking history. She has never used smokeless tobacco.  Drug History:  reports no history of drug use.    Review of patient's allergies indicates:   Allergen Reactions    Fentanyl Other (See Comments)     Hypoxemia  Muscle twitching    Aspirin     Nicoderm     Nsaids (non-steroidal anti-inflammatory drug) Hives    Teflaro [ceftaroline fosamil]      Allergic reaction/ hives/itching       Past Medical History:   Diagnosis Date    Allergy     Anemia     Asthma     COPD (chronic obstructive pulmonary disease)     Deep vein thrombosis     Depression     Diabetes mellitus, type 2     Encounter for blood transfusion     Heart murmur     Neuromuscular disorder      Past Surgical History:   Procedure Laterality Date      SECTION      DILATION AND CURETTAGE OF UTERUS      gastric sleeve      Ema     TONSILLECTOMY       Family History   Problem Relation Name Age of Onset    Heart disease Mother      Diabetes Mother      Arthritis Mother      Depression Mother      Cancer Father      COPD Father      Arthritis Maternal Grandmother      Cancer Maternal Grandmother      Cancer Maternal Grandfather      Cancer Paternal Grandmother      Kidney disease Paternal Grandmother      Diabetes Paternal Grandmother      Diabetes Paternal Grandfather      Hyperlipidemia Paternal Grandfather         SUBJECTIVE     Review of Systems  Constitutional:  + fevers, Denies chills, night sweats, or loss of appetite.  HEENT: Denies visual changes, ear pain, sinus congestion, mouth pain or trouble swallowing, sore throat or dental pain.  Neck: Denies neck pain or lumps.  Respiratory: Denies shortness of breath, coughing, wheezing or hemoptysis. + bipap for sleep  Cardiovascular:  Denies chest pain, palpitations or edema.  Gastrointestinal: Denies  abdominal pain, nausea, vomiting, constipation or diarrhea.  Genitourinary:  Denies dysuria, frequency, urgency or hematuria   Musculoskeletal:  Denies joint pain or swelling, + difficulty walking.    Skin:  Denies itching. + rt thigh redness, swelling and pain  Neurologic:  Denies motor or sensory loss, headaches or dizziness.    Psychiatric:  Denies changes in mood or behavior.    OBJECTIVE     Temp:  [98.1 °F (36.7 °C)-102.2 °F (39 °C)] 98.4 °F (36.9 °C)  Pulse:  [] 90  Resp:  [17-27] 20  SpO2:  [95 %-99 %] 99 %  BP: ()/()   Temp:  [98.1 °F (36.7 °C)-102.2 °F (39 °C)]   Temp: 98.4 °F (36.9 °C) (25 1100)  Pulse: 90 (25 1129)  Resp: 20 (25 1432)  BP: 112/66 (25 1100)  SpO2: 99 % (25 1129)    Intake/Output Summary (Last 24 hours) at 2025 1505  Last data filed at 2025 1202  Gross per 24 hour   Intake 529.34 ml   Output --   Net 529.34 ml       Examined at 1216  Physical Exam  General:  obese, pleasant older female, lying in bed awake and alert, she is a good historian.  Eyes: Eyes with no icterus or injection. Vision grossly normal  Ears: Hearing grossly normal.  Nose: Nares patent  Mouth: Moist mucous membranes, dentition is  fair. No ulcerations, erythema or exudates.  Neck: Supple, no tenderness to palpation.  Cardiovascular: Regular rate and rhythm, no murmurs, no edema.    Respiratory:  Clear to auscultation bilaterally, no tachypnea or increased work of breathing.  On room air.  Gastrointestinal:  Soft and obese with active bowel sounds, no tenderness to palpation, no distention.  Genitourinary:  No suprapubic tenderness.  Musculoskeletal:  Moves all extremities with equal strength.    Skin:  Warm and dry, no obvious rashes. Right thigh with large  appendage that is erythematous, hot and tender to palpation.  Neuro: Oriented, conversant, follows commands.  Psych: Good mood, normal affect.  VAD: PIV  Isolation: None     Wounds  1/27/2025            Significant Labs: All pertinent labs within the past 24 hours have been reviewed.    CBC LAST 7 DAYS  Recent Labs   Lab 01/26/25 1942 01/27/25  0546   WBC 15.98* 15.01*   RBC 4.51 4.30   HGB 12.0 11.4*   HCT 38.1 36.8*   MCV 85 86   MCH 26.6* 26.5*   MCHC 31.5* 31.0*   RDW 16.5* 16.8*    282   MPV 9.0* 8.9*   GRAN 86.3*  13.8* 76.3*  11.5*   LYMPH 8.5*  1.4 14.0*  2.1   MONO 4.4  0.7 8.8  1.3*   BASO 0.02 0.04   NRBC 0 0       CHEMISTRY LAST 7 DAYS  Recent Labs   Lab 01/26/25 1942 01/27/25  0546    134*   K 3.9 3.9    100   CO2 27 27   ANIONGAP 9 7*   BUN 12 18   CREATININE 0.6 0.8   * 128*   CALCIUM 9.3 9.0   MG  --  1.9   ALBUMIN 3.9  --    PROT 7.3  --    ALKPHOS 64  --    ALT 13  --    AST 12  --    BILITOT 0.7  --        Estimated Creatinine Clearance: 117.1 mL/min (based on SCr of 0.8 mg/dL).    INFLAMMATORY/PROCAL  LAST 7 DAYS  Recent Labs   Lab 01/27/25  1110  "01/27/25  0944   PROCAL  --  0.216   CRP 10.40*  --      No results found for: "ESR"  CRP   Date Value Ref Range Status   01/27/2025 10.40 (H) <1.00 mg/dL Final     Comment:     CRP-Normal Application expected values:   <1.0        mg/dL   Normal Range  1.0 - 5.0  mg/dL   Indicates mild inflammation  5.0 - 10.0 mg/dL   Indicates severe inflammation  >10.0        mg/dL   Represents serious processes and   frequently         indicates the presence of a bacterial   infection.      09/25/2024 1.30 (H) <1.00 mg/dL Final     Comment:     CRP-Normal Application expected values:   <1.0        mg/dL   Normal Range  1.0 - 5.0  mg/dL   Indicates mild inflammation  5.0 - 10.0 mg/dL   Indicates severe inflammation  >10.0        mg/dL   Represents serious processes and   frequently         indicates the presence of a bacterial   infection.      08/28/2024 10.60 (H) <1.00 mg/dL Final     Comment:     CRP-Normal Application expected values:   <1.0        mg/dL   Normal Range  1.0 - 5.0  mg/dL   Indicates mild inflammation  5.0 - 10.0 mg/dL   Indicates severe inflammation  >10.0        mg/dL   Represents serious processes and   frequently         indicates the presence of a bacterial   infection.      08/26/2024 8.00 (H) <1.00 mg/dL Final     Comment:     CRP-Normal Application expected values:   <1.0        mg/dL   Normal Range  1.0 - 5.0  mg/dL   Indicates mild inflammation  5.0 - 10.0 mg/dL   Indicates severe inflammation  >10.0        mg/dL   Represents serious processes and   frequently         indicates the presence of a bacterial   infection.      02/14/2024 5.0 0.0 - 8.2 mg/L Final   02/06/2024 115.2 (H) 0.0 - 8.2 mg/L Final   03/07/2023 3.94 (H) <0.76 mg/dL Final       PRIOR MICROBIOLOGY:  Susceptibility data from last 90 days.  Collected Specimen Info Organism   01/15/25 Blood from Peripheral, Antecubital, Right No growth after 5 days.   01/15/25 Blood from Peripheral, Forearm, Left No growth after 5 days.   12/26/24 Blood " from Peripheral, Hand, Left No growth after 5 days.   12/26/24 Blood from Peripheral, Hand, Right No growth after 5 days.       LAST 7 DAYS MICROBIOLOGY   Microbiology Results (last 7 days)       Procedure Component Value Units Date/Time    Blood culture x two cultures. Draw prior to antibiotics. [[id number]] Collected: 01/26/25 1855    Order Status: Completed Specimen: Blood Updated: 01/27/25 0317     Blood Culture, Routine No Growth to date    Narrative:      Aerobic and anaerobic  Collection has been rescheduled by MYB at 01/26/2025 18:39 Reason:   Patient unavailable/pt with ultrasound  Collection has been rescheduled by MYB at 01/26/2025 18:39 Reason:   Patient unavailable/pt with ultrasound    Blood culture x two cultures. Draw prior to antibiotics. [[id number]] Collected: 01/26/25 1902    Order Status: Completed Specimen: Blood Updated: 01/27/25 0317     Blood Culture, Routine No Growth to date    Narrative:      Aerobic and anaerobic  Collection has been rescheduled by MYB at 01/26/2025 18:39 Reason:   Patient unavailable/pt with ultrasound  Collection has been rescheduled by MYB at 01/26/2025 18:39 Reason:   Patient unavailable/pt with ultrasound              CURRENT/PREVIOUS VISIT EKG  Results for orders placed or performed during the hospital encounter of 01/26/25   EKG 12-lead    Collection Time: 01/26/25  8:01 PM   Result Value Ref Range    QRS Duration 80 ms    OHS QTC Calculation 427 ms    Narrative    Test Reason : Z13.6,    Vent. Rate :  94 BPM     Atrial Rate :  94 BPM     P-R Int : 172 ms          QRS Dur :  80 ms      QT Int : 342 ms       P-R-T Axes :  26  70  40 degrees    QTcB Int : 427 ms    Normal sinus rhythm  Low voltage QRS  Borderline Abnormal ECG  When compared with ECG of 15-Choco-2025 20:00,  Minimal criteria for Anterior infarct are no longer Present  Nonspecific T wave abnormality no longer evident in Lateral leads    Referred By: AAAREFERRAL SELF           Confirmed By:           Significant Imaging: I have reviewed all relevant and available imaging results/findings within the past 24 hours.      I spent a total of 75 minutes on the day of the visit.This includes face to face time and non-face to face time preparing to see the patient (eg, review of tests), obtaining and/or reviewing separately obtained history, documenting clinical information in the electronic or other health record, independently interpreting results and communicating results to the patient/family/caregiver, or care coordinator.      Ambika Valle NP  Date of Service: 01/27/2025      This note was created using [employer]  voice recognition software that occasionally misinterpreted phrases or words.

## 2025-01-27 NOTE — CARE UPDATE
01/26/25 1831   Patient Assessment/Suction   Level of Consciousness (AVPU) alert   Tobacco Cessation Intervention   Do you use any type of tobacco product? Yes   Are you interested in quitting use of tobacco products? Not interested   Are you interested in Nicotine Replacement for symptom relief? No   $ Smoking Cessation Charges Smoking Cessation - Intermediate (Non-CTTS)   Respiratory Evaluation   $ Care Plan Tech Time 15 min   $ Respiratory Evaluation Complete   Evaluation For New Orders   Admitting Diagnosis LEG PAIN   Cardiac Diagnosis NONE   Pulmonary Diagnosis ASTHMA, COPD   Home Oxygen   Has Home Oxygen? No   Home Aerosol, MDI, DPI, and Other Treatments/Therapies   Home Respiratory Therapy Per Patient/Review of Chart No   Oxygen Care Plan   Oxygen Care Plan Per Protocol   Rationale No rational found   Bronchodilator Care Plan   Bronchodilator Care Plan N/A   Rationale No Rationale found   Atelectasis Care Plan   Atelectasis Care Plan Other   Rationale No Rational Found   Airway Clearance Care Plan   Airway Clearance Care Plan Other   Rationale No rationale found

## 2025-01-27 NOTE — CARE UPDATE
01/26/25 1831   Patient Assessment/Suction   Level of Consciousness (AVPU) alert   Tobacco Cessation Intervention   Do you use any type of tobacco product? Yes   Are you interested in quitting use of tobacco products? Not interested   Are you interested in Nicotine Replacement for symptom relief? No   $ Smoking Cessation Charges Smoking Cessation - Intermediate (Non-CTTS)   Respiratory Evaluation   $ Care Plan Tech Time 15 min   $ Respiratory Evaluation Complete   Evaluation For New Orders   Admitting Diagnosis LEG PAIN   Cardiac Diagnosis NONE   Pulmonary Diagnosis ASTHMA, COPD,KEESHA   Home Oxygen   Has Home Oxygen? Yes   Liter Flow 2   Duration continuous   Route nasal cannula   Mode continuous   Device home concentrator   Home Aerosol, MDI, DPI, and Other Treatments/Therapies   Home Respiratory Therapy Per Patient/Review of Chart No   Oxygen Care Plan   Oxygen Care Plan Per Protocol   SPO2 Goal (%) 92% non-cardiac   Rationale Maintain Home oxygen   Bronchodilator Care Plan   Bronchodilator Care Plan Aerosol   Aerosol Meds w/ frequency Albuterol - Ipratropium (DUO-NEB) 0.5mg-3mg(2.5ml) 3ml Nebulizer Solution2.5mg PRN   Rationale Asthma;Bronchitis/COPD   Atelectasis Care Plan   Atelectasis Care Plan Other   Rationale No Rational Found   Airway Clearance Care Plan   Airway Clearance Care Plan Other   Rationale No rationale found

## 2025-01-27 NOTE — SUBJECTIVE & OBJECTIVE
Past Medical History:   Diagnosis Date    Allergy     Anemia     Asthma     COPD (chronic obstructive pulmonary disease)     Deep vein thrombosis     Depression     Diabetes mellitus, type 2     Encounter for blood transfusion     Heart murmur     Neuromuscular disorder        Past Surgical History:   Procedure Laterality Date     SECTION      DILATION AND CURETTAGE OF UTERUS      gastric sleeve      [facility]    TONSILLECTOMY         Review of patient's allergies indicates:   Allergen Reactions    Fentanyl Other (See Comments)     Hypoxemia  Muscle twitching    Aspirin     Nicoderm     Nsaids (non-steroidal anti-inflammatory drug) Hives    Teflaro [ceftaroline fosamil]      Allergic reaction/ hives/itching       No current facility-administered medications on file prior to encounter.     Current Outpatient Medications on File Prior to Encounter   Medication Sig    acetaminophen (TYLENOL EXTRA STRENGTH) 500 MG tablet Take 1,000 mg by mouth every 6 (six) hours as needed for Pain.    benzonatate (TESSALON) 100 MG capsule Take 1 capsule (100 mg total) by mouth 3 (three) times daily as needed for Cough. (Patient taking differently: Take 100 mg by mouth 3 (three) times daily as needed for Cough. NEW Rx - NOT YET STARTED)    cetirizine (ZYRTEC) 10 MG tablet Take 1 tablet (10 mg total) by mouth daily as needed for Allergies or Rhinitis. (Patient taking differently: Take 10 mg by mouth daily as needed for Allergies or Rhinitis. NEW Rx - NOT YET STARTED)    fluticasone propionate (FLONASE) 50 mcg/actuation nasal spray 1 spray (50 mcg total) by Each Nostril route 2 (two) times daily as needed for Rhinitis or Allergies. (Patient taking differently: 1 spray by Each Nostril route 2 (two) times daily as needed for Rhinitis or Allergies. NEW Rx - NOT YET STARTED)    hydrOXYzine HCL (ATARAX) 25 MG tablet Take 25 mg by mouth 3 (three) times daily as needed for Anxiety.    oxyCODONE-acetaminophen (PERCOCET) 5-325 mg per  tablet Take 1 tablet by mouth every 6 (six) hours as needed for Pain.    promethazine-dextromethorphan (PROMETHAZINE-DM) 6.25-15 mg/5 mL Syrp Take 5 mLs by mouth every 4 (four) hours as needed (cough/congestion).     Family History       Problem Relation (Age of Onset)    Arthritis Mother, Maternal Grandmother    COPD Father    Cancer Father, Maternal Grandmother, Maternal Grandfather, Paternal Grandmother    Depression Mother    Diabetes Mother, Paternal Grandmother, Paternal Grandfather    Heart disease Mother    Hyperlipidemia Paternal Grandfather    Kidney disease Paternal Grandmother          Tobacco Use    Smoking status: Every Day     Current packs/day: 1.00     Average packs/day: 1 pack/day for 6.1 years (6.1 ttl pk-yrs)     Types: Cigarettes     Start date: 2019    Smokeless tobacco: Never   Substance and Sexual Activity    Alcohol use: No    Drug use: No    Sexual activity: Never     Review of Systems   Constitutional:  Positive for fatigue and fever. Negative for chills.   HENT:  Negative for drooling.    Eyes:  Negative for visual disturbance.   Respiratory:  Positive for cough. Negative for choking, chest tightness, shortness of breath, wheezing and stridor.    Cardiovascular:  Negative for chest pain, palpitations and leg swelling.   Gastrointestinal:  Negative for abdominal pain, constipation, nausea and vomiting.   Endocrine: Negative for polyuria.   Genitourinary:  Negative for dysuria and hematuria.   Skin:  Positive for color change.        Tender erythematous area on right medial thigh    Neurological:  Negative for syncope, weakness and numbness.   Psychiatric/Behavioral:  Negative for confusion and self-injury.      Objective:     Vital Signs (Most Recent):  Temp: (!) 102.2 °F (39 °C) (01/26/25 2033)  Pulse: 105 (01/26/25 2025)  Resp: 20 (01/26/25 2111)  BP: (!) 145/64 (01/26/25 2000)  SpO2: 95 % (01/26/25 2025) Vital Signs (24h Range):  Temp:  [99 °F (37.2 °C)-102.2 °F (39 °C)] 102.2 °F (39  °C)  Pulse:  [] 105  Resp:  [20-23] 20  SpO2:  [95 %-96 %] 95 %  BP: (145-160)/(64-73) 145/64     Weight: (!) 166 kg (365 lb 15.4 oz)  Body mass index is 62.82 kg/m².     Physical Exam  Constitutional:       General: She is not in acute distress.     Appearance: Normal appearance. She is obese. She is not toxic-appearing.   HENT:      Head: Normocephalic and atraumatic.      Nose: No congestion or rhinorrhea.      Mouth/Throat:      Mouth: Mucous membranes are moist.      Pharynx: No oropharyngeal exudate or posterior oropharyngeal erythema.   Eyes:      General: No scleral icterus.     Extraocular Movements: Extraocular movements intact.      Pupils: Pupils are equal, round, and reactive to light.   Cardiovascular:      Rate and Rhythm: Normal rate and regular rhythm.      Pulses: Normal pulses.      Heart sounds: Normal heart sounds. No murmur heard.  Pulmonary:      Effort: No respiratory distress.      Breath sounds: Normal breath sounds. No wheezing or rales.   Abdominal:      General: There is no distension.      Tenderness: There is no abdominal tenderness.   Musculoskeletal:         General: Tenderness present.      Right lower leg: No edema.      Left lower leg: No edema.      Comments: Large firm erythematous area on right medial thigh, tender to palpation   Skin:     General: Skin is warm.      Coloration: Skin is not jaundiced.      Findings: Erythema present.   Neurological:      General: No focal deficit present.      Mental Status: She is alert and oriented to person, place, and time.   Psychiatric:         Mood and Affect: Mood normal.         Behavior: Behavior normal.              CRANIAL NERVES     CN III, IV, VI   Pupils are equal, round, and reactive to light.       Significant Labs: All pertinent labs within the past 24 hours have been reviewed.  Bilirubin:   Recent Labs   Lab 12/29/24  0329 12/30/24  0320 12/31/24  0534 01/15/25  1812 01/26/25  1942   BILITOT 0.4 0.3 0.4 0.5 0.7  "    Blood Culture: No results for input(s): "LABBLOO" in the last 48 hours.  BMP:   Recent Labs   Lab 01/26/25 1942   *      K 3.9      CO2 27   BUN 12   CREATININE 0.6   CALCIUM 9.3     CBC:   Recent Labs   Lab 01/26/25 1942   WBC 15.98*   HGB 12.0   HCT 38.1        CMP:   Recent Labs   Lab 01/26/25 1942      K 3.9      CO2 27   *   BUN 12   CREATININE 0.6   CALCIUM 9.3   PROT 7.3   ALBUMIN 3.9   BILITOT 0.7   ALKPHOS 64   AST 12   ALT 13   ANIONGAP 9     Cardiac Markers: No results for input(s): "CKMB", "MYOGLOBIN", "BNP", "TROPISTAT" in the last 48 hours.  Coagulation: No results for input(s): "PT", "INR", "APTT" in the last 48 hours.  Lipase: No results for input(s): "LIPASE" in the last 48 hours.  Magnesium: No results for input(s): "MG" in the last 48 hours.  Troponin: No results for input(s): "TROPONINI", "TROPONINIHS" in the last 48 hours.  Urine Studies:   Recent Labs   Lab 01/26/25 2026   COLORU Yellow   APPEARANCEUA Clear   PHUR 8.0   SPECGRAV 1.030   PROTEINUA Trace*   GLUCUA Negative   KETONESU Negative   BILIRUBINUA Negative   OCCULTUA Negative   NITRITE Negative   UROBILINOGEN 2.0-3.0*   LEUKOCYTESUR Negative       Significant Imaging: I have reviewed all pertinent imaging results/findings within the past 24 hours.  "

## 2025-01-27 NOTE — PLAN OF CARE
"[facility]  Initial Discharge Assessment    Assessment completed at bedside with Pt, and all information on FaceSheet confirmed, including demographics, PCP, pharmacy and insurance. Pt has not addressed advance directives. She currently lives with her SO in [place]. Her PCP is Melba Chicas MD, and last appointment was two months ago. Her preferred pharmacy is [facility] [address]. She denies any HD/Blood Thinners but does use DME listed below and receives services from [facility], waiting on verification. Bryan Graham" (Significant other) [phone] (Mobile) will transport her back home at discharge. She denies any recent hospitalizations. Plan for discharge is to return home. Case Management to continue to follow for discharge planning needs.          Primary Care Provider: Melba Trivedi MD    Admission Diagnosis: Screening for cardiovascular condition [Z13.6]    Admission Date: 1/26/2025  Expected Discharge Date: 1/29/2025    Transition of Care Barriers: None    Payor: AETNA MANAGED MEDICARE / Plan: AETNA MEDICARE PLAN PPO / Product Type: Medicare Advantage /     Extended Emergency Contact Information  Primary Emergency Contact: Bryan Graham "Les"  Address: [address] [place] [address] of [facility]  Home Phone: [phone]  Mobile Phone: [phone]  Relation: Significant other  Preferred language: English   needed? No  Secondary Emergency Contact: Estephanie Lopes  Home Phone: [phone]  Mobile Phone: [phone]  Relation: Friend  Preferred language: English   needed? No    Discharge Plan A: Home Health  Discharge Plan B: Home      [facility] [address] [place], [place] - [address] [facility]  [address] [place] [address]  Phone: [phone] Fax: [phone]    [id number] - [place], [place] - [address]  [address] [facility] " "[id number]  Phone: [phone] Fax: [phone]      Initial Assessment (most recent)       Adult Discharge Assessment - 01/27/25 1248          Discharge Assessment    Assessment Type Discharge Planning Assessment     Confirmed/corrected address, phone number and insurance Yes     Confirmed Demographics Correct on Facesheet     Source of Information patient     When was your last doctors appointment? --   two months ago    Does patient/caregiver understand observation status Yes     Reason For Admission Panniculitis     People in Home significant other     Do you expect to return to your current living situation? Yes     Do you have help at home or someone to help you manage your care at home? Yes     Who are your caregiver(s) and their phone number(s)? Bryan Graham" (Significant other)  [phone] (Mobile)     Prior to hospitilization cognitive status: Alert/Oriented     Current cognitive status: Alert/Oriented     Walking or Climbing Stairs Difficulty yes     Walking or Climbing Stairs ambulation difficulty, requires equipment     Dressing/Bathing Difficulty yes     Dressing/Bathing bathing difficulty, requires equipment     Equipment Currently Used at Home shower chair;oxygen;rollator;CPAP     Readmission within 30 days? No     Patient currently being followed by outpatient case management? No     Do you currently have service(s) that help you manage your care at home? Yes     Name and Contact number of agency [facility]     Is the pt/caregiver preference to resume services with current agency Yes     Do you take prescription medications? Yes     Do you have prescription coverage? Yes     Coverage Payor: AETNA MANAGED MEDICARE - AETNA MEDICARE PLAN PPO -     Do you have any problems affording any of your prescribed medications? No     Is the patient taking medications as prescribed? yes     Who is going to help you get home at discharge? Bryan Graham" (Significant other)  [phone] " (Mobile)     How do you get to doctors appointments? family or friend will provide     Are you on dialysis? No     Do you take coumadin? No     Discharge Plan A Home Health     Discharge Plan B Home     DME Needed Upon Discharge  none     Discharge Plan discussed with: Patient     Transition of Care Barriers None

## 2025-01-27 NOTE — PROGRESS NOTES
Pharmacist Renal Dose Adjustment Note    Adriana Zaragoza is a 59 y.o. female being treated with the medication Enoxaparin    Patient Data:    Vital Signs (Most Recent):  Temp: (!) 102.2 °F (39 °C) (01/26/25 2033)  Pulse: 105 (01/26/25 2025)  Resp: 20 (01/26/25 2111)  BP: (!) 145/64 (01/26/25 2000)  SpO2: 95 % (01/26/25 2025) Vital Signs (72h Range):  Temp:  [99 °F (37.2 °C)-102.2 °F (39 °C)]   Pulse:  []   Resp:  [20-23]   BP: (145-160)/(64-73)   SpO2:  [95 %-96 %]      Recent Labs   Lab 01/26/25 1942   CREATININE 0.6     Serum creatinine: 0.6 mg/dL 01/26/25 1942  Estimated creatinine clearance: 156.1 mL/min  BMI 62.8    Medication:Enoxaparin dose: 40 mg frequency daily will be changed to medication:Enoxaparin dose:60 mg frequency:Q12H    Pharmacist's Name: Winsome Gutierrez  Pharmacist's Extension: [phone]

## 2025-01-27 NOTE — CARE UPDATE
01/26/25 2333   Patient Assessment/Suction   Level of Consciousness (AVPU) alert   Respiratory Effort Normal   Expansion/Accessory Muscles/Retractions no use of accessory muscles   Rhythm/Pattern, Respiratory unlabored   Cough Frequency no cough   Skin Integrity   $ Wound Care Tech Time 15 min   Area Observed Left;Right;Behind ear;Cheek;Bridge of nose;Nares   Skin Appearance without discoloration   PRE-TX-O2   Device (Oxygen Therapy) BIPAP   $ Is the patient on Low Flow Oxygen? Yes   $ Is the patient on High Flow Oxygen? Yes   Oxygen Concentration (%) 35   SpO2 99 %   Pulse Oximetry Type Continuous   $ Pulse Oximetry - Multiple Charge Pulse Oximetry - Multiple   Pulse 93   Resp (!) 27   Aerosol Therapy   $ Aerosol Therapy Charges PRN treatment not required   Ready to Wean/Extubation Screen   FIO2<=50 (chart decimal) 0.35   Preset CPAP/BiPAP Settings   Mode Of Delivery CPAP   $ CPAP/BiPAP Daily Charge 1   CPAP/BIPAP charged w/in last 24 h YES   $ Initial CPAP/BiPAP Setup? Yes   $ Is patient using? Yes   Size of Mask Small   Sized Appropriately? Yes   Equipment Type V60   Airway Device Type small full face mask   CPAP (cm H2O) 20   Patient CPAP/BiPAP Settings   RR Total (Breaths/Min) 20   Tidal Volume (mL) 632   VE Minute Ventilation (L/min) 12.8 L/min   Peak Inspiratory Pressure (cm H2O) 21   TiTOT (%) 24   Total Leak (L/Min) 0   Patient Trigger - ST Mode Only (%) 100   Education   $ Education BiPAP;Bronchodilator;15 min   Respiratory Evaluation   $ Care Plan Tech Time 15 min

## 2025-01-28 PROBLEM — B37.31 VAGINAL CANDIDIASIS: Status: ACTIVE | Noted: 2025-01-28

## 2025-01-28 LAB
ANION GAP SERPL CALC-SCNC: 5 MMOL/L (ref 8–16)
BASOPHILS # BLD AUTO: 0.02 K/UL (ref 0–0.2)
BASOPHILS NFR BLD: 0.2 % (ref 0–1.9)
BUN SERPL-MCNC: 15 MG/DL (ref 6–20)
CALCIUM SERPL-MCNC: 8.8 MG/DL (ref 8.7–10.5)
CHLORIDE SERPL-SCNC: 101 MMOL/L (ref 95–110)
CO2 SERPL-SCNC: 28 MMOL/L (ref 23–29)
CREAT SERPL-MCNC: 0.6 MG/DL (ref 0.5–1.4)
DIFFERENTIAL METHOD BLD: ABNORMAL
EOSINOPHIL # BLD AUTO: 0 K/UL (ref 0–0.5)
EOSINOPHIL NFR BLD: 0.4 % (ref 0–8)
ERYTHROCYTE [DISTWIDTH] IN BLOOD BY AUTOMATED COUNT: 16.7 % (ref 11.5–14.5)
EST. GFR  (NO RACE VARIABLE): >60 ML/MIN/1.73 M^2
GLUCOSE SERPL-MCNC: 107 MG/DL (ref 70–110)
HCT VFR BLD AUTO: 34.1 % (ref 37–48.5)
HGB BLD-MCNC: 10.3 G/DL (ref 12–16)
IMM GRANULOCYTES # BLD AUTO: 0.06 K/UL (ref 0–0.04)
IMM GRANULOCYTES NFR BLD AUTO: 0.6 % (ref 0–0.5)
LYMPHOCYTES # BLD AUTO: 1.9 K/UL (ref 1–4.8)
LYMPHOCYTES NFR BLD: 17.8 % (ref 18–48)
MAGNESIUM SERPL-MCNC: 2.1 MG/DL (ref 1.6–2.6)
MCH RBC QN AUTO: 26.6 PG (ref 27–31)
MCHC RBC AUTO-ENTMCNC: 30.2 G/DL (ref 32–36)
MCV RBC AUTO: 88 FL (ref 82–98)
MONOCYTES # BLD AUTO: 1.3 K/UL (ref 0.3–1)
MONOCYTES NFR BLD: 12.2 % (ref 4–15)
NEUTROPHILS # BLD AUTO: 7.1 K/UL (ref 1.8–7.7)
NEUTROPHILS NFR BLD: 68.8 % (ref 38–73)
NRBC BLD-RTO: 0 /100 WBC
PHOSPHATE SERPL-MCNC: 3.3 MG/DL (ref 2.7–4.5)
PLATELET # BLD AUTO: 244 K/UL (ref 150–450)
PMV BLD AUTO: 8.8 FL (ref 9.2–12.9)
POCT GLUCOSE: 106 MG/DL (ref 70–110)
POCT GLUCOSE: 116 MG/DL (ref 70–110)
POCT GLUCOSE: 99 MG/DL (ref 70–110)
POTASSIUM SERPL-SCNC: 4 MMOL/L (ref 3.5–5.1)
RBC # BLD AUTO: 3.87 M/UL (ref 4–5.4)
SODIUM SERPL-SCNC: 134 MMOL/L (ref 136–145)
WBC # BLD AUTO: 10.37 K/UL (ref 3.9–12.7)

## 2025-01-28 PROCEDURE — 63600175 PHARM REV CODE 636 W HCPCS: Performed by: STUDENT IN AN ORGANIZED HEALTH CARE EDUCATION/TRAINING PROGRAM

## 2025-01-28 PROCEDURE — 63600175 PHARM REV CODE 636 W HCPCS: Performed by: EMERGENCY MEDICINE

## 2025-01-28 PROCEDURE — 63700000 PHARM REV CODE 250 ALT 637 W/O HCPCS: Performed by: NURSE PRACTITIONER

## 2025-01-28 PROCEDURE — 83735 ASSAY OF MAGNESIUM: CPT | Performed by: STUDENT IN AN ORGANIZED HEALTH CARE EDUCATION/TRAINING PROGRAM

## 2025-01-28 PROCEDURE — 99900035 HC TECH TIME PER 15 MIN (STAT)

## 2025-01-28 PROCEDURE — 12000002 HC ACUTE/MED SURGE SEMI-PRIVATE ROOM

## 2025-01-28 PROCEDURE — 80048 BASIC METABOLIC PNL TOTAL CA: CPT | Performed by: STUDENT IN AN ORGANIZED HEALTH CARE EDUCATION/TRAINING PROGRAM

## 2025-01-28 PROCEDURE — 36415 COLL VENOUS BLD VENIPUNCTURE: CPT | Performed by: STUDENT IN AN ORGANIZED HEALTH CARE EDUCATION/TRAINING PROGRAM

## 2025-01-28 PROCEDURE — 94761 N-INVAS EAR/PLS OXIMETRY MLT: CPT

## 2025-01-28 PROCEDURE — 25000003 PHARM REV CODE 250: Performed by: STUDENT IN AN ORGANIZED HEALTH CARE EDUCATION/TRAINING PROGRAM

## 2025-01-28 PROCEDURE — 85025 COMPLETE CBC W/AUTO DIFF WBC: CPT | Performed by: STUDENT IN AN ORGANIZED HEALTH CARE EDUCATION/TRAINING PROGRAM

## 2025-01-28 PROCEDURE — 82962 GLUCOSE BLOOD TEST: CPT

## 2025-01-28 PROCEDURE — 94660 CPAP INITIATION&MGMT: CPT

## 2025-01-28 PROCEDURE — 84100 ASSAY OF PHOSPHORUS: CPT | Performed by: STUDENT IN AN ORGANIZED HEALTH CARE EDUCATION/TRAINING PROGRAM

## 2025-01-28 PROCEDURE — 27100171 HC OXYGEN HIGH FLOW UP TO 24 HOURS

## 2025-01-28 RX ADMIN — ENOXAPARIN SODIUM 60 MG: 60 INJECTION SUBCUTANEOUS at 08:01

## 2025-01-28 RX ADMIN — MORPHINE SULFATE 2 MG: 2 INJECTION, SOLUTION INTRAMUSCULAR; INTRAVENOUS at 12:01

## 2025-01-28 RX ADMIN — MORPHINE SULFATE 2 MG: 2 INJECTION, SOLUTION INTRAMUSCULAR; INTRAVENOUS at 03:01

## 2025-01-28 RX ADMIN — ONDANSETRON 4 MG: 2 INJECTION INTRAMUSCULAR; INTRAVENOUS at 07:01

## 2025-01-28 RX ADMIN — MORPHINE SULFATE 2 MG: 2 INJECTION, SOLUTION INTRAMUSCULAR; INTRAVENOUS at 08:01

## 2025-01-28 RX ADMIN — DAPTOMYCIN 795 MG: 500 INJECTION, POWDER, LYOPHILIZED, FOR SOLUTION INTRAVENOUS at 07:01

## 2025-01-28 RX ADMIN — MORPHINE SULFATE 2 MG: 2 INJECTION, SOLUTION INTRAMUSCULAR; INTRAVENOUS at 04:01

## 2025-01-28 RX ADMIN — ONDANSETRON 4 MG: 2 INJECTION INTRAMUSCULAR; INTRAVENOUS at 08:01

## 2025-01-28 RX ADMIN — ONDANSETRON 4 MG: 2 INJECTION INTRAMUSCULAR; INTRAVENOUS at 04:01

## 2025-01-28 RX ADMIN — MORPHINE SULFATE 2 MG: 2 INJECTION, SOLUTION INTRAMUSCULAR; INTRAVENOUS at 07:01

## 2025-01-28 RX ADMIN — FLUCONAZOLE 200 MG: 100 TABLET ORAL at 08:01

## 2025-01-28 RX ADMIN — ONDANSETRON 4 MG: 2 INJECTION INTRAMUSCULAR; INTRAVENOUS at 03:01

## 2025-01-28 NOTE — SUBJECTIVE & OBJECTIVE
Interval History:  Pain still present but improving.  Tolerating oral intake.  No worsening of baseline shortness of breath.  No chest pain.    Review of Systems  Objective:     Vital Signs (Most Recent):  Temp: 97.7 °F (36.5 °C) (01/28/25 1638)  Pulse: 79 (01/28/25 1638)  Resp: 18 (01/28/25 1638)  BP: 122/70 (88) (01/28/25 1638)  SpO2: 100 % (01/28/25 1638) Vital Signs (24h Range):  Temp:  [97.7 °F (36.5 °C)-98.9 °F (37.2 °C)] 97.7 °F (36.5 °C)  Pulse:  [78-93] 79  Resp:  [17-21] 18  SpO2:  [95 %-100 %] 100 %  BP: ()/(56-70) 122/70     Weight: (!) 166 kg (365 lb 15.4 oz)  Body mass index is 62.82 kg/m².    Intake/Output Summary (Last 24 hours) at 1/28/2025 1746  Last data filed at 1/28/2025 1639  Gross per 24 hour   Intake 480 ml   Output 750 ml   Net -270 ml         Physical Exam      NAD,AO3   RRR  CTAB   Soft nontender nondistended, bowel sounds present    Large firm erythematous area on right medial thigh, tender to palpation   Significant Labs: All pertinent labs within the past 24 hours have been reviewed.  CBC:   Recent Labs   Lab 01/26/25 1942 01/27/25  0546 01/28/25  0712   WBC 15.98* 15.01* 10.37   HGB 12.0 11.4* 10.3*   HCT 38.1 36.8* 34.1*    282 244     CMP:   Recent Labs   Lab 01/26/25 1942 01/27/25  0546 01/28/25  0712    134* 134*   K 3.9 3.9 4.0    100 101   CO2 27 27 28   * 128* 107   BUN 12 18 15   CREATININE 0.6 0.8 0.6   CALCIUM 9.3 9.0 8.8   PROT 7.3  --   --    ALBUMIN 3.9  --   --    BILITOT 0.7  --   --    ALKPHOS 64  --   --    AST 12  --   --    ALT 13  --   --    ANIONGAP 9 7* 5*     Magnesium:   Recent Labs   Lab 01/27/25  0546 01/28/25  0712   MG 1.9 2.1       Significant Imaging: I have reviewed all pertinent imaging results/findings within the past 24 hours.

## 2025-01-28 NOTE — CONSULTS
Left lateral foot dry small callus, Bilateral groin and labia red patient states hurts, bilateral abdominal fold skin in pink moist, odorous,applied miconazole 2% powder,  large right leg growth  red indurated, bilateral buttock red denuded, pink  fold, applied thin layer of Triad patient states buttock hurts turned to the left side.  After bath pillow for position to left side.

## 2025-01-28 NOTE — PROGRESS NOTES
[facility]   Department of Infectious Disease  Progress Note        PATIENT NAME: Adriana Zaragoza  MRN: [id number]  TODAY'S DATE: 01/28/2025  ADMIT DATE: 1/26/2025  LENGTH OF STAY: 2 DAYS      CHIEF COMPLAINT: Leg Pain (She states that her growth on her leg may be getting infected.)    PRINCIPLE PROBLEM: Panniculitis    REASON FOR CONSULT: recurrent panniculititis     INTERVAL HISTORY     1/28/2025@1000 (Denette): Patient is lying in her bed on BiPAP.  She states she is having severe burning pain in her groin area and thighs.  She feels like she has a yeast infection.  Appetite is fair, she denies nausea vomiting,  no abdominal pain, no specific dysuria, no headaches or dizziness, denies wheezing or shortness a breath.  Using BiPAP for sleep.  T-max 99° in the last 24 hours.  Leukocytosis improved with WBC 10.37, no left shift, H&H 10.3/34.1, no bands, creatinine 0.6 and estimated cr creatinine clearance 156, procalcitonin 0.216, total CK 37, CRP 10.4.   1/26 blood cultures no growth to date.  X-ray with improved patchy airspace opacities.  Redness, swelling and tenderness to right thigh improved though her perineal area is erythematous and  appears inflamed.    Antibiotics (From admission, onward)      Start     Stop Route Frequency Ordered    01/27/25 1800  DAPTOmycin (CUBICIN) 795 mg in 0.9% NaCl SolP 50 mL IVPB         -- IV Every 24 hours (non-standard times) 01/27/25 1640           Antifungals (From admission, onward)      Start     Stop Route Frequency Ordered    01/28/25 0900  fluconazole tablet 200 mg         -- Oral Daily 01/27/25 2115           Antivirals (From admission, onward)      None                ASSESSMENT and PLAN     1.  Sepsis with recurrent  right thigh panniculitis/cellulitis with chronic lymphedema  -Leukocytosis WBC 15.98-->15.01-->10.37  -CRP 10.40, Procalcitonin 0.216    2. Morbidly obese with BMI 62.82      3.  Asthma/KEESHA      RECOMMENDATIONS:  Continue daptomycin 8  milligrams/kilogram adjusted weight  790 mg IV every 24 hours   Twice weekly Cks while on daptomycin  Fluconazole 200 mg by mouth daily for 3 days  If she continues to improve, will have midline placed tomorrow and plan for home IV daptomycin for 2 weeks  Repeat CRP and procalcitonin in AM    D/W Dr Humphreys    Please send [facility] secure chat with any questions.    [facility]      Adriana Zaragoza is a 59 y.o. female with chronic medical problems include severe obesity, asthma/COPD on BiPAP at home, diabetes, depression and a history of recurrent cellulitis who presented to the emergency room on 01/26 complaining of right thigh redness.  She was recently hospitalized from 12/26 through 1/3 with fever and hypoxia and was intubated in in ICU.  She was on linezolid and ceftriaxone for right thigh cellulitis.  Oxygenation improved and she was discharged on ciprofloxacin and linezolid for 2 weeks.  She said that the redness resolved and the large lump on her thigh actually got soft and smaller.  She said she has seen a plastic surgeon who is willing to remove the area on her thigh that gets the recurrent cellulitis.  She also came to the emergency room on January 15th with influenza.  On presentation to the emergency room she had a fever of 102 but she denied having fevers and chills to us at home.  Chest x-ray with bilateral interstitial and patchy airspace opacities improved from previous chest x-ray on the 15th.  Leukocytosis with WBC 15.01, platelets 282, left shift 76% neutrophils, H&H 11.4/36.8, creatinine 0.8, CRP 10.4, total CK 37, procalcitonin 0.216, urinalysis with no signs of infection.  She was started on linezolid and ceftriaxone.  At time of exam she is sitting up in bed resting on BiPAP and takes it off to talk with us.  She denies any current fevers or chills, denies significant cough.  States she has been eating and drinking fairly well.      Outdoor activities: Lives at home with her boyfriend  Travel:   none  Implants:   none  Antibiotic history:   see HPI    Social History  Marital Status: Significant Other  Alcohol History:  reports no history of alcohol use.  Tobacco History:  reports that she has been smoking cigarettes. She started smoking about 6 years ago. She has a 6.1 pack-year smoking history. She has never used smokeless tobacco.  Drug History:  reports no history of drug use.    Review of patient's allergies indicates:   Allergen Reactions    Fentanyl Other (See Comments)     Hypoxemia  Muscle twitching    Aspirin     Nicoderm     Nsaids (non-steroidal anti-inflammatory drug) Hives    Teflaro [ceftaroline fosamil]      Allergic reaction/ hives/itching     SUBJECTIVE     Review of Systems  Review of systems obtained and negative except as stated above in Interval History     OBJECTIVE     Temp:  [97.7 °F (36.5 °C)-99 °F (37.2 °C)] 97.7 °F (36.5 °C)  Pulse:  [78-93] 93  Resp:  [17-21] 20  SpO2:  [95 %-99 %] 95 %  BP: ()/(56-68) 121/68  Temp:  [97.7 °F (36.5 °C)-99 °F (37.2 °C)]   Temp: 97.7 °F (36.5 °C) (01/28/25 1200)  Pulse: 93 (01/28/25 1200)  Resp: 20 (01/28/25 1208)  BP: 121/68 (86) (01/28/25 1200)  SpO2: 95 % (01/28/25 1200)    Intake/Output Summary (Last 24 hours) at 1/28/2025 1300  Last data filed at 1/28/2025 1207  Gross per 24 hour   Intake 600 ml   Output 350 ml   Net 250 ml     Physical Exam  General: Obese, drowsy, awakens to voice,  conversant.  Eyes: Eyes with no icterus or injection. Vision grossly normal  Ears: Hearing grossly normal.  Nose: Nares patent  Mouth: Moist mucous membranes, dentition is  fair. No ulcerations, erythema or exudates.  Cardiovascular: Regular rate and rhythm, no murmurs, no edema.    Respiratory:  Clear to auscultation bilaterally, no tachypnea or increased work of breathing.  On room air.  She was on  BiPAP.  Gastrointestinal:  Soft and obese with active bowel sounds, no tenderness to palpation, no distention.  Erythematous skin folds.  Musculoskeletal:   "Moves all extremities with equal strength.    Skin:  Warm and dry, no obvious rashes. Right thigh with large  appendage that is erythematous, hot and tender to palpation.  Groin area and under skin folds is erythematous and moist appearing with yeast odor.  Neuro: Oriented, conversant, follows commands.  Psych: Good mood, normal affect.  VAD: PIV  Isolation: None     Wounds  1/27/2025            Significant Labs: All pertinent labs within the past 24 hours have been reviewed.    CBC LAST 7 DAYS  Recent Labs   Lab 01/26/25 1942 01/27/25 0546 01/28/25  0712   WBC 15.98* 15.01* 10.37   RBC 4.51 4.30 3.87*   HGB 12.0 11.4* 10.3*   HCT 38.1 36.8* 34.1*   MCV 85 86 88   MCH 26.6* 26.5* 26.6*   MCHC 31.5* 31.0* 30.2*   RDW 16.5* 16.8* 16.7*    282 244   MPV 9.0* 8.9* 8.8*   GRAN 86.3*  13.8* 76.3*  11.5* 68.8  7.1   LYMPH 8.5*  1.4 14.0*  2.1 17.8*  1.9   MONO 4.4  0.7 8.8  1.3* 12.2  1.3*   BASO 0.02 0.04 0.02   NRBC 0 0 0       CHEMISTRY LAST 7 DAYS  Recent Labs   Lab 01/26/25 1942 01/27/25 0546 01/28/25  0712    134* 134*   K 3.9 3.9 4.0    100 101   CO2 27 27 28   ANIONGAP 9 7* 5*   BUN 12 18 15   CREATININE 0.6 0.8 0.6   * 128* 107   CALCIUM 9.3 9.0 8.8   MG  --  1.9 2.1   ALBUMIN 3.9  --   --    PROT 7.3  --   --    ALKPHOS 64  --   --    ALT 13  --   --    AST 12  --   --    BILITOT 0.7  --   --        Estimated Creatinine Clearance: 156.1 mL/min (based on SCr of 0.6 mg/dL).    INFLAMMATORY/PROCAL  LAST 7 DAYS  Recent Labs   Lab 01/27/25  0546 01/27/25  0944   PROCAL  --  0.216   CRP 10.40*  --      No results found for: "ESR"  CRP   Date Value Ref Range Status   01/27/2025 10.40 (H) <1.00 mg/dL Final     Comment:     CRP-Normal Application expected values:   <1.0        mg/dL   Normal Range  1.0 - 5.0  mg/dL   Indicates mild inflammation  5.0 - 10.0 mg/dL   Indicates severe inflammation  >10.0        mg/dL   Represents serious processes and   frequently         indicates the " presence of a bacterial   infection.      09/25/2024 1.30 (H) <1.00 mg/dL Final     Comment:     CRP-Normal Application expected values:   <1.0        mg/dL   Normal Range  1.0 - 5.0  mg/dL   Indicates mild inflammation  5.0 - 10.0 mg/dL   Indicates severe inflammation  >10.0        mg/dL   Represents serious processes and   frequently         indicates the presence of a bacterial   infection.      08/28/2024 10.60 (H) <1.00 mg/dL Final     Comment:     CRP-Normal Application expected values:   <1.0        mg/dL   Normal Range  1.0 - 5.0  mg/dL   Indicates mild inflammation  5.0 - 10.0 mg/dL   Indicates severe inflammation  >10.0        mg/dL   Represents serious processes and   frequently         indicates the presence of a bacterial   infection.      08/26/2024 8.00 (H) <1.00 mg/dL Final     Comment:     CRP-Normal Application expected values:   <1.0        mg/dL   Normal Range  1.0 - 5.0  mg/dL   Indicates mild inflammation  5.0 - 10.0 mg/dL   Indicates severe inflammation  >10.0        mg/dL   Represents serious processes and   frequently         indicates the presence of a bacterial   infection.      02/14/2024 5.0 0.0 - 8.2 mg/L Final   02/06/2024 115.2 (H) 0.0 - 8.2 mg/L Final   03/07/2023 3.94 (H) <0.76 mg/dL Final       PRIOR MICROBIOLOGY:  Susceptibility data from last 90 days.  Collected Specimen Info Organism   01/15/25 Blood from Peripheral, Antecubital, Right No growth after 5 days.   01/15/25 Blood from Peripheral, Forearm, Left No growth after 5 days.   12/26/24 Blood from Peripheral, Hand, Left No growth after 5 days.   12/26/24 Blood from Peripheral, Hand, Right No growth after 5 days.       LAST 7 DAYS MICROBIOLOGY   Microbiology Results (last 7 days)       Procedure Component Value Units Date/Time    Blood culture x two cultures. Draw prior to antibiotics. [[id number]] Collected: 01/26/25 0206    Order Status: Completed Specimen: Blood Updated: 01/27/25 2232     Blood Culture, Routine No Growth  to date      No Growth to date    Narrative:      Aerobic and anaerobic  Collection has been rescheduled by MYB at 01/26/2025 18:39 Reason:   Patient unavailable/pt with ultrasound  Collection has been rescheduled by MYB at 01/26/2025 18:39 Reason:   Patient unavailable/pt with ultrasound    Blood culture x two cultures. Draw prior to antibiotics. [[id number]] Collected: 01/26/25 1902    Order Status: Completed Specimen: Blood Updated: 01/27/25 2232     Blood Culture, Routine No Growth to date      No Growth to date    Narrative:      Aerobic and anaerobic  Collection has been rescheduled by MYB at 01/26/2025 18:39 Reason:   Patient unavailable/pt with ultrasound  Collection has been rescheduled by MYB at 01/26/2025 18:39 Reason:   Patient unavailable/pt with ultrasound              CURRENT/PREVIOUS VISIT EKG  Results for orders placed or performed during the hospital encounter of 01/26/25   EKG 12-lead    Collection Time: 01/26/25  8:01 PM   Result Value Ref Range    QRS Duration 80 ms    OHS QTC Calculation 427 ms    Narrative    Test Reason : Z13.6,    Vent. Rate :  94 BPM     Atrial Rate :  94 BPM     P-R Int : 172 ms          QRS Dur :  80 ms      QT Int : 342 ms       P-R-T Axes :  26  70  40 degrees    QTcB Int : 427 ms    Normal sinus rhythm  Low voltage QRS  Borderline Abnormal ECG  When compared with ECG of 15-Choco-2025 20:00,  Minimal criteria for Anterior infarct are no longer Present  Nonspecific T wave abnormality no longer evident in Lateral leads    Referred By: AAAREFERRAL SELF           Confirmed By:          Significant Imaging: I have reviewed all relevant and available imaging results/findings within the past 24 hours.      I spent a total of 50 minutes on the day of the visit.This includes face to face time and non-face to face time preparing to see the patient (eg, review of tests), obtaining and/or reviewing separately obtained history, documenting clinical information in the electronic or  other health record, independently interpreting results and communicating results to the patient/family/caregiver, or care coordinator.      Ambika Valle NP  Date of Service: 01/28/2025      This note was created using [employer]  voice recognition software that occasionally misinterpreted phrases or words.

## 2025-01-28 NOTE — RESPIRATORY THERAPY
01/27/25 1936   Patient Assessment/Suction   Level of Consciousness (AVPU) alert   Respiratory Effort Unlabored;Normal   Expansion/Accessory Muscles/Retractions no use of accessory muscles;no retractions   All Lung Fields Breath Sounds Anterior:;Lateral:;diminished   Rhythm/Pattern, Respiratory no shortness of breath reported;unlabored   Cough Frequency no cough   PRE-TX-O2   Device (Oxygen Therapy) CPAP   Oxygen Concentration (%) 35   SpO2 99 %   Pulse Oximetry Type Intermittent   $ Pulse Oximetry - Multiple Charge Pulse Oximetry - Multiple   Pulse 86   Resp 17   Positioning HOB elevated 45 degrees   Ready to Wean/Extubation Screen   FIO2<=50 (chart decimal) 0.35   Preset CPAP/BiPAP Settings   Mode Of Delivery CPAP   CPAP/BIPAP charged w/in last 24 h YES   $ Initial CPAP/BiPAP Setup? No   $ Is patient using? Yes   Size of Mask Small   Sized Appropriately? Yes   Equipment Type V60   Airway Device Type small full face mask   CPAP (cm H2O) 20   Patient CPAP/BiPAP Settings   RR Total (Breaths/Min) 24   Tidal Volume (mL) 382   VE Minute Ventilation (L/min) 10.4 L/min   Peak Inspiratory Pressure (cm H2O) 21   TiTOT (%) 27   Total Leak (L/Min) 0   Patient Trigger - ST Mode Only (%) 100   Education   $ Education 15 min;Other (see comment)  (cpap)

## 2025-01-28 NOTE — PROGRESS NOTES
[facility]  Progress Note    Patient Name: Adriana Zaragoza  MRN: [id number]  Patient Class: IP- Inpatient   Admission Date: 1/26/2025  Length of Stay: 2 days  Attending Physician: Lotus Andrew MD  Primary Care Provider: Melba Trivedi MD        Subjective     Principal Problem:Panniculitis        HPI:  59 year old female with comorbid conditions of KEESHA on CPAP, COPD on 2lpm home oxygen, h/o DVT, recurrent panniculitis presents due to suspected right leg panniculitis.  Patient has area of right leg that has been infected repeatedly.  Reports pain in same area of leg for 1 day duration.  Associated with fatigue, nausea, cough, headache.  Denies fevers, chills.    Last admission for panniculitis patient had to be intubated due to sepsis. Was treated with rocephin/vanco while inpatient and was discharged on 2 weeks of PO ciprofloxacin and linezolid on 1/3/25. Subsequently presented to [facility] due to influenza on 1/15 - completed tamiflu.  Patient is following with plastic surgeon about removing affected area once infection resolves.   Labs revealed WBC of 15.98.  Patient is febrile to 102 in ED.Was given acetaminophen, dose of ceftriaxone and linezolid, zofran and morphine.     Overview/Hospital Course:  No notes on file    Interval History:  Pain still present but improving.  Tolerating oral intake.  No worsening of baseline shortness of breath.  No chest pain.    Review of Systems  Objective:     Vital Signs (Most Recent):  Temp: 97.7 °F (36.5 °C) (01/28/25 1638)  Pulse: 79 (01/28/25 1638)  Resp: 18 (01/28/25 1638)  BP: 122/70 (88) (01/28/25 1638)  SpO2: 100 % (01/28/25 1638) Vital Signs (24h Range):  Temp:  [97.7 °F (36.5 °C)-98.9 °F (37.2 °C)] 97.7 °F (36.5 °C)  Pulse:  [78-93] 79  Resp:  [17-21] 18  SpO2:  [95 %-100 %] 100 %  BP: ()/(56-70) 122/70     Weight: (!) 166 kg (365 lb 15.4 oz)  Body mass index is 62.82 kg/m².    Intake/Output Summary (Last 24 hours)  at 1/28/2025 1746  Last data filed at 1/28/2025 1639  Gross per 24 hour   Intake 480 ml   Output 750 ml   Net -270 ml         Physical Exam      NAD,AO3   RRR  CTAB   Soft nontender nondistended, bowel sounds present    Large firm erythematous area on right medial thigh, tender to palpation   Significant Labs: All pertinent labs within the past 24 hours have been reviewed.  CBC:   Recent Labs   Lab 01/26/25 1942 01/27/25  0546 01/28/25  0712   WBC 15.98* 15.01* 10.37   HGB 12.0 11.4* 10.3*   HCT 38.1 36.8* 34.1*    282 244     CMP:   Recent Labs   Lab 01/26/25 1942 01/27/25 0546 01/28/25  0712    134* 134*   K 3.9 3.9 4.0    100 101   CO2 27 27 28   * 128* 107   BUN 12 18 15   CREATININE 0.6 0.8 0.6   CALCIUM 9.3 9.0 8.8   PROT 7.3  --   --    ALBUMIN 3.9  --   --    BILITOT 0.7  --   --    ALKPHOS 64  --   --    AST 12  --   --    ALT 13  --   --    ANIONGAP 9 7* 5*     Magnesium:   Recent Labs   Lab 01/27/25 0546 01/28/25  0712   MG 1.9 2.1       Significant Imaging: I have reviewed all pertinent imaging results/findings within the past 24 hours.    Assessment and Plan     * Panniculitis  Patient presents with suspected recurrence of panniculitis of right thigh.    Continue Datpomycin  ID consulted   PRN pain medications  Follow cultures    Fluconazole added        COPD (chronic obstructive pulmonary disease)  Patient's COPD is controlled currently.  Patient is currently off COPD Pathway. Continue scheduled inhalers Supplemental oxygen and monitor respiratory status closely.  Duoneb PRN    KEESHA (obstructive sleep apnea)  Continue NIPPV at night      VTE Risk Mitigation (From admission, onward)           Ordered     enoxaparin injection 60 mg  Every 12 hours         01/26/25 2124     IP VTE HIGH RISK PATIENT  Once         01/26/25 2113     Place sequential compression device  Until discontinued         01/26/25 2113                    Discharge Planning   JONATHAN: 1/29/2025     Code Status:  Full Code   Medical Readiness for Discharge Date:   Discharge Plan A: Home Health                        Lotus Andrew MD  Department of Hospital Medicine   [facility]

## 2025-01-28 NOTE — PROGRESS NOTES
01/27/25 [facility]   Pain/Comfort/Sleep   Preferred Pain Scale number (Numeric Rating Pain Scale)   Comfort/Acceptable Pain Level 0   Pain Rating (0-10): Rest 0   POSS ([place] Opioid-Induced Sed Scale) 1 - Awake and alert   Pain Reassessment   Pain Rating Prior to Med Admin 6   Pain Rating Post Med Admin 3   PRN Med Reassessment   PRN Med Reassessment (Excludes Pain Medications) Moderate relief obtained   Cognitive   Cognitive/Neuro/Behavioral WDL WDL   Level of Consciousness (AVPU) alert   Pupils   Pupil PERRLA yes   Veronica Coma Scale   Best Eye Response 4-->(E4) spontaneous   Best Motor Response 6-->(M6) obeys commands   Best Verbal Response 5-->(V5) oriented   Veronica Coma Scale Score 15   HEENT   HEENT WDL WDL   Respiratory   Respiratory WDL WDL   Rhythm/Pattern, Respiratory no shortness of breath reported   Expansion/Accessory Muscles/Retractions no use of accessory muscles   Cough Frequency no cough   Breath Sounds   All Lung Fields Breath Sounds Anterior:;Lateral:;diminished   Oxygen Therapy   Flow (L/min) (Oxygen Therapy) 2   Oxygen Concentration (%) 35   Device (Oxygen Therapy) CPAP        Peripheral IV - Single Lumen 01/26/25 1942 20 G Anterior;Right Forearm   Placement Date/Time: 01/26/25 1942   Present Prior to Hospital Arrival?: No  Inserted by: RN  Size (G): 20 G  Orientation: Anterior;Right  Location: Forearm  Placement directed by: Anatomic Landmarks  Site Prep: Chlorhexidine   Insertion attempts (ent...   Site Assessment Clean;Dry;Intact   Extremity Assessment Distal to IV No abnormal discoloration   Line Status Infusing;Flushed   Dressing Status Clean;Dry;Intact   Dressing Intervention Integrity maintained   Skin   Skin WDL ex;color/characteristics   General Skin Color/Characteristics bruised (ecchymotic)   Abhi Risk Assessment   Sensory Perception 4-->no impairment   Moisture 3-->occasionally moist   Activity 3-->walks occasionally   Mobility 2-->very limited   Nutrition 3-->adequate   Friction  and Shear 2-->potential problem   Abhi Score 17   Skin Interventions   Pressure Reduction Devices positioning supports utilized   Pressure Reduction Techniques pressure points protected   Skin Protection incontinence pads utilized   Musculoskeletal   Musculoskeletal WDL ex;mobility   General Mobility generalized weakness   Functional Screen (every 3 days/change)   Ambulation 2 - assistive person   Transferring 2 - assistive person   Toileting 2 - assistive person   Gastrointestinal   GI WDL WDL   Last Bowel Movement 01/26/25   Genitourinary   Genitourinary WDL WDL   Coping/Psychosocial   Observed Emotional State accepting;calm   Verbalized Emotional State acceptance   Plan of Care Reviewed With patient   Safety   Safety WDL WDL   All Alarms alarm(s) activated and audible   Safety Precautions emergency equipment at bedside   Enhanced Safety Measures bed alarm set   Additional Documentation All Alarms (Row)   Safety Interventions   Aspiration Precautions awake/alert before oral intake   Fall Risk Assessment (every shift)   History Of Fall (W/I 3 Mos) 0-->No   Polypharmacy 3-->Yes   Central Nervous System/Psychotropic Medication 0-->No   Cardiovascular Medication 0-->No   Age Greater Than 65 Years 0-->No   Altered Elimination 0-->No   Cognitive Deficit 0-->No   Sensory Deficit 0-->No   Dizziness/Vertigo 0-->No   Depression 0-->No   Mobility Deficit/Weakness 2-->Yes   Male 0-->No   Fall Risk Score 5   ABC Risk for Fall with Injury Assessment   A= Age: Is the patient greater than or equal to 85 years old or frail due to clinical condition? No   B=Bones: Does the patient have osteoporosis, previous fracture, prolonged steroid use, or metastatic bone cancer? No   C=Coagulation Disorders: Does the patient have a bleeding disorder, either through anticoagulants or underlying clinical condition? No   S=recent Surgery: Is the patient post-op surgicalwith a recent lower limb amputation or recent major abdominal or thoracic  surgery? No   Safety Management   Patient Rounds bed in low position;bed wheels locked;call light in patient/parent reach;ID band on   Safety Bands on Patient Fall Risk Band   Daily Care   Activity Management Rolling - L1   Activity Assistance Provided assistance, 3 or more people   Management, Orthostatic Hypotension other (see comments)  (jun)   Mobility   GEMS ([place] Early Mobility Scale) Level 3-Standing activities with assist   Positioning   Body Position position maintained   Head of Bed (HOB) Positioning HOB elevated   Positioning/Transfer Devices pillows;in use   RN Clinical Review   I have evaluated the data collected on this patient and nursing care provided. Done

## 2025-01-28 NOTE — PLAN OF CARE
Problem: Adult Inpatient Plan of Care  Goal: Plan of Care Review  Outcome: Progressing  Goal: Patient-Specific Goal (Individualized)  Outcome: Progressing  Goal: Absence of Hospital-Acquired Illness or Injury  Outcome: Progressing  Goal: Optimal Comfort and Wellbeing  Outcome: Progressing  Goal: Readiness for Transition of Care  Outcome: Progressing     Problem: Bariatric Environmental Safety  Goal: Safety Maintained with Care  Outcome: Progressing     Problem: Sepsis/Septic Shock  Goal: Optimal Coping  Outcome: Progressing  Goal: Absence of Bleeding  Outcome: Progressing  Goal: Blood Glucose Level Within Targeted Range  Outcome: Progressing  Goal: Absence of Infection Signs and Symptoms  Outcome: Progressing  Goal: Optimal Nutrition Intake  Outcome: Progressing     Problem: Wound  Goal: Optimal Coping  Outcome: Progressing  Goal: Optimal Functional Ability  Outcome: Progressing  Goal: Absence of Infection Signs and Symptoms  Outcome: Progressing  Goal: Improved Oral Intake  Outcome: Progressing  Goal: Optimal Pain Control and Function  Outcome: Progressing  Goal: Skin Health and Integrity  Outcome: Progressing  Goal: Optimal Wound Healing  Outcome: Progressing     Problem: Skin Injury Risk Increased  Goal: Skin Health and Integrity  Outcome: Progressing

## 2025-01-28 NOTE — CARE UPDATE
01/28/25 0731   Patient Assessment/Suction   Level of Consciousness (AVPU) alert   All Lung Fields Breath Sounds diminished   Skin Integrity   $ Wound Care Tech Time 15 min   Area Observed Bridge of nose   Skin Appearance redness nonblanchable   Barrier used? Gel Cushion   PRE-TX-O2   Device (Oxygen Therapy) CPAP   $ Is the patient on High Flow Oxygen? Yes   SpO2 98 %   Pulse Oximetry Type Intermittent   $ Pulse Oximetry - Multiple Charge Pulse Oximetry - Multiple   Pulse 81   Resp 20   Aerosol Therapy   $ Aerosol Therapy Charges PRN treatment not required   Preset CPAP/BiPAP Settings   Mode Of Delivery CPAP   $ CPAP/BiPAP Daily Charge 1   CPAP/BIPAP charged w/in last 24 h YES   $ Is patient using? Yes   Sized Appropriately? Yes   Equipment Type V60   Airway Device Type small full face mask   CPAP (cm H2O) 20   Patient CPAP/BiPAP Settings   RR Total (Breaths/Min) 20   Tidal Volume (mL) 691   VE Minute Ventilation (L/min) 14.5 L/min   Peak Inspiratory Pressure (cm H2O) 21   TiTOT (%) 24   Total Leak (L/Min) 0   Patient Trigger - ST Mode Only (%) 100   CPAP/BiPAP Alarms   High Pressure (cm H2O) 40   Low Pressure (cm H2O) 5   High RR (breaths/min) 40   Low RR (breaths/min) 10

## 2025-01-28 NOTE — ASSESSMENT & PLAN NOTE
Patient presents with suspected recurrence of panniculitis of right thigh.    Continue Datpomycin  ID consulted   PRN pain medications  Follow cultures    Fluconazole added

## 2025-01-29 LAB
ANION GAP SERPL CALC-SCNC: 4 MMOL/L (ref 8–16)
BASOPHILS # BLD AUTO: 0.04 K/UL (ref 0–0.2)
BASOPHILS NFR BLD: 0.4 % (ref 0–1.9)
BUN SERPL-MCNC: 11 MG/DL (ref 6–20)
CALCIUM SERPL-MCNC: 8.8 MG/DL (ref 8.7–10.5)
CHLORIDE SERPL-SCNC: 101 MMOL/L (ref 95–110)
CO2 SERPL-SCNC: 29 MMOL/L (ref 23–29)
CREAT SERPL-MCNC: 0.5 MG/DL (ref 0.5–1.4)
CRP SERPL-MCNC: 17 MG/DL
DIFFERENTIAL METHOD BLD: ABNORMAL
EOSINOPHIL # BLD AUTO: 0.1 K/UL (ref 0–0.5)
EOSINOPHIL NFR BLD: 1.2 % (ref 0–8)
ERYTHROCYTE [DISTWIDTH] IN BLOOD BY AUTOMATED COUNT: 16.1 % (ref 11.5–14.5)
EST. GFR  (NO RACE VARIABLE): >60 ML/MIN/1.73 M^2
GLUCOSE SERPL-MCNC: 108 MG/DL (ref 70–110)
HCT VFR BLD AUTO: 32.3 % (ref 37–48.5)
HGB BLD-MCNC: 9.9 G/DL (ref 12–16)
IMM GRANULOCYTES # BLD AUTO: 0.03 K/UL (ref 0–0.04)
IMM GRANULOCYTES NFR BLD AUTO: 0.3 % (ref 0–0.5)
LYMPHOCYTES # BLD AUTO: 1.6 K/UL (ref 1–4.8)
LYMPHOCYTES NFR BLD: 16.9 % (ref 18–48)
MAGNESIUM SERPL-MCNC: 1.9 MG/DL (ref 1.6–2.6)
MCH RBC QN AUTO: 26.7 PG (ref 27–31)
MCHC RBC AUTO-ENTMCNC: 30.7 G/DL (ref 32–36)
MCV RBC AUTO: 87 FL (ref 82–98)
MONOCYTES # BLD AUTO: 1 K/UL (ref 0.3–1)
MONOCYTES NFR BLD: 10.1 % (ref 4–15)
NEUTROPHILS # BLD AUTO: 6.8 K/UL (ref 1.8–7.7)
NEUTROPHILS NFR BLD: 71.1 % (ref 38–73)
NRBC BLD-RTO: 0 /100 WBC
PHOSPHATE SERPL-MCNC: 3 MG/DL (ref 2.7–4.5)
PLATELET # BLD AUTO: 251 K/UL (ref 150–450)
PMV BLD AUTO: 9 FL (ref 9.2–12.9)
POCT GLUCOSE: 100 MG/DL (ref 70–110)
POCT GLUCOSE: 110 MG/DL (ref 70–110)
POCT GLUCOSE: 149 MG/DL (ref 70–110)
POTASSIUM SERPL-SCNC: 4 MMOL/L (ref 3.5–5.1)
PROCALCITONIN SERPL IA-MCNC: 0.1 NG/ML (ref 0–0.5)
RBC # BLD AUTO: 3.71 M/UL (ref 4–5.4)
SODIUM SERPL-SCNC: 134 MMOL/L (ref 136–145)
WBC # BLD AUTO: 9.52 K/UL (ref 3.9–12.7)

## 2025-01-29 PROCEDURE — 94660 CPAP INITIATION&MGMT: CPT

## 2025-01-29 PROCEDURE — C1751 CATH, INF, PER/CENT/MIDLINE: HCPCS

## 2025-01-29 PROCEDURE — 97162 PT EVAL MOD COMPLEX 30 MIN: CPT

## 2025-01-29 PROCEDURE — 63600175 PHARM REV CODE 636 W HCPCS: Performed by: STUDENT IN AN ORGANIZED HEALTH CARE EDUCATION/TRAINING PROGRAM

## 2025-01-29 PROCEDURE — 84145 PROCALCITONIN (PCT): CPT | Performed by: NURSE PRACTITIONER

## 2025-01-29 PROCEDURE — 36410 VNPNXR 3YR/> PHY/QHP DX/THER: CPT

## 2025-01-29 PROCEDURE — 97530 THERAPEUTIC ACTIVITIES: CPT

## 2025-01-29 PROCEDURE — 83735 ASSAY OF MAGNESIUM: CPT | Performed by: STUDENT IN AN ORGANIZED HEALTH CARE EDUCATION/TRAINING PROGRAM

## 2025-01-29 PROCEDURE — 80048 BASIC METABOLIC PNL TOTAL CA: CPT | Performed by: STUDENT IN AN ORGANIZED HEALTH CARE EDUCATION/TRAINING PROGRAM

## 2025-01-29 PROCEDURE — 99900035 HC TECH TIME PER 15 MIN (STAT)

## 2025-01-29 PROCEDURE — 63600175 PHARM REV CODE 636 W HCPCS

## 2025-01-29 PROCEDURE — 25000003 PHARM REV CODE 250

## 2025-01-29 PROCEDURE — 12000002 HC ACUTE/MED SURGE SEMI-PRIVATE ROOM

## 2025-01-29 PROCEDURE — 85025 COMPLETE CBC W/AUTO DIFF WBC: CPT | Performed by: STUDENT IN AN ORGANIZED HEALTH CARE EDUCATION/TRAINING PROGRAM

## 2025-01-29 PROCEDURE — 25000003 PHARM REV CODE 250: Performed by: NURSE PRACTITIONER

## 2025-01-29 PROCEDURE — 36415 COLL VENOUS BLD VENIPUNCTURE: CPT | Performed by: NURSE PRACTITIONER

## 2025-01-29 PROCEDURE — 63600175 PHARM REV CODE 636 W HCPCS: Performed by: EMERGENCY MEDICINE

## 2025-01-29 PROCEDURE — 63600175 PHARM REV CODE 636 W HCPCS: Performed by: NURSE PRACTITIONER

## 2025-01-29 PROCEDURE — 97116 GAIT TRAINING THERAPY: CPT

## 2025-01-29 PROCEDURE — 25000003 PHARM REV CODE 250: Performed by: INTERNAL MEDICINE

## 2025-01-29 PROCEDURE — 84100 ASSAY OF PHOSPHORUS: CPT | Performed by: STUDENT IN AN ORGANIZED HEALTH CARE EDUCATION/TRAINING PROGRAM

## 2025-01-29 PROCEDURE — 86140 C-REACTIVE PROTEIN: CPT | Performed by: NURSE PRACTITIONER

## 2025-01-29 PROCEDURE — 63700000 PHARM REV CODE 250 ALT 637 W/O HCPCS: Performed by: NURSE PRACTITIONER

## 2025-01-29 PROCEDURE — 94761 N-INVAS EAR/PLS OXIMETRY MLT: CPT

## 2025-01-29 PROCEDURE — 21400001 HC TELEMETRY ROOM

## 2025-01-29 PROCEDURE — 76937 US GUIDE VASCULAR ACCESS: CPT

## 2025-01-29 RX ORDER — OXYCODONE AND ACETAMINOPHEN 5; 325 MG/1; MG/1
1 TABLET ORAL EVERY 6 HOURS PRN
Status: DISCONTINUED | OUTPATIENT
Start: 2025-01-29 | End: 2025-01-30 | Stop reason: HOSPADM

## 2025-01-29 RX ORDER — MORPHINE SULFATE 2 MG/ML
2 INJECTION, SOLUTION INTRAMUSCULAR; INTRAVENOUS EVERY 4 HOURS PRN
Status: DISCONTINUED | OUTPATIENT
Start: 2025-01-29 | End: 2025-01-30 | Stop reason: HOSPADM

## 2025-01-29 RX ADMIN — ONDANSETRON 4 MG: 2 INJECTION INTRAMUSCULAR; INTRAVENOUS at 05:01

## 2025-01-29 RX ADMIN — DAPTOMYCIN 750 MG: 500 INJECTION, POWDER, LYOPHILIZED, FOR SOLUTION INTRAVENOUS at 05:01

## 2025-01-29 RX ADMIN — OXYCODONE HYDROCHLORIDE AND ACETAMINOPHEN 1 TABLET: 5; 325 TABLET ORAL at 04:01

## 2025-01-29 RX ADMIN — MORPHINE SULFATE 2 MG: 2 INJECTION, SOLUTION INTRAMUSCULAR; INTRAVENOUS at 04:01

## 2025-01-29 RX ADMIN — MORPHINE SULFATE 2 MG: 2 INJECTION, SOLUTION INTRAMUSCULAR; INTRAVENOUS at 09:01

## 2025-01-29 RX ADMIN — HYDROXYZINE HYDROCHLORIDE 25 MG: 25 TABLET, FILM COATED ORAL at 12:01

## 2025-01-29 RX ADMIN — ENOXAPARIN SODIUM 60 MG: 60 INJECTION SUBCUTANEOUS at 09:01

## 2025-01-29 RX ADMIN — MORPHINE SULFATE 2 MG: 2 INJECTION, SOLUTION INTRAMUSCULAR; INTRAVENOUS at 12:01

## 2025-01-29 RX ADMIN — ENOXAPARIN SODIUM 60 MG: 60 INJECTION SUBCUTANEOUS at 08:01

## 2025-01-29 RX ADMIN — ONDANSETRON 4 MG: 2 INJECTION INTRAMUSCULAR; INTRAVENOUS at 08:01

## 2025-01-29 RX ADMIN — MORPHINE SULFATE 2 MG: 2 INJECTION, SOLUTION INTRAMUSCULAR; INTRAVENOUS at 08:01

## 2025-01-29 RX ADMIN — ONDANSETRON 4 MG: 2 INJECTION INTRAMUSCULAR; INTRAVENOUS at 12:01

## 2025-01-29 RX ADMIN — FLUCONAZOLE 200 MG: 100 TABLET ORAL at 08:01

## 2025-01-29 RX ADMIN — MORPHINE SULFATE 2 MG: 2 INJECTION, SOLUTION INTRAMUSCULAR; INTRAVENOUS at 05:01

## 2025-01-29 RX ADMIN — OXYCODONE HYDROCHLORIDE AND ACETAMINOPHEN 1 TABLET: 5; 325 TABLET ORAL at 11:01

## 2025-01-29 NOTE — PT/OT/SLP EVAL
Physical Therapy Evaluation    Patient Name:  Adriana Zaragoza   MRN:  [id number]    Recommendations:     Discharge Recommendations: Low Intensity Therapy   Discharge Equipment Recommendations: none   Barriers to discharge:  medical status    Assessment:     Adriana Zaragoza is a 59 y.o. female admitted with a medical diagnosis of Panniculitis.  She presents with the following impairments/functional limitations: weakness, impaired endurance, impaired self care skills, impaired functional mobility, gait instability, decreased lower extremity function, impaired skin, edema.  Pt found lying in bed with HOB elevated, resting with CPAP on.  She awakens easily and is A & Ox4 and agreeable to PT. Pt requires Jasson for bed mobility for assist to move pannis, transfers with CGA and gait with RW.  Pt previously had good success with home health therapy and prefers to continue PT  following acute care discharge.    Rehab Prognosis: Fair; patient would benefit from acute skilled PT services to address these deficits and reach maximum level of function.    Recent Surgery: * No surgery found *      Plan:     During this hospitalization, patient to be seen 5 x/week to address the identified rehab impairments via gait training, therapeutic activities, therapeutic exercises and progress toward the following goals:    Plan of Care Expires:       Subjective     Chief Complaint: discouraged by recurring infection  Patient/Family Comments/goals: get better and go home so she can work with plastic surgeon who will remove pannus once infection resolved.  Pain/Comfort:       Patients cultural, spiritual, Religion conflicts given the current situation:      Living Environment:  Pt lives [facility] with significant other and 2 roommates  Prior to admission, patients level of function was mod I with mobility using rollator as needed, some occasional asssit with ADLs, min/mod assist with IADLs.  Equipment used at home: shower chair,  rollator, oxygen, CPAP.  DME owned (not currently used): none.  Upon discharge, patient will have assistance from significant other and roommate.    Objective:     Communicated with RN, Alicia, prior to session.  Patient found HOB elevated with telemetry, peripheral IV, CPAP, PureWick, bed alarm  upon PT entry to room.    General Precautions: Standard, fall  Orthopedic Precautions:    Braces:    Respiratory Status:  resting with CPAP mask on    Exams:  Cognitive Exam:  Patient is oriented to Person, Place, Time, and Situation  Postural Exam:  Patient presented with the following abnormalities:    -       Rounded shoulders  -       Forward head  Skin Integrity/Edema:      -       Edema: Moderate pannus and bilat LE  RLE Strength: 4/5 within available range  LLE Strength: 4/5 within available range    Functional Mobility:  Bed Mobility:     Supine to Sit: minimum assistance  Sit to Supine: minimum assistance  Transfers:     Sit to Stand:  contact guard assistance with rolling walker  Bed to Chair: contact guard assistance with  rolling walker  using  Step Transfer  Gait: 2x45' w/RW wide MIREYA and slow pace      AM-PAC 6 CLICK MOBILITY  Total Score:18       Treatment & Education:  Pt was educated on the following: call light use, importance of OOB activity and functional mobility to negate the negative effects of prolonged bed rest during this hospitalization, safe transfers/ambulation and discharge planning recommendations/options.     Patient left up in chair with all lines intact, call button in reach, and RN notified.    GOALS:   Multidisciplinary Problems       Physical Therapy Goals          Problem: Physical Therapy    Goal Priority Disciplines Outcome Interventions   Physical Therapy Goal     PT, PT/OT Progressing    Description: 1. Supine to sit with Stand-by Assistance  2. Sit to stand transfer with Stand-by Assistance  3.. Bed to chair transfer with Supervision using Rolling Walker  4. Gait  x 100 feet with  Minimal Assistance using Rolling Walker.                         DME Justifications:  No DME recommended requiring DME justifications    History:     Past Medical History:   Diagnosis Date    Allergy     Anemia     Asthma     COPD (chronic obstructive pulmonary disease)     Deep vein thrombosis     Depression     Diabetes mellitus, type 2     Encounter for blood transfusion     Heart murmur     Neuromuscular disorder        Past Surgical History:   Procedure Laterality Date     SECTION      DILATION AND CURETTAGE OF UTERUS      gastric sleeve      [facility]    TONSILLECTOMY         Time Tracking:     PT Received On: 25  PT Start Time: 1411     PT Stop Time: 1443  PT Total Time (min): 32 min     Billable Minutes: Evaluation 8, Gait Training 12, and Therapeutic Activity 12      2025

## 2025-01-29 NOTE — SUBJECTIVE & OBJECTIVE
Interval History:  Patient was seen and examined.  Overnight notes reviewed.  Patient reports continued pain but states it was improving.  Denies any acute chest pain or shortness of breath.  Midline ordered.  Id following.    Review of Systems   Respiratory:  Negative for shortness of breath.    Cardiovascular:  Negative for chest pain.   Gastrointestinal:  Positive for abdominal pain. Negative for nausea and vomiting.     Objective:     Vital Signs (Most Recent):  Temp: 98.8 °F (37.1 °C) (01/29/25 1204)  Pulse: 88 (01/29/25 1337)  Resp: 18 (01/29/25 1337)  BP: (!) 108/54 (01/29/25 1204)  SpO2: 99 % (01/29/25 1402) Vital Signs (24h Range):  Temp:  [97.7 °F (36.5 °C)-98.8 °F (37.1 °C)] 98.8 °F (37.1 °C)  Pulse:  [76-88] 88  Resp:  [15-20] 18  SpO2:  [94 %-100 %] 99 %  BP: (103-122)/(54-70) 108/54     Weight: (!) 166 kg (365 lb 15.4 oz)  Body mass index is 62.82 kg/m².    Intake/Output Summary (Last 24 hours) at 1/29/2025 1409  Last data filed at 1/29/2025 0400  Gross per 24 hour   Intake 480 ml   Output 1200 ml   Net -720 ml         Physical Exam  Vitals and nursing note reviewed.   Constitutional:       General: She is not in acute distress.     Appearance: Normal appearance. She is obese.   HENT:      Head: Normocephalic and atraumatic.      Mouth/Throat:      Mouth: Mucous membranes are moist.      Pharynx: Oropharynx is clear.   Eyes:      Extraocular Movements: Extraocular movements intact.   Cardiovascular:      Rate and Rhythm: Normal rate and regular rhythm.   Pulmonary:      Effort: Pulmonary effort is normal.   Abdominal:      General: Abdomen is flat. Bowel sounds are normal.      Palpations: Abdomen is soft.      Tenderness: There is no abdominal tenderness. There is no guarding or rebound.   Skin:     Comments: SEE PHOTO.   Neurological:      General: No focal deficit present.      Mental Status: She is alert and oriented to person, place, and time. Mental status is at baseline.   Psychiatric:          Mood and Affect: Mood normal.         Behavior: Behavior normal.             Significant Labs: All pertinent labs within the past 24 hours have been reviewed.  CBC:   Recent Labs   Lab 01/28/25  0712 01/29/25  0528   WBC 10.37 9.52   HGB 10.3* 9.9*   HCT 34.1* 32.3*    251     CMP:   Recent Labs   Lab 01/28/25  0712 01/29/25  0528   * 134*   K 4.0 4.0    101   CO2 28 29    108   BUN 15 11   CREATININE 0.6 0.5   CALCIUM 8.8 8.8   ANIONGAP 5* 4*     Magnesium:   Recent Labs   Lab 01/28/25  0712 01/29/25  0528   MG 2.1 1.9       Significant Imaging: I have reviewed all pertinent imaging results/findings within the past 24 hours.

## 2025-01-29 NOTE — PLAN OF CARE
Problem: Physical Therapy  Goal: Physical Therapy Goal  Description: 1. Supine to sit with Stand-by Assistance  2. Sit to stand transfer with Stand-by Assistance  3.. Bed to chair transfer with Supervision using Rolling Walker  4. Gait  x 100 feet with Minimal Assistance using Rolling Walker.    Outcome: Progressing

## 2025-01-29 NOTE — ASSESSMENT & PLAN NOTE
Patient presents with suspected recurrence of panniculitis of right thigh.    Continue Datpomycin  ID consulted   PRN pain medications  Follow cultures  Fluconazole added  Midline ordered

## 2025-01-29 NOTE — CONSULTS
VASCULAR ACCESS NOTE       Bed:3110/3110    Single lumen 18G, 10CM midline placed in the right cephalic vein. Needle advanced into the vessel under real time ultrasound guidance.    Attempts: 1  Max dwell date: 2/27/25  Lot number: [id number]     Andrew Douglas RN

## 2025-01-29 NOTE — CARE UPDATE
01/28/25 2045   Patient Assessment/Suction   Level of Consciousness (AVPU) alert   Respiratory Effort Normal;Unlabored   NICU Assessment/Suction   Expansion/Accessory Muscles/Retractions no use of accessory muscles;no retractions   PRE-TX-O2   Device (Oxygen Therapy) CPAP   $ Is the patient on High Flow Oxygen? Yes   Oxygen Concentration (%) 35   SpO2 99 %   Pulse Oximetry Type Intermittent   $ Pulse Oximetry - Multiple Charge Pulse Oximetry - Multiple   Aerosol Therapy   $ Aerosol Therapy Charges PRN treatment not required   Ready to Wean/Extubation Screen   FIO2<=50 (chart decimal) 0.35   Preset CPAP/BiPAP Settings   Mode Of Delivery CPAP   CPAP/BIPAP charged w/in last 24 h YES   $ Initial CPAP/BiPAP Setup? No   $ Is patient using? Yes   Size of Mask Small   Sized Appropriately? Yes   Equipment Type V60   Airway Device Type small full face mask   CPAP (cm H2O) 20   Patient CPAP/BiPAP Settings   RR Total (Breaths/Min) 20   Tidal Volume (mL) 683   VE Minute Ventilation (L/min) 13.5 L/min   Peak Inspiratory Pressure (cm H2O) 21   TiTOT (%) 20   Patient Trigger - ST Mode Only (%) 100   CPAP/BiPAP Alarms   High Pressure (cm H2O) 40   Low Pressure (cm H2O) 5   High RR (breaths/min) 40   Low RR (breaths/min) 10

## 2025-01-29 NOTE — PROGRESS NOTES
[facility] Medicine  Progress Note    Patient Name: Adriana Zaragoza  MRN: [id number]  Patient Class: IP- Inpatient   Admission Date: 1/26/2025  Length of Stay: 3 days  Attending Physician: Ruth Gannon DO  Primary Care Provider: Melba Trivedi MD        Subjective     Principal Problem:Panniculitis        HPI:  59 year old female with comorbid conditions of KEESHA on CPAP, COPD on 2lpm home oxygen, h/o DVT, recurrent panniculitis presents due to suspected right leg panniculitis.  Patient has area of right leg that has been infected repeatedly.  Reports pain in same area of leg for 1 day duration.  Associated with fatigue, nausea, cough, headache.  Denies fevers, chills.    Last admission for panniculitis patient had to be intubated due to sepsis. Was treated with rocephin/vanco while inpatient and was discharged on 2 weeks of PO ciprofloxacin and linezolid on 1/3/25. Subsequently presented to [facility] due to influenza on 1/15 - completed tamiflu.  Patient is following with plastic surgeon about removing affected area once infection resolves.   Labs revealed WBC of 15.98.  Patient is febrile to 102 in ED.Was given acetaminophen, dose of ceftriaxone and linezolid, zofran and morphine.     Overview/Hospital Course:  No notes on file    Interval History:  Patient was seen and examined.  Overnight notes reviewed.  Patient reports continued pain but states it was improving.  Denies any acute chest pain or shortness of breath.  Midline ordered.  Id following.    Review of Systems   Respiratory:  Negative for shortness of breath.    Cardiovascular:  Negative for chest pain.   Gastrointestinal:  Positive for abdominal pain. Negative for nausea and vomiting.     Objective:     Vital Signs (Most Recent):  Temp: 98.8 °F (37.1 °C) (01/29/25 1204)  Pulse: 88 (01/29/25 1337)  Resp: 18 (01/29/25 1337)  BP: (!) 108/54 (01/29/25 1204)  SpO2: 99 % (01/29/25 1402) Vital Signs (24h Range):  Temp:  [97.7  °F (36.5 °C)-98.8 °F (37.1 °C)] 98.8 °F (37.1 °C)  Pulse:  [76-88] 88  Resp:  [15-20] 18  SpO2:  [94 %-100 %] 99 %  BP: (103-122)/(54-70) 108/54     Weight: (!) 166 kg (365 lb 15.4 oz)  Body mass index is 62.82 kg/m².    Intake/Output Summary (Last 24 hours) at 1/29/2025 1409  Last data filed at 1/29/2025 0400  Gross per 24 hour   Intake 480 ml   Output 1200 ml   Net -720 ml         Physical Exam  Vitals and nursing note reviewed.   Constitutional:       General: She is not in acute distress.     Appearance: Normal appearance. She is obese.   HENT:      Head: Normocephalic and atraumatic.      Mouth/Throat:      Mouth: Mucous membranes are moist.      Pharynx: Oropharynx is clear.   Eyes:      Extraocular Movements: Extraocular movements intact.   Cardiovascular:      Rate and Rhythm: Normal rate and regular rhythm.   Pulmonary:      Effort: Pulmonary effort is normal.   Abdominal:      General: Abdomen is flat. Bowel sounds are normal.      Palpations: Abdomen is soft.      Tenderness: There is no abdominal tenderness. There is no guarding or rebound.   Skin:     Comments: SEE PHOTO.   Neurological:      General: No focal deficit present.      Mental Status: She is alert and oriented to person, place, and time. Mental status is at baseline.   Psychiatric:         Mood and Affect: Mood normal.         Behavior: Behavior normal.             Significant Labs: All pertinent labs within the past 24 hours have been reviewed.  CBC:   Recent Labs   Lab 01/28/25  0712 01/29/25  0528   WBC 10.37 9.52   HGB 10.3* 9.9*   HCT 34.1* 32.3*    251     CMP:   Recent Labs   Lab 01/28/25  0712 01/29/25  0528   * 134*   K 4.0 4.0    101   CO2 28 29    108   BUN 15 11   CREATININE 0.6 0.5   CALCIUM 8.8 8.8   ANIONGAP 5* 4*     Magnesium:   Recent Labs   Lab 01/28/25  0712 01/29/25  0528   MG 2.1 1.9       Significant Imaging: I have reviewed all pertinent imaging results/findings within the past 24  hours.    Assessment and Plan     * Panniculitis  Patient presents with suspected recurrence of panniculitis of right thigh.    Continue Datpomycin  ID consulted   PRN pain medications  Follow cultures  Fluconazole added  Midline ordered        Vaginal candidiasis  Acute:   -id following: Appreciate recommendations   -continue Diflucan      COPD (chronic obstructive pulmonary disease)  Patient's COPD is controlled currently.  Patient is currently off COPD Pathway. Continue scheduled inhalers Supplemental oxygen and monitor respiratory status closely.  Duoneb PRN    KEESHA (obstructive sleep apnea)  Continue NIPPV at night      VTE Risk Mitigation (From admission, onward)           Ordered     enoxaparin injection 60 mg  Every 12 hours         01/26/25 2124     IP VTE HIGH RISK PATIENT  Once         01/26/25 2113     Place sequential compression device  Until discontinued         01/26/25 2113                    Discharge Planning   JONATHAN: 1/30/2025     Code Status: Full Code   Medical Readiness for Discharge Date:   Discharge Plan A: Home Health                        Nanci Loaiza PA-C  Department of Hospital Medicine   [facility]

## 2025-01-29 NOTE — PLAN OF CARE
Problem: Adult Inpatient Plan of Care  Goal: Plan of Care Review  Outcome: Progressing  Goal: Patient-Specific Goal (Individualized)  Outcome: Progressing  Goal: Absence of Hospital-Acquired Illness or Injury  Outcome: Progressing  Goal: Optimal Comfort and Wellbeing  Outcome: Progressing  Goal: Readiness for Transition of Care  Outcome: Progressing     Problem: Bariatric Environmental Safety  Goal: Safety Maintained with Care  Outcome: Progressing     Problem: Wound  Goal: Optimal Coping  Outcome: Progressing  Goal: Optimal Functional Ability  Outcome: Progressing  Goal: Absence of Infection Signs and Symptoms  Outcome: Progressing  Goal: Improved Oral Intake  Outcome: Progressing  Goal: Optimal Pain Control and Function  Outcome: Progressing  Goal: Skin Health and Integrity  Outcome: Progressing  Goal: Optimal Wound Healing  Outcome: Progressing     Problem: Skin Injury Risk Increased  Goal: Skin Health and Integrity  Outcome: Progressing     Problem: Infection  Goal: Absence of Infection Signs and Symptoms  Outcome: Progressing

## 2025-01-29 NOTE — PROGRESS NOTES
01/28/25 2000   Pain/Comfort/Sleep   Preferred Pain Scale number (Numeric Rating Pain Scale)   Comfort/Acceptable Pain Level 0   Pain Rating (0-10): Rest 3   POSS (Pasero Opioid-Induced Sed Scale) 1 - Awake and alert   Pain Reassessment   Pain Rating Prior to Med Admin 8   Cognitive   Cognitive/Neuro/Behavioral WDL WDL   Level of Consciousness (AVPU) alert   Pupils   Pupil PERRLA yes   Veronica Coma Scale   Best Eye Response 4-->(E4) spontaneous   Best Motor Response 6-->(M6) obeys commands   Best Verbal Response 5-->(V5) oriented   Veronica Coma Scale Score 15   HEENT   HEENT WDL WDL   Respiratory   Respiratory WDL ex;breath sounds   Rhythm/Pattern, Respiratory unlabored   Breath Sounds   All Lung Fields Breath Sounds diminished   Breath Sounds All Fields   Oxygen Therapy   Flow (L/min) (Oxygen Therapy) 2   Oxygen Concentration (%) 35   Device (Oxygen Therapy) CPAP   Cardiac   Cardiac WDL WDL   Peripheral Neurovascular   Peripheral Neurovascular WDL WDL   VTE Prevention/Management ROM (active) performed        Peripheral IV - Single Lumen 01/26/25 1942 20 G Anterior;Right Forearm   Placement Date/Time: 01/26/25 1942   Present Prior to Hospital Arrival?: No  Inserted by: RN  Size (G): 20 G  Orientation: Anterior;Right  Location: Forearm  Placement directed by: Anatomic Landmarks  Site Prep: Chlorhexidine   Insertion attempts (ent...   Site Assessment Clean;Dry;Intact   Line Status Saline locked   Dressing Status Clean;Dry;Intact   Dressing Intervention Integrity maintained   Skin   Skin WDL ex;color/characteristics   General Skin Color/Characteristics bruised (ecchymotic)   Abhi Risk Assessment   Sensory Perception 4-->no impairment   Moisture 3-->occasionally moist   Activity 2-->chairfast   Mobility 2-->very limited   Nutrition 3-->adequate   Friction and Shear 2-->potential problem   Abhi Score 16        Wound 01/27/25 1604 Skin Tear midline Perineum #2   Date First Assessed/Time First Assessed: 01/27/25  1600   Present on Original Admission: Yes  Primary Wound Type: Skin Tear  Orientation: midline  Location: Perineum  Wound Number: #2  Is this injury device related?: No   Wound Image   (see image)   Skin Interventions   Pressure Reduction Devices positioning supports utilized   Pressure Reduction Techniques pressure points protected   Radiation Site Skin Care cleansed with mild soap and water   Skin Protection incontinence pads utilized   Musculoskeletal   Musculoskeletal WDL ex;mobility   General Mobility mildly impaired   Functional Screen (every 3 days/change)   Ambulation 2 - assistive person   Transferring 2 - assistive person   Toileting 2 - assistive person   Gastrointestinal   GI WDL WDL   Last Bowel Movement 01/26/25   Genitourinary   Genitourinary WDL ex;urine   Coping/Psychosocial   Observed Emotional State accepting   Verbalized Emotional State acceptance   Plan of Care Reviewed With patient   Safety   Safety WDL WDL   All Alarms alarm(s) activated and audible   Safety Precautions emergency equipment at bedside   Enhanced Safety Measures bed alarm set   Fall Risk Assessment (every shift)   History Of Fall (W/I 3 Mos) 0-->No   Polypharmacy 3-->Yes   Central Nervous System/Psychotropic Medication 0-->No   Cardiovascular Medication 0-->No   Age Greater Than 65 Years 0-->No   Altered Elimination 2-->Yes   Cognitive Deficit 0-->No   Sensory Deficit 0-->No   Dizziness/Vertigo 0-->No   Depression 0-->No   Mobility Deficit/Weakness 2-->Yes   Male 0-->No   Fall Risk Score 7   ABC Risk for Fall with Injury Assessment   A= Age: Is the patient greater than or equal to 85 years old or frail due to clinical condition? No   B=Bones: Does the patient have osteoporosis, previous fracture, prolonged steroid use, or metastatic bone cancer? No   C=Coagulation Disorders: Does the patient have a bleeding disorder, either through anticoagulants or underlying clinical condition? No   S=recent Surgery: Is the patient post-op  surgicalwith a recent lower limb amputation or recent major abdominal or thoracic surgery? No   Safety Management   Safety Promotion/Fall Prevention assistive device/personal item within reach   Patient Rounds ID band on;call light in patient/parent reach;bed wheels locked;bed in low position;visualized patient   Safety Bands on Patient Fall Risk Band;Allergy Band   Daily Care   Activity Management Rolling - L1   Activity Assistance Provided assistance, 2 people   Mobility   GEMS (Wolf Early Mobility Scale) Level 3-Standing activities with assist   Positioning   Body Position position changed independently   Head of Bed (HOB) Positioning HOB elevated   Positioning/Transfer Devices pillows;in use   RN Clinical Review   I have evaluated the data collected on this patient and nursing care provided. Done

## 2025-01-29 NOTE — PROGRESS NOTES
[facility]   Department of Infectious Disease  Progress Note        PATIENT NAME: Adriana Zaragoza  MRN: [id number]  TODAY'S DATE: 01/29/2025  ADMIT DATE: 1/26/2025  LENGTH OF STAY: 3 DAYS      CHIEF COMPLAINT: Leg Pain (She states that her growth on her leg may be getting infected.)    PRINCIPLE PROBLEM: Panniculitis    REASON FOR CONSULT: recurrent panniculititis     INTERVAL HISTORY     1/28/2025@1000 (Denjulián): Patient is lying in her bed on BiPAP.  She states she is having severe burning pain in her groin area and thighs.  She feels like she has a yeast infection.  Appetite is fair, she denies nausea vomiting,  no abdominal pain, no specific dysuria, no headaches or dizziness, denies wheezing or shortness a breath.  Using BiPAP for sleep.  T-max 99° in the last 24 hours.  Leukocytosis improved with WBC 10.37, no left shift, H&H 10.3/34.1, no bands, creatinine 0.6 and estimated cr creatinine clearance 156, procalcitonin 0.216, total CK 37, CRP 10.4.   1/26 blood cultures no growth to date.  X-ray with improved patchy airspace opacities.  Redness, swelling and tenderness to right thigh improved though her perineal area is erythematous and  appears inflamed.      1/29/2025@1118 (Tori): Patient is lying in bed awake and alert.  She has just received Nataliya care and her perineal area is looking much better, the large right leg appendage with lessened redness but still indurated and tender.  She states the burning in the groin region is much improved.  She denies fevers or chills, states she is eating and drinking better, less nausea, she has not occasional cough and feels like she needs to cough something up but does not get it up.  She continues to use BiPAP intermittently otherwise appears comfortable on room air.  T-max 98.7° in the last 24 hours.  WBC much improved at 9.52, platelets 251, H&H 9.9/32.3, left shift resolved, creatinine 0.5, CRP 17 elevated from yesterday, procalcitonin 0.095.   Last total CK 37.  An ultrasound of soft tissue was ordered that showed soft tissue edema swelling and skin thickening over the right thigh with possible lymphedema or cellulitis with no abscess.    Antibiotics (From admission, onward)      Start     Stop Route Frequency Ordered    01/29/25 1800  DAPTOmycin (CUBICIN) 750 mg in 0.9% NaCl SolP 50 mL IVPB         -- IV Every 24 hours (non-standard times) 01/29/25 1508           Antifungals (From admission, onward)      Start     Stop Route Frequency Ordered    01/28/25 0900  fluconazole tablet 200 mg         -- Oral Daily 01/27/25 2115           Antivirals (From admission, onward)      None                ASSESSMENT and PLAN     1.  Sepsis with recurrent  right thigh panniculitis/cellulitis with chronic lymphedema  -Leukocytosis WBC 15.98-->15.01-->10.37  -CRP 10.40-->17, Procalcitonin 0.216  -  Ultrasound negative for abscess    2. Morbidly obese with BMI 62.82      3.  Asthma/KEESHA      RECOMMENDATIONS:  Continue daptomycin (8 mg/kg adjusted weight = 790 mg but round to )750mg IV every 24 hours   Twice weekly Cks while on daptomycin  Fluconazole 200 mg by mouth daily for 3 days  Order for midline placed today  Repeat CRP  in AM   We discussed with patient obtaining daptomycin 750 mg IV daily for 2 weeks at [facility] infusion clinic daily  and possible discharge tomorrow if she continues to improve and have no fever    D/W Dr Humphreys    Please send Epic secure chat with any questions.    HPI      Adriana Zaragoza is a 59 y.o. female with chronic medical problems include severe obesity, asthma/COPD on BiPAP at home, diabetes, depression and a history of recurrent cellulitis who presented to the emergency room on 01/26 complaining of right thigh redness.  She was recently hospitalized from 12/26 through 1/3 with fever and hypoxia and was intubated in in ICU.  She was on linezolid and ceftriaxone for right thigh cellulitis.  Oxygenation improved and she was discharged on  ciprofloxacin and linezolid for 2 weeks.  She said that the redness resolved and the large lump on her thigh actually got soft and smaller.  She said she has seen a plastic surgeon who is willing to remove the area on her thigh that gets the recurrent cellulitis.  She also came to the emergency room on January 15th with influenza.  On presentation to the emergency room she had a fever of 102 but she denied having fevers and chills to us at home.  Chest x-ray with bilateral interstitial and patchy airspace opacities improved from previous chest x-ray on the 15th.  Leukocytosis with WBC 15.01, platelets 282, left shift 76% neutrophils, H&H 11.4/36.8, creatinine 0.8, CRP 10.4, total CK 37, procalcitonin 0.216, urinalysis with no signs of infection.  She was started on linezolid and ceftriaxone.  At time of exam she is sitting up in bed resting on BiPAP and takes it off to talk with us.  She denies any current fevers or chills, denies significant cough.  States she has been eating and drinking fairly well.      Outdoor activities: Lives at home with her boyfriend  Travel:  none  Implants:   none  Antibiotic history:   see HPI    Social History  Marital Status: Significant Other  Alcohol History:  reports no history of alcohol use.  Tobacco History:  reports that she has been smoking cigarettes. She started smoking about 6 years ago. She has a 6.1 pack-year smoking history. She has never used smokeless tobacco.  Drug History:  reports no history of drug use.    Review of patient's allergies indicates:   Allergen Reactions    Fentanyl Other (See Comments)     Hypoxemia  Muscle twitching    Aspirin     Nicoderm     Nsaids (non-steroidal anti-inflammatory drug) Hives    Teflaro [ceftaroline fosamil]      Allergic reaction/ hives/itching     SUBJECTIVE     Review of Systems  Review of systems obtained and negative except as stated above in Interval History     OBJECTIVE     Temp:  [97.7 °F (36.5 °C)-98.8 °F (37.1 °C)] 98.8  °F (37.1 °C)  Pulse:  [76-88] 78  Resp:  [15-20] 18  SpO2:  [94 %-100 %] 95 %  BP: (103-122)/(54-70) 108/54  Temp:  [97.7 °F (36.5 °C)-98.8 °F (37.1 °C)]   Temp: 98.8 °F (37.1 °C) (01/29/25 1204)  Pulse: 78 (01/29/25 1204)  Resp: 18 (01/29/25 1204)  BP: (!) 108/54 (01/29/25 1204)  SpO2: 95 % (01/29/25 1204)    Intake/Output Summary (Last 24 hours) at 1/29/2025 1249  Last data filed at 1/29/2025 0400  Gross per 24 hour   Intake 480 ml   Output 1200 ml   Net -720 ml     Physical Exam  General:  awake alert, pleasant and conversant.  Eyes: Eyes with no icterus or injection. Vision grossly normal  Ears: Hearing grossly normal.  Nose: Nares patent  Mouth: Moist mucous membranes, dentition is  fair. No ulcerations, erythema or exudates.  Cardiovascular: Regular rate and rhythm, no murmurs, no edema.    Respiratory:   Bilateral breath sounds are rhonchorous anteriorly, occasional moist sounding cough but no sputum production, no tachypnea or increased work of breathing.  On room air.    Gastrointestinal:  Soft and obese with active bowel sounds, no tenderness to palpation, no distention.   Erythema in moistness to skin folds much improved.  Musculoskeletal:  Moves all extremities with equal strength.    Skin:  Pale, warm and dry, no obvious rashes. Right thigh with large  appendage   With improving erythema and warmth, still indurated on bottom edge, tenderness improving.  Neuro: Oriented, conversant, follows commands.  Psych: Good mood, normal affect.  VAD: PIV  Isolation: None     Wounds  1/29/2025 1/27/2025            Significant Labs: All pertinent labs within the past 24 hours have been reviewed.    CBC LAST 7 DAYS  Recent Labs   Lab 01/26/25  1942 01/27/25  0546 01/28/25  0712 01/29/25  0528   WBC 15.98* 15.01* 10.37 9.52   RBC 4.51 4.30 3.87* 3.71*   HGB 12.0 11.4* 10.3* 9.9*   HCT 38.1 36.8* 34.1* 32.3*   MCV 85 86 88 87   MCH 26.6* 26.5* 26.6* 26.7*   MCHC 31.5* 31.0* 30.2* 30.7*   RDW 16.5* 16.8* 16.7* 16.1*  "   282 244 251   MPV 9.0* 8.9* 8.8* 9.0*   GRAN 86.3*  13.8* 76.3*  11.5* 68.8  7.1 71.1  6.8   LYMPH 8.5*  1.4 14.0*  2.1 17.8*  1.9 16.9*  1.6   MONO 4.4  0.7 8.8  1.3* 12.2  1.3* 10.1  1.0   BASO 0.02 0.04 0.02 0.04   NRBC 0 0 0 0       CHEMISTRY LAST 7 DAYS  Recent Labs   Lab 01/26/25  1942 01/27/25  0546 01/28/25  0712 01/29/25  0528    134* 134* 134*   K 3.9 3.9 4.0 4.0    100 101 101   CO2 27 27 28 29   ANIONGAP 9 7* 5* 4*   BUN 12 18 15 11   CREATININE 0.6 0.8 0.6 0.5   * 128* 107 108   CALCIUM 9.3 9.0 8.8 8.8   MG  --  1.9 2.1 1.9   ALBUMIN 3.9  --   --   --    PROT 7.3  --   --   --    ALKPHOS 64  --   --   --    ALT 13  --   --   --    AST 12  --   --   --    BILITOT 0.7  --   --   --        Estimated Creatinine Clearance: 187.4 mL/min (based on SCr of 0.5 mg/dL).    INFLAMMATORY/PROCAL  LAST 7 DAYS  Recent Labs   Lab 01/27/25  0546 01/27/25  0944 01/29/25  0528   PROCAL  --  0.216 0.095   CRP 10.40*  --  17.00*     No results found for: "ESR"  CRP   Date Value Ref Range Status   01/29/2025 17.00 (H) <1.00 mg/dL Final     Comment:     CRP-Normal Application expected values:   <1.0        mg/dL   Normal Range  1.0 - 5.0  mg/dL   Indicates mild inflammation  5.0 - 10.0 mg/dL   Indicates severe inflammation  >10.0        mg/dL   Represents serious processes and   frequently         indicates the presence of a bacterial   infection.      01/27/2025 10.40 (H) <1.00 mg/dL Final     Comment:     CRP-Normal Application expected values:   <1.0        mg/dL   Normal Range  1.0 - 5.0  mg/dL   Indicates mild inflammation  5.0 - 10.0 mg/dL   Indicates severe inflammation  >10.0        mg/dL   Represents serious processes and   frequently         indicates the presence of a bacterial   infection.      09/25/2024 1.30 (H) <1.00 mg/dL Final     Comment:     CRP-Normal Application expected values:   <1.0        mg/dL   Normal Range  1.0 - 5.0  mg/dL   Indicates mild inflammation  5.0 " - 10.0 mg/dL   Indicates severe inflammation  >10.0        mg/dL   Represents serious processes and   frequently         indicates the presence of a bacterial   infection.      08/28/2024 10.60 (H) <1.00 mg/dL Final     Comment:     CRP-Normal Application expected values:   <1.0        mg/dL   Normal Range  1.0 - 5.0  mg/dL   Indicates mild inflammation  5.0 - 10.0 mg/dL   Indicates severe inflammation  >10.0        mg/dL   Represents serious processes and   frequently         indicates the presence of a bacterial   infection.      08/26/2024 8.00 (H) <1.00 mg/dL Final     Comment:     CRP-Normal Application expected values:   <1.0        mg/dL   Normal Range  1.0 - 5.0  mg/dL   Indicates mild inflammation  5.0 - 10.0 mg/dL   Indicates severe inflammation  >10.0        mg/dL   Represents serious processes and   frequently         indicates the presence of a bacterial   infection.      02/14/2024 5.0 0.0 - 8.2 mg/L Final   02/06/2024 115.2 (H) 0.0 - 8.2 mg/L Final       PRIOR MICROBIOLOGY:  Susceptibility data from last 90 days.  Collected Specimen Info Organism   01/15/25 Blood from Peripheral, Antecubital, Right No growth after 5 days.   01/15/25 Blood from Peripheral, Forearm, Left No growth after 5 days.   12/26/24 Blood from Peripheral, Hand, Left No growth after 5 days.   12/26/24 Blood from Peripheral, Hand, Right No growth after 5 days.       LAST 7 DAYS MICROBIOLOGY   Microbiology Results (last 7 days)       Procedure Component Value Units Date/Time    Blood culture x two cultures. Draw prior to antibiotics. [[id number]] Collected: 01/26/25 1902    Order Status: Completed Specimen: Blood Updated: 01/28/25 2232     Blood Culture, Routine No Growth to date      No Growth to date      No Growth to date    Narrative:      Aerobic and anaerobic  Collection has been rescheduled by MYB at 01/26/2025 18:39 Reason:   Patient unavailable/pt with ultrasound  Collection has been rescheduled by MYB at 01/26/2025 18:39  Reason:   Patient unavailable/pt with ultrasound    Blood culture x two cultures. Draw prior to antibiotics. [[id number]] Collected: 01/26/25 1855    Order Status: Completed Specimen: Blood Updated: 01/28/25 2232     Blood Culture, Routine No Growth to date      No Growth to date      No Growth to date    Narrative:      Aerobic and anaerobic  Collection has been rescheduled by MYB at 01/26/2025 18:39 Reason:   Patient unavailable/pt with ultrasound  Collection has been rescheduled by MYB at 01/26/2025 18:39 Reason:   Patient unavailable/pt with ultrasound              CURRENT/PREVIOUS VISIT EKG  Results for orders placed or performed during the hospital encounter of 01/26/25   EKG 12-lead    Collection Time: 01/26/25  8:01 PM   Result Value Ref Range    QRS Duration 80 ms    [facility] QTC Calculation 427 ms    Narrative    Test Reason : Z13.6,    Vent. Rate :  94 BPM     Atrial Rate :  94 BPM     P-R Int : 172 ms          QRS Dur :  80 ms      QT Int : 342 ms       P-R-T Axes :  26  70  40 degrees    QTcB Int : 427 ms    Normal sinus rhythm  Low voltage QRS  Borderline Abnormal ECG  When compared with ECG of 15-Choco-2025 20:00,  Minimal criteria for Anterior infarct are no longer Present  Nonspecific T wave abnormality no longer evident in Lateral leads    Referred By: AAAREFERRAL SELF           Confirmed By:          Significant Imaging: I have reviewed all relevant and available imaging results/findings within the past 24 hours.      I spent a total of 55 minutes on the day of the visit.This includes face to face time and non-face to face time preparing to see the patient (eg, review of tests), obtaining and/or reviewing separately obtained history, documenting clinical information in the electronic or other health record, independently interpreting results and communicating results to the patient/family/caregiver, or care coordinator.      Ambika Valle NP  Date of Service: 01/29/2025      This note was created  using M Modal  voice recognition software that occasionally misinterpreted phrases or words.

## 2025-01-30 ENCOUNTER — PATIENT MESSAGE (OUTPATIENT)
Dept: CASE MANAGEMENT | Facility: HOSPITAL | Age: 60
End: 2025-01-30

## 2025-01-30 VITALS
BODY MASS INDEX: 50.02 KG/M2 | OXYGEN SATURATION: 95 % | DIASTOLIC BLOOD PRESSURE: 66 MMHG | HEART RATE: 78 BPM | HEIGHT: 64 IN | TEMPERATURE: 98 F | WEIGHT: 293 LBS | RESPIRATION RATE: 20 BRPM | SYSTOLIC BLOOD PRESSURE: 124 MMHG

## 2025-01-30 LAB
ANION GAP SERPL CALC-SCNC: 7 MMOL/L (ref 8–16)
BASOPHILS # BLD AUTO: 0.04 K/UL (ref 0–0.2)
BASOPHILS NFR BLD: 0.5 % (ref 0–1.9)
BUN SERPL-MCNC: 11 MG/DL (ref 6–20)
CALCIUM SERPL-MCNC: 9 MG/DL (ref 8.7–10.5)
CHLORIDE SERPL-SCNC: 100 MMOL/L (ref 95–110)
CK SERPL-CCNC: 25 U/L (ref 20–180)
CO2 SERPL-SCNC: 30 MMOL/L (ref 23–29)
CREAT SERPL-MCNC: 0.6 MG/DL (ref 0.5–1.4)
CRP SERPL-MCNC: 13.6 MG/DL
DIFFERENTIAL METHOD BLD: ABNORMAL
EOSINOPHIL # BLD AUTO: 0.1 K/UL (ref 0–0.5)
EOSINOPHIL NFR BLD: 1.7 % (ref 0–8)
ERYTHROCYTE [DISTWIDTH] IN BLOOD BY AUTOMATED COUNT: 16.1 % (ref 11.5–14.5)
EST. GFR  (NO RACE VARIABLE): >60 ML/MIN/1.73 M^2
GLUCOSE SERPL-MCNC: 127 MG/DL (ref 70–110)
HCT VFR BLD AUTO: 32 % (ref 37–48.5)
HGB BLD-MCNC: 9.8 G/DL (ref 12–16)
IMM GRANULOCYTES # BLD AUTO: 0.02 K/UL (ref 0–0.04)
IMM GRANULOCYTES NFR BLD AUTO: 0.2 % (ref 0–0.5)
LYMPHOCYTES # BLD AUTO: 1.7 K/UL (ref 1–4.8)
LYMPHOCYTES NFR BLD: 20 % (ref 18–48)
MCH RBC QN AUTO: 26.5 PG (ref 27–31)
MCHC RBC AUTO-ENTMCNC: 30.6 G/DL (ref 32–36)
MCV RBC AUTO: 87 FL (ref 82–98)
MONOCYTES # BLD AUTO: 0.7 K/UL (ref 0.3–1)
MONOCYTES NFR BLD: 8.4 % (ref 4–15)
NEUTROPHILS # BLD AUTO: 5.8 K/UL (ref 1.8–7.7)
NEUTROPHILS NFR BLD: 69.2 % (ref 38–73)
NRBC BLD-RTO: 0 /100 WBC
PLATELET # BLD AUTO: 333 K/UL (ref 150–450)
PMV BLD AUTO: 9.2 FL (ref 9.2–12.9)
POTASSIUM SERPL-SCNC: 4.4 MMOL/L (ref 3.5–5.1)
RBC # BLD AUTO: 3.7 M/UL (ref 4–5.4)
SODIUM SERPL-SCNC: 137 MMOL/L (ref 136–145)
WBC # BLD AUTO: 8.32 K/UL (ref 3.9–12.7)

## 2025-01-30 PROCEDURE — 63600175 PHARM REV CODE 636 W HCPCS: Performed by: NURSE PRACTITIONER

## 2025-01-30 PROCEDURE — 25000003 PHARM REV CODE 250

## 2025-01-30 PROCEDURE — 86140 C-REACTIVE PROTEIN: CPT | Performed by: NURSE PRACTITIONER

## 2025-01-30 PROCEDURE — 63700000 PHARM REV CODE 250 ALT 637 W/O HCPCS: Performed by: NURSE PRACTITIONER

## 2025-01-30 PROCEDURE — 63600175 PHARM REV CODE 636 W HCPCS: Performed by: EMERGENCY MEDICINE

## 2025-01-30 PROCEDURE — 82550 ASSAY OF CK (CPK): CPT | Performed by: NURSE PRACTITIONER

## 2025-01-30 PROCEDURE — 80048 BASIC METABOLIC PNL TOTAL CA: CPT | Performed by: NURSE PRACTITIONER

## 2025-01-30 PROCEDURE — 99900035 HC TECH TIME PER 15 MIN (STAT)

## 2025-01-30 PROCEDURE — 25000003 PHARM REV CODE 250: Performed by: INTERNAL MEDICINE

## 2025-01-30 PROCEDURE — 85025 COMPLETE CBC W/AUTO DIFF WBC: CPT | Performed by: NURSE PRACTITIONER

## 2025-01-30 PROCEDURE — 36415 COLL VENOUS BLD VENIPUNCTURE: CPT | Performed by: NURSE PRACTITIONER

## 2025-01-30 PROCEDURE — 63600175 PHARM REV CODE 636 W HCPCS

## 2025-01-30 PROCEDURE — 97116 GAIT TRAINING THERAPY: CPT | Mod: CQ

## 2025-01-30 PROCEDURE — 25000003 PHARM REV CODE 250: Performed by: NURSE PRACTITIONER

## 2025-01-30 PROCEDURE — 94761 N-INVAS EAR/PLS OXIMETRY MLT: CPT

## 2025-01-30 RX ORDER — SODIUM CHLORIDE 0.9 % (FLUSH) 0.9 %
10 SYRINGE (ML) INJECTION
Status: CANCELLED | OUTPATIENT
Start: 2025-01-31

## 2025-01-30 RX ORDER — HEPARIN 100 UNIT/ML
500 SYRINGE INTRAVENOUS
Status: CANCELLED | OUTPATIENT
Start: 2025-01-31

## 2025-01-30 RX ADMIN — OXYCODONE HYDROCHLORIDE AND ACETAMINOPHEN 1 TABLET: 5; 325 TABLET ORAL at 02:01

## 2025-01-30 RX ADMIN — HYDROXYZINE HYDROCHLORIDE 25 MG: 25 TABLET, FILM COATED ORAL at 12:01

## 2025-01-30 RX ADMIN — DAPTOMYCIN 750 MG: 500 INJECTION, POWDER, LYOPHILIZED, FOR SOLUTION INTRAVENOUS at 04:01

## 2025-01-30 RX ADMIN — FLUCONAZOLE 200 MG: 100 TABLET ORAL at 08:01

## 2025-01-30 RX ADMIN — MORPHINE SULFATE 2 MG: 2 INJECTION, SOLUTION INTRAMUSCULAR; INTRAVENOUS at 08:01

## 2025-01-30 RX ADMIN — ENOXAPARIN SODIUM 60 MG: 60 INJECTION SUBCUTANEOUS at 08:01

## 2025-01-30 NOTE — DISCHARGE INSTRUCTIONS
Our goal at [facility] is to always give you outstanding care and exceptional service. You may receive a survey by mail, text or e-mail in the next 7-10 days from Dilip Tamayo and our leadership team asking about the care you received with us. The survey should only take 5-10 minutes to complete and is very important to us.     Your feedback provides us with a way to recognize our staff who work tirelessly to provide the best care! Also, your responses help us learn how to improve when your experience was below our aspiration of excellence. We WILL use your feedback to continue making improvements to help us provide the highest quality care. We keep your personal information and feedback confidential. We appreciate your time completing this survey and can't wait to hear from you!!!     We want you to leave us today feeling like you can DEFINITELY recommend us to others! We look forward to your continued care with us! Thanks so much for choosing [facility] for your healthcare needs!       Discharge Instructions, [facility]    Thank you for choosing [facility] for your medical care. The primary doctor who is taking care of you at the time of your discharge is Ruth Gannon DO.     You were admitted to the hospital with Panniculitis.     Please note your discharge instructions, including diet/activity restrictions, follow-up appointments, and medication changes.  If you have any questions about your medical issues, prescriptions, or any other questions, please feel free to contact the [facility] Medicine Dept at [phone] and we will help.    If you are previously with Home health, outpatient PT/OT or under a therapy program, you are cleared to return to those programs.    Please direct all long term medication refills and follow up to your primary care provider, Melba Trivedi MD. Thank you again for letting us take care of your health care needs.    Please note  the following discharge instructions per your discharging physician-  Nanci silva PA-C

## 2025-01-30 NOTE — PT/OT/SLP PROGRESS
Physical Therapy Treatment    Patient Name:  Adriana Zaragoza   MRN:  [id number]    Recommendations:     Discharge Recommendations: Low Intensity Therapy  Discharge Equipment Recommendations: none  Barriers to discharge: None    Assessment:     Adriana Zaragoza is a 59 y.o. female admitted with a medical diagnosis of Panniculitis.  She presents with the following impairments/functional limitations: weakness, impaired endurance, impaired self care skills, impaired functional mobility, gait instability, impaired cardiopulmonary response to activity, impaired skin, edema .    Rehab Prognosis: Fair; patient would benefit from acute skilled PT services to address these deficits and reach maximum level of function.    Recent Surgery: * No surgery found *      Plan:     During this hospitalization, patient to be seen 5 x/week to address the identified rehab impairments via gait training, therapeutic activities, therapeutic exercises and progress toward the following goals:    Plan of Care Expires:       Subjective     Chief Complaint: Panis impairing her gait  Patient/Family Comments/goals: Pt is excited to meet with the plastic surgeon to hopefully have panis removed.   Pain/Comfort:  Pain Rating 1: 0/10  Pain Rating Post-Intervention 1: 0/10      Objective:     Communicated with RN prior to session.  Patient found up in chair with telemetry, peripheral IV upon PT entry to room.     General Precautions: Standard, fall  Orthopedic Precautions:    Braces:    Respiratory Status: Room air     Functional Mobility:  Transfers:     Sit to Stand:  stand by assistance with rolling walker  Gait: 60,40, and 120' SBA with rw      AM-PAC 6 CLICK MOBILITY          Treatment & Education:  Pt was educated on the following: call light use, importance of OOB activity and functional mobility to negate the negative effects of prolonged bed rest during this hospitalization, safe transfers/ambulation and discharge planning  recommendations/options.     Patient left up in chair with all lines intact and call button in reach..    GOALS:   Multidisciplinary Problems       Physical Therapy Goals          Problem: Physical Therapy    Goal Priority Disciplines Outcome Interventions   Physical Therapy Goal     PT, PT/OT Progressing    Description: 1. Supine to sit with Stand-by Assistance  2. Sit to stand transfer with Stand-by Assistance  3.. Bed to chair transfer with Supervision using Rolling Walker  4. Gait  x 100 feet with Minimal Assistance using Rolling Walker.                             Time Tracking:     PT Received On: 01/30/25  PT Start Time: 1037     PT Stop Time: 1100  PT Total Time (min): 23 min     Billable Minutes: Gait Training 23    Treatment Type: Treatment  PT/PTA: PTA     Number of PTA visits since last PT visit: 1 01/30/2025

## 2025-01-30 NOTE — PLAN OF CARE
Problem: Adult Inpatient Plan of Care  Goal: Plan of Care Review  Outcome: Adequate for Care Transition  Goal: Patient-Specific Goal (Individualized)  Outcome: Adequate for Care Transition  Goal: Absence of Hospital-Acquired Illness or Injury  Outcome: Adequate for Care Transition  Goal: Optimal Comfort and Wellbeing  Outcome: Adequate for Care Transition  Goal: Readiness for Transition of Care  Outcome: Adequate for Care Transition     Problem: Bariatric Environmental Safety  Goal: Safety Maintained with Care  Outcome: Adequate for Care Transition     Problem: Sepsis/Septic Shock  Goal: Optimal Coping  Outcome: Adequate for Care Transition  Goal: Absence of Bleeding  Outcome: Adequate for Care Transition  Goal: Blood Glucose Level Within Targeted Range  Outcome: Adequate for Care Transition  Goal: Absence of Infection Signs and Symptoms  Outcome: Adequate for Care Transition  Goal: Optimal Nutrition Intake  Outcome: Adequate for Care Transition     Problem: Wound  Goal: Optimal Coping  Outcome: Adequate for Care Transition  Goal: Optimal Functional Ability  Outcome: Adequate for Care Transition  Goal: Absence of Infection Signs and Symptoms  Outcome: Adequate for Care Transition  Goal: Improved Oral Intake  Outcome: Adequate for Care Transition  Goal: Optimal Pain Control and Function  Outcome: Adequate for Care Transition  Goal: Skin Health and Integrity  Outcome: Adequate for Care Transition  Goal: Optimal Wound Healing  Outcome: Adequate for Care Transition     Problem: Skin Injury Risk Increased  Goal: Skin Health and Integrity  Outcome: Adequate for Care Transition     Problem: Infection  Goal: Absence of Infection Signs and Symptoms  Outcome: Adequate for Care Transition

## 2025-01-30 NOTE — PLAN OF CARE
Problem: Adult Inpatient Plan of Care  Goal: Plan of Care Review  Outcome: Progressing  Goal: Patient-Specific Goal (Individualized)  Outcome: Progressing  Goal: Absence of Hospital-Acquired Illness or Injury  Outcome: Progressing  Goal: Optimal Comfort and Wellbeing  Outcome: Progressing  Goal: Readiness for Transition of Care  Outcome: Progressing     Problem: Bariatric Environmental Safety  Goal: Safety Maintained with Care  Outcome: Progressing     Problem: Sepsis/Septic Shock  Goal: Optimal Coping  Outcome: Progressing  Goal: Absence of Bleeding  Outcome: Progressing  Goal: Blood Glucose Level Within Targeted Range  Outcome: Progressing  Goal: Absence of Infection Signs and Symptoms  Outcome: Progressing  Goal: Optimal Nutrition Intake  Outcome: Progressing     Problem: Wound  Goal: Optimal Coping  Outcome: Progressing  Goal: Optimal Functional Ability  Outcome: Progressing  Goal: Absence of Infection Signs and Symptoms  Outcome: Progressing  Goal: Improved Oral Intake  Outcome: Progressing  Goal: Optimal Pain Control and Function  Outcome: Progressing  Goal: Skin Health and Integrity  Outcome: Progressing  Goal: Optimal Wound Healing  Outcome: Progressing     Problem: Skin Injury Risk Increased  Goal: Skin Health and Integrity  Outcome: Progressing     Problem: Infection  Goal: Absence of Infection Signs and Symptoms  Outcome: Progressing

## 2025-01-30 NOTE — PLAN OF CARE
New order received for resumption of home health and new home iv abx infusion.  Referral sent to Leidy Phillips and beatriz via epic.       01/30/25 1250   Post-Acute Status   Post-Acute Authorization Home Health;IV Infusion   Home Health Status Referrals Sent   IV Infusion Status Referral(s) sent

## 2025-01-30 NOTE — PLAN OF CARE
Patient cleared for discharge by case management to home with home health once 3:00 pm dose of IV antibiotics are given.    Patient states she has a ride home via private vehicle.       01/30/25 1427   Final Note   Assessment Type Final Discharge Note   Anticipated Discharge Disposition Home-Health   Hospital Resources/Appts/Education Provided Appointments scheduled and added to AVS

## 2025-01-30 NOTE — DISCHARGE SUMMARY
[facility] Medicine  Discharge Summary      Patient Name: Adriana Zaragoza  MRN: [id number]  MINI: [id number]  Patient Class: IP- Inpatient  Admission Date: 1/26/2025  Hospital Length of Stay: 4 days  Discharge Date and Time:  01/30/2025 3:41 PM  Attending Physician: Ruth Gannon DO   Discharging Provider: Nanci Loaiza PA-C  Primary Care Provider: Melba Trivedi MD    Primary Care Team: Networked reference to record PCT     HPI:   59 year old female with comorbid conditions of KEESHA on CPAP, COPD on 2lpm home oxygen, h/o DVT, recurrent panniculitis presents due to suspected right leg panniculitis.  Patient has area of right leg that has been infected repeatedly.  Reports pain in same area of leg for 1 day duration.  Associated with fatigue, nausea, cough, headache.  Denies fevers, chills.    Last admission for panniculitis patient had to be intubated due to sepsis. Was treated with rocephin/vanco while inpatient and was discharged on 2 weeks of PO ciprofloxacin and linezolid on 1/3/25. Subsequently presented to [facility] due to influenza on 1/15 - completed tamiflu.  Patient is following with plastic surgeon about removing affected area once infection resolves.   Labs revealed WBC of 15.98.  Patient is febrile to 102 in ED.Was given acetaminophen, dose of ceftriaxone and linezolid, zofran and morphine.     * No surgery found *      Hospital Course:   Patient was admitted to the hospital with panniculitis.  Patient was monitored closely throughout hospital stay.  Infectious Disease was consulted on admission.  Blood cultures were obtained on admission and remained in process with no growth to date.  Patient was started on IV antibiotics which were adjusted per ID recommendations.  PT/OT were consulted.  CRP was trended throughout the patient was hospital stay.  Patient was noted to have leukocytosis on admission which normalized.  Ultrasound soft tissue right lower extremity was  obtained which did not demonstrate abscess.  Midline was placed.  Patient was seen on day of discharge.  Patient was cleared for discharge by ID.  Patient was to follow up with PCP, plastic surgery and Infectious Disease.  Home health was ordered at discharge.  Patient was complete course of daptomycin with ED of 2/10 per ID recommendations.  Patient was will complete this at infusions at [facility] infusion center.  Home health to draw weekly CBC, CMP and CRP and biweekly CPK per ID recommendations and with results to sent to Dr. Humphreys with Infectious Disease.  Return precautions discussed and patient was voiced understanding.  All questions answered.     Goals of Care Treatment Preferences:  Code Status: Full Code      SDOH Screening:  The patient was screened for food insecurity, housing instability, transportation needs, utility difficulties, and interpersonal safety. The social determinant(s) of health identified as a concern this admission are:  Food insecurity  Utility difficulties    Will discuss with case management and/or community health workers.    Social Drivers of Health with Concerns     Food Insecurity: Food Insecurity Present (1/27/2025)   Utilities: At Risk (1/27/2025)        Consults:   Consults (From admission, onward)          Status Ordering Provider     Inpatient consult to Social Work/Case Management  Once        Provider:  (Not yet assigned)    Acknowledged ELSI GRANADO     Inpatient consult to Midline team  Once        Provider:  (Not yet assigned)    Completed ELSI GRANADO     Inpatient consult to Infectious Diseases  Once        Provider:  Caitlyn Gill MD    Completed MYA SALDIVAR            * Panniculitis  Patient presents with suspected recurrence of panniculitis of right thigh.    Continue Datpomycin  ID consulted   PRN pain medications  Follow cultures  Fluconazole added  Midline ordered        Vaginal candidiasis  Acute:   -id following: Appreciate recommendations   -continue  Diflucan      COPD (chronic obstructive pulmonary disease)  Patient's COPD is controlled currently.  Patient is currently off COPD Pathway. Continue scheduled inhalers Supplemental oxygen and monitor respiratory status closely.  Duoneb PRN    KEESHA (obstructive sleep apnea)  Continue NIPPV at night      Final Active Diagnoses:    Diagnosis Date Noted POA    PRINCIPAL PROBLEM:  Panniculitis [M79.3] 10/22/2022 Yes    Vaginal candidiasis [B37.31] 01/28/2025 Yes    Tobacco dependency [F17.200] 01/27/2025 Unknown    KEESHA (obstructive sleep apnea) [G47.33] 06/14/2023 Yes    COPD (chronic obstructive pulmonary disease) [J44.9] 01/05/2016 Yes      Problems Resolved During this Admission:    Diagnosis Date Noted Date Resolved POA    Dependence on nicotine from cigarettes [F17.210] 04/02/2024 01/26/2025 Yes       Discharged Condition: good    Disposition: Home-Health Care [facility]    Follow Up:   Contact information for follow-up providers       Maehler, Jewel A., FNP. Go on 2/6/2025.    Specialty: Family Medicine  Why: Hospital follow up scheduled at 2:00 PM  at Discharge Clinic  Contact information:  [address] [facility]  [address]  [place] [address]  [phone]               Rajendra Callahan MD Follow up on 2/18/2025.    Specialty: Infectious Diseases  Why: @ 10:00 am  Contact information:  [address]  [place] [address]  [phone]                       Contact information for after-discharge care       Home Medical Care       [facility] .    Service: Home Health Services  Why: home health will contact patient regarding visit schedule.  Contact information:  [address] [place] [address] [id number] [phone]                                 Patient Instructions:      Ambulatory referral/consult to Outpatient Case Management   Referral Priority: Routine Referral Type: Consultation   Referral Reason: Specialty Services Required   Number of Visits Requested: 1     Ambulatory referral/consult to Home Health    Standing Status: Future   Referral Priority: Routine Referral Type: Home Health   Referral Reason: Specialty Services Required   Requested Specialty: Home Health Services   Number of Visits Requested: 1     SUBSEQUENT HOME HEALTH ORDERS   Order Comments: Resume home health with the addition of home iv infusion labs and dressing changes.     Order Specific Question Answer Comments   What Home Health Agency is the patient currently using? Other/External      Activity as tolerated       Significant Diagnostic Studies: Labs: CMP   Recent Labs   Lab 01/29/25  0528 01/30/25  0846   * 137   K 4.0 4.4    100   CO2 29 30*    127*   BUN 11 11   CREATININE 0.5 0.6   CALCIUM 8.8 9.0   ANIONGAP 4* 7*    and CBC   Recent Labs   Lab 01/29/25  0528 01/30/25  0825   WBC 9.52 8.32   HGB 9.9* 9.8*   HCT 32.3* 32.0*    333       Pending Diagnostic Studies:       None           Medications:  Reconciled Home Medications:      Medication List        START taking these medications      0.9% NaCl SolP 50 mL with DAPTOmycin 500 mg SolR 749 mg  Inject 749 mg into the vein once daily. for 11 days            CONTINUE taking these medications      benzonatate 100 MG capsule  Commonly known as: TESSALON  Take 1 capsule (100 mg total) by mouth 3 (three) times daily as needed for Cough.     cetirizine 10 MG tablet  Commonly known as: ZYRTEC  Take 1 tablet (10 mg total) by mouth daily as needed for Allergies or Rhinitis.     fluticasone propionate 50 mcg/actuation nasal spray  Commonly known as: FLONASE  1 spray (50 mcg total) by Each Nostril route 2 (two) times daily as needed for Rhinitis or Allergies.     hydrOXYzine HCL 25 MG tablet  Commonly known as: ATARAX  Take 25 mg by mouth 3 (three) times daily as needed for Anxiety.     oxyCODONE-acetaminophen 5-325 mg per tablet  Commonly known as: PERCOCET  Take 1 tablet by mouth every 6 (six) hours as needed for Pain.     promethazine-dextromethorphan 6.25-15 mg/5 mL  Syrp  Commonly known as: PROMETHAZINE-DM  Take 5 mLs by mouth every 4 (four) hours as needed (cough/congestion).     TYLENOL EXTRA STRENGTH 500 MG tablet  Generic drug: acetaminophen  Take 1,000 mg by mouth every 6 (six) hours as needed for Pain.              Indwelling Lines/Drains at time of discharge:   Lines/Drains/Airways       None                   Time spent on the discharge of patient: 35 minutes         Nanci Loaiza PA-C  Department of Hospital Medicine  [facility]

## 2025-01-30 NOTE — PLAN OF CARE
[facility]   Department of Infectious Disease    PATIENT NAME: Adriana Zaragoza  MRN: [id number]  TODAY'S DATE: 01/30/2025  ADMIT DATE: 1/26/2025  LENGTH OF STAY: 4 DAYS  PROJECTED DISCHARGE DATE: 01/30/2025      OUTPATIENT PARENTERAL ANTIBIOTIC THERAPY PLAN:       Case Management: Please send referral to Home Health and/or Home Infusion Agencies     1) Diagnosis:  Panniculitis/cellulitis of right leg     2) Discharge Antibiotics:  IV antibiotics: Daptomycin (8 mg/kg adjusted weight = 790 mg but round to )750mg IV every 24 hours   Oral Antibiotics: None    3) Therapy Duration:    Estimated end date of IV antibiotics: Last dose February 10th    4) Intravenous Access:  Midline placed on: 1/29/2025  Routine Midline care for duration of antibiotic therapy with weekly dressing changes per protocol  Remove PICC/midline at completion of antibiotics after last dose on Feb 10th    5) Outpatient Weekly Labs for duration of antibiotic therapy:  Order the following labs to be drawn on Mondays:  CBC with Diff, CMP, and CRP   Order the following labs to be drawn on Mondays and Thursdays:  CPK    6) Fax Lab Results to Infectious Diseases Provider if not done at [facility] facility:   [facility] Fax Number: [phone]     7) Outpatient Infectious Diseases Follow-up:   Follow-up appointment will be arranged by the ID clinic and will be found in the patient's appointments tab.  Prior to discharge, please ensure the patient's follow-up has been scheduled.    If there is still no follow-up scheduled prior to discharge, please send an EPIC message to Valentine Gomez in [facility].      8) Miscellaneous Orders:           Ambika Valle NP 01/30/2025  12:55 PM  [facility]    This note was created using [employer]  voice recognition software that occasionally misinterpreted phrases or words.

## 2025-01-30 NOTE — PROGRESS NOTES
[facility]   Department of Infectious Disease  Progress Note        PATIENT NAME: Adriana Zaragoza  MRN: [id number]  TODAY'S DATE: 01/30/2025  ADMIT DATE: 1/26/2025  LENGTH OF STAY: 4 DAYS      CHIEF COMPLAINT: Leg Pain (She states that her growth on her leg may be getting infected.)    PRINCIPLE PROBLEM: Panniculitis    REASON FOR CONSULT: recurrent panniculititis     INTERVAL HISTORY     1/28/2025@1000 (Denjulián): Patient is lying in her bed on BiPAP.  She states she is having severe burning pain in her groin area and thighs.  She feels like she has a yeast infection.  Appetite is fair, she denies nausea vomiting,  no abdominal pain, no specific dysuria, no headaches or dizziness, denies wheezing or shortness a breath.  Using BiPAP for sleep.  T-max 99° in the last 24 hours.  Leukocytosis improved with WBC 10.37, no left shift, H&H 10.3/34.1, no bands, creatinine 0.6 and estimated cr creatinine clearance 156, procalcitonin 0.216, total CK 37, CRP 10.4.   1/26 blood cultures no growth to date.  X-ray with improved patchy airspace opacities.  Redness, swelling and tenderness to right thigh improved though her perineal area is erythematous and  appears inflamed.    1/29/2025@1118 (Tori): Patient is lying in bed awake and alert.  She has just received Nataliya care and her perineal area is looking much better, the large right leg appendage with lessened redness but still indurated and tender.  She states the burning in the groin region is much improved.  She denies fevers or chills, states she is eating and drinking better, less nausea, she has not occasional cough and feels like she needs to cough something up but does not get it up.  She continues to use BiPAP intermittently otherwise appears comfortable on room air.  T-max 98.7° in the last 24 hours.  WBC much improved at 9.52, platelets 251, H&H 9.9/32.3, left shift resolved, creatinine 0.5, CRP 17 elevated from yesterday, procalcitonin 0.095.   Last total CK 37.  An ultrasound of soft tissue was ordered that showed soft tissue edema swelling and skin thickening over the right thigh with possible lymphedema or cellulitis with no abscess.    1/30/2025@1210 (Denette): She is sitting up in chair eating lunch.  She is feeling much better today.  She denies fevers or chills, nausea or vomiting, rash in groin area much improved with no more discomfort.  T-max 98.1°   In the last 24 hours.  WBC 8.32, platelets 333, H&H 9.8/32.0, creatinine 0.6, CRP improved at 13.60, total CK 25.  1/26 blood cultures no growth to date.She is going to get daptomycin at infusion clinic daily   With lab work and dressing changes.    Antibiotics (From admission, onward)      Start     Stop Route Frequency Ordered    01/29/25 1800  DAPTOmycin (CUBICIN) 750 mg in 0.9% NaCl SolP 50 mL IVPB         -- IV Every 24 hours (non-standard times) 01/29/25 1508           Antifungals (From admission, onward)      Start     Stop Route Frequency Ordered    01/28/25 0900  fluconazole tablet 200 mg         -- Oral Daily 01/27/25 2115           Antivirals (From admission, onward)      None                ASSESSMENT and PLAN     1.  Sepsis with recurrent  right thigh panniculitis/cellulitis with chronic lymphedema  -Leukocytosis WBC 15.98-->15.01-->10.37  -CRP 10.40-->17, Procalcitonin 0.216  -  Ultrasound negative for abscess    2. Morbidly obese with BMI 62.82      3.  Asthma/KEESHA      RECOMMENDATIONS:  Continue daptomycin (8 mg/kg adjusted weight = 790 mg but round to )750mg IV every 24 hours   Twice weekly Cks while on daptomycin  Routine Midline Care  See OPAT    Communication sent to Case Management    D/W Dr Humphreys    Infectious Disease will sign off. Please send Epic secure chat with any questions.       HPI      Adriana Zaragoza is a 59 y.o. female with chronic medical problems include severe obesity, asthma/COPD on BiPAP at home, diabetes, depression and a history of recurrent cellulitis  who presented to the emergency room on 01/26 complaining of right thigh redness.  She was recently hospitalized from 12/26 through 1/3 with fever and hypoxia and was intubated in in ICU.  She was on linezolid and ceftriaxone for right thigh cellulitis.  Oxygenation improved and she was discharged on ciprofloxacin and linezolid for 2 weeks.  She said that the redness resolved and the large lump on her thigh actually got soft and smaller.  She said she has seen a plastic surgeon who is willing to remove the area on her thigh that gets the recurrent cellulitis.  She also came to the emergency room on January 15th with influenza.  On presentation to the emergency room she had a fever of 102 but she denied having fevers and chills to us at home.  Chest x-ray with bilateral interstitial and patchy airspace opacities improved from previous chest x-ray on the 15th.  Leukocytosis with WBC 15.01, platelets 282, left shift 76% neutrophils, H&H 11.4/36.8, creatinine 0.8, CRP 10.4, total CK 37, procalcitonin 0.216, urinalysis with no signs of infection.  She was started on linezolid and ceftriaxone.  At time of exam she is sitting up in bed resting on BiPAP and takes it off to talk with us.  She denies any current fevers or chills, denies significant cough.  States she has been eating and drinking fairly well.      Outdoor activities: Lives at home with her boyfriend  Travel:  none  Implants:   none  Antibiotic history:   see HPI    Social History  Marital Status: Significant Other  Alcohol History:  reports no history of alcohol use.  Tobacco History:  reports that she has been smoking cigarettes. She started smoking about 6 years ago. She has a 6.1 pack-year smoking history. She has never used smokeless tobacco.  Drug History:  reports no history of drug use.    Review of patient's allergies indicates:   Allergen Reactions    Fentanyl Other (See Comments)     Hypoxemia  Muscle twitching    Aspirin     Nicoderm     Nsaids  (non-steroidal anti-inflammatory drug) Hives    Teflaro [ceftaroline fosamil]      Allergic reaction/ hives/itching     SUBJECTIVE     Review of Systems  Review of systems obtained and negative except as stated above in Interval History     OBJECTIVE     Temp:  [97.4 °F (36.3 °C)-98.1 °F (36.7 °C)] 98 °F (36.7 °C)  Pulse:  [75-96] 84  Resp:  [17-20] 18  SpO2:  [94 %-100 %] 94 %  BP: ()/(57-87) 116/70  Temp:  [97.4 °F (36.3 °C)-98.1 °F (36.7 °C)]   Temp: 98 °F (36.7 °C) (01/30/25 1154)  Pulse: 84 (01/30/25 1154)  Resp: 18 (01/30/25 0857)  BP: 116/70 (01/30/25 1154)  SpO2: (!) 94 % (01/30/25 1154)    Intake/Output Summary (Last 24 hours) at 1/30/2025 1248  Last data filed at 1/30/2025 1228  Gross per 24 hour   Intake 720 ml   Output 300 ml   Net 420 ml     Physical Exam  General: Awake alert, pleasant and conversant,   Sitting up in chair.  Eyes: Eyes with no icterus or injection. Vision grossly normal  Ears: Hearing grossly normal.  Nose: Nares patent  Mouth: Moist mucous membranes, dentition is  fair. No ulcerations, erythema or exudates.  Cardiovascular: Regular rate and rhythm, no murmurs, no edema.    Respiratory:   Bilateral breath sounds are rhonchorous anteriorly, occasional moist sounding cough but no sputum production, no tachypnea or increased work of breathing.  On room air.    Gastrointestinal:  Soft and obese with active bowel sounds, no tenderness to palpation, no distention.    Musculoskeletal:  Moves all extremities with equal strength.    Skin:  Pale, warm and dry, no obvious rashes. Right thigh with large  appendage much improved erythema, warmth, slowly improving induration.  Neuro: Oriented, conversant, follows commands.  Psych: Good mood, normal affect.  VAD:   Midline  Isolation: None     Wounds  1/29/2025 1/27/2025            Significant Labs: All pertinent labs within the past 24 hours have been reviewed.    CBC LAST 7 DAYS  Recent Labs   Lab 01/26/25 1942 01/27/25  0539  "01/28/25  0712 01/29/25  0528 01/30/25  0825   WBC 15.98* 15.01* 10.37 9.52 8.32   RBC 4.51 4.30 3.87* 3.71* 3.70*   HGB 12.0 11.4* 10.3* 9.9* 9.8*   HCT 38.1 36.8* 34.1* 32.3* 32.0*   MCV 85 86 88 87 87   MCH 26.6* 26.5* 26.6* 26.7* 26.5*   MCHC 31.5* 31.0* 30.2* 30.7* 30.6*   RDW 16.5* 16.8* 16.7* 16.1* 16.1*    282 244 251 333   MPV 9.0* 8.9* 8.8* 9.0* 9.2   GRAN 86.3*  13.8* 76.3*  11.5* 68.8  7.1 71.1  6.8 69.2  5.8   LYMPH 8.5*  1.4 14.0*  2.1 17.8*  1.9 16.9*  1.6 20.0  1.7   MONO 4.4  0.7 8.8  1.3* 12.2  1.3* 10.1  1.0 8.4  0.7   BASO 0.02 0.04 0.02 0.04 0.04   NRBC 0 0 0 0 0       CHEMISTRY LAST 7 DAYS  Recent Labs   Lab 01/26/25  1942 01/27/25  0546 01/28/25  0712 01/29/25  0528 01/30/25  0846    134* 134* 134* 137   K 3.9 3.9 4.0 4.0 4.4    100 101 101 100   CO2 27 27 28 29 30*   ANIONGAP 9 7* 5* 4* 7*   BUN 12 18 15 11 11   CREATININE 0.6 0.8 0.6 0.5 0.6   * 128* 107 108 127*   CALCIUM 9.3 9.0 8.8 8.8 9.0   MG  --  1.9 2.1 1.9  --    ALBUMIN 3.9  --   --   --   --    PROT 7.3  --   --   --   --    ALKPHOS 64  --   --   --   --    ALT 13  --   --   --   --    AST 12  --   --   --   --    BILITOT 0.7  --   --   --   --        Estimated Creatinine Clearance: 156.1 mL/min (based on SCr of 0.6 mg/dL).    INFLAMMATORY/PROCAL  LAST 7 DAYS  Recent Labs   Lab 01/27/25  0546 01/27/25  0944 01/29/25  0528 01/30/25  0846   PROCAL  --  0.216 0.095  --    CRP 10.40*  --  17.00* 13.60*     No results found for: "ESR"  CRP   Date Value Ref Range Status   01/30/2025 13.60 (H) <1.00 mg/dL Final     Comment:     CRP-Normal Application expected values:   <1.0        mg/dL   Normal Range  1.0 - 5.0  mg/dL   Indicates mild inflammation  5.0 - 10.0 mg/dL   Indicates severe inflammation  >10.0        mg/dL   Represents serious processes and   frequently         indicates the presence of a bacterial   infection.      01/29/2025 17.00 (H) <1.00 mg/dL Final     Comment:     CRP-Normal " Application expected values:   <1.0        mg/dL   Normal Range  1.0 - 5.0  mg/dL   Indicates mild inflammation  5.0 - 10.0 mg/dL   Indicates severe inflammation  >10.0        mg/dL   Represents serious processes and   frequently         indicates the presence of a bacterial   infection.      01/27/2025 10.40 (H) <1.00 mg/dL Final     Comment:     CRP-Normal Application expected values:   <1.0        mg/dL   Normal Range  1.0 - 5.0  mg/dL   Indicates mild inflammation  5.0 - 10.0 mg/dL   Indicates severe inflammation  >10.0        mg/dL   Represents serious processes and   frequently         indicates the presence of a bacterial   infection.      09/25/2024 1.30 (H) <1.00 mg/dL Final     Comment:     CRP-Normal Application expected values:   <1.0        mg/dL   Normal Range  1.0 - 5.0  mg/dL   Indicates mild inflammation  5.0 - 10.0 mg/dL   Indicates severe inflammation  >10.0        mg/dL   Represents serious processes and   frequently         indicates the presence of a bacterial   infection.      08/28/2024 10.60 (H) <1.00 mg/dL Final     Comment:     CRP-Normal Application expected values:   <1.0        mg/dL   Normal Range  1.0 - 5.0  mg/dL   Indicates mild inflammation  5.0 - 10.0 mg/dL   Indicates severe inflammation  >10.0        mg/dL   Represents serious processes and   frequently         indicates the presence of a bacterial   infection.      08/26/2024 8.00 (H) <1.00 mg/dL Final     Comment:     CRP-Normal Application expected values:   <1.0        mg/dL   Normal Range  1.0 - 5.0  mg/dL   Indicates mild inflammation  5.0 - 10.0 mg/dL   Indicates severe inflammation  >10.0        mg/dL   Represents serious processes and   frequently         indicates the presence of a bacterial   infection.      02/14/2024 5.0 0.0 - 8.2 mg/L Final       PRIOR MICROBIOLOGY:  Susceptibility data from last 90 days.  Collected Specimen Info Organism   01/15/25 Blood from Peripheral, Antecubital, Right No growth after 5 days.    01/15/25 Blood from Peripheral, Forearm, Left No growth after 5 days.   12/26/24 Blood from Peripheral, Hand, Left No growth after 5 days.   12/26/24 Blood from Peripheral, Hand, Right No growth after 5 days.       LAST 7 DAYS MICROBIOLOGY   Microbiology Results (last 7 days)       Procedure Component Value Units Date/Time    Blood culture x two cultures. Draw prior to antibiotics. [[id number]] Collected: 01/26/25 1855    Order Status: Completed Specimen: Blood Updated: 01/29/25 2232     Blood Culture, Routine No Growth to date      No Growth to date      No Growth to date      No Growth to date    Narrative:      Aerobic and anaerobic  Collection has been rescheduled by MYB at 01/26/2025 18:39 Reason:   Patient unavailable/pt with ultrasound  Collection has been rescheduled by MYB at 01/26/2025 18:39 Reason:   Patient unavailable/pt with ultrasound    Blood culture x two cultures. Draw prior to antibiotics. [[id number]] Collected: 01/26/25 1902    Order Status: Completed Specimen: Blood Updated: 01/29/25 2232     Blood Culture, Routine No Growth to date      No Growth to date      No Growth to date      No Growth to date    Narrative:      Aerobic and anaerobic  Collection has been rescheduled by MYB at 01/26/2025 18:39 Reason:   Patient unavailable/pt with ultrasound  Collection has been rescheduled by MYB at 01/26/2025 18:39 Reason:   Patient unavailable/pt with ultrasound              CURRENT/PREVIOUS VISIT EKG  Results for orders placed or performed during the hospital encounter of 01/26/25   EKG 12-lead    Collection Time: 01/26/25  8:01 PM   Result Value Ref Range    QRS Duration 80 ms    [facility] QTC Calculation 427 ms    Narrative    Test Reason : Z13.6,    Vent. Rate :  94 BPM     Atrial Rate :  94 BPM     P-R Int : 172 ms          QRS Dur :  80 ms      QT Int : 342 ms       P-R-T Axes :  26  70  40 degrees    QTcB Int : 427 ms    Normal sinus rhythm  Low voltage QRS  Borderline Abnormal ECG  When compared  with ECG of 15-Choco-2025 20:00,  Minimal criteria for Anterior infarct are no longer Present  Nonspecific T wave abnormality no longer evident in Lateral leads    Referred By: AAAREFERRAL SELF           Confirmed By:          Significant Imaging: I have reviewed all relevant and available imaging results/findings within the past 24 hours.      I spent a total of 55 minutes on the day of the visit.This includes face to face time and non-face to face time preparing to see the patient (eg, review of tests), obtaining and/or reviewing separately obtained history, documenting clinical information in the electronic or other health record, independently interpreting results and communicating results to the patient/family/caregiver, or care coordinator.      Ambika Valle NP  Date of Service: 01/30/2025      This note was created using Piiku  voice recognition software that occasionally misinterpreted phrases or words.

## 2025-01-30 NOTE — HOSPITAL COURSE
Patient was admitted to the hospital with panniculitis.  Patient was monitored closely throughout hospital stay.  Infectious Disease was consulted on admission.  Blood cultures were obtained on admission and remained in process with no growth to date.  Patient was started on IV antibiotics which were adjusted per ID recommendations.  PT/OT were consulted.  CRP was trended throughout the patient was hospital stay.  Patient was noted to have leukocytosis on admission which normalized.  Ultrasound soft tissue right lower extremity was obtained which did not demonstrate abscess.  Midline was placed.  Patient was seen on day of discharge.  Patient was cleared for discharge by ID.  Patient was to follow up with PCP, plastic surgery and Infectious Disease.  Home health was ordered at discharge.  Patient was complete course of daptomycin with ED of 2/10 per ID recommendations.  Patient was will complete this at infusions at [facility] infusion center.  Home health to draw weekly CBC, CMP and CRP and biweekly CPK per ID recommendations and with results to sent to Dr. Humphreys with Infectious Disease.  Return precautions discussed and patient was voiced understanding.  All questions answered.

## 2025-01-30 NOTE — PLAN OF CARE
Per Prieto with [employer], patient's copay for each dose of Daptomycin would cost over $1000. Spoke to patient regarding the copay and she states she is unable to pay that and would like to get her infusions at [facility] infusion center instead. Notified Dr. Humphreys who is agreeable to this plan. Secure chat sent to [facility] infusion to notify of new treatment plan.        01/30/25 1427   Post-Acute Status   Post-Acute Authorization IV Infusion   IV Infusion Status Patient declined/refused

## 2025-01-30 NOTE — PLAN OF CARE
Per Epic, Leidy Phillips has accepted patient for resumption of home health.       01/30/25 1255   Post-Acute Status   Post-Acute Authorization Home Health   Home Health Status Set-up Complete/Auth obtained

## 2025-01-31 ENCOUNTER — TELEPHONE (OUTPATIENT)
Dept: PHARMACY | Facility: CLINIC | Age: 60
End: 2025-01-31
Payer: MEDICARE

## 2025-01-31 ENCOUNTER — PATIENT OUTREACH (OUTPATIENT)
Dept: ADMINISTRATIVE | Facility: OTHER | Age: 60
End: 2025-01-31
Payer: MEDICARE

## 2025-01-31 ENCOUNTER — INFUSION (OUTPATIENT)
Dept: INFUSION THERAPY | Facility: HOSPITAL | Age: 60
End: 2025-01-31
Payer: MEDICARE

## 2025-01-31 ENCOUNTER — PATIENT MESSAGE (OUTPATIENT)
Dept: ADMINISTRATIVE | Facility: OTHER | Age: 60
End: 2025-01-31
Payer: MEDICARE

## 2025-01-31 VITALS
TEMPERATURE: 98 F | BODY MASS INDEX: 50.02 KG/M2 | DIASTOLIC BLOOD PRESSURE: 59 MMHG | SYSTOLIC BLOOD PRESSURE: 126 MMHG | OXYGEN SATURATION: 100 % | HEART RATE: 73 BPM | HEIGHT: 64 IN | RESPIRATION RATE: 16 BRPM | WEIGHT: 293 LBS

## 2025-01-31 DIAGNOSIS — M79.3 PANNICULITIS: ICD-10-CM

## 2025-01-31 DIAGNOSIS — L03.115 CELLULITIS OF RIGHT LEG: Primary | ICD-10-CM

## 2025-01-31 LAB
BACTERIA BLD CULT: NORMAL
BACTERIA BLD CULT: NORMAL
OHS QRS DURATION: 80 MS
OHS QTC CALCULATION: 427 MS

## 2025-01-31 PROCEDURE — 25000003 PHARM REV CODE 250: Performed by: NURSE PRACTITIONER

## 2025-01-31 PROCEDURE — 63600175 PHARM REV CODE 636 W HCPCS: Performed by: NURSE PRACTITIONER

## 2025-01-31 PROCEDURE — 96365 THER/PROPH/DIAG IV INF INIT: CPT

## 2025-01-31 PROCEDURE — A4216 STERILE WATER/SALINE, 10 ML: HCPCS | Performed by: NURSE PRACTITIONER

## 2025-01-31 RX ORDER — SODIUM CHLORIDE 0.9 % (FLUSH) 0.9 %
10 SYRINGE (ML) INJECTION
Status: DISCONTINUED | OUTPATIENT
Start: 2025-01-31 | End: 2025-01-31 | Stop reason: HOSPADM

## 2025-01-31 RX ORDER — HEPARIN 100 UNIT/ML
500 SYRINGE INTRAVENOUS
Status: CANCELLED | OUTPATIENT
Start: 2025-02-03

## 2025-01-31 RX ORDER — SODIUM CHLORIDE 0.9 % (FLUSH) 0.9 %
10 SYRINGE (ML) INJECTION
Status: CANCELLED | OUTPATIENT
Start: 2025-02-03

## 2025-01-31 RX ORDER — HEPARIN 100 UNIT/ML
500 SYRINGE INTRAVENOUS
Status: DISCONTINUED | OUTPATIENT
Start: 2025-01-31 | End: 2025-01-31 | Stop reason: HOSPADM

## 2025-01-31 RX ADMIN — DAPTOMYCIN 850 MG: 350 INJECTION, POWDER, LYOPHILIZED, FOR SOLUTION INTRAVENOUS at 02:01

## 2025-01-31 RX ADMIN — Medication 10 ML: at 03:01

## 2025-01-31 RX ADMIN — Medication 10 ML: at 02:01

## 2025-01-31 NOTE — PROGRESS NOTES
CHW - Initial Contact    This Community Health Worker completed OR updated the Social Determinant of Health questionnaire with patient via telephone today.    Pt identified barriers of most importance are: food resources, financial assistance     Support and Services:   [place] income is about $2000 a month via long-term disability and SSD. Has 2 roommates and rent/utilities are split. Asked for assistance with medical bills & pharmacy costs. She stated she is 2 months behind on utilities, pays what she can on extensions. She voiced she receives help once a year from [employer] with electric bill. I asked if she considered renting elsewhere cheaper; she voiced she likes her place now and it is locally convenient for her.    Sent her portal msg with attachments for resources and FA application.       Other information discussed the patient needs / wants help with: pharmacy assistance, rental options   Follow up required:   Follow-up Outreach - Due: 3/6/2025

## 2025-01-31 NOTE — TELEPHONE ENCOUNTER
We have reviewed Ms. Zaragoza current medication list and/or insurance status. Unfortunately, The Pharmacy Patient Assistance Team is unable to assist at this time due to the following reasons      There are no Manufacture or Co-pay Savings Programs available for requested medication.                 Traci Kapoor  Pharmacy Patient Assistance Team

## 2025-01-31 NOTE — LETTER
January 31, 2025    Adriana Zaragoza  [facility] [facility] [address]             Dear Ms. Zaragoza,      My name is Traci Kapoor.  I am reaching out on behalf of [facility]s Pharmacy Patient Assistance Team after receiving a referral from your Provider inquiring about assistance with your medication.  We have reviewed your current medication list and/or insurance status. Unfortunately, The Pharmacy Patient Assistance Team is unable to assist at this time due to the following reasons      There are no Manufacture or Co-pay Savings Programs available for requested medication.               Sincerely,   Traci PINEDA @[phone]  Pharmacy Patient Assistance  [address] [place] [address]  [address] [place] [id number]  Fax: [phone]  Email: [email or web address]

## 2025-01-31 NOTE — TELEPHONE ENCOUNTER
----- Message from JESSICA ANDERSON sent at 1/31/2025 12:59 PM CST -----  Hi,     Please contact patient and screen for any co-pays, rebates or coupons to cover the cost of medication prescribed by [facility] providers. The medications of concern are:     oxyCODONE-acetaminophen (PERCOCET) 5-325 mg per tablet    Jessica Riggs, JD, [facility]   Community Healthcare Worker   Outpatient Case Management   Office: [phone]

## 2025-02-01 ENCOUNTER — INFUSION (OUTPATIENT)
Dept: INFUSION THERAPY | Facility: HOSPITAL | Age: 60
End: 2025-02-01
Attending: STUDENT IN AN ORGANIZED HEALTH CARE EDUCATION/TRAINING PROGRAM
Payer: MEDICARE

## 2025-02-01 VITALS
HEART RATE: 75 BPM | HEIGHT: 64 IN | DIASTOLIC BLOOD PRESSURE: 61 MMHG | SYSTOLIC BLOOD PRESSURE: 132 MMHG | RESPIRATION RATE: 16 BRPM | BODY MASS INDEX: 50.02 KG/M2 | OXYGEN SATURATION: 100 % | WEIGHT: 293 LBS | TEMPERATURE: 98 F

## 2025-02-01 DIAGNOSIS — M79.3 PANNICULITIS: ICD-10-CM

## 2025-02-01 DIAGNOSIS — L03.115 CELLULITIS OF RIGHT LEG: Primary | ICD-10-CM

## 2025-02-01 PROCEDURE — 63600175 PHARM REV CODE 636 W HCPCS: Performed by: STUDENT IN AN ORGANIZED HEALTH CARE EDUCATION/TRAINING PROGRAM

## 2025-02-01 PROCEDURE — 25000003 PHARM REV CODE 250: Performed by: STUDENT IN AN ORGANIZED HEALTH CARE EDUCATION/TRAINING PROGRAM

## 2025-02-01 RX ORDER — DAPTOMYCIN 50 MG/ML
850 INJECTION, POWDER, LYOPHILIZED, FOR SOLUTION INTRAVENOUS DAILY
Status: DISCONTINUED | OUTPATIENT
Start: 2025-02-01 | End: 2025-02-01

## 2025-02-01 RX ORDER — DAPTOMYCIN 50 MG/ML
850 INJECTION, POWDER, LYOPHILIZED, FOR SOLUTION INTRAVENOUS DAILY
Status: CANCELLED
Start: 2025-02-02

## 2025-02-01 RX ORDER — DAPTOMYCIN 50 MG/ML
850 INJECTION, POWDER, LYOPHILIZED, FOR SOLUTION INTRAVENOUS DAILY
Status: CANCELLED
Start: 2025-02-01

## 2025-02-01 RX ADMIN — DAPTOMYCIN 850 MG: 350 INJECTION, POWDER, LYOPHILIZED, FOR SOLUTION INTRAVENOUS at 08:02

## 2025-02-01 NOTE — PLAN OF CARE
Problem: Adult Inpatient Plan of Care  Goal: Optimal Comfort and Wellbeing  Outcome: Progressing  Intervention: Provide Person-Centered Care  Flowsheets (Taken 2/1/2025 6879)  Trust Relationship/Rapport:   care explained   thoughts/feelings acknowledged   choices provided   emotional support provided   empathic listening provided   questions answered   questions encouraged   reassurance provided

## 2025-02-02 ENCOUNTER — TELEPHONE (OUTPATIENT)
Dept: INFECTIOUS DISEASES | Facility: HOSPITAL | Age: 60
End: 2025-02-02

## 2025-02-02 DIAGNOSIS — M79.3 PANNICULITIS: Primary | ICD-10-CM

## 2025-02-03 ENCOUNTER — LAB VISIT (OUTPATIENT)
Dept: LAB | Facility: HOSPITAL | Age: 60
End: 2025-02-03
Attending: STUDENT IN AN ORGANIZED HEALTH CARE EDUCATION/TRAINING PROGRAM
Payer: MEDICARE

## 2025-02-03 ENCOUNTER — CLINICAL SUPPORT (OUTPATIENT)
Dept: NURSING | Facility: HOSPITAL | Age: 60
End: 2025-02-03
Attending: STUDENT IN AN ORGANIZED HEALTH CARE EDUCATION/TRAINING PROGRAM
Payer: MEDICARE

## 2025-02-03 ENCOUNTER — INFUSION (OUTPATIENT)
Dept: INFUSION THERAPY | Facility: HOSPITAL | Age: 60
End: 2025-02-03
Attending: STUDENT IN AN ORGANIZED HEALTH CARE EDUCATION/TRAINING PROGRAM
Payer: MEDICARE

## 2025-02-03 VITALS
RESPIRATION RATE: 19 BRPM | TEMPERATURE: 98 F | BODY MASS INDEX: 50.02 KG/M2 | DIASTOLIC BLOOD PRESSURE: 71 MMHG | OXYGEN SATURATION: 100 % | HEIGHT: 64 IN | SYSTOLIC BLOOD PRESSURE: 173 MMHG | WEIGHT: 293 LBS | HEART RATE: 78 BPM

## 2025-02-03 DIAGNOSIS — M79.3 PANNICULITIS: ICD-10-CM

## 2025-02-03 DIAGNOSIS — L03.115 CELLULITIS OF RIGHT LEG: Primary | ICD-10-CM

## 2025-02-03 LAB
ALBUMIN SERPL BCP-MCNC: 3 G/DL (ref 3.5–5.2)
ALP SERPL-CCNC: 106 U/L (ref 40–150)
ALT SERPL W/O P-5'-P-CCNC: 35 U/L (ref 10–44)
ANION GAP SERPL CALC-SCNC: 8 MMOL/L (ref 8–16)
AST SERPL-CCNC: 22 U/L (ref 10–40)
BASOPHILS # BLD AUTO: 0.06 K/UL (ref 0–0.2)
BASOPHILS NFR BLD: 0.7 % (ref 0–1.9)
BILIRUB SERPL-MCNC: 0.3 MG/DL (ref 0.1–1)
BUN SERPL-MCNC: 11 MG/DL (ref 6–20)
CALCIUM SERPL-MCNC: 9.2 MG/DL (ref 8.7–10.5)
CHLORIDE SERPL-SCNC: 105 MMOL/L (ref 95–110)
CK SERPL-CCNC: 35 U/L (ref 20–180)
CO2 SERPL-SCNC: 28 MMOL/L (ref 23–29)
CREAT SERPL-MCNC: 0.7 MG/DL (ref 0.5–1.4)
CRP SERPL-MCNC: 18.2 MG/L (ref 0–8.2)
DIFFERENTIAL METHOD BLD: ABNORMAL
EOSINOPHIL # BLD AUTO: 0.2 K/UL (ref 0–0.5)
EOSINOPHIL NFR BLD: 1.7 % (ref 0–8)
ERYTHROCYTE [DISTWIDTH] IN BLOOD BY AUTOMATED COUNT: 15.8 % (ref 11.5–14.5)
EST. GFR  (NO RACE VARIABLE): >60 ML/MIN/1.73 M^2
GLUCOSE SERPL-MCNC: 152 MG/DL (ref 70–110)
HCT VFR BLD AUTO: 34.3 % (ref 37–48.5)
HGB BLD-MCNC: 10.4 G/DL (ref 12–16)
IMM GRANULOCYTES # BLD AUTO: 0.06 K/UL (ref 0–0.04)
IMM GRANULOCYTES NFR BLD AUTO: 0.7 % (ref 0–0.5)
LYMPHOCYTES # BLD AUTO: 2.1 K/UL (ref 1–4.8)
LYMPHOCYTES NFR BLD: 24.5 % (ref 18–48)
MCH RBC QN AUTO: 26.5 PG (ref 27–31)
MCHC RBC AUTO-ENTMCNC: 30.3 G/DL (ref 32–36)
MCV RBC AUTO: 88 FL (ref 82–98)
MONOCYTES # BLD AUTO: 0.6 K/UL (ref 0.3–1)
MONOCYTES NFR BLD: 7.1 % (ref 4–15)
NEUTROPHILS # BLD AUTO: 5.7 K/UL (ref 1.8–7.7)
NEUTROPHILS NFR BLD: 65.3 % (ref 38–73)
NRBC BLD-RTO: 0 /100 WBC
PLATELET # BLD AUTO: 418 K/UL (ref 150–450)
PMV BLD AUTO: 8.6 FL (ref 9.2–12.9)
POTASSIUM SERPL-SCNC: 4.1 MMOL/L (ref 3.5–5.1)
PROT SERPL-MCNC: 7.5 G/DL (ref 6–8.4)
RBC # BLD AUTO: 3.92 M/UL (ref 4–5.4)
SODIUM SERPL-SCNC: 141 MMOL/L (ref 136–145)
WBC # BLD AUTO: 8.69 K/UL (ref 3.9–12.7)

## 2025-02-03 PROCEDURE — C1751 CATH, INF, PER/CENT/MIDLINE: HCPCS

## 2025-02-03 PROCEDURE — 76937 US GUIDE VASCULAR ACCESS: CPT

## 2025-02-03 PROCEDURE — 80053 COMPREHEN METABOLIC PANEL: CPT | Performed by: STUDENT IN AN ORGANIZED HEALTH CARE EDUCATION/TRAINING PROGRAM

## 2025-02-03 PROCEDURE — 36415 COLL VENOUS BLD VENIPUNCTURE: CPT | Performed by: STUDENT IN AN ORGANIZED HEALTH CARE EDUCATION/TRAINING PROGRAM

## 2025-02-03 PROCEDURE — 36410 VNPNXR 3YR/> PHY/QHP DX/THER: CPT

## 2025-02-03 PROCEDURE — 82550 ASSAY OF CK (CPK): CPT | Performed by: STUDENT IN AN ORGANIZED HEALTH CARE EDUCATION/TRAINING PROGRAM

## 2025-02-03 PROCEDURE — 63600175 PHARM REV CODE 636 W HCPCS: Performed by: STUDENT IN AN ORGANIZED HEALTH CARE EDUCATION/TRAINING PROGRAM

## 2025-02-03 PROCEDURE — 96365 THER/PROPH/DIAG IV INF INIT: CPT

## 2025-02-03 PROCEDURE — 85025 COMPLETE CBC W/AUTO DIFF WBC: CPT | Performed by: STUDENT IN AN ORGANIZED HEALTH CARE EDUCATION/TRAINING PROGRAM

## 2025-02-03 PROCEDURE — 86140 C-REACTIVE PROTEIN: CPT | Performed by: STUDENT IN AN ORGANIZED HEALTH CARE EDUCATION/TRAINING PROGRAM

## 2025-02-03 PROCEDURE — 25000003 PHARM REV CODE 250: Performed by: STUDENT IN AN ORGANIZED HEALTH CARE EDUCATION/TRAINING PROGRAM

## 2025-02-03 RX ORDER — DAPTOMYCIN 50 MG/ML
850 INJECTION, POWDER, LYOPHILIZED, FOR SOLUTION INTRAVENOUS DAILY
Status: CANCELLED
Start: 2025-02-04

## 2025-02-03 RX ORDER — DAPTOMYCIN 50 MG/ML
850 INJECTION, POWDER, LYOPHILIZED, FOR SOLUTION INTRAVENOUS DAILY
Status: DISCONTINUED | OUTPATIENT
Start: 2025-02-03 | End: 2025-02-03 | Stop reason: SDUPTHER

## 2025-02-03 RX ADMIN — DAPTOMYCIN 850 MG: 350 INJECTION, POWDER, LYOPHILIZED, FOR SOLUTION INTRAVENOUS at 08:02

## 2025-02-03 NOTE — PLAN OF CARE
Problem: Adult Inpatient Plan of Care  Goal: Optimal Comfort and Wellbeing  Outcome: Progressing  Intervention: Provide Person-Centered Care  Flowsheets (Taken 2/3/2025 7552)  Trust Relationship/Rapport:   care explained   choices provided   emotional support provided   empathic listening provided   questions answered   questions encouraged   reassurance provided   thoughts/feelings acknowledged

## 2025-02-03 NOTE — NURSING
18 gauge x 10cm Midline placed to patient's left cephalic vein with the use of ultrasound guidance.     Ultrasound guidance: yes  Vessel Caliber: large and patent, compressibility normal  Needle advanced into vessel with real time Ultrasound guidance.  Guidewire confirmed in vessel.  Image recorded and saved.  Sterile sheath used.  Sterile dressing applied  Arm circumference- 37cm  Dressing dated

## 2025-02-03 NOTE — PROGRESS NOTES
2/3 Left message. Called to see how infusion went. Instructed to call if any concerns to Infusion department.

## 2025-02-04 ENCOUNTER — INFUSION (OUTPATIENT)
Dept: INFUSION THERAPY | Facility: HOSPITAL | Age: 60
End: 2025-02-04
Attending: STUDENT IN AN ORGANIZED HEALTH CARE EDUCATION/TRAINING PROGRAM
Payer: MEDICARE

## 2025-02-04 VITALS
HEART RATE: 64 BPM | BODY MASS INDEX: 50.02 KG/M2 | TEMPERATURE: 98 F | DIASTOLIC BLOOD PRESSURE: 68 MMHG | SYSTOLIC BLOOD PRESSURE: 140 MMHG | RESPIRATION RATE: 17 BRPM | WEIGHT: 293 LBS | HEIGHT: 64 IN | OXYGEN SATURATION: 100 %

## 2025-02-04 DIAGNOSIS — M79.3 PANNICULITIS: ICD-10-CM

## 2025-02-04 DIAGNOSIS — L03.115 CELLULITIS OF RIGHT LEG: Primary | ICD-10-CM

## 2025-02-04 PROCEDURE — 25000003 PHARM REV CODE 250: Performed by: STUDENT IN AN ORGANIZED HEALTH CARE EDUCATION/TRAINING PROGRAM

## 2025-02-04 PROCEDURE — 63600175 PHARM REV CODE 636 W HCPCS: Performed by: STUDENT IN AN ORGANIZED HEALTH CARE EDUCATION/TRAINING PROGRAM

## 2025-02-04 PROCEDURE — 96365 THER/PROPH/DIAG IV INF INIT: CPT

## 2025-02-04 RX ORDER — DAPTOMYCIN 50 MG/ML
850 INJECTION, POWDER, LYOPHILIZED, FOR SOLUTION INTRAVENOUS DAILY
Status: CANCELLED
Start: 2025-02-05

## 2025-02-04 RX ORDER — DAPTOMYCIN 50 MG/ML
850 INJECTION, POWDER, LYOPHILIZED, FOR SOLUTION INTRAVENOUS DAILY
Status: DISCONTINUED | OUTPATIENT
Start: 2025-02-04 | End: 2025-02-04

## 2025-02-04 RX ADMIN — DAPTOMYCIN 850 MG: 350 INJECTION, POWDER, LYOPHILIZED, FOR SOLUTION INTRAVENOUS at 08:02

## 2025-02-04 NOTE — PLAN OF CARE
Problem: Adult Inpatient Plan of Care  Goal: Optimal Comfort and Wellbeing  Outcome: Progressing  Intervention: Provide Person-Centered Care  Flowsheets (Taken 2/4/2025 3846)  Trust Relationship/Rapport:   care explained   choices provided   emotional support provided   empathic listening provided   questions answered   questions encouraged   reassurance provided   thoughts/feelings acknowledged

## 2025-02-05 ENCOUNTER — INFUSION (OUTPATIENT)
Dept: INFUSION THERAPY | Facility: HOSPITAL | Age: 60
End: 2025-02-05
Attending: STUDENT IN AN ORGANIZED HEALTH CARE EDUCATION/TRAINING PROGRAM
Payer: MEDICARE

## 2025-02-05 VITALS
OXYGEN SATURATION: 96 % | RESPIRATION RATE: 18 BRPM | DIASTOLIC BLOOD PRESSURE: 68 MMHG | WEIGHT: 293 LBS | BODY MASS INDEX: 50.02 KG/M2 | SYSTOLIC BLOOD PRESSURE: 139 MMHG | HEIGHT: 64 IN | HEART RATE: 62 BPM | TEMPERATURE: 97 F

## 2025-02-05 DIAGNOSIS — M79.3 PANNICULITIS: Primary | ICD-10-CM

## 2025-02-05 DIAGNOSIS — L03.115 CELLULITIS OF RIGHT LEG: ICD-10-CM

## 2025-02-05 PROCEDURE — 96365 THER/PROPH/DIAG IV INF INIT: CPT

## 2025-02-05 PROCEDURE — 25000003 PHARM REV CODE 250: Performed by: STUDENT IN AN ORGANIZED HEALTH CARE EDUCATION/TRAINING PROGRAM

## 2025-02-05 PROCEDURE — A4216 STERILE WATER/SALINE, 10 ML: HCPCS | Performed by: STUDENT IN AN ORGANIZED HEALTH CARE EDUCATION/TRAINING PROGRAM

## 2025-02-05 PROCEDURE — 63600175 PHARM REV CODE 636 W HCPCS: Performed by: STUDENT IN AN ORGANIZED HEALTH CARE EDUCATION/TRAINING PROGRAM

## 2025-02-05 RX ORDER — SODIUM CHLORIDE 0.9 % (FLUSH) 0.9 %
10 SYRINGE (ML) INJECTION
Status: DISCONTINUED | OUTPATIENT
Start: 2025-02-05 | End: 2025-02-18 | Stop reason: ALTCHOICE

## 2025-02-05 RX ORDER — DAPTOMYCIN 50 MG/ML
850 INJECTION, POWDER, LYOPHILIZED, FOR SOLUTION INTRAVENOUS DAILY
Status: DISCONTINUED | OUTPATIENT
Start: 2025-02-05 | End: 2025-02-05

## 2025-02-05 RX ORDER — DAPTOMYCIN 50 MG/ML
850 INJECTION, POWDER, LYOPHILIZED, FOR SOLUTION INTRAVENOUS DAILY
Status: CANCELLED
Start: 2025-02-06

## 2025-02-05 RX ADMIN — Medication 10 ML: at 08:02

## 2025-02-05 RX ADMIN — Medication 10 ML: at 09:02

## 2025-02-05 RX ADMIN — DAPTOMYCIN 850 MG: 350 INJECTION, POWDER, LYOPHILIZED, FOR SOLUTION INTRAVENOUS at 08:02

## 2025-02-05 NOTE — PLAN OF CARE
Problem: Adult Inpatient Plan of Care  Goal: Optimal Comfort and Wellbeing  Outcome: Progressing  Intervention: Provide Person-Centered Care  Flowsheets (Taken 2/5/2025 6813)  Trust Relationship/Rapport:   care explained   choices provided   emotional support provided   empathic listening provided   questions answered   questions encouraged   reassurance provided   thoughts/feelings acknowledged

## 2025-02-06 ENCOUNTER — INFUSION (OUTPATIENT)
Dept: INFUSION THERAPY | Facility: HOSPITAL | Age: 60
End: 2025-02-06
Attending: STUDENT IN AN ORGANIZED HEALTH CARE EDUCATION/TRAINING PROGRAM
Payer: MEDICARE

## 2025-02-06 VITALS
SYSTOLIC BLOOD PRESSURE: 135 MMHG | WEIGHT: 293 LBS | HEART RATE: 68 BPM | OXYGEN SATURATION: 96 % | TEMPERATURE: 98 F | HEIGHT: 64 IN | BODY MASS INDEX: 50.02 KG/M2 | RESPIRATION RATE: 17 BRPM | DIASTOLIC BLOOD PRESSURE: 68 MMHG

## 2025-02-06 DIAGNOSIS — M79.3 PANNICULITIS: ICD-10-CM

## 2025-02-06 DIAGNOSIS — L03.115 CELLULITIS OF RIGHT LEG: Primary | ICD-10-CM

## 2025-02-06 PROCEDURE — 25000003 PHARM REV CODE 250: Performed by: STUDENT IN AN ORGANIZED HEALTH CARE EDUCATION/TRAINING PROGRAM

## 2025-02-06 PROCEDURE — 63600175 PHARM REV CODE 636 W HCPCS: Performed by: STUDENT IN AN ORGANIZED HEALTH CARE EDUCATION/TRAINING PROGRAM

## 2025-02-06 PROCEDURE — 96365 THER/PROPH/DIAG IV INF INIT: CPT

## 2025-02-06 PROCEDURE — A4216 STERILE WATER/SALINE, 10 ML: HCPCS | Performed by: STUDENT IN AN ORGANIZED HEALTH CARE EDUCATION/TRAINING PROGRAM

## 2025-02-06 RX ORDER — DAPTOMYCIN 50 MG/ML
850 INJECTION, POWDER, LYOPHILIZED, FOR SOLUTION INTRAVENOUS DAILY
Status: DISCONTINUED | OUTPATIENT
Start: 2025-02-06 | End: 2025-02-06 | Stop reason: SDUPTHER

## 2025-02-06 RX ORDER — SODIUM CHLORIDE 0.9 % (FLUSH) 0.9 %
10 SYRINGE (ML) INJECTION
Status: DISCONTINUED | OUTPATIENT
Start: 2025-02-06 | End: 2025-02-18 | Stop reason: ALTCHOICE

## 2025-02-06 RX ORDER — DAPTOMYCIN 50 MG/ML
850 INJECTION, POWDER, LYOPHILIZED, FOR SOLUTION INTRAVENOUS DAILY
Status: CANCELLED
Start: 2025-02-07

## 2025-02-06 RX ADMIN — Medication 10 ML: at 08:02

## 2025-02-06 RX ADMIN — DAPTOMYCIN 850 MG: 350 INJECTION, POWDER, LYOPHILIZED, FOR SOLUTION INTRAVENOUS at 08:02

## 2025-02-06 RX ADMIN — Medication 10 ML: at 09:02

## 2025-02-06 NOTE — PLAN OF CARE
0830 Pt here for infusion tx, placed to chair, vss.  0935 Therapy plan administered. Tolerated well. D/c instructions given including s/s of reaction and to notify administering MD or go to ER. D/c'd to home with eulalia.

## 2025-02-06 NOTE — PLAN OF CARE
Problem: Adult Inpatient Plan of Care  Goal: Optimal Comfort and Wellbeing  Outcome: Progressing  Intervention: Monitor Pain and Promote Comfort  Flowsheets (Taken 2/6/2025 0843)  Pain Management Interventions:   quiet environment facilitated   relaxation techniques promoted   warm blanket provided  Intervention: Provide Person-Centered Care  Flowsheets (Taken 2/6/2025 0843)  Trust Relationship/Rapport:   care explained   choices provided   emotional support provided   empathic listening provided   questions answered   questions encouraged   reassurance provided   thoughts/feelings acknowledged

## 2025-02-07 ENCOUNTER — INFUSION (OUTPATIENT)
Dept: INFUSION THERAPY | Facility: HOSPITAL | Age: 60
End: 2025-02-07
Attending: STUDENT IN AN ORGANIZED HEALTH CARE EDUCATION/TRAINING PROGRAM
Payer: MEDICARE

## 2025-02-07 VITALS
SYSTOLIC BLOOD PRESSURE: 136 MMHG | WEIGHT: 293 LBS | HEART RATE: 64 BPM | DIASTOLIC BLOOD PRESSURE: 64 MMHG | HEIGHT: 64 IN | TEMPERATURE: 98 F | OXYGEN SATURATION: 95 % | RESPIRATION RATE: 16 BRPM | BODY MASS INDEX: 50.02 KG/M2

## 2025-02-07 DIAGNOSIS — L03.115 CELLULITIS OF RIGHT LEG: Primary | ICD-10-CM

## 2025-02-07 DIAGNOSIS — M79.3 PANNICULITIS: ICD-10-CM

## 2025-02-07 PROCEDURE — 25000003 PHARM REV CODE 250: Performed by: STUDENT IN AN ORGANIZED HEALTH CARE EDUCATION/TRAINING PROGRAM

## 2025-02-07 PROCEDURE — 63600175 PHARM REV CODE 636 W HCPCS: Performed by: STUDENT IN AN ORGANIZED HEALTH CARE EDUCATION/TRAINING PROGRAM

## 2025-02-07 PROCEDURE — 96365 THER/PROPH/DIAG IV INF INIT: CPT

## 2025-02-07 PROCEDURE — A4216 STERILE WATER/SALINE, 10 ML: HCPCS | Performed by: STUDENT IN AN ORGANIZED HEALTH CARE EDUCATION/TRAINING PROGRAM

## 2025-02-07 RX ORDER — DAPTOMYCIN 50 MG/ML
850 INJECTION, POWDER, LYOPHILIZED, FOR SOLUTION INTRAVENOUS DAILY
Status: DISCONTINUED | OUTPATIENT
Start: 2025-02-07 | End: 2025-02-07 | Stop reason: SDUPTHER

## 2025-02-07 RX ORDER — DAPTOMYCIN 50 MG/ML
850 INJECTION, POWDER, LYOPHILIZED, FOR SOLUTION INTRAVENOUS DAILY
Status: CANCELLED
Start: 2025-02-08

## 2025-02-07 RX ADMIN — Medication 10 ML: at 08:02

## 2025-02-07 RX ADMIN — DAPTOMYCIN 850 MG: 350 INJECTION, POWDER, LYOPHILIZED, FOR SOLUTION INTRAVENOUS at 08:02

## 2025-02-08 ENCOUNTER — INFUSION (OUTPATIENT)
Dept: INFUSION THERAPY | Facility: HOSPITAL | Age: 60
End: 2025-02-08
Attending: STUDENT IN AN ORGANIZED HEALTH CARE EDUCATION/TRAINING PROGRAM
Payer: MEDICARE

## 2025-02-08 VITALS
DIASTOLIC BLOOD PRESSURE: 58 MMHG | RESPIRATION RATE: 17 BRPM | HEIGHT: 64 IN | WEIGHT: 293 LBS | SYSTOLIC BLOOD PRESSURE: 120 MMHG | HEART RATE: 69 BPM | TEMPERATURE: 97 F | BODY MASS INDEX: 50.02 KG/M2 | OXYGEN SATURATION: 98 %

## 2025-02-08 DIAGNOSIS — M79.3 PANNICULITIS: ICD-10-CM

## 2025-02-08 DIAGNOSIS — L03.115 CELLULITIS OF RIGHT LEG: Primary | ICD-10-CM

## 2025-02-08 PROCEDURE — 63600175 PHARM REV CODE 636 W HCPCS: Performed by: STUDENT IN AN ORGANIZED HEALTH CARE EDUCATION/TRAINING PROGRAM

## 2025-02-08 PROCEDURE — 96365 THER/PROPH/DIAG IV INF INIT: CPT

## 2025-02-08 RX ORDER — DAPTOMYCIN 50 MG/ML
850 INJECTION, POWDER, LYOPHILIZED, FOR SOLUTION INTRAVENOUS DAILY
Status: CANCELLED
Start: 2025-02-09

## 2025-02-08 RX ORDER — DAPTOMYCIN 50 MG/ML
850 INJECTION, POWDER, LYOPHILIZED, FOR SOLUTION INTRAVENOUS DAILY
Status: DISCONTINUED | OUTPATIENT
Start: 2025-02-08 | End: 2025-02-08 | Stop reason: HOSPADM

## 2025-02-08 RX ADMIN — DAPTOMYCIN 850 MG: 350 INJECTION, POWDER, LYOPHILIZED, FOR SOLUTION INTRAVENOUS at 08:02

## 2025-02-08 NOTE — PLAN OF CARE
Problem: Adult Inpatient Plan of Care  Goal: Optimal Comfort and Wellbeing  Outcome: Progressing  Intervention: Provide Person-Centered Care  Flowsheets (Taken 2/8/2025 7416)  Trust Relationship/Rapport:   care explained   choices provided   emotional support provided   empathic listening provided   questions answered   questions encouraged   reassurance provided   thoughts/feelings acknowledged

## 2025-02-09 ENCOUNTER — INFUSION (OUTPATIENT)
Dept: INFUSION THERAPY | Facility: HOSPITAL | Age: 60
End: 2025-02-09
Attending: STUDENT IN AN ORGANIZED HEALTH CARE EDUCATION/TRAINING PROGRAM
Payer: MEDICARE

## 2025-02-09 VITALS
HEIGHT: 64 IN | TEMPERATURE: 98 F | WEIGHT: 293 LBS | HEART RATE: 76 BPM | DIASTOLIC BLOOD PRESSURE: 65 MMHG | BODY MASS INDEX: 50.02 KG/M2 | SYSTOLIC BLOOD PRESSURE: 145 MMHG | RESPIRATION RATE: 18 BRPM

## 2025-02-09 DIAGNOSIS — M79.3 PANNICULITIS: ICD-10-CM

## 2025-02-09 DIAGNOSIS — L03.115 CELLULITIS OF RIGHT LEG: Primary | ICD-10-CM

## 2025-02-09 PROCEDURE — 25000003 PHARM REV CODE 250: Performed by: STUDENT IN AN ORGANIZED HEALTH CARE EDUCATION/TRAINING PROGRAM

## 2025-02-09 PROCEDURE — 63600175 PHARM REV CODE 636 W HCPCS: Performed by: STUDENT IN AN ORGANIZED HEALTH CARE EDUCATION/TRAINING PROGRAM

## 2025-02-09 PROCEDURE — 96374 THER/PROPH/DIAG INJ IV PUSH: CPT

## 2025-02-09 RX ORDER — DAPTOMYCIN 50 MG/ML
850 INJECTION, POWDER, LYOPHILIZED, FOR SOLUTION INTRAVENOUS DAILY
Status: DISCONTINUED | OUTPATIENT
Start: 2025-02-09 | End: 2025-02-09

## 2025-02-09 RX ORDER — DAPTOMYCIN 50 MG/ML
850 INJECTION, POWDER, LYOPHILIZED, FOR SOLUTION INTRAVENOUS DAILY
Status: CANCELLED
Start: 2025-02-10

## 2025-02-09 RX ADMIN — DAPTOMYCIN 850 MG: 350 INJECTION, POWDER, LYOPHILIZED, FOR SOLUTION INTRAVENOUS at 08:02

## 2025-02-10 ENCOUNTER — INFUSION (OUTPATIENT)
Dept: INFUSION THERAPY | Facility: HOSPITAL | Age: 60
End: 2025-02-10
Attending: STUDENT IN AN ORGANIZED HEALTH CARE EDUCATION/TRAINING PROGRAM
Payer: MEDICARE

## 2025-02-10 ENCOUNTER — LAB VISIT (OUTPATIENT)
Dept: LAB | Facility: HOSPITAL | Age: 60
End: 2025-02-10
Attending: STUDENT IN AN ORGANIZED HEALTH CARE EDUCATION/TRAINING PROGRAM
Payer: MEDICARE

## 2025-02-10 VITALS
OXYGEN SATURATION: 96 % | HEIGHT: 64 IN | WEIGHT: 293 LBS | HEART RATE: 64 BPM | SYSTOLIC BLOOD PRESSURE: 138 MMHG | RESPIRATION RATE: 17 BRPM | TEMPERATURE: 98 F | DIASTOLIC BLOOD PRESSURE: 62 MMHG | BODY MASS INDEX: 50.02 KG/M2

## 2025-02-10 DIAGNOSIS — M79.3 PANNICULITIS: ICD-10-CM

## 2025-02-10 DIAGNOSIS — A41.9 SEVERE SEPSIS: ICD-10-CM

## 2025-02-10 DIAGNOSIS — L03.115 CELLULITIS OF RIGHT LEG: Primary | ICD-10-CM

## 2025-02-10 DIAGNOSIS — L03.115 CELLULITIS OF RIGHT LEG: ICD-10-CM

## 2025-02-10 DIAGNOSIS — R65.20 SEVERE SEPSIS: ICD-10-CM

## 2025-02-10 LAB
ALBUMIN SERPL BCP-MCNC: 3.5 G/DL (ref 3.5–5.2)
ALP SERPL-CCNC: 85 U/L (ref 40–150)
ALT SERPL W/O P-5'-P-CCNC: 31 U/L (ref 10–44)
ANION GAP SERPL CALC-SCNC: 9 MMOL/L (ref 8–16)
AST SERPL-CCNC: 19 U/L (ref 10–40)
BASOPHILS # BLD AUTO: 0.07 K/UL (ref 0–0.2)
BASOPHILS NFR BLD: 0.6 % (ref 0–1.9)
BILIRUB SERPL-MCNC: 0.3 MG/DL (ref 0.1–1)
BUN SERPL-MCNC: 17 MG/DL (ref 6–20)
CALCIUM SERPL-MCNC: 9.5 MG/DL (ref 8.7–10.5)
CHLORIDE SERPL-SCNC: 103 MMOL/L (ref 95–110)
CK SERPL-CCNC: 74 U/L (ref 20–180)
CO2 SERPL-SCNC: 26 MMOL/L (ref 23–29)
CREAT SERPL-MCNC: 0.7 MG/DL (ref 0.5–1.4)
CRP SERPL-MCNC: 4.2 MG/L (ref 0–8.2)
DIFFERENTIAL METHOD BLD: ABNORMAL
EOSINOPHIL # BLD AUTO: 0.2 K/UL (ref 0–0.5)
EOSINOPHIL NFR BLD: 1.8 % (ref 0–8)
ERYTHROCYTE [DISTWIDTH] IN BLOOD BY AUTOMATED COUNT: 16.3 % (ref 11.5–14.5)
ERYTHROCYTE [SEDIMENTATION RATE] IN BLOOD BY WESTERGREN METHOD: 82 MM/HR (ref 0–20)
EST. GFR  (NO RACE VARIABLE): >60 ML/MIN/1.73 M^2
GLUCOSE SERPL-MCNC: 104 MG/DL (ref 70–110)
HCT VFR BLD AUTO: 37.4 % (ref 37–48.5)
HGB BLD-MCNC: 11.2 G/DL (ref 12–16)
IMM GRANULOCYTES # BLD AUTO: 0.08 K/UL (ref 0–0.04)
IMM GRANULOCYTES NFR BLD AUTO: 0.7 % (ref 0–0.5)
LYMPHOCYTES # BLD AUTO: 3.2 K/UL (ref 1–4.8)
LYMPHOCYTES NFR BLD: 28 % (ref 18–48)
MCH RBC QN AUTO: 26 PG (ref 27–31)
MCHC RBC AUTO-ENTMCNC: 29.9 G/DL (ref 32–36)
MCV RBC AUTO: 87 FL (ref 82–98)
MONOCYTES # BLD AUTO: 0.8 K/UL (ref 0.3–1)
MONOCYTES NFR BLD: 6.9 % (ref 4–15)
NEUTROPHILS # BLD AUTO: 7.1 K/UL (ref 1.8–7.7)
NEUTROPHILS NFR BLD: 62 % (ref 38–73)
NRBC BLD-RTO: 0 /100 WBC
PLATELET # BLD AUTO: 455 K/UL (ref 150–450)
PMV BLD AUTO: 8.5 FL (ref 9.2–12.9)
POTASSIUM SERPL-SCNC: 4.4 MMOL/L (ref 3.5–5.1)
PROT SERPL-MCNC: 8.1 G/DL (ref 6–8.4)
RBC # BLD AUTO: 4.31 M/UL (ref 4–5.4)
SODIUM SERPL-SCNC: 138 MMOL/L (ref 136–145)
WBC # BLD AUTO: 11.48 K/UL (ref 3.9–12.7)

## 2025-02-10 PROCEDURE — 80053 COMPREHEN METABOLIC PANEL: CPT | Performed by: STUDENT IN AN ORGANIZED HEALTH CARE EDUCATION/TRAINING PROGRAM

## 2025-02-10 PROCEDURE — 82550 ASSAY OF CK (CPK): CPT | Performed by: STUDENT IN AN ORGANIZED HEALTH CARE EDUCATION/TRAINING PROGRAM

## 2025-02-10 PROCEDURE — 36415 COLL VENOUS BLD VENIPUNCTURE: CPT | Performed by: STUDENT IN AN ORGANIZED HEALTH CARE EDUCATION/TRAINING PROGRAM

## 2025-02-10 PROCEDURE — 25000003 PHARM REV CODE 250: Performed by: STUDENT IN AN ORGANIZED HEALTH CARE EDUCATION/TRAINING PROGRAM

## 2025-02-10 PROCEDURE — 85651 RBC SED RATE NONAUTOMATED: CPT | Performed by: STUDENT IN AN ORGANIZED HEALTH CARE EDUCATION/TRAINING PROGRAM

## 2025-02-10 PROCEDURE — 96365 THER/PROPH/DIAG IV INF INIT: CPT

## 2025-02-10 PROCEDURE — 85025 COMPLETE CBC W/AUTO DIFF WBC: CPT | Performed by: STUDENT IN AN ORGANIZED HEALTH CARE EDUCATION/TRAINING PROGRAM

## 2025-02-10 PROCEDURE — 63600175 PHARM REV CODE 636 W HCPCS: Performed by: STUDENT IN AN ORGANIZED HEALTH CARE EDUCATION/TRAINING PROGRAM

## 2025-02-10 PROCEDURE — 86140 C-REACTIVE PROTEIN: CPT | Performed by: STUDENT IN AN ORGANIZED HEALTH CARE EDUCATION/TRAINING PROGRAM

## 2025-02-10 RX ORDER — DAPTOMYCIN 50 MG/ML
850 INJECTION, POWDER, LYOPHILIZED, FOR SOLUTION INTRAVENOUS DAILY
Status: DISCONTINUED | OUTPATIENT
Start: 2025-02-10 | End: 2025-02-10 | Stop reason: SDUPTHER

## 2025-02-10 RX ORDER — DAPTOMYCIN 50 MG/ML
850 INJECTION, POWDER, LYOPHILIZED, FOR SOLUTION INTRAVENOUS DAILY
Status: CANCELLED
Start: 2025-02-10

## 2025-02-10 RX ADMIN — DAPTOMYCIN 850 MG: 350 INJECTION, POWDER, LYOPHILIZED, FOR SOLUTION INTRAVENOUS at 08:02

## 2025-02-10 NOTE — PLAN OF CARE
Problem: Adult Inpatient Plan of Care  Goal: Optimal Comfort and Wellbeing  Outcome: Progressing  Intervention: Provide Person-Centered Care  Flowsheets (Taken 2/10/2025 6265)  Trust Relationship/Rapport:   care explained   choices provided   emotional support provided   empathic listening provided   questions answered   questions encouraged   reassurance provided   thoughts/feelings acknowledged

## 2025-02-18 ENCOUNTER — OFFICE VISIT (OUTPATIENT)
Dept: INFECTIOUS DISEASES | Facility: CLINIC | Age: 60
End: 2025-02-18
Payer: MEDICARE

## 2025-02-18 VITALS
HEIGHT: 64 IN | HEART RATE: 90 BPM | TEMPERATURE: 98 F | DIASTOLIC BLOOD PRESSURE: 74 MMHG | OXYGEN SATURATION: 97 % | WEIGHT: 293 LBS | SYSTOLIC BLOOD PRESSURE: 118 MMHG | BODY MASS INDEX: 50.02 KG/M2

## 2025-02-18 DIAGNOSIS — M79.3 PANNICULITIS: Primary | ICD-10-CM

## 2025-02-18 DIAGNOSIS — G47.33 OSA (OBSTRUCTIVE SLEEP APNEA): ICD-10-CM

## 2025-02-18 DIAGNOSIS — R22.41 MASS OF RIGHT THIGH: ICD-10-CM

## 2025-02-18 PROCEDURE — 99999 PR PBB SHADOW E&M-EST. PATIENT-LVL III: CPT | Mod: PBBFAC,,, | Performed by: INTERNAL MEDICINE

## 2025-02-18 NOTE — PROGRESS NOTES
Subjective:      HPI  She has a history of extreme obesity, COPD and depression.  Known to me from previous encounter during hospitalization for panniculitis.  Presents to the emergency room 12/20/2024 generalized weakness of acute onset.  BP in the /59, pulse 96, respiratory 18, temperature 101.9°, oxygen saturation 94%.  She quickly decompensated and was intubated in the ER.       WBC 18, hematocrit 38, MCV 87, platelet 279, sodium 137, creatinine 0.6, LFTs normal.  UA unremarkable.  CT right showed known medial panniculus with findings concerning for cellulitis.  CT chest abdomen and pelvis with no pneumoniae or other acute findings.  CT head with no acute abnormality.  She was admitted to the ICU on antibiotics.     Antibiotic history:   Vancomycin:  12/26/2024-  Metronidazole: 02/20/2024 x1 dose   Ertapenem:  12/22/2024-  Aztreonam: 12/26/2024 x1 dose     Microbiology:    Blood culture 12/20/2024:  NGTD    01/23/2025:  here for follow up.  Recently hospitalized with recurrent cellulitis of right thigh pannus.  Improved and was discharged home.  States she developed flu and was back in ER again and treated with Tamiflu she is getting better.  States the right thigh swelling significantly reduced because she has been laying down from the flu.  Her home health has sent in referral for her to be reassessed by her plastic surgeon Dr. Trina Costello.    02/18/2025:  She was hospitalized again after last visit.  Discharged on daptomycin.  She has improved significantly.  Edema of right thigh pannus much better.  She has been reassess by her plastic surgeon and is scheduled for excisional resection of the pannus in March.    Outdoor activities:  She does not smoke.  ADL independent.  Travel:  No recent travel  Implants:  None  Antibiotic history:  As above    Past Medical History:   Diagnosis Date    Allergy     Anemia     Asthma     COPD (chronic obstructive pulmonary disease)     Deep vein thrombosis      Depression     Diabetes mellitus, type 2     Encounter for blood transfusion     Heart murmur     Neuromuscular disorder        Past Surgical History:   Procedure Laterality Date     SECTION      DILATION AND CURETTAGE OF UTERUS      gastric sleeve      [facility]    TONSILLECTOMY         Family History   Problem Relation Name Age of Onset    Heart disease Mother      Diabetes Mother      Arthritis Mother      Depression Mother      Cancer Father      COPD Father      Arthritis Maternal Grandmother      Cancer Maternal Grandmother      Cancer Maternal Grandfather      Cancer Paternal Grandmother      Kidney disease Paternal Grandmother      Diabetes Paternal Grandmother      Diabetes Paternal Grandfather      Hyperlipidemia Paternal Grandfather         Social History     Socioeconomic History    Marital status: Significant Other   Tobacco Use    Smoking status: Every Day     Current packs/day: 1.00     Average packs/day: 1 pack/day for 6.1 years (6.1 ttl pk-yrs)     Types: Cigarettes     Start date:     Smokeless tobacco: Never   Substance and Sexual Activity    Alcohol use: No    Drug use: No    Sexual activity: Never     Social Drivers of Health     Financial Resource Strain: High Risk (2025)    Overall Financial Resource Strain (CARDIA)     Difficulty of Paying Living Expenses: Hard   Food Insecurity: Food Insecurity Present (2025)    Hunger Vital Sign     Worried About Running Out of Food in the Last Year: Sometimes true     Ran Out of Food in the Last Year: Sometimes true   Transportation Needs: No Transportation Needs (2025)    TRANSPORTATION NEEDS     Transportation : No   Physical Activity: Inactive (2025)    Exercise Vital Sign     Days of Exercise per Week: 0 days     Minutes of Exercise per Session: 0 min   Stress: Stress Concern Present (2025)    [employer] [place] [employer] - Occupational Stress Questionnaire     Feeling of Stress : To some extent   Housing  Stability: Unknown (1/27/2025)    Housing Stability Vital Sign     Unable to Pay for Housing in the Last Year: No     Homeless in the Last Year: No       Review of patient's allergies indicates:   Allergen Reactions    Fentanyl Other (See Comments)     Hypoxemia  Muscle twitching    Aspirin     Nicoderm     Nsaids (non-steroidal anti-inflammatory drug) Hives    Teflaro [ceftaroline fosamil]      Allergic reaction/ hives/itching       Current Outpatient Medications   Medication Instructions    acetaminophen (TYLENOL EXTRA STRENGTH) 1,000 mg, Every 6 hours PRN    hydrOXYzine HCL (ATARAX) 25 mg, 3 times daily PRN    oxyCODONE-acetaminophen (PERCOCET) 5-325 mg per tablet 1 tablet, Every 6 hours PRN         Review of Systems   10 system review unremarkable.  As in HPI.    OBJECTIVE          Physical Exam      General:   Obese, looks well.  In no acute distress   CVS: S1 and 2 heard, no murmurs appreciated   Respiratory: Clear to auscultation  Abdomen: Full, soft, nontender, no palpable organomegaly   Right lower extremity:  Lymphedema.  Pedunculated right thigh pannus with very faint erythema.  Edema and induration significantly reduced.  No active cellulitis at this time.  Psych: Good mood, normal affect.     Wounds:  None  VAD:  None  ISOLATION:  Not applicable    PRIOR  MICROBIOLOGY:   reviewed    Susceptibility data from last 90 days.  Collected Specimen Info Organism   01/26/25 Blood No growth after 5 days.   01/26/25 Blood No growth after 5 days.   01/15/25 Blood from Peripheral, Antecubital, Right No growth after 5 days.   01/15/25 Blood from Peripheral, Forearm, Left No growth after 5 days.   12/26/24 Blood from Peripheral, Hand, Left No growth after 5 days.   12/26/24 Blood from Peripheral, Hand, Right No growth after 5 days.       LAST 7 DAYS MICROBIOLOGY   Microbiology Results (last 7 days)       ** No results found for the last 168 hours. **            CURRENT/PREVIOUS VISIT EKG  Results for orders placed or  performed during the hospital encounter of 01/26/25   EKG 12-lead    Collection Time: 01/26/25  8:01 PM   Result Value Ref Range    QRS Duration 80 ms    [facility] QTC Calculation 427 ms    Narrative    Test Reason : Z13.6,    Vent. Rate :  94 BPM     Atrial Rate :  94 BPM     P-R Int : 172 ms          QRS Dur :  80 ms      QT Int : 342 ms       P-R-T Axes :  26  70  40 degrees    QTcB Int : 427 ms    Normal sinus rhythm  Low voltage QRS  Borderline Abnormal ECG  When compared with ECG of 15-Choco-2025 20:00,  Minimal criteria for Anterior infarct are no longer Present  Nonspecific T wave abnormality no longer evident in Lateral leads  Confirmed by Kapil Moran ([id number]) on 1/31/2025 2:54:39 PM    Referred By: AAAREFERRAL SELF           Confirmed By: Kapil Moran       Significant Labs: All pertinent labs within the past 24 hours have been reviewed.     Significant Imaging: I have reviewed all relevant and available imaging results/findings within the past 24 hours.     Right thigh panniculitis.  Completed antibiotics 02/10/2025.  This is the best I have seen her pannus.      Extreme obesity.     3. Large right medial thigh pannus.  She is scheduled for excisional resection in March 2025    I will see again in 6 weeks for follow up.    Rajendra Callahan MD  Date of Service: 02/18/2025  1:34 PM    Total time spent with patient: 30    Each patient to whom he or she provides medical services by telemedicine is:  (1) informed of the relationship between the physician and patient and the respective role of any other health care provider with respect to management of the patient; and (2) notified that he or she may decline to receive medical services by telemedicine and may withdraw from such care at any time.

## 2025-03-06 ENCOUNTER — PATIENT OUTREACH (OUTPATIENT)
Dept: ADMINISTRATIVE | Facility: OTHER | Age: 60
End: 2025-03-06
Payer: MEDICARE

## 2025-03-06 NOTE — PROGRESS NOTES
CHW - Follow Up    This Community Health Worker completed a follow up visit with patient via telephone today.  Pt/Caregiver reported: Had not completed any of the resources sent to her, has been focused on getting all her clearance appts done before sx next week.  Community Health Worker provided: Advised her to work on resources after sx and reach out to me if she has questions or updates.  Follow up required:   No future outreach task assigned

## 2025-03-17 ENCOUNTER — DOCUMENTATION ONLY (OUTPATIENT)
Dept: PSYCHIATRY | Facility: CLINIC | Age: 60
End: 2025-03-17
Payer: MEDICARE

## 2025-03-17 NOTE — PROGRESS NOTES
Outreach Note    3/17/2025    Chart Review:     LAST VISIT 4/2/2024   Cancellations: 5/2/2024   NO-SHOWS: 5/15/2024   Outreach Attempts: 12/4/2024     Future Scheduling:       If pt would like to re-engage in treatment within 3 years from the LAST VISIT (see above), then the next appointment should be in person at the clinic and should also be an extended visit (an hour).            Kunal Olguin DO  Department of Psychiatry, [facility]

## 2025-03-21 ENCOUNTER — TELEPHONE (OUTPATIENT)
Dept: ADMINISTRATIVE | Facility: CLINIC | Age: 60
End: 2025-03-21
Payer: MEDICARE

## 2025-03-28 ENCOUNTER — OFFICE VISIT (OUTPATIENT)
Dept: HOME HEALTH SERVICES | Facility: CLINIC | Age: 60
End: 2025-03-28
Payer: MEDICARE

## 2025-03-28 VITALS
WEIGHT: 293 LBS | HEIGHT: 64 IN | BODY MASS INDEX: 50.02 KG/M2 | OXYGEN SATURATION: 97 % | HEART RATE: 67 BPM | SYSTOLIC BLOOD PRESSURE: 131 MMHG | DIASTOLIC BLOOD PRESSURE: 73 MMHG

## 2025-03-28 DIAGNOSIS — G47.33 OSA (OBSTRUCTIVE SLEEP APNEA): ICD-10-CM

## 2025-03-28 DIAGNOSIS — E66.2 CLASS 3 OBESITY WITH ALVEOLAR HYPOVENTILATION, SERIOUS COMORBIDITY, AND BODY MASS INDEX (BMI) OF 60.0 TO 69.9 IN ADULT: ICD-10-CM

## 2025-03-28 DIAGNOSIS — E66.813 CLASS 3 OBESITY WITH ALVEOLAR HYPOVENTILATION, SERIOUS COMORBIDITY, AND BODY MASS INDEX (BMI) OF 60.0 TO 69.9 IN ADULT: ICD-10-CM

## 2025-03-28 DIAGNOSIS — Z74.09 OTHER REDUCED MOBILITY: ICD-10-CM

## 2025-03-28 DIAGNOSIS — I89.0 LYMPHEDEMA: ICD-10-CM

## 2025-03-28 DIAGNOSIS — E66.01 SEVERE OBESITY (BMI >= 40): ICD-10-CM

## 2025-03-28 DIAGNOSIS — J44.9 CHRONIC OBSTRUCTIVE PULMONARY DISEASE, UNSPECIFIED COPD TYPE: ICD-10-CM

## 2025-03-28 DIAGNOSIS — Z98.84 S/P GASTRIC BYPASS: ICD-10-CM

## 2025-03-28 DIAGNOSIS — Z12.31 ENCOUNTER FOR SCREENING MAMMOGRAM FOR MALIGNANT NEOPLASM OF BREAST: ICD-10-CM

## 2025-03-28 DIAGNOSIS — M79.3 PANNICULITIS: ICD-10-CM

## 2025-03-28 DIAGNOSIS — F41.1 GENERALIZED ANXIETY DISORDER: ICD-10-CM

## 2025-03-28 DIAGNOSIS — E78.5 HYPERLIPIDEMIA, UNSPECIFIED HYPERLIPIDEMIA TYPE: ICD-10-CM

## 2025-03-28 DIAGNOSIS — F17.200 TOBACCO DEPENDENCY: ICD-10-CM

## 2025-03-28 DIAGNOSIS — S71.101S OPEN WOUND OF RIGHT THIGH, SEQUELA: ICD-10-CM

## 2025-03-28 DIAGNOSIS — F33.1 MODERATE EPISODE OF RECURRENT MAJOR DEPRESSIVE DISORDER: ICD-10-CM

## 2025-03-28 DIAGNOSIS — Z00.00 ENCOUNTER FOR MEDICARE ANNUAL WELLNESS EXAM: Primary | ICD-10-CM

## 2025-03-28 RX ORDER — ASCORBIC ACID 500 MG
1 TABLET ORAL 2 TIMES DAILY
COMMUNITY
Start: 2025-03-14 | End: 2026-03-14

## 2025-03-28 RX ORDER — CIPROFLOXACIN 500 MG/1
500 TABLET ORAL 2 TIMES DAILY
COMMUNITY
Start: 2025-03-26

## 2025-03-28 RX ORDER — DOCUSATE SODIUM 100 MG/1
1 CAPSULE, LIQUID FILLED ORAL 2 TIMES DAILY
COMMUNITY
Start: 2025-03-14 | End: 2025-05-13

## 2025-03-28 RX ORDER — DOXYCYCLINE 100 MG/1
100 CAPSULE ORAL 2 TIMES DAILY
COMMUNITY
Start: 2025-03-26

## 2025-03-28 RX ORDER — MULTIVITAMIN
1 TABLET ORAL DAILY
COMMUNITY

## 2025-03-28 RX ORDER — BACLOFEN 20 MG/1
1 TABLET ORAL 3 TIMES DAILY
COMMUNITY
Start: 2025-03-14 | End: 2025-04-13

## 2025-03-28 RX ORDER — BUPROPION HYDROCHLORIDE 150 MG/1
1 TABLET ORAL EVERY MORNING
COMMUNITY

## 2025-03-28 RX ORDER — HYDROXYZINE PAMOATE 25 MG/1
25 CAPSULE ORAL 3 TIMES DAILY PRN
COMMUNITY

## 2025-03-28 RX ORDER — ZINC SULFATE 50(220)MG
1 CAPSULE ORAL DAILY
COMMUNITY
Start: 2025-03-15 | End: 2026-03-15

## 2025-03-28 RX ORDER — ALBUTEROL SULFATE 90 UG/1
2 INHALANT RESPIRATORY (INHALATION) EVERY 6 HOURS PRN
COMMUNITY

## 2025-04-04 PROBLEM — E65 PANNICULUS: Status: ACTIVE | Noted: 2024-04-22

## 2025-04-04 PROBLEM — A41.9 SEVERE SEPSIS: Status: RESOLVED | Noted: 2024-12-26 | Resolved: 2025-04-04

## 2025-04-04 PROBLEM — M54.17 LUMBOSACRAL RADICULOPATHY: Status: ACTIVE | Noted: 2024-07-18

## 2025-04-04 PROBLEM — S71.101A OPEN WOUND OF RIGHT THIGH: Status: ACTIVE | Noted: 2025-02-13

## 2025-04-04 PROBLEM — E66.2 CLASS 3 OBESITY WITH ALVEOLAR HYPOVENTILATION, SERIOUS COMORBIDITY, AND BODY MASS INDEX (BMI) GREATER THAN OR EQUAL TO 70 IN ADULT: Status: ACTIVE | Noted: 2025-02-13

## 2025-04-04 PROBLEM — R65.20 SEVERE SEPSIS: Status: RESOLVED | Noted: 2024-12-26 | Resolved: 2025-04-04

## 2025-04-04 PROBLEM — Z79.891 LONG-TERM CURRENT USE OF OPIATE ANALGESIC: Status: ACTIVE | Noted: 2024-07-18

## 2025-04-04 PROBLEM — S21.002A OPEN WOUND OF LEFT BREAST: Status: RESOLVED | Noted: 2024-02-06 | Resolved: 2025-04-04

## 2025-04-04 PROBLEM — R22.41 MASS OF RIGHT THIGH: Status: RESOLVED | Noted: 2021-08-26 | Resolved: 2025-04-04

## 2025-04-04 PROBLEM — E66.813 CLASS 3 OBESITY WITH ALVEOLAR HYPOVENTILATION, SERIOUS COMORBIDITY, AND BODY MASS INDEX (BMI) GREATER THAN OR EQUAL TO 70 IN ADULT: Status: ACTIVE | Noted: 2025-02-13

## 2025-04-04 NOTE — PATIENT INSTRUCTIONS
Counseling and Referral of Other Preventative  (Italic type indicates deductible and co-insurance are waived)    Patient Name: Adriana Zaragoza  Today's Date: 4/4/2025    Health Maintenance       Date Due Completion Date    Mammogram Never done ---    Pneumococcal Vaccines (Age 50+) (2 of 2 - PCV) 05/14/2013 5/14/2012    Shingles Vaccine (1 of 2) Never done ---    Cervical Cancer Screening 10/07/2020 10/7/2015    Influenza Vaccine (1) Never done ---    COVID-19 Vaccine (1 - 2024-25 season) Never done ---    RSV Vaccine (Age 60+ and Pregnant patients) (1 - Risk 60-74 years 1-dose series) Never done ---    Colorectal Cancer Screening 10/03/2026 10/3/2023    TETANUS VACCINE 05/23/2027 5/23/2017    Hemoglobin A1c (Diabetic Prevention Screening) 03/12/2028 3/12/2025    Lipid Panel 06/22/2028 6/22/2023        Orders Placed This Encounter   Procedures    Mammo Digital Screening Bilat     The following information is provided to all patients.  This information is to help you find resources for any of the problems found today that may be affecting your health:                  Living healthy guide: www.[facility].[place].gov      Understanding Diabetes: www.diabetes.org      Eating healthy: www.cdc.gov/healthyweight      CDC home safety checklist: www.cdc.gov/steadi/patient.html      Agency on Aging: www.goea.[place].[facility]      Alcoholics anonymous (AA): www.aa.org      Physical Activity: www.travis.nih.gov/lv4zxqa      Tobacco use: www.quitwithusla.org

## 2025-04-04 NOTE — PROGRESS NOTES
I offered to discuss advanced care planning, including how to pick a person who would make decisions for you if you were unable to make them for yourself, called a health care power of , and what kind of decisions you might make such as use of life sustaining treatments such as ventilators and tube feeding when faced with a life limiting illness recorded on a living will that they will need to know. (How you want to be cared for as you near the end of your natural life)     X Patient is interested in learning more about how to make advanced directives.  I provided them paperwork and offered to discuss this with them.

## 2025-04-04 NOTE — PROGRESS NOTES
"  Adriana Zaragoza presented for an initial Medicare AWV today. The following components were reviewed and updated:    Medical history  Family History  Social history  Allergies and Current Medications  Health Risk Assessment  Health Maintenance  Care Team    **See Completed Assessments for Annual Wellness visit with in the encounter summary    The following assessments were completed:  Depression Screening  Cognitive function Screening  Timed Get Up Test  Whisper Test      Opioid documentation:      Patient does have a current opioid prescription.      Patient accepted further discussion regarding opioid medication use.      Patient is currently taking oxycodone narcotic for back and knee pain.        Pain level today is 4/10.       In addition to narcotic pain medications, patient is also using NSAIDs and acetaminophen for pain control.       Patient is followed by a specialist currently for their pain and will not be referred today.       Patient's opioid risk potential based on ORT-OUD tool:       Semaj each box that applies   No   Yes     Family history of substance abuse   Alcohol [x] []   Illegal drugs [x] []   Rx drugs [x] []     Personal history of substance abuse   Alcohol [x] []   Illegal drugs [x] []   Rx drugs [x] []     Age between 16-45 years   [x]   []     Patient with ADD, OCD, Bipolar disorder, schizoprenia   [x]   []     Patient with depression   []   [x]                         Scoring total                                                    1             Non-opioid treatment options have been discussed today and added to the patient's after visit summary.          Vitals:    03/28/25 1120   BP: 131/73   Patient Position: Sitting   Pulse: 67   SpO2: 97%   Weight: (!) 171.9 kg (379 lb)   Height: 5' 4" (1.626 m)     Body mass index is 65.06 kg/m².       Physical Exam  Constitutional:       Appearance: Normal appearance.   HENT:      Head: Normocephalic and atraumatic.      Nose: Nose normal.      " Mouth/Throat:      Mouth: Mucous membranes are moist.   Eyes:      Extraocular Movements: Extraocular movements intact.      Pupils: Pupils are equal, round, and reactive to light.   Cardiovascular:      Rate and Rhythm: Normal rate and regular rhythm.   Pulmonary:      Effort: Pulmonary effort is normal.      Breath sounds: Normal breath sounds.   Abdominal:      General: Bowel sounds are normal.      Palpations: Abdomen is soft.   Musculoskeletal:      Cervical back: Normal range of motion and neck supple.   Skin:     General: Skin is warm and dry.      Comments: + wound vac to right upper thigh    Neurological:      General: No focal deficit present.      Mental Status: She is alert and oriented to person, place, and time.   Psychiatric:         Mood and Affect: Mood normal.         Behavior: Behavior normal.           Diagnoses and health risks identified today and associated recommendations/orders:  1. Encounter for Medicare annual wellness exam  Awv completed      2. Encounter for screening mammogram for malignant neoplasm of breast  - Mammo Digital Screening Bilat; Future  Sent to [facility] for patient    3. Other reduced mobility  Has had issues with walking due to surgery       4. Moderate episode of recurrent major depressive disorder  On wellbutrin - stable - followed with PCP      5. Generalized anxiety disorder  On wellbutrin - stable - Followed with PCP      6. Chronic obstructive pulmonary disease, unspecified COPD type  Has been on oxygen in the past - now only using PRN. No increase SOB noted      7. Hyperlipidemia, unspecified hyperlipidemia type  Last  - followed with labs by PCP      8. Severe obesity (BMI >= 40)  Patient has lost 200 lbs in the last 7 years- stilll working on diet and exercise      9. Tobacco dependency  Cessation discussed      10. KEESHA (obstructive sleep apnea)  On cpap- followed with PCP      11. Lymphedema  Related to weight loss    12. Open wound of right thigh,  sequela  Related to recent surgery - removal of lymph tissue  [facility] for [facility] and surgeon    13. Panniculitis  Related to weight loss      14. Class 3 obesity with alveolar hypoventilation, serious comorbidity, and body mass index (BMI) of 60.0 to 69.9 in adult  Working on diet and exercise      15. S/P gastric bypass  Stable - has lost weight since surgery         Provided Adriana with a 5-10 year written screening schedule and personal prevention plan. Recommendations were developed using the USPSTF age appropriate recommendations. Education, counseling, and referrals were provided as needed.  After Visit Summary printed and given to patient which includes a list of additional screenings\tests needed.    Fu in 1 yr for bran Pena, DIANA, APRN

## 2025-04-07 ENCOUNTER — TELEPHONE (OUTPATIENT)
Dept: SMOKING CESSATION | Facility: CLINIC | Age: 60
End: 2025-04-07
Payer: MEDICARE

## 2025-04-07 NOTE — TELEPHONE ENCOUNTER
Called patient about 12 month follow up. Left message for patient to call [phone]. Resolved quit episode.

## 2025-04-24 ENCOUNTER — OFFICE VISIT (OUTPATIENT)
Dept: INFECTIOUS DISEASES | Facility: CLINIC | Age: 60
End: 2025-04-24
Payer: MEDICARE

## 2025-04-24 VITALS
OXYGEN SATURATION: 97 % | SYSTOLIC BLOOD PRESSURE: 128 MMHG | DIASTOLIC BLOOD PRESSURE: 66 MMHG | HEIGHT: 64 IN | WEIGHT: 293 LBS | BODY MASS INDEX: 50.02 KG/M2 | TEMPERATURE: 97 F

## 2025-04-24 DIAGNOSIS — L02.91 CUTANEOUS ABSCESS, UNSPECIFIED SITE: Primary | ICD-10-CM

## 2025-04-24 PROCEDURE — 99999 PR PBB SHADOW E&M-EST. PATIENT-LVL III: CPT | Mod: PBBFAC,,, | Performed by: INTERNAL MEDICINE

## 2025-04-24 RX ORDER — SULFAMETHOXAZOLE AND TRIMETHOPRIM 800; 160 MG/1; MG/1
1 TABLET ORAL 2 TIMES DAILY
Qty: 60 TABLET | Refills: 3 | Status: SHIPPED | OUTPATIENT
Start: 2025-04-24

## 2025-04-24 RX ORDER — MUPIROCIN 20 MG/G
OINTMENT TOPICAL 2 TIMES DAILY
Qty: 1 EACH | Refills: 0 | Status: SHIPPED | OUTPATIENT
Start: 2025-04-24 | End: 2025-04-29

## 2025-04-24 NOTE — PROGRESS NOTES
Subjective:      HPI  She has a history of extreme obesity, COPD and depression.  Known to me from previous encounter during hospitalization for panniculitis.  Presents to the emergency room 12/20/2024 generalized weakness of acute onset.  BP in the /59, pulse 96, respiratory 18, temperature 101.9°, oxygen saturation 94%.  She quickly decompensated and was intubated in the ER.       WBC 18, hematocrit 38, MCV 87, platelet 279, sodium 137, creatinine 0.6, LFTs normal.  UA unremarkable.  CT right showed known medial panniculus with findings concerning for cellulitis.  CT chest abdomen and pelvis with no pneumoniae or other acute findings.  CT head with no acute abnormality.  She was admitted to the ICU on antibiotics.     Antibiotic history:   Vancomycin:  12/26/2024-  Metronidazole: 02/20/2024 x1 dose   Ertapenem:  12/22/2024-  Aztreonam: 12/26/2024 x1 dose     Microbiology:    Blood culture 12/20/2024:  NGTD    01/23/2025:  here for follow up.  Recently hospitalized with recurrent cellulitis of right thigh pannus.  Improved and was discharged home.  States she developed flu and was back in ER again and treated with Tamiflu she is getting better.  States the right thigh swelling significantly reduced because she has been laying down from the flu.  Her home health has sent in referral for her to be reassessed by her plastic surgeon Dr. Trina Costello.    02/18/2025:  She was hospitalized again after last visit.  Discharged on daptomycin.  She has improved significantly.  Edema of right thigh pannus much better.  She has been reassess by her plastic surgeon and is scheduled for excisional resection of the pannus in March.    04/24/2025:  Since last visit she had excision of large right thigh pannus on 03/11/2025 at [facility].  She had a prolonged 16 day was antibiotics throughout her stay.  At discharge 11/5 doxycycline and ciprofloxacin which he had completed.  Wound continues to heal.  Still has  staples in place.  Developed a small right breast abscess about 1 week ago which he squeezed by herself.  It is healing.  States she developed diskitis of lesions from time to time on her skin.    Outdoor activities:  She does not smoke.  ADL independent.  Travel:  No recent travel  Implants:  None  Antibiotic history:  As above    Past Medical History:   Diagnosis Date    Allergy     Anemia     Asthma     COPD (chronic obstructive pulmonary disease)     Deep vein thrombosis     Depression     Diabetes mellitus, type 2     Encounter for blood transfusion     Heart murmur     Neuromuscular disorder        Past Surgical History:   Procedure Laterality Date     SECTION      DILATION AND CURETTAGE OF UTERUS      gastric sleeve      [facility]    TONSILLECTOMY         Family History   Problem Relation Name Age of Onset    Heart disease Mother      Diabetes Mother      Arthritis Mother      Depression Mother      Cancer Father      COPD Father      Arthritis Maternal Grandmother      Cancer Maternal Grandmother      Cancer Maternal Grandfather      Cancer Paternal Grandmother      Kidney disease Paternal Grandmother      Diabetes Paternal Grandmother      Diabetes Paternal Grandfather      Hyperlipidemia Paternal Grandfather         Social History     Socioeconomic History    Marital status: Significant Other   Tobacco Use    Smoking status: Former     Current packs/day: 1.00     Average packs/day: 1 pack/day for 0.4 years (0.4 ttl pk-yrs)     Types: Cigarettes     Start date: 2024    Smokeless tobacco: Never   Substance and Sexual Activity    Alcohol use: No    Drug use: No    Sexual activity: Never     Social Drivers of Health     Financial Resource Strain: Low Risk  (3/28/2025)    Overall Financial Resource Strain (CARDIA)     Difficulty of Paying Living Expenses: Not hard at all   Recent Concern: Financial Resource Strain - High Risk (2025)    Overall Financial Resource Strain (CARDIA)     Difficulty of  Paying Living Expenses: Hard   Food Insecurity: No Food Insecurity (3/28/2025)    Hunger Vital Sign     Worried About Running Out of Food in the Last Year: Never true     Ran Out of Food in the Last Year: Never true   Recent Concern: Food Insecurity - Food Insecurity Present (1/27/2025)    Hunger Vital Sign     Worried About Running Out of Food in the Last Year: Sometimes true     Ran Out of Food in the Last Year: Sometimes true   Transportation Needs: No Transportation Needs (3/28/2025)    PRAPARE - Transportation     Lack of Transportation (Medical): No     Lack of Transportation (Non-Medical): No   Recent Concern: Transportation Needs - Unmet Transportation Needs (3/11/2025)    Received from [facility]    PRAPARE - Transportation     Lack of Transportation (Medical): Yes     Lack of Transportation (Non-Medical): Yes   Physical Activity: Insufficiently Active (3/28/2025)    Exercise Vital Sign     Days of Exercise per Week: 2 days     Minutes of Exercise per Session: 10 min   Stress: No Stress Concern Present (3/28/2025)    [employer] - Occupational Stress Questionnaire     Feeling of Stress : Only a little   Recent Concern: Stress - Stress Concern Present (1/27/2025)    [employer] - Occupational Stress Questionnaire     Feeling of Stress : To some extent   Housing Stability: Low Risk  (3/28/2025)    Housing Stability Vital Sign     Unable to Pay for Housing in the Last Year: No     Number of Times Moved in the Last Year: 0     Homeless in the Last Year: No       Review of patient's allergies indicates:   Allergen Reactions    Fentanyl Other (See Comments)     Hypoxemia  Muscle twitching    Aspirin     Nicoderm     Nsaids (non-steroidal anti-inflammatory drug) Hives    Teflaro [ceftaroline fosamil]      Allergic reaction/ hives/itching       Current Outpatient Medications   Medication Instructions    acetaminophen (TYLENOL EXTRA STRENGTH) 1,000 mg, Every  6 hours PRN    albuterol (PROVENTIL/VENTOLIN HFA) 90 mcg/actuation inhaler 2 puffs, Every 6 hours PRN    ascorbic acid, vitamin C, (VITAMIN C) 500 MG tablet 1 tablet, 2 times daily    buPROPion (WELLBUTRIN XL) 150 MG TB24 tablet 1 tablet, Every morning    ciprofloxacin HCl (CIPRO) 500 mg, 2 times daily    docusate sodium (COLACE) 100 MG capsule 1 capsule, 2 times daily    doxycycline (VIBRAMYCIN) 100 mg, 2 times daily    hydrOXYzine pamoate (VISTARIL) 25 mg, 3 times daily PRN    multivitamin (THERAGRAN) per tablet 1 tablet, Daily    oxyCODONE-acetaminophen (PERCOCET) 5-325 mg per tablet 1 tablet, Every 6 hours PRN    zinc sulfate (ZINCATE) 50 mg zinc (220 mg) capsule 1 capsule, Daily         Review of Systems   10 system review unremarkable.  As in HPI.    OBJECTIVE          Physical Exam      General:   Obese, looks well.  In no acute distress   CVS: S1 and 2 heard, no murmurs appreciated   Respiratory: Clear to auscultation  Abdomen: Full, soft, nontender, no palpable organomegaly   Right lower extremity:  Lymphedema.  Dressing right medial thigh.  Did not take down   Right breast:  Healing spot lateral breast at area of skin abscess   Psych: Good mood, normal affect.     Wounds:  None  VAD:  None  ISOLATION:  Not applicable    PRIOR  MICROBIOLOGY:   reviewed    Susceptibility data from last 90 days.  Collected Specimen Info Organism   01/26/25 Blood No growth after 5 days.   01/26/25 Blood No growth after 5 days.       LAST 7 DAYS MICROBIOLOGY   Microbiology Results (last 7 days)       ** No results found for the last 168 hours. **            CURRENT/PREVIOUS VISIT EKG  Results for orders placed or performed during the hospital encounter of 01/26/25   EKG 12-lead    Collection Time: 01/26/25  8:01 PM   Result Value Ref Range    QRS Duration 80 ms    [facility] QTC Calculation 427 ms    Narrative    Test Reason : Z13.6,    Vent. Rate :  94 BPM     Atrial Rate :  94 BPM     P-R Int : 172 ms          QRS Dur :  80 ms       QT Int : 342 ms       P-R-T Axes :  26  70  40 degrees    QTcB Int : 427 ms    Normal sinus rhythm  Low voltage QRS  Borderline Abnormal ECG  When compared with ECG of 15-Choco-2025 20:00,  Minimal criteria for Anterior infarct are no longer Present  Nonspecific T wave abnormality no longer evident in Lateral leads  Confirmed by Kapil Moran ([id number]) on 1/31/2025 2:54:39 PM    Referred By: AAAREFERRAL SELF           Confirmed By: Kapil Moran       Significant Labs: All pertinent labs within the past 24 hours have been reviewed.     Significant Imaging: I have reviewed all relevant and available imaging results/findings within the past 24 hours.     Right breast abscess.  This has clinically resolved.  S    2.    Recurrent skin abscesses. Start with a ds 1 tablet b.i.d. for secondary prophylaxis.    3.    Right thigh panniculectomy.  Wound care as per plastic surgeon..      4.    Extreme obesity.    I will see again in 6 weeks for follow up.    Rajendra Callahan MD  Date of Service: 04/24/2025  1:34 PM    Total time spent with patient: 30    Each patient to whom he or she provides medical services by telemedicine is:  (1) informed of the relationship between the physician and patient and the respective role of any other health care provider with respect to management of the patient; and (2) notified that he or she may decline to receive medical services by telemedicine and may withdraw from such care at any time.

## 2025-06-12 ENCOUNTER — HOSPITAL ENCOUNTER (OUTPATIENT)
Dept: RADIOLOGY | Facility: HOSPITAL | Age: 60
Discharge: HOME | End: 2025-06-12
Attending: NURSE PRACTITIONER
Payer: MEDICARE

## 2025-06-12 DIAGNOSIS — Z12.31 ENCOUNTER FOR SCREENING MAMMOGRAM FOR MALIGNANT NEOPLASM OF BREAST: ICD-10-CM

## 2025-06-12 PROCEDURE — 77063 BREAST TOMOSYNTHESIS BI: CPT | Mod: TC,PO

## 2025-06-12 PROCEDURE — 77067 SCR MAMMO BI INCL CAD: CPT | Mod: 26,,, | Performed by: RADIOLOGY

## 2025-06-12 PROCEDURE — 77063 BREAST TOMOSYNTHESIS BI: CPT | Mod: 26,,, | Performed by: RADIOLOGY

## 2025-06-30 ENCOUNTER — HOSPITAL ENCOUNTER (OUTPATIENT)
Dept: RADIOLOGY | Facility: HOSPITAL | Age: 60
Discharge: HOME | End: 2025-06-30
Attending: NURSE PRACTITIONER
Payer: MEDICARE

## 2025-06-30 DIAGNOSIS — R92.8 ABNORMAL MAMMOGRAM: ICD-10-CM

## 2025-06-30 PROCEDURE — 77062 BREAST TOMOSYNTHESIS BI: CPT | Mod: 26,,, | Performed by: RADIOLOGY

## 2025-06-30 PROCEDURE — 77066 DX MAMMO INCL CAD BI: CPT | Mod: TC,PO

## 2025-06-30 PROCEDURE — 76642 ULTRASOUND BREAST LIMITED: CPT | Mod: 26,50,, | Performed by: RADIOLOGY

## 2025-06-30 PROCEDURE — 77066 DX MAMMO INCL CAD BI: CPT | Mod: 26,,, | Performed by: RADIOLOGY

## 2025-06-30 PROCEDURE — 76642 ULTRASOUND BREAST LIMITED: CPT | Mod: TC,50,PO

## 2025-07-06 ENCOUNTER — PATIENT MESSAGE (OUTPATIENT)
Dept: HOME HEALTH SERVICES | Facility: CLINIC | Age: 60
End: 2025-07-06
Payer: MEDICARE